# Patient Record
Sex: FEMALE | Race: WHITE | NOT HISPANIC OR LATINO | Employment: OTHER | URBAN - METROPOLITAN AREA
[De-identification: names, ages, dates, MRNs, and addresses within clinical notes are randomized per-mention and may not be internally consistent; named-entity substitution may affect disease eponyms.]

---

## 2017-03-03 ENCOUNTER — OFFICE VISIT - HEALTHEAST (OUTPATIENT)
Dept: CARDIOLOGY | Facility: CLINIC | Age: 62
End: 2017-03-03

## 2017-03-03 ENCOUNTER — AMBULATORY - HEALTHEAST (OUTPATIENT)
Dept: CARDIOLOGY | Facility: CLINIC | Age: 62
End: 2017-03-03

## 2017-03-03 DIAGNOSIS — I10 ESSENTIAL HYPERTENSION: ICD-10-CM

## 2017-03-03 DIAGNOSIS — I48.20 CHRONIC ATRIAL FIBRILLATION (H): ICD-10-CM

## 2017-03-03 RX ORDER — LOSARTAN POTASSIUM AND HYDROCHLOROTHIAZIDE 12.5; 5 MG/1; MG/1
1 TABLET ORAL DAILY
Status: SHIPPED | COMMUNITY
Start: 2017-03-03 | End: 2021-08-21

## 2017-03-03 ASSESSMENT — MIFFLIN-ST. JEOR: SCORE: 1759.91

## 2017-03-15 ENCOUNTER — HOSPITAL ENCOUNTER (OUTPATIENT)
Dept: CARDIOLOGY | Facility: HOSPITAL | Age: 62
Discharge: HOME | End: 2017-03-15
Attending: INTERNAL MEDICINE

## 2017-03-15 DIAGNOSIS — I48.20 CHRONIC ATRIAL FIBRILLATION (H): ICD-10-CM

## 2017-04-24 ENCOUNTER — COMMUNICATION - HEALTHEAST (OUTPATIENT)
Dept: CARDIOLOGY | Facility: CLINIC | Age: 62
End: 2017-04-24

## 2017-04-24 ENCOUNTER — OFFICE VISIT - HEALTHEAST (OUTPATIENT)
Dept: CARDIOLOGY | Facility: CLINIC | Age: 62
End: 2017-04-24

## 2017-04-24 DIAGNOSIS — I10 ESSENTIAL HYPERTENSION: ICD-10-CM

## 2017-04-24 DIAGNOSIS — I48.20 CHRONIC ATRIAL FIBRILLATION (H): ICD-10-CM

## 2017-04-24 ASSESSMENT — MIFFLIN-ST. JEOR: SCORE: 1728.16

## 2017-04-26 ENCOUNTER — COMMUNICATION - HEALTHEAST (OUTPATIENT)
Dept: CARDIOLOGY | Facility: CLINIC | Age: 62
End: 2017-04-26

## 2017-04-26 DIAGNOSIS — I48.20 CHRONIC ATRIAL FIBRILLATION (H): ICD-10-CM

## 2017-04-27 ENCOUNTER — COMMUNICATION - HEALTHEAST (OUTPATIENT)
Dept: CARDIOLOGY | Facility: CLINIC | Age: 62
End: 2017-04-27

## 2017-10-23 ENCOUNTER — COMMUNICATION - HEALTHEAST (OUTPATIENT)
Dept: CARDIOLOGY | Facility: CLINIC | Age: 62
End: 2017-10-23

## 2017-10-23 DIAGNOSIS — I48.20 CHRONIC ATRIAL FIBRILLATION (H): ICD-10-CM

## 2017-10-23 RX ORDER — METOPROLOL SUCCINATE 25 MG/1
TABLET, EXTENDED RELEASE ORAL
Qty: 90 TABLET | Refills: 2 | Status: ON HOLD | OUTPATIENT
Start: 2017-10-23 | End: 2023-05-04

## 2018-03-22 ENCOUNTER — RECORDS - HEALTHEAST (OUTPATIENT)
Dept: LAB | Facility: CLINIC | Age: 63
End: 2018-03-22

## 2018-03-22 LAB
ANION GAP SERPL CALCULATED.3IONS-SCNC: 10 MMOL/L (ref 5–18)
BUN SERPL-MCNC: 17 MG/DL (ref 8–22)
CALCIUM SERPL-MCNC: 9 MG/DL (ref 8.5–10.5)
CHLORIDE BLD-SCNC: 106 MMOL/L (ref 98–107)
CO2 SERPL-SCNC: 25 MMOL/L (ref 22–31)
CREAT SERPL-MCNC: 0.59 MG/DL (ref 0.6–1.1)
GFR SERPL CREATININE-BSD FRML MDRD: >60 ML/MIN/1.73M2
GLUCOSE BLD-MCNC: 119 MG/DL (ref 70–125)
POTASSIUM BLD-SCNC: 3.4 MMOL/L (ref 3.5–5)
SODIUM SERPL-SCNC: 141 MMOL/L (ref 136–145)

## 2018-08-28 ENCOUNTER — COMMUNICATION - HEALTHEAST (OUTPATIENT)
Dept: ADMINISTRATIVE | Facility: CLINIC | Age: 63
End: 2018-08-28

## 2018-10-19 ENCOUNTER — COMMUNICATION - HEALTHEAST (OUTPATIENT)
Dept: CARDIOLOGY | Facility: CLINIC | Age: 63
End: 2018-10-19

## 2019-11-21 ENCOUNTER — OFFICE VISIT - HEALTHEAST (OUTPATIENT)
Dept: CARDIOLOGY | Facility: CLINIC | Age: 64
End: 2019-11-21

## 2019-11-21 DIAGNOSIS — I48.19 PERSISTENT ATRIAL FIBRILLATION (H): ICD-10-CM

## 2019-11-21 DIAGNOSIS — I10 HYPERTENSION, UNSPECIFIED TYPE: ICD-10-CM

## 2019-11-21 RX ORDER — OLMESARTAN MEDOXOMIL AND HYDROCHLOROTHIAZIDE 20/12.5 20; 12.5 MG/1; MG/1
1 TABLET ORAL
Status: SHIPPED | COMMUNITY
Start: 2019-09-11 | End: 2021-08-21

## 2019-12-18 ENCOUNTER — HOSPITAL ENCOUNTER (OUTPATIENT)
Dept: CARDIOLOGY | Facility: HOSPITAL | Age: 64
Discharge: HOME | End: 2019-12-18
Attending: INTERNAL MEDICINE

## 2019-12-18 DIAGNOSIS — I48.19 PERSISTENT ATRIAL FIBRILLATION (H): ICD-10-CM

## 2021-05-24 ENCOUNTER — RECORDS - HEALTHEAST (OUTPATIENT)
Dept: ADMINISTRATIVE | Facility: CLINIC | Age: 66
End: 2021-05-24

## 2021-05-25 ENCOUNTER — RECORDS - HEALTHEAST (OUTPATIENT)
Dept: ADMINISTRATIVE | Facility: CLINIC | Age: 66
End: 2021-05-25

## 2021-05-26 ENCOUNTER — OFFICE VISIT - HEALTHEAST (OUTPATIENT)
Dept: CARDIOLOGY | Facility: CLINIC | Age: 66
End: 2021-05-26

## 2021-05-26 DIAGNOSIS — I10 ESSENTIAL HYPERTENSION: ICD-10-CM

## 2021-05-26 DIAGNOSIS — I48.21 PERMANENT ATRIAL FIBRILLATION (H): ICD-10-CM

## 2021-05-26 DIAGNOSIS — I07.1 TRICUSPID VALVE INSUFFICIENCY, UNSPECIFIED ETIOLOGY: ICD-10-CM

## 2021-05-26 ASSESSMENT — MIFFLIN-ST. JEOR: SCORE: 1868.78

## 2021-05-27 ENCOUNTER — RECORDS - HEALTHEAST (OUTPATIENT)
Dept: ADMINISTRATIVE | Facility: CLINIC | Age: 66
End: 2021-05-27

## 2021-05-28 ENCOUNTER — RECORDS - HEALTHEAST (OUTPATIENT)
Dept: ADMINISTRATIVE | Facility: CLINIC | Age: 66
End: 2021-05-28

## 2021-05-30 VITALS — BODY MASS INDEX: 38.34 KG/M2 | WEIGHT: 253 LBS | HEIGHT: 68 IN

## 2021-05-30 VITALS — BODY MASS INDEX: 39.4 KG/M2 | WEIGHT: 260 LBS | HEIGHT: 68 IN

## 2021-06-02 ENCOUNTER — RECORDS - HEALTHEAST (OUTPATIENT)
Dept: ADMINISTRATIVE | Facility: CLINIC | Age: 66
End: 2021-06-02

## 2021-06-03 ENCOUNTER — RECORDS - HEALTHEAST (OUTPATIENT)
Dept: ADMINISTRATIVE | Facility: CLINIC | Age: 66
End: 2021-06-03

## 2021-06-04 VITALS
RESPIRATION RATE: 18 BRPM | HEART RATE: 99 BPM | SYSTOLIC BLOOD PRESSURE: 124 MMHG | BODY MASS INDEX: 40.97 KG/M2 | OXYGEN SATURATION: 97 % | DIASTOLIC BLOOD PRESSURE: 72 MMHG | WEIGHT: 265.5 LBS

## 2021-06-22 ENCOUNTER — HOSPITAL ENCOUNTER (OUTPATIENT)
Dept: CARDIOLOGY | Facility: HOSPITAL | Age: 66
Discharge: HOME | End: 2021-06-22
Attending: INTERNAL MEDICINE
Payer: MEDICARE

## 2021-06-22 DIAGNOSIS — I07.1 TRICUSPID VALVE INSUFFICIENCY, UNSPECIFIED ETIOLOGY: ICD-10-CM

## 2021-06-22 DIAGNOSIS — I48.21 PERMANENT ATRIAL FIBRILLATION (H): ICD-10-CM

## 2021-06-22 DIAGNOSIS — I10 ESSENTIAL HYPERTENSION: ICD-10-CM

## 2021-06-22 LAB
AORTIC ROOT: 3.5 CM
AORTIC VALVE MEAN VELOCITY: 168 CM/S
ASCENDING AORTA: 3.7 CM
AV DIMENSIONLESS INDEX VTI: 0.3
AV MEAN GRADIENT: 13 MMHG
AV PEAK GRADIENT: 26.8 MMHG
AV VALVE AREA: 0.9 CM2
BSA FOR ECHO PROCEDURE: 2.47 M2
CV BLOOD PRESSURE: ABNORMAL MMHG
CV ECHO HEIGHT: 67.5 IN
CV ECHO WEIGHT: 284 LBS
DOP CALC AO PEAK VEL: 259 CM/S
DOP CALC AO VTI: 58.5 CM
DOP CALC LVOT AREA: 3.14 CM2
DOP CALC LVOT DIAMETER: 2 CM
DOP CALC LVOT STROKE VOLUME: 51.8 CM3
DOP CALC MV VTI: 28.2 CM
DOP CALCLVOT PEAK VEL VTI: 16.5 CM
EJECTION FRACTION: 74 % (ref 55–75)
FRACTIONAL SHORTENING: 27.1 % (ref 28–44)
INTERVENTRICULAR SEPTUM IN END DIASTOLE: 0.9 CM (ref 0.6–0.9)
IVS/PW RATIO: 0.8
LA AREA 1: 23.9 CM2
LA AREA 2: 23.4 CM2
LEFT ATRIUM LENGTH: 5.47 CM
LEFT ATRIUM SIZE: 5.3 CM
LEFT ATRIUM TO AORTIC ROOT RATIO: 1.51 NO UNITS
LEFT ATRIUM VOLUME INDEX: 35.2 ML/M2
LEFT ATRIUM VOLUME: 86.9 ML
LEFT VENTRICLE CARDIAC INDEX: 2 L/MIN/M2
LEFT VENTRICLE CARDIAC OUTPUT: 4.9 L/MIN
LEFT VENTRICLE DIASTOLIC VOLUME INDEX: 20.2 CM3/M2 (ref 29–61)
LEFT VENTRICLE DIASTOLIC VOLUME: 50 CM3 (ref 46–106)
LEFT VENTRICLE HEART RATE: 94 BPM
LEFT VENTRICLE MASS INDEX: 73.6 G/M2
LEFT VENTRICLE SYSTOLIC VOLUME INDEX: 5.3 CM3/M2 (ref 8–24)
LEFT VENTRICLE SYSTOLIC VOLUME: 13 CM3 (ref 14–42)
LEFT VENTRICULAR INTERNAL DIMENSION IN DIASTOLE: 4.8 CM (ref 3.8–5.2)
LEFT VENTRICULAR INTERNAL DIMENSION IN SYSTOLE: 3.5 CM (ref 2.2–3.5)
LEFT VENTRICULAR MASS: 181.9 G
LEFT VENTRICULAR OUTFLOW TRACT MEAN GRADIENT: 1 MMHG
LEFT VENTRICULAR OUTFLOW TRACT MEAN VELOCITY: 43.8 CM/S
LEFT VENTRICULAR POSTERIOR WALL IN END DIASTOLE: 1.2 CM (ref 0.6–0.9)
LV STROKE VOLUME INDEX: 21 ML/M2
MITRAL VALVE MEAN INFLOW VELOCITY: 48.3 CM/S
MITRAL VALVE PEAK VELOCITY: 124 CM/S
MV AREA VTI: 1.84 CM2
MV DECELERATION TIME: 197 MS
MV MEAN GRADIENT: 1 MMHG
MV PEAK E VELOCITY: 139 CM/S
MV PEAK GRADIENT: 6.2 MMHG
MV VALVE AREA BY CONTINUITY EQUATION: 1.8 CM2
NUC REST DIASTOLIC VOLUME INDEX: 4544 LBS
NUC REST SYSTOLIC VOLUME INDEX: 67.5 IN
PV ACCELERATION TIME: 53 MS
TRICUSPID REGURGITATION PEAK PRESSURE GRADIENT: 33.4 MMHG
TRICUSPID VALVE ANULAR PLANE SYSTOLIC EXCURSION: 2.1 CM
TRICUSPID VALVE PEAK REGURGITANT VELOCITY: 289 CM/S

## 2021-06-22 ASSESSMENT — MIFFLIN-ST. JEOR: SCORE: 1868.78

## 2021-06-25 NOTE — PROGRESS NOTES
Progress Notes by Adia Rocha MD at 4/24/2017  8:30 AM     Author: Adia Rocha MD Service: -- Author Type: Physician    Filed: 4/24/2017  8:57 AM Encounter Date: 4/24/2017 Status: Signed    : Adia Rocha MD (Physician)                  [email or web address]           Thank you Dr. Dougherty for asking the [facility] Heart Care team to participate in the care of your patient, Misty Quezada.     Impression and Plan     1. Atrial fibrillation. Misty has relatively recent diagnosis of atrial fibrillation. This was diagnosed within the last 6 months. Esther [id number] Score is 2 yielding an annual embolic risk of 2.2-2.9%. He has been initiated on apixaban (Eliquis ) for CVA prophylaxis. Her ventricular response would appear to be well controlled.  Today, I did readdress with Misty the possibility of pursuing direct current cardioversion.  She continues to be somewhat reticent to pursue and would rather defer.  Plan:    Continue metoprolol succinate.    Continue apixaban (Eliquis ) for CVA prophylaxis.     2.  Hypertension.  Blood pressure is very reasonable in the office today.    Plan on follow-up in 6 months.         History of Present Illness    Once again I would like to thank you again for asking me to participate in the care of your patient, Misty Quezada.  As you know, but to reiterate for my own records, Misty Quezada is a 61 y.o. female with relatively recent diagnosis of atrial fibrillation. Patient indicates that this was identified somewhat incidentally. She states that she is unaware of the rhythm disturbance. She specifically denies subjective palpitations. She reports no lightheadedness. She also denies any chest pain, shortness of breath, or subjective decline in exercise tolerance from baseline but does indicate that she is somewhat limited due to her weight and some mild orthopedic issues.    Further review of systems is otherwise negative/noncontributory  "(medical record and 13 point review of systems reviewed as well and pertinent positives noted).         Cardiac Diagnostics      Echocardiogram 9 February 2017:  1. Normal left ventricular size and systolic performance with ejection fraction of 50-55%.  2. Mild concentric increased left ventricular wall thickness.  3. Moderate tricuspid insufficiency.  4. Mild left atrial enlargement. Moderate right atrial enlargement.     24-hour Holter monitor 15 March 2017:  1. Persistent atrial fibrillation with mildly elevated ventricular response.         Physical Examination       /70 (Patient Site: Left Arm, Patient Position: Sitting, Cuff Size: Adult Large)  Pulse 76  Resp 16  Ht 5' 7.5\" (1.715 m)  Wt (!) 253 lb (114.8 kg)  BMI 39.04 kg/m2        Wt Readings from Last 3 Encounters:   04/24/17 (!) 253 lb (114.8 kg)   03/03/17 (!) 260 lb (117.9 kg)     The patient is alert and oriented times three. Sclerae are anicteric. Mucosal membranes are moist. Jugular venous pressure is normal. No significant adenopathy/thyromegally appreciated. Lungs are clear with good expansion. On cardiovascular exam, the patient has an irregular S1 and S2. Abdomen is soft and non-tender. Extremities reveal no clubbing, cyanosis, or edema.         Family History/Social History/Risk Factors   Patient does not smoke.  Family history of hypertension.         Medications  Allergies   Current Outpatient Prescriptions   Medication Sig Dispense Refill   ? apixaban (ELIQUIS) 5 mg Tab tablet Take 5 mg by mouth 2 (two) times a day.     ? CALCIUM/D3/MAG OX//TYRONE/ZN (CALTRATE + D3 PLUS MINERALS ORAL) Take 1 tablet by mouth daily.     ? cholecalciferol, vitamin D3, 2,000 unit Tab Take 2,000 Units by mouth daily.     ? cyanocobalamin 1000 MCG tablet Take 1,000 mcg by mouth daily.     ? losartan-hydrochlorothiazide (HYZAAR) 50-12.5 mg per tablet Take 1 tablet by mouth daily.     ? metoprolol succinate (TOPROL-XL) 25 MG Take 12.5 mg by mouth daily.   "   ? MULTIVIT WITH CALCIUM,IRON,MIN (WOMEN'S DAILY MULTIVITAMIN ORAL) Take 1 tablet by mouth daily.       No current facility-administered medications for this visit.       No Known Allergies       Lab Results   Lab Results   Component Value Date     09/09/2016    K 3.7 09/09/2016     09/09/2016    CO2 27 09/09/2016    BUN 14 09/09/2016    CREATININE 0.73 09/09/2016    CALCIUM 9.2 09/09/2016     Lab Results   Component Value Date    CHOL 163 09/09/2016    TRIG 81 09/09/2016    HDL 42 (L) 09/09/2016    LDLCALC 105 09/09/2016     Lab Results   Component Value Date    TSH 4.15 09/09/2016

## 2021-06-25 NOTE — PROGRESS NOTES
Progress Notes by Adia Rocha MD at 3/3/2017  9:10 AM     Author: Adia Rocha MD Service: -- Author Type: Physician    Filed: 3/3/2017  9:52 AM Encounter Date: 3/3/2017 Status: Signed    : Adia Rocha MD (Physician)        `        [email or web address]            Thank you Dr. Dougherty for asking the [facility] Heart Care team to participate in the care of your patient, Misty Quezada.     Impression and Plan     1.  Atrial fibrillation.  Misty has relatively recent diagnosis of atrial fibrillation.  This was diagnosed within the last 6 months. Rut [id number] Score is 2 yielding an annual embolic risk of 2.2-2.9%.  He has been initiated on apixaban (Eliquis ) for CVA prophylaxis.  Her ventricular response would appear to be well controlled on examination and also on ECG.  Patient is essentially asymptomatic with the rhythm disturbance.  I did discuss with her pursuing at least one attempt at direct-current cardioversion.  She had first blush was somewhat reticent to pursue this though did not entirely rule out this option.  She ultimately wanted to think about this some more.  Parenthetically I did discuss possible sleep apnea with the patient.  She states that she lives with her 2 sons and she has not been reported to have prominent snoring.  She also minimizes any subjective daytime somnolence.  Recommend:    24-hour Holter monitor to ensure the ventricular response is indeed well-controlled particularly throughout her activities of daily living.    Continue metoprolol succinate.    Continue apixaban (Eliquis )  for CVA prophylaxis.    Plan on follow-up in 6 weeks.         History of Present Illness    Once again I would like to thank you again for asking me to participate in the care of your patient, Misty Quezada.  As you know, but to reiterate for my own records, Misty Quezada is a 61 y.o. female with relatively recent diagnosis of atrial fibrillation.   "Patient indicates that this was identified somewhat incidentally.  She states that she is unaware of the rhythm disturbance.  She specifically denies subjective palpitations.  She reports no lightheadedness.  She also denies any chest pain, shortness of breath, or subjective decline in exercise tolerance from baseline but does indicate that she is somewhat limited due to her weight and some mild orthopedic issues.    Further review of systems is otherwise negative/noncontributory (medical record and 13 point review of systems reviewed as well and pertinent positives noted).         Cardiac Diagnostics     Echocardiogram 9 February 2017:  1. Normal left ventricular size and systolic performance with ejection fraction of 50-55%.  2. Mild concentric increased left ventricular wall thickness.  3. Moderate tricuspid insufficiency.  4. Mild left atrial enlargement.  Moderate right atrial enlargement.    12-lead ECG 24 August 2016 (personally reviewed): Atrial fibrillation with heart rate of 106 bpm.  Somewhat low voltage.  Nonspecific T-wave changes.         Physical Examination         Visit Vitals   ? BP 96/80 (Patient Site: Right Arm, Patient Position: Sitting, Cuff Size: Adult Large)   ? Pulse 76   ? Resp 16   ? Ht 5' 7.5\" (1.715 m)   ? Wt (!) 260 lb (117.9 kg)   ? BMI 40.12 kg/m2           Wt Readings from Last 3 Encounters:   03/03/17 (!) 260 lb (117.9 kg)     The patient is alert and oriented times three. Sclerae are anicteric. Mucosal membranes are moist. Jugular venous pressure is normal. No significant adenopathy/thyromegally appreciated. Lungs are clear with good expansion. On cardiovascular exam, the patient has an irregular S1 and S2.  1-2/6 systolic murmur.  Abdomen is soft and non-tender. Extremities reveal no clubbing, cyanosis, or edema.         Family History/Social History/Risk Factors     She does not smoke.  Father had a history of brain aneurysm.  Mother with history of hypertension.  Patient works " as a  at a [employer].  She lives with her 2 sons.  In her spare time she enjoys reading.         Medications  Allergies   Current Outpatient Prescriptions   Medication Sig Dispense Refill   ? apixaban (ELIQUIS) 5 mg Tab tablet Take 5 mg by mouth 2 (two) times a day.     ? aspirin 81 MG EC tablet Take 81 mg by mouth daily.     ? CALCIUM/D3/MAG OX//TYRONE/ZN (CALTRATE + D3 PLUS MINERALS ORAL) Take 1 tablet by mouth daily.     ? cholecalciferol, vitamin D3, 2,000 unit Tab Take 2,000 Units by mouth daily.     ? cyanocobalamin 1000 MCG tablet Take 1,000 mcg by mouth daily.     ? losartan-hydrochlorothiazide (HYZAAR) 50-12.5 mg per tablet Take 1 tablet by mouth daily.     ? metoprolol succinate (TOPROL-XL) 25 MG Take 12.5 mg by mouth daily.     ? MULTIVIT WITH CALCIUM,IRON,MIN (WOMEN'S DAILY MULTIVITAMIN ORAL) Take 1 tablet by mouth daily.       No current facility-administered medications for this visit.       No Known Allergies       Lab Results   Lab Results   Component Value Date     09/09/2016    K 3.7 09/09/2016     09/09/2016    CO2 27 09/09/2016    BUN 14 09/09/2016    CREATININE 0.73 09/09/2016    CALCIUM 9.2 09/09/2016     Lab Results   Component Value Date    CHOL 163 09/09/2016    TRIG 81 09/09/2016    HDL 42 (L) 09/09/2016    LDLCALC 105 09/09/2016     Lab Results   Component Value Date    TSH 4.15 09/09/2016           Medical History  Surgical History   Hypertension  Anxiety  Obesity   Status post tonsillectomy/adenoidectomy  Status post ventral hernia repair  Status post appendectomy  Status post Muna-en-Y bypass  Status post dilation/curettage

## 2021-06-28 NOTE — PROGRESS NOTES
Progress Notes by Adia Rocha MD at 11/21/2019  1:30 PM     Author: Adia Rocha MD Service: -- Author Type: Physician    Filed: 11/21/2019  1:46 PM Encounter Date: 11/21/2019 Status: Signed    : Adia Rocha MD (Physician)                                       Thank you Dr. Chavez for asking the [facility] Heart Care team to participate in the care of your patient, Misty Quezada.     Impression and Plan     1. Atrial fibrillation. Misty has relatively recent diagnosis of atrial fibrillation. This was diagnosed within the last 6 months. Miguel [id number] Score is 2 yielding an annual embolic risk of 2.2-2.9%. He has been initiated on apixaban (Eliquis ) for CVA prophylaxis. Her ventricular response would appear to be well fairly well controlled.  Misty historically has been reticent to pursue cardioversion.  Plan:    Continue metoprolol succinate.    Continue apixaban (Eliquis ) for CVA prophylaxis.      At this time, would like to obtain a 24-hour Holter monitor to ensure ventricular response is indeed well controlled particularly with activity.     2.  Hypertension.  Blood pressure is reasonable in the office today at 124/72 mmHg.     Plan on follow-up in one year           History of Present Illness    Once again I would like to thank you again for asking me to participate in the care of your patient, Misty Quezada.  As you know, but to reiterate for my own records, Misty Quezada is a 64 y.o. female with chronic/persistent atrial fibrillation. Patient indicates that this was identified somewhat incidentally. She states that she is unaware of the rhythm disturbance. She specifically denies subjective palpitations. She reports no lightheadedness. She also denies any chest pain, shortness of breath, or subjective decline in exercise tolerance from baseline.    Further review of systems is otherwise negative/noncontributory (medical record and 13 point review of systems  reviewed as well and pertinent positives noted).         Cardiac Diagnostics      Echocardiogram 9 February 2017:  1. Normal left ventricular size and systolic performance with ejection fraction of 50-55%.  2. Mild concentric increased left ventricular wall thickness.  3. Moderate tricuspid insufficiency.  4. Mild left atrial enlargement. Moderate right atrial enlargement.    24-hour Holter monitor 15 March 2017:  1. Persistent atrial fibrillation with mildly elevated ventricular response.           Physical Examination       /72 (Patient Site: Right Arm, Patient Position: Sitting, Cuff Size: Adult Large)   Pulse 99   Resp 18   Wt (!) 265 lb 8 oz (120.4 kg)   SpO2 97%   BMI 40.97 kg/m          Wt Readings from Last 3 Encounters:   11/21/19 (!) 265 lb 8 oz (120.4 kg)   04/24/17 (!) 253 lb (114.8 kg)   03/03/17 (!) 260 lb (117.9 kg)     The patient is alert and oriented times three. Sclerae are anicteric. Mucosal membranes are moist. Jugular venous pressure is normal. No significant adenopathy/thyromegally appreciated. Lungs are clear with good expansion. On cardiovascular exam, the patient has an irregular S1 and S2. Abdomen is soft and non-tender. Extremities reveal no clubbing, cyanosis, or edema.         Family History/Social History/Risk Factors   Patient does not smoke.  Family history of hypertension.         Medications  Allergies   Current Outpatient Medications   Medication Sig Dispense Refill   ? apixaban (ELIQUIS) 5 mg Tab tablet Take 5 mg by mouth 2 (two) times a day.     ? CALCIUM/D3/MAG OX//TYRONE/ZN (CALTRATE + D3 PLUS MINERALS ORAL) Take 1 tablet by mouth daily.     ? cholecalciferol, vitamin D3, 2,000 unit Tab Take 2,000 Units by mouth daily.     ? cyanocobalamin 1000 MCG tablet Take 1,000 mcg by mouth daily.     ? losartan-hydrochlorothiazide (HYZAAR) 50-12.5 mg per tablet Take 1 tablet by mouth daily.     ? metoprolol succinate (TOPROL-XL) 25 MG TAKE 1 TABLET BY MOUTH DAILY. 90 tablet 2    ? MULTIVIT WITH CALCIUM,IRON,MIN (WOMEN'S DAILY MULTIVITAMIN ORAL) Take 1 tablet by mouth daily.     ? olmesartan-hydrochlorothiazide (BENICAR HCT) 20-12.5 mg per tablet Take 1 tablet by mouth.       No current facility-administered medications for this visit.       No Known Allergies       Lab Results   Lab Results   Component Value Date     03/22/2018    K 3.4 (L) 03/22/2018     03/22/2018    CO2 25 03/22/2018    BUN 17 03/22/2018    CREATININE 0.59 (L) 03/22/2018    CALCIUM 9.0 03/22/2018     Lab Results   Component Value Date    CHOL 163 09/09/2016    TRIG 81 09/09/2016    HDL 42 (L) 09/09/2016    LDLCALC 105 09/09/2016     Lab Results   Component Value Date    TSH 4.15 09/09/2016

## 2021-07-04 NOTE — PROGRESS NOTES
Progress Notes by Adia Rocha MD at 5/26/2021  1:10 PM     Author: Adia Rocha MD Service: -- Author Type: Physician    Filed: 5/26/2021  1:44 PM Encounter Date: 5/26/2021 Status: Signed    : Adia Rocha MD (Physician)                                       Thank you Dr. Chavez for asking the [facility] Heart Care team to participate in the care of your patient, Misty Quezada.     Impression and Plan        1. Atrial fibrillation (permanent). Misty has relatively recent diagnosis of atrial fibrillation. This was diagnosed within the last 6 months. Esther [id number] Score is 2 yielding an annual embolic risk of 2.2-2.9%. He has been initiated on apixaban (Eliquis ) for CVA prophylaxis. Her ventricular response would appear to be well fairly well controlled in the clinic today and prior Holter monitor 18 December 2019 also confirmed controlled ventricular response.  Plan:    Continue metoprolol succinate.    Continue apixaban (Eliquis ) for CVA prophylaxis.       2.  Hypertension.  Blood pressure is reasonable in the office today at 124/72 mmHg.    3.  Tricuspid insufficiency.  This is felt moderate in degree on echocardiogram February 2017.    Plan to obtain repeat echocardiogram to rule out significant progression.     Plan on follow-up in one year.      25 minutes spent reviewing prior records (including documentation, laboratory studies, cardiac testing/imaging), interview with patient along with physical exam, planning, and subsequent documentation/crafting of note.           History of Present Illness    Once again I would like to thank you again for asking me to participate in the care of your patient, Misty Quezada.  As you know, but to reiterate for my own records, Misty Quezada is a 65 y.o. female with chronic/persistent atrial fibrillation. Patient indicates that this was identified somewhat incidentally. She states that she is unaware of the rhythm  "disturbance. She specifically denies subjective palpitations. She reports no lightheadedness. She also denies any chest pain, shortness of breath, or subjective decline in exercise tolerance from her usual baseline.  Denies fevers, chills, or other constitutional symptoms.    Further review of systems is otherwise negative/noncontributory (medical record and 13 point review of systems reviewed as well and pertinent positives noted).         Cardiac Diagnostics      Echocardiogram 9 February 2017:  1. Normal left ventricular size and systolic performance with ejection fraction of 50-55%.  2. Mild concentric increased left ventricular wall thickness.  3. Moderate tricuspid insufficiency.  4. Mild left atrial enlargement. Moderate right atrial enlargement.    24-hour Holter monitor 18 December 2019:  1. Persistent atrial fibrillation with controlled ventricular response.    24-hour Holter monitor 15 March 2017:  1. Persistent atrial fibrillation with mildly elevated ventricular response.           Physical Examination       /85 (Patient Site: Right Arm, Patient Position: Sitting, Cuff Size: Adult Large)   Pulse 88   Resp 16   Ht 5' 7.5\" (1.715 m)   Wt (!) 284 lb (128.8 kg) Comment: With shoes.  BMI 43.82 kg/m          Wt Readings from Last 3 Encounters:   05/26/21 (!) 284 lb (128.8 kg)   11/21/19 (!) 265 lb 8 oz (120.4 kg)   04/24/17 (!) 253 lb (114.8 kg)     The patient is alert and oriented times three. Sclerae are anicteric. Mucosal membranes are moist. Jugular venous pressure is normal. No significant adenopathy/thyromegally appreciated. Lungs are clear with good expansion. On cardiovascular exam, the patient has a an irregular regular S1 and S2. Abdomen is soft and non-tender. Extremities reveal no clubbing, cyanosis, or edema.         Family History/Social History/Risk Factors   Patient does not smoke.  Family history of hypertension in father.         Medications  Allergies   Current Outpatient " Medications   Medication Sig Dispense Refill   ? apixaban (ELIQUIS) 5 mg Tab tablet Take 5 mg by mouth 2 (two) times a day.     ? CALCIUM/D3/MAG OX//TYRONE/ZN (CALTRATE + D3 PLUS MINERALS ORAL) Take 1 tablet by mouth daily.     ? cholecalciferol, vitamin D3, 2,000 unit Tab Take 2,000 Units by mouth daily.     ? cyanocobalamin 1000 MCG tablet Take 1,000 mcg by mouth daily.     ? losartan-hydrochlorothiazide (HYZAAR) 50-12.5 mg per tablet Take 1 tablet by mouth daily.     ? metoprolol succinate (TOPROL-XL) 25 MG TAKE 1 TABLET BY MOUTH DAILY. 90 tablet 2   ? MULTIVIT WITH CALCIUM,IRON,MIN (WOMEN'S DAILY MULTIVITAMIN ORAL) Take 1 tablet by mouth daily.     ? olmesartan-hydrochlorothiazide (BENICAR HCT) 20-12.5 mg per tablet Take 1 tablet by mouth.       No current facility-administered medications for this visit.       No Known Allergies       Lab Results   Lab Results   Component Value Date     03/22/2018    K 3.4 (L) 03/22/2018     03/22/2018    CO2 25 03/22/2018    BUN 17 03/22/2018    CREATININE 0.59 (L) 03/22/2018    CALCIUM 9.0 03/22/2018     Lab Results   Component Value Date    CHOL 163 09/09/2016    TRIG 81 09/09/2016    HDL 42 (L) 09/09/2016    LDLCALC 105 09/09/2016     Lab Results   Component Value Date    TSH 4.15 09/09/2016

## 2021-07-06 VITALS
DIASTOLIC BLOOD PRESSURE: 85 MMHG | HEIGHT: 68 IN | SYSTOLIC BLOOD PRESSURE: 122 MMHG | WEIGHT: 284 LBS | RESPIRATION RATE: 16 BRPM | BODY MASS INDEX: 43.04 KG/M2 | HEART RATE: 88 BPM

## 2021-07-06 VITALS — HEIGHT: 68 IN | WEIGHT: 284 LBS | BODY MASS INDEX: 43.04 KG/M2

## 2021-07-13 ENCOUNTER — RECORDS - HEALTHEAST (OUTPATIENT)
Dept: ADMINISTRATIVE | Facility: CLINIC | Age: 66
End: 2021-07-13

## 2021-07-21 ENCOUNTER — RECORDS - HEALTHEAST (OUTPATIENT)
Dept: ADMINISTRATIVE | Facility: CLINIC | Age: 66
End: 2021-07-21

## 2021-08-21 ENCOUNTER — APPOINTMENT (OUTPATIENT)
Dept: RADIOLOGY | Facility: HOSPITAL | Age: 66
DRG: 871 | End: 2021-08-21
Payer: MEDICARE

## 2021-08-21 ENCOUNTER — APPOINTMENT (OUTPATIENT)
Dept: ULTRASOUND IMAGING | Facility: HOSPITAL | Age: 66
DRG: 871 | End: 2021-08-21
Payer: MEDICARE

## 2021-08-21 ENCOUNTER — HOSPITAL ENCOUNTER (INPATIENT)
Facility: HOSPITAL | Age: 66
LOS: 6 days | Discharge: NURSING, REHAB OR LONG-TERM CARE | DRG: 871 | End: 2021-08-27
Attending: EMERGENCY MEDICINE | Admitting: INTERNAL MEDICINE
Payer: MEDICARE

## 2021-08-21 DIAGNOSIS — R06.00 DYSPNEA: Primary | ICD-10-CM

## 2021-08-21 DIAGNOSIS — I48.91 ATRIAL FIBRILLATION WITH RAPID VENTRICULAR RESPONSE (H): ICD-10-CM

## 2021-08-21 DIAGNOSIS — R79.89 ELEVATED TROPONIN: ICD-10-CM

## 2021-08-21 DIAGNOSIS — L03.116 CELLULITIS OF LEFT LEG: ICD-10-CM

## 2021-08-21 DIAGNOSIS — A41.9 SEPSIS (H): ICD-10-CM

## 2021-08-21 DIAGNOSIS — J20.9 ACUTE BRONCHITIS, UNSPECIFIED ORGANISM: ICD-10-CM

## 2021-08-21 LAB
ANION GAP SERPL CALCULATED.3IONS-SCNC: 10 MMOL/L (ref 5–18)
APTT PPP: 29 SECONDS (ref 22–38)
BNP SERPL-MCNC: 281 PG/ML (ref 0–112)
BUN SERPL-MCNC: 26 MG/DL (ref 8–22)
CALCIUM SERPL-MCNC: 10.1 MG/DL (ref 8.5–10.5)
CHLORIDE BLD-SCNC: 106 MMOL/L (ref 98–107)
CO2 SERPL-SCNC: 22 MMOL/L (ref 22–31)
CREAT SERPL-MCNC: 0.88 MG/DL (ref 0.6–1.1)
ERYTHROCYTE [DISTWIDTH] IN BLOOD BY AUTOMATED COUNT: 14.8 % (ref 10–15)
GFR SERPL CREATININE-BSD FRML MDRD: 68 ML/MIN/1.73M2
GLUCOSE BLD-MCNC: 129 MG/DL (ref 70–125)
HCT VFR BLD AUTO: 44.2 % (ref 35–47)
HGB BLD-MCNC: 14.1 G/DL (ref 11.7–15.7)
INR PPP: 1.44 (ref 0.9–1.15)
LACTATE SERPL-SCNC: 1.6 MMOL/L (ref 0.7–2)
LACTATE SERPL-SCNC: 3.7 MMOL/L (ref 0.7–2)
MCH RBC QN AUTO: 30.4 PG (ref 26.5–33)
MCHC RBC AUTO-ENTMCNC: 31.9 G/DL (ref 31.5–36.5)
MCV RBC AUTO: 95 FL (ref 78–100)
PLATELET # BLD AUTO: 220 10E3/UL (ref 150–450)
POTASSIUM BLD-SCNC: 3.6 MMOL/L (ref 3.5–5)
RBC # BLD AUTO: 4.64 10E6/UL (ref 3.8–5.2)
SARS-COV-2 RNA RESP QL NAA+PROBE: NEGATIVE
SODIUM SERPL-SCNC: 138 MMOL/L (ref 136–145)
TROPONIN I SERPL-MCNC: 1.32 NG/ML (ref 0–0.29)
TROPONIN I SERPL-MCNC: 1.75 NG/ML (ref 0–0.29)
TROPONIN I SERPL-MCNC: 1.87 NG/ML (ref 0–0.29)
WBC # BLD AUTO: 17.2 10E3/UL (ref 4–11)

## 2021-08-21 PROCEDURE — 99291 CRITICAL CARE FIRST HOUR: CPT | Mod: 25

## 2021-08-21 PROCEDURE — 87149 DNA/RNA DIRECT PROBE: CPT | Performed by: PHYSICIAN ASSISTANT

## 2021-08-21 PROCEDURE — 250N000011 HC RX IP 250 OP 636: Performed by: PHYSICIAN ASSISTANT

## 2021-08-21 PROCEDURE — 84484 ASSAY OF TROPONIN QUANT: CPT | Performed by: HOSPITALIST

## 2021-08-21 PROCEDURE — 999N000127 HC STATISTIC PERIPHERAL IV START W US GUIDANCE

## 2021-08-21 PROCEDURE — 36415 COLL VENOUS BLD VENIPUNCTURE: CPT | Performed by: HOSPITALIST

## 2021-08-21 PROCEDURE — 96367 TX/PROPH/DG ADDL SEQ IV INF: CPT

## 2021-08-21 PROCEDURE — 258N000003 HC RX IP 258 OP 636: Performed by: EMERGENCY MEDICINE

## 2021-08-21 PROCEDURE — 96375 TX/PRO/DX INJ NEW DRUG ADDON: CPT

## 2021-08-21 PROCEDURE — C9803 HOPD COVID-19 SPEC COLLECT: HCPCS

## 2021-08-21 PROCEDURE — 258N000003 HC RX IP 258 OP 636: Performed by: PHYSICIAN ASSISTANT

## 2021-08-21 PROCEDURE — 96366 THER/PROPH/DIAG IV INF ADDON: CPT

## 2021-08-21 PROCEDURE — 93970 EXTREMITY STUDY: CPT

## 2021-08-21 PROCEDURE — 83605 ASSAY OF LACTIC ACID: CPT | Performed by: PHYSICIAN ASSISTANT

## 2021-08-21 PROCEDURE — 250N000013 HC RX MED GY IP 250 OP 250 PS 637: Performed by: HOSPITALIST

## 2021-08-21 PROCEDURE — 210N000001 HC R&B IMCU HEART CARE

## 2021-08-21 PROCEDURE — 87635 SARS-COV-2 COVID-19 AMP PRB: CPT | Performed by: PHYSICIAN ASSISTANT

## 2021-08-21 PROCEDURE — 36415 COLL VENOUS BLD VENIPUNCTURE: CPT | Performed by: PHYSICIAN ASSISTANT

## 2021-08-21 PROCEDURE — 85730 THROMBOPLASTIN TIME PARTIAL: CPT | Performed by: PHYSICIAN ASSISTANT

## 2021-08-21 PROCEDURE — 85610 PROTHROMBIN TIME: CPT | Performed by: PHYSICIAN ASSISTANT

## 2021-08-21 PROCEDURE — 250N000011 HC RX IP 250 OP 636: Performed by: HOSPITALIST

## 2021-08-21 PROCEDURE — 85027 COMPLETE CBC AUTOMATED: CPT | Performed by: PHYSICIAN ASSISTANT

## 2021-08-21 PROCEDURE — 83880 ASSAY OF NATRIURETIC PEPTIDE: CPT | Performed by: PHYSICIAN ASSISTANT

## 2021-08-21 PROCEDURE — 71046 X-RAY EXAM CHEST 2 VIEWS: CPT

## 2021-08-21 PROCEDURE — 250N000009 HC RX 250: Performed by: PHYSICIAN ASSISTANT

## 2021-08-21 PROCEDURE — 250N000011 HC RX IP 250 OP 636: Performed by: EMERGENCY MEDICINE

## 2021-08-21 PROCEDURE — 96365 THER/PROPH/DIAG IV INF INIT: CPT

## 2021-08-21 PROCEDURE — 250N000013 HC RX MED GY IP 250 OP 250 PS 637: Performed by: PHYSICIAN ASSISTANT

## 2021-08-21 PROCEDURE — 80048 BASIC METABOLIC PNL TOTAL CA: CPT | Performed by: PHYSICIAN ASSISTANT

## 2021-08-21 PROCEDURE — 250N000013 HC RX MED GY IP 250 OP 250 PS 637: Performed by: EMERGENCY MEDICINE

## 2021-08-21 PROCEDURE — 87186 SC STD MICRODIL/AGAR DIL: CPT | Performed by: PHYSICIAN ASSISTANT

## 2021-08-21 PROCEDURE — 99223 1ST HOSP IP/OBS HIGH 75: CPT | Performed by: HOSPITALIST

## 2021-08-21 PROCEDURE — 84484 ASSAY OF TROPONIN QUANT: CPT | Performed by: PHYSICIAN ASSISTANT

## 2021-08-21 PROCEDURE — 93005 ELECTROCARDIOGRAM TRACING: CPT | Performed by: PHYSICIAN ASSISTANT

## 2021-08-21 PROCEDURE — 99223 1ST HOSP IP/OBS HIGH 75: CPT | Performed by: INTERNAL MEDICINE

## 2021-08-21 RX ORDER — METOPROLOL TARTRATE 1 MG/ML
5 INJECTION, SOLUTION INTRAVENOUS ONCE
Status: DISCONTINUED | OUTPATIENT
Start: 2021-08-21 | End: 2021-08-21

## 2021-08-21 RX ORDER — ACETAMINOPHEN 650 MG/1
650 SUPPOSITORY RECTAL EVERY 6 HOURS PRN
Status: DISCONTINUED | OUTPATIENT
Start: 2021-08-21 | End: 2021-08-22

## 2021-08-21 RX ORDER — ACETAMINOPHEN 650 MG/1
650 SUPPOSITORY RECTAL EVERY 6 HOURS PRN
Status: DISCONTINUED | OUTPATIENT
Start: 2021-08-21 | End: 2021-08-21

## 2021-08-21 RX ORDER — ACETAMINOPHEN 325 MG/1
650 TABLET ORAL EVERY 6 HOURS PRN
Status: DISCONTINUED | OUTPATIENT
Start: 2021-08-21 | End: 2021-08-22

## 2021-08-21 RX ORDER — ACETAMINOPHEN 325 MG/1
650 TABLET ORAL EVERY 6 HOURS PRN
Status: DISCONTINUED | OUTPATIENT
Start: 2021-08-21 | End: 2021-08-21

## 2021-08-21 RX ORDER — METOPROLOL SUCCINATE 25 MG/1
25 TABLET, EXTENDED RELEASE ORAL ONCE
Status: COMPLETED | OUTPATIENT
Start: 2021-08-21 | End: 2021-08-21

## 2021-08-21 RX ORDER — CALCIUM CARBONATE 500 MG/1
1000 TABLET, CHEWABLE ORAL 4 TIMES DAILY PRN
Status: DISCONTINUED | OUTPATIENT
Start: 2021-08-21 | End: 2021-08-27 | Stop reason: HOSPADM

## 2021-08-21 RX ORDER — ACETAMINOPHEN 325 MG/1
650 TABLET ORAL ONCE
Status: COMPLETED | OUTPATIENT
Start: 2021-08-21 | End: 2021-08-21

## 2021-08-21 RX ORDER — METOPROLOL TARTRATE 1 MG/ML
5 INJECTION, SOLUTION INTRAVENOUS EVERY 5 MIN PRN
Status: COMPLETED | OUTPATIENT
Start: 2021-08-21 | End: 2021-08-21

## 2021-08-21 RX ORDER — SODIUM CHLORIDE 9 MG/ML
INJECTION, SOLUTION INTRAVENOUS ONCE
Status: COMPLETED | OUTPATIENT
Start: 2021-08-21 | End: 2021-08-21

## 2021-08-21 RX ORDER — OLMESARTAN MEDOXOMIL AND HYDROCHLOROTHIAZIDE 20/12.5 20; 12.5 MG/1; MG/1
1 TABLET ORAL DAILY
Status: ON HOLD | COMMUNITY
End: 2022-10-07

## 2021-08-21 RX ORDER — LIDOCAINE 40 MG/G
CREAM TOPICAL
Status: DISCONTINUED | OUTPATIENT
Start: 2021-08-21 | End: 2021-08-27 | Stop reason: HOSPADM

## 2021-08-21 RX ORDER — BISACODYL 10 MG
10 SUPPOSITORY, RECTAL RECTAL DAILY PRN
Status: DISCONTINUED | OUTPATIENT
Start: 2021-08-21 | End: 2021-08-27 | Stop reason: HOSPADM

## 2021-08-21 RX ORDER — FAMOTIDINE 20 MG/1
20 TABLET, FILM COATED ORAL 2 TIMES DAILY PRN
Status: DISCONTINUED | OUTPATIENT
Start: 2021-08-21 | End: 2021-08-27 | Stop reason: HOSPADM

## 2021-08-21 RX ORDER — ONDANSETRON 2 MG/ML
4 INJECTION INTRAMUSCULAR; INTRAVENOUS EVERY 6 HOURS PRN
Status: DISCONTINUED | OUTPATIENT
Start: 2021-08-21 | End: 2021-08-27 | Stop reason: HOSPADM

## 2021-08-21 RX ORDER — PIPERACILLIN SODIUM, TAZOBACTAM SODIUM 3; .375 G/15ML; G/15ML
3.38 INJECTION, POWDER, LYOPHILIZED, FOR SOLUTION INTRAVENOUS EVERY 8 HOURS
Status: DISCONTINUED | OUTPATIENT
Start: 2021-08-21 | End: 2021-08-24

## 2021-08-21 RX ORDER — ONDANSETRON 4 MG/1
4 TABLET, ORALLY DISINTEGRATING ORAL EVERY 6 HOURS PRN
Status: DISCONTINUED | OUTPATIENT
Start: 2021-08-21 | End: 2021-08-27 | Stop reason: HOSPADM

## 2021-08-21 RX ORDER — LANOLIN ALCOHOL/MO/W.PET/CERES
1000 CREAM (GRAM) TOPICAL DAILY
Status: DISCONTINUED | OUTPATIENT
Start: 2021-08-21 | End: 2021-08-27 | Stop reason: HOSPADM

## 2021-08-21 RX ORDER — PIPERACILLIN SODIUM, TAZOBACTAM SODIUM 3; .375 G/15ML; G/15ML
3.38 INJECTION, POWDER, LYOPHILIZED, FOR SOLUTION INTRAVENOUS ONCE
Status: COMPLETED | OUTPATIENT
Start: 2021-08-21 | End: 2021-08-21

## 2021-08-21 RX ORDER — CEFAZOLIN SODIUM 1 G/3ML
1 INJECTION, POWDER, FOR SOLUTION INTRAMUSCULAR; INTRAVENOUS ONCE
Status: DISCONTINUED | OUTPATIENT
Start: 2021-08-21 | End: 2021-08-21

## 2021-08-21 RX ORDER — POLYETHYLENE GLYCOL 3350 17 G/17G
17 POWDER, FOR SOLUTION ORAL DAILY PRN
Status: DISCONTINUED | OUTPATIENT
Start: 2021-08-21 | End: 2021-08-27 | Stop reason: HOSPADM

## 2021-08-21 RX ORDER — VITAMIN B COMPLEX
2000 TABLET ORAL DAILY
Status: DISCONTINUED | OUTPATIENT
Start: 2021-08-22 | End: 2021-08-27 | Stop reason: HOSPADM

## 2021-08-21 RX ORDER — METOPROLOL SUCCINATE 25 MG/1
25 TABLET, EXTENDED RELEASE ORAL DAILY
Status: DISCONTINUED | OUTPATIENT
Start: 2021-08-22 | End: 2021-08-27 | Stop reason: HOSPADM

## 2021-08-21 RX ORDER — METOPROLOL TARTRATE 1 MG/ML
5 INJECTION, SOLUTION INTRAVENOUS ONCE
Status: COMPLETED | OUTPATIENT
Start: 2021-08-21 | End: 2021-08-21

## 2021-08-21 RX ADMIN — APIXABAN 5 MG: 5 TABLET, FILM COATED ORAL at 10:58

## 2021-08-21 RX ADMIN — PIPERACILLIN SODIUM AND TAZOBACTAM SODIUM 3.38 G: 3; .375 INJECTION, POWDER, LYOPHILIZED, FOR SOLUTION INTRAVENOUS at 11:50

## 2021-08-21 RX ADMIN — METOPROLOL TARTRATE 5 MG: 5 INJECTION INTRAVENOUS at 09:48

## 2021-08-21 RX ADMIN — CYANOCOBALAMIN TAB 1000 MCG 1000 MCG: 1000 TAB at 18:46

## 2021-08-21 RX ADMIN — VANCOMYCIN HYDROCHLORIDE 2000 MG: 5 INJECTION, POWDER, LYOPHILIZED, FOR SOLUTION INTRAVENOUS at 12:44

## 2021-08-21 RX ADMIN — METOPROLOL TARTRATE 5 MG: 5 INJECTION INTRAVENOUS at 10:18

## 2021-08-21 RX ADMIN — ACETAMINOPHEN 650 MG: 325 TABLET ORAL at 19:53

## 2021-08-21 RX ADMIN — METOPROLOL TARTRATE 5 MG: 5 INJECTION INTRAVENOUS at 10:00

## 2021-08-21 RX ADMIN — SODIUM CHLORIDE: 9 INJECTION, SOLUTION INTRAVENOUS at 11:00

## 2021-08-21 RX ADMIN — PIPERACILLIN SODIUM AND TAZOBACTAM SODIUM 3.38 G: 3; .375 INJECTION, POWDER, LYOPHILIZED, FOR SOLUTION INTRAVENOUS at 18:42

## 2021-08-21 RX ADMIN — APIXABAN 5 MG: 5 TABLET, FILM COATED ORAL at 21:30

## 2021-08-21 RX ADMIN — METOPROLOL SUCCINATE 25 MG: 25 TABLET, EXTENDED RELEASE ORAL at 10:11

## 2021-08-21 RX ADMIN — ACETAMINOPHEN 650 MG: 325 TABLET ORAL at 11:04

## 2021-08-21 RX ADMIN — MICONAZOLE NITRATE: 20 POWDER TOPICAL at 22:24

## 2021-08-21 ASSESSMENT — MIFFLIN-ST. JEOR
SCORE: 1697.63
SCORE: 1845.87

## 2021-08-21 ASSESSMENT — ACTIVITIES OF DAILY LIVING (ADL): DEPENDENT_IADLS:: INDEPENDENT

## 2021-08-21 NOTE — CONSULTS
Care Management Initial Consult    General Information  Assessment completed with: Patient, pt  Type of CM/SW Visit: Initial Assessment    Primary Care Provider verified and updated as needed: Yes   Readmission within the last 30 days: no previous admission in last 30 days   Return Category: Progression of disease  Reason for Consult: discharge planning  Advance Care Planning: Advance Care Planning Reviewed: no concerns identified          Communication Assessment  Patient's communication style: spoken language (English or Bilingual)             Cognitive  Cognitive/Neuro/Behavioral: WDL                      Living Environment:   People in home: child(david), adult     Current living Arrangements:        Able to return to prior arrangements:         Family/Social Support:  Care provided by:    Provides care for:                  Description of Support System:           Current Resources:   Patient receiving home care services: No     Community Resources: DME  Equipment currently used at home: cane, straight  Supplies currently used at home: None    Employment/Financial:  Employment Status:          Financial Concerns: No concerns identified   Referral to Financial Counselor: Yes       Lifestyle & Psychosocial Needs:  Social Determinants of Health     Tobacco Use: Low Risk      Smoking Tobacco Use: Never Smoker     Smokeless Tobacco Use: Never Used   Alcohol Use:      Frequency of Alcohol Consumption:      Average Number of Drinks:      Frequency of Binge Drinking:    Financial Resource Strain:      Difficulty of Paying Living Expenses:    Food Insecurity:      Worried About Running Out of Food in the Last Year:      Ran Out of Food in the Last Year:    Transportation Needs:      Lack of Transportation (Medical):      Lack of Transportation (Non-Medical):    Physical Activity:      Days of Exercise per Week:      Minutes of Exercise per Session:    Stress:      Feeling of Stress :    Social Connections:      Frequency  of Communication with Friends and Family:      Frequency of Social Gatherings with Friends and Family:      Attends [employer] Services:      Active Member of Clubs or Organizations:      Attends Club or Organization Meetings:      Marital Status:    Intimate Partner Violence:      Fear of Current or Ex-Partner:      Emotionally Abused:      Physically Abused:      Sexually Abused:    Depression:      PHQ-2 Score:    Housing Stability:      Unable to Pay for Housing in the Last Year:      Number of Places Lived in the Last Year:      Unstable Housing in the Last Year:        Functional Status:  Prior to admission patient needed assistance:   Dependent ADLs:: Ambulation-cane  Dependent IADLs:: Independent  Assesssment of Functional Status: At functional baseline    Mental Health Status:  Mental Health Status: No Current Concerns       Chemical Dependency Status:                Values/Beliefs:  Spiritual, Cultural Beliefs, [employer]al Practices, Values that affect care:                 Additional Information:  Assessed, no svcs, lives w/sons and they will transport at discharge, declined HCD.      Wan Urrutia RN

## 2021-08-21 NOTE — ED TRIAGE NOTES
Pt called medics because she could not get off toilet due to pain in right leg. Pt denies injury. Pt has history of HTN and a-fib--on eliquis.  
Pt complains of right leg pain and also some pain in left. Pt denies injury or numbness/tingling  
none
no

## 2021-08-21 NOTE — ED PROVIDER NOTES
ED PROVIDER NOTE    EMERGENCY DEPARTMENT ENCOUNTER      NAME: Misty Quezada  AGE: 66 year old female  YOB: 1955  MRN: [id number]  EVALUATION DATE & TIME: No admission date for patient encounter.    PCP: Provider, Historical    ED PROVIDER: Milady Chance PA-C      Chief Complaint   Patient presents with     Leg Pain         FINAL IMPRESSION:  1. Atrial fibrillation with rapid ventricular response (H)    2. Cellulitis of left leg    3. Elevated troponin    4. Sepsis (H)          MEDICAL DECISION MAKING:    Pertinent Labs & Imaging studies reviewed. (See chart for details)    66-year-old female with a history of atrial fibrillation (On Eliquis), HTN, and tricuspid insufficiency presenting with leg pain R>L. Reports that her legs starting hurting more about 24 hours ago. No trauma. Denies any shortness of breath or chest pain. Is unsure if the legs feel more swollen. She is unable to ambulate. Lives at home with sons.    Here was tachycardic and just slightly hypertensive. On examination has atrial fibrillation. No respiratory distress. Significant lower extremity edema.  Signs of cellulitis of the LLE. Sensation intact and feet are warm. No signs of arterial compromise.    Initial concern is for heart failure with leg edema given her A. fib with RVR. DVT unlikely bilaterally and she is on Eliquis but we will obtain ultrasounds of the legs. Initiated work-up with EKG, lab work and chest x-ray.    EKG revealed afib with RVR. Labs with leukocytosis of 17.2, lactate 3.7 and troponin 1.75. Her lactate and elevated trop may be d/t sepsis or stress response from her rapid HR. Discussed trop elevation with cardiology. Agrees with treating sepsis and her afib and monitoring. No indication for heparin at this time given patient is really asymptomatic. CXR slightly enlarged heart but no signs of pulmonary edema.  US w/o signs of DVT.    Patient started on light IV fluids for sepsis given no signs of pulmonary  edema on CXR but avoiding boluses given LE edema.  She was given metoprolol IV x5 with improvement of her tachycardia.  Started on zosyn/vanco for cellulitis.    After looking for multiple hospital beds throughout the [facility] and being unsuccessful, patient ultimately admitted to [facility] and will board in ED until bed in hospital available and will be managed by hospitalist.     At the conclusion of the encounter I discussed the results of all of the tests and the disposition. The questions were answered. The patient or family acknowledged understanding and was agreeable with the care plan.         ED COURSE  6:33 AM I Met and evaluated patient. Discussed ED plan. Patient work-up delayed and initially commenced from triage and patient evaluated in triage due to emergency department full with boarding inpatient patients due to nursing staff shortages and no hospital bed availability within the [facility] limiting admission capability and transfer.   9:23 AM I staffed the patient with Dr. Worrell and discussed the patient's elevated troponin lab result  9:33 AM I rechecked the patient. She is currently being placed in ED room.  9:55 AM I rechecked the patient again  10:36 AM Pharmacy told me her last Eliquis dose was yeterday morning, will give a dose here.  10:44 AM Lactate elevated at 3.7. This could be sepsis or related to her afib RVR.  Will start light fluids, avoid bolus given concern for retained fluid.    11:39 AM We have tried multiple hospitals in [facility], who state they also do not have any cardiac telemetry beds, we are checking with [place] and if this is unsuccessful, patient will be admitted and board here in ED.   11:40 AM Spoke with Dr. Zapata, cardiology given + trop.  Recommends continued tx of RVR and sepsis. Agrees with no heparin at this point.    11:42 AM Spoke with Dr. Young, hospitalist for admission to cardiac tele  11:45 AM Ordered repeated lactate and troponin for 6 hours from initial per  discussion with hospitalist  1:11 PM HR down to 98. /66        MEDICATIONS GIVEN IN THE EMERGENCY:  Medications   piperacillin-tazobactam (ZOSYN) 3.375 g vial to attach to  mL bag (3.375 g Intravenous New Bag 8/21/21 1150)   vancomycin 2000 mg in 0.9% NaCl 500 ml intermittent infusion 2,000 mg (has no administration in time range)   metoprolol (LOPRESSOR) injection 5 mg (5 mg Intravenous Given 8/21/21 0948)   metoprolol succinate ER (TOPROL-XL) 24 hr tablet 25 mg (25 mg Oral Given 8/21/21 1011)   metoprolol (LOPRESSOR) injection 5 mg (5 mg Intravenous Given 8/21/21 1018)   apixaban ANTICOAGULANT (ELIQUIS) tablet 5 mg (5 mg Oral Given 8/21/21 1058)   sodium chloride 0.9% infusion ( Intravenous New Bag 8/21/21 1100)   acetaminophen (TYLENOL) tablet 650 mg (650 mg Oral Given 8/21/21 1104)       NEW PRESCRIPTIONS STARTED AT TODAY'S ER VISIT  New Prescriptions    No medications on file          =================================================================    HPI    Patient information was obtained from: Patient      Misty Quezada is a 66 year old female who presents via EMS for evaluation of leg pain.    Patient reports bilateral leg pain that started yesterday and has worsened over the last 5 hours, adding that the right leg is worse. She reports some chronic leg pain but nothing this bad. She notes pain being worse in her feet and denies any falls or trauma. She is unsure if they are more swollen than normal. Patient called medics because she could not get off the toilet at home and can no longer walk due to her pain. She normally is able to ambulate her legs and this has never happened before.     Patient reports a fever of 100.4 with an episode of vomiting and diarrhea in the last 24 hours. She sometimes has back pain but this is non-consistent and not her main concern.     Patient lives with her 2 sons and is taking blood pressure and blood thinner medications. She took tylenol while waiting in the  ED and last took metrolol on Thursday morning but not yesterday because she wasn't feeling well. [facility]     Patient denies chest pain, shortness of breath, abdominal pain, numbness, tingling, or any other symptoms at this time.         REVIEW OF SYSTEMS   Constitutional: Negative for chills.Positive for fever  Pulmonary: Negative for shortness of breath  Cardiac: Negative for chest pain  GI:Negative for abdominal pain, Positive for vomiting and diarrhea  Musculoskeletal: Positive for bilateral leg pain(worse in right leg and feet) and mild back pain  Skin: Negative for skin changes  Neuro: Negative for numbness, tingliness.      All other systems reviewed and are negative        PAST MEDICAL HISTORY:  Afib  HTN  Tricuspid insufficiency    PAST SURGICAL HISTORY:  No past surgical history on file.        CURRENT MEDICATIONS:    No current facility-administered medications for this encounter.    Current Outpatient Medications:      apixaban (ELIQUIS) 5 mg Tab tablet, [APIXABAN (ELIQUIS) 5 MG TAB TABLET] Take 5 mg by mouth 2 (two) times a day., Disp: , Rfl:      CALCIUM/D3/MAG OX//TYRONE/ZN (CALTRATE + D3 PLUS MINERALS ORAL), [CALCIUM/D3/MAG OX//TYRONE/ZN (CALTRATE + D3 PLUS MINERALS ORAL)] Take 1 tablet by mouth daily., Disp: , Rfl:      cholecalciferol, vitamin D3, 2,000 unit Tab, [CHOLECALCIFEROL, VITAMIN D3, 2,000 UNIT TAB] Take 2,000 Units by mouth daily., Disp: , Rfl:      cyanocobalamin 1000 MCG tablet, [CYANOCOBALAMIN 1000 MCG TABLET] Take 1,000 mcg by mouth daily., Disp: , Rfl:      losartan-hydrochlorothiazide (HYZAAR) 50-12.5 mg per tablet, [LOSARTAN-HYDROCHLOROTHIAZIDE (HYZAAR) 50-12.5 MG PER TABLET] Take 1 tablet by mouth daily., Disp: , Rfl:      metoprolol succinate (TOPROL-XL) 25 MG, [METOPROLOL SUCCINATE (TOPROL-XL) 25 MG] TAKE 1 TABLET BY MOUTH DAILY., Disp: 90 tablet, Rfl: 2     MULTIVIT WITH CALCIUM,IRON,MIN (WOMEN'S DAILY MULTIVITAMIN ORAL), [MULTIVIT WITH  "CALCIUM,IRON,MIN (WOMEN'S DAILY MULTIVITAMIN ORAL)] Take 1 tablet by mouth daily., Disp: , Rfl:      olmesartan-hydrochlorothiazide (BENICAR HCT) 20-12.5 mg per tablet, [OLMESARTAN-HYDROCHLOROTHIAZIDE (BENICAR HCT) 20-12.5 MG PER TABLET] Take 1 tablet by mouth., Disp: , Rfl:     ALLERGIES:  No Known Allergies    FAMILY HISTORY:  No family history on file.    SOCIAL HISTORY:   Smoking: non-smoker  Alcohol: Drinks alcohol rarely  Illicit drug: Denies illicit drug use  Other: Lives with her 2 sons    VITALS:  Vitals:    08/21/21 0207   BP: (!) 149/72   Pulse: 112   Resp: 20   Temp: 99.4  F (37.4  C)   TempSrc: Oral   SpO2: 96%   Weight: 113 kg (249 lb 1.9 oz)   Height: 1.702 m (5' 7\")       PHYSICAL EXAM    General Appearance:  Alert, cooperative, no distress, appears stated age  HENT: Normocephalic without obvious deformity, atraumatic. Mucous membranes moist   Eyes: Conjunctiva clear, Lids normal. No discharge.   Respiratory: Mild distress Lungs clear to ausculation bilaterally. No wheezes, rhonchi or stridor  Cardiovascular: Irregular irregular. Tachycardic.  Normal cap refill. + pitting LE edema  GI: Abdomen soft, obese, nontender, normal bowel sounds  : No CVA tenderness  Musculoskeletal: Edema of b/l lower extremities with some difficulty with movement given the swelling.  No tenderness  Integument: Warm, dry. Erythema and warmth of the LLE from knee to foot.    Neurologic: Alert and orientated x3. No focal deficits.  Psych: Normal mood and affect        LAB:  Labs Ordered and Resulted from Time of ED Arrival Up to the Time of Departure from the ED   B-TYPE NATRIURETIC PEPTIDE ( EAST ONLY) - Abnormal; Notable for the following components:       Result Value     (*)     All other components within normal limits   CBC WITH PLATELETS - Abnormal; Notable for the following components:    WBC Count 17.2 (*)     All other components within normal limits   INR - Abnormal; Notable for the following components: "    INR 1.44 (*)     All other components within normal limits   TROPONIN I - Abnormal; Notable for the following components:    Troponin I 1.75 (*)     All other components within normal limits   PARTIAL THROMBOPLASTIN TIME - Normal   BASIC METABOLIC PANEL   MAY SALINE LOCK IV   CARDIAC CONTINUOUS MONITORING       RADIOLOGY:  Chest XR,  PA & LAT   Final Result   IMPRESSION: Shallow inspiration. Heart size is mildly enlarged. Slight interstitial prominence most likely due to the shallow inspiration. No convincing evidence for pulmonary edema.      US Lower Extremity Venous Duplex Bilateral   Final Result   IMPRESSION:   1.  No deep venous thrombosis in the bilateral lower extremities.          EKG:    Performed at: 8/21/21 at 0747  Impression: Atrial fibrillation with RVR, No STEMI, Nonspecific ST ad T wave abnormality  Dr. Martin and I have independently reviewed and interpreted the EKG(s) documented above.    EKG:   Performed at: 8/21/21 at 1217  Impression: Atrial fibrillation with RVR, rightward axis, Low voltage QRS  Comparison: When compared to ECG at 0747, nonspecific T wave abnormality now evident in lateral leads  Dr. Worrell and I have independently reviewed and interpreted the EKG(s) documented above.    Critical Care  Performed by: Milady Chance PA-C  Authorized by: Milady Chance PA-C   Total critical care time: 60 minutes  Criticalcare time was exclusive of separately billable procedures and treating other patients.  Critical care was necessary to treat or prevent imminent or life-threatening deterioration of the following conditions: Sepsis, cardiac failure.  60 min of care was time spent personally by me on the following activities: development of treatment plan with patient or surrogate, discussions with consultants, examination of patient, evaluation of patient's response totreatment, obtaining history from patient or surrogate, ordering and performing treatments and interventions, ordering and  review of laboratory studies, ordering and review of radiographic studies and re-evaluation ofpatient's condition, this excludes any separately billable procedures.      I, Aneudy Morrow, am serving as a scribe to document services personally performed by Milady Chance PA-C based on my observation and the provider's statements to me. I, Milady Chance PA-C attest that Aneudy Morrow is acting in a scribe capacity, has observed my performance of the services and has documented them in accordance with my direction.    Milady Chance PA-C   Emergency Medicine       Milady Chance PA-C  08/21/21 7658

## 2021-08-21 NOTE — PHARMACY-ADMISSION MEDICATION HISTORY
Pharmacy Note - Admission Medication History    Pertinent Provider Information: Pt last had her Eliquis on 8/20 in am     ______________________________________________________________________    Prior To Admission (PTA) med list completed and updated in EMR.       PTA Med List   Medication Sig Last Dose     apixaban (ELIQUIS) 5 mg Tab tablet [APIXABAN (ELIQUIS) 5 MG TAB TABLET] Take 5 mg by mouth 2 (two) times a day. 8/20/2021 at am     CALCIUM/D3/MAG OX//TYRONE/ZN (CALTRATE + D3 PLUS MINERALS ORAL) [CALCIUM/D3/MAG OX//TYRONE/ZN (CALTRATE + D3 PLUS MINERALS ORAL)] Take 1 tablet by mouth daily. 8/20/2021 at am     cholecalciferol, vitamin D3, 2,000 unit Tab [CHOLECALCIFEROL, VITAMIN D3, 2,000 UNIT TAB] Take 2,000 Units by mouth daily. 8/20/2021 at am     cyanocobalamin 1000 MCG tablet [CYANOCOBALAMIN 1000 MCG TABLET] Take 1,000 mcg by mouth daily. 8/20/2021 at am     metoprolol succinate (TOPROL-XL) 25 MG [METOPROLOL SUCCINATE (TOPROL-XL) 25 MG] TAKE 1 TABLET BY MOUTH DAILY. 8/20/2021 at am     MULTIVIT WITH CALCIUM,IRON,MIN (WOMEN'S DAILY MULTIVITAMIN ORAL) [MULTIVIT WITH CALCIUM,IRON,MIN (WOMEN'S DAILY MULTIVITAMIN ORAL)] Take 1 tablet by mouth daily. 8/20/2021 at am     olmesartan-hydrochlorothiazide (BENICAR HCT) 20-12.5 MG tablet Take 1 tablet by mouth daily 8/20/2021 at am       Information source(s): Patient and CareEverywhere/SureScripts  Method of interview communication: in-person    Summary of Changes to PTA Med List  New: N/A  Discontinued: losartan-hctz  Changed: N/A    Patient was asked about OTC/herbal products specifically.  PTA med list reflects this.    In the past week, patient estimated taking medication this percent of the time:  greater than 90%.    Allergies were reviewed, assessed, and updated with the patient.      Patient does not use any multi-dose medications prior to admission.    The information provided in this note is only as accurate as the sources available at the time of the  update(s).    Thank you for the opportunity to participate in the care of this patient.    Khadra Edwards  8/21/2021 10:38 AM

## 2021-08-21 NOTE — ED PROVIDER NOTES
"I am seeing this patient along with LAURA Hernández    At this point I agree with the current assessment done in the Emergency Department.   I, Jonathon Worrell MD have reviewed the documentation, personally taken the patient's history, performed an exam and agree with the physical finds, diagnosis and management plan.     Misty Quezada is a 66 year old female, with a history of hypertension, irregular heart beat, and prediabetes, who presents to the Emergency Department for the evaluation of leg pain.     Patient reports bilateral leg pain that started yesterday and has worsened over the last 5 hours, adding that the right leg is worse. She reports some chronic leg pain but nothing this bad. She notes pain being worse in her feet and denies any falls or trauma. She is unsure if they are more swollen than normal. Patient called medics because she could not get off the toilet at home and can no longer walk due to her pain. She normally is able to ambulate her legs and this has never happened before.      Patient reports a fever of 100.4 with an episode of vomiting and diarrhea in the last 24 hours.      Patient lives with her 2 sons and is taking blood pressure and blood thinner medications. She took tylenol while waiting in the ED and belongs to [facility] [employer] [facility] for primary care. Patient denies skin changes, chest pain, shortness of breath, abdominal pain, numbness, tingling, or any other symptoms at this time.     ROS: Negative for chills, shortness of breath, chest pain, abdominal pain, skin changes, numbness and tingles. Positive for fever, vomiting, diarrhea, bilateral leg pain (worse in right), and mild back pain.    PHYSICAL EXAM  BP (!) 161/94   Pulse (!) 143   Temp (!) 101.2  F (38.4  C)   Resp (!) 31   Ht 1.702 m (5' 7\")   Wt 113 kg (249 lb 1.9 oz)   SpO2 97%   BMI 39.02 kg/m    Constitutional:  Awake, alert, in moderate distress, obese  HENT:  Normocephalic, Atraumatic. Bilateral external ears " normal. Oropharynx moist. Nose normal. Neck- Normal range of motion with no guarding, No midline cervical tenderness, Supple, No stridor.   Eyes:  PERRL, EOMI with no signs of entrapment, Conjunctiva normal, No discharge.   Respiratory:  Normal breath sounds, No respiratory distress, No wheezing.  Lungs clear   Cardiovascular:  Rapid irregular heart beat   GI:  Soft, No tenderness, No distension, No palpable masses  Musculoskeletal:  Erythema of stocking distribution, relative sparing of distal foot, mild pitting edema of bilateral lower extremities  Integument:  Warm, Dry, No erythema, No rash.   Neurologic:  Alert & oriented, Normal motor function, Normal sensory function, No focal deficits noted.   Psychiatric:  Affect normal, Judgment normal, Mood normal.     Social: Non-smoker, drinks alcohol rarely, denies illicit drug use, and lives with her 2 sons.    ED COURSE:  9:20 AM Received a call regarding critical lab information of an elevated troponin. Was staffed by Milady Chance PA-C.  9:22 AM Received full history of patient from Milady.   9:50 AM Met with the patient and obtained her history. Performed physical exam.  11:02 AM.  Heart rate now approximately 100.  Plan will be for hospitalization.  Patient noted to be markedly febrile.  Tylenol ordered.  11:46 AM.  Patient accepted for admission here in the hospital.  Patient's lactate did return moderately elevated.  Could relate to her infectious process versus poor circulation related to her rapid atrial fibrillation.  Will proceed with fluids judiciously given concerns of precipitating pulmonary edema.    Results for orders placed or performed during the hospital encounter of 08/21/21   Chest XR,  PA & LAT     Status: None    Narrative    EXAM: XR CHEST 2 VW  LOCATION: [facility]  DATE/TIME: 8/21/2021 8:53 AM    INDICATION: leg edema, concern for chf  COMPARISON: None.      Impression    IMPRESSION: Shallow inspiration. Heart size  is mildly enlarged. Slight interstitial prominence most likely due to the shallow inspiration. No convincing evidence for pulmonary edema.   US Lower Extremity Venous Duplex Bilateral     Status: None    Narrative    EXAM: US LOWER EXTREMITY VENOUS DUPLEX BILATERAL  LOCATION: [facility]  DATE/TIME: 8/21/2021 8:27 AM    INDICATION: Bilateral lower extremity swelling.  COMPARISON: None.  TECHNIQUE: Venous Duplex ultrasound of bilateral lower extremities with and without compression, augmentation and duplex. Color flow and spectral Doppler with waveform analysis performed.    FINDINGS: Exam includes the common femoral, femoral, popliteal veins as well as segmentally visualized deep calf veins and greater saphenous vein.     RIGHT: No deep vein thrombosis. No superficial thrombophlebitis. No popliteal cyst.    LEFT: No deep vein thrombosis. No superficial thrombophlebitis. No popliteal cyst.      Impression    IMPRESSION:  1.  No deep venous thrombosis in the bilateral lower extremities.   Basic metabolic panel     Status: Abnormal   Result Value Ref Range    Sodium 138 136 - 145 mmol/L    Potassium 3.6 3.5 - 5.0 mmol/L    Chloride 106 98 - 107 mmol/L    Carbon Dioxide (CO2) 22 22 - 31 mmol/L    Anion Gap 10 5 - 18 mmol/L    Urea Nitrogen 26 (H) 8 - 22 mg/dL    Creatinine 0.88 0.60 - 1.10 mg/dL    Calcium 10.1 8.5 - 10.5 mg/dL    Glucose 129 (H) 70 - 125 mg/dL    GFR Estimate 68 >60 mL/min/1.73m2   B-Type Natriuretic Peptide ([facility] Only)     Status: Abnormal   Result Value Ref Range     (H) 0 - 112 pg/mL   CBC with platelets     Status: Abnormal   Result Value Ref Range    WBC Count 17.2 (H) 4.0 - 11.0 10e3/uL    RBC Count 4.64 3.80 - 5.20 10e6/uL    Hemoglobin 14.1 11.7 - 15.7 g/dL    Hematocrit 44.2 35.0 - 47.0 %    MCV 95 78 - 100 fL    MCH 30.4 26.5 - 33.0 pg    MCHC 31.9 31.5 - 36.5 g/dL    RDW 14.8 10.0 - 15.0 %    Platelet Count 220 150 - 450 10e3/uL   INR     Status: Abnormal    Result Value Ref Range    INR 1.44 (H) 0.90 - 1.15   Partial thromboplastin time     Status: Normal   Result Value Ref Range    aPTT 29 22 - 38 Seconds   Troponin I     Status: Abnormal   Result Value Ref Range    Troponin I 1.75 (HH) 0.00 - 0.29 ng/mL   Asymptomatic COVID-19 Virus (Coronavirus) by PCR Nasopharyngeal     Status: Normal    Specimen: Nasopharyngeal; Swab   Result Value Ref Range    SARS CoV2 PCR Negative Negative    Narrative    Testing was performed using the humberto  SARS-CoV-2 & Influenza A/B Assay on the humberto  G[employer]  System.  This test should be ordered for the detection of SARS-COV-2 in individuals who meet SARS-CoV-2 clinical and/or epidemiological criteria. Test performance is unknown in asymptomatic patients.  This test is for in vitro diagnostic use under the FDA EUA for laboratories certified under CLIA to perform moderate and/or high complexity testing. This test has not been FDA cleared or approved.  A negative test does not rule out the presence of PCR inhibitors in the specimen or target RNA in concentration below the limit of detection for the assay. The possibility of a false negative should be considered if the patient's recent exposure or clinical presentation suggests COVID-19.  [facility] are certified under the Clinical Laboratory Improvement Amendments of 1988 (CLIA-88) as qualified to perform moderate and/or high complexity laboratory testing.   Lactic acid whole blood     Status: Abnormal   Result Value Ref Range    Lactic Acid 3.7 (H) 0.7 - 2.0 mmol/L     Independent review and interpretation of EKG: Atrial fibrillation with RVR.  Rate of 150.  Normal QRS.  Nonspecific ST segment flattening inferiorly.  No prior tracing.    Chest XR,  PA & LAT    Result Date: 8/21/2021  EXAM: XR CHEST 2 VW LOCATION: [facility] DATE/TIME: 8/21/2021 8:53 AM INDICATION: leg edema, concern for chf COMPARISON: None.     IMPRESSION: Shallow inspiration.  Heart size is mildly enlarged. Slight interstitial prominence most likely due to the shallow inspiration. No convincing evidence for pulmonary edema.    US Lower Extremity Venous Duplex Bilateral    Result Date: 8/21/2021  EXAM: US LOWER EXTREMITY VENOUS DUPLEX BILATERAL LOCATION: [facility] DATE/TIME: 8/21/2021 8:27 AM INDICATION: Bilateral lower extremity swelling. COMPARISON: None. TECHNIQUE: Venous Duplex ultrasound of bilateral lower extremities with and without compression, augmentation and duplex. Color flow and spectral Doppler with waveform analysis performed. FINDINGS: Exam includes the common femoral, femoral, popliteal veins as well as segmentally visualized deep calf veins and greater saphenous vein. RIGHT: No deep vein thrombosis. No superficial thrombophlebitis. No popliteal cyst. LEFT: No deep vein thrombosis. No superficial thrombophlebitis. No popliteal cyst.     IMPRESSION: 1.  No deep venous thrombosis in the bilateral lower extremities.    Patient is present primarily with leg weakness and discomfort in the left leg.  Examination consistent with potential cellulitis versus DVT within the leg.  However patient also noted to be in atrial fibrillation with RVR.  Patient febrile also.  Patient had been seen initially in triage area due to circumstances of overcrowding.  Troponin had returned positive.  However she is without chest pain or shortness of breath.  Likely presents enzyme leak after prolonged tachycardia and febrile episode.  We will proceed with antibiotics and beta-blockers for rate management.  Pertinent Labs & Imaging studies reviewed. (See Advanced Practice Provider's chart for details if not noted here)      Prior to disposition, all patient concerns were addressed and opportunity to ask additional questions was given.  Patient was comfortable with this treatment plan and expressed understanding of all instructions.      Atrial fibrillation with RVR  Left  lower extremity cellulitis  Elevated troponin    Patient represents critical care situation proximately 40 minutes spent direct involved patient care independent of any procedures     Jonathon Worrell MD  08/21/21 1147       Jonathon Worrell MD  08/21/21 1149       Jonathon Worrell MD  08/21/21 [id number]

## 2021-08-21 NOTE — H&P
[facility]    History and Physical - Hospitalist Service       Date of Admission:  8/21/2021    Assessment & Plan      iMsty Quezada is a 66 year old female admitted on 8/21/2021. She has history of HTN and preDM and presents for evaluation of BLE pain found to be septic.    #Severe sepsis  With signs of organ damage including elevated troponin and lactic acidosis  WBC 17, tachycardic, tachypneic, febrile 101.2F  COVID19 negative and is immunized  Source possible LLE cellulitis  Starting on Vanc and Zosyn  Blood cultures in process  Trend labs  Hold IVF resuscitation, lactate has already normalized and concern possible CHF    #LLE cellulitis  Vanc and Zosyn as above  Initiate lymphedema therapy    #Demand ischemia/type II MI  Initial troponin 1.75  EKG some flattened T waves without previous for comparison, no clear ischemia  Likely from sepsis and A fib RVR  No heparin drip for now  Trend trop  Consult cardiology  Echo  Likely get stress test when more stable    #Tachypnea  CXR with some slight interstitial prominence possibly due to shallow inspiration. BNP mildly elevated 281.  Unclear if any component of acute CHF. Not hypoxic.  Checking echo.    #A fib RVR  She had not taken her metoprolol yet today  Got IV metoprolol in ED with improvement in HR  Resume home metoprolol  Continue home Eliquis    #HTN  Hold home Benicar for now, monitor BP trend  Continue home metoprolol         Diet:   regular diet  DVT Prophylaxis: Moderate risk. Eliquis    Hi Catheter: Not present  Central Lines: None  Code Status:   Full code. Discussed and confirmed with patient.    Clinically Significant Risk Factors Present on Admission             # Coagulation Defect: home medication list includes an anticoagulant medication       Disposition Plan   Expected discharge: 08/23/2021 recommended to prior living arrangement once SIRS/Sepsis treated.     The patient's care was discussed with the  Patient.    Tonya Young MD  [facility]  Text page via [facility] Paging/Directory      ______________________________________________________________________    Chief Complaint   LE cramping pain    History of Present Illness   Misty Quezada is a 66 year old female who presents for leg cramping pain  She reports began 3 days ago, worsening  She did not notice erythema, unsure when it began  Hard to walk due to pain. Denies weakness, numbness  Has not had similar in past  Fever 100.4F 2 days ago  Rhinorrhea  Had one meal where food did not taste right. Then resolved next meal.  Appetite has been less  Denies significant cough.    Patient did not wish me to call and discuss her care with any family members, however notes if she were unable to speak for herself, her contacts are her sons Samuel and Slade.    Review of Systems    The 10 point Review of Systems is negative other than noted in the HPI or here.    Past Medical History    I have reviewed this patient's medical history and updated it with pertinent information if needed.   Past Medical History:   Diagnosis Date     Benign essential hypertension      Chronic atrial fibrillation (H)        Past Surgical History   I have reviewed this patient's surgical history and updated it with pertinent information if needed.  Past Surgical History:   Procedure Laterality Date     APPENDECTOMY       CHOLECYSTECTOMY       GASTRIC BYPASS         Social History   I have reviewed this patient's social history and updated it with pertinent information if needed.  Social History     Tobacco Use     Smoking status: Never Smoker     Smokeless tobacco: Never Used   Substance Use Topics     Alcohol use: Not Currently     Comment: Rarely Uses alcohol     Drug use: Never       Family History   I have reviewed this patient's family history and updated it with pertinent information if needed.  Family History   Problem Relation Age of Onset     No Known Problems  Mother      No Known Problems Father        Prior to Admission Medications   Prior to Admission Medications   Prescriptions Last Dose Informant Patient Reported? Taking?   CALCIUM/D3/MAG OX//TYRONE/ZN (CALTRATE + D3 PLUS MINERALS ORAL) 8/20/2021 at am  Yes Yes   Sig: [CALCIUM/D3/MAG OX//TYRONE/ZN (CALTRATE + D3 PLUS MINERALS ORAL)] Take 1 tablet by mouth daily.   MULTIVIT WITH CALCIUM,IRON,MIN (WOMEN'S DAILY MULTIVITAMIN ORAL) 8/20/2021 at am  Yes Yes   Sig: [MULTIVIT WITH CALCIUM,IRON,MIN (WOMEN'S DAILY MULTIVITAMIN ORAL)] Take 1 tablet by mouth daily.   apixaban (ELIQUIS) 5 mg Tab tablet 8/20/2021 at am  Yes Yes   Sig: [APIXABAN (ELIQUIS) 5 MG TAB TABLET] Take 5 mg by mouth 2 (two) times a day.   cholecalciferol, vitamin D3, 2,000 unit Tab 8/20/2021 at am  Yes Yes   Sig: [CHOLECALCIFEROL, VITAMIN D3, 2,000 UNIT TAB] Take 2,000 Units by mouth daily.   cyanocobalamin 1000 MCG tablet 8/20/2021 at am  Yes Yes   Sig: [CYANOCOBALAMIN 1000 MCG TABLET] Take 1,000 mcg by mouth daily.   metoprolol succinate (TOPROL-XL) 25 MG 8/20/2021 at am  No Yes   Sig: [METOPROLOL SUCCINATE (TOPROL-XL) 25 MG] TAKE 1 TABLET BY MOUTH DAILY.   olmesartan-hydrochlorothiazide (BENICAR HCT) 20-12.5 MG tablet 8/20/2021 at am  Yes Yes   Sig: Take 1 tablet by mouth daily      Facility-Administered Medications: None     Allergies   No Known Allergies    Physical Exam   Vital Signs: Temp: 98.6  F (37  C) Temp src: Oral BP: 129/71 Pulse: 98   Resp: (!) 37 SpO2: 94 % O2 Device: Nasal cannula Oxygen Delivery: 1 LPM  Weight: 249 lbs 1.92 oz    General: in no apparent distress, non-toxic and alert female lying in hospital bed oriented x3. Fatigued and a bit distracted.  HEENT: Head normocephalic atraumatic, oral mucosa moist. Sclerae anicteric  CV: Regular rhythm, normal rate, no murmurs  Resp: No wheezes, no rales or rhonchi, no focal consolidations  GI: Belly soft, nondistended, nontender, bowel sounds present  Skin: Erythema LLE, small scabbed area  mid shin  Extremities: 1+ pitting edema bilateral ankles  Psych: Normal affect, mood dysthymic  Neuro: CNII-XII grossly intact, moving all 4 extremities    Data   Data reviewed today: I reviewed all medications, new labs and imaging results over the last 24 hours.  EKG personally reviewed shows: A fib RVR rate 115 bpm    Recent Results (from the past 12 hour(s))   B-Type Natriuretic Peptide ([facility] Only)    Collection Time: 08/21/21  8:20 AM   Result Value Ref Range     (H) 0 - 112 pg/mL   CBC with platelets    Collection Time: 08/21/21  8:20 AM   Result Value Ref Range    WBC Count 17.2 (H) 4.0 - 11.0 10e3/uL    RBC Count 4.64 3.80 - 5.20 10e6/uL    Hemoglobin 14.1 11.7 - 15.7 g/dL    Hematocrit 44.2 35.0 - 47.0 %    MCV 95 78 - 100 fL    MCH 30.4 26.5 - 33.0 pg    MCHC 31.9 31.5 - 36.5 g/dL    RDW 14.8 10.0 - 15.0 %    Platelet Count 220 150 - 450 10e3/uL   INR    Collection Time: 08/21/21  8:20 AM   Result Value Ref Range    INR 1.44 (H) 0.90 - 1.15   Partial thromboplastin time    Collection Time: 08/21/21  8:20 AM   Result Value Ref Range    aPTT 29 22 - 38 Seconds   Troponin I    Collection Time: 08/21/21  8:20 AM   Result Value Ref Range    Troponin I 1.75 (HH) 0.00 - 0.29 ng/mL   Asymptomatic COVID-19 Virus (Coronavirus) by PCR Nasopharyngeal    Collection Time: 08/21/21 10:22 AM    Specimen: Nasopharyngeal; Swab   Result Value Ref Range    SARS CoV2 PCR Negative Negative   Basic metabolic panel    Collection Time: 08/21/21 10:28 AM   Result Value Ref Range    Sodium 138 136 - 145 mmol/L    Potassium 3.6 3.5 - 5.0 mmol/L    Chloride 106 98 - 107 mmol/L    Carbon Dioxide (CO2) 22 22 - 31 mmol/L    Anion Gap 10 5 - 18 mmol/L    Urea Nitrogen 26 (H) 8 - 22 mg/dL    Creatinine 0.88 0.60 - 1.10 mg/dL    Calcium 10.1 8.5 - 10.5 mg/dL    Glucose 129 (H) 70 - 125 mg/dL    GFR Estimate 68 >60 mL/min/1.73m2   Lactic acid whole blood    Collection Time: 08/21/21 10:28 AM   Result Value Ref Range    Lactic Acid  3.7 (H) 0.7 - 2.0 mmol/L   Lactic acid whole blood    Collection Time: 08/21/21  2:26 PM   Result Value Ref Range    Lactic Acid 1.6 0.7 - 2.0 mmol/L   Troponin I (second draw)    Collection Time: 08/21/21  2:26 PM   Result Value Ref Range    Troponin I 1.87 (HH) 0.00 - 0.29 ng/mL

## 2021-08-21 NOTE — PROGRESS NOTES
Patient arrived on a cart from ED with one RN.No c/o pain.  called with Trop level 1.87 texted Dr. Foster hospitalist with result. 's Afib. PICC team paged for IV to get started since it was infiltrated on arrival. Left leg red and swollen and warm. Had US of the Left leg before she arrived to the unit. Patient resting in bed and call light in reach. Will report off to next shift.

## 2021-08-21 NOTE — CONSULTS
[facility] team is asked by the emergency room PA Milady Chance to see Misty Quezada in consultation to evaluate atrial fibrillation with rapid ventricular response.      Assessment:     1. Chronic atrial fibrillation, with rapid ventricular response likely secondary to current sepsis syndrome.  This would appear to be an appropriate response to systemic stress.  2. Elevated troponin.  This is likely to represent a type II myocardial infarction not associated with an acute coronary syndrome, rather related to the added stress of atrial fibrillation with a rapid ventricular response.  This does not require further acute cardiac intervention at this time.  3. Obesity, chronic atrial fibrillation, daytime sleepiness pulmonary hypertension.  Could represent obstructive sleep apnea for which patient has never been evaluated.     Plan:     1. Suggested treating sepsis syndrome, holding home rate control medications as necessary for hypotension  2. Suggest continued use of Eliquis for stroke prevention, unless interventions are planned.  3. Consider eventual outpatient sleep apnea evaluation     Current History:     Misty Quezada is a 66-year-old woman with permanent atrial fibrillation, and essential hypertension.  She has been followed in our clinic by Dr. Alphonse Rocha, seen most recently in May.    She reports minimal activity chronically, and that she sleeps much of the daytime but feels rested in the mornings.  She denies any reports of snoring or apnea.  Over the last 3 days she has had increased pain in both legs, by her report actually more in the right rather than the left.  Today she has felt more flushed was admitted for further evaluation.  Feels better after antibiotic administration    Past Medical History:   History reviewed. No pertinent past medical history.  Sleep history: Sleepy through much of the day, but feels rested in the mornings.  No snoring or apnea  "reported  Past Surgical History:   History reviewed. No pertinent surgical history.    Family History:   History reviewed. No pertinent family history.  Family history reviewed and is not pertinent to the chief complaint or presenting problem    Social History:    reports that she has never smoked. She has never used smokeless tobacco.  Exercise history: Minimal ambulation recently with swollen legs.    Meds:     Current Outpatient Medications   Medication Sig Dispense Refill     apixaban (ELIQUIS) 5 mg Tab tablet [APIXABAN (ELIQUIS) 5 MG TAB TABLET] Take 5 mg by mouth 2 (two) times a day.       CALCIUM/D3/MAG OX//TYRONE/ZN (CALTRATE + D3 PLUS MINERALS ORAL) [CALCIUM/D3/MAG OX//TYRONE/ZN (CALTRATE + D3 PLUS MINERALS ORAL)] Take 1 tablet by mouth daily.       cholecalciferol, vitamin D3, 2,000 unit Tab [CHOLECALCIFEROL, VITAMIN D3, 2,000 UNIT TAB] Take 2,000 Units by mouth daily.       cyanocobalamin 1000 MCG tablet [CYANOCOBALAMIN 1000 MCG TABLET] Take 1,000 mcg by mouth daily.       metoprolol succinate (TOPROL-XL) 25 MG [METOPROLOL SUCCINATE (TOPROL-XL) 25 MG] TAKE 1 TABLET BY MOUTH DAILY. 90 tablet 2     MULTIVIT WITH CALCIUM,IRON,MIN (WOMEN'S DAILY MULTIVITAMIN ORAL) [MULTIVIT WITH CALCIUM,IRON,MIN (WOMEN'S DAILY MULTIVITAMIN ORAL)] Take 1 tablet by mouth daily.       olmesartan-hydrochlorothiazide (BENICAR HCT) 20-12.5 MG tablet Take 1 tablet by mouth daily         Allergies:   Patient has no known allergies.    Review of Systems:   A 12 point comprehensive review of systems was negative except as noted in the current history.    Objective:      Physical Exam  Wt Readings from Last 3 Encounters:   08/21/21 113 kg (249 lb 1.9 oz)   05/26/21 128.8 kg (284 lb)   11/21/19 120.4 kg (265 lb 8 oz)     Body mass index is 39.02 kg/m .  /60   Pulse 110   Temp 98.6  F (37  C) (Oral)   Resp (!) 32   Ht 1.702 m (5' 7\")   Wt 113 kg (249 lb 1.9 oz)   SpO2 95%   BMI 39.02 kg/m      General Appearance:  No " apparent distress.  HEENT: No scleral icterus; the mucous membranes were pink and moist.  Conjunctiva not injected  Neck:  No cervical bruits, jugular venous distention, or thyromegaly.  Chest:The spine was straight. The chest was symmetric.  Lungs:  Respirations unlabored; the lungs are clear to auscultation.  Cardiovascular:   Nonpalpable point of maximal impulse. Auscultation reveals rapid, irregularly irregular first and second heart sounds with 1/6 systolic murmur left lower sternal border.  Carotid, radial, and dorsalis pedal pulses are intact and symmetric.  Abdomen: Obese.  No organomegaly, masses, bruits, or tenderness. Bowels sounds are present  Extremities: Thick: 2+ edema to the knees bilaterally.  Left erythema without significant tenderness in the calf and foot.  Right foot without erythema, tender to the touch.  Skin:  No xanthelasma.  Neurologic: Mood and affect are appropriate. Speech is fluent.      EKG (personally reviewed):    Today's initial ECG shows atrial fibrillation at 155 bpm, subsequent exam shows atrial fibrillation at 115 bpm.  Low voltage QRS is noted.    Imaging   EXAM: XR CHEST 2 VW  LOCATION: [facility]  DATE/TIME: 8/21/2021 8:53 AM  INDICATION: leg edema, concern for chf  COMPARISON: None.                                                  IMPRESSION: Shallow inspiration. Heart size is mildly enlarged. Slight interstitial prominence most likely due to the shallow inspiration. No convincing evidence for pulmonary edema.      Cardiac diagnostics    Echocardiogram 6/2021:    Normal left ventricular size.The calculated left ventricular ejection fraction is 74%. This represents a normal ejection fraction. The left ventricular wall thickness is normal. Diastolic pattern consistent with atrial fibrillation observed.    Left atrial volume is mildly increased.    Normal right ventricular systolic function. The right ventricle is mildly dilated. TAPSE is normal, which  is consistent with normal right ventricular systolic function.    The tricuspid valve has a dilated annulus. Moderate to severe tricuspid valve regurgitation. The estimated systolic pulmonary artery pressure is 42 mmhg.    Estimated central venous pressure equal to 8 mmHg.    No previous study for comparison.        Lab Review     Lab Results   Component Value Date    HGB 14.1 08/21/2021     Lab Results   Component Value Date     08/21/2021     Lab Results   Component Value Date    POTASSIUM 3.6 08/21/2021     Lab Results   Component Value Date    BUN 26 08/21/2021     Lab Results   Component Value Date    CR 0.88 08/21/2021     Lab Results   Component Value Date    CHOL 163 09/09/2016    HDL 42 09/09/2016     09/09/2016    TRIG 81 09/09/2016           Willi Zapata MD MD, FACC  8/21/2021    Note created using Dragon voice recognition software.  Sound alike errors may have escaped editing.

## 2021-08-21 NOTE — PHARMACY-VANCOMYCIN DOSING SERVICE
Pharmacy Vancomycin Initial Note  Date of Service 2021  Patient's  1955  66 year old, female    Indication: Sepsis and Skin and Soft Tissue Infection    Current estimated CrCl = Estimated Creatinine Clearance: 80.5 mL/min (based on SCr of 0.88 mg/dL).    Creatinine for last 3 days  2021: 10:28 AM Creatinine 0.88 mg/dL    Recent Vancomycin Level(s) for last 3 days  No results found for requested labs within last 72 hours.      Vancomycin IV Administrations (past 72 hours)      No vancomycin orders with administrations in past 72 hours.                Nephrotoxins and other renal medications (From now, onward)    Start     Dose/Rate Route Frequency Ordered Stop    21 1200  vancomycin 2000 mg in 0.9% NaCl 500 ml intermittent infusion 2,000 mg      2,000 mg  over 2 Hours Intravenous ONCE 21 1135      21 1130  piperacillin-tazobactam (ZOSYN) 3.375 g vial to attach to  mL bag      3.375 g  over 30 Minutes Intravenous ONCE 21 1107            Contrast Orders - past 72 hours (72h ago, onward)    None        Plan:  1. Vancomycin  2000 mg IV x one dose in ED (~ 17.5 mg/kg).   2. Vancomycin monitoring method: AUC  3. Vancomycin therapeutic monitoring goal: 400-600 mg*h/L  4. Please re consult pharmacy if vancomycin therapy is to continue on admission.    Mariana Arnold [facility]

## 2021-08-22 ENCOUNTER — APPOINTMENT (OUTPATIENT)
Dept: OCCUPATIONAL THERAPY | Facility: HOSPITAL | Age: 66
DRG: 871 | End: 2021-08-22
Attending: HOSPITALIST
Payer: MEDICARE

## 2021-08-22 ENCOUNTER — APPOINTMENT (OUTPATIENT)
Dept: PHYSICAL THERAPY | Facility: HOSPITAL | Age: 66
DRG: 871 | End: 2021-08-22
Attending: HOSPITALIST
Payer: MEDICARE

## 2021-08-22 ENCOUNTER — APPOINTMENT (OUTPATIENT)
Dept: CARDIOLOGY | Facility: HOSPITAL | Age: 66
DRG: 871 | End: 2021-08-22
Attending: HOSPITALIST
Payer: MEDICARE

## 2021-08-22 LAB
ANION GAP SERPL CALCULATED.3IONS-SCNC: 9 MMOL/L (ref 5–18)
BASOPHILS # BLD AUTO: 0 10E3/UL (ref 0–0.2)
BASOPHILS NFR BLD AUTO: 0 %
BUN SERPL-MCNC: 28 MG/DL (ref 8–22)
CALCIUM SERPL-MCNC: 8.9 MG/DL (ref 8.5–10.5)
CHLORIDE BLD-SCNC: 107 MMOL/L (ref 98–107)
CO2 SERPL-SCNC: 22 MMOL/L (ref 22–31)
CREAT SERPL-MCNC: 0.74 MG/DL (ref 0.6–1.1)
ENTEROCOCCUS FAECALIS: NOT DETECTED
ENTEROCOCCUS FAECIUM: NOT DETECTED
EOSINOPHIL # BLD AUTO: 0 10E3/UL (ref 0–0.7)
EOSINOPHIL NFR BLD AUTO: 0 %
ERYTHROCYTE [DISTWIDTH] IN BLOOD BY AUTOMATED COUNT: 14.7 % (ref 10–15)
GFR SERPL CREATININE-BSD FRML MDRD: 84 ML/MIN/1.73M2
GLUCOSE BLD-MCNC: 112 MG/DL (ref 70–125)
HCT VFR BLD AUTO: 35.8 % (ref 35–47)
HGB BLD-MCNC: 11.5 G/DL (ref 11.7–15.7)
IMM GRANULOCYTES # BLD: 0.1 10E3/UL
IMM GRANULOCYTES NFR BLD: 1 %
LACTATE SERPL-SCNC: 0.9 MMOL/L (ref 0.7–2)
LISTERIA SPECIES (DETECTED/NOT DETECTED): NOT DETECTED
LVEF ECHO: NORMAL
LYMPHOCYTES # BLD AUTO: 0.9 10E3/UL (ref 0.8–5.3)
LYMPHOCYTES NFR BLD AUTO: 7 %
MAGNESIUM SERPL-MCNC: 1.8 MG/DL (ref 1.8–2.6)
MCH RBC QN AUTO: 30.3 PG (ref 26.5–33)
MCHC RBC AUTO-ENTMCNC: 32.1 G/DL (ref 31.5–36.5)
MCV RBC AUTO: 94 FL (ref 78–100)
MONOCYTES # BLD AUTO: 0.4 10E3/UL (ref 0–1.3)
MONOCYTES NFR BLD AUTO: 3 %
NEUTROPHILS # BLD AUTO: 11.9 10E3/UL (ref 1.6–8.3)
NEUTROPHILS NFR BLD AUTO: 89 %
NRBC # BLD AUTO: 0 10E3/UL
NRBC BLD AUTO-RTO: 0 /100
PLATELET # BLD AUTO: 186 10E3/UL (ref 150–450)
POTASSIUM BLD-SCNC: 3.2 MMOL/L (ref 3.5–5)
POTASSIUM BLD-SCNC: 3.7 MMOL/L (ref 3.5–5)
RBC # BLD AUTO: 3.8 10E6/UL (ref 3.8–5.2)
SODIUM SERPL-SCNC: 138 MMOL/L (ref 136–145)
STAPHYLOCOCCUS AUREUS: NOT DETECTED
STAPHYLOCOCCUS EPIDERMIDIS: NOT DETECTED
STAPHYLOCOCCUS LUGDUNENSIS: NOT DETECTED
STAPHYLOCOCCUS SPECIES: NOT DETECTED
STREPTOCOCCUS AGALACTIAE: NOT DETECTED
STREPTOCOCCUS ANGINOSUS GROUP: NOT DETECTED
STREPTOCOCCUS PNEUMONIAE: NOT DETECTED
STREPTOCOCCUS PYOGENES: NOT DETECTED
STREPTOCOCCUS SPECIES: DETECTED
WBC # BLD AUTO: 13.3 10E3/UL (ref 4–11)

## 2021-08-22 PROCEDURE — 87077 CULTURE AEROBIC IDENTIFY: CPT | Performed by: HOSPITALIST

## 2021-08-22 PROCEDURE — 99233 SBSQ HOSP IP/OBS HIGH 50: CPT | Performed by: HOSPITALIST

## 2021-08-22 PROCEDURE — 210N000001 HC R&B IMCU HEART CARE

## 2021-08-22 PROCEDURE — 97166 OT EVAL MOD COMPLEX 45 MIN: CPT | Mod: GO

## 2021-08-22 PROCEDURE — 97162 PT EVAL MOD COMPLEX 30 MIN: CPT | Mod: GP

## 2021-08-22 PROCEDURE — 85025 COMPLETE CBC W/AUTO DIFF WBC: CPT | Performed by: HOSPITALIST

## 2021-08-22 PROCEDURE — 99232 SBSQ HOSP IP/OBS MODERATE 35: CPT | Performed by: INTERNAL MEDICINE

## 2021-08-22 PROCEDURE — 84132 ASSAY OF SERUM POTASSIUM: CPT | Performed by: HOSPITALIST

## 2021-08-22 PROCEDURE — 999N000208 ECHOCARDIOGRAM COMPLETE

## 2021-08-22 PROCEDURE — 255N000002 HC RX 255 OP 636: Performed by: HOSPITALIST

## 2021-08-22 PROCEDURE — 250N000013 HC RX MED GY IP 250 OP 250 PS 637: Performed by: HOSPITALIST

## 2021-08-22 PROCEDURE — 250N000011 HC RX IP 250 OP 636: Performed by: HOSPITALIST

## 2021-08-22 PROCEDURE — 258N000003 HC RX IP 258 OP 636: Performed by: HOSPITALIST

## 2021-08-22 PROCEDURE — 97535 SELF CARE MNGMENT TRAINING: CPT | Mod: GO

## 2021-08-22 PROCEDURE — 97530 THERAPEUTIC ACTIVITIES: CPT | Mod: GP

## 2021-08-22 PROCEDURE — 87149 DNA/RNA DIRECT PROBE: CPT | Performed by: HOSPITALIST

## 2021-08-22 PROCEDURE — 80048 BASIC METABOLIC PNL TOTAL CA: CPT | Performed by: HOSPITALIST

## 2021-08-22 PROCEDURE — 83735 ASSAY OF MAGNESIUM: CPT | Performed by: INTERNAL MEDICINE

## 2021-08-22 PROCEDURE — 93306 TTE W/DOPPLER COMPLETE: CPT | Mod: 26 | Performed by: INTERNAL MEDICINE

## 2021-08-22 PROCEDURE — 83605 ASSAY OF LACTIC ACID: CPT | Performed by: HOSPITALIST

## 2021-08-22 PROCEDURE — 36415 COLL VENOUS BLD VENIPUNCTURE: CPT | Performed by: HOSPITALIST

## 2021-08-22 RX ORDER — TRAMADOL HYDROCHLORIDE 50 MG/1
50 TABLET ORAL EVERY 6 HOURS PRN
Status: DISCONTINUED | OUTPATIENT
Start: 2021-08-22 | End: 2021-08-27 | Stop reason: HOSPADM

## 2021-08-22 RX ORDER — NALOXONE HYDROCHLORIDE 0.4 MG/ML
0.2 INJECTION, SOLUTION INTRAMUSCULAR; INTRAVENOUS; SUBCUTANEOUS
Status: DISCONTINUED | OUTPATIENT
Start: 2021-08-22 | End: 2021-08-27 | Stop reason: HOSPADM

## 2021-08-22 RX ORDER — NALOXONE HYDROCHLORIDE 0.4 MG/ML
0.4 INJECTION, SOLUTION INTRAMUSCULAR; INTRAVENOUS; SUBCUTANEOUS
Status: DISCONTINUED | OUTPATIENT
Start: 2021-08-22 | End: 2021-08-27 | Stop reason: HOSPADM

## 2021-08-22 RX ORDER — ACETAMINOPHEN 325 MG/1
975 TABLET ORAL 3 TIMES DAILY
Status: DISCONTINUED | OUTPATIENT
Start: 2021-08-22 | End: 2021-08-27 | Stop reason: HOSPADM

## 2021-08-22 RX ORDER — POTASSIUM CHLORIDE 1500 MG/1
40 TABLET, EXTENDED RELEASE ORAL ONCE
Status: COMPLETED | OUTPATIENT
Start: 2021-08-22 | End: 2021-08-22

## 2021-08-22 RX ADMIN — Medication 1 MG: at 21:20

## 2021-08-22 RX ADMIN — VANCOMYCIN HYDROCHLORIDE 1500 MG: 5 INJECTION, POWDER, LYOPHILIZED, FOR SOLUTION INTRAVENOUS at 11:21

## 2021-08-22 RX ADMIN — PIPERACILLIN SODIUM AND TAZOBACTAM SODIUM 3.38 G: 3; .375 INJECTION, POWDER, LYOPHILIZED, FOR SOLUTION INTRAVENOUS at 11:24

## 2021-08-22 RX ADMIN — Medication 2000 UNITS: at 09:06

## 2021-08-22 RX ADMIN — APIXABAN 5 MG: 5 TABLET, FILM COATED ORAL at 09:07

## 2021-08-22 RX ADMIN — TRAMADOL HYDROCHLORIDE 50 MG: 50 TABLET, FILM COATED ORAL at 14:37

## 2021-08-22 RX ADMIN — CYANOCOBALAMIN TAB 1000 MCG 1000 MCG: 1000 TAB at 09:06

## 2021-08-22 RX ADMIN — PIPERACILLIN SODIUM AND TAZOBACTAM SODIUM 3.38 G: 3; .375 INJECTION, POWDER, LYOPHILIZED, FOR SOLUTION INTRAVENOUS at 17:35

## 2021-08-22 RX ADMIN — METOPROLOL SUCCINATE 25 MG: 25 TABLET, EXTENDED RELEASE ORAL at 09:07

## 2021-08-22 RX ADMIN — POTASSIUM CHLORIDE 40 MEQ: 1500 TABLET, EXTENDED RELEASE ORAL at 09:07

## 2021-08-22 RX ADMIN — APIXABAN 5 MG: 5 TABLET, FILM COATED ORAL at 20:55

## 2021-08-22 RX ADMIN — TRAMADOL HYDROCHLORIDE 50 MG: 50 TABLET, FILM COATED ORAL at 20:56

## 2021-08-22 RX ADMIN — ACETAMINOPHEN 650 MG: 325 TABLET ORAL at 02:36

## 2021-08-22 RX ADMIN — PIPERACILLIN SODIUM AND TAZOBACTAM SODIUM 3.38 G: 3; .375 INJECTION, POWDER, LYOPHILIZED, FOR SOLUTION INTRAVENOUS at 02:36

## 2021-08-22 RX ADMIN — ACETAMINOPHEN 975 MG: 325 TABLET ORAL at 13:19

## 2021-08-22 RX ADMIN — PERFLUTREN 3 ML: 6.52 INJECTION, SUSPENSION INTRAVENOUS at 09:55

## 2021-08-22 RX ADMIN — MICONAZOLE NITRATE: 20 POWDER TOPICAL at 09:10

## 2021-08-22 RX ADMIN — ACETAMINOPHEN 975 MG: 325 TABLET ORAL at 20:56

## 2021-08-22 RX ADMIN — MICONAZOLE NITRATE 1 APPLICATOR: 20 POWDER TOPICAL at 20:57

## 2021-08-22 ASSESSMENT — MIFFLIN-ST. JEOR: SCORE: 1847.23

## 2021-08-22 ASSESSMENT — ACTIVITIES OF DAILY LIVING (ADL): PREVIOUS_RESPONSIBILITIES: MEAL PREP;HOUSEKEEPING;LAUNDRY;SHOPPING;MEDICATION MANAGEMENT;FINANCES;DRIVING;WORK

## 2021-08-22 NOTE — PROGRESS NOTES
Occupational Therapy       08/22/21 1005   Quick Adds   Type of Visit Initial Occupational Therapy Evaluation   Living Environment   Living Environment Comments see PT comments   Self-Care   Usual Activity Tolerance good   Current Activity Tolerance fair   Regular Exercise No   Activity/Exercise/Self-Care Comment see PT comments   Instrumental Activities of Daily Living (IADL)   Previous Responsibilities meal prep;housekeeping;laundry;shopping;medication management;finances;driving;work  (part time)   Disability/Function   Hearing Difficulty or Deaf no   Wear Glasses or Blind yes   Concentrating, Remembering or Making Decisions Difficulty no   Difficulty Communicating no   Difficulty Eating/Swallowing no   Walking or Climbing Stairs Difficulty no   Dressing/Bathing Difficulty no   Toileting issues no   Doing Errands Independently Difficulty (such as shopping) no   Fall history within last six months no   General Information   Onset of Illness/Injury or Date of Surgery 08/21/21   Referring Physician Dr Young   Patient/Family Therapy Goal Statement (OT) go home   Additional Occupational Profile Info/Pertinent History of Current Problem mod complexity   Existing Precautions/Restrictions no known precautions/restrictions   Limitations/Impairments other (see comments)  (painful legs)   Left Upper Extremity (Weight-bearing Status) full weight-bearing (FWB)   Right Upper Extremity (Weight-bearing Status) full weight-bearing (FWB)   Left Lower Extremity (Weight-bearing Status) full weight-bearing (FWB)   Right Lower Extremity (Weight-bearing Status) full weight-bearing (FWB)   Cognitive Status Examination   Orientation Status orientation to person, place and time   Affect/Mental Status (Cognitive) WNL   Follows Commands follows multi-step commands;over 90% accuracy   Visual Perception   Visual Impairment/Limitations corrective lenses for reading   Sensory   Sensory Quick Adds No deficits were identified   Pain  Assessment   Patient Currently in Pain Yes, see Vital Sign flowsheet   Posture   Posture forward head position   Range of Motion Comprehensive   General Range of Motion no range of motion deficits identified   Strength Comprehensive (MMT)   General Manual Muscle Testing (MMT) Assessment no strength deficits identified   Muscle Tone Assessment   Muscle Tone Quick Adds No deficits were identified   Coordination   Upper Extremity Coordination No deficits were identified   Bed Mobility   Bed Mobility supine-sit   Supine-Sit [place] (Bed Mobility) maximum assist (25% patient effort);2 person assist   Assistive Device (Bed Mobility) bed rails   Comment (Bed Mobility) weakness, pain   Transfers   Transfer Comments mod assist   Balance   Balance Assessment standing balance: dynamic   Standing Balance: Dynamic fair balance   Activities of Daily Living   BADL Assessment lower body dressing   Lower Body Dressing Assessment   [place] Level (Lower Body Dressing) dependent (less than 25% patient effort)   Assistive Devices (Lower Body Dressing) other (see comments)  (none)   Position (Lower Body Dressing) unsupported sitting   Comment (Lower Body Dressing) painful LE's   Clinical Impression   Criteria for Skilled Therapeutic Interventions Met (OT) skilled treatment is necessary   OT Diagnosis weakness, pain, fatigue   OT Problem List-Impairments impacting ADL balance;strength;pain   Assessment of Occupational Performance 3-5 Performance Deficits   Identified Performance Deficits balance, strength, pain   Planned Therapy Interventions (OT) ADL retraining;strengthening   Clinical Decision Making Complexity (OT) moderate complexity   Therapy Frequency (OT) Daily   Predicted Duration of Therapy 7 days   Anticipated Equipment Needs Upon Discharge (OT) shower chair   OT Discharge Planning    OT Discharge Recommendation (DC Rec) Transitional Care Facility   OT Rationale for DC Rec needs assist w/her ADLs and mobility   Total  Evaluation Time (Minutes)   Total Evaluation Time (Minutes) 15

## 2021-08-22 NOTE — PLAN OF CARE
Problem: Adult Inpatient Plan of Care  Goal: Plan of Care Review  Outcome: No Change     Pt c/o bilateral leg pain, tylenol given x1 with some relief.   Pt turned and repositioned with pillows, stated he back hurts. Pt unsteady on her feet, heavy assist x2 with activity. BSC for now.   No fevers overnight, pt on IV antibiotics.

## 2021-08-22 NOTE — PROGRESS NOTES
08/22/21 0845   Quick Adds   Type of Visit Initial PT Evaluation       Present no   Living Environment   People in home child(david), adult  (2 sons )   Current Living Arrangements house  (split entry could live on upper level, laundry in lower leve)   Home Accessibility stairs to enter home;stairs within home   Number of Stairs, Main Entrance 1   Number of Stairs, Within Home, Primary 8   Stair Railings, Within Home, Primary other (see comments)  (1 railing )   Transportation Anticipated family or friend will provide   Self-Care   Usual Activity Tolerance good   Current Activity Tolerance fair   Equipment Currently Used at Home cane, straight  (ros only use SEcane when legs bothered her otherwise no AD )   Activity/Exercise/Self-Care Comment pt I all ADLs, drives    General Information   Onset of Illness/Injury or Date of Surgery 08/21/21   Referring Physician Dr. Tonya Young    Patient/Family Therapy Goals Statement (PT) return when better    Pertinent History of Current Problem (include personal factors and/or comorbidities that impact the POC) LLE cellulitis, pt report RLE >LLE    General Observations pt sitting at EOB with RN, reports pain in legs    Cognition   Orientation Status (Cognition) oriented x 4   Pain Assessment   Patient Currently in Pain Yes, see Vital Sign flowsheet  (BLE R>L)   Integumentary/Edema   Integumentary/Edema Comments LEs    Strength   Strength Comments generalized weakness due to pain and swelling, Pt able to moves legs with increased effort in bed    Bed Mobility   Bed Mobility Limitations decreased ability to use legs for bridging/pushing   Impairments Contributing to Impaired Bed Mobility pain;decreased strength   Assistive Device (Bed Mobility) bed rails  (HOB elevated )   Comment (Bed Mobility) sit>supine minAx1 + CGA to bring legs into bed    Transfers   Transfer Safety Concerns Noted decreased weight-shifting ability;decreased step length   Impairments  Contributing to Impaired Transfers pain;decreased strength   Transfer Safety Comments min-mod AX1-2, pt has difficulty initiating transfer due to pain in legs    Gait/Stairs (Locomotion)   [place] Level (Gait) minimum assist (75% patient effort)   Assistive Device (Gait) walker, front-wheeled   Distance in Feet (Required for LE Total Joints) 5-6 steps x2   Pattern (Gait) step-to   Deviations/Abnormal Patterns (Gait) antalgic   Comment (Gait/Stairs) pt reports pain BLEs, difficulty taking steps to transfer   Clinical Impression   Criteria for Skilled Therapeutic Intervention yes, treatment indicated   PT Diagnosis (PT) decreased mobilty    Influenced by the following impairments pain, weakness    Functional limitations due to impairments decreased independent mobility    Clinical Presentation Evolving/Changing   Clinical Presentation Rationale per medical diagnosis   Clinical Decision Making (Complexity) moderate complexity   Therapy Frequency (PT) Daily   Predicted Duration of Therapy Intervention (days/wks) 7   Planned Therapy Interventions (PT) bed mobility training;transfer training;gait training;progressive activity/exercise   Anticipated Equipment Needs at Discharge (PT) other (see comments)  (has SEcane, may need FWW for home )   Risk & Benefits of therapy have been explained evaluation/treatment results reviewed   Clinical Impression Comments pain/weakness limiting mobility    PT Discharge Planning    PT Discharge Recommendation (DC Rec)   (with decreased pain home with A, will continue to assess d/c)   PT Rationale for DC Rec min/mod for limited mobility    Total Evaluation Time   Total Evaluation Time (Minutes) 10

## 2021-08-22 NOTE — PLAN OF CARE
Patient is oriented but switches between alertness/lethargy.  Uses call light appropriately.  Patient often sad/tearful - offered emotional support.  Patient heavy assist x 1-2, transfer belt, and walker.  Patient only did transfers to/from bed, chair and commode.  Administered scheduled acetaminophen and prn tramadol for pain.  Pain continues in lower left extremity.  RN managed potassium protocol - K 3.2, gave oral replacement, recheck 3.7, recheck again with morning blood draw.      Problem: Adjustment to Illness (Sepsis/Septic Shock)  Goal: Optimal Coping  Outcome: Improving

## 2021-08-22 NOTE — PROGRESS NOTES
[facility]    Medicine Progress Note - Hospitalist Service       Date of Admission:  8/21/2021    Assessment & Plan           Misty Quezada is a 66 year old female admitted on 8/21/2021. She has history of obesity, HTN and preDM and presents for evaluation of BLE pain found to be septic with Strep bacteremia     #Severe sepsis  #Strep bacteremia  With signs of organ damage including elevated troponin and lactic acidosis  WBC 17, tachycardic, tachypneic, febrile 101.2F on admission  Source possibly from LLE cellulitis  Blood culture with GPCs in pairs and chains from 1 of 2 bottles, PCR showing Strep species, speciation and sensitivities pending. Repeat blood culture x1 today  Continue on Vanc and Zosyn  Likely will consult ID prior to discharge but for now I am comfortable managing     #LLE cellulitis  Vanc and Zosyn as above  Initiate lymphedema therapy  Scheduled tylenol. Tramadol for breakthrough pain     #Demand ischemia/type II MI  Initial troponin 1.75-->1.32  EKG some flattened T waves without previous for comparison, no clear ischemia  Likely from sepsis and A fib RVR  No heparin drip for now  Cardiology following, appreciate assistance  Echo without wall motion abnormality  Consider stress test as outpatient  Cardiology also recommends Sleep eval     #Tachypnea  Likely was from sepsis. Better today  CXR with some slight interstitial prominence possibly due to shallow inspiration. BNP mildly elevated 281.  Not thought to be acute CHF. Not hypoxic.  Echo ok LEV 55-60%     #A fib RVR  Continue home metoprolol  Continue home Eliquis     #HTN  Hold home Benicar for now, monitor BP trend  Continue home metoprolol    #HypoK  Protocol       Diet: Combination Diet Regular Diet Adult    DVT Prophylaxis: Moderate risk. Eliquis   Hi Catheter: Not present  Central Lines: None  Code Status: Full Code      Disposition Plan   Expected discharge: 08/24/2021 recommended to prior living  arrangement once SIRS/Sepsis treated.     The patient's care was discussed with the Patient.    Tonya Young MD  Hospitalist Service  [facility]  Text page via [facility] Paging/Directory      Clinically Significant Risk Factors Present on Admission               ____________        Physical Exam   Vital Signs: Temp: 98.6  F (37  C) Temp src: Oral BP: 125/58 Pulse: 102   Resp: 22 SpO2: 97 % O2 Device: None (Room air)    Weight: 282 lbs 1.6 oz  General: in no apparent distress, non-toxic and alert obese female lying in hospital bed oriented x3  HEENT: Head normocephalic atraumatic, oral mucosa moist. Sclerae anicteric  Skin: Cheeks flushed. LLE erythema improved proximally, unchanged distally  Extremities: Woody edema bilateral lower legs  Psych: Normal affect, mood dysthymic  Neuro: CNII-XII grossly intact, moving all 4 extremities      Data   Recent Results (from the past 24 hour(s))   Lactic acid whole blood    Collection Time: 08/21/21  2:26 PM   Result Value Ref Range    Lactic Acid 1.6 0.7 - 2.0 mmol/L   Troponin I (second draw)    Collection Time: 08/21/21  2:26 PM   Result Value Ref Range    Troponin I 1.87 (HH) 0.00 - 0.29 ng/mL   Troponin I    Collection Time: 08/21/21  7:44 PM   Result Value Ref Range    Troponin I 1.32 (HH) 0.00 - 0.29 ng/mL   Basic metabolic panel    Collection Time: 08/22/21  4:24 AM   Result Value Ref Range    Sodium 138 136 - 145 mmol/L    Potassium 3.2 (L) 3.5 - 5.0 mmol/L    Chloride 107 98 - 107 mmol/L    Carbon Dioxide (CO2) 22 22 - 31 mmol/L    Anion Gap 9 5 - 18 mmol/L    Urea Nitrogen 28 (H) 8 - 22 mg/dL    Creatinine 0.74 0.60 - 1.10 mg/dL    Calcium 8.9 8.5 - 10.5 mg/dL    Glucose 112 70 - 125 mg/dL    GFR Estimate 84 >60 mL/min/1.73m2   CBC with platelets and differential    Collection Time: 08/22/21  4:24 AM   Result Value Ref Range    WBC Count 13.3 (H) 4.0 - 11.0 10e3/uL    RBC Count 3.80 3.80 - 5.20 10e6/uL    Hemoglobin 11.5 (L) 11.7 - 15.7 g/dL     Hematocrit 35.8 35.0 - 47.0 %    MCV 94 78 - 100 fL    MCH 30.3 26.5 - 33.0 pg    MCHC 32.1 31.5 - 36.5 g/dL    RDW 14.7 10.0 - 15.0 %    Platelet Count 186 150 - 450 10e3/uL    % Neutrophils 89 %    % Lymphocytes 7 %    % Monocytes 3 %    % Eosinophils 0 %    % Basophils 0 %    % Immature Granulocytes 1 %    NRBCs per 100 WBC 0 <1 /100    Absolute Neutrophils 11.9 (H) 1.6 - 8.3 10e3/uL    Absolute Lymphocytes 0.9 0.8 - 5.3 10e3/uL    Absolute Monocytes 0.4 0.0 - 1.3 10e3/uL    Absolute Eosinophils 0.0 0.0 - 0.7 10e3/uL    Absolute Basophils 0.0 0.0 - 0.2 10e3/uL    Absolute Immature Granulocytes 0.1 (H) <=0.0 10e3/uL    Absolute NRBCs 0.0 10e3/uL   Magnesium    Collection Time: 08/22/21  4:24 AM   Result Value Ref Range    Magnesium 1.8 1.8 - 2.6 mg/dL   Echocardiogram Complete    Collection Time: 08/22/21 10:05 AM   Result Value Ref Range    LVEF  55-60%      ____________  Interval History   Data reviewed today: I reviewed all medications, new labs and imaging results over the last 24 hours. I personally reviewed no images or EKG's today.  Patient states she is feeling a little bit better today.  Continues to have significant pain in the left leg.  Denies chest pain or shortness of breath.  Appetite poor but is eating.  Voiding and stooling okay. Some back pain today, not constant.

## 2021-08-22 NOTE — PLAN OF CARE
Problem: Adult Inpatient Plan of Care  Goal: Plan of Care Review  Outcome: No Change     Problem: Infection Progression (Sepsis)  Goal: Absence of Infection Signs and Symptoms  Outcome: No Change  Intervention: Promote Recovery  Recent Flowsheet Documentation  Taken 8/21/2021 2100 by Rashida Carranza, RN  Activity Management:   activity adjusted per tolerance   bedrest with commode  Taken 8/21/2021 1600 by Rashida Carranza, RN  Activity Management:   activity adjusted per tolerance   bedrest with commode     Problem: Dysrhythmia  Goal: Normalized Cardiac Rhythm  Outcome: No Change    Patient has chronic afib.  Patient's heart rate has maintained mostly 90s-110s while patient's temp was afebrile.  Patient did spike a temp of 101.2 at 1930, and patient's heart rate fluctuated 120s-130s.  Patient was given tylenol and her temperature and heart rate eventually came down to temp 99.5 and HR 90-110s.    Patient has been alert and oriented x4.  Patient has been sleeping in between cares.  Patient calls on call light appropriately.  Patient has been assist of 1 up to the commode.    Patient's 2000 Troponin was 1.32.  This is down from previous draw.  Dr. Zapata instructed not to call md with 2000 troponin level.  Patient has had no chest pain, and only feels short of breath with activity.  Will continue to monitor.

## 2021-08-22 NOTE — PROVIDER NOTIFICATION
Text page to Dr. Tonya Young:  The lab called to state the blood cultures showed streptococcus. They will do the manual work up now.

## 2021-08-22 NOTE — PHARMACY-VANCOMYCIN DOSING SERVICE
"Pharmacy Vancomycin Initial Note  Date of Service 2021  Patient's  1955  66 year old, female    Indication: Skin and Soft Tissue Infection    Current estimated CrCl = Estimated Creatinine Clearance: 86.3 mL/min (based on SCr of 0.88 mg/dL).    Creatinine for last 3 days  2021: 10:28 AM Creatinine 0.88 mg/dL    Recent Vancomycin Level(s) for last 3 days  No results found for requested labs within last 72 hours.      Vancomycin IV Administrations (past 72 hours)                   vancomycin 2000 mg in 0.9% NaCl 500 ml intermittent infusion 2,000 mg (mg) 2,000 mg New Bag 21 1244                Nephrotoxins and other renal medications (From now, onward)    Start     Dose/Rate Route Frequency Ordered Stop    21 1200  vancomycin 1500 mg in 0.9% NaCl 250 ml intermittent infusion 1,500 mg      1,500 mg  over 90 Minutes Intravenous EVERY 24 HOURS 21 1941      21 1800  piperacillin-tazobactam (ZOSYN) 3.375 g vial to attach to  mL bag     Note to Pharmacy: For [facility], [facility] and [facility]: For Zosyn-naive patients, use the \"Zosyn initial dose + extended infusion\" order panel.    3.375 g  over 240 Minutes Intravenous EVERY 8 HOURS 21 1529            Contrast Orders - past 72 hours (72h ago, onward)    None        Population pharmacokinetic (pop-pk) data from Bayesian modeling software:  Regimen: 1500 mg IV every 24 hours.  Exposure target: AUC24 (range)400-600 mg/L.hr   AUC24,ss: 460 mg/L.hr  Probability of AUC24 > 400: 60 %  Ctrough,ss: 10.3 mg/L  Probability of Ctrough,ss > 20: 24 %  Probability of nephrotoxicity (Lodise LEILANI ): 6 %        Plan:  1. Start vancomycin 1500 mg IV q24h.   2. Vancomycin monitoring method: AUC  3. Vancomycin therapeutic monitoring goal: 400-600 mg*h/L  4. Pharmacy will check vancomycin levels as appropriate in 1-3 Days.    5. Serum creatinine levels will be ordered daily for the first week of therapy and at least twice weekly for subsequent weeks.  "     Janina Callejas, [facility]

## 2021-08-22 NOTE — CONSULTS
Cardiology Progress Note    Assessment/Plan:    1. Chronic atrial fibrillation with rapid ventricular response secondary to sepsis syndrome with cellulitis.  Continue Eliquis, resuming metoprolol today.  Likely discontinue telemetry in a.m.  2. Elevated troponin representing type II myocardial infarction not associated with acute coronary syndrome  3. Obesity, chronic atrial fibrillation, pulmonary hypertension, daytime sleepiness.  Suggest outpatient sleep apnea evaluation.    Active Problems:    Elevated troponin    Atrial fibrillation with rapid ventricular response (H)    Cellulitis of left leg     LOS: 1 day     Subjective:  Both legs still sore.  Denies shortness of breath.  Poor sleep last evening.      Objective:   Vital signs in last 24 hours:  Vitals:    08/21/21 2322 08/22/21 0229 08/22/21 0357 08/22/21 0712   BP: 90/59  111/63 111/75   BP Location: Left arm  Left arm Right arm   Pulse: 110  112 111   Resp: 24 22 24   Temp: 99  F (37.2  C)  98.7  F (37.1  C) 99  F (37.2  C)   TempSrc: Oral  Oral Oral   SpO2: 93%  93% 90%   Weight:  128 kg (282 lb 1.6 oz)     Height:         Weight:   Wt Readings from Last 3 Encounters:   08/22/21 128 kg (282 lb 1.6 oz)   05/26/21 128.8 kg (284 lb)   11/21/19 120.4 kg (265 lb 8 oz)           PHYSICAL EXAM      Respiratory:  Normal breath sounds, No respiratory distress, No wheezing, No chest tenderness.   Cardiovascular: Irregularly irregular.  No murmur or gallop audible.  GI:  Bowel sounds normal, Soft, No tenderness, No masses  Extremities: 2+ edema bilaterally to the knees.  Less erythema in left calf today.  Tender on both sides.       Cardiographics:   Telemetry atrial fibrillation 120 beats a minute last evening, slowing to 100 bpm today.    Imaging   EXAM: XR CHEST 2 VW  LOCATION: [facility]  DATE/TIME: 8/21/2021 8:53 AM  INDICATION: leg edema, concern for chf  COMPARISON: None.                                                  IMPRESSION:  Shallow inspiration. Heart size is mildly enlarged. Slight interstitial prominence most likely due to the shallow inspiration. No convincing evidence for pulmonary edema.        Cardiac diagnostics    Echocardiogram 6/2021:    Normal left ventricular size.The calculated left ventricular ejection fraction is 74%. This represents a normal ejection fraction. The left ventricular wall thickness is normal. Diastolic pattern consistent with atrial fibrillation observed.    Left atrial volume is mildly increased.    Normal right ventricular systolic function. The right ventricle is mildly dilated. TAPSE is normal, which is consistent with normal right ventricular systolic function.    The tricuspid valve has a dilated annulus. Moderate to severe tricuspid valve regurgitation. The estimated systolic pulmonary artery pressure is 42 mmhg.    Estimated central venous pressure equal to 8 mmHg.    No previous study for comparison.    Lab Results:   Lab Results   Component Value Date    WBC 13.3 (H) 08/22/2021    HGB 11.5 (L) 08/22/2021    HCT 35.8 08/22/2021     08/22/2021    CHOL 163 09/09/2016    TRIG 81 09/09/2016    HDL 42 (L) 09/09/2016     08/22/2021    BUN 28 (H) 08/22/2021    CO2 22 08/22/2021    INR 1.44 (H) 08/21/2021     Lab Results   Component Value Date    TROPONINI 1.32 (HH) 08/21/2021         Willi Zapata MD FAC  8/22/2021

## 2021-08-23 ENCOUNTER — APPOINTMENT (OUTPATIENT)
Dept: MRI IMAGING | Facility: HOSPITAL | Age: 66
DRG: 871 | End: 2021-08-23
Attending: HOSPITALIST
Payer: MEDICARE

## 2021-08-23 ENCOUNTER — APPOINTMENT (OUTPATIENT)
Dept: PHYSICAL THERAPY | Facility: HOSPITAL | Age: 66
DRG: 871 | End: 2021-08-23
Payer: MEDICARE

## 2021-08-23 ENCOUNTER — APPOINTMENT (OUTPATIENT)
Dept: OCCUPATIONAL THERAPY | Facility: HOSPITAL | Age: 66
DRG: 871 | End: 2021-08-23
Payer: MEDICARE

## 2021-08-23 LAB
ANION GAP SERPL CALCULATED.3IONS-SCNC: 12 MMOL/L (ref 5–18)
ATRIAL RATE - MUSE: 166 BPM
ATRIAL RATE - MUSE: 97 BPM
BASOPHILS # BLD AUTO: 0 10E3/UL (ref 0–0.2)
BASOPHILS NFR BLD AUTO: 0 %
BUN SERPL-MCNC: 24 MG/DL (ref 8–22)
CALCIUM SERPL-MCNC: 9.1 MG/DL (ref 8.5–10.5)
CHLORIDE BLD-SCNC: 106 MMOL/L (ref 98–107)
CO2 SERPL-SCNC: 22 MMOL/L (ref 22–31)
CREAT SERPL-MCNC: 0.7 MG/DL (ref 0.6–1.1)
DIASTOLIC BLOOD PRESSURE - MUSE: 56 MMHG
DIASTOLIC BLOOD PRESSURE - MUSE: NORMAL MMHG
ENTEROCOCCUS FAECALIS: NOT DETECTED
ENTEROCOCCUS FAECIUM: NOT DETECTED
EOSINOPHIL # BLD AUTO: 0.2 10E3/UL (ref 0–0.7)
EOSINOPHIL NFR BLD AUTO: 1 %
ERYTHROCYTE [DISTWIDTH] IN BLOOD BY AUTOMATED COUNT: 15 % (ref 10–15)
GFR SERPL CREATININE-BSD FRML MDRD: 90 ML/MIN/1.73M2
GLUCOSE BLD-MCNC: 112 MG/DL (ref 70–125)
HCT VFR BLD AUTO: 36.4 % (ref 35–47)
HGB BLD-MCNC: 11.5 G/DL (ref 11.7–15.7)
IMM GRANULOCYTES # BLD: 0.1 10E3/UL
IMM GRANULOCYTES NFR BLD: 1 %
INTERPRETATION ECG - MUSE: NORMAL
INTERPRETATION ECG - MUSE: NORMAL
LISTERIA SPECIES (DETECTED/NOT DETECTED): NOT DETECTED
LYMPHOCYTES # BLD AUTO: 1.2 10E3/UL (ref 0.8–5.3)
LYMPHOCYTES NFR BLD AUTO: 10 %
MCH RBC QN AUTO: 30.7 PG (ref 26.5–33)
MCHC RBC AUTO-ENTMCNC: 31.6 G/DL (ref 31.5–36.5)
MCV RBC AUTO: 97 FL (ref 78–100)
MONOCYTES # BLD AUTO: 0.7 10E3/UL (ref 0–1.3)
MONOCYTES NFR BLD AUTO: 6 %
NEUTROPHILS # BLD AUTO: 10 10E3/UL (ref 1.6–8.3)
NEUTROPHILS NFR BLD AUTO: 82 %
NRBC # BLD AUTO: 0 10E3/UL
NRBC BLD AUTO-RTO: 0 /100
P AXIS - MUSE: NORMAL DEGREES
P AXIS - MUSE: NORMAL DEGREES
PLATELET # BLD AUTO: 188 10E3/UL (ref 150–450)
POTASSIUM BLD-SCNC: 3.9 MMOL/L (ref 3.5–5)
PR INTERVAL - MUSE: NORMAL MS
PR INTERVAL - MUSE: NORMAL MS
QRS DURATION - MUSE: 94 MS
QRS DURATION - MUSE: 96 MS
QT - MUSE: 278 MS
QT - MUSE: 324 MS
QTC - MUSE: 439 MS
QTC - MUSE: 448 MS
R AXIS - MUSE: 15 DEGREES
R AXIS - MUSE: 92 DEGREES
RBC # BLD AUTO: 3.75 10E6/UL (ref 3.8–5.2)
SODIUM SERPL-SCNC: 140 MMOL/L (ref 136–145)
STAPHYLOCOCCUS AUREUS: NOT DETECTED
STAPHYLOCOCCUS EPIDERMIDIS: DETECTED
STAPHYLOCOCCUS LUGDUNENSIS: NOT DETECTED
STREPTOCOCCUS AGALACTIAE: NOT DETECTED
STREPTOCOCCUS ANGINOSUS GROUP: NOT DETECTED
STREPTOCOCCUS PNEUMONIAE: NOT DETECTED
STREPTOCOCCUS PYOGENES: NOT DETECTED
STREPTOCOCCUS SPECIES: NOT DETECTED
SYSTOLIC BLOOD PRESSURE - MUSE: 119 MMHG
SYSTOLIC BLOOD PRESSURE - MUSE: NORMAL MMHG
T AXIS - MUSE: 116 DEGREES
T AXIS - MUSE: 34 DEGREES
VENTRICULAR RATE- MUSE: 115 BPM
VENTRICULAR RATE- MUSE: 150 BPM
WBC # BLD AUTO: 12.2 10E3/UL (ref 4–11)

## 2021-08-23 PROCEDURE — 99232 SBSQ HOSP IP/OBS MODERATE 35: CPT | Performed by: INTERNAL MEDICINE

## 2021-08-23 PROCEDURE — 85004 AUTOMATED DIFF WBC COUNT: CPT | Performed by: HOSPITALIST

## 2021-08-23 PROCEDURE — 99233 SBSQ HOSP IP/OBS HIGH 50: CPT | Performed by: HOSPITALIST

## 2021-08-23 PROCEDURE — 250N000013 HC RX MED GY IP 250 OP 250 PS 637: Performed by: HOSPITALIST

## 2021-08-23 PROCEDURE — A9585 GADOBUTROL INJECTION: HCPCS | Performed by: HOSPITALIST

## 2021-08-23 PROCEDURE — 250N000011 HC RX IP 250 OP 636: Performed by: HOSPITALIST

## 2021-08-23 PROCEDURE — 97110 THERAPEUTIC EXERCISES: CPT | Mod: GO

## 2021-08-23 PROCEDURE — 97535 SELF CARE MNGMENT TRAINING: CPT | Mod: GO

## 2021-08-23 PROCEDURE — 258N000003 HC RX IP 258 OP 636: Performed by: HOSPITALIST

## 2021-08-23 PROCEDURE — 97110 THERAPEUTIC EXERCISES: CPT | Mod: GP

## 2021-08-23 PROCEDURE — 73720 MRI LWR EXTREMITY W/O&W/DYE: CPT | Mod: LT

## 2021-08-23 PROCEDURE — 210N000001 HC R&B IMCU HEART CARE

## 2021-08-23 PROCEDURE — 80048 BASIC METABOLIC PNL TOTAL CA: CPT | Performed by: HOSPITALIST

## 2021-08-23 PROCEDURE — 255N000002 HC RX 255 OP 636: Performed by: HOSPITALIST

## 2021-08-23 PROCEDURE — 36415 COLL VENOUS BLD VENIPUNCTURE: CPT | Performed by: HOSPITALIST

## 2021-08-23 RX ORDER — DIPHENHYDRAMINE HCL 25 MG
25 TABLET ORAL EVERY 6 HOURS PRN
Status: DISCONTINUED | OUTPATIENT
Start: 2021-08-23 | End: 2021-08-27 | Stop reason: HOSPADM

## 2021-08-23 RX ORDER — GADOBUTROL 604.72 MG/ML
10 INJECTION INTRAVENOUS ONCE
Status: COMPLETED | OUTPATIENT
Start: 2021-08-23 | End: 2021-08-23

## 2021-08-23 RX ADMIN — PIPERACILLIN SODIUM AND TAZOBACTAM SODIUM 3.38 G: 3; .375 INJECTION, POWDER, LYOPHILIZED, FOR SOLUTION INTRAVENOUS at 17:17

## 2021-08-23 RX ADMIN — TRAMADOL HYDROCHLORIDE 50 MG: 50 TABLET, FILM COATED ORAL at 10:02

## 2021-08-23 RX ADMIN — APIXABAN 5 MG: 5 TABLET, FILM COATED ORAL at 20:38

## 2021-08-23 RX ADMIN — VANCOMYCIN HYDROCHLORIDE 1500 MG: 5 INJECTION, POWDER, LYOPHILIZED, FOR SOLUTION INTRAVENOUS at 11:23

## 2021-08-23 RX ADMIN — MICONAZOLE NITRATE: 20 POWDER TOPICAL at 20:40

## 2021-08-23 RX ADMIN — CYANOCOBALAMIN TAB 1000 MCG 1000 MCG: 1000 TAB at 08:05

## 2021-08-23 RX ADMIN — GADOBUTROL 10 ML: 604.72 INJECTION INTRAVENOUS at 14:48

## 2021-08-23 RX ADMIN — METOPROLOL SUCCINATE 25 MG: 25 TABLET, EXTENDED RELEASE ORAL at 08:04

## 2021-08-23 RX ADMIN — APIXABAN 5 MG: 5 TABLET, FILM COATED ORAL at 08:04

## 2021-08-23 RX ADMIN — ACETAMINOPHEN 975 MG: 325 TABLET ORAL at 20:38

## 2021-08-23 RX ADMIN — PIPERACILLIN SODIUM AND TAZOBACTAM SODIUM 3.38 G: 3; .375 INJECTION, POWDER, LYOPHILIZED, FOR SOLUTION INTRAVENOUS at 09:59

## 2021-08-23 RX ADMIN — Medication 2000 UNITS: at 08:04

## 2021-08-23 RX ADMIN — MICONAZOLE NITRATE: 20 POWDER TOPICAL at 08:05

## 2021-08-23 RX ADMIN — ACETAMINOPHEN 975 MG: 325 TABLET ORAL at 13:10

## 2021-08-23 RX ADMIN — PIPERACILLIN SODIUM AND TAZOBACTAM SODIUM 3.38 G: 3; .375 INJECTION, POWDER, LYOPHILIZED, FOR SOLUTION INTRAVENOUS at 02:48

## 2021-08-23 RX ADMIN — TRAMADOL HYDROCHLORIDE 50 MG: 50 TABLET, FILM COATED ORAL at 15:22

## 2021-08-23 RX ADMIN — TRAMADOL HYDROCHLORIDE 50 MG: 50 TABLET, FILM COATED ORAL at 21:01

## 2021-08-23 RX ADMIN — TRAMADOL HYDROCHLORIDE 50 MG: 50 TABLET, FILM COATED ORAL at 03:10

## 2021-08-23 RX ADMIN — ACETAMINOPHEN 975 MG: 325 TABLET ORAL at 08:04

## 2021-08-23 ASSESSMENT — MIFFLIN-ST. JEOR: SCORE: 1855.39

## 2021-08-23 NOTE — PROGRESS NOTES
Care Management Follow Up    Length of Stay (days): 2    Expected Discharge Date: 08/24/2021     Concerns to be Addressed:     Clinical progression, cardiology following  Patient plan of care discussed at interdisciplinary rounds: Yes    Anticipated Discharge Disposition:  Transitional Care     Anticipated Discharge Services:  Nursing, PT and OT  Anticipated Discharge DME:  [facility] [place]    Patient/family educated on Medicare website which has current facility and service quality ratings:  Yes  Education Provided on the Discharge Plan:  yes  Patient/Family in Agreement with the Plan:  yes    Referrals Placed by CM/SW:  Pending TCU choices  Private pay costs discussed: Not applicable    Additional Information:  KATHARINA completed chart review. SWCM spoke to physical therapist who is recommending TCU at discharge. SW met with patient in her room to introduce self, CM role and review discharge plan and recommendations. Discussed TCU recommendations, patient is agreeable to TCU. Provided patient with TCU choice list, provided brief education on TCU. Patient states understanding and will review TCU choices.   Patient reports she is independent at baseline, drives.   Care management will follow patient for discharge planning.      Kym Angel LGSW

## 2021-08-23 NOTE — PLAN OF CARE
Problem: Adult Inpatient Plan of Care  Goal: Plan of Care Review  Outcome: No Change    Pt continues to have bilateral leg pain, tramadol given as needed. Pt did sleep post intervention.  Pt is very weeping and sad, pt states she wants to get better sooner than later.   Offer emotional support and listen to pt concerns.

## 2021-08-23 NOTE — PLAN OF CARE
Problem: Adjustment to Illness (Sepsis/Septic Shock)  Goal: Optimal Coping  Outcome: No Change       At times patient is weepy, and states she is frustrated.  Patient is easily reassured.  Patient is compliant with plan of care.    Patient's vitals have been stable.  Patient remains in chronic afib with heart rate 100-110s.  Patient has not had a fever this evening.  Patient's cellulitis is less red today, and less swollen.      Patient is up to commode or chair with assist of 1.     Patient did complain of pain in her calfs at HS.  Patient was given prn tramadol and scheduled tylenol.  Patient was then assisted with ADLS and repositioned.  Patient fell asleep after.  Will continue to monitor.

## 2021-08-23 NOTE — PROGRESS NOTES
[facility]    Medicine Progress Note - Hospitalist Service       Date of Admission:  8/21/2021    Assessment & Plan             Misty Quezada is a 66 year old female admitted on 8/21/2021. She has history of obesity, HTN and preDM and presents for evaluation of BLE pain found to be septic with Strep bacteremia     #Severe sepsis  #Strep bacteremia  Presented with signs of organ damage including elevated troponin and lactic acidosis  WBC 17, tachycardic, tachypneic, febrile 101.2F on admission  Source possibly from LLE cellulitis  Blood culture 8/21 with GPCs in pairs and chains from 1 of 2 bottles, PCR showing Strep species non GAS and non GBS, speciation and sensitivities pending. Repeat blood culture from 8/22 growing GPCs in clusters. Suspect the latter may be a contaminant. Will repeat another blood culture tomorrow AM.  Continue on Zosyn. Chart reviewed by ID consultant did not feel Vanc indicated.  Likely will consult ID prior to discharge but for now I am comfortable managing.  MRI today just showing severe cellulitis, no abscess or osteomyelitis.     #LLE cellulitis  Zosyn as above  Initiate lymphedema therapy  Scheduled tylenol. Tramadol for breakthrough pain     #Demand ischemia/type II MI  Initial troponin 1.75-->1.32  EKG some flattened T waves without previous for comparison, no clear ischemia  Likely from sepsis and A fib RVR  Cardiology saw and have signed off, appreciate assistance  Echo without wall motion abnormality  Consider stress test as outpatient  Cardiology also recommends Sleep eval     #A fib RVR  Continue home metoprolol, Eliquis     #HTN  Hold home Benicar for now, monitor BP trend  Continue home metoprolol    #HypoK  Protocol       Diet: Combination Diet Regular Diet Adult    DVT Prophylaxis: Moderate risk. Eliquis   Hi Catheter: Not present  Central Lines: None  Code Status: Full Code      Disposition Plan   Expected discharge: 08/24/2021 recommended to  transitional care unit once SIRS/Sepsis treated.     The patient's care was discussed with the Patient.    Tonya Young MD  Hospitalist Service  [facility]  Text page via [employer] Paging/Directory      Clinically Significant Risk Factors Present on Admission               ____________        Physical Exam   Vital Signs: Temp: 98  F (36.7  C) Temp src: Oral BP: 115/69 Pulse: 101   Resp: 22 SpO2: 94 % O2 Device: None (Room air)    Weight: 283 lbs 14.4 oz  General: in no apparent distress, non-toxic and alert obese female lying in hospital bed oriented x3  HEENT: Head normocephalic atraumatic, oral mucosa moist. Sclerae anicteric  Skin: Cheeks flushed. LLE erythema improved proximally, unchanged distally.  R calf more firm compared to L.  Extremities: Woody edema bilateral lower legs  Psych: Normal affect, mood dysthymic  Neuro: CNII-XII grossly intact, moving all 4 extremities      Data   Recent Results (from the past 24 hour(s))   Potassium    Collection Time: 08/22/21  2:18 PM   Result Value Ref Range    Potassium 3.7 3.5 - 5.0 mmol/L   Lactic Acid STAT    Collection Time: 08/22/21  2:18 PM   Result Value Ref Range    Lactic Acid 0.9 0.7 - 2.0 mmol/L   Blood Culture Arm, Right    Collection Time: 08/22/21  2:18 PM    Specimen: Arm, Right; Blood   Result Value Ref Range    Culture No growth after 12 hours    Basic metabolic panel    Collection Time: 08/23/21  6:04 AM   Result Value Ref Range    Sodium 140 136 - 145 mmol/L    Potassium 3.9 3.5 - 5.0 mmol/L    Chloride 106 98 - 107 mmol/L    Carbon Dioxide (CO2) 22 22 - 31 mmol/L    Anion Gap 12 5 - 18 mmol/L    Urea Nitrogen 24 (H) 8 - 22 mg/dL    Creatinine 0.70 0.60 - 1.10 mg/dL    Calcium 9.1 8.5 - 10.5 mg/dL    Glucose 112 70 - 125 mg/dL    GFR Estimate 90 >60 mL/min/1.73m2   CBC with platelets and differential    Collection Time: 08/23/21  6:04 AM   Result Value Ref Range    WBC Count 12.2 (H) 4.0 - 11.0 10e3/uL    RBC Count 3.75 (L) 3.80 -  5.20 10e6/uL    Hemoglobin 11.5 (L) 11.7 - 15.7 g/dL    Hematocrit 36.4 35.0 - 47.0 %    MCV 97 78 - 100 fL    MCH 30.7 26.5 - 33.0 pg    MCHC 31.6 31.5 - 36.5 g/dL    RDW 15.0 10.0 - 15.0 %    Platelet Count 188 150 - 450 10e3/uL    % Neutrophils 82 %    % Lymphocytes 10 %    % Monocytes 6 %    % Eosinophils 1 %    % Basophils 0 %    % Immature Granulocytes 1 %    NRBCs per 100 WBC 0 <1 /100    Absolute Neutrophils 10.0 (H) 1.6 - 8.3 10e3/uL    Absolute Lymphocytes 1.2 0.8 - 5.3 10e3/uL    Absolute Monocytes 0.7 0.0 - 1.3 10e3/uL    Absolute Eosinophils 0.2 0.0 - 0.7 10e3/uL    Absolute Basophils 0.0 0.0 - 0.2 10e3/uL    Absolute Immature Granulocytes 0.1 (H) <=0.0 10e3/uL    Absolute NRBCs 0.0 10e3/uL     ____________  Interval History   Data reviewed today: I reviewed all medications, new labs and imaging results over the last 24 hours. I personally reviewed no images or EKG's today.  patient states left leg still very painful especially when she bears weight. leg still red, feels firm to the touch and with scabbed area mid shin. some improvement in proximal erythema since admission but more distally no improvement. somewhat concerned for underlying osteomyelitis, will check MRI for completeness. speciation on blood cultures still pending. patient does appear more energetic today. complains of hoarse voice. no dysphagia or odynophagia. not been up walking much due to leg pain. planning TCU when medically ready, likely not for 2 days or more. tiesha June at bedside for visit.

## 2021-08-23 NOTE — PLAN OF CARE
Problem: Adult Inpatient Plan of Care  Goal: Plan of Care Review  Outcome: Improving  Pt up with assist x1. Pain increased in left leg when up moving around. PT/OT working with pt. Updated on plan of care, pt verbalized understanding. Call light within reach, calls approprietly. VSS, will continue to monitor.      Problem: Infection Progression (Sepsis)  Goal: Absence of Infection Signs and Symptoms  Outcome: Improving   IV antibiotics given per order. MR of left leg ordered, called MRI, no time scheduled yet. Left shin reddened, swelling noted, elevated on pillows. Lymphedema wraps held, waiting on MRI.     Problem: Dysrhythmia  Goal: Normalized Cardiac Rhythm  Outcome: Improving   Afib on tele, HR in the 90s- low 100s when moving around.

## 2021-08-23 NOTE — PROGRESS NOTES
Cardiology Progress Note    Assessment/Plan:  1.  Chronic atrial fibrillation with mildly rapid ventricular response, likely precipitated by stress of acute sepsis.  Heart rates improving with resumption of her daily dose of metoprolol.  We will continue with current regimen and anticipate heart rate will continue to decline.  Continue apixaban anticoagulation.  2.  Elevated troponin, consistent with demand ischemia from acute infection.  Echocardiogram demonstrates no evidence of a regional wall motion abnormality.  3.  Pulmonary hypertension, daytime sleepiness and obesity.  Agree with recommendation for outpatient evaluation for obstructive sleep apnea.    We will sign off at this time.  Call if further questions.      Subjective:  Reports general fatigue, weakness.  Tells me that there is recommendation that she go to TCU for additional rehab which I told her would be best.      acetaminophen  975 mg Oral TID     apixaban ANTICOAGULANT  5 mg Oral BID     cyanocobalamin  1,000 mcg Oral Daily     metoprolol succinate ER  25 mg Oral Daily     miconazole   Topical BID     piperacillin-tazobactam  3.375 g Intravenous Q8H     sodium chloride (PF)  3 mL Intracatheter Q8H     vancomycin  1,500 mg Intravenous Q24H     Vitamin D3  2,000 Units Oral Daily         Objective:   Vital signs in last 24 hours:  Temp:  [97.3  F (36.3  C)-99  F (37.2  C)] 98.2  F (36.8  C)  Pulse:  [] 98  Resp:  [22-24] 22  BP: (106-125)/(58-89) 108/72  SpO2:  [92 %-97 %] 92 %  Weight:   [unfilled]     Review of Systems:  Negative with exception of above    Physical Exam:  General appearance: alert, cooperative, no distress   Head: Normocephalic, without obvious abnormality, atraumatic  Neck: no JVD   Lungs: clear to auscultation bilaterally   Heart: Irregularly irregular rhythm.  S1, S2 normal.  No murmur or gallop  Extremities: No peripheral edema bilaterally.    Cardiographics (personally reviewed):   Telemetry demonstrates atrial  fibrillation with controlled ventricular response    Imaging (personally reviewed):   No new imaging.    Lab Results (personally reviewed):   Recent Labs   Lab 08/23/21  0604      CO2 22   BUN 24*       INR   Date Value Ref Range Status   08/21/2021 1.44 (H) 0.90 - 1.15 Final     Comment:     Effective 7/11/2021, the reference range for this assay has changed.       Troponin I   Date Value Ref Range Status   08/21/2021 1.32 (HH) 0.00 - 0.29 ng/mL Final   08/21/2021 1.87 (HH) 0.00 - 0.29 ng/mL Final   08/21/2021 1.75 (HH) 0.00 - 0.29 ng/mL Final       BNP   Date Value Ref Range Status   08/21/2021 281 (H) 0 - 112 pg/mL Final       Hemoglobin   Date Value Ref Range Status   08/23/2021 11.5 (L) 11.7 - 15.7 g/dL Final         Gissel Goodwin MD

## 2021-08-24 ENCOUNTER — APPOINTMENT (OUTPATIENT)
Dept: OCCUPATIONAL THERAPY | Facility: HOSPITAL | Age: 66
DRG: 871 | End: 2021-08-24
Attending: HOSPITALIST
Payer: MEDICARE

## 2021-08-24 ENCOUNTER — APPOINTMENT (OUTPATIENT)
Dept: PHYSICAL THERAPY | Facility: HOSPITAL | Age: 66
DRG: 871 | End: 2021-08-24
Payer: MEDICARE

## 2021-08-24 LAB
ERYTHROCYTE [DISTWIDTH] IN BLOOD BY AUTOMATED COUNT: 14.9 % (ref 10–15)
HCT VFR BLD AUTO: 35.3 % (ref 35–47)
HGB BLD-MCNC: 11.3 G/DL (ref 11.7–15.7)
MCH RBC QN AUTO: 31 PG (ref 26.5–33)
MCHC RBC AUTO-ENTMCNC: 32 G/DL (ref 31.5–36.5)
MCV RBC AUTO: 97 FL (ref 78–100)
PLATELET # BLD AUTO: 206 10E3/UL (ref 150–450)
POTASSIUM BLD-SCNC: 4.1 MMOL/L (ref 3.5–5)
RBC # BLD AUTO: 3.65 10E6/UL (ref 3.8–5.2)
WBC # BLD AUTO: 8.6 10E3/UL (ref 4–11)

## 2021-08-24 PROCEDURE — 97110 THERAPEUTIC EXERCISES: CPT | Mod: GP

## 2021-08-24 PROCEDURE — 36415 COLL VENOUS BLD VENIPUNCTURE: CPT | Performed by: HOSPITALIST

## 2021-08-24 PROCEDURE — 250N000013 HC RX MED GY IP 250 OP 250 PS 637: Performed by: HOSPITALIST

## 2021-08-24 PROCEDURE — 97530 THERAPEUTIC ACTIVITIES: CPT | Mod: GP

## 2021-08-24 PROCEDURE — 85027 COMPLETE CBC AUTOMATED: CPT | Performed by: HOSPITALIST

## 2021-08-24 PROCEDURE — 97535 SELF CARE MNGMENT TRAINING: CPT | Mod: GO

## 2021-08-24 PROCEDURE — 210N000001 HC R&B IMCU HEART CARE

## 2021-08-24 PROCEDURE — 99233 SBSQ HOSP IP/OBS HIGH 50: CPT | Performed by: HOSPITALIST

## 2021-08-24 PROCEDURE — 250N000013 HC RX MED GY IP 250 OP 250 PS 637: Performed by: INTERNAL MEDICINE

## 2021-08-24 PROCEDURE — 250N000011 HC RX IP 250 OP 636: Performed by: HOSPITALIST

## 2021-08-24 PROCEDURE — 99222 1ST HOSP IP/OBS MODERATE 55: CPT | Performed by: INTERNAL MEDICINE

## 2021-08-24 PROCEDURE — 250N000012 HC RX MED GY IP 250 OP 636 PS 637: Performed by: INTERNAL MEDICINE

## 2021-08-24 PROCEDURE — 250N000011 HC RX IP 250 OP 636: Performed by: INTERNAL MEDICINE

## 2021-08-24 PROCEDURE — 87040 BLOOD CULTURE FOR BACTERIA: CPT | Performed by: HOSPITALIST

## 2021-08-24 PROCEDURE — 84132 ASSAY OF SERUM POTASSIUM: CPT | Performed by: HOSPITALIST

## 2021-08-24 RX ORDER — PREDNISONE 20 MG/1
60 TABLET ORAL
Status: COMPLETED | OUTPATIENT
Start: 2021-08-24 | End: 2021-08-26

## 2021-08-24 RX ORDER — CEFTRIAXONE 2 G/1
2 INJECTION, POWDER, FOR SOLUTION INTRAMUSCULAR; INTRAVENOUS EVERY 24 HOURS
Status: COMPLETED | OUTPATIENT
Start: 2021-08-24 | End: 2021-08-26

## 2021-08-24 RX ADMIN — TRAMADOL HYDROCHLORIDE 50 MG: 50 TABLET, FILM COATED ORAL at 06:00

## 2021-08-24 RX ADMIN — METOPROLOL SUCCINATE 25 MG: 25 TABLET, EXTENDED RELEASE ORAL at 09:34

## 2021-08-24 RX ADMIN — APIXABAN 5 MG: 5 TABLET, FILM COATED ORAL at 21:28

## 2021-08-24 RX ADMIN — ACETAMINOPHEN 975 MG: 325 TABLET ORAL at 15:28

## 2021-08-24 RX ADMIN — ACETAMINOPHEN 975 MG: 325 TABLET ORAL at 09:34

## 2021-08-24 RX ADMIN — ACETAMINOPHEN 975 MG: 325 TABLET ORAL at 21:28

## 2021-08-24 RX ADMIN — PREDNISONE 60 MG: 20 TABLET ORAL at 15:28

## 2021-08-24 RX ADMIN — CYANOCOBALAMIN TAB 1000 MCG 1000 MCG: 1000 TAB at 09:39

## 2021-08-24 RX ADMIN — PIPERACILLIN SODIUM AND TAZOBACTAM SODIUM 3.38 G: 3; .375 INJECTION, POWDER, LYOPHILIZED, FOR SOLUTION INTRAVENOUS at 09:35

## 2021-08-24 RX ADMIN — Medication 2000 UNITS: at 09:34

## 2021-08-24 RX ADMIN — CEFTRIAXONE SODIUM 2 G: 2 INJECTION, POWDER, FOR SOLUTION INTRAMUSCULAR; INTRAVENOUS at 18:06

## 2021-08-24 RX ADMIN — MICONAZOLE NITRATE 1 APPLICATOR: 20 POWDER TOPICAL at 10:02

## 2021-08-24 RX ADMIN — DIPHENHYDRAMINE HCL 25 MG: 25 TABLET ORAL at 01:26

## 2021-08-24 RX ADMIN — MICONAZOLE NITRATE: 20 POWDER TOPICAL at 21:20

## 2021-08-24 RX ADMIN — APIXABAN 5 MG: 5 TABLET, FILM COATED ORAL at 09:39

## 2021-08-24 RX ADMIN — PIPERACILLIN SODIUM AND TAZOBACTAM SODIUM 3.38 G: 3; .375 INJECTION, POWDER, LYOPHILIZED, FOR SOLUTION INTRAVENOUS at 01:35

## 2021-08-24 ASSESSMENT — ACTIVITIES OF DAILY LIVING (ADL): PREVIOUS_RESPONSIBILITIES: MEAL PREP;HOUSEKEEPING;LAUNDRY

## 2021-08-24 ASSESSMENT — MIFFLIN-ST. JEOR: SCORE: 1862.65

## 2021-08-24 NOTE — PLAN OF CARE
Problem: Nutrition Impaired (Sepsis/Septic Shock)  Goal: Optimal Nutrition Intake  Outcome: No Change    Continues to have poor appetite. Consumed only 1/4 of soup for dinner.     Problem: Pain Acute  Goal: Acceptable Pain Control and Functional Ability  Outcome: No Change  Intervention: Develop Pain Management Plan  Recent Flowsheet Documentation  Taken 8/23/2021 2101 by Calin Crespo, RN  Pain Management Interventions: medication (see MAR)     Pt continues to complain of bilateral lower extremity pain (up to 8/10) and is at it's worst while ambulating. Pain is being managed effectively with scheduled analgesics and prn Tramadol.    Problem: Dysrhythmia  Goal: Normalized Cardiac Rhythm  Outcome: Improving  Intervention: Monitor and Manage Cardiac Rhythm Effect  Recent Flowsheet Documentation  Taken 8/23/2021 1546 by Calin Crespo, RN  VTE Prevention/Management: anticoagulant therapy maintained     A-fib remains .

## 2021-08-24 NOTE — SIGNIFICANT EVENT
Significant Event Note    Time of event: 7:54 PM August 23, 2021    Description of event:  Was notified regarding 1 blood culture from 8/22 growing staphalococcus epidermidis. Original blood culture from 8/21 grew Streptococcus species.  It is likely that the staph epidermidis is result of contamination.  Patient is currently on Zosyn.  ID was consulted earlier in the admission and did not feel vancomycin was indicated.  Patient has been clinically improving on current regimen.  Vital signs currently within normal limits.  Plan:  - Continue current antibiotic regimen   - Sensitivities still pending   -Blood culture from today is pending.    Dionicio Hall MD

## 2021-08-24 NOTE — PLAN OF CARE
Problem: Adult Inpatient Plan of Care  Goal: Plan of Care Review  Outcome: Improving     Pt c/o bilateral leg pain, mostly in the left side. Too early for prn at the time. Legs elevated on pillows and ice applied. Pt more steady on her feet now to bedside commode.     Pt c/o body itching, benadryl given with relief.

## 2021-08-24 NOTE — CONSULTS
Consultation - Infectious Disease  [facility]  Misty Quezada,  1955, MRN [id number]    Admitting Dx: Elevated troponin [R77.8]  Atrial fibrillation with rapid ventricular response (H) [I48.91]  Cellulitis of left leg [L03.116]  Sepsis (H) [A41.9]    PCP: Tiff Chavez, [phone]       ASSESSMENT   66-year-old woman with a history of hypertension, A. fib, obesity who is admitted with left leg pain and swelling.    1. Left leg cellulitis.  Erythema and swelling on admission.  Fever.  Leukocytosis.  Ultrasound negative for DVT.  MRI showing cellulitis without deeper infection  2. Gram-positive bacteremia.  1 of 2 blood cultures on admission with strep species, final culture pending.  Likely associated with cellulitis.  Repeat cultures on the next day with GPC's in clusters, likely a contaminant (staph epidermidis on verigene).    Active Problems:    Elevated troponin    Atrial fibrillation with rapid ventricular response (H)    Cellulitis of left leg       PLAN   -Discontinue Zosyn  -Start ceftriaxone 2 g IV daily  -3-day burst of prednisone 60 mg once daily  -Lymphedema wraps versus leg elevation      Thank you for this consult. Will follow.    Zion Mcdaniels MD  [facility]  Direct messaging: [employer] Paging  On-Call ID provider: [phone], option: [phone]      ===========================================      Chief Complaint   Cellulitis      HPI     We have been requested by Tonya Young MD to evaluate Misty Quezada for the above.    History obtained by patient    Misty Quezada is a 66 year old woman with a history of A. fib, hypertension, obesity who is admitted with left leg pain.  She developed new swelling over the past few days.  This is never happened before.  Started on broad-spectrum antibiotics.  Blood cultures on admission with strep species.  Febrile on admission, but has been afebrile since then.  Continues to have pain and swelling in her left  "leg.  Some low back pain today without radiation.          Review of Systems   Ten systems reviewed and negative except for what is noted in the HPI       Medical History  Past Medical History:   Diagnosis Date     Benign essential hypertension      Chronic atrial fibrillation (H)     Surgical History  She  has a past surgical history that includes gastric bypass; appendectomy; and Cholecystectomy.     Social History  Reviewed, and she  reports that she has never smoked. She has never used smokeless tobacco. She reports previous alcohol use. She reports that she does not use drugs.  Social History     Social History Narrative     Not on file     Family History  family history includes No Known Problems in her father and mother.  family history reviewed and is not pertinent to the presenting problem.            Allergies   No Known Allergies      Antibiotics   Zosyn 8/21-      Previous:  Vancomycin 8/21-8/23      Physical Exam     Temp:  [97.8  F (36.6  C)-98.8  F (37.1  C)] 98.7  F (37.1  C)  Pulse:  [] 90  Resp:  [18-20] 18  BP: (105-129)/() 116/72  SpO2:  [92 %-96 %] 95 %    /72 (BP Location: Right arm)   Pulse 90   Temp 98.7  F (37.1  C) (Oral)   Resp 18   Ht 1.702 m (5' 7\")   Wt 129.5 kg (285 lb 8 oz)   SpO2 95%   BMI 44.72 kg/m      GENERAL:  well-developed, well-nourished, lying in bed in no acute distress.   HENT:  Head is normocephalic, atraumatic. Oropharynx is moist without exudates or ulcers.  Edentulous  EYES:  Eyes have anicteric sclerae without conjunctival injection or stigmata of endocarditis.   NECK:  Supple.  LUNGS:  Clear to auscultation.  CARDIOVASCULAR:  Regular rate and rhythm with no murmurs, gallops or rubs.  ABDOMEN:  Normal bowel sounds, soft, nontender. No appreciable hepatosplenomegaly  Lymph: No inguinal lymphadenopathy  SKIN: Left leg edema and erythema.  Most tender area just proximal to left medial malleolus.  Good ankle range of motion  NEUROLOGIC:  Grossly " nonfocal.      Cultures     8/21 blood culture 1 of 2: Strep species on verigene  8/22 blood culture: Gram-positive cocci in clusters (MSSE on verigene)  8/24 blood culture: Pending    Laboratory results     Recent Labs   Lab 08/24/21  0428 08/23/21  0604 08/22/21  0424   WBC 8.6 12.2* 13.3*   HGB 11.3* 11.5* 11.5*    188 186       Recent Labs   Lab 08/23/21  0604 08/22/21  0424 08/21/21  1028    138 138   CO2 22 22 22   BUN 24* 28* 26*       No results for input(s): CRP in the last 168 hours.    Invalid input(s): SEDRATE        Imaging   Radiology results reviewed    Chest XR,  PA & LAT    Result Date: 8/21/2021  EXAM: XR CHEST 2 VW LOCATION: [facility] DATE/TIME: 8/21/2021 8:53 AM INDICATION: leg edema, concern for chf COMPARISON: None.     IMPRESSION: Shallow inspiration. Heart size is mildly enlarged. Slight interstitial prominence most likely due to the shallow inspiration. No convincing evidence for pulmonary edema.    MR Tibia Fibula Lower Leg Left wo & w Contr    Result Date: 8/23/2021  EXAM: MR TIBIA FIBULA LOWER LEG LEFT WO and W CONTR LOCATION: [facility] DATE/TIME: 8/23/2021 2:11 PM INDICATION: Pain and swelling. COMPARISON: None. TECHNIQUE: Routine. Additional postgadolinium T1 sequences were obtained. IV CONTRAST: 10 ml of Gadavist FINDINGS: BONES: -The left tibia and fibula are negative for fracture or marrow signal abnormality. No evidence for significant stress reaction. No evidence for osteomyelitis. No evidence for solid bone lesion. SOFT TISSUES:  -There is some intermediate signal abnormality within the medial and lateral gastrocnemius suggestive of muscle strain. There is no tearing. There is no significant fatty infiltration or atrophy. No evidence for organized fluid collection or focal soft tissue mass. MUSCLES: -Extensive but nonspecific edema or cellulitis seen circumferentially about the lower leg without evidence for discrete  fluid collection.     IMPRESSION: 1.  Extensive but nonspecific edema or cellulitis seen circumferentially about the lower leg without evidence for organized fluid collection or soft tissue mass. 2.  There is some mild intermediate signal abnormality within the medial and lateral gastrocnemius which could be due to low-grade muscle strain or reactive but there is no tearing or retraction and no evidence for organized fluid collection or soft tissue mass. 3.  The left tibia and fibula are negative for fracture or marrow signal abnormality. No evidence for stress reaction or focal bone lesion. 4.  No significant knee or ankle joint effusion.    Echocardiogram Complete    Result Date: 2021  [id number]^DOW[place] [address]  Name: BEN BENTLEY MRN: [id number] : 1955 Study Date: 2021 09:14 AM Age: 66 yrs Gender: Female Patient Location: [facility] Reason For Study: Atrial Fibrillation, Dyspnea Ordering Physician: ZELDA PICKARD Performed By: JESUS  BSA: 2.3 m2 Height: 67 in Weight: 282 lb HR: 117 ______________________________________________________________________________ Procedure Definity (NDC #49034-517) given intravenously. Lot # [id number], : . ______________________________________________________________________________ Interpretation Summary  Left ventricular function is normal.The ejection fraction is 55-60%. The right ventricle is moderately dilated. TAPSE is normal, which is consistent with normal right ventricular systolic function. There is mod-severe to severe (3-4+) tricuspid regurgitation. The right ventricular systolic pressure is approximated at 58mmHg plus the right atrial pressure. Moderate valvular aortic stenosis. ______________________________________________________________________________ I      WMSI = 1.00     % Normal = 100  X - Cannot   0 -                      (2) - Mildly 2 -          Segments   Size Interpret    Hyperkinetic 1 - Normal  Hypokinetic  Hypokinetic  1-2     small                                                    7 -          3-5    moderate 3 - Akinetic 4 -          5 -         6 - Akinetic Dyskinetic   6-14    large              Dyskinetic   Aneurysmal  w/scar       w/scar       15-16   diffuse  Left Ventricle The left ventricle is normal in size. Left ventricular function is normal.The ejection fraction is 55-60%. There is mild concentric left ventricular hypertrophy. Left ventricular diastolic function is normal. Flattened septum is consistent with RV pressure/volume overload.  Right Ventricle The right ventricle is moderately dilated. TAPSE is normal, which is consistent with normal right ventricular systolic function.  Atria The left atrium is mild to moderately dilated. The right atrium is moderate to severely dilated. There is no color Doppler evidence of an atrial shunt.  Mitral Valve Mitral valve leaflets appear normal. There is no evidence of mitral stenosis or clinically significant mitral regurgitation.  Tricuspid Valve Tricuspid valve leaflets appear normal. There is mod-severe to severe (3-4+) tricuspid regurgitation. The right ventricular systolic pressure is approximated at 58mmHg plus the right atrial pressure. Right ventricular systolic pressure is elevated, consistent with moderate pulmonary hypertension.  Aortic Valve Moderate aortic valve calcification is present. Moderate valvular aortic stenosis.  Pulmonic Valve The pulmonic valve is not well visualized.  Vessels The aorta root is normal. Normal size ascending aorta. IVC diameter >2.1 cm collapsing <50% with sniff suggests a high RA pressure estimated at 15 mmHg or greater.  Pericardium There is no pericardial effusion.  ______________________________________________________________________________ MMode/2D Measurements & Calculations IVSd: 1.4 cm LVIDd: 4.1 cm LVIDs: 2.8 cm LVPWd: 1.7 cm FS: 30.0 % LV mass(C)d: 244.6  grams LV mass(C)dI: 104.5 grams/m2 Ao root diam: 3.5 cm LA dimension: 4.6 cm  asc Aorta Diam: 3.7 cm LA/Ao: 1.3 LVOT diam: 2.1 cm LVOT area: 3.4 cm2 LA Volume Indexed (AL/bp): 51.8 ml/m2 RWT: 0.83  Time Measurements MM HR: 117.0 BPM  Doppler Measurements & Calculations MV E max markus: 104.8 cm/sec  MV max P.9 mmHg MV mean P.6 mmHg MV V2 VTI: 25.3 cm MVA(VTI): 1.9 cm2 MV dec time: 0.20 sec Ao V2 max: 262.2 cm/sec Ao max P.0 mmHg Ao V2 mean: 175.8 cm/sec Ao mean P.4 mmHg Ao V2 VTI: 48.9 cm LORRAINE(I,D): 0.99 cm2 LORRAINE(V,D): 1.2 cm2 LV V1 max PG: 3.4 mmHg LV V1 max: 92.2 cm/sec LV V1 VTI: 14.1 cm SV(LVOT): 48.6 ml SI(LVOT): 20.8 ml/m2 PA acc time: 0.08 sec TR max markus: 327.1 cm/sec TR max P.8 mmHg AV Markus Ratio (DI): 0.35 LORRAINE Index (cm2/m2): 0.42 E/E' avg: 10.6 Lateral E/e': 7.6 Medial E/e': 13.6  ______________________________________________________________________________ Report approved by: Adin Huerta 2021 11:11 AM       US Lower Extremity Venous Duplex Bilateral    Result Date: 2021  EXAM: US LOWER EXTREMITY VENOUS DUPLEX BILATERAL LOCATION: [facility] DATE/TIME: 2021 8:27 AM INDICATION: Bilateral lower extremity swelling. COMPARISON: None. TECHNIQUE: Venous Duplex ultrasound of bilateral lower extremities with and without compression, augmentation and duplex. Color flow and spectral Doppler with waveform analysis performed. FINDINGS: Exam includes the common femoral, femoral, popliteal veins as well as segmentally visualized deep calf veins and greater saphenous vein. RIGHT: No deep vein thrombosis. No superficial thrombophlebitis. No popliteal cyst. LEFT: No deep vein thrombosis. No superficial thrombophlebitis. No popliteal cyst.     IMPRESSION: 1.  No deep venous thrombosis in the bilateral lower extremities.      Data reviewed today: I reviewed all medications, new labs and imaging results over the last 24 hours. I personally reviewed the leg MRI  image(s) showing No abscess.  The patient's care was discussed with the Patient.

## 2021-08-24 NOTE — PLAN OF CARE
Problem: Adult Inpatient Plan of Care  Goal: Plan of Care Review  Outcome: Improving   Pt is alert and oirented x4 and able to make needs known.  She is able to get up to commode with just a standby assist.  Abx were changed per ID.  Lymph wraps off at this time and OT will be here in the am to replace.  Pt is resting in bed at this time, call light within reach, will continue to monitor.

## 2021-08-24 NOTE — PROGRESS NOTES
Care Management Follow Up    Length of Stay (days): 3    Expected Discharge Date:  8/24/2021       Concerns to be Addressed:     Clinical progression  Patient plan of care discussed at interdisciplinary rounds: Yes    Anticipated Discharge Disposition:  Transitional Care     Anticipated Discharge Services:  Nursing, PT and OT  Anticipated Discharge DME:  david    Patient/family educated on Medicare website which has current facility and service quality ratings:  yes  Education Provided on the Discharge Plan:  Yes  Patient/Family in Agreement with the Plan:  yes    Referrals Placed by CM/SW:  Post acute rehab  Private pay costs discussed: Not applicable    Additional Information:  SWCM completed chart review and met with patient in her room to review discharge planning and TCU choices. Patient states she and her son have reviewed ratings and would like referrals sent to [facility] and [facility]. Referrals sent.   Care management will follow for discharge planning.      ROGER Ambrocio

## 2021-08-24 NOTE — PROGRESS NOTES
[facility]    Medicine Progress Note - Hospitalist Service       Date of Admission:  8/21/2021    Assessment & Plan             Misty Quezada is a 66 year old female admitted on 8/21/2021. She has history of obesity, HTN and preDM and presents for evaluation of BLE pain found to be septic with Strep bacteremia     #Severe sepsis, resolved  #Strep bacteremia?  Presented with signs of organ damage including elevated troponin and lactic acidosis  WBC 17, tachycardic, tachypneic, febrile 101.2F on admission  Source possibly from LLE cellulitis  Blood culture 8/21 polymicrobial with Strep dysgalactiae, Strep mitis x2 strains, and Rothia mucilaginosa, given polymicrobial this may be skin contaminant although Strep would fit cellulitis picture.  Repeat blood culture from 8/22 growing Staph warneri do suspect this is a contaminant. Repeat blood culture 8/24 NGTD.  Labs significantly improved.  ID consulted today appreciate assistance     #LLE cellulitis  Has been on Zosyn, changing to high dose Rocephin today per ID  Leg is slow to improve. ID also adding high dose prednisone today.  MRI just showed severe cellulitis, no abscess or osteomyelitis.  Continue lymphedema therapy  Scheduled tylenol. Tramadol for breakthrough pain     #Demand ischemia/type II MI  Initial troponin 1.75-->1.32  EKG some flattened T waves without previous for comparison, no clear ischemia  Likely from sepsis and A fib RVR  Cardiology saw and have signed off, appreciate assistance  Echo without wall motion abnormality  Consider stress test as outpatient  Cardiology also recommends Sleep eval     #A fib RVR  Continue home metoprolol, Eliquis  Ok to discontinue tele at this point     #HTN  Hold home Benicar for now, monitor BP trend  Continue home metoprolol    #HypoK  Protocol       Diet: Combination Diet Regular Diet Adult    DVT Prophylaxis: Moderate risk. Eliquis   Hi Catheter: Not present  Central Lines: None  Code  Status: Full Code      Disposition Plan   Expected discharge: 08/25/2021 recommended to transitional care unit once cellulitis better.     The patient's care was discussed with the Patient.    Tonya Young MD  Hospitalist Service  [facility]  Text page via [email or web address] Paging/Directory      Clinically Significant Risk Factors Present on Admission               ____________        Physical Exam   Vital Signs: Temp: 98.7  F (37.1  C) Temp src: Oral BP: 116/72 Pulse: 90   Resp: 18 SpO2: 95 % O2 Device: None (Room air)    Weight: 285 lbs 8 oz  General: in no apparent distress, non-toxic and alert obese female lying in hospital bed oriented x3  HEENT: Head normocephalic atraumatic, oral mucosa moist. Sclerae anicteric  Skin: Cheeks flushed. LLE erythema extending up into proximal medial knee, quite densely erythematous.    Extremities: Legs in lymphedema wraps  Psych: Normal affect, mood dysthymic  Neuro: CNII-XII grossly intact, moving all 4 extremities      Data   Recent Results (from the past 24 hour(s))   Potassium    Collection Time: 08/24/21  4:28 AM   Result Value Ref Range    Potassium 4.1 3.5 - 5.0 mmol/L   CBC with platelets    Collection Time: 08/24/21  4:28 AM   Result Value Ref Range    WBC Count 8.6 4.0 - 11.0 10e3/uL    RBC Count 3.65 (L) 3.80 - 5.20 10e6/uL    Hemoglobin 11.3 (L) 11.7 - 15.7 g/dL    Hematocrit 35.3 35.0 - 47.0 %    MCV 97 78 - 100 fL    MCH 31.0 26.5 - 33.0 pg    MCHC 32.0 31.5 - 36.5 g/dL    RDW 14.9 10.0 - 15.0 %    Platelet Count 206 150 - 450 10e3/uL     ____________  Interval History   Data reviewed today: I reviewed all medications, new labs and imaging results over the last 24 hours. I personally reviewed no images or EKG's today.  patient states leg not feeling much better. did get lymphedema wraps placed today. still with dense erythema up into the knee. wonder if maybe a steroid would help get things moving in the right direction. will ask ID for assistance in  mgmt. not ready for discharge. still awaiting speciation and sensitivities from blood cultures.

## 2021-08-25 ENCOUNTER — APPOINTMENT (OUTPATIENT)
Dept: PHYSICAL THERAPY | Facility: HOSPITAL | Age: 66
DRG: 871 | End: 2021-08-25
Payer: MEDICARE

## 2021-08-25 ENCOUNTER — APPOINTMENT (OUTPATIENT)
Dept: OCCUPATIONAL THERAPY | Facility: HOSPITAL | Age: 66
DRG: 871 | End: 2021-08-25
Payer: MEDICARE

## 2021-08-25 LAB
ANION GAP SERPL CALCULATED.3IONS-SCNC: 14 MMOL/L (ref 5–18)
BUN SERPL-MCNC: 15 MG/DL (ref 8–22)
CALCIUM SERPL-MCNC: 9.4 MG/DL (ref 8.5–10.5)
CHLORIDE BLD-SCNC: 105 MMOL/L (ref 98–107)
CO2 SERPL-SCNC: 19 MMOL/L (ref 22–31)
CREAT SERPL-MCNC: 0.61 MG/DL (ref 0.6–1.1)
ERYTHROCYTE [DISTWIDTH] IN BLOOD BY AUTOMATED COUNT: 14.6 % (ref 10–15)
GFR SERPL CREATININE-BSD FRML MDRD: >90 ML/MIN/1.73M2
GLUCOSE BLD-MCNC: 146 MG/DL (ref 70–125)
HCT VFR BLD AUTO: 37.3 % (ref 35–47)
HGB BLD-MCNC: 11.8 G/DL (ref 11.7–15.7)
MCH RBC QN AUTO: 30.3 PG (ref 26.5–33)
MCHC RBC AUTO-ENTMCNC: 31.6 G/DL (ref 31.5–36.5)
MCV RBC AUTO: 96 FL (ref 78–100)
PLATELET # BLD AUTO: 264 10E3/UL (ref 150–450)
POTASSIUM BLD-SCNC: 4 MMOL/L (ref 3.5–5)
RBC # BLD AUTO: 3.89 10E6/UL (ref 3.8–5.2)
SODIUM SERPL-SCNC: 138 MMOL/L (ref 136–145)
WBC # BLD AUTO: 7.6 10E3/UL (ref 4–11)

## 2021-08-25 PROCEDURE — 250N000012 HC RX MED GY IP 250 OP 636 PS 637: Performed by: INTERNAL MEDICINE

## 2021-08-25 PROCEDURE — 85027 COMPLETE CBC AUTOMATED: CPT | Performed by: HOSPITALIST

## 2021-08-25 PROCEDURE — 99232 SBSQ HOSP IP/OBS MODERATE 35: CPT | Performed by: INTERNAL MEDICINE

## 2021-08-25 PROCEDURE — 250N000013 HC RX MED GY IP 250 OP 250 PS 637: Performed by: HOSPITALIST

## 2021-08-25 PROCEDURE — 97110 THERAPEUTIC EXERCISES: CPT | Mod: GO

## 2021-08-25 PROCEDURE — 250N000011 HC RX IP 250 OP 636: Performed by: INTERNAL MEDICINE

## 2021-08-25 PROCEDURE — 80048 BASIC METABOLIC PNL TOTAL CA: CPT | Performed by: HOSPITALIST

## 2021-08-25 PROCEDURE — 36415 COLL VENOUS BLD VENIPUNCTURE: CPT | Performed by: HOSPITALIST

## 2021-08-25 PROCEDURE — 120N000004 HC R&B MS OVERFLOW

## 2021-08-25 PROCEDURE — 250N000013 HC RX MED GY IP 250 OP 250 PS 637: Performed by: INTERNAL MEDICINE

## 2021-08-25 PROCEDURE — 97116 GAIT TRAINING THERAPY: CPT | Mod: GP

## 2021-08-25 PROCEDURE — 97535 SELF CARE MNGMENT TRAINING: CPT | Mod: GO

## 2021-08-25 PROCEDURE — 97110 THERAPEUTIC EXERCISES: CPT | Mod: GP

## 2021-08-25 RX ADMIN — APIXABAN 5 MG: 5 TABLET, FILM COATED ORAL at 09:49

## 2021-08-25 RX ADMIN — BENZOCAINE AND MENTHOL 1 LOZENGE: 15; 3.6 LOZENGE ORAL at 18:45

## 2021-08-25 RX ADMIN — TRAMADOL HYDROCHLORIDE 50 MG: 50 TABLET, FILM COATED ORAL at 13:18

## 2021-08-25 RX ADMIN — TRAMADOL HYDROCHLORIDE 50 MG: 50 TABLET, FILM COATED ORAL at 00:32

## 2021-08-25 RX ADMIN — METOPROLOL SUCCINATE 25 MG: 25 TABLET, EXTENDED RELEASE ORAL at 09:49

## 2021-08-25 RX ADMIN — PREDNISONE 60 MG: 20 TABLET ORAL at 09:48

## 2021-08-25 RX ADMIN — ACETAMINOPHEN 975 MG: 325 TABLET ORAL at 21:02

## 2021-08-25 RX ADMIN — MICONAZOLE NITRATE: 20 POWDER TOPICAL at 21:06

## 2021-08-25 RX ADMIN — CYANOCOBALAMIN TAB 1000 MCG 1000 MCG: 1000 TAB at 09:49

## 2021-08-25 RX ADMIN — Medication 2000 UNITS: at 09:49

## 2021-08-25 RX ADMIN — ACETAMINOPHEN 975 MG: 325 TABLET ORAL at 09:49

## 2021-08-25 RX ADMIN — CEFTRIAXONE SODIUM 2 G: 2 INJECTION, POWDER, FOR SOLUTION INTRAMUSCULAR; INTRAVENOUS at 17:34

## 2021-08-25 RX ADMIN — Medication 1 MG: at 21:05

## 2021-08-25 RX ADMIN — APIXABAN 5 MG: 5 TABLET, FILM COATED ORAL at 21:02

## 2021-08-25 RX ADMIN — ACETAMINOPHEN 975 MG: 325 TABLET ORAL at 14:28

## 2021-08-25 RX ADMIN — MICONAZOLE NITRATE: 20 POWDER TOPICAL at 09:49

## 2021-08-25 RX ADMIN — Medication 1000 MG: at 18:46

## 2021-08-25 RX ADMIN — TRAMADOL HYDROCHLORIDE 50 MG: 50 TABLET, FILM COATED ORAL at 21:05

## 2021-08-25 NOTE — PROGRESS NOTES
"Infectious Diseases Progress Note  [facility]    Date of visit: 8/25/2021      ASSESSMENT   66-year-old woman with a history of hypertension, A. fib, obesity who is admitted with left leg pain and swelling.    1. Left leg cellulitis.  Erythema and swelling on admission.  Fever.  Leukocytosis.  Ultrasound negative for DVT.  MRI showing cellulitis without deeper infection. improving  2. Gram-positive bacteremia.  1 of 2 blood cultures on admission with strep mitis (x2), group G strep, and Rothia (usually a contaminant). Repeat cultures on the next day with Staph warneri, likely a contaminant    Active Problems:    Elevated troponin    Atrial fibrillation with rapid ventricular response (H)    Cellulitis of left leg       PLAN   -continue ceftriaxone   -3-day burst of prednisone 60 mg once daily (day #2 today)  -Lymphedema wraps versus leg elevation      Zion Mcdaniels MD  [facility]  Direct messaging: [employer] Paging  On-Call ID provider: [phone], option: 9      ===========================================      SUBJECTIVE / INTERVAL HISTORY:     No events. Feels about the same, maybe a little better. Tolerating prednisone.       Review of Systems     No fevers, no rashes    Antibiotics   Ceftriaxone 8/24-      Previous:  Zosyn 8/21-8/24  Vancomycin 8/21-8/23      Physical Exam     Temp:  [97.8  F (36.6  C)-98.6  F (37  C)] 98  F (36.7  C)  Pulse:  [] 95  Resp:  [16-18] 16  BP: (112-127)/(58-75) 114/70  SpO2:  [92 %-96 %] 96 %    /70 (BP Location: Left arm)   Pulse 95   Temp 98  F (36.7  C) (Oral)   Resp 16   Ht 1.702 m (5' 7\")   Wt 129.5 kg (285 lb 8 oz)   SpO2 96%   BMI 44.72 kg/m      GENERAL:  well-developed, well-nourished, lying in bed in no acute distress.   HENT:  Head is normocephalic, atraumatic. Oropharynx is moist without exudates or ulcers.  Edentulous  EYES:  Eyes have anicteric sclerae without conjunctival injection   LUNGS:  Normal resp " pattern  SKIN: Left leg edema and erythema.  Less intensity today, less tenderness.   NEUROLOGIC:  Grossly nonfocal.      Cultures     8/21 blood culture 1 of 2: Strep mitis x 2, group G strep, rothia  8/22 blood culture: Staph warneri  8/24 blood culture: no growth to date       Pertinent Labs:     Recent Labs   Lab 08/25/21  0425 08/24/21  0428 08/23/21  0604   WBC 7.6 8.6 12.2*   HGB 11.8 11.3* 11.5*    206 188       Recent Labs   Lab 08/25/21  0425 08/23/21  0604 08/22/21  0424    140 138   CO2 19* 22 22   BUN 15 24* 28*       No results for input(s): CRP in the last 168 hours.    Invalid input(s): SEDRATE        Imaging:     MR Tibia Fibula Lower Leg Left wo & w Contr    Result Date: 8/23/2021  EXAM: MR TIBIA FIBULA LOWER LEG LEFT WO and W CONTR LOCATION: [facility] DATE/TIME: 8/23/2021 2:11 PM INDICATION: Pain and swelling. COMPARISON: None. TECHNIQUE: Routine. Additional postgadolinium T1 sequences were obtained. IV CONTRAST: 10 ml of Gadavist FINDINGS: BONES: -The left tibia and fibula are negative for fracture or marrow signal abnormality. No evidence for significant stress reaction. No evidence for osteomyelitis. No evidence for solid bone lesion. SOFT TISSUES:  -There is some intermediate signal abnormality within the medial and lateral gastrocnemius suggestive of muscle strain. There is no tearing. There is no significant fatty infiltration or atrophy. No evidence for organized fluid collection or focal soft tissue mass. MUSCLES: -Extensive but nonspecific edema or cellulitis seen circumferentially about the lower leg without evidence for discrete fluid collection.     IMPRESSION: 1.  Extensive but nonspecific edema or cellulitis seen circumferentially about the lower leg without evidence for organized fluid collection or soft tissue mass. 2.  There is some mild intermediate signal abnormality within the medial and lateral gastrocnemius which could be due to  low-grade muscle strain or reactive but there is no tearing or retraction and no evidence for organized fluid collection or soft tissue mass. 3.  The left tibia and fibula are negative for fracture or marrow signal abnormality. No evidence for stress reaction or focal bone lesion. 4.  No significant knee or ankle joint effusion.        Data reviewed today: I reviewed all medications, new labs and imaging results over the last 24 hours. I personally reviewed no images or EKG's today.  The patient's care was discussed with the Bedside Nurse and Patient.

## 2021-08-25 NOTE — PROGRESS NOTES
Hospitalist Progress Note    Assessment/Plan  Misty Quezada is a 66 year old female admitted on 8/21/2021. She has history of obesity, HTN and preDM and presents for evaluation of BLE pain found to be septic with Strep bacteremia     #Severe sepsis, resolved  #Gram-positive bacteremia  Presented with signs of organ damage including elevated troponin and lactic acidosis  WBC 17, tachycardic, tachypneic, febrile 101.2F on admission  Source is  LLE cellulitis  Blood culture 8/21 polymicrobial with Strep dysgalactiae, Strep mitis x2 strains, and Rothia mucilaginosa, given polymicrobial this may be skin contaminant although Strep would fit cellulitis picture.  Repeat blood culture from 8/22 growing Staph warneri do suspect this is a contaminant. Repeat blood culture 8/24 NGTD.  Labs significantly improved.  ID consulted, appreciate assistance     #LLE cellulitis  Has been on Zosyn, changing to high dose Rocephin 8/24 per ID  Leg is slow to improve.  Prednisone added 8/24  MRI just showed severe cellulitis, no abscess or osteomyelitis.  Continue lymphedema therapy  Scheduled tylenol. Tramadol for breakthrough pain     #Demand ischemia/type II MI  Initial troponin 1.75-->1.32  EKG some flattened T waves without previous for comparison, no clear ischemia  Likely from sepsis and A fib RVR  Cardiology saw and have signed off, appreciate assistance  Echo without wall motion abnormality  Consider stress test as outpatient  Cardiology also recommends Sleep eval     #A fib RVR  Continue home metoprolol, Eliquis  Ok to discontinue tele at this point     #HTN  Hold home Benicar for now, monitor BP trend  Continue home metoprolol     #HypoK  Protocol      Barriers to Discharge: IV antibiotics    Anticipated discharge date/Disposition: TCU in 2 days    Subjective  Patient reports persistent moderate to severe left lower extremity pain.  She states that erythema and swelling may be slightly better today.  She still has difficulties  ambulating.  Appetite is poor.  Reports having some loose stools.    Objective    Vital signs in last 24 hours  Temp:  [97.7  F (36.5  C)-98.4  F (36.9  C)] 97.7  F (36.5  C)  Pulse:  [] 95  Resp:  [16-18] 18  BP: (112-127)/(58-75) 121/75  SpO2:  [92 %-96 %] 93 % @LASTSAO2(12)@ O2 Device: None (Room air)    Weight:   Wt Readings from Last 3 Encounters:   08/24/21 129.5 kg (285 lb 8 oz)   05/26/21 128.8 kg (284 lb)   11/21/19 120.4 kg (265 lb 8 oz)      Weight change:     Intake/Output last 3 shifts  I/O last 3 completed shifts:  In: 600 [P.O.:600]  Out: 450 [Urine:450]  Body mass index is 44.72 kg/m .    Physical Exam    General Appearance:    Alert, cooperative, no distress, appears stated age   Lungs:     Clear bilaterally    Cardiovascular:    Regular rate ands rhythm.  Nornal S1, S2.  No murmur, rub or gallop.  No edema   Abdomen:     Soft, non-tender, bowel sounds active all four quadrants,     no masses, no organomegaly   Neurologic:   Awake, alert, oriented x 3.  Grossly nonfocal                                      Skin: Significant left leg edema and erythema    Pertinent Labs   Lab Results: personally reviewed.   Recent Labs   Lab 08/25/21  0425 08/23/21  0604 08/22/21  0424    140 138   CO2 19* 22 22   BUN 15 24* 28*     Recent Labs   Lab 08/25/21  0425 08/24/21  0428 08/23/21  0604   WBC 7.6 8.6 12.2*   HGB 11.8 11.3* 11.5*   HCT 37.3 35.3 36.4    206 188     Recent Labs   Lab 08/21/21  1944 08/21/21  1426 08/21/21  0820   TROPONINI 1.32* 1.87* 1.75*     Invalid input(s): POCGLUFGR    Medications  Current Facility-Administered Medications   Medication     acetaminophen (TYLENOL) tablet 975 mg     apixaban ANTICOAGULANT (ELIQUIS) tablet 5 mg     bisacodyl (DULCOLAX) Suppository 10 mg     calcium carbonate (TUMS) chewable tablet 1,000 mg     cefTRIAXone (ROCEPHIN) 2 g vial to attach to  ml bag for ADULTS or NS 50 ml bag for PEDS     cyanocobalamin (VITAMIN B-12) tablet 1,000 mcg      diphenhydrAMINE (BENADRYL) tablet 25 mg     famotidine (PEPCID) tablet 20 mg     lidocaine (LMX4) cream     lidocaine 1 % 0.1-1 mL     magnesium hydroxide (MILK OF MAGNESIA) suspension 30 mL     melatonin tablet 1 mg     metoprolol succinate ER (TOPROL-XL) 24 hr tablet 25 mg     miconazole (MICATIN) 2 % powder     naloxone (NARCAN) injection 0.2 mg    Or     naloxone (NARCAN) injection 0.4 mg    Or     naloxone (NARCAN) injection 0.2 mg    Or     naloxone (NARCAN) injection 0.4 mg     ondansetron (ZOFRAN-ODT) ODT tab 4 mg    Or     ondansetron (ZOFRAN) injection 4 mg     polyethylene glycol (MIRALAX) Packet 17 g     predniSONE (DELTASONE) tablet 60 mg     sodium chloride (PF) 0.9% PF flush 3 mL     sodium chloride (PF) 0.9% PF flush 3 mL     traMADol (ULTRAM) tablet 50 mg     Vitamin D3 (CHOLECALCIFEROL) tablet 2,000 Units       Pertinent Radiology   Radiology Results: Personally reviewed   Results for orders placed or performed during the hospital encounter of 08/21/21   Chest XR,  PA & LAT    Impression    IMPRESSION: Shallow inspiration. Heart size is mildly enlarged. Slight interstitial prominence most likely due to the shallow inspiration. No convincing evidence for pulmonary edema.   US Lower Extremity Venous Duplex Bilateral    Impression    IMPRESSION:  1.  No deep venous thrombosis in the bilateral lower extremities.   MR Tibia Fibula Lower Leg Left wo & w Contr    Impression    IMPRESSION:  1.  Extensive but nonspecific edema or cellulitis seen circumferentially about the lower leg without evidence for organized fluid collection or soft tissue mass.  2.  There is some mild intermediate signal abnormality within the medial and lateral gastrocnemius which could be due to low-grade muscle strain or reactive but there is no tearing or retraction and no evidence for organized fluid collection or soft   tissue mass.  3.  The left tibia and fibula are negative for fracture or marrow signal abnormality. No  evidence for stress reaction or focal bone lesion.  4.  No significant knee or ankle joint effusion.   Echocardiogram Complete   Result Value Ref Range    LVEF  55-60%          Advanced Care Planning:  Discharge Planning discussed with patient and nursing staff        Sushma Stiles MD  Internal Medicine Hospitalist  8/25/2021

## 2021-08-25 NOTE — PLAN OF CARE
Problem: Infection Progression (Sepsis)  Goal: Absence of Infection Signs and Symptoms  Outcome: Improving     Problem: Skin or Soft Tissue Infection  Goal: Infection Symptom Resolution  Outcome: Improving   RN outlined the redness on left leg to measure improvement.     Pt is med surg.     Pt states pain is increased with ambulation in LE.  Pt is 6/10 in bed and 9/10 with ambulation.  Scheduled tylenol given with some improvement in pain.

## 2021-08-25 NOTE — PROGRESS NOTES
Care Management Follow Up    Length of Stay (days): 4    Expected Discharge Date: 08/26/2021     Concerns to be Addressed:     Clinical progression, ID following  Patient plan of care discussed at interdisciplinary rounds: Yes    Anticipated Discharge Disposition:  Transitional Care- accepted at [facility] for 8/26      Anticipated Discharge Services:  Nursing, PT and OT  Anticipated Discharge DME:  Walker, wheelchair    Patient/family educated on Medicare website which has current facility and service quality ratings:  yes  Education Provided on the Discharge Plan:  yes  Patient/Family in Agreement with the Plan:  yes    Referrals Placed by CM/SW:  Post acute rehab  Private pay costs discussed: transportation costs    Additional Information:  KATHARINA completed chart review, discussed updates with MD. Patient likely ready for discharge on 8/26/2021. Patient has been clinically accepted at [facility] and [facility], Patient states she and her son have reviewed ratings and would like to accept bed at [facility]. SW updated Hattie in admissions who states bed is available tomorrow 8/26/2021. Patient states she will update her son and patient is requesting medical transport at discharge.      Kym Angel, ISHMAELSW

## 2021-08-25 NOTE — PLAN OF CARE
Pt given scheduled Tylenol with no relief in LLE pain. PRN Tramadol given and pt able to sleep  a couple hours. Otherwise pt up most of night. Frequent position changes to make pt as comfortable as possible.

## 2021-08-26 ENCOUNTER — APPOINTMENT (OUTPATIENT)
Dept: OCCUPATIONAL THERAPY | Facility: HOSPITAL | Age: 66
DRG: 871 | End: 2021-08-26
Payer: MEDICARE

## 2021-08-26 ENCOUNTER — APPOINTMENT (OUTPATIENT)
Dept: PHYSICAL THERAPY | Facility: HOSPITAL | Age: 66
DRG: 871 | End: 2021-08-26
Payer: MEDICARE

## 2021-08-26 ENCOUNTER — APPOINTMENT (OUTPATIENT)
Dept: ULTRASOUND IMAGING | Facility: HOSPITAL | Age: 66
DRG: 871 | End: 2021-08-26
Attending: INTERNAL MEDICINE
Payer: MEDICARE

## 2021-08-26 LAB
BACTERIA BLD CULT: ABNORMAL
BACTERIA BLD CULT: NO GROWTH
POTASSIUM BLD-SCNC: 3.7 MMOL/L (ref 3.5–5)

## 2021-08-26 PROCEDURE — 93970 EXTREMITY STUDY: CPT

## 2021-08-26 PROCEDURE — 250N000011 HC RX IP 250 OP 636: Performed by: INTERNAL MEDICINE

## 2021-08-26 PROCEDURE — 250N000012 HC RX MED GY IP 250 OP 636 PS 637: Performed by: INTERNAL MEDICINE

## 2021-08-26 PROCEDURE — 250N000013 HC RX MED GY IP 250 OP 250 PS 637: Performed by: INTERNAL MEDICINE

## 2021-08-26 PROCEDURE — 84132 ASSAY OF SERUM POTASSIUM: CPT | Performed by: FAMILY MEDICINE

## 2021-08-26 PROCEDURE — 99232 SBSQ HOSP IP/OBS MODERATE 35: CPT | Performed by: INTERNAL MEDICINE

## 2021-08-26 PROCEDURE — 36415 COLL VENOUS BLD VENIPUNCTURE: CPT | Performed by: FAMILY MEDICINE

## 2021-08-26 PROCEDURE — 250N000013 HC RX MED GY IP 250 OP 250 PS 637: Performed by: HOSPITALIST

## 2021-08-26 PROCEDURE — 120N000004 HC R&B MS OVERFLOW

## 2021-08-26 PROCEDURE — 97535 SELF CARE MNGMENT TRAINING: CPT | Mod: GO

## 2021-08-26 PROCEDURE — 99233 SBSQ HOSP IP/OBS HIGH 50: CPT | Performed by: INTERNAL MEDICINE

## 2021-08-26 PROCEDURE — 97116 GAIT TRAINING THERAPY: CPT | Mod: GP

## 2021-08-26 PROCEDURE — 97110 THERAPEUTIC EXERCISES: CPT | Mod: GP

## 2021-08-26 RX ORDER — AZITHROMYCIN 250 MG/1
500 TABLET, FILM COATED ORAL ONCE
Status: COMPLETED | OUTPATIENT
Start: 2021-08-26 | End: 2021-08-26

## 2021-08-26 RX ORDER — CEPHALEXIN 500 MG/1
1000 CAPSULE ORAL EVERY 8 HOURS SCHEDULED
Status: DISCONTINUED | OUTPATIENT
Start: 2021-08-27 | End: 2021-08-27 | Stop reason: HOSPADM

## 2021-08-26 RX ORDER — AZITHROMYCIN 250 MG/1
250 TABLET, FILM COATED ORAL DAILY
Status: DISCONTINUED | OUTPATIENT
Start: 2021-08-27 | End: 2021-08-27 | Stop reason: HOSPADM

## 2021-08-26 RX ADMIN — MICONAZOLE NITRATE: 20 POWDER TOPICAL at 09:49

## 2021-08-26 RX ADMIN — PREDNISONE 60 MG: 20 TABLET ORAL at 09:47

## 2021-08-26 RX ADMIN — AZITHROMYCIN MONOHYDRATE 500 MG: 250 TABLET ORAL at 09:47

## 2021-08-26 RX ADMIN — APIXABAN 5 MG: 5 TABLET, FILM COATED ORAL at 21:25

## 2021-08-26 RX ADMIN — CYANOCOBALAMIN TAB 1000 MCG 1000 MCG: 1000 TAB at 09:48

## 2021-08-26 RX ADMIN — MICONAZOLE NITRATE: 20 POWDER TOPICAL at 21:26

## 2021-08-26 RX ADMIN — TRAMADOL HYDROCHLORIDE 50 MG: 50 TABLET, FILM COATED ORAL at 17:33

## 2021-08-26 RX ADMIN — ACETAMINOPHEN 975 MG: 325 TABLET ORAL at 21:24

## 2021-08-26 RX ADMIN — Medication 2000 UNITS: at 09:46

## 2021-08-26 RX ADMIN — ACETAMINOPHEN 975 MG: 325 TABLET ORAL at 09:48

## 2021-08-26 RX ADMIN — ACETAMINOPHEN 975 MG: 325 TABLET ORAL at 14:47

## 2021-08-26 RX ADMIN — APIXABAN 5 MG: 5 TABLET, FILM COATED ORAL at 09:47

## 2021-08-26 RX ADMIN — METOPROLOL SUCCINATE 25 MG: 25 TABLET, EXTENDED RELEASE ORAL at 09:46

## 2021-08-26 RX ADMIN — Medication 1000 MG: at 09:46

## 2021-08-26 RX ADMIN — TRAMADOL HYDROCHLORIDE 50 MG: 50 TABLET, FILM COATED ORAL at 09:46

## 2021-08-26 RX ADMIN — CEFTRIAXONE SODIUM 2 G: 2 INJECTION, POWDER, FOR SOLUTION INTRAMUSCULAR; INTRAVENOUS at 17:19

## 2021-08-26 ASSESSMENT — MIFFLIN-ST. JEOR: SCORE: 1863.1

## 2021-08-26 NOTE — PLAN OF CARE
Pt's cellulitis has improved today.  Pt up in chair most of the day.  Pt states pain is still bad in left lower extremity where cellulitis is present.  Tramadol helps.

## 2021-08-26 NOTE — PROGRESS NOTES
Care Management Follow Up    Length of Stay (days): 5    Expected Discharge Date: 08/26/2021     Concerns to be Addressed:     Clinical progression, infectious disease following  Patient plan of care discussed at interdisciplinary rounds: Yes    Anticipated Discharge Disposition:  Transitional Care     Anticipated Discharge Services:  Nursing, PT and OT  Anticipated Discharge DME:  Wheelchair, walker    Patient/family educated on Medicare website which has current facility and service quality ratings:  yes  Education Provided on the Discharge Plan:  Yes  Patient/Family in Agreement with the Plan:  Yes, patient states she has been updating sons    Referrals Placed by CM/ZURI:  Post acute rehab  Private pay costs discussed: transportation costs    Additional Information:  KATHARINA completed chart review, updates received from MD. Anticipate patient will be ready for discharge on 8/27/21 per MD. Patient has been clinically accepted at [facility]. ZURI spoke to Hattie to provide update, will continue to update Hattie when patient medically rady to discharge, anticipate bed availability at [facility] when ready.  Patient will need wheelchair transport at discharge.      Kym Angel LGSW

## 2021-08-26 NOTE — PROGRESS NOTES
"Infectious Diseases Progress Note  [facility]    Date of visit: 8/25/2021      ASSESSMENT   66-year-old woman with a history of hypertension, A. fib, obesity who is admitted with left leg pain and swelling.    1. Left leg cellulitis.  Erythema and swelling on admission.  Fever.  Leukocytosis.  Ultrasound negative for DVT.  MRI showing cellulitis without deeper infection. improving  2. Gram-positive bacteremia.  1 of 2 blood cultures on admission with strep mitis (x2), group G strep, and Rothia (usually a contaminant). Repeat cultures on the next day with Staph warneri, likely a contaminant    Active Problems:    Elevated troponin    Atrial fibrillation with rapid ventricular response (H)    Cellulitis of left leg       PLAN   -continue ceftriaxone  -start cephalexin 1000mg po tid starting tomorrow   -3-day burst of prednisone 60 mg once daily (last day today)  -Lymphedema wraps versus leg elevation  -left leg venous u/s due to increase pain to r/out clot    Azithromycin started today by hospitalist for bronchitis      Zion Mcdaniels MD  [facility]  Direct messaging: [employer] Paging  On-Call ID provider: [phone], option: 9      ===========================================      SUBJECTIVE / INTERVAL HISTORY:     No events. Notes increased hoarseness and sore throat today. Leg is a little better. Tolerating antibiotics. More pain in calf today.    Review of Systems     No fevers, no rashes    Antibiotics   Ceftriaxone 8/24-      Previous:  Zosyn 8/21-8/24  Vancomycin 8/21-8/23      Physical Exam     Temp:  [97.5  F (36.4  C)-98.4  F (36.9  C)] 98  F (36.7  C)  Pulse:  [83-98] 98  Resp:  [16-18] 16  BP: (114-141)/(71-86) 141/83  SpO2:  [91 %-95 %] 94 %    BP (!) 141/83 (BP Location: Left arm)   Pulse 98   Temp 98  F (36.7  C) (Oral)   Resp 16   Ht 1.702 m (5' 7\")   Wt 129.5 kg (285 lb 9.6 oz)   SpO2 94%   BMI 44.73 kg/m      GENERAL:  well-developed, well-nourished, lying " in bed in no acute distress.   HENT:  Head is normocephalic, atraumatic. Oropharynx is moist without exudates or ulcers.  Edentulous  EYES:  Eyes have anicteric sclerae without conjunctival injection   LUNGS:  Normal resp pattern  SKIN: erythema improved. Calf tenderness on the left  NEUROLOGIC:  Grossly nonfocal.      Cultures     8/21 blood culture 1 of 2: Strep mitis x 2, group G strep, rothia  8/22 blood culture: Staph warneri  8/24 blood culture: no growth to date       Pertinent Labs:     Recent Labs   Lab 08/25/21  0425 08/24/21  0428 08/23/21  0604   WBC 7.6 8.6 12.2*   HGB 11.8 11.3* 11.5*    206 188       Recent Labs   Lab 08/25/21 0425 08/23/21  0604 08/22/21  0424    140 138   CO2 19* 22 22   BUN 15 24* 28*       No results for input(s): CRP in the last 168 hours.    Invalid input(s): SEDRATE        Imaging:     MR Tibia Fibula Lower Leg Left wo & w Contr    Result Date: 8/23/2021  EXAM: MR TIBIA FIBULA LOWER LEG LEFT WO and W CONTR LOCATION: [facility] DATE/TIME: 8/23/2021 2:11 PM INDICATION: Pain and swelling. COMPARISON: None. TECHNIQUE: Routine. Additional postgadolinium T1 sequences were obtained. IV CONTRAST: 10 ml of Gadavist FINDINGS: BONES: -The left tibia and fibula are negative for fracture or marrow signal abnormality. No evidence for significant stress reaction. No evidence for osteomyelitis. No evidence for solid bone lesion. SOFT TISSUES:  -There is some intermediate signal abnormality within the medial and lateral gastrocnemius suggestive of muscle strain. There is no tearing. There is no significant fatty infiltration or atrophy. No evidence for organized fluid collection or focal soft tissue mass. MUSCLES: -Extensive but nonspecific edema or cellulitis seen circumferentially about the lower leg without evidence for discrete fluid collection.     IMPRESSION: 1.  Extensive but nonspecific edema or cellulitis seen circumferentially about the lower leg  without evidence for organized fluid collection or soft tissue mass. 2.  There is some mild intermediate signal abnormality within the medial and lateral gastrocnemius which could be due to low-grade muscle strain or reactive but there is no tearing or retraction and no evidence for organized fluid collection or soft tissue mass. 3.  The left tibia and fibula are negative for fracture or marrow signal abnormality. No evidence for stress reaction or focal bone lesion. 4.  No significant knee or ankle joint effusion.        Data reviewed today: I reviewed all medications, new labs and imaging results over the last 24 hours. I personally reviewed no images or EKG's today.  The patient's care was discussed with the Bedside Nurse and Patient.

## 2021-08-26 NOTE — PLAN OF CARE
Problem: Adult Inpatient Plan of Care  Goal: Plan of Care Review  Outcome: Improving     Pt reports pain in bilateral legs, no prn given at the time as not due yet. Pt sleeping throughout the night.  Lymphedema wraps in place. Legs elevated on pillows.  Assist x1 to Bedside commode.

## 2021-08-26 NOTE — PLAN OF CARE
Problem: Pain Acute  Goal: Acceptable Pain Control and Functional Ability  Outcome: Improving  Intervention: Develop Pain Management Plan  Recent Flowsheet Documentation  Taken 8/25/2021 1730 by Clarisse Gayle RN  Pain Management Interventions:   repositioned   distraction   emotional support     Pt c/o 5/10 pain to BLE, repositioned legs and pt stated relief. Pt remains on IV ABX. Up AST 1-2 to BR and up to chair. VSS, no acute changes.

## 2021-08-26 NOTE — PROGRESS NOTES
Hospitalist Progress Note    Assessment/Plan  Misty Quezada is a 66 year old female admitted on 8/21/2021. She has history of obesity, HTN and preDM and presents for evaluation of BLE pain found to be septic with Strep bacteremia     #Severe sepsis, resolved  #Gram-positive bacteremia  Presented with signs of organ damage including elevated troponin and lactic acidosis  WBC 17, tachycardic, tachypneic, febrile 101.2F on admission  Source is  LLE cellulitis  Blood culture 8/21 polymicrobial with Strep dysgalactiae, Strep mitis x2 strains, and Rothia mucilaginosa, given polymicrobial this may be skin contaminant although Strep would fit cellulitis picture.  Repeat blood culture from 8/22 growing Staph warneri do suspect this is a contaminant. Repeat blood culture 8/24 NGTD.  Labs significantly improved.  ID consulted, appreciate assistance     #LLE cellulitis  Has been on Zosyn, changing to high dose Rocephin 8/24 per ID and can switch to oral Keflex 3 times daily starting tomorrow  Prednisone added 8/24 (completing today)  MRI just showed severe cellulitis, no abscess or osteomyelitis.  Continue lymphedema therapy  Scheduled tylenol. Tramadol for breakthrough pain    #Suspected acute bronchitis  And Z-Denis today     #Demand ischemia/type II MI  Initial troponin 1.75-->1.32  EKG some flattened T waves without previous for comparison, no clear ischemia  Likely from sepsis and A fib RVR  Cardiology saw and have signed off, appreciate assistance  Echo without wall motion abnormality  Consider stress test as outpatient  Cardiology also recommends Sleep eval     #Chronic atrial fibrillation  Continue home metoprolol, Eliquis     #HTN  Hold home Benicar for now, monitor BP trend  Continue home metoprolol     #HypoK  Protocol      Barriers to Discharge: IV antibiotics    Anticipated discharge date/Disposition: TCU tomorrow    Subjective  Patient complains of leg tightness from lymphedema wraps which were removed by myself.  Erythema and swelling has improved since yesterday. No fevers or chills overnight. Patient endorses new cough and scratchy throat since yesterday.      Objective    Vital signs in last 24 hours  Temp:  [97.5  F (36.4  C)-98.4  F (36.9  C)] 97.9  F (36.6  C)  Pulse:  [] 118  Resp:  [16-18] 18  BP: (108-141)/(71-91) 108/91  SpO2:  [91 %-95 %] 95 % @LASTSAO2(12)@ O2 Device: None (Room air)    Weight:   Wt Readings from Last 3 Encounters:   08/26/21 129.5 kg (285 lb 9.6 oz)   05/26/21 128.8 kg (284 lb)   11/21/19 120.4 kg (265 lb 8 oz)      Weight change:     Intake/Output last 3 shifts  I/O last 3 completed shifts:  In: -   Out: 300 [Urine:300]  Body mass index is 44.73 kg/m .    Physical Exam    General Appearance:    Alert, cooperative, no distress, appears stated age   Lungs:     Clear bilaterally    Cardiovascular:    Regular rate ands rhythm.  Nornal S1, S2.  No murmur, rub or gallop.  No edema   Abdomen:     Soft, non-tender, bowel sounds active all four quadrants,     no masses, no organomegaly   Neurologic:   Awake, alert, oriented x 3.  Grossly nonfocal                                      Skin: Left leg edema and erythema - improved since yesterday    Pertinent Labs   Lab Results: personally reviewed.   Recent Labs   Lab 08/25/21  0425 08/23/21  0604 08/22/21  0424    140 138   CO2 19* 22 22   BUN 15 24* 28*     Recent Labs   Lab 08/25/21  0425 08/24/21  0428 08/23/21  0604   WBC 7.6 8.6 12.2*   HGB 11.8 11.3* 11.5*   HCT 37.3 35.3 36.4    206 188     Recent Labs   Lab 08/21/21  1944 08/21/21  1426 08/21/21  0820   TROPONINI 1.32* 1.87* 1.75*     Invalid input(s): POCGLUFGR    Medications  Current Facility-Administered Medications   Medication     acetaminophen (TYLENOL) tablet 975 mg     apixaban ANTICOAGULANT (ELIQUIS) tablet 5 mg     [START ON 8/27/2021] azithromycin (ZITHROMAX) tablet 250 mg     benzocaine-menthol (CEPACOL) 15-3.6 MG lozenge 1 lozenge     bisacodyl (DULCOLAX)  Suppository 10 mg     calcium carbonate (TUMS) chewable tablet 1,000 mg     [START ON 8/27/2021] cephALEXin (KEFLEX) capsule 1,000 mg     cyanocobalamin (VITAMIN B-12) tablet 1,000 mcg     diphenhydrAMINE (BENADRYL) tablet 25 mg     famotidine (PEPCID) tablet 20 mg     lidocaine (LMX4) cream     lidocaine 1 % 0.1-1 mL     magnesium hydroxide (MILK OF MAGNESIA) suspension 30 mL     melatonin tablet 1 mg     metoprolol succinate ER (TOPROL-XL) 24 hr tablet 25 mg     miconazole (MICATIN) 2 % powder     naloxone (NARCAN) injection 0.2 mg    Or     naloxone (NARCAN) injection 0.4 mg    Or     naloxone (NARCAN) injection 0.2 mg    Or     naloxone (NARCAN) injection 0.4 mg     ondansetron (ZOFRAN-ODT) ODT tab 4 mg    Or     ondansetron (ZOFRAN) injection 4 mg     polyethylene glycol (MIRALAX) Packet 17 g     sodium chloride (PF) 0.9% PF flush 3 mL     sodium chloride (PF) 0.9% PF flush 3 mL     traMADol (ULTRAM) tablet 50 mg     vitamin C (ASCORBIC ACID) tablet 1,000 mg     Vitamin D3 (CHOLECALCIFEROL) tablet 2,000 Units       Pertinent Radiology   Radiology Results: Personally reviewed   Results for orders placed or performed during the hospital encounter of 08/21/21   Chest XR,  PA & LAT    Impression    IMPRESSION: Shallow inspiration. Heart size is mildly enlarged. Slight interstitial prominence most likely due to the shallow inspiration. No convincing evidence for pulmonary edema.   US Lower Extremity Venous Duplex Bilateral    Impression    IMPRESSION:  1.  No deep venous thrombosis in the bilateral lower extremities.   MR Tibia Fibula Lower Leg Left wo & w Contr    Impression    IMPRESSION:  1.  Extensive but nonspecific edema or cellulitis seen circumferentially about the lower leg without evidence for organized fluid collection or soft tissue mass.  2.  There is some mild intermediate signal abnormality within the medial and lateral gastrocnemius which could be due to low-grade muscle strain or reactive but there  is no tearing or retraction and no evidence for organized fluid collection or soft   tissue mass.  3.  The left tibia and fibula are negative for fracture or marrow signal abnormality. No evidence for stress reaction or focal bone lesion.  4.  No significant knee or ankle joint effusion.   Echocardiogram Complete   Result Value Ref Range    LVEF  55-60%          Advanced Care Planning:  Discharge Planning discussed with patient and Dr. Arslan Stiles MD  Internal Medicine Hospitalist  8/25/2021

## 2021-08-27 ENCOUNTER — APPOINTMENT (OUTPATIENT)
Dept: OCCUPATIONAL THERAPY | Facility: HOSPITAL | Age: 66
DRG: 871 | End: 2021-08-27
Payer: MEDICARE

## 2021-08-27 VITALS
SYSTOLIC BLOOD PRESSURE: 123 MMHG | BODY MASS INDEX: 44.95 KG/M2 | DIASTOLIC BLOOD PRESSURE: 77 MMHG | OXYGEN SATURATION: 95 % | HEART RATE: 87 BPM | WEIGHT: 286.4 LBS | HEIGHT: 67 IN | RESPIRATION RATE: 18 BRPM | TEMPERATURE: 97.6 F

## 2021-08-27 LAB — POTASSIUM BLD-SCNC: 3.9 MMOL/L (ref 3.5–5)

## 2021-08-27 PROCEDURE — 36415 COLL VENOUS BLD VENIPUNCTURE: CPT | Performed by: INTERNAL MEDICINE

## 2021-08-27 PROCEDURE — 250N000013 HC RX MED GY IP 250 OP 250 PS 637: Performed by: INTERNAL MEDICINE

## 2021-08-27 PROCEDURE — 97535 SELF CARE MNGMENT TRAINING: CPT | Mod: GO

## 2021-08-27 PROCEDURE — 99239 HOSP IP/OBS DSCHRG MGMT >30: CPT | Performed by: HOSPITALIST

## 2021-08-27 PROCEDURE — 99232 SBSQ HOSP IP/OBS MODERATE 35: CPT | Performed by: INTERNAL MEDICINE

## 2021-08-27 PROCEDURE — 250N000013 HC RX MED GY IP 250 OP 250 PS 637: Performed by: HOSPITALIST

## 2021-08-27 PROCEDURE — 84132 ASSAY OF SERUM POTASSIUM: CPT | Performed by: INTERNAL MEDICINE

## 2021-08-27 RX ORDER — ACETAMINOPHEN 325 MG/1
975 TABLET ORAL 3 TIMES DAILY
Qty: 30 TABLET | Refills: 0
Start: 2021-08-27 | End: 2022-09-24

## 2021-08-27 RX ORDER — CEPHALEXIN 500 MG/1
1000 CAPSULE ORAL EVERY 8 HOURS
Qty: 42 CAPSULE | Refills: 0
Start: 2021-08-27 | End: 2021-09-03

## 2021-08-27 RX ORDER — AZITHROMYCIN 250 MG/1
250 TABLET, FILM COATED ORAL DAILY
Qty: 3 TABLET | Refills: 0
Start: 2021-08-28 | End: 2021-09-27

## 2021-08-27 RX ADMIN — Medication 1000 MG: at 10:20

## 2021-08-27 RX ADMIN — ACETAMINOPHEN 975 MG: 325 TABLET ORAL at 10:14

## 2021-08-27 RX ADMIN — AZITHROMYCIN MONOHYDRATE 250 MG: 250 TABLET ORAL at 10:21

## 2021-08-27 RX ADMIN — Medication 2000 UNITS: at 10:14

## 2021-08-27 RX ADMIN — CYANOCOBALAMIN TAB 1000 MCG 1000 MCG: 1000 TAB at 10:14

## 2021-08-27 RX ADMIN — METOPROLOL SUCCINATE 25 MG: 25 TABLET, EXTENDED RELEASE ORAL at 10:13

## 2021-08-27 RX ADMIN — APIXABAN 5 MG: 5 TABLET, FILM COATED ORAL at 10:20

## 2021-08-27 RX ADMIN — MICONAZOLE NITRATE: 20 POWDER TOPICAL at 10:17

## 2021-08-27 RX ADMIN — CEPHALEXIN 1000 MG: 500 CAPSULE ORAL at 07:39

## 2021-08-27 ASSESSMENT — MIFFLIN-ST. JEOR: SCORE: 1866.73

## 2021-08-27 NOTE — DISCHARGE INSTRUCTIONS
Lymphedema after care information:  After TCU stay, can order tubigrip or tetragrip elasticized stockinette.  Can order through most medical supply websites or [employer].  Size F appears to be the appropriate size at this time, however you can measure the circumference of the widest part of your calf and compare to the size chart.  Mini Roberts OTR/L

## 2021-08-27 NOTE — PROGRESS NOTES
Patient discharged via HE w/c transport.  Patient belongings and discharge instructions sent with patient.  Patient PIV was removed prior to discharge.  Patient discharged to TCU.

## 2021-08-27 NOTE — PLAN OF CARE
Problem: Adult Inpatient Plan of Care  Goal: Optimal Comfort and Wellbeing  Outcome: Improving  Intervention: Provide Person-Centered Care  Recent Flowsheet Documentation  Taken 8/26/2021 2100 by Rupa Kern RN  Trust Relationship/Rapport:   care explained   choices provided   emotional support provided   empathic listening provided   questions answered   questions encouraged   reassurance provided   thoughts/feelings acknowledged  Taken 8/26/2021 1545 by Rupa Kern RN  Trust Relationship/Rapport:   care explained   choices provided   emotional support provided   empathic listening provided   questions answered   questions encouraged   reassurance provided   thoughts/feelings acknowledged     Problem: Infection Progression (Sepsis)  Goal: Absence of Infection Signs and Symptoms  Outcome: Improving  Intervention: Promote Recovery  Recent Flowsheet Documentation  Taken 8/26/2021 2100 by Rupa Kern RN  Activity Management:   activity adjusted per tolerance   activity encouraged   up ad adiel  Taken 8/26/2021 1545 by Rupa Kern RN  Activity Management:   activity adjusted per tolerance   activity encouraged   up ad adiel     Patient is compliant with cares. She explained her frustration with her bilateral lower extremities r/t pain. Left leg red, warm to touch, sore/tender when touched. Right leg is sore and tender on calf. Removed Lymph wraps for US Left and Right leg.    RLE: +3 edema, pain (calf, equal to left)  LLE: +3 edema, red, warm to touch, pain (tender/sore)    Protocol  K+: 3.7, am recheck 8/27.    NOT on Telemetry. Med/Surg.

## 2021-08-27 NOTE — PLAN OF CARE
Pt c/o of moderate LLE pain but no analgesics given. Up to void SBA and walker. Mobility much improved. Pt looking forward to discharge.

## 2021-08-27 NOTE — DISCHARGE SUMMARY
[facility]  Hospitalist Discharge Summary      Date of Admission:  8/21/2021  Date of Discharge:  8/27/2021  Discharging Provider: Michelle Traore MD      Discharge Diagnoses   Left leg cellulitis  Severe sepsis, resolved  Strep bacteremia  Acute bronchitis  Demand ischemia/type II MI  Chronic Afib  HTN    Follow-ups Needed After Discharge   Follow-up Appointments     Follow Up and recommended labs and tests      Follow up with Nursing home physician.  No follow up labs or test are   needed.         Patient will need to be referred for an outpatient sleep study to evaluate for sleep apnea    Unresulted Labs Ordered in the Past 30 Days of this Admission     Date and Time Order Name Status Description    8/24/2021 12:02 AM Blood Culture Arm, Right Preliminary       These results will be followed up by PCP.    Discharge Disposition   Discharged to nursing home  Condition at discharge: Stable      Hospital Course   Misty Quezada is a 66 year old female admitted on 8/21/2021. She has history of obesity, HTN and preDM who presented for evaluation of BLE pain found to have severe sepsis with Strep bacteremia. She had an elevated troponin and lactate on admission due to sepsis. She did not require pressors and was started on broad spectrum IV antibiotics. She was eventually switched to high dose ceftriaxone per ID recommendations based on blood culture results and she will complete 7 days of cephalexin 1000mg TID once discharged from the hospital. She is being discharged to TCU in stable condition.     #Severe sepsis, resolved  #Gram-positive bacteremia  Presented with signs of organ damage including elevated troponin and lactic acidosis  WBC 17, tachycardic, tachypneic, febrile 101.2F on admission  Source is  LLE cellulitis  Blood culture 8/21 polymicrobial with Strep dysgalactiae, Strep mitis x2 strains, and Rothia mucilaginosa, given polymicrobial this may be skin contaminant although Strep  would fit cellulitis picture.  Repeat blood culture from 8/22 growing Staph warneri do suspect this is a contaminant. Repeat blood culture 8/24 NGTD.  Labs significantly improved.  ID consulted, appreciate assistance     #LLE cellulitis  Had been on Zosyn, changed to high dose Rocephin 8/24 per ID and then to oral Keflex 1000mg TID on the day of discharge  Prednisone added 8/24 (completing today)  MRI just showed severe cellulitis, no abscess or osteomyelitis.  Continue lymphedema therapy  Scheduled tylenol. Tramadol for breakthrough pain     #Suspected acute bronchitis  Started on zithromax on 8/26 - will complete course     #Demand ischemia/type II MI  Initial troponin 1.75-->1.32  EKG had some flattened T waves without previous for comparison, no clear ischemia  Likely from sepsis and Afib with RVR  Cardiology saw and recommended monitoring and medical mgmt, as well as outpatient sleep study to eval for MIKEL  Echo without wall motion abnormality     #Chronic atrial fibrillation  Continue home metoprolol, Eliquis     #HTN  Continue home metoprolol and resume benicar    Consultations This Hospital Stay   PHARMACY TO DOSE VANCO  SOCIAL WORK IP CONSULT  LYMPHEDEMA THERAPY IP CONSULT  PHARMACY TO DOSE VANCO  PHYSICAL THERAPY ADULT IP CONSULT  OCCUPATIONAL THERAPY ADULT IP CONSULT  CARDIOLOGY IP CONSULT  INFECTIOUS DISEASES IP CONSULT    Code Status   Full Code    Time Spent on this Encounter   I, Michelle Traore MD, personally saw the patient today and spent greater than 30 minutes discharging this patient.       Michelle Traore MD  [facility]  [address] [place] [id number]  Phone: [phone]  Fax: [phone]  ______________________________________________________________________    Physical Exam   Vital Signs: Temp: 97.6  F (36.4  C) Temp src: Oral BP: 123/77 Pulse: 87   Resp: 18 SpO2: 95 % O2 Device: None (Room air)    Weight: 286 lbs 6.4  oz  Constitutional: obese female sitting up in NAD  HEENT: NC/AT, no scleral icterus, nl conjunctiva, moist oral mucosa  Respiratory: good inspiratory effort, lungs CTAB  Cardiovascular: RRR, no M/R/G, 2+ radial pulses, 1+ BLE edema  GI: soft, obese, nontender  Skin: LLE faint erythema on shin/calf, well below line of demarcation, nontender, warm, dry, intact  Neuro: CN 2-12 grossly intact  MSK: , able to move all 4 exts  Psych: nl mood/affect/judgment.         Primary Care Physician   Tiff Chavez    Discharge Orders      General info for SNF    Length of Stay Estimate: Short Term Care: Estimated # of Days <30  Condition at Discharge: Improving  Level of care:skilled   Rehabilitation Potential: Excellent  Admission H&P remains valid and up-to-date: Yes  Recent Chemotherapy: N/A  Use Nursing Home Standing Orders: Yes     Mantoux instructions    Give two-step Mantoux (PPD) Per Facility Policy Yes     Follow Up and recommended labs and tests    Follow up with Nursing home physician.  No follow up labs or test are needed.     Reason for your hospital stay    67yo female here with left leg cellulitis, sepsis, Strep bacteremia. Treated with IV antibiotics and has been transitioned to oral Keflex for 7 more days, as well as zithromax for presumed bronchitis.     Activity - Up with assistive device     Full Code     Fall precautions     Advance Diet as Tolerated    Follow this diet upon discharge: Orders Placed This Encounter      Combination Diet Regular Diet Adult       Significant Results and Procedures   Most Recent 6 Bacteria Isolates From Any Culture (See EPIC Reports for Culture Details):No lab results found.,   Results for orders placed or performed during the hospital encounter of 08/21/21   Chest XR,  PA & LAT    Narrative    EXAM: XR CHEST 2 VW  LOCATION: [facility]  DATE/TIME: 8/21/2021 8:53 AM    INDICATION: leg edema, concern for chf  COMPARISON: None.      Impression     IMPRESSION: Shallow inspiration. Heart size is mildly enlarged. Slight interstitial prominence most likely due to the shallow inspiration. No convincing evidence for pulmonary edema.   US Lower Extremity Venous Duplex Bilateral    Narrative    EXAM: US LOWER EXTREMITY VENOUS DUPLEX BILATERAL  LOCATION: [facility]  DATE/TIME: 8/21/2021 8:27 AM    INDICATION: Bilateral lower extremity swelling.  COMPARISON: None.  TECHNIQUE: Venous Duplex ultrasound of bilateral lower extremities with and without compression, augmentation and duplex. Color flow and spectral Doppler with waveform analysis performed.    FINDINGS: Exam includes the common femoral, femoral, popliteal veins as well as segmentally visualized deep calf veins and greater saphenous vein.     RIGHT: No deep vein thrombosis. No superficial thrombophlebitis. No popliteal cyst.    LEFT: No deep vein thrombosis. No superficial thrombophlebitis. No popliteal cyst.      Impression    IMPRESSION:  1.  No deep venous thrombosis in the bilateral lower extremities.   MR Tibia Fibula Lower Leg Left wo & w Contr    Narrative    EXAM: MR TIBIA FIBULA LOWER LEG LEFT WO and W CONTR  LOCATION: [facility]  DATE/TIME: 8/23/2021 2:11 PM    INDICATION: Pain and swelling.  COMPARISON: None.  TECHNIQUE: Routine. Additional postgadolinium T1 sequences were obtained.  IV CONTRAST: 10 ml of Gadavist    FINDINGS:     BONES:   -The left tibia and fibula are negative for fracture or marrow signal abnormality. No evidence for significant stress reaction. No evidence for osteomyelitis. No evidence for solid bone lesion.     SOFT TISSUES:    -There is some intermediate signal abnormality within the medial and lateral gastrocnemius suggestive of muscle strain. There is no tearing. There is no significant fatty infiltration or atrophy. No evidence for organized fluid collection or focal soft   tissue mass.    MUSCLES:  -Extensive but  nonspecific edema or cellulitis seen circumferentially about the lower leg without evidence for discrete fluid collection.      Impression    IMPRESSION:  1.  Extensive but nonspecific edema or cellulitis seen circumferentially about the lower leg without evidence for organized fluid collection or soft tissue mass.  2.  There is some mild intermediate signal abnormality within the medial and lateral gastrocnemius which could be due to low-grade muscle strain or reactive but there is no tearing or retraction and no evidence for organized fluid collection or soft   tissue mass.  3.  The left tibia and fibula are negative for fracture or marrow signal abnormality. No evidence for stress reaction or focal bone lesion.  4.  No significant knee or ankle joint effusion.   US Lower Extremity Venous Duplex Bilateral    Narrative    EXAM: US LOWER EXTREMITY VENOUS DUPLEX BILATERAL  LOCATION: [facility]  DATE/TIME: 2021 5:17 PM    INDICATION: Lower extremity pain and swelling  COMPARISON: 2021.  TECHNIQUE: Venous Duplex ultrasound of bilateral lower extremities with and without compression, augmentation and duplex. Color flow and spectral Doppler with waveform analysis performed.    FINDINGS: Exam includes the common femoral, femoral, popliteal veins as well as segmentally visualized deep calf veins and greater saphenous vein.     RIGHT: No deep vein thrombosis. No superficial thrombophlebitis. No popliteal cyst.    LEFT: No deep vein thrombosis. No superficial thrombophlebitis. No popliteal cyst.      Impression    IMPRESSION:  1.  No deep venous thrombosis in the bilateral lower extremities.   Echocardiogram Complete     Value    LVEF  55-60%    Narrative    [id number]  [id number]  [id number]  [id number] [address]     Name: BEN BENTLEY  MRN: [id number]  : 1955  Study Date: 2021 09:14 AM  Age: 66 yrs  Gender:  Female  Patient Location: [facility]  Reason For Study: Atrial Fibrillation, Dyspnea  Ordering Physician: ZELDA PICKARD  Performed By: JESUS     BSA: 2.3 m2  Height: 67 in  Weight: 282 lb  HR: 117  ______________________________________________________________________________  Procedure  Definity (NDC #55546-317) given intravenously. Lot # [id number], : .  ______________________________________________________________________________  Interpretation Summary     Left ventricular function is normal.The ejection fraction is 55-60%.  The right ventricle is moderately dilated.  TAPSE is normal, which is consistent with normal right ventricular systolic  function.  There is mod-severe to severe (3-4+) tricuspid regurgitation.  The right ventricular systolic pressure is approximated at 58mmHg plus the  right atrial pressure.  Moderate valvular aortic stenosis.  ______________________________________________________________________________  I      WMSI = 1.00     % Normal = 100     X - Cannot   0 -                      (2) - Mildly 2 -          Segments  Size  Interpret    Hyperkinetic 1 - Normal  Hypokinetic  Hypokinetic  1-2     small                                                     7 -          3-5      moderate  3 - Akinetic 4 -          5 -         6 - Akinetic Dyskinetic   6-14    large               Dyskinetic   Aneurysmal  w/scar       w/scar       15-16   diffuse     Left Ventricle  The left ventricle is normal in size. Left ventricular function is normal.The  ejection fraction is 55-60%. There is mild concentric left ventricular  hypertrophy. Left ventricular diastolic function is normal. Flattened septum  is consistent with RV pressure/volume overload.     Right Ventricle  The right ventricle is moderately dilated. TAPSE is normal, which is  consistent with normal right ventricular systolic function.     Atria  The left atrium is mild to moderately dilated. The right atrium is moderate to  severely dilated.  There is no color Doppler evidence of an atrial shunt.     Mitral Valve  Mitral valve leaflets appear normal. There is no evidence of mitral stenosis  or clinically significant mitral regurgitation.     Tricuspid Valve  Tricuspid valve leaflets appear normal. There is mod-severe to severe (3-4+)  tricuspid regurgitation. The right ventricular systolic pressure is  approximated at 58mmHg plus the right atrial pressure. Right ventricular  systolic pressure is elevated, consistent with moderate pulmonary  hypertension.     Aortic Valve  Moderate aortic valve calcification is present. Moderate valvular aortic  stenosis.     Pulmonic Valve  The pulmonic valve is not well visualized.     Vessels  The aorta root is normal. Normal size ascending aorta. IVC diameter >2.1 cm  collapsing <50% with sniff suggests a high RA pressure estimated at 15 mmHg or  greater.     Pericardium  There is no pericardial effusion.     ______________________________________________________________________________  MMode/2D Measurements & Calculations  IVSd: 1.4 cm  LVIDd: 4.1 cm  LVIDs: 2.8 cm  LVPWd: 1.7 cm  FS: 30.0 %  LV mass(C)d: 244.6 grams  LV mass(C)dI: 104.5 grams/m2  Ao root diam: 3.5 cm  LA dimension: 4.6 cm     asc Aorta Diam: 3.7 cm  LA/Ao: 1.3  LVOT diam: 2.1 cm  LVOT area: 3.4 cm2  LA Volume Indexed (AL/bp): 51.8 ml/m2  RWT: 0.83     Time Measurements  MM HR: 117.0 BPM     Doppler Measurements & Calculations  MV E max markus: 104.8 cm/sec     MV max P.9 mmHg  MV mean P.6 mmHg  MV V2 VTI: 25.3 cm  MVA(VTI): 1.9 cm2  MV dec time: 0.20 sec  Ao V2 max: 262.2 cm/sec  Ao max P.0 mmHg  Ao V2 mean: 175.8 cm/sec  Ao mean P.4 mmHg  Ao V2 VTI: 48.9 cm  LORRAINE(I,D): 0.99 cm2  LORRAINE(V,D): 1.2 cm2  LV V1 max PG: 3.4 mmHg  LV V1 max: 92.2 cm/sec  LV V1 VTI: 14.1 cm  SV(LVOT): 48.6 ml  SI(LVOT): 20.8 ml/m2  PA acc time: 0.08 sec  TR max markus: 327.1 cm/sec  TR max P.8 mmHg  AV Markus Ratio (DI): 0.35  LORRAINE Index (cm2/m2): 0.42  E/E' avg:  10.6  Lateral E/e': 7.6  [facility] E/e': 13.6     ______________________________________________________________________________  Report approved by: Adin Huerta 08/22/2021 11:11 AM               Discharge Medications   Discharge Medication List as of 8/27/2021  2:28 PM      START taking these medications    Details   acetaminophen (TYLENOL) 325 MG tablet Take 3 tablets (975 mg) by mouth 3 times daily, Disp-30 tablet, R-0, No Print Out      azithromycin (ZITHROMAX) 250 MG tablet Take 1 tablet (250 mg) by mouth daily, Disp-3 tablet, R-0, No Print Out      cephALEXin (KEFLEX) 500 MG capsule Take 2 capsules (1,000 mg) by mouth every 8 hours for 7 days, Disp-42 capsule, R-0, No Print Out      miconazole (MICATIN) 2 % external powder Apply topically 2 times daily for 7 daysDisp-43 g, R-0No Print Out      vitamin C 1000 MG TABS tablet Take 1 tablet (1,000 mg) by mouth daily, Disp-30 tablet, R-0, No Print Out         CONTINUE these medications which have NOT CHANGED    Details   apixaban (ELIQUIS) 5 mg Tab tablet [APIXABAN (ELIQUIS) 5 MG TAB TABLET] Take 5 mg by mouth 2 (two) times a day., Historical      CALCIUM/D3/MAG OX//TYRONE/ZN (CALTRATE + D3 PLUS MINERALS ORAL) [CALCIUM/D3/MAG OX//TYRONE/ZN (CALTRATE + D3 PLUS MINERALS ORAL)] Take 1 tablet by mouth daily., Historical      cholecalciferol, vitamin D3, 2,000 unit Tab [CHOLECALCIFEROL, VITAMIN D3, 2,000 UNIT TAB] Take 2,000 Units by mouth daily., Historical      cyanocobalamin 1000 MCG tablet [CYANOCOBALAMIN 1000 MCG TABLET] Take 1,000 mcg by mouth daily., Historical      metoprolol succinate (TOPROL-XL) 25 MG [METOPROLOL SUCCINATE (TOPROL-XL) 25 MG] TAKE 1 TABLET BY MOUTH DAILY., Disp-90 tablet, R-2, Normal      MULTIVIT WITH CALCIUM,IRON,MIN (WOMEN'S DAILY MULTIVITAMIN ORAL) [MULTIVIT WITH CALCIUM,IRON,MIN (WOMEN'S DAILY MULTIVITAMIN ORAL)] Take 1 tablet by mouth daily., Historical      olmesartan-hydrochlorothiazide (BENICAR HCT) 20-12.5 MG tablet Take 1  tablet by mouth daily, Historical           Allergies   No Known Allergies

## 2021-08-27 NOTE — PLAN OF CARE
Occupational Therapy Discharge Summary    Reason for therapy discharge:    Discharged to transitional care facility.    Progress towards therapy goal(s). See goals on Care Plan in [facility] electronic health record for goal details.  Goals partially met.  Barriers to achieving goals:   discharge from facility.    Therapy recommendation(s):    Continued therapy is recommended.  Rationale/Recommendations:  Further OT services to maximize functional performance with ADLs/mobility.

## 2021-08-27 NOTE — PROGRESS NOTES
"Infectious Diseases Progress Note  [facility]    Date of visit: 8/25/2021      ASSESSMENT   66-year-old woman with a history of hypertension, A. fib, obesity who is admitted with left leg pain and swelling.    1. Left leg cellulitis.  Erythema and swelling on admission.  Fever.  Leukocytosis.  Ultrasound negative for DVT.  MRI showing cellulitis without deeper infection. improving  2. Gram-positive bacteremia.  1 of 2 blood cultures on admission with strep mitis (x2), group G strep, and Rothia (usually a contaminant). Repeat cultures on the next day with Staph warneri, likely a contaminant    Active Problems:    Elevated troponin    Atrial fibrillation with rapid ventricular response (H)    Cellulitis of left leg       PLAN   -cephalexin 1000mg po tid x 7 days  -Lymphedema wraps versus leg elevation        Zion Mcdaniels MD  [facility]  Direct messaging: [employer] Paging  On-Call ID provider: [phone], option: 9      ===========================================      SUBJECTIVE / INTERVAL HISTORY:     No events. Legs still feel heavy. U/s without dvt. Tolerating antibiotics.    Review of Systems     No fevers, no rashes    Antibiotics   Cephalexin 8/27-      Previous:  Ceftriaxone 8/24-8/26  Zosyn 8/21-8/24  Vancomycin 8/21-8/23      Physical Exam     Temp:  [97.5  F (36.4  C)-97.9  F (36.6  C)] 97.6  F (36.4  C)  Pulse:  [] 87  Resp:  [18] 18  BP: (108-124)/(57-91) 123/77  SpO2:  [95 %-97 %] 95 %    /77   Pulse 87   Temp 97.6  F (36.4  C) (Oral)   Resp 18   Ht 1.702 m (5' 7\")   Wt 129.9 kg (286 lb 6.4 oz)   SpO2 95%   BMI 44.86 kg/m      GENERAL:  well-developed, well-nourished, sitting in chair in no acute distress.   HENT:  Head is normocephalic, atraumatic. Oropharynx is moist without exudates or ulcers.  Edentulous  EYES:  Eyes have anicteric sclerae without conjunctival injection   LUNGS:  Normal resp pattern  SKIN: legs wrapped   NEUROLOGIC:  Grossly " nonfocal.      Cultures     8/21 blood culture 1 of 2: Strep mitis x 2, group G strep, rothia  8/22 blood culture: Staph warneri  8/24 blood culture: no growth to date       Pertinent Labs:     Recent Labs   Lab 08/25/21  0425 08/24/21  0428 08/23/21  0604   WBC 7.6 8.6 12.2*   HGB 11.8 11.3* 11.5*    206 188       Recent Labs   Lab 08/25/21  0425 08/23/21  0604 08/22/21  0424    140 138   CO2 19* 22 22   BUN 15 24* 28*       No results for input(s): CRP in the last 168 hours.    Invalid input(s): SEDRATE        Imaging:     MR Tibia Fibula Lower Leg Left wo & w Contr    Result Date: 8/23/2021  EXAM: MR TIBIA FIBULA LOWER LEG LEFT WO and W CONTR LOCATION: [facility] DATE/TIME: 8/23/2021 2:11 PM INDICATION: Pain and swelling. COMPARISON: None. TECHNIQUE: Routine. Additional postgadolinium T1 sequences were obtained. IV CONTRAST: 10 ml of Gadavist FINDINGS: BONES: -The left tibia and fibula are negative for fracture or marrow signal abnormality. No evidence for significant stress reaction. No evidence for osteomyelitis. No evidence for solid bone lesion. SOFT TISSUES:  -There is some intermediate signal abnormality within the medial and lateral gastrocnemius suggestive of muscle strain. There is no tearing. There is no significant fatty infiltration or atrophy. No evidence for organized fluid collection or focal soft tissue mass. MUSCLES: -Extensive but nonspecific edema or cellulitis seen circumferentially about the lower leg without evidence for discrete fluid collection.     IMPRESSION: 1.  Extensive but nonspecific edema or cellulitis seen circumferentially about the lower leg without evidence for organized fluid collection or soft tissue mass. 2.  There is some mild intermediate signal abnormality within the medial and lateral gastrocnemius which could be due to low-grade muscle strain or reactive but there is no tearing or retraction and no evidence for organized fluid  collection or soft tissue mass. 3.  The left tibia and fibula are negative for fracture or marrow signal abnormality. No evidence for stress reaction or focal bone lesion. 4.  No significant knee or ankle joint effusion.        Data reviewed today: I reviewed all medications, new labs and imaging results over the last 24 hours. I personally reviewed no images or EKG's today.  The patient's care was discussed with the Patient and Primary team.

## 2021-08-27 NOTE — PLAN OF CARE
Physical Therapy Discharge Summary    Reason for therapy discharge:    Discharged to transitional care facility.    Progress towards therapy goal(s). See goals on Care Plan in [facility] electronic health record for goal details.  Goals partially met.  Barriers to achieving goals:   discharge from facility.    Therapy recommendation(s):    Continued therapy is recommended.  Rationale/Recommendations:  to improve functional mobility.

## 2021-08-27 NOTE — PROGRESS NOTES
Care Management Discharge Note    Discharge Date: 08/27/2021  Expected Time of Departure: 4:00 PM     Discharge Disposition: Transitional Care    Discharge Services: None    Discharge DME: None    Discharge Transportation: agency    Private pay costs discussed: transportation costs    PAS Confirmation Code: [id number]  Patient/family educated on Medicare website which has current facility and service quality ratings: yes    Education Provided on the Discharge Plan:  Yes   Persons Notified of Discharge Plans: Pt and left VM for tiesha June  Patient/Family in Agreement with the Plan:  Yes  Handoff Referral Completed: yes     Additional Information:  Pt accepted at [facility] by Hattie tejeda today. Pt is accepting or bed.  Pt desires  [facility] [facility] W/C transport.  Ride set for 4:00 PM. PAS completed.  SW left VM for tiesha June.  [facility] full.       LISA Cross

## 2021-08-27 NOTE — PLAN OF CARE
Occupational Therapy Discharge Summary    Reason for therapy discharge:    Discharged to transitional care facility.    Progress towards therapy goal(s). See goals on Care Plan in [facility] electronic health record for goal details.  Goals met    Therapy recommendation(s):    Continued therapy is recommended.  Rationale/Recommendations:  Continue LE wraps at TCU to further reduce edema for ADL independence.

## 2021-08-29 LAB — BACTERIA BLD CULT: NO GROWTH

## 2021-08-30 ENCOUNTER — LAB REQUISITION (OUTPATIENT)
Dept: LAB | Facility: CLINIC | Age: 66
End: 2021-08-30

## 2021-08-30 DIAGNOSIS — R76.12 NONSPECIFIC REACTION TO CELL MEDIATED IMMUNITY MEASUREMENT OF GAMMA INTERFERON ANTIGEN RESPONSE WITHOUT ACTIVE TUBERCULOSIS: ICD-10-CM

## 2021-08-30 DIAGNOSIS — E03.9 HYPOTHYROIDISM, UNSPECIFIED: ICD-10-CM

## 2021-08-30 DIAGNOSIS — I10 ESSENTIAL (PRIMARY) HYPERTENSION: ICD-10-CM

## 2021-08-31 ENCOUNTER — LAB REQUISITION (OUTPATIENT)
Dept: LAB | Facility: CLINIC | Age: 66
End: 2021-08-31

## 2021-08-31 DIAGNOSIS — R89.9 UNSPECIFIED ABNORMAL FINDING IN SPECIMENS FROM OTHER ORGANS, SYSTEMS AND TISSUES: ICD-10-CM

## 2021-08-31 LAB
ANION GAP SERPL CALCULATED.3IONS-SCNC: 10 MMOL/L (ref 5–18)
BASOPHILS # BLD AUTO: 0 10E3/UL (ref 0–0.2)
BASOPHILS NFR BLD AUTO: 0 %
BUN SERPL-MCNC: 11 MG/DL (ref 8–22)
CALCIUM SERPL-MCNC: 9.2 MG/DL (ref 8.5–10.5)
CHLORIDE BLD-SCNC: 105 MMOL/L (ref 98–107)
CO2 SERPL-SCNC: 25 MMOL/L (ref 22–31)
CREAT SERPL-MCNC: 0.6 MG/DL (ref 0.6–1.1)
EOSINOPHIL # BLD AUTO: 0.2 10E3/UL (ref 0–0.7)
EOSINOPHIL NFR BLD AUTO: 2 %
ERYTHROCYTE [DISTWIDTH] IN BLOOD BY AUTOMATED COUNT: 14.8 % (ref 10–15)
GFR SERPL CREATININE-BSD FRML MDRD: >90 ML/MIN/1.73M2
GLUCOSE BLD-MCNC: 84 MG/DL (ref 70–125)
HCT VFR BLD AUTO: 36.3 % (ref 35–47)
HGB BLD-MCNC: 11.5 G/DL (ref 11.7–15.7)
IMM GRANULOCYTES # BLD: 0.1 10E3/UL
IMM GRANULOCYTES NFR BLD: 1 %
LYMPHOCYTES # BLD AUTO: 1.3 10E3/UL (ref 0.8–5.3)
LYMPHOCYTES NFR BLD AUTO: 17 %
MCH RBC QN AUTO: 30.7 PG (ref 26.5–33)
MCHC RBC AUTO-ENTMCNC: 31.7 G/DL (ref 31.5–36.5)
MCV RBC AUTO: 97 FL (ref 78–100)
MONOCYTES # BLD AUTO: 0.7 10E3/UL (ref 0–1.3)
MONOCYTES NFR BLD AUTO: 9 %
NEUTROPHILS # BLD AUTO: 5.5 10E3/UL (ref 1.6–8.3)
NEUTROPHILS NFR BLD AUTO: 71 %
NRBC # BLD AUTO: 0 10E3/UL
NRBC BLD AUTO-RTO: 0 /100
PLATELET # BLD AUTO: 444 10E3/UL (ref 150–450)
POTASSIUM BLD-SCNC: 4.1 MMOL/L (ref 3.5–5)
RBC # BLD AUTO: 3.75 10E6/UL (ref 3.8–5.2)
SODIUM SERPL-SCNC: 140 MMOL/L (ref 136–145)
TSH SERPL DL<=0.005 MIU/L-ACNC: 4.92 UIU/ML (ref 0.3–5)
WBC # BLD AUTO: 7.8 10E3/UL (ref 4–11)

## 2021-08-31 PROCEDURE — 84443 ASSAY THYROID STIM HORMONE: CPT

## 2021-08-31 PROCEDURE — 86481 TB AG RESPONSE T-CELL SUSP: CPT

## 2021-08-31 PROCEDURE — P9604 ONE-WAY ALLOW PRORATED TRIP: HCPCS

## 2021-08-31 PROCEDURE — 36415 COLL VENOUS BLD VENIPUNCTURE: CPT

## 2021-08-31 PROCEDURE — 81003 URINALYSIS AUTO W/O SCOPE: CPT | Performed by: INTERNAL MEDICINE

## 2021-08-31 PROCEDURE — 82374 ASSAY BLOOD CARBON DIOXIDE: CPT

## 2021-08-31 PROCEDURE — 85025 COMPLETE CBC W/AUTO DIFF WBC: CPT

## 2021-09-01 ENCOUNTER — LAB REQUISITION (OUTPATIENT)
Dept: LAB | Facility: CLINIC | Age: 66
End: 2021-09-01

## 2021-09-01 DIAGNOSIS — D50.9 IRON DEFICIENCY ANEMIA, UNSPECIFIED: ICD-10-CM

## 2021-09-01 DIAGNOSIS — R73.09 OTHER ABNORMAL GLUCOSE: ICD-10-CM

## 2021-09-01 DIAGNOSIS — R68.89 OTHER GENERAL SYMPTOMS AND SIGNS: ICD-10-CM

## 2021-09-01 DIAGNOSIS — R79.89 OTHER SPECIFIED ABNORMAL FINDINGS OF BLOOD CHEMISTRY: ICD-10-CM

## 2021-09-01 DIAGNOSIS — R70.0 ELEVATED ERYTHROCYTE SEDIMENTATION RATE: ICD-10-CM

## 2021-09-01 DIAGNOSIS — R06.89 OTHER ABNORMALITIES OF BREATHING: ICD-10-CM

## 2021-09-01 DIAGNOSIS — R74.8 ABNORMAL LEVELS OF OTHER SERUM ENZYMES: ICD-10-CM

## 2021-09-01 LAB
ALBUMIN UR-MCNC: NEGATIVE MG/DL
APPEARANCE UR: CLEAR
BILIRUB UR QL STRIP: NEGATIVE
COLOR UR AUTO: COLORLESS
GAMMA INTERFERON BACKGROUND BLD IA-ACNC: 0.03 IU/ML
GLUCOSE UR STRIP-MCNC: NEGATIVE MG/DL
HGB UR QL STRIP: NEGATIVE
KETONES UR STRIP-MCNC: NEGATIVE MG/DL
LEUKOCYTE ESTERASE UR QL STRIP: NEGATIVE
M TB IFN-G BLD-IMP: NEGATIVE
M TB IFN-G CD4+ BCKGRND COR BLD-ACNC: 5.69 IU/ML
MITOGEN IGNF BCKGRD COR BLD-ACNC: 0 IU/ML
MITOGEN IGNF BCKGRD COR BLD-ACNC: 0.02 IU/ML
NITRATE UR QL: NEGATIVE
PH UR STRIP: 5 [PH] (ref 5–7)
QUANTIFERON MITOGEN: 5.72 IU/ML
QUANTIFERON NIL TUBE: 0.03 IU/ML
QUANTIFERON TB1 TUBE: 0.05 IU/ML
QUANTIFERON TB2 TUBE: 0.03
SP GR UR STRIP: 1.01 (ref 1–1.03)
UROBILINOGEN UR STRIP-MCNC: <2 MG/DL

## 2021-09-02 LAB
ANION GAP SERPL CALCULATED.3IONS-SCNC: 9 MMOL/L (ref 5–18)
BNP SERPL-MCNC: 199 PG/ML (ref 0–112)
BUN SERPL-MCNC: 12 MG/DL (ref 8–22)
CALCIUM SERPL-MCNC: 8.8 MG/DL (ref 8.5–10.5)
CHLORIDE BLD-SCNC: 105 MMOL/L (ref 98–107)
CK SERPL-CCNC: 41 U/L (ref 30–190)
CO2 SERPL-SCNC: 28 MMOL/L (ref 22–31)
CREAT SERPL-MCNC: 0.61 MG/DL (ref 0.6–1.1)
ERYTHROCYTE [SEDIMENTATION RATE] IN BLOOD BY WESTERGREN METHOD: 47 MM/HR (ref 0–20)
FERRITIN SERPL-MCNC: 123 NG/ML (ref 10–130)
GFR SERPL CREATININE-BSD FRML MDRD: >90 ML/MIN/1.73M2
GLUCOSE BLD-MCNC: 94 MG/DL (ref 70–125)
HBA1C MFR BLD: 6.2 %
IRON SATN MFR SERPL: 13 % (ref 20–50)
IRON SERPL-MCNC: 30 UG/DL (ref 42–175)
IRON SERPL-MCNC: 30 UG/DL (ref 42–175)
POTASSIUM BLD-SCNC: 4.1 MMOL/L (ref 3.5–5)
SODIUM SERPL-SCNC: 142 MMOL/L (ref 136–145)
TIBC SERPL-MCNC: 239 UG/DL (ref 313–563)
TRANSFERRIN SERPL-MCNC: 191 MG/DL (ref 212–360)

## 2021-09-02 PROCEDURE — 82728 ASSAY OF FERRITIN: CPT | Performed by: INTERNAL MEDICINE

## 2021-09-02 PROCEDURE — 36415 COLL VENOUS BLD VENIPUNCTURE: CPT | Performed by: INTERNAL MEDICINE

## 2021-09-02 PROCEDURE — 84466 ASSAY OF TRANSFERRIN: CPT | Performed by: INTERNAL MEDICINE

## 2021-09-02 PROCEDURE — 85652 RBC SED RATE AUTOMATED: CPT | Performed by: INTERNAL MEDICINE

## 2021-09-02 PROCEDURE — P9604 ONE-WAY ALLOW PRORATED TRIP: HCPCS | Performed by: INTERNAL MEDICINE

## 2021-09-02 PROCEDURE — 82374 ASSAY BLOOD CARBON DIOXIDE: CPT | Performed by: INTERNAL MEDICINE

## 2021-09-02 PROCEDURE — 82550 ASSAY OF CK (CPK): CPT | Performed by: INTERNAL MEDICINE

## 2021-09-02 PROCEDURE — 83880 ASSAY OF NATRIURETIC PEPTIDE: CPT | Performed by: INTERNAL MEDICINE

## 2021-09-02 PROCEDURE — 83036 HEMOGLOBIN GLYCOSYLATED A1C: CPT | Performed by: INTERNAL MEDICINE

## 2021-09-02 PROCEDURE — 83540 ASSAY OF IRON: CPT | Performed by: INTERNAL MEDICINE

## 2021-09-21 ENCOUNTER — HOSPITAL ENCOUNTER (EMERGENCY)
Facility: HOSPITAL | Age: 66
Discharge: HOME | End: 2021-09-21
Attending: EMERGENCY MEDICINE | Admitting: EMERGENCY MEDICINE
Payer: MEDICARE

## 2021-09-21 ENCOUNTER — APPOINTMENT (OUTPATIENT)
Dept: ULTRASOUND IMAGING | Facility: HOSPITAL | Age: 66
End: 2021-09-21
Payer: MEDICARE

## 2021-09-21 VITALS
HEIGHT: 67 IN | HEART RATE: 75 BPM | SYSTOLIC BLOOD PRESSURE: 122 MMHG | BODY MASS INDEX: 45.04 KG/M2 | OXYGEN SATURATION: 95 % | RESPIRATION RATE: 16 BRPM | DIASTOLIC BLOOD PRESSURE: 66 MMHG | TEMPERATURE: 98.4 F | WEIGHT: 287 LBS

## 2021-09-21 DIAGNOSIS — L03.116 CELLULITIS OF LEFT LEG: ICD-10-CM

## 2021-09-21 DIAGNOSIS — L03.116 LEFT LEG CELLULITIS: ICD-10-CM

## 2021-09-21 LAB
ANION GAP SERPL CALCULATED.3IONS-SCNC: 10 MMOL/L (ref 5–18)
BUN SERPL-MCNC: 11 MG/DL (ref 8–22)
C REACTIVE PROTEIN LHE: 1.8 MG/DL (ref 0–0.8)
CALCIUM SERPL-MCNC: 9.3 MG/DL (ref 8.5–10.5)
CHLORIDE BLD-SCNC: 106 MMOL/L (ref 98–107)
CO2 SERPL-SCNC: 27 MMOL/L (ref 22–31)
CREAT SERPL-MCNC: 0.73 MG/DL (ref 0.6–1.1)
ERYTHROCYTE [DISTWIDTH] IN BLOOD BY AUTOMATED COUNT: 14.6 % (ref 10–15)
GFR SERPL CREATININE-BSD FRML MDRD: 85 ML/MIN/1.73M2
GLUCOSE BLD-MCNC: 105 MG/DL (ref 70–125)
HCT VFR BLD AUTO: 38.2 % (ref 35–47)
HGB BLD-MCNC: 11.9 G/DL (ref 11.7–15.7)
MCH RBC QN AUTO: 30.1 PG (ref 26.5–33)
MCHC RBC AUTO-ENTMCNC: 31.2 G/DL (ref 31.5–36.5)
MCV RBC AUTO: 97 FL (ref 78–100)
PLATELET # BLD AUTO: 314 10E3/UL (ref 150–450)
POTASSIUM BLD-SCNC: 3.4 MMOL/L (ref 3.5–5)
RBC # BLD AUTO: 3.96 10E6/UL (ref 3.8–5.2)
SODIUM SERPL-SCNC: 143 MMOL/L (ref 136–145)
WBC # BLD AUTO: 8 10E3/UL (ref 4–11)

## 2021-09-21 PROCEDURE — 80048 BASIC METABOLIC PNL TOTAL CA: CPT | Performed by: EMERGENCY MEDICINE

## 2021-09-21 PROCEDURE — 86141 C-REACTIVE PROTEIN HS: CPT | Performed by: EMERGENCY MEDICINE

## 2021-09-21 PROCEDURE — 99285 EMERGENCY DEPT VISIT HI MDM: CPT | Mod: 25

## 2021-09-21 PROCEDURE — 36415 COLL VENOUS BLD VENIPUNCTURE: CPT | Performed by: EMERGENCY MEDICINE

## 2021-09-21 PROCEDURE — 96365 THER/PROPH/DIAG IV INF INIT: CPT

## 2021-09-21 PROCEDURE — 85027 COMPLETE CBC AUTOMATED: CPT | Performed by: EMERGENCY MEDICINE

## 2021-09-21 PROCEDURE — 250N000011 HC RX IP 250 OP 636: Performed by: EMERGENCY MEDICINE

## 2021-09-21 PROCEDURE — 93971 EXTREMITY STUDY: CPT | Mod: LT

## 2021-09-21 RX ORDER — CLINDAMYCIN HCL 300 MG
300 CAPSULE ORAL 4 TIMES DAILY
Qty: 40 CAPSULE | Refills: 0 | Status: SHIPPED | OUTPATIENT
Start: 2021-09-21 | End: 2021-10-01

## 2021-09-21 RX ORDER — CLINDAMYCIN PHOSPHATE 900 MG/50ML
900 INJECTION, SOLUTION INTRAVENOUS ONCE
Status: COMPLETED | OUTPATIENT
Start: 2021-09-21 | End: 2021-09-21

## 2021-09-21 RX ADMIN — CLINDAMYCIN PHOSPHATE 900 MG: 900 INJECTION, SOLUTION INTRAVENOUS at 16:15

## 2021-09-21 ASSESSMENT — ENCOUNTER SYMPTOMS
WOUND: 1
VOMITING: 0
NUMBNESS: 0
NAUSEA: 0
COLOR CHANGE: 1
CHILLS: 0
FEVER: 0
SHORTNESS OF BREATH: 0

## 2021-09-21 ASSESSMENT — MIFFLIN-ST. JEOR: SCORE: 1869.45

## 2021-09-21 NOTE — ED PROVIDER NOTES
EMERGENCY DEPARTMENT ENCOUNTER      NAME: Misty Quezada  AGE: 66 year old female  YOB: 1955  MRN: [id number]  EVALUATION DATE & TIME: 9/21/2021 12:51 PM    PCP: Tiff Chavez    ED PROVIDER: Milady Jacob PA-C      Chief Complaint   Patient presents with     left leg cellulitis         FINAL IMPRESSION:  1. Cellulitis of left leg          ED COURSE & MEDICAL DECISION MAKING:    Pertinent Labs & Imaging studies reviewed. (See chart for details)    66 year old female presents to the Emergency Department for evaluation of left leg redness.    Physical exam is remarkable for a generally well-appearing female who is in no acute distress.  She has chronic skin changes noted on the left anterior leg.  There is a healing wound on the left anterior lower leg as well with surrounding erythema that is circumferential, warmth, and tenderness to palpation just lateral to the wound.  She has good distal sensation, capillary refill in the foot is less than 2 seconds, strong dorsal pedal pulse identified.  Heart and lung sounds clear diffusely throughout.  Vital signs remarkable for mild tachycardia, otherwise stable and she is afebrile.    CBC is unremarkable with no leukocytosis or anemia.     Care signed out to Dr. Worrell pending lab work and US of the left leg. Disposition depending on workup.    ED Course   1:11 PM Performed my initial history and physical exam. Discussed workup in the emergency department, management of symptoms, and likely disposition. PPE: Provider wore surgical mask and gloves.   2:03 PM Care signed out to Dr. Worrell pending labs and US. Patient updated with change of plan.     At the conclusion of the encounter I discussed the results of all of the tests and the disposition. The questions were answered. The patient or family acknowledged understanding and was agreeable with the care plan.     Voice recognition software was used in the creation of this note. Any grammatical or  nonsensical errors are due to inherent errors with the software and are not the intention of the writer.     MEDICATIONS GIVEN IN THE EMERGENCY:  Medications - No data to display    NEW PRESCRIPTIONS STARTED AT TODAY'S ER VISIT  New Prescriptions    No medications on file            =================================================================    HPI    Patient information was obtained from: Patient     Use of Intrepreter: N/A         Misty Quezada is a 66 year old female with a pertinent medical history of hypertension, atrial fibrillation, prediabetes, and left leg cellulitis who presents to this ED by private vehicle for evaluation of left leg redness and swelling.    Per chart review, patient was admitted to [facility] 8/21/2021-8/27/2021 after presenting to the ED with bilateral lower extremity pain found to have LLE cellulitis and severe sepsis with Strep bacteremia. Patient had an elevated troponin and lactate on admission due to sepsis. No pressors required. Patient was started on broad spectrum IV antibiotics, eventually switched to high dose ceftriaxone per ID recommendations. Also started on Zithromax on 8/26 for suspected acute bronchitis. Discharged to TCU in stable condition to complete 7 days of cephalexin.      Patient reports that her left lower leg became more red and swollen today. She endorses associated pain to this area around a wound to the left lower leg. Patient is not currently on oral antibiotics, she has been applying Mupirocin to the wound on the left lower leg. She denies fever, chills, chest pain, shortness of breath, nausea, vomiting, numbness/tingling in the left foot, or additional symptoms at this time. No history of DVT or PE.         REVIEW OF SYSTEMS   Review of Systems   Constitutional: Negative for chills and fever.   Respiratory: Negative for shortness of breath.    Cardiovascular: Positive for leg swelling. Negative for chest pain.   Gastrointestinal: Negative  for nausea and vomiting.   Skin: Positive for color change (redness to left lower leg) and wound (left lower leg).   Neurological: Negative for numbness.     All other systems reviewed and are negative unless noted in HPI.      PAST MEDICAL HISTORY:  Past Medical History:   Diagnosis Date     Benign essential hypertension      Chronic atrial fibrillation (H)        PAST SURGICAL HISTORY:  Past Surgical History:   Procedure Laterality Date     APPENDECTOMY       CHOLECYSTECTOMY       GASTRIC BYPASS         CURRENT MEDICATIONS:    acetaminophen (TYLENOL) 325 MG tablet  apixaban (ELIQUIS) 5 mg Tab tablet  azithromycin (ZITHROMAX) 250 MG tablet  CALCIUM/D3/MAG OX//TYRONE/ZN (CALTRATE + D3 PLUS MINERALS ORAL)  cholecalciferol, vitamin D3, 2,000 unit Tab  cyanocobalamin 1000 MCG tablet  metoprolol succinate (TOPROL-XL) 25 MG  MULTIVIT WITH CALCIUM,IRON,MIN (WOMEN'S DAILY MULTIVITAMIN ORAL)  olmesartan-hydrochlorothiazide (BENICAR HCT) 20-12.5 MG tablet  vitamin C 1000 MG TABS tablet        ALLERGIES:  No Known Allergies    FAMILY HISTORY:  Family History   Problem Relation Age of Onset     No Known Problems Mother      No Known Problems Father        SOCIAL HISTORY:   Social History     Socioeconomic History     Marital status:      Spouse name: Not on file     Number of children: Not on file     Years of education: Not on file     Highest education level: Not on file   Occupational History     Not on file   Tobacco Use     Smoking status: Never Smoker     Smokeless tobacco: Never Used   Substance and Sexual Activity     Alcohol use: Not Currently     Comment: Rarely Uses alcohol     Drug use: Never     Sexual activity: Not on file   Other Topics Concern     Not on file   Social History Narrative     Not on file     Social Determinants of Health     Financial Resource Strain:      Difficulty of Paying Living Expenses:    Food Insecurity:      Worried About Running Out of Food in the Last Year:      Ran Out of Food  "in the Last Year:    Transportation Needs:      Lack of Transportation (Medical):      Lack of Transportation (Non-Medical):    Physical Activity:      Days of Exercise per Week:      Minutes of Exercise per Session:    Stress:      Feeling of Stress :    Social Connections:      Frequency of Communication with Friends and Family:      Frequency of Social Gatherings with Friends and Family:      Attends Sikh Services:      Active Member of Clubs or Organizations:      Attends Club or Organization Meetings:      Marital Status:    Intimate Partner Violence:      Fear of Current or Ex-Partner:      Emotionally Abused:      Physically Abused:      Sexually Abused:        VITALS:  Patient Vitals for the past 24 hrs:   BP Temp Temp src Pulse Resp SpO2 Height Weight   09/21/21 1330 122/66 -- -- 75 16 95 % -- --   09/21/21 1220 121/86 98.4  F (36.9  C) Oral 104 16 94 % 1.702 m (5' 7\") 130.2 kg (287 lb)       PHYSICAL EXAM    VITAL SIGNS: /66   Pulse 75   Temp 98.4  F (36.9  C) (Oral)   Resp 16   Ht 1.702 m (5' 7\")   Wt 130.2 kg (287 lb)   SpO2 95%   BMI 44.95 kg/m    General Appearance: Alert, cooperative, normal speech and facial symmetry, appears stated age, the patient does not appear in distress  Head:  Normocephalic, without obvious abnormality, atraumatic  Cardio:  Regular rate and rhythm, S1 and S2 normal, no murmur, rub    or gallop, 2+ pulses symmetric in all extremities  Pulm:  Clear to auscultation bilaterally, respirations unlabored with no accessory muscle use  Extremities:  Chronic skin changes noted on the left anterior leg.  There is a healing wound on the left anterior lower leg as well with surrounding erythema that is circumferential, warmth, and tenderness to palpation just lateral to the wound.  No posterior calf tenderness; She has good distal sensation, capillary refill in the foot is less than 2 seconds, strong dorsal pedal pulse identified.    Neuro: Patient is awake, alert, and " responsive to voice. No gross motor weaknesses or sensory loss; moves all extremities.     LAB:  All pertinent labs reviewed and interpreted.  Labs Ordered and Resulted from Time of ED Arrival Up to the Time of Departure from the ED   CBC WITH PLATELETS - Abnormal; Notable for the following components:       Result Value    MCHC 31.2 (*)     All other components within normal limits   BASIC METABOLIC PANEL   CRP INFLAMMATION   PERIPHERAL IV CATHETER       RADIOLOGY:  Reviewed all pertinent imaging. Please see official radiology report.  US Lower Extremity Venous Duplex Left    (Results Pending)         I, Radha Moses, am serving as a scribe to document services personally performed by Milady Jacob PA-C based on my observation and the provider's statements to me. I, Milady Jacob PA-C attest that Radha Moses is acting in a scribe capacity, has observed my performance of the services and has documented them in accordance with my direction.     Milady Jacob PA-C  Emergency Medicine  [facility]  [facility] [facility] [id number] [phone]  Dept: [phone]     Milady Jacob PA-C  09/21/21 2909

## 2021-09-21 NOTE — ED TRIAGE NOTES
Pt was in the hospital for a left leg cellulitis Aug 21st thru 27th. Pt presents now with left lower leg redness, swelling and warmth to left lower leg. Pt denies n/v, no fever or chills

## 2021-09-21 NOTE — ED PROVIDER NOTES
"ED SIGNOUT  Date/Time:9/21/2021 2:08 PM    Patient signed out to me, Dr. Worrell, by my colleague, Milady Jacob PA-C.  Please see their note for complete history and physical. Plan to follow up on US Lower Extremity Venous Duplex Left and disposition.     The creation of this record is based on the scribe s observations of the work being performed by Tfif Canales and the provider s statements to them. It was created on their behalf by Tiff Caanles a trained medical scribe. This document has been checked and approved by the attending provider.      HPI  Misty Quezada is a 66 year old female with a pertinent medical history of hypertension, atrial fibrillation, prediabetes, and left leg cellulitis who presents to this ED by private vehicle for evaluation of left leg redness and swelling.    Patient reports that her left lower leg became more red and swollen today. She endorses associated pain to this area around a wound to the left lower leg.     REMAINING ED WORKUP:    Vitals:  /66   Pulse 75   Temp 98.4  F (36.9  C) (Oral)   Resp 16   Ht 1.702 m (5' 7\")   Wt 130.2 kg (287 lb)   SpO2 95%   BMI 44.95 kg/m        Pertinent labs results reviewed   Results for orders placed or performed during the hospital encounter of 09/21/21   US Lower Extremity Venous Duplex Left    Impression    IMPRESSION:    1.  No deep venous thrombosis in the left lower extremity.    2.  Nonspecific subcutaneous edema.   CBC (+ platelets, no diff)   Result Value Ref Range    WBC Count 8.0 4.0 - 11.0 10e3/uL    RBC Count 3.96 3.80 - 5.20 10e6/uL    Hemoglobin 11.9 11.7 - 15.7 g/dL    Hematocrit 38.2 35.0 - 47.0 %    MCV 97 78 - 100 fL    MCH 30.1 26.5 - 33.0 pg    MCHC 31.2 (L) 31.5 - 36.5 g/dL    RDW 14.6 10.0 - 15.0 %    Platelet Count 314 150 - 450 10e3/uL   Basic metabolic panel   Result Value Ref Range    Sodium 143 136 - 145 mmol/L    Potassium 3.4 (L) 3.5 - 5.0 mmol/L    Chloride 106 98 - 107 mmol/L    Carbon Dioxide (CO2) 27 " 22 - 31 mmol/L    Anion Gap 10 5 - 18 mmol/L    Urea Nitrogen 11 8 - 22 mg/dL    Creatinine 0.73 0.60 - 1.10 mg/dL    Calcium 9.3 8.5 - 10.5 mg/dL    Glucose 105 70 - 125 mg/dL    GFR Estimate 85 >60 mL/min/1.73m2   CRP inflammation   Result Value Ref Range    CRP 1.8 (H) 0.0-<0.8 mg/dL       Pertinent imaging reviewed   Please see official radiology report.  US Lower Extremity Venous Duplex Left   Final Result   IMPRESSION:      1.  No deep venous thrombosis in the left lower extremity.      2.  Nonspecific subcutaneous edema.           Interventions  Medications   clindamycin (CLEOCIN) infusion 900 mg (has no administration in time range)        ED Course/MDM:  2:08 PM Signout accepted from Milady Jacob PA-C. Prior records were reviewed.  Diagnostics from this visit are reviewed.  3:50 PM.  I introduced myself to the patient.  Returns without evidence of DVT.  On inspection patient with marked erythema in the stocking distribution on the left.  Moderately tender.  Consistent with likely early recurrent cellulitis.  Records were reviewed.  Patient with positive blood cultures for various staph organisms during recent hospitalization.  Had been treated with cephalexin.  However was sensitive to clindamycin.  We will proceed with clindamycin dosage here and then continued outpatient management.  Recurrent cellulitis.             1. Cellulitis of left leg          Current Discharge Medication List      START taking these medications    Details   clindamycin (CLEOCIN) 300 MG capsule Take 1 capsule (300 mg) by mouth 4 times daily for 10 days  Qty: 40 capsule, Refills: 0              Jonathon Worrell MD  09/21/21 8085

## 2021-09-27 ENCOUNTER — OFFICE VISIT (OUTPATIENT)
Dept: CARDIOLOGY | Facility: CLINIC | Age: 66
End: 2021-09-27
Payer: MEDICARE

## 2021-09-27 VITALS
HEIGHT: 67 IN | HEART RATE: 47 BPM | RESPIRATION RATE: 16 BRPM | SYSTOLIC BLOOD PRESSURE: 96 MMHG | BODY MASS INDEX: 44.95 KG/M2 | DIASTOLIC BLOOD PRESSURE: 74 MMHG

## 2021-09-27 DIAGNOSIS — R60.0 BILATERAL LOWER EXTREMITY EDEMA: ICD-10-CM

## 2021-09-27 DIAGNOSIS — I27.20 PULMONARY HYPERTENSION (H): ICD-10-CM

## 2021-09-27 DIAGNOSIS — I48.21 PERMANENT ATRIAL FIBRILLATION (H): Primary | ICD-10-CM

## 2021-09-27 DIAGNOSIS — G47.33 OSA (OBSTRUCTIVE SLEEP APNEA): ICD-10-CM

## 2021-09-27 DIAGNOSIS — I35.0 NONRHEUMATIC AORTIC VALVE STENOSIS: ICD-10-CM

## 2021-09-27 DIAGNOSIS — I07.1 TRICUSPID VALVE INSUFFICIENCY, UNSPECIFIED ETIOLOGY: ICD-10-CM

## 2021-09-27 PROCEDURE — 99214 OFFICE O/P EST MOD 30 MIN: CPT | Performed by: INTERNAL MEDICINE

## 2021-09-27 RX ORDER — NYSTATIN 100000 [USP'U]/G
POWDER TOPICAL
COMMUNITY
Start: 2021-09-15 | End: 2022-09-24

## 2021-09-27 RX ORDER — GABAPENTIN 100 MG/1
200 CAPSULE ORAL 3 TIMES DAILY
COMMUNITY
Start: 2021-09-15 | End: 2022-09-24

## 2021-09-27 RX ORDER — TRAZODONE HYDROCHLORIDE 50 MG/1
50 TABLET, FILM COATED ORAL AT BEDTIME
COMMUNITY
End: 2022-09-24

## 2021-09-27 RX ORDER — FUROSEMIDE 20 MG
20 TABLET ORAL DAILY
COMMUNITY
Start: 2021-09-15 | End: 2022-09-24

## 2021-09-27 NOTE — PROGRESS NOTES
[facility]   [address], [place] [address]   [email or web address]   OFFICE: [phone]          Thank you Tiff Ron for asking the [facility] team to participate in the care of your patient, Misty Quezada.     Impression and Plan     1. Atrial fibrillation (permanent). Misty has relatively recent diagnosis of atrial fibrillation. This was diagnosed within the last 6 months. Misty's [id number] Score is 3-4. %. She has been initiated on apixaban (Eliquis ) for CVA prophylaxis.  When initially roomed, heart rate was registered at 47 bpm.  On personal exam, however, heart rate actually seemed a bit on the faster side.  When she was ill in the hospital, her ventricular response was somewhat on the faster side.  Plan:    Continue metoprolol succinate.    Continue apixaban (Eliquis ) for CVA prophylaxis.    24-hour Holter monitor to reassess ventricular response and confirm adequate control.     2.  Aortic stenosis.  This was felt moderate in degree by echocardiogram 22 August though mean gradient only 14 mmHg with peak velocity of 2.6 m/s (mildly elevated). Calculated valve area, however, measured at 0.42 cm .  Dimensionless index 0.35.  Stroke-volume index 20.8 mL/m . Echocardiogram a couple months prior on 22 June 2021 revealed only mild aortic stenosis.  More advanced aortic stenosis is not suspected on clinical exam.  This will require continued intermittent monitoring.    Hypertension.  Blood pressure is very reasonable on exam at 96/74 mmHg.     3.  Tricuspid insufficiency pulmonary hypertension.  Echocardiogram 22 August 2021 demonstrated moderate to severe tricuspid insufficiency.  Pulmonary artery pressure documented at 40-45 mmHg plus right to pressure.  This is felt likely related to possible underlying sleep apnea/hypoventilation syndrome/obesity.  Outpatient sleep evaluation had been advised upon dismissal from hospital 27  August 2021.  This, however, does not appear to have been set up.  Plan:    Referral to Sleep Medicine    4.  Lower extremity edema.  Suspect that this is multifactorial in nature though primarily related to impeded venous return vis-à-vis significant central obesity.  As aforementioned, patient with suspected sleep disordered breathing/sleep apnea leading to some degree of pulmonary hypertension which likely is a contributor as well.  With the exception of her lower extremity edema, she otherwise appears fairly volume neutral on exam.  Specifically, jugular venous pressure appears normal and lungs are fairly clear.  Consequently, do not feel further augmentation and diuretic regimen is necessarily required.  Parenthetically, left lower extremity Doppler 21 September 2021 revealed no evidence of DVT.    Referral to Sleep Medicine as per problem #3.    Follow-up and further recommendations pending Holter monitor results.    35 minutes spent reviewing prior records (including documentation, laboratory studies, cardiac testing/imaging), interview with patient along with physical exam, planning, and subsequent documentation/crafting of note.           History of Present Illness    Once again I would like to thank you again for asking me to participate in the care of your patient, Misty Quezada.  As you know, but to reiterate for my own records, Misty Quezada is a 66 year old female with permanent atrial fibrillation. Patient indicates that this was identified somewhat incidentally. She states that she is unaware of the rhythm disturbance. She specifically denies subjective palpitations. She reports no lightheadedness. She also denies any chest pain, shortness of breath, or subjective decline in exercise tolerance from her usual baseline.  Denies fevers, chills, or other constitutional symptoms.    Approximately one month ago, patient had been admitted to the with evidence of sepsis/bacteremia.  Patient had left  lower extremity cellulitis and suspected bronchitis.    On interview today, Misty is without specific cardiovascular complaint.  She states her breathing is fairly comfortable and at her baseline.  Denies chest pain.  Reports no subjective palpitations despite permanent atrial fibrillation.  No lightheadedness.  She continues to have some redness involving the left lower extremity and is currently on antibiotics for continued treatment of her cellulitis.  There is bilateral lower extremity edema, though she states that this is been fairly stable.  Currently denies any fevers, chills, or other constitutional symptoms.    Further review of systems is otherwise negative/noncontributory (medical record and 13 point review of systems reviewed as well and pertinent positives noted).         Cardiac Diagnostics      Echocardiogram 22 August 2021:  1. Normal left ventricular size and systolic performance with ejection fraction of 55-60%.  2. Abnormal septal motion consistent with right ventricular volume/pressure overload.  3. Moderate aortic stenosis.  4. Mean gradient documented at 14 mmHg with peak velocity of 2.6 m/s.  5. Moderate-severe tricuspid insufficiency.  6. Moderate right ventricular enlargement with normal right ventricular systolic performance.  7. Mild to moderate left atrial enlargement.  Moderate to severe right atrial enlargement.  8. Right ventricular systolic pressure relative to right to pressure is mildly increased.  Pulmonary artery pressure estimated at 40-45 mmHg plus right atrial pressure.    Echocardiogram 22 June 2021:  1. Normal left ventricular size with hyperdynamic left ventricular systolic performance.  Ejection fraction 70-75%.  2. Mild aortic stenosis.  3. Moderate to severe tricuspid insufficiency.  4. Mild right ventricular enlargement with normal right ventricular systolic performance.  5. Mild left atrial enlargement.  Moderate right atrial enlargement.  6. Right ventricular systolic  "pressure relative to right to pressure is at the upper limits of normal.  Pulmonary artery pressure estimated at 30-35 mmHg plus right to pressure.    Echocardiogram 9 February 2017:  1. Normal left ventricular size and systolic performance with ejection fraction of 50-55%.  2. Mild concentric increased left ventricular wall thickness.  3. Moderate tricuspid insufficiency.  4. Mild left atrial enlargement. Moderate right atrial enlargement.    24-hour Holter monitor 18 December 2019:  1. Persistent atrial fibrillation with controlled ventricular response.    24-hour Holter monitor 15 March 2017:  1. Persistent atrial fibrillation with mildly elevated ventricular response.    Twelve-lead ECG (personally viewed) 8/21/2021: Atrial fibrillation with heart rate of 115 bpm.  Low voltage.            Physical Examination       BP 96/74 (BP Location: Right arm, Patient Position: Sitting, Cuff Size: Adult Large)   Pulse (!) 47   Resp 16   Ht 1.702 m (5' 7\")   BMI 44.95 kg/m          Wt Readings from Last 3 Encounters:   09/21/21 130.2 kg (287 lb)   08/27/21 129.9 kg (286 lb 6.4 oz)   05/26/21 128.8 kg (284 lb)       The patient is alert and oriented times three. Sclerae are anicteric. Mucosal membranes are moist. Jugular venous pressure is normal. No significant adenopathy/thyromegally appreciated. Lungs are fairly clear with somewhat diminished. On cardiovascular exam, the patient has an irregular S1 and S2.  There is a 2-6 systolic murmur heard somewhat in a sash-like distribution which is mid peaking.  Abdomen is soft and non-tender. Extremities reveal no clubbing, cyanosis, with bilateral lower extremity edema.  There is some erythema involving the left lower extremity.  Dressing/compressive wraps in place.         Imaging     Left lower extremity Dopplers 21 September 2021:  1. No deep venous thrombosis in the left lower extremity.  2. Nonspecific subcutaneous edema.    Chest radiograph 21 August 2021:  1. Shallow " inspiration.   2. Heart size is mildly enlarged.   3. Slight interstitial prominence most likely due to the shallow inspiration.   4. No convincing evidence for pulmonary edema.          Medications  Allergies   Current Outpatient Medications   Medication Sig Dispense Refill     acetaminophen (TYLENOL) 325 MG tablet Take 3 tablets (975 mg) by mouth 3 times daily 30 tablet 0     apixaban (ELIQUIS) 5 mg Tab tablet [APIXABAN (ELIQUIS) 5 MG TAB TABLET] Take 5 mg by mouth 2 (two) times a day.       CALCIUM/D3/MAG OX//TYRONE/ZN (CALTRATE + D3 PLUS MINERALS ORAL) [CALCIUM/D3/MAG OX//TYRONE/ZN (CALTRATE + D3 PLUS MINERALS ORAL)] Take 1 tablet by mouth daily.       cholecalciferol, vitamin D3, 2,000 unit Tab [CHOLECALCIFEROL, VITAMIN D3, 2,000 UNIT TAB] Take 2,000 Units by mouth daily.       clindamycin (CLEOCIN) 300 MG capsule Take 1 capsule (300 mg) by mouth 4 times daily for 10 days 40 capsule 0     cyanocobalamin 1000 MCG tablet [CYANOCOBALAMIN 1000 MCG TABLET] Take 1,000 mcg by mouth daily.       furosemide (LASIX) 20 MG tablet Take 20 mg by mouth daily       gabapentin (NEURONTIN) 100 MG capsule Take 200 mg by mouth 3 times daily       metoprolol succinate (TOPROL-XL) 25 MG [METOPROLOL SUCCINATE (TOPROL-XL) 25 MG] TAKE 1 TABLET BY MOUTH DAILY. 90 tablet 2     MULTIVIT WITH CALCIUM,IRON,MIN (WOMEN'S DAILY MULTIVITAMIN ORAL) [MULTIVIT WITH CALCIUM,IRON,MIN (WOMEN'S DAILY MULTIVITAMIN ORAL)] Take 1 tablet by mouth daily.       nystatin (MYCOSTATIN) 260316 UNIT/GM external powder Apply under breasts once daily as needed until clear       olmesartan-hydrochlorothiazide (BENICAR HCT) 20-12.5 MG tablet Take 1 tablet by mouth daily       traZODone (DESYREL) 50 MG tablet Take 50 mg by mouth At Bedtime       vitamin C 1000 MG TABS tablet Take 1 tablet (1,000 mg) by mouth daily 30 tablet 0     No Known Allergies       Lab Results    Chemistry/lipid CBC Cardiac Enzymes/BNP/TSH/INR   Recent Labs   Lab Test 09/09/16  1412   CHOL  163   HDL 42*      TRIG 81     Recent Labs   Lab Test 09/09/16  1412 10/10/14  1207    118     Recent Labs   Lab Test 09/21/21  1346      POTASSIUM 3.4*   CHLORIDE 106   CO2 27      BUN 11   CR 0.73   GFRESTIMATED 85   TURNER 9.3     Recent Labs   Lab Test 09/21/21  1346 08/25/21  0425 08/23/21  0604   CR 0.73 0.61 0.70     No results for input(s): A1C in the last 15828 hours.       Recent Labs   Lab Test 09/21/21  1346   WBC 8.0   HGB 11.9   HCT 38.2   MCV 97        Recent Labs   Lab Test 09/21/21  1346 08/25/21  0425 08/24/21  0428   HGB 11.9 11.8 11.3*    Recent Labs   Lab Test 08/21/21  1944 08/21/21  1426 08/21/21  0820   TROPONINI 1.32* 1.87* 1.75*     Recent Labs   Lab Test 08/21/21  0820   *     No results for input(s): TSH in the last 69284 hours.  Recent Labs   Lab Test 08/21/21  0820   INR 1.44*        Medical History  Surgical History Family History Social History   Past Medical History:   Diagnosis Date     Benign essential hypertension      Chronic atrial fibrillation (H)      Past Surgical History:   Procedure Laterality Date     APPENDECTOMY       CHOLECYSTECTOMY       GASTRIC BYPASS       Family History   Problem Relation Age of Onset     No Known Problems Mother      No Known Problems Father         Social History     Socioeconomic History     Marital status:      Spouse name: Not on file     Number of children: Not on file     Years of education: Not on file     Highest education level: Not on file   Occupational History     Not on file   Tobacco Use     Smoking status: Never Smoker     Smokeless tobacco: Never Used   Substance and Sexual Activity     Alcohol use: Not Currently     Comment: Rarely Uses alcohol     Drug use: Never     Sexual activity: Not on file   Other Topics Concern     Not on file   Social History Narrative     Not on file     Social Determinants of Health     Financial Resource Strain:      Difficulty of Paying Living Expenses:     Food Insecurity:      Worried About Running Out of Food in the Last Year:      Ran Out of Food in the Last Year:    Transportation Needs:      Lack of Transportation (Medical):      Lack of Transportation (Non-Medical):    Physical Activity:      Days of Exercise per Week:      Minutes of Exercise per Session:    Stress:      Feeling of Stress :    Social Connections:      Frequency of Communication with Friends and Family:      Frequency of Social Gatherings with Friends and Family:      Attends Sabian[employer] Services:      Active Member of Clubs or Organizations:      Attends Club or Organization Meetings:      Marital Status:    Intimate Partner Violence:      Fear of Current or Ex-Partner:      Emotionally Abused:      Physically Abused:      Sexually Abused:

## 2021-09-27 NOTE — LETTER
9/27/2021    Tiff Chavez, NP  [address]  [facility] [address]    RE: Misty A Damien       Dear Colleague,    I had the pleasure of seeing Misty Quezada in the [facility].           [facility]   [address], [place] [address]   [email or web address]   OFFICE: [phone]          Thank you Tiff Ron for asking the [facility] team to participate in the care of your patient, Misty Quezada.     Impression and Plan     1. Atrial fibrillation (permanent). Misty has relatively recent diagnosis of atrial fibrillation. This was diagnosed within the last 6 months. Misty's [id number] Score is 3-4. %. She has been initiated on apixaban (Eliquis ) for CVA prophylaxis.  When initially roomed, heart rate was registered at 47 bpm.  On personal exam, however, heart rate actually seemed a bit on the faster side.  When she was ill in the hospital, her ventricular response was somewhat on the faster side.  Plan:    Continue metoprolol succinate.    Continue apixaban (Eliquis ) for CVA prophylaxis.    24-hour Holter monitor to reassess ventricular response and confirm adequate control.     2.  Aortic stenosis.  This was felt moderate in degree by echocardiogram 22 August though mean gradient only 14 mmHg with peak velocity of 2.6 m/s (mildly elevated). Calculated valve area, however, measured at 0.42 cm .  Dimensionless index 0.35.  Stroke-volume index 20.8 mL/m . Echocardiogram a couple months prior on 22 June 2021 revealed only mild aortic stenosis.  More advanced aortic stenosis is not suspected on clinical exam.  This will require continued intermittent monitoring.    Hypertension.  Blood pressure is very reasonable on exam at 96/74 mmHg.     3.  Tricuspid insufficiency pulmonary hypertension.  Echocardiogram 22 August 2021 demonstrated moderate to severe tricuspid insufficiency.   Pulmonary artery pressure documented at 40-45 mmHg plus right to pressure.  This is felt likely related to possible underlying sleep apnea/hypoventilation syndrome/obesity.  Outpatient sleep evaluation had been advised upon dismissal from hospital 27 August 2021.  This, however, does not appear to have been set up.  Plan:    Referral to Sleep Medicine    4.  Lower extremity edema.  Suspect that this is multifactorial in nature though primarily related to impeded venous return vis-à-vis significant central obesity.  As aforementioned, patient with suspected sleep disordered breathing/sleep apnea leading to some degree of pulmonary hypertension which likely is a contributor as well.  With the exception of her lower extremity edema, she otherwise appears fairly volume neutral on exam.  Specifically, jugular venous pressure appears normal and lungs are fairly clear.  Consequently, do not feel further augmentation and diuretic regimen is necessarily required.  Parenthetically, left lower extremity Doppler 21 September 2021 revealed no evidence of DVT.    Referral to Sleep Medicine as per problem #3.    Follow-up and further recommendations pending Holter monitor results.    35 minutes spent reviewing prior records (including documentation, laboratory studies, cardiac testing/imaging), interview with patient along with physical exam, planning, and subsequent documentation/crafting of note.           History of Present Illness    Once again I would like to thank you again for asking me to participate in the care of your patient, Misty Quezada.  As you know, but to reiterate for my own records, Misty Quezada is a 66 year old female with permanent atrial fibrillation. Patient indicates that this was identified somewhat incidentally. She states that she is unaware of the rhythm disturbance. She specifically denies subjective palpitations. She reports no lightheadedness. She also denies any chest pain, shortness of breath,  or subjective decline in exercise tolerance from her usual baseline.  Denies fevers, chills, or other constitutional symptoms.    Approximately one month ago, patient had been admitted to the with evidence of sepsis/bacteremia.  Patient had left lower extremity cellulitis and suspected bronchitis.    On interview today, Misty is without specific cardiovascular complaint.  She states her breathing is fairly comfortable and at her baseline.  Denies chest pain.  Reports no subjective palpitations despite permanent atrial fibrillation.  No lightheadedness.  She continues to have some redness involving the left lower extremity and is currently on antibiotics for continued treatment of her cellulitis.  There is bilateral lower extremity edema, though she states that this is been fairly stable.  Currently denies any fevers, chills, or other constitutional symptoms.    Further review of systems is otherwise negative/noncontributory (medical record and 13 point review of systems reviewed as well and pertinent positives noted).         Cardiac Diagnostics      Echocardiogram 22 August 2021:  1. Normal left ventricular size and systolic performance with ejection fraction of 55-60%.  2. Abnormal septal motion consistent with right ventricular volume/pressure overload.  3. Moderate aortic stenosis.  4. Mean gradient documented at 14 mmHg with peak velocity of 2.6 m/s.  5. Moderate-severe tricuspid insufficiency.  6. Moderate right ventricular enlargement with normal right ventricular systolic performance.  7. Mild to moderate left atrial enlargement.  Moderate to severe right atrial enlargement.  8. Right ventricular systolic pressure relative to right to pressure is mildly increased.  Pulmonary artery pressure estimated at 40-45 mmHg plus right atrial pressure.    Echocardiogram 22 June 2021:  1. Normal left ventricular size with hyperdynamic left ventricular systolic performance.  Ejection fraction 70-75%.  2. Mild aortic  "stenosis.  3. Moderate to severe tricuspid insufficiency.  4. Mild right ventricular enlargement with normal right ventricular systolic performance.  5. Mild left atrial enlargement.  Moderate right atrial enlargement.  6. Right ventricular systolic pressure relative to right to pressure is at the upper limits of normal.  Pulmonary artery pressure estimated at 30-35 mmHg plus right to pressure.    Echocardiogram 9 February 2017:  1. Normal left ventricular size and systolic performance with ejection fraction of 50-55%.  2. Mild concentric increased left ventricular wall thickness.  3. Moderate tricuspid insufficiency.  4. Mild left atrial enlargement. Moderate right atrial enlargement.    24-hour Holter monitor 18 December 2019:  1. Persistent atrial fibrillation with controlled ventricular response.    24-hour Holter monitor 15 March 2017:  1. Persistent atrial fibrillation with mildly elevated ventricular response.    Twelve-lead ECG (personally viewed) 8/21/2021: Atrial fibrillation with heart rate of 115 bpm.  Low voltage.            Physical Examination       BP 96/74 (BP Location: Right arm, Patient Position: Sitting, Cuff Size: Adult Large)   Pulse (!) 47   Resp 16   Ht 1.702 m (5' 7\")   BMI 44.95 kg/m          Wt Readings from Last 3 Encounters:   09/21/21 130.2 kg (287 lb)   08/27/21 129.9 kg (286 lb 6.4 oz)   05/26/21 128.8 kg (284 lb)       The patient is alert and oriented times three. Sclerae are anicteric. Mucosal membranes are moist. Jugular venous pressure is normal. No significant adenopathy/thyromegally appreciated. Lungs are fairly clear with somewhat diminished. On cardiovascular exam, the patient has an irregular S1 and S2.  There is a 2-6 systolic murmur heard somewhat in a sash-like distribution which is mid peaking.  Abdomen is soft and non-tender. Extremities reveal no clubbing, cyanosis, with bilateral lower extremity edema.  There is some erythema involving the left lower extremity.  " Dressing/compressive wraps in place.         Imaging     Left lower extremity Dopplers 21 September 2021:  1. No deep venous thrombosis in the left lower extremity.  2. Nonspecific subcutaneous edema.    Chest radiograph 21 August 2021:  1. Shallow inspiration.   2. Heart size is mildly enlarged.   3. Slight interstitial prominence most likely due to the shallow inspiration.   4. No convincing evidence for pulmonary edema.          Medications  Allergies   Current Outpatient Medications   Medication Sig Dispense Refill     acetaminophen (TYLENOL) 325 MG tablet Take 3 tablets (975 mg) by mouth 3 times daily 30 tablet 0     apixaban (ELIQUIS) 5 mg Tab tablet [APIXABAN (ELIQUIS) 5 MG TAB TABLET] Take 5 mg by mouth 2 (two) times a day.       CALCIUM/D3/MAG OX//TYRONE/ZN (CALTRATE + D3 PLUS MINERALS ORAL) [CALCIUM/D3/MAG OX//TYRONE/ZN (CALTRATE + D3 PLUS MINERALS ORAL)] Take 1 tablet by mouth daily.       cholecalciferol, vitamin D3, 2,000 unit Tab [CHOLECALCIFEROL, VITAMIN D3, 2,000 UNIT TAB] Take 2,000 Units by mouth daily.       clindamycin (CLEOCIN) 300 MG capsule Take 1 capsule (300 mg) by mouth 4 times daily for 10 days 40 capsule 0     cyanocobalamin 1000 MCG tablet [CYANOCOBALAMIN 1000 MCG TABLET] Take 1,000 mcg by mouth daily.       furosemide (LASIX) 20 MG tablet Take 20 mg by mouth daily       gabapentin (NEURONTIN) 100 MG capsule Take 200 mg by mouth 3 times daily       metoprolol succinate (TOPROL-XL) 25 MG [METOPROLOL SUCCINATE (TOPROL-XL) 25 MG] TAKE 1 TABLET BY MOUTH DAILY. 90 tablet 2     MULTIVIT WITH CALCIUM,IRON,MIN (WOMEN'S DAILY MULTIVITAMIN ORAL) [MULTIVIT WITH CALCIUM,IRON,MIN (WOMEN'S DAILY MULTIVITAMIN ORAL)] Take 1 tablet by mouth daily.       nystatin (MYCOSTATIN) 846743 UNIT/GM external powder Apply under breasts once daily as needed until clear       olmesartan-hydrochlorothiazide (BENICAR HCT) 20-12.5 MG tablet Take 1 tablet by mouth daily       traZODone (DESYREL) 50 MG tablet Take 50 mg  by mouth At Bedtime       vitamin C 1000 MG TABS tablet Take 1 tablet (1,000 mg) by mouth daily 30 tablet 0     No Known Allergies       Lab Results    Chemistry/lipid CBC Cardiac Enzymes/BNP/TSH/INR   Recent Labs   Lab Test 09/09/16  1412   CHOL 163   HDL 42*      TRIG 81     Recent Labs   Lab Test 09/09/16  1412 10/10/14  1207    118     Recent Labs   Lab Test 09/21/21  1346      POTASSIUM 3.4*   CHLORIDE 106   CO2 27      BUN 11   CR 0.73   GFRESTIMATED 85   TURNER 9.3     Recent Labs   Lab Test 09/21/21  1346 08/25/21  0425 08/23/21  0604   CR 0.73 0.61 0.70     No results for input(s): A1C in the last 85262 hours.       Recent Labs   Lab Test 09/21/21  1346   WBC 8.0   HGB 11.9   HCT 38.2   MCV 97        Recent Labs   Lab Test 09/21/21  1346 08/25/21  0425 08/24/21  0428   HGB 11.9 11.8 11.3*    Recent Labs   Lab Test 08/21/21  1944 08/21/21  1426 08/21/21  0820   TROPONINI 1.32* 1.87* 1.75*     Recent Labs   Lab Test 08/21/21  0820   *     No results for input(s): TSH in the last 63426 hours.  Recent Labs   Lab Test 08/21/21  0820   INR 1.44*        Medical History  Surgical History Family History Social History   Past Medical History:   Diagnosis Date     Benign essential hypertension      Chronic atrial fibrillation (H)      Past Surgical History:   Procedure Laterality Date     APPENDECTOMY       CHOLECYSTECTOMY       GASTRIC BYPASS       Family History   Problem Relation Age of Onset     No Known Problems Mother      No Known Problems Father         Social History     Socioeconomic History     Marital status:      Spouse name: Not on file     Number of children: Not on file     Years of education: Not on file     Highest education level: Not on file   Occupational History     Not on file   Tobacco Use     Smoking status: Never Smoker     Smokeless tobacco: Never Used   Substance and Sexual Activity     Alcohol use: Not Currently     Comment: Rarely Uses alcohol      Drug use: Never     Sexual activity: Not on file   Other Topics Concern     Not on file   Social History Narrative     Not on file     Social Determinants of Health     Financial Resource Strain:      Difficulty of Paying Living Expenses:    Food Insecurity:      Worried About Running Out of Food in the Last Year:      Ran Out of Food in the Last Year:    Transportation Needs:      Lack of Transportation (Medical):      Lack of Transportation (Non-Medical):    Physical Activity:      Days of Exercise per Week:      Minutes of Exercise per Session:    Stress:      Feeling of Stress :    Social Connections:      Frequency of Communication with Friends and Family:      Frequency of Social Gatherings with Friends and Family:      Attends Gnosticism Services:      Active Member of Clubs or Organizations:      Attends Club or Organization Meetings:      Marital Status:    Intimate Partner Violence:      Fear of Current or Ex-Partner:      Emotionally Abused:      Physically Abused:      Sexually Abused:                       Thank you for allowing me to participate in the care of your patient.      Sincerely,     Adia Rocha MD     [facility]  cc:   Tiff Chavez, NP  [address] [place] [address]

## 2021-10-04 ENCOUNTER — HOSPITAL ENCOUNTER (OUTPATIENT)
Dept: CARDIOLOGY | Facility: HOSPITAL | Age: 66
Discharge: HOME | End: 2021-10-04
Attending: INTERNAL MEDICINE | Admitting: INTERNAL MEDICINE
Payer: MEDICARE

## 2021-10-04 DIAGNOSIS — I48.21 PERMANENT ATRIAL FIBRILLATION (H): ICD-10-CM

## 2021-10-04 PROCEDURE — 93227 XTRNL ECG REC<48 HR R&I: CPT | Performed by: INTERNAL MEDICINE

## 2021-10-04 PROCEDURE — 93225 XTRNL ECG REC<48 HRS REC: CPT

## 2021-10-07 DIAGNOSIS — I48.21 PERMANENT ATRIAL FIBRILLATION (H): Primary | ICD-10-CM

## 2022-01-24 ENCOUNTER — OFFICE VISIT (OUTPATIENT)
Dept: FAMILY MEDICINE | Facility: CLINIC | Age: 67
End: 2022-01-24
Payer: MEDICARE

## 2022-01-24 VITALS
HEART RATE: 115 BPM | SYSTOLIC BLOOD PRESSURE: 160 MMHG | DIASTOLIC BLOOD PRESSURE: 100 MMHG | TEMPERATURE: 98.2 F | RESPIRATION RATE: 24 BRPM | OXYGEN SATURATION: 94 %

## 2022-01-24 DIAGNOSIS — H10.33 ACUTE CONJUNCTIVITIS OF BOTH EYES, UNSPECIFIED ACUTE CONJUNCTIVITIS TYPE: Primary | ICD-10-CM

## 2022-01-24 PROCEDURE — 99203 OFFICE O/P NEW LOW 30 MIN: CPT | Performed by: FAMILY MEDICINE

## 2022-01-24 RX ORDER — TOBRAMYCIN AND DEXAMETHASONE 3; 1 MG/ML; MG/ML
1-2 SUSPENSION/ DROPS OPHTHALMIC EVERY 4 HOURS
Qty: 10 ML | Refills: 1 | Status: SHIPPED | OUTPATIENT
Start: 2022-01-24 | End: 2022-09-24

## 2022-01-24 NOTE — PROGRESS NOTES
Pt is a 66 year old female brought by  who presents with concerns about possible conjunctivitis.  There has been 2days of left conjunctival injection and green discharge.  There is no pain, photophobia, blurriness or diplopia.  Additional symptoms include none.    Obj:  BP (!) 160/100 (BP Location: Right arm, Patient Position: Sitting, Cuff Size: Adult Large)   Pulse 115   Temp 98.2  F (36.8  C)   Resp 24   SpO2 94%   Gen:  WDWN, alert, NAD  Head:  Normocephalic, atraumatic  Eyes:  EOMI, PERRLA, left conjunctival injection with green discharge noted  Ears:  TM's clear bilaterally  Nose:  normal  Mouth: MMM without lesions  Pharynx:  Not inflamed, no exudate  Neck:  supple  Lungs:  clear  Heart:  Regular rate and rhythm without murmers.    (H10.33) Acute conjunctivitis of left eyes, unspecified acute conjunctivitis type  (primary encounter diagnosis)  Comment:   Plan: tobramycin-dexamethasone (TOBRADEX) 0.3-0.1 %         ophthalmic suspension        Use bilaterally to prevent spread.     .

## 2022-09-24 ENCOUNTER — APPOINTMENT (OUTPATIENT)
Dept: PHYSICAL THERAPY | Facility: HOSPITAL | Age: 67
DRG: 280 | End: 2022-09-24
Attending: FAMILY MEDICINE
Payer: MEDICARE

## 2022-09-24 ENCOUNTER — APPOINTMENT (OUTPATIENT)
Dept: CARDIOLOGY | Facility: HOSPITAL | Age: 67
DRG: 280 | End: 2022-09-24
Attending: FAMILY MEDICINE
Payer: MEDICARE

## 2022-09-24 ENCOUNTER — HOSPITAL ENCOUNTER (INPATIENT)
Facility: HOSPITAL | Age: 67
LOS: 13 days | Discharge: NURSING, REHAB OR LONG-TERM CARE | DRG: 280 | End: 2022-10-07
Attending: EMERGENCY MEDICINE | Admitting: FAMILY MEDICINE
Payer: MEDICARE

## 2022-09-24 ENCOUNTER — APPOINTMENT (OUTPATIENT)
Dept: RADIOLOGY | Facility: HOSPITAL | Age: 67
DRG: 280 | End: 2022-09-24
Attending: EMERGENCY MEDICINE
Payer: MEDICARE

## 2022-09-24 DIAGNOSIS — J81.0 ACUTE PULMONARY EDEMA (H): ICD-10-CM

## 2022-09-24 DIAGNOSIS — I89.0 LYMPHEDEMA: ICD-10-CM

## 2022-09-24 DIAGNOSIS — R60.0 BILATERAL LOWER EXTREMITY EDEMA: ICD-10-CM

## 2022-09-24 DIAGNOSIS — Z79.01 ANTICOAGULATED BY ANTICOAGULATION TREATMENT: ICD-10-CM

## 2022-09-24 DIAGNOSIS — I48.20 CHRONIC ATRIAL FIBRILLATION (H): ICD-10-CM

## 2022-09-24 DIAGNOSIS — E66.01 MORBID OBESITY (H): ICD-10-CM

## 2022-09-24 DIAGNOSIS — Z86.39 HISTORY OF DIABETES MELLITUS: ICD-10-CM

## 2022-09-24 LAB
ANION GAP SERPL CALCULATED.3IONS-SCNC: 12 MMOL/L (ref 7–15)
BASOPHILS # BLD AUTO: 0 10E3/UL (ref 0–0.2)
BASOPHILS NFR BLD AUTO: 0 %
BUN SERPL-MCNC: 19.2 MG/DL (ref 8–23)
CALCIUM SERPL-MCNC: 9 MG/DL (ref 8.8–10.2)
CHLORIDE SERPL-SCNC: 105 MMOL/L (ref 98–107)
CK SERPL-CCNC: 97 U/L (ref 26–192)
CREAT SERPL-MCNC: 0.68 MG/DL (ref 0.51–0.95)
DEPRECATED HCO3 PLAS-SCNC: 24 MMOL/L (ref 22–29)
EOSINOPHIL # BLD AUTO: 0.1 10E3/UL (ref 0–0.7)
EOSINOPHIL NFR BLD AUTO: 1 %
ERYTHROCYTE [DISTWIDTH] IN BLOOD BY AUTOMATED COUNT: 18.4 % (ref 10–15)
GFR SERPL CREATININE-BSD FRML MDRD: >90 ML/MIN/1.73M2
GLUCOSE BLDC GLUCOMTR-MCNC: 126 MG/DL (ref 70–99)
GLUCOSE SERPL-MCNC: 98 MG/DL (ref 70–99)
HCT VFR BLD AUTO: 34.9 % (ref 35–47)
HGB BLD-MCNC: 10.6 G/DL (ref 11.7–15.7)
IMM GRANULOCYTES # BLD: 0 10E3/UL
IMM GRANULOCYTES NFR BLD: 0 %
LVEF ECHO: NORMAL
LYMPHOCYTES # BLD AUTO: 1 10E3/UL (ref 0.8–5.3)
LYMPHOCYTES NFR BLD AUTO: 18 %
MAGNESIUM SERPL-MCNC: 1.7 MG/DL (ref 1.7–2.3)
MCH RBC QN AUTO: 27.5 PG (ref 26.5–33)
MCHC RBC AUTO-ENTMCNC: 30.4 G/DL (ref 31.5–36.5)
MCV RBC AUTO: 90 FL (ref 78–100)
MONOCYTES # BLD AUTO: 0.9 10E3/UL (ref 0–1.3)
MONOCYTES NFR BLD AUTO: 15 %
NEUTROPHILS # BLD AUTO: 3.9 10E3/UL (ref 1.6–8.3)
NEUTROPHILS NFR BLD AUTO: 66 %
NRBC # BLD AUTO: 0 10E3/UL
NRBC BLD AUTO-RTO: 0 /100
NT-PROBNP SERPL-MCNC: 1898 PG/ML (ref 0–125)
PLATELET # BLD AUTO: 295 10E3/UL (ref 150–450)
POTASSIUM SERPL-SCNC: 5.2 MMOL/L (ref 3.4–5.3)
PROCALCITONIN SERPL IA-MCNC: 0.06 NG/ML
RBC # BLD AUTO: 3.86 10E6/UL (ref 3.8–5.2)
SARS-COV-2 RNA RESP QL NAA+PROBE: NEGATIVE
SODIUM SERPL-SCNC: 141 MMOL/L (ref 136–145)
TROPONIN T SERPL HS-MCNC: 17 NG/L
TROPONIN T SERPL HS-MCNC: 17 NG/L
TROPONIN T SERPL HS-MCNC: 18 NG/L
WBC # BLD AUTO: 5.8 10E3/UL (ref 4–11)

## 2022-09-24 PROCEDURE — C9803 HOPD COVID-19 SPEC COLLECT: HCPCS

## 2022-09-24 PROCEDURE — 99223 1ST HOSP IP/OBS HIGH 75: CPT | Performed by: FAMILY MEDICINE

## 2022-09-24 PROCEDURE — 93005 ELECTROCARDIOGRAM TRACING: CPT | Performed by: EMERGENCY MEDICINE

## 2022-09-24 PROCEDURE — 83735 ASSAY OF MAGNESIUM: CPT | Performed by: FAMILY MEDICINE

## 2022-09-24 PROCEDURE — 210N000001 HC R&B IMCU HEART CARE

## 2022-09-24 PROCEDURE — 96374 THER/PROPH/DIAG INJ IV PUSH: CPT | Mod: 59

## 2022-09-24 PROCEDURE — 99285 EMERGENCY DEPT VISIT HI MDM: CPT | Mod: 25

## 2022-09-24 PROCEDURE — 97535 SELF CARE MNGMENT TRAINING: CPT | Mod: GP | Performed by: PHYSICAL THERAPIST

## 2022-09-24 PROCEDURE — 93306 TTE W/DOPPLER COMPLETE: CPT | Mod: 26 | Performed by: GENERAL ACUTE CARE HOSPITAL

## 2022-09-24 PROCEDURE — 71045 X-RAY EXAM CHEST 1 VIEW: CPT

## 2022-09-24 PROCEDURE — 85025 COMPLETE CBC W/AUTO DIFF WBC: CPT | Performed by: EMERGENCY MEDICINE

## 2022-09-24 PROCEDURE — U0003 INFECTIOUS AGENT DETECTION BY NUCLEIC ACID (DNA OR RNA); SEVERE ACUTE RESPIRATORY SYNDROME CORONAVIRUS 2 (SARS-COV-2) (CORONAVIRUS DISEASE [COVID-19]), AMPLIFIED PROBE TECHNIQUE, MAKING USE OF HIGH THROUGHPUT TECHNOLOGIES AS DESCRIBED BY CMS-2020-01-R: HCPCS | Performed by: EMERGENCY MEDICINE

## 2022-09-24 PROCEDURE — 36415 COLL VENOUS BLD VENIPUNCTURE: CPT | Performed by: EMERGENCY MEDICINE

## 2022-09-24 PROCEDURE — 84484 ASSAY OF TROPONIN QUANT: CPT | Performed by: FAMILY MEDICINE

## 2022-09-24 PROCEDURE — 84145 PROCALCITONIN (PCT): CPT | Performed by: EMERGENCY MEDICINE

## 2022-09-24 PROCEDURE — 36415 COLL VENOUS BLD VENIPUNCTURE: CPT | Performed by: FAMILY MEDICINE

## 2022-09-24 PROCEDURE — 80048 BASIC METABOLIC PNL TOTAL CA: CPT | Performed by: EMERGENCY MEDICINE

## 2022-09-24 PROCEDURE — 97162 PT EVAL MOD COMPLEX 30 MIN: CPT | Mod: GP | Performed by: PHYSICAL THERAPIST

## 2022-09-24 PROCEDURE — 250N000013 HC RX MED GY IP 250 OP 250 PS 637: Performed by: FAMILY MEDICINE

## 2022-09-24 PROCEDURE — 250N000013 HC RX MED GY IP 250 OP 250 PS 637: Performed by: INTERNAL MEDICINE

## 2022-09-24 PROCEDURE — 83880 ASSAY OF NATRIURETIC PEPTIDE: CPT | Performed by: EMERGENCY MEDICINE

## 2022-09-24 PROCEDURE — 250N000011 HC RX IP 250 OP 636: Performed by: EMERGENCY MEDICINE

## 2022-09-24 PROCEDURE — 82550 ASSAY OF CK (CPK): CPT | Performed by: EMERGENCY MEDICINE

## 2022-09-24 PROCEDURE — 255N000002 HC RX 255 OP 636: Performed by: FAMILY MEDICINE

## 2022-09-24 PROCEDURE — 84484 ASSAY OF TROPONIN QUANT: CPT | Performed by: EMERGENCY MEDICINE

## 2022-09-24 RX ORDER — PROCHLORPERAZINE MALEATE 5 MG
5 TABLET ORAL EVERY 6 HOURS PRN
Status: DISCONTINUED | OUTPATIENT
Start: 2022-09-24 | End: 2022-10-07 | Stop reason: HOSPADM

## 2022-09-24 RX ORDER — LANOLIN ALCOHOL/MO/W.PET/CERES
1000 CREAM (GRAM) TOPICAL DAILY
Status: DISCONTINUED | OUTPATIENT
Start: 2022-09-24 | End: 2022-10-07 | Stop reason: HOSPADM

## 2022-09-24 RX ORDER — ACETAMINOPHEN 325 MG/1
975 TABLET ORAL EVERY 4 HOURS PRN
COMMUNITY
End: 2024-04-23

## 2022-09-24 RX ORDER — POLYETHYLENE GLYCOL 3350 17 G/17G
17 POWDER, FOR SOLUTION ORAL DAILY
Status: DISCONTINUED | OUTPATIENT
Start: 2022-09-24 | End: 2022-10-03

## 2022-09-24 RX ORDER — LOSARTAN POTASSIUM 25 MG/1
25 TABLET ORAL DAILY
Status: DISCONTINUED | OUTPATIENT
Start: 2022-09-24 | End: 2022-10-07 | Stop reason: HOSPADM

## 2022-09-24 RX ORDER — BISACODYL 10 MG
10 SUPPOSITORY, RECTAL RECTAL DAILY PRN
Status: DISCONTINUED | OUTPATIENT
Start: 2022-09-24 | End: 2022-10-07 | Stop reason: HOSPADM

## 2022-09-24 RX ORDER — METOPROLOL SUCCINATE 25 MG/1
25 TABLET, EXTENDED RELEASE ORAL DAILY
Status: DISCONTINUED | OUTPATIENT
Start: 2022-09-24 | End: 2022-10-07 | Stop reason: HOSPADM

## 2022-09-24 RX ORDER — ONDANSETRON 2 MG/ML
4 INJECTION INTRAMUSCULAR; INTRAVENOUS EVERY 6 HOURS PRN
Status: DISCONTINUED | OUTPATIENT
Start: 2022-09-24 | End: 2022-10-07 | Stop reason: HOSPADM

## 2022-09-24 RX ORDER — ACETAMINOPHEN 325 MG/1
975 TABLET ORAL EVERY 4 HOURS PRN
Status: DISCONTINUED | OUTPATIENT
Start: 2022-09-24 | End: 2022-10-07 | Stop reason: HOSPADM

## 2022-09-24 RX ORDER — CITALOPRAM HYDROBROMIDE 10 MG/1
20 TABLET ORAL DAILY
Status: DISCONTINUED | OUTPATIENT
Start: 2022-09-24 | End: 2022-10-07 | Stop reason: HOSPADM

## 2022-09-24 RX ORDER — CITALOPRAM HYDROBROMIDE 10 MG/1
20 TABLET ORAL DAILY
COMMUNITY
End: 2024-04-23

## 2022-09-24 RX ORDER — VITAMIN B COMPLEX
2000 TABLET ORAL DAILY
Status: DISCONTINUED | OUTPATIENT
Start: 2022-09-24 | End: 2022-09-26

## 2022-09-24 RX ORDER — ONDANSETRON 4 MG/1
4 TABLET, ORALLY DISINTEGRATING ORAL EVERY 6 HOURS PRN
Status: DISCONTINUED | OUTPATIENT
Start: 2022-09-24 | End: 2022-10-07 | Stop reason: HOSPADM

## 2022-09-24 RX ORDER — LIDOCAINE 40 MG/G
CREAM TOPICAL
Status: DISCONTINUED | OUTPATIENT
Start: 2022-09-24 | End: 2022-10-07 | Stop reason: HOSPADM

## 2022-09-24 RX ORDER — CITALOPRAM HYDROBROMIDE 20 MG/1
20 TABLET ORAL DAILY
Status: DISCONTINUED | OUTPATIENT
Start: 2022-09-25 | End: 2022-09-24

## 2022-09-24 RX ORDER — FUROSEMIDE 10 MG/ML
40 INJECTION INTRAMUSCULAR; INTRAVENOUS EVERY 12 HOURS
Status: DISCONTINUED | OUTPATIENT
Start: 2022-09-24 | End: 2022-09-25 | Stop reason: DRUGHIGH

## 2022-09-24 RX ORDER — FUROSEMIDE 10 MG/ML
40 INJECTION INTRAMUSCULAR; INTRAVENOUS ONCE
Status: COMPLETED | OUTPATIENT
Start: 2022-09-24 | End: 2022-09-24

## 2022-09-24 RX ORDER — PROCHLORPERAZINE 25 MG
12.5 SUPPOSITORY, RECTAL RECTAL EVERY 12 HOURS PRN
Status: DISCONTINUED | OUTPATIENT
Start: 2022-09-24 | End: 2022-10-07 | Stop reason: HOSPADM

## 2022-09-24 RX ORDER — MULTIPLE VITAMINS W/ MINERALS TAB 9MG-400MCG
TAB ORAL DAILY
Status: DISCONTINUED | OUTPATIENT
Start: 2022-09-24 | End: 2022-10-07 | Stop reason: HOSPADM

## 2022-09-24 RX ORDER — MULTIVIT WITH MINERALS/LUTEIN
1000 TABLET ORAL DAILY
Status: DISCONTINUED | OUTPATIENT
Start: 2022-09-24 | End: 2022-09-26

## 2022-09-24 RX ADMIN — Medication 1 TABLET: at 14:25

## 2022-09-24 RX ADMIN — APIXABAN 5 MG: 5 TABLET, FILM COATED ORAL at 20:23

## 2022-09-24 RX ADMIN — CITALOPRAM HYDROBROMIDE 20 MG: 20 TABLET ORAL at 14:30

## 2022-09-24 RX ADMIN — Medication 1000 MCG: at 14:29

## 2022-09-24 RX ADMIN — Medication 2000 UNITS: at 14:25

## 2022-09-24 RX ADMIN — LOSARTAN POTASSIUM 25 MG: 25 TABLET, FILM COATED ORAL at 14:29

## 2022-09-24 RX ADMIN — METOPROLOL SUCCINATE 25 MG: 25 TABLET, EXTENDED RELEASE ORAL at 14:24

## 2022-09-24 RX ADMIN — PERFLUTREN 2 ML: 6.52 INJECTION, SUSPENSION INTRAVENOUS at 14:55

## 2022-09-24 RX ADMIN — ACETAMINOPHEN 975 MG: 325 TABLET, FILM COATED ORAL at 23:34

## 2022-09-24 RX ADMIN — FUROSEMIDE 40 MG: 10 INJECTION, SOLUTION INTRAMUSCULAR; INTRAVENOUS at 11:13

## 2022-09-24 RX ADMIN — Medication 1000 MG: at 14:28

## 2022-09-24 RX ADMIN — MICONAZOLE NITRATE: 2 POWDER TOPICAL at 21:00

## 2022-09-24 ASSESSMENT — ACTIVITIES OF DAILY LIVING (ADL)
ADLS_ACUITY_SCORE: 35
ADLS_ACUITY_SCORE: 35
DRESSING/BATHING_DIFFICULTY: NO
ADLS_ACUITY_SCORE: 22
ADLS_ACUITY_SCORE: 22
ADLS_ACUITY_SCORE: 35
CONCENTRATING,_REMEMBERING_OR_MAKING_DECISIONS_DIFFICULTY: NO
ADLS_ACUITY_SCORE: 22
WEAR_GLASSES_OR_BLIND: YES
VISION_MANAGEMENT: GLASSES
EQUIPMENT_CURRENTLY_USED_AT_HOME: CANE, STRAIGHT
DIFFICULTY_EATING/SWALLOWING: NO
WALKING_OR_CLIMBING_STAIRS_DIFFICULTY: NO
DOING_ERRANDS_INDEPENDENTLY_DIFFICULTY: YES
TOILETING_ISSUES: NO
ADLS_ACUITY_SCORE: 35
FALL_HISTORY_WITHIN_LAST_SIX_MONTHS: NO
CHANGE_IN_FUNCTIONAL_STATUS_SINCE_ONSET_OF_CURRENT_ILLNESS/INJURY: NO
ADLS_ACUITY_SCORE: 22

## 2022-09-24 ASSESSMENT — ENCOUNTER SYMPTOMS
DYSURIA: 0
SHORTNESS OF BREATH: 1
VOMITING: 0
FEVER: 0
DIARRHEA: 0
HEMATURIA: 0
CHILLS: 0

## 2022-09-24 NOTE — ED NOTES
"[facility] ED Handoff Report    ED Chief Complaint: leg swelling and decreased mobility    ED Diagnosis:  (J81.0) Acute pulmonary edema (H)  Comment: Given Lasix  Plan: Monitor I&O    (R60.0) Bilateral lower extremity edema  Comment: lymphodema  Plan:     (I89.0) Lymphedema  Comment: Patient does not wear compression x 6 months  Plan:     (E66.01) Morbid obesity (H)  Comment:   Plan:     (Z86.39) History of diabetes mellitus  Comment:   Plan:     (I48.20) Chronic atrial fibrillation (H)  Comment:   Plan:     (Z79.01) Anticoagulated by anticoagulation treatment  Comment:   Plan:        PMH:    Past Medical History:   Diagnosis Date     Benign essential hypertension      Chronic atrial fibrillation (H)         Code Status:  Full Code     Falls Risk: Yes Band: Applied    Current Living Situation/Residence: lives in a house     Elimination Status: Continent: Yes     Activity Level: SBA w/ walker, lately has required more assistence    Patients Preferred Language:  English     Needed: No    Vital Signs:  /58   Pulse 98   Temp 98.4  F (36.9  C) (Temporal)   Resp 28   Ht 1.702 m (5' 7\")   Wt (!) 154.2 kg (340 lb)   SpO2 94%   BMI 53.25 kg/m       Cardiac Rhythm: atrial fibrillation    Pain Score: 4/10    Is the Patient Confused:  No    Last Food or Drink: 09/24/22 at ordered tray at 1415    Focused Assessment:  Patient has bilateral swelling to legs.  Chronic problem, but worsening over the last couple weeks, to the point of patient not being able to ambulate with someone to assist her.  Patient lives in house with 2 sons.  No increasing redness.  Increasing SOB with exertion- tires easily.      Tests Performed: Done: Labs    Treatments Provided:  SEE MAR    Family Dynamics/Concerns: No    Family Updated On Visitor Policy: Yes    Plan of Care Communicated to Family: Yes    Who Was Updated about Plan of Care: Patient updating family via cell phone    Belongings Checklist Done and Signed by " Patient: Yes    Medications sent with patient: NA    Covid: asymptomatic , negative    Additional Information: Patient has stopped wearing compression stocking for the past 6 months or so.  Discussed Lasix order with pharmacy and they will retime the next dose to 1800 today as first dose was given at 1113 this am.      RN: Lisa Story RN   9/24/2022 2:34 PM

## 2022-09-24 NOTE — PHARMACY-ADMISSION MEDICATION HISTORY
Pharmacy Note - Admission Medication History    Pertinent Provider Information: none     ______________________________________________________________________    Prior To Admission (PTA) med list completed and updated in EMR.       PTA Med List   Medication Sig Last Dose     acetaminophen (TYLENOL) 325 MG tablet Take 975 mg by mouth every 4 hours as needed for mild pain 9/24/2022 at am     apixaban (ELIQUIS) 5 mg Tab tablet [APIXABAN (ELIQUIS) 5 MG TAB TABLET] Take 5 mg by mouth 2 (two) times a day. 9/22/2022     CALCIUM/D3/MAG OX//TYRONE/ZN (CALTRATE + D3 PLUS MINERALS ORAL) [CALCIUM/D3/MAG OX//TYRONE/ZN (CALTRATE + D3 PLUS MINERALS ORAL)] Take 1 tablet by mouth daily. 9/22/2022     cholecalciferol, vitamin D3, 2,000 unit Tab [CHOLECALCIFEROL, VITAMIN D3, 2,000 UNIT TAB] Take 2,000 Units by mouth daily. 9/22/2022     citalopram (CELEXA) 10 MG tablet Take 20 mg by mouth daily 9/22/2022     cyanocobalamin 1000 MCG tablet [CYANOCOBALAMIN 1000 MCG TABLET] Take 1,000 mcg by mouth daily. 9/22/2022     metoprolol succinate (TOPROL-XL) 25 MG [METOPROLOL SUCCINATE (TOPROL-XL) 25 MG] TAKE 1 TABLET BY MOUTH DAILY. 9/22/2022     MULTIVIT WITH CALCIUM,IRON,MIN (WOMEN'S DAILY MULTIVITAMIN ORAL) [MULTIVIT WITH CALCIUM,IRON,MIN (WOMEN'S DAILY MULTIVITAMIN ORAL)] Take 1 tablet by mouth daily. 9/22/2022     olmesartan-hydrochlorothiazide (BENICAR HCT) 20-12.5 MG tablet Take 1 tablet by mouth daily 9/22/2022     vitamin C 1000 MG TABS tablet Take 1 tablet (1,000 mg) by mouth daily 9/22/2022       Information source(s): Patient and CareEverywhere/SureScripts  Method of interview communication: in-person    Summary of Changes to PTA Med List  New: citalopram  Discontinued: furosemide, gabapentin, nystatin, Tobradex, trazodone  Changed: none    Patient was asked about OTC/herbal products specifically.  PTA med list reflects this.    In the past week, patient estimated taking medication this percent of the time:  50-90% due to  other.    Allergies were reviewed, assessed, and updated with the patient.      Patient does not use any multi-dose medications prior to admission.    The information provided in this note is only as accurate as the sources available at the time of the update(s).    Thank you for the opportunity to participate in the care of this patient.    Alejandra Zacarias  9/24/2022 10:08 AM

## 2022-09-24 NOTE — ED TRIAGE NOTES
" Worsening leg swelling and poor mobility. \"Getting worse last couple of months\". Pain in legs an 8. Denies CP. Slightly Tachy, other VSS. HX of AFIB. Expresses interest in OT/PT.     Triage Assessment     Row Name 09/24/22 0634       Triage Assessment (Adult)    Airway WDL WDL       Respiratory WDL    Respiratory WDL WDL       Skin Circulation/Temperature WDL    Skin Circulation/Temperature WDL X  LE edema       Cardiac WDL    Cardiac WDL X  ST       Peripheral/Neurovascular WDL    Peripheral Neurovascular WDL WDL       Cognitive/Neuro/Behavioral WDL    Cognitive/Neuro/Behavioral WDL WDL              "

## 2022-09-24 NOTE — ED PROVIDER NOTES
EMERGENCY DEPARTMENT ENCOUNTER      NAME: Misty Quezada  AGE: 67 year old female  YOB: 1955  MRN: [id number]  EVALUATION DATE & TIME: No admission date for patient encounter.    PCP: Tiff Chavez    ED PROVIDER: Pastora Augustine M.D.      CHIEF COMPLAINT     Chief Complaint   Patient presents with     leg swelling, decreased mobility         FINAL IMPRESSION:     1. Acute pulmonary edema (H)    2. Bilateral lower extremity edema    3. Lymphedema    4. Morbid obesity (H)    5. History of diabetes mellitus    6. Chronic atrial fibrillation (H)    7. Anticoagulated by anticoagulation treatment          MEDICAL DECISION MAKING:       Pertinent Labs & Imaging studies reviewed. (See chart for details)    67 year old female presents to the Emergency Department for evaluation of leg swelling and leakage.    ED Course as of 09/24/22 1514   Sat Sep 24, 2022   0710 Mrs. Quezada is a 67-year-old female presents here complaining of leg swelling.  Is been getting progressively worse over the last few weeks.  She last saw her primary care doctor over the summer who recommended compression stockings she says she is unable to use those.   0710 associated with her leg swelling and her weight gain is some shortness of breath.  She has a history of chronic A. fib he is anticoagulated denies any trauma.   0710 She denies any constitutional symptoms such as fevers chills headache no blood in her nose or throat.  Denies any chest pain but admits to feeling short of breath.  No current abdominal pain vomiting diarrhea no dysuria no hematuria she has some right-sided hip pain denies any trauma no fecal urinary incontinence.   0711 Patient states she is here today because she knew she could be seen immediately versus waiting for her primary care doctor.  She is in the past she had had sepsis and is concerned about this.   0711 Patient lives independently.  She uses a walker for mobility.   0711 Per EMS she was able to  ambulate with assistance but limited.   0711 She uses a cane.   0712 Examination she is nontoxic cooperative and pleasant on exam she is morbidly obese seated in a wheelchair seen at triage because of current emergency department boarding conditions.  She is reading outlook.  She is tachycardic irregularly regular.  Occasional crackles bilateral chronic lymphedema minimal erythema no gross cellulitis.   0712 Differential diagnosis for increasing lower distributive edema with shortness of breath chronic A. fib include but not limited to lymphedema congestive heart failure acute coronary syndrome pulmonary embolism DVT sepsis among others.   0937 Elevated BNP patient will extremity edema and crackles saturations decreased to 88%.  Concerning for CHF   0938 Troponin T 18 (6-99)  No current chest pain   Plan repeat in 2 hours   0943 Hematocrit(!): 34.9   0959 Spoke with hospitalist Dr. Matamoros who requests cardiac telemetry inpatient.   1003 Clinical impression and decision making 67-year-old female presents complaining of lower Arelis edema worse than usual associated with shortness of breath.  History of A. fib anticoagulated on Eliquis.  On examination she is nontoxic morbidly obese crackles on auscultation of chronic lymphedema minimal inner thigh erythema but no evidence of Samreen's gangrene and no evidence of cellulitis.  She is hypoxic when laying flat.  BNP significant elevated no previous for comparison checks x-ray shows volume overload.   1003 Entertained the possibility of PE at the less likely given that she is anticoagulated and she has reasons for her shortness of breath.   1004 No chest pain but shortness of breath initial troponin 18 awaiting 2-hour troponin.   1004 Given 40 mEq of Lasix IV.  Will be admitted to cardiac telemetry for further evaluation.   1201 Delta troponin is 17.  This is not an increase of greater or equal than 7 ng/L but it is greater than 14 which is the referring referral for  normal female.  Patient will be admitted and discussed with cardiology.   1202 Clinical impression and decision making  [age]-year-old female morbidly obese presents via EMS for shortness of breath and weight extremity edema.  Toxic on examination with bibasilar crackles.  Episodes of hypoxia when laying flat.  Known history of A. fib no ST segment elevation or depression   1203 Initial troponin 18 delta troponin 17 no increase but still higher than the range for female.  Noted to be in congestive heart failure given Lasix.  Supplemental oxygen as needed.  Admitted for further evaluation.  Admitted in stable condition.       Vital Signs: Tachycardic tachypneic  EKG: Atrial fibrillation poor anterior progression right axis deviation low voltage  Imaging: X-ray pulmonary edema  Home Meds: Reviewed  ED meds/abx: Lasix  Fluids: TKO    Labs  K 5.2  Cr 0.68  Wbc 5.8  Hgb 10.6  Platelets 295  bnp 1898  Troponin T 18      Review of Previous Records  Echo   Definity (NDC #88088-499) given intravenously. Lot # [id number], : .  ______________________________________________________________________________  Interpretation Summary     Left ventricular function is normal.The ejection fraction is 55-60%.  The right ventricle is moderately dilated.  TAPSE is normal, which is consistent with normal right ventricular systolic  function.  There is mod-severe to severe (3-4+) tricuspid regurgitation.  The right ventricular systolic pressure is approximated at 58mmHg plus the  right atrial pressure.  Moderate valvular aortic stenosis.  ______________________________________________________________________________        Consults  Hospitalist, Dr. Matamoros.    ED COURSE     7:05 AM I met with the patient in triage and obtained history, performed an initial exam, and discussed options and plan for diagnostics and treatment here in the ED.    8:30 AM REvaluated updated.    9:35 AM reevaluated and updated    9:41 AM I paged the  hospitalist, to accept patient for admission.    9:49 AM I spoke with the patient's care manager.    9:50 AM I spoke with hospitalist to accept patient to Dr. Matamoros, cardiac tele-inpatient.    12:06 PM re evaluated and upgraded.  In agreement with plan.  At the time of the dictation patient boarding in the emergency department declined transfer.  No chest pain currently.  No shortness of breath.  Admitting physician at bedside.  Satting 94% on room air.    At the conclusion of the encounter I discussed the results of all of the tests and the disposition. The questions were answered. The patient acknowledged understanding and was agreeable with the care plan.         Critical Care     Performed by: Dr Pastora Augustine  Authorized by: Dr Pastora Augustine  Total critical care time: 30 minutes  Critical care was necessary to treat or prevent imminent or life-threatening deterioration of the following conditions:  Acute pulmonary edema with congestive heart failure.  Hypoxic.  Critical care was time spent personally by me on the following activities: development of treatment plan with patient or surrogate, discussions with consultants, examination of patient, evaluation of patient's response to treatment, obtaining history from patient or surrogate, ordering and performing treatments and interventions, ordering and review of laboratory studies, ordering and review of radiographic studies, re-evaluation of patient's condition and monitoring for potential decompensation.  Critical care time was exclusive of separately billable procedures and treating other patients.    MEDICATIONS GIVEN IN THE EMERGENCY:     Medications   acetaminophen (TYLENOL) tablet 975 mg (has no administration in time range)   apixaban ANTICOAGULANT (ELIQUIS) tablet 5 mg (has no administration in time range)   calcium carbonate-vitamin D (OS-TURNER with D) per tablet (has no administration in time range)   Vitamin D3 (CHOLECALCIFEROL) tablet 2,000 Units  (2,000 Units Oral Given 9/24/22 1425)   cyanocobalamin (VITAMIN B-12) tablet 1,000 mcg (1,000 mcg Oral Given 9/24/22 1429)   metoprolol succinate ER (TOPROL XL) 24 hr tablet 25 mg (25 mg Oral Given 9/24/22 1424)   multivitamin w/minerals (THERA-VIT-M) tablet (1 tablet Oral Given 9/24/22 1425)   vitamin C (ASCORBIC ACID) tablet 1,000 mg (1,000 mg Oral Given 9/24/22 1428)   losartan (COZAAR) tablet 25 mg (25 mg Oral Given 9/24/22 1429)   lidocaine 1 % 0.1-1 mL (has no administration in time range)   lidocaine (LMX4) cream (has no administration in time range)   sodium chloride (PF) 0.9% PF flush 3 mL (3 mLs Intracatheter Not Given 9/24/22 1334)   sodium chloride (PF) 0.9% PF flush 3 mL (has no administration in time range)   melatonin tablet 1 mg (has no administration in time range)   Patient is already receiving anticoagulation with heparin, enoxaparin (LOVENOX), warfarin (COUMADIN)  or other anticoagulant medication (has no administration in time range)   polyethylene glycol (MIRALAX) Packet 17 g (17 g Oral Not Given 9/24/22 1448)   bisacodyl (DULCOLAX) suppository 10 mg (has no administration in time range)   ondansetron (ZOFRAN ODT) ODT tab 4 mg (has no administration in time range)     Or   ondansetron (ZOFRAN) injection 4 mg (has no administration in time range)   prochlorperazine (COMPAZINE) injection 5 mg (has no administration in time range)     Or   prochlorperazine (COMPAZINE) tablet 5 mg (has no administration in time range)     Or   prochlorperazine (COMPAZINE) suppository 12.5 mg (has no administration in time range)   furosemide (LASIX) injection 40 mg (has no administration in time range)   Continuing ACE inhibitor/ARB/ARNI from home medication list OR ACE inhibitor/ARB/ARNI order already placed during this visit (has no administration in time range)   Continuing beta blocker from home medication list OR beta blocker order already placed during this visit (has no administration in time range)    citalopram (celeXA) tablet 20 mg (20 mg Oral Given 9/24/22 1430)   furosemide (LASIX) injection 40 mg (40 mg Intravenous Given 9/24/22 1113)   perflutren lipid microsphere (DEFINITY) injection SUSP 2 mL (2 mLs Intravenous Given 9/24/22 5275)       NEW PRESCRIPTIONS STARTED AT TODAY'S ER VISIT     New Prescriptions    No medications on file          =================================================================    HPI     Patient information was obtained from: Patient    Use of : N/A     Misty Quezada is a 67 year old female who presents by ambulance for evaluation of leg swelling and fluid leakage.    Per EMS, the patient has swelling in legs and decreased mobility, and has only been able to shuffle in short amounts before sitting back down. The patient was slightly tachycardic with her heart rate in the 90s to low 100s.     The patient reports that she has been experiencing foot pain and swelling in her legs bilaterally fo the past couple of days. She states that she also has some fluid leaking from the area and she had seen her provider at [facility] in July and she was told to wear compression socks but that hasn't seemed to help. She denies any redness, fever, chills, chest pain, vomiting, diarrhea, hematuria, vaginal numbness and dysuria. Patient also expresses she has concerns for an infection and came in as she wanted to get seen sooner in the ED rather than in clinic.    Patient has a history of sepsis, left leg cellulitis, hypertension, prediabetes, and venous ulcers. No recent falls or history of alcohol and smoking. No other complaints at this time.          REVIEW OF SYSTEMS   Review of Systems   Constitutional: Negative for chills and fever.   Respiratory: Positive for shortness of breath.         Positive for tachycardia.   Cardiovascular: Positive for leg swelling (with fluid leakage). Negative for chest pain.   Gastrointestinal: Negative for diarrhea and vomiting.  "  Genitourinary: Negative for dysuria and hematuria.        Negative for vaginal numbness.   Musculoskeletal: Positive for gait problem.   Skin:        Negative for erythema.   All other systems reviewed and are negative.       PAST MEDICAL HISTORY:     Past Medical History:   Diagnosis Date     Benign essential hypertension      Chronic atrial fibrillation (H)        PAST SURGICAL HISTORY:     Past Surgical History:   Procedure Laterality Date     APPENDECTOMY       CHOLECYSTECTOMY       GASTRIC BYPASS           CURRENT MEDICATIONS:   acetaminophen (TYLENOL) 325 MG tablet  apixaban (ELIQUIS) 5 mg Tab tablet  CALCIUM/D3/MAG OX//TYRONE/ZN (CALTRATE + D3 PLUS MINERALS ORAL)  cholecalciferol, vitamin D3, 2,000 unit Tab  citalopram (CELEXA) 10 MG tablet  cyanocobalamin 1000 MCG tablet  metoprolol succinate (TOPROL-XL) 25 MG  MULTIVIT WITH CALCIUM,IRON,MIN (WOMEN'S DAILY MULTIVITAMIN ORAL)  olmesartan-hydrochlorothiazide (BENICAR HCT) 20-12.5 MG tablet  vitamin C 1000 MG TABS tablet         ALLERGIES:   No Known Allergies    FAMILY HISTORY:     Family History   Problem Relation Age of Onset     No Known Problems Mother      No Known Problems Father        SOCIAL HISTORY:     Social History     Socioeconomic History     Marital status:    Tobacco Use     Smoking status: Never Smoker     Smokeless tobacco: Never Used   Substance and Sexual Activity     Alcohol use: Not Currently     Comment: Rarely Uses alcohol     Drug use: Never       VITALS:   /58   Pulse 98   Temp 98.4  F (36.9  C) (Temporal)   Resp 28   Ht 1.702 m (5' 7\")   Wt (!) 154.2 kg (340 lb)   SpO2 94%   BMI 53.25 kg/m      PHYSICAL EXAM     Physical Exam  Vitals and nursing note reviewed.   Constitutional:       General: She is not in acute distress.     Appearance: Normal appearance. She is obese. She is not toxic-appearing or diaphoretic.   Abdominal:       Musculoskeletal:      Right lower leg: Edema present.      Left lower leg: Edema " present.   Skin:         Neurological:      Mental Status: She is alert.         Physical Exam   Constitutional: Nontoxic cooperative and pleasant sitting in wheelchair reading a book.    Head: Atraumatic.     Nose: Nose normal.     Mouth/Throat: Oropharynx is clear and moist.     Eyes: EOM are normal. Pupils are equal, round, and reactive to light.     Ears: External ears normal    Neck: Normal range of motion. Neck supple.     Cardiovascular: Irregularly irregular    Pulmonary/Chest: Basilar crackles    Abdominal: Protuberant nontender    Musculoskeletal: Normal range of motion.     Neurological: No focal deficit    Lymphatics: Bilateral chronic lower extremity edema pharyngitis    : External vaginal area no evidence of erythema    Skin: Skin is warm and dry.  Chronic venous stasis changes lower extremity    Psychiatric: Normal mood and affect. Behavior is normal.       LAB:     All pertinent labs reviewed and interpreted.  Labs Ordered and Resulted from Time of ED Arrival to Time of ED Departure   TROPONIN T, HIGH SENSITIVITY - Abnormal       Result Value    Troponin T, High Sensitivity 18 (*)    N TERMINAL PRO BNP OUTPATIENT - Abnormal    N Terminal Pro BNP Outpatient 1,898 (*)    PROCALCITONIN - Abnormal    Procalcitonin 0.06 (*)    CBC WITH PLATELETS AND DIFFERENTIAL - Abnormal    WBC Count 5.8      RBC Count 3.86      Hemoglobin 10.6 (*)     Hematocrit 34.9 (*)     MCV 90      MCH 27.5      MCHC 30.4 (*)     RDW 18.4 (*)     Platelet Count 295      % Neutrophils 66      % Lymphocytes 18      % Monocytes 15      % Eosinophils 1      % Basophils 0      % Immature Granulocytes 0      NRBCs per 100 WBC 0      Absolute Neutrophils 3.9      Absolute Lymphocytes 1.0      Absolute Monocytes 0.9      Absolute Eosinophils 0.1      Absolute Basophils 0.0      Absolute Immature Granulocytes 0.0      Absolute NRBCs 0.0     TROPONIN T, HIGH SENSITIVITY - Abnormal    Troponin T, High Sensitivity 17 (*)    BASIC METABOLIC  PANEL - Normal    Sodium 141      Potassium 5.2      Chloride 105      Carbon Dioxide (CO2) 24      Anion Gap 12      Urea Nitrogen 19.2      Creatinine 0.68      Calcium 9.0      Glucose 98      GFR Estimate >90     CK TOTAL - Normal    CK 97     COVID-19 VIRUS (CORONAVIRUS) BY PCR - Normal    SARS CoV2 PCR Negative     TROPONIN T, HIGH SENSITIVITY        RADIOLOGY:     Reviewed all pertinent imaging. Please see official radiology report.  XR Chest Port 1 View   Final Result   IMPRESSION: Cardiomegaly, pulmonary vascular congestion, distention of the azygos vein, mild interstitial edema with small left effusion are consistent with mild failure.      No signs of pneumonia.      Echocardiogram Complete    (Results Pending)        EKG:     EKG #1  Atrial fibrillation poor anterior progression for axis deviation low voltage    Time:080542    Ventricular rate 98 bmp  Axis RAD  NY interval ms  QRS duration 88 ms  QT//462 ms    Compared to previous EKG on August 2021 poor anterior progression is new right axis deviation was seen before low voltage was seen before.  I have independently reviewed and interpreted the EKG(s) documented above.      PROCEDURES:     Procedures      I,Izzy Troy, am serving as a scribe to document services personally performed by Dr. Augustine based on my observation and the provider's statements to me. I, Pastora Augustine MD attest that Izzy Troy is acting in a scribe capacity, has observed my performance of the services and has documented them in accordance with my direction.    Pastora Augustine M.D.  Emergency Medicine  [facility] EMERGENCY DEPARTMENT  [facility] [id number] [phone]  Dept: [phone]     Pastora Augustine MD  09/24/22 8742       Pastora Augustine MD  09/24/22 1218       Pastora Augustine MD  09/24/22 2003

## 2022-09-24 NOTE — H&P
"[facility]    History and Physical - Hospitalist Service       Date of Admission:  9/24/2022    Assessment & Plan      Misty Quezada is a 67 year old female admitted on 9/24/2022 with LE edema and shortness of breath.      1. Acute CHF  -- No h/o this per pt, does have mod aortic stenosis so could be AF with poor rate control, worsening valve disease vs new cardiac event  -- Serial troponin measurements  -- Echo  -- Lasix  -- Continue metoprolol and ARB  -- Strict I/O, low sodium diet    2. LE edema, acute on chronic  -- May be related to above  -- Lymphedema therapy  -- Diuretics    3. Chronic AF  -- Rate control  -- Continue eliquis    4. Obesity  -- Body mass index is 53.25 kg/m .   -- Referred to sleep medicine last year, unclear if followed up    5. Constipation  -- Start scheduled Miralax with prn Dolcolax supp       Diet:   Low sodium, cardiac  DVT Prophylaxis: DOAC  Hi Catheter: Not present  Central Lines: None  Cardiac Monitoring: None  Code Status:   Full    Clinically Significant Risk Factors Present on Admission               # Coagulation Defect: home medication list includes an anticoagulant medication   # Hypertension: home medication list includes antihypertensive(s)    # Severe Obesity: Estimated body mass index is 53.25 kg/m  as calculated from the following:    Height as of this encounter: 1.702 m (5' 7\").    Weight as of this encounter: 154.2 kg (340 lb).        Disposition Plan      Expected Discharge Date: 09/26/2022                The patient's care was discussed with the Patient.    Khadra Escalante MD  Hospitalist Service  [facility]  Securely message with the Vocera Web Console (learn more here)  Text page via [facility] Paging/Directory         ______________________________________________________________________    Chief Complaint   Lower extremity edema    History is obtained from the patient    History of Present Illness   Misty NARAYAN " Damien is a 67 year old female who presented to ED with worsening lower extremity edema. Reports it has been worsening for the past few weeks. She additionally endorses some dyspnea on exertion. No chest pain. Unsure if orthopnea as she never tries to lay flat.  She was concerned that she was developing cellulitis which she has had previously so she presented to the emergency department today.    Her only other concern is she occasionally has some right flank pain but no dysuria and no hematuria.  No known history of kidney stones.  She also endorses some intermittent constipation.    Review of Systems    The 10 point Review of Systems is negative other than noted in the HPI or here.     Past Medical History    I have reviewed this patient's medical history and updated it with pertinent information if needed.   Past Medical History:   Diagnosis Date     Benign essential hypertension      Chronic atrial fibrillation (H)        Past Surgical History   I have reviewed this patient's surgical history and updated it with pertinent information if needed.  Past Surgical History:   Procedure Laterality Date     APPENDECTOMY       CHOLECYSTECTOMY       GASTRIC BYPASS         Social History   I have reviewed this patient's social history and updated it with pertinent information if needed.  Social History     Tobacco Use     Smoking status: Never Smoker     Smokeless tobacco: Never Used   Substance Use Topics     Alcohol use: Not Currently     Comment: Rarely Uses alcohol     Drug use: Never       Family History   I have reviewed this patient's family history and updated it with pertinent information if needed.  Family History   Problem Relation Age of Onset     No Known Problems Mother      No Known Problems Father        Prior to Admission Medications   Prior to Admission Medications   Prescriptions Last Dose Informant Patient Reported? Taking?   CALCIUM/D3/MAG OX//TYRONE/ZN (CALTRATE + D3 PLUS MINERALS ORAL) 9/22/2022   Yes Yes   Sig: [CALCIUM/D3/MAG OX//TYRONE/ZN (CALTRATE + D3 PLUS MINERALS ORAL)] Take 1 tablet by mouth daily.   MULTIVIT WITH CALCIUM,IRON,MIN (WOMEN'S DAILY MULTIVITAMIN ORAL) 9/22/2022  Yes Yes   Sig: [MULTIVIT WITH CALCIUM,IRON,MIN (WOMEN'S DAILY MULTIVITAMIN ORAL)] Take 1 tablet by mouth daily.   acetaminophen (TYLENOL) 325 MG tablet 9/24/2022 at am  Yes Yes   Sig: Take 975 mg by mouth every 4 hours as needed for mild pain   apixaban (ELIQUIS) 5 mg Tab tablet 9/22/2022  Yes Yes   Sig: [APIXABAN (ELIQUIS) 5 MG TAB TABLET] Take 5 mg by mouth 2 (two) times a day.   cholecalciferol, vitamin D3, 2,000 unit Tab 9/22/2022  Yes Yes   Sig: [CHOLECALCIFEROL, VITAMIN D3, 2,000 UNIT TAB] Take 2,000 Units by mouth daily.   citalopram (CELEXA) 10 MG tablet 9/22/2022  Yes Yes   Sig: Take 20 mg by mouth daily   cyanocobalamin 1000 MCG tablet 9/22/2022  Yes Yes   Sig: [CYANOCOBALAMIN 1000 MCG TABLET] Take 1,000 mcg by mouth daily.   metoprolol succinate (TOPROL-XL) 25 MG 9/22/2022  No Yes   Sig: [METOPROLOL SUCCINATE (TOPROL-XL) 25 MG] TAKE 1 TABLET BY MOUTH DAILY.   olmesartan-hydrochlorothiazide (BENICAR HCT) 20-12.5 MG tablet 9/22/2022  Yes Yes   Sig: Take 1 tablet by mouth daily   vitamin C 1000 MG TABS tablet 9/22/2022  No Yes   Sig: Take 1 tablet (1,000 mg) by mouth daily      Facility-Administered Medications: None     Allergies   No Known Allergies    Physical Exam   Vital Signs: Temp: 98.4  F (36.9  C) Temp src: Temporal BP: 119/78 Pulse: 97   Resp: 24 SpO2: 91 % O2 Device: None (Room air)    Weight: 340 lbs 0 oz    General: Well developed obese middle-aged female, No apparent distress  Head: Normocephalic, atraumatic  Eyes: Pupils equal, round, Extra-ocular movements intact  Mouth: Mucus membranes moist  Cardiovascular: Irregularly irregular rate and rhythm, Normal S1, S2, difficult to auscultate murmur due to body habitus  Lungs: Clear to auscultation bilaterally, difficult to auscultate lung bases due to body  habitus  Abdomen: Soft, obese, non-tender, not distended, Bowel sounds present  Extremities: 4+ edema to mid thigh, no clubbing  Skin: Warm and well-perfused without lesions.  Neurologic: Alert and oriented. Face is symmetric, Moves all extremities equally      Data   Data reviewed today: I reviewed all medications, new labs and imaging results over the last 24 hours.    Recent Labs   Lab 09/24/22  0850   WBC 5.8   HGB 10.6*   MCV 90         POTASSIUM 5.2   CHLORIDE 105   CO2 24   BUN 19.2   CR 0.68   ANIONGAP 12   TURNER 9.0   GLC 98     Recent Results (from the past 24 hour(s))   XR Chest Port 1 View    Narrative    EXAM: XR CHEST PORT 1 VIEW  LOCATION: [facility] [place]  DATE/TIME: 9/24/2022 9:54 AM    INDICATION: SOB  COMPARISON: 08/21/2022      Impression    IMPRESSION: Cardiomegaly, pulmonary vascular congestion, distention of the azygos vein, mild interstitial edema with small left effusion are consistent with mild failure.    No signs of pneumonia.

## 2022-09-24 NOTE — PLAN OF CARE
Problem: Plan of Care - These are the overarching goals to be used throughout the patient stay.    Goal: Optimal Comfort and Wellbeing  Outcome: Ongoing, Progressing     Problem: Plan of Care - These are the overarching goals to be used throughout the patient stay.    Goal: Readiness for Transition of Care  Outcome: Ongoing, Progressing  Intervention: Mutually Develop Transition Plan  Recent Flowsheet Documentation  Taken 2022 1600 by Keeley Dorsey, RN  Equipment Currently Used at Home: cane, straight   Goal Outcome Evaluation:    Plan of Care Reviewed With: patient     Overall Patient Progress: improving    Pt is axox4, and a-fib. Pt arrived to unit on RA but O2 sats went as low as 85 when talking, applied 1L O2 NC and pt sats stayed above 90, decreased O2 to 0.5L, pt maintaining 02 above 90, notified MD Escalante. Pt had some visible redness under breast, eli keyes MD, powder med ordered. Mg and K protocol, recheck in the AM. B Continue to monitor.

## 2022-09-25 ENCOUNTER — APPOINTMENT (OUTPATIENT)
Dept: PHYSICAL THERAPY | Facility: HOSPITAL | Age: 67
DRG: 280 | End: 2022-09-25
Payer: MEDICARE

## 2022-09-25 ENCOUNTER — APPOINTMENT (OUTPATIENT)
Dept: PHYSICAL THERAPY | Facility: HOSPITAL | Age: 67
DRG: 280 | End: 2022-09-25
Attending: FAMILY MEDICINE
Payer: MEDICARE

## 2022-09-25 ENCOUNTER — APPOINTMENT (OUTPATIENT)
Dept: OCCUPATIONAL THERAPY | Facility: HOSPITAL | Age: 67
DRG: 280 | End: 2022-09-25
Attending: FAMILY MEDICINE
Payer: MEDICARE

## 2022-09-25 LAB
ANION GAP SERPL CALCULATED.3IONS-SCNC: 11 MMOL/L (ref 7–15)
BUN SERPL-MCNC: 19.3 MG/DL (ref 8–23)
CALCIUM SERPL-MCNC: 8.8 MG/DL (ref 8.8–10.2)
CHLORIDE SERPL-SCNC: 105 MMOL/L (ref 98–107)
CREAT SERPL-MCNC: 0.72 MG/DL (ref 0.51–0.95)
DEPRECATED HCO3 PLAS-SCNC: 26 MMOL/L (ref 22–29)
ERYTHROCYTE [DISTWIDTH] IN BLOOD BY AUTOMATED COUNT: 17.9 % (ref 10–15)
GFR SERPL CREATININE-BSD FRML MDRD: >90 ML/MIN/1.73M2
GLUCOSE SERPL-MCNC: 89 MG/DL (ref 70–99)
HCT VFR BLD AUTO: 32 % (ref 35–47)
HGB BLD-MCNC: 9.9 G/DL (ref 11.7–15.7)
MAGNESIUM SERPL-MCNC: 1.7 MG/DL (ref 1.7–2.3)
MCH RBC QN AUTO: 28 PG (ref 26.5–33)
MCHC RBC AUTO-ENTMCNC: 30.9 G/DL (ref 31.5–36.5)
MCV RBC AUTO: 90 FL (ref 78–100)
PLATELET # BLD AUTO: 262 10E3/UL (ref 150–450)
POTASSIUM SERPL-SCNC: 3.8 MMOL/L (ref 3.4–5.3)
RBC # BLD AUTO: 3.54 10E6/UL (ref 3.8–5.2)
SODIUM SERPL-SCNC: 142 MMOL/L (ref 136–145)
WBC # BLD AUTO: 4.8 10E3/UL (ref 4–11)

## 2022-09-25 PROCEDURE — 250N000013 HC RX MED GY IP 250 OP 250 PS 637: Performed by: FAMILY MEDICINE

## 2022-09-25 PROCEDURE — 258N000003 HC RX IP 258 OP 636: Performed by: INTERNAL MEDICINE

## 2022-09-25 PROCEDURE — 97530 THERAPEUTIC ACTIVITIES: CPT | Mod: GP | Performed by: PHYSICAL THERAPIST

## 2022-09-25 PROCEDURE — 97535 SELF CARE MNGMENT TRAINING: CPT | Mod: GO

## 2022-09-25 PROCEDURE — 36415 COLL VENOUS BLD VENIPUNCTURE: CPT | Performed by: FAMILY MEDICINE

## 2022-09-25 PROCEDURE — 85027 COMPLETE CBC AUTOMATED: CPT | Performed by: FAMILY MEDICINE

## 2022-09-25 PROCEDURE — 99233 SBSQ HOSP IP/OBS HIGH 50: CPT | Performed by: INTERNAL MEDICINE

## 2022-09-25 PROCEDURE — 97535 SELF CARE MNGMENT TRAINING: CPT | Mod: GP | Performed by: PHYSICAL THERAPIST

## 2022-09-25 PROCEDURE — 250N000013 HC RX MED GY IP 250 OP 250 PS 637: Performed by: INTERNAL MEDICINE

## 2022-09-25 PROCEDURE — 210N000001 HC R&B IMCU HEART CARE

## 2022-09-25 PROCEDURE — 83735 ASSAY OF MAGNESIUM: CPT | Performed by: FAMILY MEDICINE

## 2022-09-25 PROCEDURE — 99222 1ST HOSP IP/OBS MODERATE 55: CPT | Performed by: INTERNAL MEDICINE

## 2022-09-25 PROCEDURE — 250N000009 HC RX 250: Performed by: INTERNAL MEDICINE

## 2022-09-25 PROCEDURE — 80048 BASIC METABOLIC PNL TOTAL CA: CPT | Performed by: FAMILY MEDICINE

## 2022-09-25 PROCEDURE — 97165 OT EVAL LOW COMPLEX 30 MIN: CPT | Mod: GO

## 2022-09-25 RX ORDER — SPIRONOLACTONE 25 MG/1
25 TABLET ORAL DAILY
Status: DISCONTINUED | OUTPATIENT
Start: 2022-09-25 | End: 2022-10-07 | Stop reason: HOSPADM

## 2022-09-25 RX ORDER — SODIUM CHLORIDE 9 MG/ML
INJECTION, SOLUTION INTRAVENOUS CONTINUOUS
Status: DISCONTINUED | OUTPATIENT
Start: 2022-09-25 | End: 2022-09-26

## 2022-09-25 RX ADMIN — CITALOPRAM HYDROBROMIDE 20 MG: 20 TABLET ORAL at 09:00

## 2022-09-25 RX ADMIN — SODIUM CHLORIDE: 9 INJECTION, SOLUTION INTRAVENOUS at 09:01

## 2022-09-25 RX ADMIN — ACETAMINOPHEN 975 MG: 325 TABLET, FILM COATED ORAL at 18:34

## 2022-09-25 RX ADMIN — Medication 2000 UNITS: at 09:00

## 2022-09-25 RX ADMIN — METOPROLOL SUCCINATE 25 MG: 25 TABLET, EXTENDED RELEASE ORAL at 09:00

## 2022-09-25 RX ADMIN — APIXABAN 5 MG: 5 TABLET, FILM COATED ORAL at 20:18

## 2022-09-25 RX ADMIN — SPIRONOLACTONE 25 MG: 25 TABLET, FILM COATED ORAL at 09:00

## 2022-09-25 RX ADMIN — Medication 1000 MG: at 09:00

## 2022-09-25 RX ADMIN — MICONAZOLE NITRATE 1 G: 2 POWDER TOPICAL at 20:21

## 2022-09-25 RX ADMIN — LOSARTAN POTASSIUM 25 MG: 25 TABLET, FILM COATED ORAL at 09:00

## 2022-09-25 RX ADMIN — FUROSEMIDE 10 MG/HR: 10 INJECTION, SOLUTION INTRAVENOUS at 09:00

## 2022-09-25 RX ADMIN — Medication 1000 MCG: at 09:00

## 2022-09-25 RX ADMIN — ACETAMINOPHEN 975 MG: 325 TABLET, FILM COATED ORAL at 12:35

## 2022-09-25 RX ADMIN — Medication 1 TABLET: at 09:00

## 2022-09-25 RX ADMIN — APIXABAN 5 MG: 5 TABLET, FILM COATED ORAL at 09:00

## 2022-09-25 RX ADMIN — MICONAZOLE NITRATE: 2 POWDER TOPICAL at 12:29

## 2022-09-25 RX ADMIN — POLYETHYLENE GLYCOL 3350 17 G: 17 POWDER, FOR SOLUTION ORAL at 09:00

## 2022-09-25 ASSESSMENT — ACTIVITIES OF DAILY LIVING (ADL)
ADLS_ACUITY_SCORE: 34
PREVIOUS_RESPONSIBILITIES: MEAL PREP;HOUSEKEEPING;LAUNDRY;MEDICATION MANAGEMENT;FINANCES
DEPENDENT_IADLS:: TRANSPORTATION
ADLS_ACUITY_SCORE: 34
ADLS_ACUITY_SCORE: 34
ADLS_ACUITY_SCORE: 22

## 2022-09-25 NOTE — PROGRESS NOTES
"   09/25/22 1150   Quick Adds   Type of Visit Initial PT Evaluation   Living Environment   People in Home child(david), adult  (son)   Current Living Arrangements house   Home Accessibility stairs within home   Number of Stairs, Main Entrance 1   Stair Railings, Main Entrance railings safe and in good condition   Number of Stairs, Within Home, Primary six   Stair Railings, Within Home, Primary railings safe and in good condition   Transportation Anticipated family or friend will provide   Self-Care   Equipment Currently Used at Home cane, straight   Fall history within last six months no   General Information   Onset of Illness/Injury or Date of Surgery 09/24/22   Referring Physician Khadra Escalante MD   Patient/Family Therapy Goals Statement (PT) None stated.   Pertinent History of Current Problem (include personal factors and/or comorbidities that impact the POC) Per H&P: \"67 year old female admitted on 9/24/2022 with LE edema and shortness of breath.\"   Existing Precautions/Restrictions fall   Range of Motion (ROM)   Range of Motion ROM is WFL   Strength (Manual Muscle Testing)   Strength (Manual Muscle Testing) Deficits observed during functional mobility   Transfers   Transfers sit-stand transfer   Transfer Safety Concerns Noted decreased weight-shifting ability   Impairments Contributing to Impaired Transfers impaired balance;decreased strength   Sit-Stand Transfer   Sit-Stand [place] (Transfers) moderate assist (50% patient effort);maximum assist (25% patient effort);2 person assist   Assistive Device (Sit-Stand Transfers) walker, front-wheeled   Gait/Stairs (Locomotion)   [place] Level (Gait) moderate assist (50% patient effort);maximum assist (25% patient effort);2 person assist   Assistive Device (Gait) walker, front-wheeled   Distance in Feet (Required for LE Total Joints) 2 steps forward/back x 2 reps   Pattern (Gait) step-to   Deviations/Abnormal Patterns (Gait) radha decreased;gait speed " decreased   Clinical Impression   Criteria for Skilled Therapeutic Intervention Yes, treatment indicated   PT Diagnosis (PT) impaired functional mobility   Influenced by the following impairments decreased strength, impaired balance, decreased endurance   Functional limitations due to impairments transfers, ambulation   Clinical Presentation (PT Evaluation Complexity) Evolving/Changing   Clinical Presentation Rationale Pt presents as medically diagnosed.   Clinical Decision Making (Complexity) moderate complexity   Planned Therapy Interventions (PT) balance training;bed mobility training;gait training;home exercise program;neuromuscular re-education;patient/family education;strengthening;transfer training   Risk & Benefits of therapy have been explained evaluation/treatment results reviewed;participants voiced agreement with care plan;participants included;patient   PT Discharge Planning   PT Discharge Recommendation (DC Rec) Transitional Care Facility   PT Rationale for DC Rec Pt currently requires mod to max assist of 2 for transfers. Patient is independent with functional mobility at baseline, indicating strength, balance, and activity tolerance are below baseline. Recommend TCU at discharge to maximize safety and independence with functional mobility.   Plan of Care Review   Plan of Care Reviewed With patient   Total Evaluation Time   Total Evaluation Time (Minutes) 15   Physical Therapy Goals   PT Frequency Daily   PT Predicted Duration/Target Date for Goal Attainment 10/01/22   PT Goals Bed Mobility;Gait;Transfers   PT: Bed Mobility Minimal assist;Supine to/from sit   PT: Transfers Minimal assist;Sit to/from stand;Assistive device   PT: Gait Minimal assist;Assistive device;Rolling walker;10 feet     Janina Antoine, PT  9/25/2022

## 2022-09-25 NOTE — PLAN OF CARE
Goal Outcome Evaluation:    Heart Failure Care Map  GOALS TO BE MET BEFORE DISCHARGE:    1. Decrease congestion and/or edema with diuretic therapy to achieve near optimal volume status.     Dyspnea improved: No, further care required to meet this goal. Please explain Pt still experiencing dyspnea.   Edema improved: No, further care required to meet this goal. Please explain Pt still has +3 BLE        Net I/O and Weights since admission:   08/26 1500 - 09/25 1459  In: 843 [P.O.:840; I.V.:3]  Out: 2100 [Urine:2100]  Net: -1257     Vitals:    09/24/22 0631 09/24/22 1700 09/25/22 0432   Weight: (!) 154.2 kg (340 lb) (!) 159.8 kg (352 lb 4.7 oz) (!) 159.8 kg (352 lb 4.7 oz)       2.  O2 sats > 90% on room air, or at prior home O2 therapy level.      Able to wean O2 this shift to keep sats above 90%?: No, further care required to meet this goal. Please explain Pt requiring 1L O2 to maintain sats.   Does patient use Home O2? No          Current oxygenation status:   SpO2: 96 %     O2 Device: Nasal cannula, Oxygen Delivery: 1 LPM    3.  Tolerates ambulation and mobility near baseline.     Ambulation: No, further care required to meet this goal. Please explain Pt requiring heavy assist x 2 to stand.   Times patient ambulated with staff this shift: 0    Please review the Heart Failure Care Map for additional HF goal outcomes.    Vital signs stable. Patient desats when she dozes off, 1L O2 applied to maintain sats. Heavy assist x 2 to stand. Sat in chair most of day, refused to recline with legs up despite education. Lymph wraps in place. Tylenol x 1 for pain in legs.    On lasix gtt 10 mg/hr. Purewick in place,  ml.    Sybil Mcbride RN  9/25/2022

## 2022-09-25 NOTE — PROGRESS NOTES
Pt verbalized she is so depressed, and wants to die so she can go to [facility]. She does not have any plan to kill herself. Encouraged pt to verbalized her feelings, reassured her that we are here to help her. Ordered early breakfast for her.

## 2022-09-25 NOTE — PROGRESS NOTES
Occupational Therapy      09/25/22 1130   Quick Adds   Type of Visit Initial Occupational Therapy Evaluation   Living Environment   People in Home child(david), adult  (sons)   Current Living Arrangements house   Home Accessibility stairs within home;stairs to enter home   Number of Stairs, Main Entrance 1   Stair Railings, Main Entrance railings safe and in good condition   Number of Stairs, Within Home, Primary six  (split entry home)   Stair Railings, Within Home, Primary railings safe and in good condition   Transportation Anticipated family or friend will provide   Living Environment Comments Tub/shower w/ GB no Shower chair   Self-Care   Equipment Currently Used at Home cane, straight   Fall history within last six months no   Activity/Exercise/Self-Care Comment Ind. w/ dressing/bathing/toileting   Instrumental Activities of Daily Living (IADL)   Previous Responsibilities meal prep;housekeeping;laundry;medication management;finances   General Information   Onset of Illness/Injury or Date of Surgery 09/24/22   Referring Physician Amarilis Dent MD   Additional Occupational Profile Info/Pertinent History of Current Problem 67 year old female admitted on 9/24/2022 with LE edema and shortness of breath. Acute CHF   Existing Precautions/Restrictions fall;oxygen therapy device and L/min   Cognitive Status Examination   Orientation Status orientation to person, place and time   Range of Motion Comprehensive   General Range of Motion no range of motion deficits identified   Transfers   Transfers sit-stand transfer   Sit-Stand Transfer   Sit-Stand [place] (Transfers) moderate assist (50% patient effort);minimum assist (75% patient effort);2 person assist   Assistive Device (Sit-Stand Transfers) walker, front-wheeled   Activities of Daily Living   BADL Assessment/Intervention lower body dressing;upper body dressing   Upper Body Dressing Assessment/Training   Position (Upper Body Dressing) unsupported sitting    [place] Level (Upper Body Dressing) minimum assist (75% patient effort);supervision;verbal cues   Lower Body Dressing Assessment/Training   Position (Lower Body Dressing) unsupported sitting   [place] Level (Lower Body Dressing) moderate assist (50% patient effort)   Clinical Impression   Criteria for Skilled Therapeutic Interventions Met (OT) Yes, treatment indicated   OT Diagnosis Decreased ADL ind/safety   OT Problem List-Impairments impacting ADL problems related to;balance;activity tolerance impaired;mobility;strength   Assessment of Occupational Performance 3-5 Performance Deficits   Planned Therapy Interventions (OT) ADL retraining;bed mobility training;strengthening;transfer training   Clinical Decision Making Complexity (OT) moderate complexity   Risk & Benefits of therapy have been explained evaluation/treatment results reviewed;patient   OT Discharge Planning   OT Discharge Recommendation (DC Rec) Transitional Care Facility;home with assist;home with home care occupational therapy   OT Rationale for DC Rec OT recommending TCU as safest d/c option to ensure safety w/ trnf/mobility- Pt requires Assist x2 for trnf/mobility/self cares at this time   Total Evaluation Time (Minutes)   Total Evaluation Time (Minutes) 8   OT Goals   Therapy Frequency (OT) Daily   OT Predicted Duration/Target Date for Goal Attainment 10/04/22   OT Goals Lower Body Dressing;Toilet Transfer/Toileting;Transfers   OT: Lower Body Dressing Modified independent;using adaptive equipment   OT: Transfer Modified independent;with assistive device   OT: Toilet Transfer/Toileting Modified independent;using adaptive equipment

## 2022-09-25 NOTE — CONSULTS
Care Management Initial Consult    General Information  Assessment completed with: Patient, Patient Misty  Type of CM/SW Visit: Initial Assessment    Primary Care Provider verified and updated as needed: Yes   Readmission within the last 30 days: no previous admission in last 30 days         Advance Care Planning: Advance Care Planning Reviewed: no concerns identified          Communication Assessment  Patient's communication style: spoken language (English or Bilingual)    Hearing Difficulty or Deaf: no   Wear Glasses or Blind: yes    Cognitive  Cognitive/Neuro/Behavioral: WDL                      Living Environment:   People in home: child(david), adult  Sons Solange  Current living Arrangements: house      Able to return to prior arrangements: yes (If ok from PT standpoint)       Family/Social Support:  Care provided by: self, child(david)  Provides care for: no one, unable/limited ability to care for self  Marital Status:   Children          Description of Support System: Supportive         Current Resources:   Patient receiving home care services: No     Community Resources:    Equipment currently used at home: cane, straight  Supplies currently used at home:      Employment/Financial:  Employment Status:          Financial Concerns:             Lifestyle & Psychosocial Needs:  Social Determinants of Health     Tobacco Use: Low Risk      Smoking Tobacco Use: Never Smoker     Smokeless Tobacco Use: Never Used   Alcohol Use: Not on file   Financial Resource Strain: Not on file   Food Insecurity: Not on file   Transportation Needs: Not on file   Physical Activity: Not on file   Stress: Not on file   Social Connections: Not on file   Intimate Partner Violence: Not on file   Depression: Not on file   Housing Stability: Not on file       Functional Status:  Prior to admission patient needed assistance:   Dependent ADLs:: Ambulation-cane  Dependent IADLs:: Transportation       Mental Health Status:           Chemical Dependency Status:                Values/Beliefs:  Spiritual, Cultural Beliefs, Baptism Practices, Values that affect care:                 Additional Information:  Assessed. Pt from home with 2 sons Jorge Baker. Pt says Ind at baseline, but does use cane at times. Sons help with running errands, patient has license but hasn't driven recently. No svcs prior. Family can transport.     CM will continue to follow plan of care, review recommendations,and assist with any discharge need anticipated.     Paty Espinal RN

## 2022-09-25 NOTE — PLAN OF CARE
Problem: Dysrhythmia  Goal: Normalized Cardiac Rhythm  Outcome: Ongoing, Progressing     Problem: Cardiac Output Decreased (Heart Failure)  Goal: Optimal Cardiac Output  Outcome: Ongoing, Progressing     Problem: Fluid Imbalance (Heart Failure)  Goal: Fluid Balance  Outcome: Ongoing, Progressing     Problem: Functional Ability Impaired (Heart Failure)  Goal: Optimal Functional Ability  Outcome: Ongoing, Progressing     Problem: Oral Intake Inadequate (Heart Failure)  Goal: Optimal Nutrition Intake  Outcome: Ongoing, Progressing     Problem: Respiratory Compromise (Heart Failure)  Goal: Effective Oxygenation and Ventilation  Outcome: Ongoing, Progressing   Goal Outcome Evaluation:      Pt is alert and oriented  X 4, c/o pain in the legs, Tylenol given with relief. SOB with activity noted, lung sounds diminished, SpO2 90 % on RA., applied O2 at 1 LPM per nasal cannula. SpO2 went up to 92 to 95 %.At 0530 H, pt requested to be off O2, O2 saturation dropped to 87 to 88% on RA., placed O2 at 1 LPM again. Pt denies having diabetes, stated she is not on diabetic medication. She also does not remember being diagnosed with CHF. CHF education packet given to pt. Lasix held, SBP in the 90's. Purewick in place. Will continue to monitor.        Heart Failure Care Map  GOALS TO BE MET BEFORE DISCHARGE:    1. Decrease congestion and/or edema with diuretic therapy to achieve near optimal volume status.     Dyspnea improved: No, further care required to meet this goal. Please explain SOB with activity noted   Edema improved: No, further care required to meet this goal. Please explain severe edema in the lower extremities        Net I/O and Weights since admission:   08/26 0700 - 09/25 0659  In: 243 [P.O.:240; I.V.:3]  Out: 1050 [Urine:1050]  Net: -807     Vitals:    09/24/22 0631 09/24/22 1700 09/25/22 0432   Weight: (!) 154.2 kg (340 lb) (!) 159.8 kg (352 lb 4.7 oz) (!) 159.8 kg (352 lb 4.7 oz)       2.  O2 sats > 90% on room air,  or at prior home O2 therapy level.      Able to wean O2 this shift to keep sats above 90%?: No, further care required to meet this goal. Please explain SpO2 dropped to 87 to 88% on RA.   Does patient use Home O2? No          Current oxygenation status:   SpO2: 94 %     O2 Device: Nasal cannula, Oxygen Delivery: 1 LPM    3.  Tolerates ambulation and mobility near baseline.     Ambulation: No, further care required to meet this goal. Please explain NA   Times patient ambulated with staff this shift: 0    Please review the Heart Failure Care Map for additional HF goal outcomes.    Katelynn Webb RN  9/25/2022

## 2022-09-25 NOTE — CONSULTS
Cardiology Consult Note    Thank you, Dr. Escalante, for asking the [facility] team to see Misty Quezada in consultation at [facility] to evaluate acute on chronic diastolic heart failure exacerbation, mild troponin elevation.      Assessment:   1.  Mild troponin elevation, likely due to congestive heart failure.  Trend not consistent with acute myocardial injury and echocardiogram yesterday demonstrated normal LV systolic function without wall motion abnormality.  2.  Acute on chronic HFpEF.  Patient's echocardiogram yesterday demonstrated normal LV systolic function with mild concentric left ventricular hypertrophy.  Aortic valve moderately stenotic.  Severe tricuspid regurgitation with at least mild-moderate pulmonary hypertension.  We will continue diuretic therapy but switch to furosemide infusion given extensive lower extremity edema.  We will also add spironolactone which may provide additional benefit.  3.  Essential hypertension, well controlled.  4.  Severe lower extremity edema, likely due to a combination of venous insufficiency due to severe morbid obesity, lymphedema as well as pulmonary hypertension.  Again we will switch to furosemide infusion for the next 24 hours to see if we can mobilize fluid quicker.  5.  Super morbid obesity     Plan:   1.  Change from intermittent IV Lasix to furosemide infusion and add spironolactone 25 mg daily  2.  No need to pursue ischemic work-up at this time  3.  Consider inpatient nocturnal oximetry run to assess for possible MIKEL.    Primary cardiologist: Dr. Alphonse Rocha     Current History:   Ms. Misty Quezada is a 67 year old female with history of super morbid obesity, chronic atrial fibrillation on Eliquis anticoagulation, essential hypertension who presented to the ED with progressive worsening of her lower extremity edema.  States her lower extremities became more swollen approximately 2 to 3 months ago.  Was advised by  primary physician to begin wearing compression stockings; however, the patient found it quite difficult to get the stockings on and thus never wore them.  She also did not follow back with her primary physician to assess for alternative treatments.  Over the last 2 months, her legs became increasingly more swollen and she reports having gained approximately 70 pounds during this time.  Also noted some increased shortness of breath but denied any associated chest discomfort, increased orthopnea, PND.    Past Medical History:     Past Medical History:   Diagnosis Date     Benign essential hypertension      Chronic atrial fibrillation (H)        Past Surgical History:     Past Surgical History:   Procedure Laterality Date     APPENDECTOMY       CHOLECYSTECTOMY       GASTRIC BYPASS         Family History:     Family History   Problem Relation Age of Onset     No Known Problems Mother      No Known Problems Father        Social History:    reports that she has never smoked. She has never used smokeless tobacco. She reports previous alcohol use. She reports that she does not use drugs.    Meds:     Current Facility-Administered Medications:      acetaminophen (TYLENOL) tablet 975 mg, 975 mg, Oral, Q4H PRN, Khadra Escalante MD, 975 mg at 09/24/22 2334     apixaban ANTICOAGULANT (ELIQUIS) tablet 5 mg, 5 mg, Oral, BID, Khadra Escalante MD, 5 mg at 09/24/22 2023     bisacodyl (DULCOLAX) suppository 10 mg, 10 mg, Rectal, Daily PRN, Khadra Escalante MD     calcium carbonate-vitamin D (OS-TURNER with D) per tablet, , Oral, Daily, Khadra Escalante MD     citalopram (celeXA) tablet 20 mg, 20 mg, Oral, Daily, Khadra Escalante MD, 20 mg at 09/24/22 4700     Continuing ACE inhibitor/ARB/ARNI from home medication list OR ACE inhibitor/ARB/ARNI order already placed during this visit, , Does not apply, DOES NOT GO TO Boris PHILLIPS Abigail L, MD     Continuing beta blocker from home medication list OR beta blocker order already  placed during this visit, , Does not apply, DOES NOT GO TO MARBoris Abigail L, MD     cyanocobalamin (VITAMIN B-12) tablet 1,000 mcg, 1,000 mcg, Oral, Daily, Khadra Escalante MD, 1,000 mcg at 09/24/22 1429     furosemide (LASIX) injection 40 mg, 40 mg, Intravenous, Q12H, Herberth Kay MD     lidocaine (LMX4) cream, , Topical, Q1H PRN, Khadra Escalante MD     lidocaine 1 % 0.1-1 mL, 0.1-1 mL, Other, Q1H PRN, Khadra Escalante MD     losartan (COZAAR) tablet 25 mg, 25 mg, Oral, Daily, Khadra Escalante MD, 25 mg at 09/24/22 1429     melatonin tablet 1 mg, 1 mg, Oral, At Bedtime PRN, Khadra Escalante MD     metoprolol succinate ER (TOPROL XL) 24 hr tablet 25 mg, 25 mg, Oral, Daily, Khadra Escalante MD, 25 mg at 09/24/22 1424     miconazole (MICATIN) 2 % powder, , Topical, BID, Herberth Kay MD, Given at 09/24/22 2100     multivitamin w/minerals (THERA-VIT-M) tablet, , Oral, Daily, Khadra Escalante MD, 1 tablet at 09/24/22 1425     ondansetron (ZOFRAN ODT) ODT tab 4 mg, 4 mg, Oral, Q6H PRN **OR** ondansetron (ZOFRAN) injection 4 mg, 4 mg, Intravenous, Q6H PRN, Khadra Escalante MD     Patient is already receiving anticoagulation with heparin, enoxaparin (LOVENOX), warfarin (COUMADIN)  or other anticoagulant medication, , Does not apply, Continuous PRN, Khadra Escalante MD     polyethylene glycol (MIRALAX) Packet 17 g, 17 g, Oral, Daily, Khadra Escalante MD     prochlorperazine (COMPAZINE) injection 5 mg, 5 mg, Intravenous, Q6H PRN **OR** prochlorperazine (COMPAZINE) tablet 5 mg, 5 mg, Oral, Q6H PRN **OR** prochlorperazine (COMPAZINE) suppository 12.5 mg, 12.5 mg, Rectal, Q12H PRN, Khadra Escalante MD     sodium chloride (PF) 0.9% PF flush 3 mL, 3 mL, Intracatheter, Q8H, Khadra Escalante MD, 3 mL at 09/25/22 0437     sodium chloride (PF) 0.9% PF flush 3 mL, 3 mL, Intracatheter, q1 min prn, Khadra Escalante MD     vitamin C (ASCORBIC ACID) tablet 1,000 mg, 1,000 mg, Oral, Daily, Boris  "Khadra REEVES MD, 1,000 mg at 09/24/22 1428     Vitamin D3 (CHOLECALCIFEROL) tablet 2,000 Units, 2,000 Units, Oral, Daily, Khadra Escalante MD, 2,000 Units at 09/24/22 1425    apixaban ANTICOAGULANT  5 mg Oral BID     calcium carbonate-vitamin D   Oral Daily     citalopram  20 mg Oral Daily     cyanocobalamin  1,000 mcg Oral Daily     furosemide  40 mg Intravenous Q12H     losartan  25 mg Oral Daily     metoprolol succinate ER  25 mg Oral Daily     miconazole   Topical BID     multivitamin w/minerals   Oral Daily     polyethylene glycol  17 g Oral Daily     sodium chloride (PF)  3 mL Intracatheter Q8H     ascorbic acid  1,000 mg Oral Daily     Vitamin D3  2,000 Units Oral Daily       Allergies:   Patient has no known allergies.    Review of Systems:   A 12 point comprehensive review of systems was negative except as noted in the current history.    Objective:      Physical Exam  Body mass index is 55.18 kg/m .  /55 (BP Location: Right arm)   Pulse 92   Temp 98.5  F (36.9  C) (Oral)   Resp 20   Ht 1.702 m (5' 7\")   Wt (!) 159.8 kg (352 lb 4.7 oz)   SpO2 99%   BMI 55.18 kg/m      General Appearance:    Well developed, morbidly obese female in no acute distress sitting in the chair.   HEENT:   Normocephalic, atraumatic.  Sclera nonicteric.  Mucous membranes moist.   Neck:  No appreciable jugular venous distention at 90 degrees although this is difficult to note given her large girth.   Chest:  Symmetric with normal AP diameter   Lungs:    Diffuse end expiratory wheezes.  Occasional rales.   Cardiovascular:    Irregularly irregular rhythm.  S1 normal, S2 reduced.  1/6 mid-to-late peaking crescendo decrescendo systolic murmur heard at the left upper sternal border.   Abdomen:   Morbidly obese, soft.   Extremities:  Lower extremity wraps in place.  3+ pitting edema to the mid thigh.   Skin:  No obvious rash or lesions although given lower extremities are wrapped   Neurologic:  Alert and oriented x3.  No gross " focal deficits.             Cardiographics (personally reviewed)  ECG demonstrates atrial fibrillation, low voltage, nonspecific T wave abnormality.  No change when compared to prior ECG.    Imaging (personally reviewed)  Procedure  Complete Echo Adult. Definity (NDC #62869-011) given intravenously.  Technically difficult study.Extremely difficult acoustic windows despite the  use of contrast for endcardial border definition. Compared to the prior study  dated 8/22/2021, there have been no changes.  ______________________________________________________________________________  Interpretation Summary     1. Left ventricular size is normal. Mild concentric increase in wall  thickness. Systolic function is normal. The estimated left ventricular  ejection fraction is 55-60%.  2. Right ventricle is not well visualized. Size is probably at least  moderately enlarged. Systolic function appears normal.  3. Severe right atrial enlargement.  4. Calcified trileaflet aortic valve with moderate aortic stenosis. Peak  forward velocity measures 2.8 m/s, mean gradient 17 mm Hg, calculated aortic  valve area 1.1 cm2, and dimensionless index 0.44. Trace aortic regurgitation.  5. Severe functional tricuspid regurgitation.  6. At least mild pulmonary hypertension is present with an estimated pulmonary  artery systolic pressure of 39 mm Hg plus the right atrial pressure.  7. Compared to the prior study dated 8/22/2021, there has been no significant  change.  ______________________________________________________________________________      Lab Review (personally reviewed all results)  Recent Labs   Lab Test 09/09/16  1412   CHOL 163   HDL 42*      TRIG 81     Recent Labs   Lab Test 09/09/16  1412 10/10/14  1207    118     Recent Labs   Lab Test 09/25/22  0438      POTASSIUM 3.8   CHLORIDE 105   CO2 26   GLC 89   BUN 19.3   CR 0.72   GFRESTIMATED >90   TURNER 8.8     Recent Labs   Lab Test 09/25/22  0438 09/24/22  0889  09/21/21  1346   CR 0.72 0.68 0.73     Recent Labs   Lab Test 09/02/21  0457   A1C 6.2*          Recent Labs   Lab Test 09/25/22  0438   WBC 4.8   HGB 9.9*   HCT 32.0*   MCV 90        Recent Labs   Lab Test 09/25/22  0438 09/24/22  0850 09/21/21  1346   HGB 9.9* 10.6* 11.9    Recent Labs   Lab Test 08/21/21  1944 08/21/21  1426 08/21/21  0820   TROPONINI 1.32* 1.87* 1.75*     Recent Labs   Lab Test 09/24/22  0850 09/02/21  0457 08/21/21  0820   BNP  --  199* 281*   NTBNP 1,898*  --   --      Recent Labs   Lab Test 08/31/21  0720   TSH 4.92     Recent Labs   Lab Test 08/21/21  0820   INR 1.44*

## 2022-09-26 ENCOUNTER — TELEPHONE (OUTPATIENT)
Dept: CARDIOLOGY | Facility: CLINIC | Age: 67
End: 2022-09-26

## 2022-09-26 ENCOUNTER — APPOINTMENT (OUTPATIENT)
Dept: PHYSICAL THERAPY | Facility: HOSPITAL | Age: 67
DRG: 280 | End: 2022-09-26
Payer: MEDICARE

## 2022-09-26 ENCOUNTER — APPOINTMENT (OUTPATIENT)
Dept: OCCUPATIONAL THERAPY | Facility: HOSPITAL | Age: 67
DRG: 280 | End: 2022-09-26
Payer: MEDICARE

## 2022-09-26 DIAGNOSIS — I50.9 ACUTE DECOMPENSATED HEART FAILURE (H): Primary | ICD-10-CM

## 2022-09-26 LAB
HOLD SPECIMEN: NORMAL
MAGNESIUM SERPL-MCNC: 1.6 MG/DL (ref 1.7–2.3)
POTASSIUM SERPL-SCNC: 3.8 MMOL/L (ref 3.4–5.3)

## 2022-09-26 PROCEDURE — 250N000009 HC RX 250: Performed by: INTERNAL MEDICINE

## 2022-09-26 PROCEDURE — 84132 ASSAY OF SERUM POTASSIUM: CPT | Performed by: FAMILY MEDICINE

## 2022-09-26 PROCEDURE — 97165 OT EVAL LOW COMPLEX 30 MIN: CPT | Mod: GO

## 2022-09-26 PROCEDURE — 97110 THERAPEUTIC EXERCISES: CPT | Mod: GO

## 2022-09-26 PROCEDURE — 97535 SELF CARE MNGMENT TRAINING: CPT | Mod: GO

## 2022-09-26 PROCEDURE — 250N000013 HC RX MED GY IP 250 OP 250 PS 637: Performed by: INTERNAL MEDICINE

## 2022-09-26 PROCEDURE — 97535 SELF CARE MNGMENT TRAINING: CPT | Mod: GP | Performed by: PHYSICAL THERAPIST

## 2022-09-26 PROCEDURE — 99232 SBSQ HOSP IP/OBS MODERATE 35: CPT | Performed by: INTERNAL MEDICINE

## 2022-09-26 PROCEDURE — 99233 SBSQ HOSP IP/OBS HIGH 50: CPT | Performed by: INTERNAL MEDICINE

## 2022-09-26 PROCEDURE — 210N000001 HC R&B IMCU HEART CARE

## 2022-09-26 PROCEDURE — 83735 ASSAY OF MAGNESIUM: CPT | Performed by: FAMILY MEDICINE

## 2022-09-26 PROCEDURE — 250N000013 HC RX MED GY IP 250 OP 250 PS 637: Performed by: FAMILY MEDICINE

## 2022-09-26 PROCEDURE — 36415 COLL VENOUS BLD VENIPUNCTURE: CPT | Performed by: FAMILY MEDICINE

## 2022-09-26 RX ADMIN — APIXABAN 5 MG: 5 TABLET, FILM COATED ORAL at 20:40

## 2022-09-26 RX ADMIN — ACETAMINOPHEN 975 MG: 325 TABLET, FILM COATED ORAL at 08:56

## 2022-09-26 RX ADMIN — LOSARTAN POTASSIUM 25 MG: 25 TABLET, FILM COATED ORAL at 08:27

## 2022-09-26 RX ADMIN — Medication 1000 MCG: at 08:27

## 2022-09-26 RX ADMIN — MICONAZOLE NITRATE: 2 POWDER TOPICAL at 08:28

## 2022-09-26 RX ADMIN — Medication 1 TABLET: at 08:27

## 2022-09-26 RX ADMIN — APIXABAN 5 MG: 5 TABLET, FILM COATED ORAL at 08:27

## 2022-09-26 RX ADMIN — MICONAZOLE NITRATE 1 G: 2 POWDER TOPICAL at 20:41

## 2022-09-26 RX ADMIN — FUROSEMIDE 10 MG/HR: 10 INJECTION, SOLUTION INTRAVENOUS at 12:06

## 2022-09-26 RX ADMIN — SPIRONOLACTONE 25 MG: 25 TABLET, FILM COATED ORAL at 08:27

## 2022-09-26 RX ADMIN — Medication 1000 MG: at 08:26

## 2022-09-26 RX ADMIN — CITALOPRAM HYDROBROMIDE 20 MG: 20 TABLET ORAL at 08:27

## 2022-09-26 RX ADMIN — POLYETHYLENE GLYCOL 3350 17 G: 17 POWDER, FOR SOLUTION ORAL at 08:27

## 2022-09-26 RX ADMIN — Medication 2000 UNITS: at 08:27

## 2022-09-26 RX ADMIN — Medication 1 TABLET: at 08:26

## 2022-09-26 RX ADMIN — METOPROLOL SUCCINATE 25 MG: 25 TABLET, EXTENDED RELEASE ORAL at 08:27

## 2022-09-26 ASSESSMENT — ACTIVITIES OF DAILY LIVING (ADL)
ADLS_ACUITY_SCORE: 34

## 2022-09-26 NOTE — PROGRESS NOTES
Care Management Follow Up    Length of Stay (days): 2    Expected Discharge Date: 09/28/2022     Concerns to be Addressed: discharge planning       Patient plan of care discussed at interdisciplinary rounds: Yes    Anticipated Discharge Disposition: transitional care      Anticipated Discharge Services: transitional care   Anticipated Discharge DME: per treatment team     Patient/family educated on Medicare website which has current facility and service quality ratings: yes   Education Provided on the Discharge Plan: yes   Patient/Family in Agreement with the Plan: yes     Referrals Placed by CM/SW: no referrals have been placed at this time   Private pay costs discussed: Not applicable    Additional Information:  2:49 PM  SW met and spoke with Pt about therapy recommendations for TCU. Pt is agreeable to TCU and reports she has been to transitional care in the past. Pt reports she does not recall the name of the facility. SW provided Pt with bariatric TCU list.     SW reviewed Pt's chart and found that she went to [facility] several years ago. Facility is no longer open. CM to follow up with Pt tomorrow regarding TCU preferences.    LISA Horan

## 2022-09-26 NOTE — PLAN OF CARE
Assumed care 2300 to 0730. A&O x 4. Assist x 2-3 with a gait belt and walker. Tele is A.fib. Denies pain. Purewick in place. Pt slept for the majority of the shift. Call light within reach, able to make needs known. Bed alarm on for safety.    Problem: Sleep Disordered Breathing (Heart Failure)  Goal: Effective Breathing Pattern During Sleep  Outcome: Ongoing, Progressing     Problem: Respiratory Compromise (Heart Failure)  Goal: Effective Oxygenation and Ventilation  Outcome: Ongoing, Progressing  Intervention: Promote Airway Secretion Clearance  Recent Flowsheet Documentation  Taken 9/26/2022 0400 by LOVE TELLES  Cough And Deep Breathing: done with encouragement  Taken 9/26/2022 0035 by LOVE TELLES  Cough And Deep Breathing: done with encouragement

## 2022-09-26 NOTE — PROGRESS NOTES
[facility]    Medicine Progress Note - Hospitalist Service    Date of Admission:  9/24/2022    Assessment & Plan        Misty Quezada is a 67 year old female admitted on 9/24/2022 with LE edema and shortness of breath.    Acute CHF, troponin elevation  - Echo 9/24: Normal LV size and function with mild concentric wall thickness, EF 55 to 60%, moderately enlarged right ventricle, severe right atrial enlargement, mild pulmonary hypertension  - Lasix infusion-net of -2790, continue I's and O's  - Continue metoprolol and ARB  - Strict I/O, low sodium diet  - Cardiology consulted, appreciate recomendations    LE edema, acute on chronic  - venous insufficieny with new HF  - Lymphedema therapy, admits to noncompliance with TEDs at home, encouraged use and putting feet up  - Diuretics as above    Generalized weakness  -Due to heart failure and deconditioning  -PT and OT consulted  -Plan for TCU at discharge    MIKEL with nocturnal hypoxia  - echo shows pulmonary hypertension and enlarged right heart   - she is a never smoker and does not have asthma or other known lung disease  - BMI 55  - was observed by nursing to have hypoxia while sleeping  - has to sleep in sitting position at home  - overnight oximetry by RT 9/25-9/26: Had a total of 33 desaturations (less than 89% SPO2 for at least 10 seconds) with the lowest to 76%, spent approximately 8.6% of the night with desaturations below 90%    Anemia  -Has a history of iron deficiency anemia, will check iron levels  -Continue to monitor    Hypomagnesemia  -Will monitor and replace with RN replacement protocol    Obesity  - Body mass index is 55    Constipation  - Start scheduled Miralax with prn Dolcolax supp    Chronic AF  -Continue metoprolol for rate control  - Continue eliquis    Disposition: Will need TCU at discharge for generalized weakness however will require bariatric placement which will complicate process     Diet: 2 Gram Sodium Diet  "Other - please comment    DVT Prophylaxis: DOAC  Hi Catheter: Not present  Central Lines: None  Cardiac Monitoring: ACTIVE order. Indication: Acute decompensated heart failure (48 hours)  Code Status: Full Code      Disposition Plan      Expected Discharge Date: 09/28/2022        Discharge Comments: Lasix gtt.  Need TCU.  Bariatric is barrier to placement.        The patient's care was discussed with the Patient.    Amarilis Dent MD  Hospitalist Service  [facility]  Securely message with the Vocera Web Console (learn more here)  Text page via [employer] Paging/Directory         Clinically Significant Risk Factors Present on Admission               # Severe Obesity: Estimated body mass index is 55.97 kg/m  as calculated from the following:    Height as of this encounter: 1.702 m (5' 7\").    Weight as of this encounter: 162.1 kg (357 lb 5.9 oz).        ______________________________________________________________________    Interval History   She is doing well today.  She is not having any complaints.  She continues to have 3+ swelling to the thigh.  She did have good output yesterday with a net output of 2790.  Today so far she has had an output of 1895.    Data reviewed today: I reviewed all medications, new labs and imaging results over the last 24 hours. I personally reviewed no images or EKG's today.    Physical Exam   Vital Signs: Temp: 97.5  F (36.4  C) Temp src: Oral BP: 102/58 Pulse: 86   Resp: 20 SpO2: 97 % O2 Device: Nasal cannula Oxygen Delivery: 1 LPM  Weight: 357 lbs 5.85 oz  General Appearance: Awake, alert, in no acute distress  Respiratory: CTAB, no wheeze  Cardiovascular: Irregular, 3+ pitting edema to the thigh  GI: soft, nontender, non distended, normal bowel sounds  Skin: no jaundice, no rash    Data   Recent Labs   Lab 09/26/22  0443 09/25/22  0438 09/24/22  1654 09/24/22  0850   WBC  --  4.8  --  5.8   HGB  --  9.9*  --  10.6*   MCV  --  90  --  90   PLT  --  262  -- "  295   NA  --  142  --  141   POTASSIUM 3.8 3.8  --  5.2   CHLORIDE  --  105  --  105   CO2  --  26  --  24   BUN  --  19.3  --  19.2   CR  --  0.72  --  0.68   ANIONGAP  --  11  --  12   TURNER  --  8.8  --  9.0   GLC  --  89 126* 98     Medications     Continuing ACE inhibitor/ARB/ARNI from home medication list OR ACE inhibitor/ARB order already placed during this visit       - MEDICATION INSTRUCTIONS -       furosemide 10 mg/hr (09/26/22 1206)     - MEDICATION INSTRUCTIONS -         apixaban ANTICOAGULANT  5 mg Oral BID     calcium carbonate-vitamin D   Oral Daily     citalopram  20 mg Oral Daily     cyanocobalamin  1,000 mcg Oral Daily     losartan  25 mg Oral Daily     metoprolol succinate ER  25 mg Oral Daily     miconazole   Topical BID     multivitamin w/minerals   Oral Daily     polyethylene glycol  17 g Oral Daily     sodium chloride (PF)  3 mL Intracatheter Q8H     spironolactone  25 mg Oral Daily

## 2022-09-26 NOTE — CONSULTS
"NUTRITION EDUCATION      REASON FOR ASSESSMENT:  Provider order for 2 gm sodium nutrition education    Admitted w/ acute CHF (no h/o), acute on chronic LE edema, chronic a-fib, obesity, constipation    NUTRITION HISTORY:  - pt typically enjoys pasta, meats, fruits, salty and sweet foods  - uses salt shaker \"typically\"  - has not had 2 gm sodium nutrition education in past  - reports no use of alcohol or smoking  - very receptive of education and expressed motivation to implement changes over time    CURRENT DIET:  2 gm sodium, no caffeine, 1800 mL fluid restriction    NUTRITION DIAGNOSIS:  Decreased sodium need related to chronic illness as evidenced by new diagnosis of heart failure with preserved ejection fraction 55-60%.    INTERVENTIONS:    Nutrition Prescription:  2000 mg sodium limit daily    Implementation:      *  Nutrition Education (Content):   A)  Provided handout on 2000 mg sodium restriction, low sodium shopping guide, low sodium recipe book   B)  Discussed ways to incorporate information from guidelines into sustainable practice long term      *  Nutrition Education (Application):   A)  Discussed current eating habits and recommended alternative food choices      *  Anticipate good compliance      *  Diet Education - refer to Education Flowsheet    Goals:      *  Patient will verbalize understanding of diet      *  All of the above goals met during the education session    Follow Up/Monitoring:      *  Provided RD contact information for future questions      *  Recommended Out-Patient Nutrition Referral, if further diet instructions are needed          "

## 2022-09-26 NOTE — PLAN OF CARE
Heart Failure Care Map  GOALS TO BE MET BEFORE DISCHARGE:    1. Decrease congestion and/or edema with diuretic therapy to achieve near optimal volume status.     Dyspnea improved: No, pt very short of breath with activity on 4L.   Edema improved: Yes, satisfactory for discharge.        Net I/O and Weights since admission:   08/27 1500 - 09/26 1459  In: 1452.85 [P.O.:1320; I.V.:132.85]  Out: 6000 [Urine:6000]  Net: -4547.15     Vitals:    09/24/22 0631 09/24/22 1700 09/25/22 0432 09/26/22 0409   Weight: (!) 154.2 kg (340 lb) (!) 159.8 kg (352 lb 4.7 oz) (!) 159.8 kg (352 lb 4.7 oz) (!) 162.1 kg (357 lb 5.9 oz)       2.  O2 sats > 90% on room air, or at prior home O2 therapy level.      Able to wean O2 this shift to keep sats above 90%?: No, further care required to meet this goal. Please explain Pt continues on 2L per NC.  Pt desats to 80's on RA.   Does patient use Home O2? No          Current oxygenation status:   SpO2: 92 %     O2 Device: Nasal cannula, 2L per NC    3.  Tolerates ambulation and mobility near baseline.     Ambulation: No, further care required to meet this goal. Please explain     Times patient ambulated with staff this shift: 0    Please review the Heart Failure Care Map for additional HF goal outcomes.    Karen Peterson RN  9/26/2022

## 2022-09-26 NOTE — PROGRESS NOTES
Completed CHF education, including overview of what CHF is, daily weights, low salt diet, monitoring SOB and edema, and medication adherence. Instructed pt on when to call MD.RN went over CHF booklet.  Pt does have a working scale at home.  RN discussed importance of close follow up once discharged. Pt stated understanding to all education.  Per pt, RD has already done education on diet. Pt expressed an interest in trying Mrs. Chung for a salt substitute.

## 2022-09-26 NOTE — PLAN OF CARE
Problem: Dysrhythmia  Goal: Normalized Cardiac Rhythm  Outcome: Ongoing, Progressing     Problem: Functional Ability Impaired (Heart Failure)  Goal: Optimal Functional Ability  Intervention: Optimize Functional Ability  Recent Flowsheet Documentation  Taken 9/25/2022 2058 by Katelynn Webb RN  Activity Management: activity adjusted per tolerance  Taken 9/25/2022 1615 by Katelynn Webb RN  Activity Management: activity adjusted per tolerance     Problem: Fluid Imbalance (Heart Failure)  Goal: Fluid Balance  Outcome: Ongoing, Progressing   Goal Outcome Evaluation:      Pt c/o leg pain and neck pain, Tylenol given with some relief. For neck pain, ice pack applied and rolled blanket placed to support her neck which pt verbalized it was helpful. Lung sounds clear. O2 was replaced when she was sleeping, SpO2 dropped to 85% on RA, placed O2 at 1 LPM per nasal cannula, it came up to 94%. HR in the 80's, afib. On Lasix drip at 1 ml/hr, NS as carrier at 9 ml/hr. SBP in the 90's to 110's. Pulse oximetry overnight study ordered by MD, RT was notified. Will continue to monitor.     Heart Failure Care Map  GOALS TO BE MET BEFORE DISCHARGE:    1. Decrease congestion and/or edema with diuretic therapy to achieve near optimal volume status.     Dyspnea improved: No, still has SOB with activity   Edema improved: Still has plus 3 edema in BLE        Net I/O and Weights since admission:   08/26 2300 - 09/25 2259  In: 1452.85 [P.O.:1320; I.V.:132.85]  Out: 3000 [Urine:3000]  Net: -1547.15     Vitals:    09/24/22 0631 09/24/22 1700 09/25/22 0432   Weight: (!) 154.2 kg (340 lb) (!) 159.8 kg (352 lb 4.7 oz) (!) 159.8 kg (352 lb 4.7 oz)       2.  O2 sats > 90% on room air, or at prior home O2 therapy level.      Able to wean O2 this shift to keep sats above 90%?: No, further care required to meet this goal. Please explain SpO2 while awake was 92 to 94% but dropped to 85% on RA when sleeping.   Does patient use Home O2?  No          Current oxygenation status:   SpO2: 94 %     O2 Device: Nasal cannula, Oxygen Delivery: 1 LPM    3.  Tolerates ambulation and mobility near baseline.     Ambulation: No, further care required to meet this goal. Please explain Pt felt tired tonight   Times patient ambulated with staff this shift: 0    Please review the Heart Failure Care Map for additional HF goal outcomes.    Katelynn Webb RN  9/25/2022

## 2022-09-26 NOTE — PROGRESS NOTES
HEART CARE NOTE          Assessment/Recommendations     1. HFpEF, Valvular heart disease c/b ADHF  Assessment / Plan    Hypervolemic on physical exam, but diuresing well on current regimen     GDMT as detailed below; mainstay of treatment for HFpEF includes diuretics and adequate BP control (class I) and SGLT2-I (class 2a); additional medical therapy (ARNI, MRA, ARB) demonstrated less robust evidence for indication but may be considered per guideline recommendations (2b); no indication for BBlockers     Current Pharmacotherapy AHA Guideline-Directed Medical Therapy   Losartan 25 mg daily ARNI/ARB   Spironolactone 25 mg  MRA   SGLT2 inhibitor not started SGLT2-I    Furosemide gtt Loop diuretic        2. Atrial fibrillation  Assessment / Plan    Rate controlled - continue metoprolol, and apixaban     3. Elevated troponin  Assessment / Plan    Mild elevated in the setting of afib and ADHF -- likely type 2 MI int he setting of demand ischemia; echo without WMAs and patient chest pain free and hemodynamically stable - no further cardiac testing indicated at this time    4. Moderate aortic stenosis  Assessment / Plan    LORRAINE 1.1, pk/mn 2.78/17.4 - will discuss with valve clinic regarding potential interventions    History of Present Illness/Subjective    Ms. Misty Quezada is a 67 year old female with a PMHx significant for atrial fibrillation who presented in ADHF in the setting of valvular heart disease.    Today, Mrs. Quezada denies any acute cardiac events or complaints    ECG: Personally reviewed. nonspecific ST and T waves changes, atrial fibrillation, with RVR    ECHO (personnaly Reviewed):  Interpretation Summary     1. Left ventricular size is normal. Mild concentric increase in wall  thickness. Systolic function is normal. The estimated left ventricular  ejection fraction is 55-60%.  2. Right ventricle is not well visualized. Size is probably at least  moderately enlarged. Systolic function appears  "normal.  3. Severe right atrial enlargement.  4. Calcified trileaflet aortic valve with moderate aortic stenosis. Peak  forward velocity measures 2.8 m/s, mean gradient 17 mm Hg, calculated aortic  valve area 1.1 cm2, and dimensionless index 0.44. Trace aortic regurgitation.  5. Severe functional tricuspid regurgitation.  6. At least mild pulmonary hypertension is present with an estimated pulmonary  artery systolic pressure of 39 mm Hg plus the right atrial pressure.  7. Compared to the prior study dated 8/22/2021, there has been no significant  change.        Physical Examination Review of Systems   /64 (BP Location: Right arm)   Pulse 82   Temp 97.8  F (36.6  C) (Oral)   Resp 20   Ht 1.702 m (5' 7\")   Wt (!) 162.1 kg (357 lb 5.9 oz)   SpO2 98%   BMI 55.97 kg/m    Body mass index is 55.97 kg/m .  Wt Readings from Last 3 Encounters:   09/26/22 (!) 162.1 kg (357 lb 5.9 oz)   09/21/21 130.2 kg (287 lb)   08/27/21 129.9 kg (286 lb 6.4 oz)     General Appearance:   no distress, normal body habitus   ENT/Mouth: membranes moist, no oral lesions or bleeding gums.      EYES:  no scleral icterus, normal conjunctivae   Neck: no carotid bruits or thyromegaly   Chest/Lungs:   lungs are clear to auscultation, no rales or wheezing, equal chest wall expansion    Cardiovascular:   Irregular. Normal first and second heart sounds with + JOHN; no murmurs, rubs, or gallops; the carotid, radial and posterior tibial pulses are intact, + JVD and LE edema bilaterally    Abdomen:  no organomegaly, masses, bruits, or tenderness; bowel sounds are present   Extremities: no cyanosis or clubbing   Skin: no xanthelasma, warm.    Neurologic: calm     Psychiatric:  calm     A complete 10 systems ROS was reviewed  And is negative except what is listed in the HPI.          Medical History  Surgical History Family History Social History   Past Medical History:   Diagnosis Date     Benign essential hypertension      Chronic atrial " fibrillation (H)     Past Surgical History:   Procedure Laterality Date     APPENDECTOMY       CHOLECYSTECTOMY       GASTRIC BYPASS      no family history of premature coronary artery disease Social History     Socioeconomic History     Marital status:      Spouse name: Not on file     Number of children: Not on file     Years of education: Not on file     Highest education level: Not on file   Occupational History     Not on file   Tobacco Use     Smoking status: Never Smoker     Smokeless tobacco: Never Used   Substance and Sexual Activity     Alcohol use: Not Currently     Comment: Rarely Uses alcohol     Drug use: Never     Sexual activity: Not on file   Other Topics Concern     Not on file   Social History Narrative     Not on file     Social Determinants of Health     Financial Resource Strain: Not on file   Food Insecurity: Not on file   Transportation Needs: Not on file   Physical Activity: Not on file   Stress: Not on file   Social Connections: Not on file   Intimate Partner Violence: Not on file   Housing Stability: Not on file           Lab Results    Chemistry/lipid CBC Cardiac Enzymes/BNP/TSH/INR   Lab Results   Component Value Date    CHOL 163 09/09/2016    HDL 42 (L) 09/09/2016    TRIG 81 09/09/2016    BUN 19.3 09/25/2022     09/25/2022    CO2 26 09/25/2022    Lab Results   Component Value Date    WBC 4.8 09/25/2022    HGB 9.9 (L) 09/25/2022    HCT 32.0 (L) 09/25/2022    MCV 90 09/25/2022     09/25/2022    Lab Results   Component Value Date    TROPONINI 1.32 (HH) 08/21/2021     (H) 09/02/2021    TSH 4.92 08/31/2021    INR 1.44 (H) 08/21/2021     Lab Results   Component Value Date    TROPONINI 1.32 (HH) 08/21/2021          Weight:    Wt Readings from Last 3 Encounters:   09/26/22 (!) 162.1 kg (357 lb 5.9 oz)   09/21/21 130.2 kg (287 lb)   08/27/21 129.9 kg (286 lb 6.4 oz)       Allergies  No Known Allergies      Surgical History  Past Surgical History:   Procedure Laterality  Date     APPENDECTOMY       CHOLECYSTECTOMY       GASTRIC BYPASS         Social History  Tobacco:   History   Smoking Status     Never Smoker   Smokeless Tobacco     Never Used    Alcohol:   Social History    Substance and Sexual Activity      Alcohol use: Not Currently        Comment: Rarely Uses alcohol   Illicit Drugs:   History   Drug Use Unknown       Family History  Family History   Problem Relation Age of Onset     No Known Problems Mother      No Known Problems Father           Tara Siegel MD on 9/26/2022      cc: Tiff Chavez

## 2022-09-26 NOTE — PROGRESS NOTES
[facility]    Medicine Progress Note - Hospitalist Service    Date of Admission:  9/24/2022    Assessment & Plan        Misty Quezada is a 67 year old female admitted on 9/24/2022 with LE edema and shortness of breath.    1. Acute CHF, troponin elevation  - No h/o this per pt, does have mod aortic stenosis so could be AF with poor rate control, worsening valve disease vs new cardiac event  - Echo completed  - Lasix infusion  - Continue metoprolol and ARB  - Strict I/O, low sodium diet  - Cardiology consulted, appreciate recomendations    2. LE edema, acute on chronic  -- venous insufficieny with new HF  -- Lymphedema therapy, admits to noncompliance with TEDs at home, encouraged use and putting feet up  -- Diuretics as above    3. Likely MIKEL  -- echo shows pulmonary hypertension and enlarged right heart   -- she is a never smoker and does not have asthma or other known lung disease  -- BMI 55  -- was observed by nursing to have hypoxia while sleeping  -- has to sleep in sitting position at home  -- overnight oximetry by RT     4. Obesity  -- Body mass index is 55    5. Constipation  -- Start scheduled Miralax with prn Dolcolax supp    6. Chronic AF  -- Rate control  -- Continue eliquis         Diet: 2 Gram Sodium Diet Other - please comment    DVT Prophylaxis: DOAC  Hi Catheter: Not present  Central Lines: None  Cardiac Monitoring: ACTIVE order. Indication: Acute decompensated heart failure (48 hours)  Code Status: Full Code      Disposition Plan     Expected Discharge Date: 09/26/2022                The patient's care was discussed with the Bedside Nurse and Patient.    Amarilis Dent MD  Hospitalist Service  [facility]  Securely message with the Vocera Web Console (learn more here)  Text page via [employer] Paging/Directory         Clinically Significant Risk Factors Present on Admission               # Severe Obesity: Estimated body mass index is 55.18 kg/m   "as calculated from the following:    Height as of this encounter: 1.702 m (5' 7\").    Weight as of this encounter: 159.8 kg (352 lb 4.7 oz).        ______________________________________________________________________    Interval History   Mrs. Quezada states her legs are still very swollen. They do get edema but it is the worst it has been at this time. She is seeing vascular for her venous insufficiency. She was recommended to see sleep in the past but didn't follow up. She wants to get the vascular work up done first and I told her she can easily do both at the same time.    Data reviewed today: I reviewed all medications, new labs and imaging results over the last 24 hours. I personally reviewed no images or EKG's today.    Physical Exam   Vital Signs: Temp: 98  F (36.7  C) Temp src: Oral BP: 112/58 Pulse: 79   Resp: 20 SpO2: 91 % O2 Device: None (Room air) Oxygen Delivery: 1 LPM  Weight: 352 lbs 4.72 oz  General Appearance: Awake, alert, in no acute distress  Respiratory: CTAB, wheeze noted   Cardiovascular: irregular, murmur noted, 3+ edema into bilateral thighs  GI: soft, nontender, non distended, normal bowel sounds  Skin: no jaundice, no rash    Data   Recent Labs   Lab 09/25/22  0438 09/24/22  1654 09/24/22  0850   WBC 4.8  --  5.8   HGB 9.9*  --  10.6*   MCV 90  --  90     --  295     --  141   POTASSIUM 3.8  --  5.2   CHLORIDE 105  --  105   CO2 26  --  24   BUN 19.3  --  19.2   CR 0.72  --  0.68   ANIONGAP 11  --  12   TURNER 8.8  --  9.0   GLC 89 126* 98     No results found for this or any previous visit (from the past 24 hour(s)).  Medications     Continuing ACE inhibitor/ARB/ARNI from home medication list OR ACE inhibitor/ARB order already placed during this visit       - MEDICATION INSTRUCTIONS -       furosemide 10 mg/hr (09/25/22 0900)     - MEDICATION INSTRUCTIONS -       sodium chloride 9 mL/hr at 09/25/22 0901       apixaban ANTICOAGULANT  5 mg Oral BID     calcium " carbonate-vitamin D   Oral Daily     citalopram  20 mg Oral Daily     cyanocobalamin  1,000 mcg Oral Daily     losartan  25 mg Oral Daily     metoprolol succinate ER  25 mg Oral Daily     miconazole   Topical BID     multivitamin w/minerals   Oral Daily     polyethylene glycol  17 g Oral Daily     sodium chloride (PF)  3 mL Intracatheter Q8H     spironolactone  25 mg Oral Daily     ascorbic acid  1,000 mg Oral Daily     Vitamin D3  2,000 Units Oral Daily

## 2022-09-27 ENCOUNTER — APPOINTMENT (OUTPATIENT)
Dept: OCCUPATIONAL THERAPY | Facility: HOSPITAL | Age: 67
DRG: 280 | End: 2022-09-27
Payer: MEDICARE

## 2022-09-27 ENCOUNTER — APPOINTMENT (OUTPATIENT)
Dept: PHYSICAL THERAPY | Facility: HOSPITAL | Age: 67
DRG: 280 | End: 2022-09-27
Payer: MEDICARE

## 2022-09-27 LAB
ANION GAP SERPL CALCULATED.3IONS-SCNC: 11 MMOL/L (ref 7–15)
ATRIAL RATE - MUSE: 96 BPM
BUN SERPL-MCNC: 25.3 MG/DL (ref 8–23)
CALCIUM SERPL-MCNC: 8.8 MG/DL (ref 8.8–10.2)
CHLORIDE SERPL-SCNC: 101 MMOL/L (ref 98–107)
CREAT SERPL-MCNC: 0.8 MG/DL (ref 0.51–0.95)
DEPRECATED HCO3 PLAS-SCNC: 30 MMOL/L (ref 22–29)
DIASTOLIC BLOOD PRESSURE - MUSE: NORMAL MMHG
ERYTHROCYTE [DISTWIDTH] IN BLOOD BY AUTOMATED COUNT: 17.8 % (ref 10–15)
FERRITIN SERPL-MCNC: 34 NG/ML (ref 11–328)
GFR SERPL CREATININE-BSD FRML MDRD: 80 ML/MIN/1.73M2
GLUCOSE SERPL-MCNC: 97 MG/DL (ref 70–99)
HCT VFR BLD AUTO: 33.6 % (ref 35–47)
HGB BLD-MCNC: 10 G/DL (ref 11.7–15.7)
INTERPRETATION ECG - MUSE: NORMAL
IRON BINDING CAPACITY (ROCHE): 349 UG/DL (ref 240–430)
IRON SATN MFR SERPL: 7 % (ref 15–46)
IRON SERPL-MCNC: 24 UG/DL (ref 37–145)
MAGNESIUM SERPL-MCNC: 1.5 MG/DL (ref 1.7–2.3)
MAGNESIUM SERPL-MCNC: 1.6 MG/DL (ref 1.7–2.3)
MCH RBC QN AUTO: 27.2 PG (ref 26.5–33)
MCHC RBC AUTO-ENTMCNC: 29.8 G/DL (ref 31.5–36.5)
MCV RBC AUTO: 91 FL (ref 78–100)
P AXIS - MUSE: NORMAL DEGREES
PLATELET # BLD AUTO: 282 10E3/UL (ref 150–450)
POTASSIUM SERPL-SCNC: 3.7 MMOL/L (ref 3.4–5.3)
PR INTERVAL - MUSE: NORMAL MS
QRS DURATION - MUSE: 88 MS
QT - MUSE: 362 MS
QTC - MUSE: 462 MS
R AXIS - MUSE: 136 DEGREES
RBC # BLD AUTO: 3.68 10E6/UL (ref 3.8–5.2)
SODIUM SERPL-SCNC: 142 MMOL/L (ref 136–145)
SYSTOLIC BLOOD PRESSURE - MUSE: NORMAL MMHG
T AXIS - MUSE: 240 DEGREES
TRANSFERRIN SERPL-MCNC: 313 MG/DL (ref 200–360)
VENTRICULAR RATE- MUSE: 98 BPM
WBC # BLD AUTO: 5 10E3/UL (ref 4–11)

## 2022-09-27 PROCEDURE — 82728 ASSAY OF FERRITIN: CPT | Performed by: INTERNAL MEDICINE

## 2022-09-27 PROCEDURE — 99232 SBSQ HOSP IP/OBS MODERATE 35: CPT | Performed by: INTERNAL MEDICINE

## 2022-09-27 PROCEDURE — 80048 BASIC METABOLIC PNL TOTAL CA: CPT | Performed by: INTERNAL MEDICINE

## 2022-09-27 PROCEDURE — 120N000004 HC R&B MS OVERFLOW

## 2022-09-27 PROCEDURE — 85027 COMPLETE CBC AUTOMATED: CPT | Performed by: INTERNAL MEDICINE

## 2022-09-27 PROCEDURE — 250N000009 HC RX 250: Performed by: INTERNAL MEDICINE

## 2022-09-27 PROCEDURE — 97530 THERAPEUTIC ACTIVITIES: CPT | Mod: GP

## 2022-09-27 PROCEDURE — 84466 ASSAY OF TRANSFERRIN: CPT | Performed by: INTERNAL MEDICINE

## 2022-09-27 PROCEDURE — 97535 SELF CARE MNGMENT TRAINING: CPT | Mod: GO

## 2022-09-27 PROCEDURE — 250N000013 HC RX MED GY IP 250 OP 250 PS 637: Performed by: FAMILY MEDICINE

## 2022-09-27 PROCEDURE — 83550 IRON BINDING TEST: CPT | Performed by: INTERNAL MEDICINE

## 2022-09-27 PROCEDURE — 250N000011 HC RX IP 250 OP 636: Performed by: INTERNAL MEDICINE

## 2022-09-27 PROCEDURE — 250N000013 HC RX MED GY IP 250 OP 250 PS 637: Performed by: INTERNAL MEDICINE

## 2022-09-27 PROCEDURE — 83735 ASSAY OF MAGNESIUM: CPT | Performed by: INTERNAL MEDICINE

## 2022-09-27 PROCEDURE — 36415 COLL VENOUS BLD VENIPUNCTURE: CPT | Performed by: INTERNAL MEDICINE

## 2022-09-27 PROCEDURE — 97535 SELF CARE MNGMENT TRAINING: CPT | Mod: GP | Performed by: PHYSICAL THERAPIST

## 2022-09-27 PROCEDURE — 99233 SBSQ HOSP IP/OBS HIGH 50: CPT | Performed by: INTERNAL MEDICINE

## 2022-09-27 RX ORDER — FERROUS SULFATE 325(65) MG
325 TABLET ORAL EVERY OTHER DAY
Status: DISCONTINUED | OUTPATIENT
Start: 2022-09-27 | End: 2022-10-07 | Stop reason: HOSPADM

## 2022-09-27 RX ORDER — MAGNESIUM SULFATE 4 G/50ML
4 INJECTION INTRAVENOUS ONCE
Status: COMPLETED | OUTPATIENT
Start: 2022-09-27 | End: 2022-09-27

## 2022-09-27 RX ADMIN — APIXABAN 5 MG: 5 TABLET, FILM COATED ORAL at 08:27

## 2022-09-27 RX ADMIN — Medication 1 TABLET: at 08:26

## 2022-09-27 RX ADMIN — FUROSEMIDE 10 MG/HR: 10 INJECTION, SOLUTION INTRAVENOUS at 14:55

## 2022-09-27 RX ADMIN — Medication 1 MG: at 21:10

## 2022-09-27 RX ADMIN — METOPROLOL SUCCINATE 25 MG: 25 TABLET, EXTENDED RELEASE ORAL at 08:25

## 2022-09-27 RX ADMIN — MICONAZOLE NITRATE: 2 POWDER TOPICAL at 08:27

## 2022-09-27 RX ADMIN — LOSARTAN POTASSIUM 25 MG: 25 TABLET, FILM COATED ORAL at 08:27

## 2022-09-27 RX ADMIN — MICONAZOLE NITRATE: 2 POWDER TOPICAL at 20:11

## 2022-09-27 RX ADMIN — CITALOPRAM HYDROBROMIDE 20 MG: 20 TABLET ORAL at 08:27

## 2022-09-27 RX ADMIN — Medication 1 TABLET: at 08:25

## 2022-09-27 RX ADMIN — POLYETHYLENE GLYCOL 3350 17 G: 17 POWDER, FOR SOLUTION ORAL at 08:19

## 2022-09-27 RX ADMIN — APIXABAN 5 MG: 5 TABLET, FILM COATED ORAL at 20:11

## 2022-09-27 RX ADMIN — MAGNESIUM SULFATE HEPTAHYDRATE 4 G: 80 INJECTION, SOLUTION INTRAVENOUS at 08:20

## 2022-09-27 RX ADMIN — SPIRONOLACTONE 25 MG: 25 TABLET, FILM COATED ORAL at 08:27

## 2022-09-27 RX ADMIN — ACETAMINOPHEN 975 MG: 325 TABLET, FILM COATED ORAL at 05:12

## 2022-09-27 RX ADMIN — FERROUS SULFATE TAB 325 MG (65 MG ELEMENTAL FE) 325 MG: 325 (65 FE) TAB at 08:25

## 2022-09-27 RX ADMIN — Medication 1000 MCG: at 08:27

## 2022-09-27 ASSESSMENT — ACTIVITIES OF DAILY LIVING (ADL)
ADLS_ACUITY_SCORE: 34

## 2022-09-27 NOTE — PROGRESS NOTES
Care Management Follow Up    Length of Stay (days): 3    Expected Discharge Date: 09/29/2022     Concerns to be Addressed: discharge planning       Patient plan of care discussed at interdisciplinary rounds: Yes     Anticipated Discharge Disposition: transitional care      Anticipated Discharge Services: transitional care   Anticipated Discharge DME: per treatment team      Patient/family educated on Medicare website which has current facility and service quality ratings: yes   Education Provided on the Discharge Plan: yes   Patient/Family in Agreement with the Plan: yes      Referrals Placed by CM/SW: no referrals have been placed at this time   Private pay costs discussed: Not applicable     Additional Information:  10:28 AM  [facility] and spoke to Makenzie to ask about facility's weight limit. Makenzie reported that the limit is 500 lbs for case-to-case basis. ZURI called a spoke to Adebayo Stone who stated that facilities would likely be more concern about the x2A. [facility] will hold onto referral. ZURI spoke to Stephanie liaslava Michel who reported most facilities have a limit of 350 lbs but Pt would also need to be a x1A. Pt weight is 345 lbs today but yesterday Pt was 357 lbs. Marilu stated she will hold onto referral.     2:49 PM  ZURI met with Pt to discuss transitional care preferences. ZURI notified Pt that [facility] is no longer open. Pt stated she then prefers whatever facility can be closest to Pt's home. ZURI informed Pt that CM will follow up with updates.     ZURI sent referrals to the three [facility] ([facility], [facility], and [facility]), [facility], and [facility]. CM to follow up.     LISA Horan

## 2022-09-27 NOTE — PROGRESS NOTES
Heart Failure Care Map  GOALS TO BE MET BEFORE DISCHARGE:    1. Decrease congestion and/or edema with diuretic therapy to achieve near optimal volume status.     Dyspnea improved: No, further care required to meet this goal. Please explain patient short of breath with activity per prior nurse.   Edema improved: No, further care required to meet this goal. Please explain +3 edema lower extremities.        Net I/O and Weights since admission:   08/28 0700 - 09/27 0659  In: 2787.85 [P.O.:2520; I.V.:267.85]  Out: 9800 [Urine:9800]  Net: -7012.15     Vitals:    09/24/22 0631 09/24/22 1700 09/25/22 0432 09/26/22 0409   Weight: (!) 154.2 kg (340 lb) (!) 159.8 kg (352 lb 4.7 oz) (!) 159.8 kg (352 lb 4.7 oz) (!) 162.1 kg (357 lb 5.9 oz)       2.  O2 sats > 90% on room air, or at prior home O2 therapy level.      Able to wean O2 this shift to keep sats above 90%?: No, further care required to meet this goal. Please explain on 1 liter 02 per nasal cannula during sleep   Does patient use Home O2? No          Current oxygenation status:   SpO2: 93 %     O2 Device: Nasal cannula, Oxygen Delivery: 1 LPM    3.  Tolerates ambulation and mobility near baseline.     Ambulation: No, further care required to meet this goal. Please explain unknown how patient ambulates during the night.   Times patient ambulated with staff this shift: 0    Please review the Heart Failure Care Map for additional HF goal outcomes.  Patient is on a Lasix gtt 10 mg's/hr. purwik in place with 1000 ml's out so far. Will cont to monitor.  Mandi Rosenthal, CHRISTOPHER  9/27/2022

## 2022-09-27 NOTE — PLAN OF CARE
Problem: Adjustment to Illness (Heart Failure)  Goal: Optimal Coping  Outcome: Ongoing, Progressing     Problem: Cardiac Output Decreased (Heart Failure)  Goal: Optimal Cardiac Output  Outcome: Ongoing, Progressing     Problem: Dysrhythmia (Heart Failure)  Goal: Stable Heart Rate and Rhythm  Outcome: Ongoing, Progressing     Problem: Fluid Imbalance (Heart Failure)  Goal: Fluid Balance  Outcome: Ongoing, Progressing   Goal Outcome Evaluation:     Pt denies pain , still has SOB with activity. Lung sounds clear, on O2 at 1 LPM per nasal cannula.  to 1000 ml. She does it 5-10 x every hour when she is awake. Overnight Oximetry was done last night per RT's report. HR in the 80's, afib. No Bm since admission, had Miralax this AM, does not want to try Dulcolax suppository when offered tonight.  On IV Lasix drip at 1 ml/hr. She is voiding well. Purewick in place.      Heart Failure Care Map  GOALS TO BE MET BEFORE DISCHARGE:    1. Decrease congestion and/or edema with diuretic therapy to achieve near optimal volume status.     Dyspnea improved: No, further care required to meet this goal. Please explain has SOB with activity   Edema improved: No, plus 3 edema in BLE        Net I/O and Weights since admission:   08/28 0700 - 09/27 0659  In: 2787.85 [P.O.:2520; I.V.:267.85]  Out: 8800 [Urine:8800]  Net: -6012.15     Vitals:    09/24/22 0631 09/24/22 1700 09/25/22 0432 09/26/22 0409   Weight: (!) 154.2 kg (340 lb) (!) 159.8 kg (352 lb 4.7 oz) (!) 159.8 kg (352 lb 4.7 oz) (!) 162.1 kg (357 lb 5.9 oz)       2.  O2 sats > 90% on room air, or at prior home O2 therapy level.      Able to wean O2 this shift to keep sats above 90%?: No, further care required to meet this goal. Please explain unable to wean off,    Does patient use Home O2? No          Current oxygenation status:   SpO2: 93 %     O2 Device: Nasal cannula, Oxygen Delivery: 1 LPM    3.  Tolerates ambulation and mobility near baseline.     Ambulation: No, further  care required to meet this goal. Please explain NA   Times patient ambulated with staff this shift: 0    Please review the Heart Failure Care Map for additional HF goal outcomes.    Katelynn Webb RN  9/26/2022

## 2022-09-27 NOTE — PLAN OF CARE
Goal Outcome Evaluation:        Heart Failure Care Map  GOALS TO BE MET BEFORE DISCHARGE:    1. Decrease congestion and/or edema with diuretic therapy to achieve near optimal volume status.     Dyspnea improved: Yes, satisfactory for discharge.   Edema improved: No, further care required to meet this goal. Please explain     weight, continues to have a 3 edema in legs, getting lymph wraps and lasix gtt        Net I/O and Weights since admission:   08/28 1500 - 09/27 1459  In: 3074.45 [P.O.:2776; I.V.:298.45]  Out: 38343 [Urine:09794]  Net: -8125.55     Vitals:    09/24/22 0631 09/24/22 1700 09/25/22 0432 09/26/22 0409   Weight: (!) 154.2 kg (340 lb) (!) 159.8 kg (352 lb 4.7 oz) (!) 159.8 kg (352 lb 4.7 oz) (!) 162.1 kg (357 lb 5.9 oz)    09/27/22 0500   Weight: (!) 156.9 kg (345 lb 14.4 oz)       2.  O2 sats > 90% on room air, or at prior home O2 therapy level.      Able to wean O2 this shift to keep sats above 90%?: No, further care required to meet this goal. Please explain     Does patient use Home O2? No          Current oxygenation status:   SpO2: 98 %     O2 Device: Nasal cannula, Oxygen Delivery: 2 LPM    3.  Tolerates ambulation and mobility near baseline.     Ambulation: Yes, satisfactory for discharge.   Times patient ambulated with staff this shift: 2    Please review the Heart Failure Care Map for additional HF goal outcomes.    Mira Altman RN  9/27/2022

## 2022-09-27 NOTE — PROGRESS NOTES
HEART CARE NOTE          Assessment/Recommendations     1. HFpEF, Valvular heart disease c/b ADHF  Assessment / Plan    Hypervolemic on physical exam, but diuresing well on current regimen - no changes to plan of care at this time; continue current regimen     GDMT as detailed below; mainstay of treatment for HFpEF includes diuretics and adequate BP control (class I) and SGLT2-I (class 2a); additional medical therapy (ARNI, MRA, ARB) demonstrated less robust evidence for indication but may be considered per guideline recommendations (2b); no indication for BBlockers      Current Pharmacotherapy AHA Guideline-Directed Medical Therapy   Losartan 25 mg daily ARNI/ARB   Spironolactone 25 mg  MRA   SGLT2 inhibitor not started SGLT2-I    Furosemide gtt Loop diuretic        2. Atrial fibrillation  Assessment / Plan    Rate controlled - continue metoprolol, and apixaban      3. Elevated troponin  Assessment / Plan    Mild elevated in the setting of afib and ADHF -- likely type 2 MI int he setting of demand ischemia; echo without WMAs and patient chest pain free and hemodynamically stable - no further cardiac testing indicated at this time     4. Moderate aortic stenosis  Assessment / Plan    LORRAINE 1.1, pk/mn 2.78/17.4 - will discuss with valve clinic regarding potential interventions    History of Present Illness/Subjective      Ms. Misty Quezada is a 67 year old female with a PMHx significant for atrial fibrillation who presented in ADHF in the setting of valvular heart disease.     Today, Mrs. Quezada denies any acute cardiac events or complaints; Management plan as detailed above - changes at this time.      ECG: Personally reviewed. nonspecific ST and T waves changes, atrial fibrillation, with RVR     ECHO (personnaly Reviewed):  Interpretation Summary     1. Left ventricular size is normal. Mild concentric increase in wall  thickness. Systolic function is normal. The estimated left ventricular  ejection fraction is  "55-60%.  2. Right ventricle is not well visualized. Size is probably at least  moderately enlarged. Systolic function appears normal.  3. Severe right atrial enlargement.  4. Calcified trileaflet aortic valve with moderate aortic stenosis. Peak  forward velocity measures 2.8 m/s, mean gradient 17 mm Hg, calculated aortic  valve area 1.1 cm2, and dimensionless index 0.44. Trace aortic regurgitation.  5. Severe functional tricuspid regurgitation.  6. At least mild pulmonary hypertension is present with an estimated pulmonary  artery systolic pressure of 39 mm Hg plus the right atrial pressure.  7. Compared to the prior study dated 8/22/2021, there has been no significant  change.          Physical Examination Review of Systems   /64 (BP Location: Left arm, Patient Position: Semi-Khan's, Cuff Size: Adult Large)   Pulse 96   Temp 97.9  F (36.6  C) (Oral)   Resp 19   Ht 1.702 m (5' 7\")   Wt (!) 162.1 kg (357 lb 5.9 oz)   SpO2 98%   BMI 55.97 kg/m    Body mass index is 55.97 kg/m .  Wt Readings from Last 3 Encounters:   09/26/22 (!) 162.1 kg (357 lb 5.9 oz)   09/21/21 130.2 kg (287 lb)   08/27/21 129.9 kg (286 lb 6.4 oz)     General Appearance:   no distress, normal body habitus   ENT/Mouth: membranes moist, no oral lesions or bleeding gums.      EYES:  no scleral icterus, normal conjunctivae   Neck: no carotid bruits or thyromegaly   Chest/Lungs:   lungs are clear to auscultation, no rales or wheezing,equal chest wall expansion    Cardiovascular:   Irregular. Normal first and second heart sounds with no murmurs, rubs, or gallops; the carotid, radial and posterior tibial pulses are intact, + JVD and LE edema bilaterally    Abdomen:  no organomegaly, masses, bruits, or tenderness; bowel sounds are present   Extremities: no cyanosis or clubbing   Skin: no xanthelasma, warm.    Neurologic: alert and oriented x3, calm     Psychiatric: alert and oriented x3, calm     A complete 10 systems ROS was reviewed  And " is negative except what is listed in the HPI.          Medical History  Surgical History Family History Social History   Past Medical History:   Diagnosis Date     Benign essential hypertension      Chronic atrial fibrillation (H)     Past Surgical History:   Procedure Laterality Date     APPENDECTOMY       CHOLECYSTECTOMY       GASTRIC BYPASS      no family history of premature coronary artery disease Social History     Socioeconomic History     Marital status:      Spouse name: Not on file     Number of children: Not on file     Years of education: Not on file     Highest education level: Not on file   Occupational History     Not on file   Tobacco Use     Smoking status: Never Smoker     Smokeless tobacco: Never Used   Substance and Sexual Activity     Alcohol use: Not Currently     Comment: Rarely Uses alcohol     Drug use: Never     Sexual activity: Not on file   Other Topics Concern     Not on file   Social History Narrative     Not on file     Social Determinants of Health     Financial Resource Strain: Not on file   Food Insecurity: Not on file   Transportation Needs: Not on file   Physical Activity: Not on file   Stress: Not on file   Social Connections: Not on file   Intimate Partner Violence: Not on file   Housing Stability: Not on file           Lab Results    Chemistry/lipid CBC Cardiac Enzymes/BNP/TSH/INR   Lab Results   Component Value Date    CHOL 163 09/09/2016    HDL 42 (L) 09/09/2016    TRIG 81 09/09/2016    BUN 25.3 (H) 09/27/2022     09/27/2022    CO2 30 (H) 09/27/2022    Lab Results   Component Value Date    WBC 5.0 09/27/2022    HGB 10.0 (L) 09/27/2022    HCT 33.6 (L) 09/27/2022    MCV 91 09/27/2022     09/27/2022    Lab Results   Component Value Date    TROPONINI 1.32 (HH) 08/21/2021     (H) 09/02/2021    TSH 4.92 08/31/2021    INR 1.44 (H) 08/21/2021     Lab Results   Component Value Date    TROPONINI 1.32 (HH) 08/21/2021          Weight:    Wt Readings from Last 3  Encounters:   09/26/22 (!) 162.1 kg (357 lb 5.9 oz)   09/21/21 130.2 kg (287 lb)   08/27/21 129.9 kg (286 lb 6.4 oz)       Allergies  No Known Allergies      Surgical History  Past Surgical History:   Procedure Laterality Date     APPENDECTOMY       CHOLECYSTECTOMY       GASTRIC BYPASS         Social History  Tobacco:   History   Smoking Status     Never Smoker   Smokeless Tobacco     Never Used    Alcohol:   Social History    Substance and Sexual Activity      Alcohol use: Not Currently        Comment: Rarely Uses alcohol   Illicit Drugs:   History   Drug Use Unknown       Family History  Family History   Problem Relation Age of Onset     No Known Problems Mother      No Known Problems Father           Tara Siegel MD on 9/27/2022      cc: Tiff Chavez

## 2022-09-27 NOTE — PROGRESS NOTES
[facility] was introduced to the patient today including our role in the home management of their heart failure.  Heart Failure Education Materials were shared today with the patient.  Introduction to the expectations including daily weight tracking using the Daily Weight Log  Home B/P monitoring as appropriate( to bring in home monitoring cuff to the clinic appointment for verification)  Low Sodium diet of 2000mg or less daily, review of label reading, aim for fresh and avoid processed items. [employer] Meal Plan Application was provided to the patient today.  She lives at home with her two sons and does most of the food shopping and preparation.  Daily Fluid restriction of 2000ml  considerations  Exercise importance as able explained  Monitor and report s/s of heart failure. How to report these changes.  Follow up appointments and testing expectations.    Myra WEBB RN BSN, CHFN, PCCN-K    [facility]   [phone] Nurse Line  [facility]   [facility]   [address] [place] [address]

## 2022-09-27 NOTE — PLAN OF CARE
Goal Outcome Evaluation:      Heart Failure Care Map  GOALS TO BE MET BEFORE DISCHARGE:    1. Decrease congestion and/or edema with diuretic therapy to achieve near optimal volume status.     Dyspnea improved: Yes, satisfactory for discharge.   Edema improved: Yes, satisfactory for discharge.        Net I/O and Weights since admission:   08/28 2300 - 09/27 2259  In: 3194.45 [P.O.:2896; I.V.:298.45]  Out: 91279 [Urine:93678]  Net: -8605.55     Vitals:    09/24/22 0631 09/24/22 1700 09/25/22 0432 09/26/22 0409   Weight: (!) 154.2 kg (340 lb) (!) 159.8 kg (352 lb 4.7 oz) (!) 159.8 kg (352 lb 4.7 oz) (!) 162.1 kg (357 lb 5.9 oz)    09/27/22 0500   Weight: (!) 156.9 kg (345 lb 14.4 oz)       2.  O2 sats > 90% on room air, or at prior home O2 therapy level.      Able to wean O2 this shift to keep sats above 90%?: Yes, satisfactory for discharge.   Does patient use Home O2? No          Current oxygenation status:   SpO2: 92 %     O2 Device: None (Room air), Oxygen Delivery: 2 LPM    3.  Tolerates ambulation and mobility near baseline.     Ambulation: Yes, satisfactory for discharge.   Times patient ambulated with staff this shift: 1    Up to chair and taking steps to bedside commode     Please review the Heart Failure Care Map for additional HF goal outcomes.    Mira Altman RN  9/27/2022

## 2022-09-28 ENCOUNTER — APPOINTMENT (OUTPATIENT)
Dept: OCCUPATIONAL THERAPY | Facility: HOSPITAL | Age: 67
DRG: 280 | End: 2022-09-28
Payer: MEDICARE

## 2022-09-28 LAB
ANION GAP SERPL CALCULATED.3IONS-SCNC: 10 MMOL/L (ref 7–15)
BUN SERPL-MCNC: 24.9 MG/DL (ref 8–23)
CALCIUM SERPL-MCNC: 8.8 MG/DL (ref 8.8–10.2)
CHLORIDE SERPL-SCNC: 100 MMOL/L (ref 98–107)
CREAT SERPL-MCNC: 0.75 MG/DL (ref 0.51–0.95)
DEPRECATED HCO3 PLAS-SCNC: 32 MMOL/L (ref 22–29)
ERYTHROCYTE [DISTWIDTH] IN BLOOD BY AUTOMATED COUNT: 17.7 % (ref 10–15)
GFR SERPL CREATININE-BSD FRML MDRD: 86 ML/MIN/1.73M2
GLUCOSE SERPL-MCNC: 107 MG/DL (ref 70–99)
HCT VFR BLD AUTO: 30.8 % (ref 35–47)
HGB BLD-MCNC: 9.5 G/DL (ref 11.7–15.7)
MAGNESIUM SERPL-MCNC: 1.8 MG/DL (ref 1.7–2.3)
MCH RBC QN AUTO: 27.8 PG (ref 26.5–33)
MCHC RBC AUTO-ENTMCNC: 30.8 G/DL (ref 31.5–36.5)
MCV RBC AUTO: 90 FL (ref 78–100)
PLATELET # BLD AUTO: 260 10E3/UL (ref 150–450)
POTASSIUM SERPL-SCNC: 3.6 MMOL/L (ref 3.4–5.3)
RBC # BLD AUTO: 3.42 10E6/UL (ref 3.8–5.2)
SODIUM SERPL-SCNC: 142 MMOL/L (ref 136–145)
WBC # BLD AUTO: 4.8 10E3/UL (ref 4–11)

## 2022-09-28 PROCEDURE — 83735 ASSAY OF MAGNESIUM: CPT | Performed by: INTERNAL MEDICINE

## 2022-09-28 PROCEDURE — 85027 COMPLETE CBC AUTOMATED: CPT | Performed by: INTERNAL MEDICINE

## 2022-09-28 PROCEDURE — 250N000013 HC RX MED GY IP 250 OP 250 PS 637: Performed by: INTERNAL MEDICINE

## 2022-09-28 PROCEDURE — 99232 SBSQ HOSP IP/OBS MODERATE 35: CPT | Performed by: INTERNAL MEDICINE

## 2022-09-28 PROCEDURE — 99233 SBSQ HOSP IP/OBS HIGH 50: CPT | Performed by: HOSPITALIST

## 2022-09-28 PROCEDURE — 36415 COLL VENOUS BLD VENIPUNCTURE: CPT | Performed by: INTERNAL MEDICINE

## 2022-09-28 PROCEDURE — 97530 THERAPEUTIC ACTIVITIES: CPT | Mod: GO

## 2022-09-28 PROCEDURE — 250N000013 HC RX MED GY IP 250 OP 250 PS 637: Performed by: FAMILY MEDICINE

## 2022-09-28 PROCEDURE — 80048 BASIC METABOLIC PNL TOTAL CA: CPT | Performed by: INTERNAL MEDICINE

## 2022-09-28 PROCEDURE — 120N000004 HC R&B MS OVERFLOW

## 2022-09-28 RX ORDER — AMOXICILLIN 250 MG
1 CAPSULE ORAL 2 TIMES DAILY PRN
Status: DISCONTINUED | OUTPATIENT
Start: 2022-09-28 | End: 2022-10-07 | Stop reason: HOSPADM

## 2022-09-28 RX ADMIN — ACETAMINOPHEN 975 MG: 325 TABLET, FILM COATED ORAL at 01:23

## 2022-09-28 RX ADMIN — METOPROLOL SUCCINATE 25 MG: 25 TABLET, EXTENDED RELEASE ORAL at 08:25

## 2022-09-28 RX ADMIN — Medication 1 TABLET: at 08:25

## 2022-09-28 RX ADMIN — POLYETHYLENE GLYCOL 3350 17 G: 17 POWDER, FOR SOLUTION ORAL at 08:25

## 2022-09-28 RX ADMIN — MICONAZOLE NITRATE: 2 POWDER TOPICAL at 08:34

## 2022-09-28 RX ADMIN — APIXABAN 5 MG: 5 TABLET, FILM COATED ORAL at 21:27

## 2022-09-28 RX ADMIN — CITALOPRAM HYDROBROMIDE 20 MG: 20 TABLET ORAL at 08:25

## 2022-09-28 RX ADMIN — SPIRONOLACTONE 25 MG: 25 TABLET, FILM COATED ORAL at 08:25

## 2022-09-28 RX ADMIN — LOSARTAN POTASSIUM 25 MG: 25 TABLET, FILM COATED ORAL at 08:25

## 2022-09-28 RX ADMIN — Medication 1000 MCG: at 08:25

## 2022-09-28 RX ADMIN — MICONAZOLE NITRATE: 2 POWDER TOPICAL at 21:15

## 2022-09-28 RX ADMIN — Medication 1 TABLET: at 08:33

## 2022-09-28 RX ADMIN — Medication 1 MG: at 21:27

## 2022-09-28 RX ADMIN — APIXABAN 5 MG: 5 TABLET, FILM COATED ORAL at 08:25

## 2022-09-28 ASSESSMENT — ACTIVITIES OF DAILY LIVING (ADL)
ADLS_ACUITY_SCORE: 34

## 2022-09-28 NOTE — PROGRESS NOTES
Care Management Follow Up    Length of Stay (days): 4    Expected Discharge Date: 09/30/2022     Concerns to be Addressed: discharge planning       Patient plan of care discussed at interdisciplinary rounds: Yes     Anticipated Discharge Disposition: transitional care      Anticipated Discharge Services: transitional care   Anticipated Discharge DME: per treatment team      Patient/family educated on Medicare website which has current facility and service quality ratings: yes   Education Provided on the Discharge Plan: yes   Patient/Family in Agreement with the Plan: yes      Referrals Placed by CM/SW: no referrals have been placed at this time   Private pay costs discussed: Not applicable    Additional Information:  8:55 AM  ZURI received a phone call from Makenzie at [facility] who stated facility can accept Pt and would have a bed available today. ZURI reported that Pt may not be ready until tomorrow but would check-in with the Hospitalist. ZURI asked if facility would be able to offer a bed tomorrow if Pt is not ready today. Makenzie said yes and to call her once SW knows more about Pt's status.     ZURI met and spoke with Pt to inform her of accepting facility and that a bed is available for her when she is medically ready. SW provided Pt information with the address and phone number for any questions. Pt denies further questions regarding placement at this time.     9:44 AM  SW spoke to Hospitalist who said it will be several days before Pt will be ready for discharge. ZURI called Makenzie and informed her of Pt's conditional. Makenzie said she will hold onto referral and can review again when Pt is closer to discharge. CM to continue to follow progression of Pt's care for discharge planning.    LISA Horan

## 2022-09-28 NOTE — PROGRESS NOTES
[facility]    Medicine Progress Note - Hospitalist Service    Date of Admission:  9/24/2022    Assessment & Plan        Misty Quezada is a 67 year old female admitted on 9/24/2022 with LE edema and shortness of breath.    Acute HFpEF  Troponin elevation- likely type 2 MI due to demand ischemia  Moderate aortic stenosis  - Echo 9/24: Normal LV size and function with mild concentric wall thickness, EF 55 to 60%, moderately enlarged right ventricle, severe right atrial enlargement, mild pulmonary hypertension  - Lasix infusion, continue I's and O's, having good output  - Continue metoprolol and ARB  - Strict I/O, low sodium diet  - Cardiology consulted, appreciate recomendations    LE edema, acute on chronic  - venous insufficieny with new HF  - Lymphedema therapy, admits to noncompliance with TEDs at home, encouraged use and putting feet up  - Diuretics as above    Generalized weakness  -Due to heart failure and deconditioning  -PT and OT consulted  -Plan for TCU at discharge    MIKEL with nocturnal hypoxia  - echo shows pulmonary hypertension and enlarged right heart   - she is a never smoker and does not have asthma or other known lung disease  - BMI 55  - was observed by nursing to have hypoxia while sleeping  - has to sleep in sitting position at home  - overnight oximetry by RT 9/25-9/26: Had a total of 33 desaturations (less than 89% SPO2 for at least 10 seconds) with the lowest to 76%, spent approximately 8.6% of the night with desaturations below 90%    Anemia  -Iron studies 9/27: Iron 24, TIBC 349, Sat 7, transferrin 313, ferritin 34  -Started on ferrous sulfate every other day   -Continue to monitor    Hypomagnesemia  -Will monitor and replace with RN replacement protocol    Obesity  - Body mass index is 55    Constipation  - Start scheduled Miralax with prn Dolcolax supp    Chronic AF  -Continue metoprolol for rate control  - Continue eliquis    Disposition: Will need TCU at  "discharge for generalized weakness will require bariatric placement     Diet: 2 Gram Sodium Diet Other - please comment    DVT Prophylaxis: DOAC  Hi Catheter: Not present  Central Lines: None  Cardiac Monitoring: None  Code Status: Full Code      Disposition Plan      Expected Discharge Date: 09/29/2022        Discharge Comments: Lasix gtt.  Need TCU.  Bariatric is barrier to placement.        The patient's care was discussed with the Patient.    Amarilis Dent MD  Hospitalist Service  [facility]  Securely message with the Vocera Web Console (learn more here)  Text page via [employer] Paging/Directory         Clinically Significant Risk Factors Present on Admission               # Severe Obesity: Estimated body mass index is 54.18 kg/m  as calculated from the following:    Height as of this encounter: 1.702 m (5' 7\").    Weight as of this encounter: 156.9 kg (345 lb 14.4 oz).        ______________________________________________________________________    Interval History   Mrs. Quezada is doing fine today. She is not having complaints. Her urine output continues to be good. She has some decrease in her swelling but still significant. I discussed the results of her sleep oximetry test.    Data reviewed today: I reviewed all medications, new labs and imaging results over the last 24 hours. I personally reviewed no images or EKG's today.    Physical Exam   Vital Signs: Temp: 98  F (36.7  C) Temp src: Oral BP: 91/53 Pulse: 87   Resp: 20 SpO2: 92 % O2 Device: None (Room air) Oxygen Delivery: 2 LPM  Weight: 345 lbs 14.43 oz  General Appearance: Awake, alert, in no acute distress  Respiratory: CTAB, no wheeze  Cardiovascular: murmur notes, 3+ pitting edema  GI: soft, nontender, non distended, normal bowel sounds  Skin: no jaundice, no rash      Data   Recent Labs   Lab 09/27/22  0438 09/26/22  0443 09/25/22  0438 09/24/22  1654 09/24/22  0850   WBC 5.0  --  4.8  --  5.8   HGB 10.0*  --  9.9*  " --  10.6*   MCV 91  --  90  --  90     --  262  --  295     --  142  --  141   POTASSIUM 3.7 3.8 3.8  --  5.2   CHLORIDE 101  --  105  --  105   CO2 30*  --  26  --  24   BUN 25.3*  --  19.3  --  19.2   CR 0.80  --  0.72  --  0.68   ANIONGAP 11  --  11  --  12   TURENR 8.8  --  8.8  --  9.0   GLC 97  --  89 126* 98     Medications     Continuing ACE inhibitor/ARB/ARNI from home medication list OR ACE inhibitor/ARB order already placed during this visit       - MEDICATION INSTRUCTIONS -       furosemide 10 mg/hr (09/27/22 2000)     - MEDICATION INSTRUCTIONS -         apixaban ANTICOAGULANT  5 mg Oral BID     calcium carbonate-vitamin D   Oral Daily     citalopram  20 mg Oral Daily     cyanocobalamin  1,000 mcg Oral Daily     ferrous sulfate  325 mg Oral Every Other Day     losartan  25 mg Oral Daily     metoprolol succinate ER  25 mg Oral Daily     miconazole   Topical BID     multivitamin w/minerals   Oral Daily     polyethylene glycol  17 g Oral Daily     sodium chloride (PF)  3 mL Intracatheter Q8H     spironolactone  25 mg Oral Daily

## 2022-09-28 NOTE — PLAN OF CARE
Heart Failure Care Map  GOALS TO BE MET BEFORE DISCHARGE:    1. Decrease congestion and/or edema with diuretic therapy to achieve near optimal volume status.     Dyspnea improved: Denied.   Edema improved: +3 LE edema, lymph edema wraps on.        Net I/O and Weights since admission:   08/29 0700 - 09/28 0659  In: 3434.45 [P.O.:3136; I.V.:298.45]  Out: 77598 [Urine:28634]  Net: -9565.55     Vitals:    09/24/22 0631 09/24/22 1700 09/25/22 0432 09/26/22 0409   Weight: (!) 154.2 kg (340 lb) (!) 159.8 kg (352 lb 4.7 oz) (!) 159.8 kg (352 lb 4.7 oz) (!) 162.1 kg (357 lb 5.9 oz)    09/27/22 0500 09/28/22 0500   Weight: (!) 156.9 kg (345 lb 14.4 oz) (!) 153 kg (337 lb 4.9 oz)       2.  O2 sats > 90% on room air, or at prior home O2 therapy level.      Able to wean O2 this shift to keep sats above 90%?: On room air.   Does patient use Home O2? No          Current oxygenation status:   SpO2: 91 %     O2 Device: None (Room air),    3.  Tolerates ambulation and mobility near baseline.     Ambulation: Not applicable this shift.   Times patient ambulated with staff this shift: 0        Lasix drip infusing. Purewick in place. Got tylenol tonight for headache. TCU at discharge.    Please review the Heart Failure Care Map for additional HF goal outcomes.    Bety Wetzel, RN  9/28/2022

## 2022-09-28 NOTE — PROGRESS NOTES
HEART CARE NOTE          Assessment/Recommendations     1. HFpEF, Valvular heart disease c/b ADHF  Assessment / Plan    Hypervolemic on physical exam, but diuresing well on current regimen - no changes to plan of care at this time    GDMT as detailed below; mainstay of treatment for HFpEF includes diuretics and adequate BP control (class I) and SGLT2-I (class 2a); additional medical therapy (ARNI, MRA, ARB) demonstrated less robust evidence for indication but may be considered per guideline recommendations (2b); no indication for BBlockers      Current Pharmacotherapy AHA Guideline-Directed Medical Therapy   Losartan 25 mg daily ARNI/ARB   Spironolactone 25 mg  MRA   SGLT2 inhibitor not started SGLT2-I    Furosemide gtt Loop diuretic        2. Atrial fibrillation  Assessment / Plan    Rate controlled - continue metoprolol, and apixaban      3. Elevated troponin  Assessment / Plan    Mild elevated in the setting of afib and ADHF -- likely type 2 MI int he setting of demand ischemia; echo without WMAs and patient chest pain free and hemodynamically stable - no further cardiac testing indicated at this time     4. Moderate aortic stenosis  Assessment / Plan    LORRAINE 1.1, pk/mn 2.78/17.4 - will discuss with valve clinic regarding potential interventions    History of Present Illness/Subjective      Ms. Misty Quezada is a 67 year old female with a PMHx significant for atrial fibrillation who presented in ADHF in the setting of valvular heart disease.     Today, Mrs. Quezada denies any acute cardiac events or complaints; Management plan as detailed above; no changes to plan of care at this time     ECG: Personally reviewed. nonspecific ST and T waves changes, atrial fibrillation, with RVR     ECHO (personnaly Reviewed):  Interpretation Summary     1. Left ventricular size is normal. Mild concentric increase in wall  thickness. Systolic function is normal. The estimated left ventricular  ejection fraction is 55-60%.  2.  "Right ventricle is not well visualized. Size is probably at least  moderately enlarged. Systolic function appears normal.  3. Severe right atrial enlargement.  4. Calcified trileaflet aortic valve with moderate aortic stenosis. Peak  forward velocity measures 2.8 m/s, mean gradient 17 mm Hg, calculated aortic  valve area 1.1 cm2, and dimensionless index 0.44. Trace aortic regurgitation.  5. Severe functional tricuspid regurgitation.  6. At least mild pulmonary hypertension is present with an estimated pulmonary  artery systolic pressure of 39 mm Hg plus the right atrial pressure.  7. Compared to the prior study dated 8/22/2021, there has been no significant  change.          Physical Examination Review of Systems   /55 (BP Location: Right arm)   Pulse 84   Temp 97.6  F (36.4  C) (Oral)   Resp 18   Ht 1.702 m (5' 7\")   Wt (!) 153 kg (337 lb 4.9 oz)   SpO2 91%   BMI 52.83 kg/m    Body mass index is 52.83 kg/m .  Wt Readings from Last 3 Encounters:   09/28/22 (!) 153 kg (337 lb 4.9 oz)   09/21/21 130.2 kg (287 lb)   08/27/21 129.9 kg (286 lb 6.4 oz)     General Appearance:   no distress, normal body habitus   ENT/Mouth: membranes moist, no oral lesions or bleeding gums.      EYES:  no scleral icterus, normal conjunctivae   Neck: no carotid bruits or thyromegaly   Chest/Lungs:   lungs are clear to auscultation, no rales or wheezing, equal chest wall expansion    Cardiovascular:   Regular. Normal first and second heart sounds with no murmurs, rubs, or gallops; the carotid, radial and posterior tibial pulses are intact, + JVD and LE edema bilaterally    Abdomen:  no organomegaly, masses, bruits, or tenderness; bowel sounds are present   Extremities: no cyanosis or clubbing   Skin: no xanthelasma, warm.    Neurologic: alert and oriented x3, calm     Psychiatric: alert and oriented x3, calm     A complete 10 systems ROS was reviewed  And is negative except what is listed in the HPI.          Medical History  " Surgical History Family History Social History   Past Medical History:   Diagnosis Date     Benign essential hypertension      Chronic atrial fibrillation (H)     Past Surgical History:   Procedure Laterality Date     APPENDECTOMY       CHOLECYSTECTOMY       GASTRIC BYPASS      no family history of premature coronary artery disease Social History     Socioeconomic History     Marital status:      Spouse name: Not on file     Number of children: Not on file     Years of education: Not on file     Highest education level: Not on file   Occupational History     Not on file   Tobacco Use     Smoking status: Never Smoker     Smokeless tobacco: Never Used   Substance and Sexual Activity     Alcohol use: Not Currently     Comment: Rarely Uses alcohol     Drug use: Never     Sexual activity: Not on file   Other Topics Concern     Not on file   Social History Narrative     Not on file     Social Determinants of Health     Financial Resource Strain: Not on file   Food Insecurity: Not on file   Transportation Needs: Not on file   Physical Activity: Not on file   Stress: Not on file   Social Connections: Not on file   Intimate Partner Violence: Not on file   Housing Stability: Not on file           Lab Results    Chemistry/lipid CBC Cardiac Enzymes/BNP/TSH/INR   Lab Results   Component Value Date    CHOL 163 09/09/2016    HDL 42 (L) 09/09/2016    TRIG 81 09/09/2016    BUN 24.9 (H) 09/28/2022     09/28/2022    CO2 32 (H) 09/28/2022    Lab Results   Component Value Date    WBC 4.8 09/28/2022    HGB 9.5 (L) 09/28/2022    HCT 30.8 (L) 09/28/2022    MCV 90 09/28/2022     09/28/2022    Lab Results   Component Value Date    TROPONINI 1.32 (HH) 08/21/2021     (H) 09/02/2021    TSH 4.92 08/31/2021    INR 1.44 (H) 08/21/2021     Lab Results   Component Value Date    TROPONINI 1.32 (HH) 08/21/2021          Weight:    Wt Readings from Last 3 Encounters:   09/28/22 (!) 153 kg (337 lb 4.9 oz)   09/21/21 130.2 kg  (287 lb)   08/27/21 129.9 kg (286 lb 6.4 oz)       Allergies  No Known Allergies      Surgical History  Past Surgical History:   Procedure Laterality Date     APPENDECTOMY       CHOLECYSTECTOMY       GASTRIC BYPASS         Social History  Tobacco:   History   Smoking Status     Never Smoker   Smokeless Tobacco     Never Used    Alcohol:   Social History    Substance and Sexual Activity      Alcohol use: Not Currently        Comment: Rarely Uses alcohol   Illicit Drugs:   History   Drug Use Unknown       Family History  Family History   Problem Relation Age of Onset     No Known Problems Mother      No Known Problems Father           Tara Siegel MD on 9/28/2022      cc: Tiff Chavez

## 2022-09-28 NOTE — PLAN OF CARE
Goal Outcome Evaluation:      Heart Failure Care Map  GOALS TO BE MET BEFORE DISCHARGE:    1. Decrease congestion and/or edema with diuretic therapy to achieve near optimal volume status.     Dyspnea improved: Yes, satisfactory for discharge.   Edema improved: 4+ edema to lower extremities. Difficult to assess d/t lymphedema wraps in place.         Net I/O and Weights since admission:   08/29 1500 - 09/28 1459  In: 3891.45 [P.O.:3496; I.V.:395.45]  Out: 86672 [Urine:26738]  Net: -03588.55     Vitals:    09/24/22 0631 09/24/22 1700 09/25/22 0432 09/26/22 0409   Weight: (!) 154.2 kg (340 lb) (!) 159.8 kg (352 lb 4.7 oz) (!) 159.8 kg (352 lb 4.7 oz) (!) 162.1 kg (357 lb 5.9 oz)    09/27/22 0500 09/28/22 0500   Weight: (!) 156.9 kg (345 lb 14.4 oz) (!) 153 kg (337 lb 4.9 oz)       2.  O2 sats > 90% on room air, or at prior home O2 therapy level.      Able to wean O2 this shift to keep sats above 90%?: Yes, satisfactory for discharge.   Does patient use Home O2? No          Current oxygenation status:   SpO2: 91 %     O2 Device: None (Room air),      3.  Tolerates ambulation and mobility near baseline.     Ambulation: Pt unable to ambulate at this time. Pt able to stand and sit in the recliner.    Times patient ambulated with staff this shift: 0    Please review the Heart Failure Care Map for additional HF goal outcomes.    Kylie Mccormick, RN  9/28/2022           Pt up in chair for all of shift. VSS. Lasix gtt at 1ml/hr. Using purewick. No oxygen needs during the day.

## 2022-09-28 NOTE — PROGRESS NOTES
"[facility] Hospitalist Progress Note  [facility]  Admission date: 9/24/2022    Summary:    67F with moderate aortic stenosis, MIKEL, Afib who presents with LE edema and shortness of breath and found to have acute on chronic HFpEF.       Assessment/Plan    #Acute HFpEF in the setting of Moderate aortic stenosis  #Troponin elevation- likely type 2 MI due to demand ischemia  #mild pulm HTN and moderately enlarged RV  -lasix infusion.    -timing of TAVR evaluation per cards  -losartan, aldactone  -still massively volume overloaded despite 15lb weight loss.  Continue diuresis.  Consider dobutamine for RV dysfunction if hemodynamics limiting diuresis.  -appreciate cards care    #MIKEL with nocturnal hypoxia - not formally diagnosed.  - overnight oximetry by RT 9/25-9/26: Had a total of 33 desaturations (less than 89% SPO2 for at least 10 seconds) with the lowest to 76%, spent approximately 8.6% of the night with desaturations below 90%  -  Needs O2 for home and urgent sleep evaluation as outpatient for nocturnal NIPPV     #Chronic AF - -toprol-xl, eliquis.      #lymphedema - PT/OT    #Generalized weakness     Anemia  -Iron studies 9/27: Iron 24, TIBC 349, Sat 7, transferrin 313, ferritin 34  -Started on ferrous sulfate every other day   -Continue to monitor     #Hypomagnesemia - replacement  #moribd Obesity - Body mass index is 55  #Constipation -  Start scheduled Miralax with prn Dolcolax supp    Checklist:  Code Status: Full Code    Diet: 2 Gram Sodium Diet Other - please comment    Hi Catheter: Not present  Central Lines: None  DVT px:  DOAC        Auto-populated based on system request - if relevant they will be addressed above:  Clinically Significant Risk Factors Present on Admission               # Severe Obesity: Estimated body mass index is 52.83 kg/m  as calculated from the following:    Height as of this encounter: 1.702 m (5' 7\").    Weight as of this encounter: 153 kg (337 lb 4.9 oz).    "     Auto-populated from discharge tab:    Expected Discharge Date: 09/29/2022        Discharge Comments: Lasix gtt.  Need TCU.  Bariatric is barrier to placement.         Overnight Events/Subjective/Notable results:  -3L, renal function stable  4 point ROS otherwise negative    Objective    Vital signs in last 24 hours  Temp:  [97.6  F (36.4  C)-98.4  F (36.9  C)] 97.7  F (36.5  C)  Pulse:  [84-95] 95  Resp:  [18-20] 20  BP: ()/(53-64) 102/64  SpO2:  [91 %-98 %] 91 % O2 Device: None (Room air)    Weight:   337 lbs 4.86 oz    Intake/Output last 3 shifts  I/O last 3 completed shifts:  In: 713 [P.O.:616; I.V.:97]  Out: 2800 [Urine:2800]  Body mass index is 52.83 kg/m .    Physical Exam  General:  Alert, cooperative, no distress    Neurologic:  oriented, facial symmetry preserved, fluent speech.   Psych: calm, mood and affect appropriate to situation  HEENT:  Anicteric, MMM  CV: RRR no JOHN, ++LE edema  Lungs: CTAB.  Easyrespirations  Abd: soft, obese, NT, normoactive BS  Skin: no rashes or jaundice noted on exposed skin.    Central Lines and Tubes: None (no talavera, CVC, feeding tubes)      I have reviewed all labs, medications, imaging studies in the last 24 hours.  Pertinent findings&changes discussed above.    Data     Stella Church MD  Internal Medicine Hospitalist

## 2022-09-29 ENCOUNTER — APPOINTMENT (OUTPATIENT)
Dept: PHYSICAL THERAPY | Facility: HOSPITAL | Age: 67
DRG: 280 | End: 2022-09-29
Payer: MEDICARE

## 2022-09-29 ENCOUNTER — APPOINTMENT (OUTPATIENT)
Dept: OCCUPATIONAL THERAPY | Facility: HOSPITAL | Age: 67
DRG: 280 | End: 2022-09-29
Payer: MEDICARE

## 2022-09-29 LAB
ANION GAP SERPL CALCULATED.3IONS-SCNC: 9 MMOL/L (ref 7–15)
BUN SERPL-MCNC: 21.6 MG/DL (ref 8–23)
CALCIUM SERPL-MCNC: 8.9 MG/DL (ref 8.8–10.2)
CHLORIDE SERPL-SCNC: 97 MMOL/L (ref 98–107)
CREAT SERPL-MCNC: 0.68 MG/DL (ref 0.51–0.95)
DEPRECATED HCO3 PLAS-SCNC: 35 MMOL/L (ref 22–29)
GFR SERPL CREATININE-BSD FRML MDRD: >90 ML/MIN/1.73M2
GLUCOSE SERPL-MCNC: 106 MG/DL (ref 70–99)
MAGNESIUM SERPL-MCNC: 1.6 MG/DL (ref 1.7–2.3)
POTASSIUM SERPL-SCNC: 3.4 MMOL/L (ref 3.4–5.3)
POTASSIUM SERPL-SCNC: 3.5 MMOL/L (ref 3.4–5.3)
SODIUM SERPL-SCNC: 141 MMOL/L (ref 136–145)

## 2022-09-29 PROCEDURE — 97110 THERAPEUTIC EXERCISES: CPT | Mod: GP

## 2022-09-29 PROCEDURE — 36415 COLL VENOUS BLD VENIPUNCTURE: CPT | Performed by: HOSPITALIST

## 2022-09-29 PROCEDURE — 250N000013 HC RX MED GY IP 250 OP 250 PS 637: Performed by: INTERNAL MEDICINE

## 2022-09-29 PROCEDURE — 80048 BASIC METABOLIC PNL TOTAL CA: CPT | Performed by: HOSPITALIST

## 2022-09-29 PROCEDURE — 250N000011 HC RX IP 250 OP 636: Performed by: HOSPITALIST

## 2022-09-29 PROCEDURE — 120N000004 HC R&B MS OVERFLOW

## 2022-09-29 PROCEDURE — 99232 SBSQ HOSP IP/OBS MODERATE 35: CPT | Performed by: INTERNAL MEDICINE

## 2022-09-29 PROCEDURE — 84132 ASSAY OF SERUM POTASSIUM: CPT | Performed by: INTERNAL MEDICINE

## 2022-09-29 PROCEDURE — G0008 ADMIN INFLUENZA VIRUS VAC: HCPCS | Performed by: HOSPITALIST

## 2022-09-29 PROCEDURE — 97535 SELF CARE MNGMENT TRAINING: CPT | Mod: GP | Performed by: PHYSICAL THERAPIST

## 2022-09-29 PROCEDURE — 97530 THERAPEUTIC ACTIVITIES: CPT | Mod: GP

## 2022-09-29 PROCEDURE — 250N000009 HC RX 250: Performed by: INTERNAL MEDICINE

## 2022-09-29 PROCEDURE — 83735 ASSAY OF MAGNESIUM: CPT | Performed by: HOSPITALIST

## 2022-09-29 PROCEDURE — 90662 IIV NO PRSV INCREASED AG IM: CPT | Performed by: HOSPITALIST

## 2022-09-29 PROCEDURE — 250N000013 HC RX MED GY IP 250 OP 250 PS 637: Performed by: FAMILY MEDICINE

## 2022-09-29 PROCEDURE — 99233 SBSQ HOSP IP/OBS HIGH 50: CPT | Performed by: HOSPITALIST

## 2022-09-29 PROCEDURE — 97535 SELF CARE MNGMENT TRAINING: CPT | Mod: GO

## 2022-09-29 RX ADMIN — Medication 1 MG: at 21:44

## 2022-09-29 RX ADMIN — Medication 1 TABLET: at 08:10

## 2022-09-29 RX ADMIN — APIXABAN 5 MG: 5 TABLET, FILM COATED ORAL at 20:13

## 2022-09-29 RX ADMIN — METOPROLOL SUCCINATE 25 MG: 25 TABLET, EXTENDED RELEASE ORAL at 08:11

## 2022-09-29 RX ADMIN — CITALOPRAM HYDROBROMIDE 20 MG: 20 TABLET ORAL at 08:11

## 2022-09-29 RX ADMIN — ACETAMINOPHEN 975 MG: 325 TABLET, FILM COATED ORAL at 16:45

## 2022-09-29 RX ADMIN — Medication 1000 MCG: at 08:10

## 2022-09-29 RX ADMIN — INFLUENZA A VIRUS A/VICTORIA/2570/2019 IVR-215 (H1N1) ANTIGEN (FORMALDEHYDE INACTIVATED), INFLUENZA A VIRUS A/DARWIN/9/2021 SAN-010 (H3N2) ANTIGEN (FORMALDEHYDE INACTIVATED), INFLUENZA B VIRUS B/PHUKET/3073/2013 ANTIGEN (FORMALDEHYDE INACTIVATED), AND INFLUENZA B VIRUS B/MICHIGAN/01/2021 ANTIGEN (FORMALDEHYDE INACTIVATED) 0.7 ML: 60; 60; 60; 60 INJECTION, SUSPENSION INTRAMUSCULAR at 10:02

## 2022-09-29 RX ADMIN — MICONAZOLE NITRATE: 2 POWDER TOPICAL at 08:11

## 2022-09-29 RX ADMIN — MICONAZOLE NITRATE: 2 POWDER TOPICAL at 20:13

## 2022-09-29 RX ADMIN — FERROUS SULFATE TAB 325 MG (65 MG ELEMENTAL FE) 325 MG: 325 (65 FE) TAB at 08:10

## 2022-09-29 RX ADMIN — SPIRONOLACTONE 25 MG: 25 TABLET, FILM COATED ORAL at 08:11

## 2022-09-29 RX ADMIN — LOSARTAN POTASSIUM 25 MG: 25 TABLET, FILM COATED ORAL at 08:10

## 2022-09-29 RX ADMIN — FUROSEMIDE 10 MG/HR: 10 INJECTION, SOLUTION INTRAVENOUS at 05:03

## 2022-09-29 RX ADMIN — APIXABAN 5 MG: 5 TABLET, FILM COATED ORAL at 08:10

## 2022-09-29 RX ADMIN — POLYETHYLENE GLYCOL 3350 17 G: 17 POWDER, FOR SOLUTION ORAL at 08:10

## 2022-09-29 RX ADMIN — ACETAMINOPHEN 975 MG: 325 TABLET, FILM COATED ORAL at 06:44

## 2022-09-29 ASSESSMENT — ACTIVITIES OF DAILY LIVING (ADL)
ADLS_ACUITY_SCORE: 34
ADLS_ACUITY_SCORE: 33
ADLS_ACUITY_SCORE: 34
ADLS_ACUITY_SCORE: 34
ADLS_ACUITY_SCORE: 33
ADLS_ACUITY_SCORE: 34
ADLS_ACUITY_SCORE: 33
ADLS_ACUITY_SCORE: 34
ADLS_ACUITY_SCORE: 35
ADLS_ACUITY_SCORE: 34

## 2022-09-29 NOTE — PROGRESS NOTES
Heart Failure Care Map  GOALS TO BE MET BEFORE DISCHARGE:    1. Decrease congestion and/or edema with diuretic therapy to achieve near optimal volume status.     Dyspnea improved: Yes, satisfactory for discharge.   Edema improved: No, further care required to meet this goal. Please explain Bilateral LE edema, lymph wraps in place. Currently on Lasix drip.        Net I/O and Weights since admission:   08/30 0700 - 09/29 0659  In: 4272.25 [P.O.:3736; I.V.:536.25]  Out: 46721 [Urine:66819]  Net: -63439.75     Vitals:    09/24/22 0631 09/24/22 1700 09/25/22 0432 09/26/22 0409   Weight: (!) 154.2 kg (340 lb) (!) 159.8 kg (352 lb 4.7 oz) (!) 159.8 kg (352 lb 4.7 oz) (!) 162.1 kg (357 lb 5.9 oz)    09/27/22 0500 09/28/22 0500 09/29/22 0327   Weight: (!) 156.9 kg (345 lb 14.4 oz) (!) 153 kg (337 lb 4.9 oz) (!) 151.4 kg (333 lb 12.4 oz)       2.  O2 sats > 90% on room air, or at prior home O2 therapy level.      Able to wean O2 this shift to keep sats above 90%?: No, further care required to meet this goal. Please explain required 2LPM O2 overnight, desating into mid 80s   Does patient use Home O2? No          Current oxygenation status:   SpO2: 91 %     O2 Device: None (Room air), Oxygen Delivery: 2 LPM    3.  Tolerates ambulation and mobility near baseline.     Ambulation: No, further care required to meet this goal. Please explain rest/sleep overnight   Times patient ambulated with staff this shift: 0    Denied pain. Continues to be on Lasix drip at 10mg/hour. Diuresing well. Some weight loss overnight. Placed 2LPM O2 NC on due to desaturations into mid 80s. Denied SOB.     Please review the Heart Failure Care Map for additional HF goal outcomes.    Yon Niño RN  9/29/2022

## 2022-09-29 NOTE — PROGRESS NOTES
[facility] Hospitalist Progress Note  [facility]  Admission date: 9/24/2022    Summary:    67F with moderate aortic stenosis, MIKEL, Afib who presents with LE edema and shortness of breath and found to have acute on chronic HFpEF.       Assessment/Plan    #Acute HFpEF in the setting of Moderate aortic stenosis  #Troponin elevation- likely type 2 MI due to demand ischemia  #mild pulm HTN and moderately enlarged RV  -lasix infusion.    -timing of TAVR evaluation per cards  -losartan, aldactone  -still massively volume overloaded despite 15lb weight loss.  Continue diuresis.  Consider dobutamine for RV dysfunction if hemodynamics limiting diuresis, currently responding very well.  -appreciate cards care    #MIKEL with nocturnal hypoxia - not formally diagnosed.  - overnight oximetry by RT 9/25-9/26: Had a total of 33 desaturations (less than 89% SPO2 for at least 10 seconds) with the lowest to 76%, spent approximately 8.6% of the night with desaturations below 90%  - Needs O2 for home night and urgent sleep evaluation as outpatient for nocturnal NIPPV     #Chronic AF - -toprol-xl, eliquis.      #lymphedema - PT/OT    #Generalized weakness     #Anemia  -Iron studies 9/27: Iron 24, TIBC 349, Sat 7, transferrin 313, ferritin 34  -Started on ferrous sulfate every other day   -Continue to monitor  -outpatient GI referral for c-scope if not recent.     #Hypomagnesemia - replacement  #moribd Obesity - Body mass index is 55  #Constipation -  Start scheduled Miralax with prn Dolcolax supp    Checklist:  Code Status: Full Code    Diet: 2 Gram Sodium Diet Other - please comment    Hi Catheter: Not present  Central Lines: None  DVT px:  DOAC        Auto-populated based on system request - if relevant they will be addressed above:  Clinically Significant Risk Factors Present on Admission                      Auto-populated from discharge tab:    Expected Discharge Date: 09/30/2022        Discharge Comments: Lasix gtt.     [facility] to accept if bed is available when patient is ready to discharge         Overnight Events/Subjective/Notable results:  Hypoxia off O2 this AM  -3.7L yesterday, renal function stable  No CP or SOB  4 point ROS otherwise negative    Objective    Vital signs in last 24 hours  Temp:  [97.9  F (36.6  C)-98.5  F (36.9  C)] 97.9  F (36.6  C)  Pulse:  [80-90] 81  Resp:  [20] 20  BP: ()/(52-67) 94/60  SpO2:  [87 %-93 %] 88 % O2 Device: None (Room air)    Weight:   333 lbs 12.42 oz    Intake/Output last 3 shifts  I/O last 3 completed shifts:  In: 802.2 [P.O.:660; I.V.:142.2]  Out: 5650 [Urine:5650]  Body mass index is 52.28 kg/m .    Physical Exam  General:  Alert, cooperative, no distress    Neurologic:  oriented, facial symmetry preserved, fluent speech.   Psych: calm, mood and affect appropriate to situation  HEENT:  Anicteric, MMM  CV: RRR no JOHN, ++LE edema  Lungs: CTAB.  Easyrespirations  Abd: soft, obese, NT, normoactive BS  Skin: no rashes or jaundice noted on exposed skin.    Central Lines and Tubes: None (no talavera, CVC, feeding tubes)      I have reviewed all labs, medications, imaging studies in the last 24 hours.  Pertinent findings&changes discussed above.    Data     Stella Church MD  Internal Medicine Hospitalist

## 2022-09-29 NOTE — PROGRESS NOTES
HEART CARE NOTE          Assessment/Recommendations     1. HFpEF, Valvular heart disease c/b ADHF  Assessment / Plan    Hypervolemic on physical exam, but diuresing well on current regimen - no changes to plan of care at this time    GDMT as detailed below; mainstay of treatment for HFpEF includes diuretics and adequate BP control (class I) and SGLT2-I (class 2a); additional medical therapy (ARNI, MRA, ARB) demonstrated less robust evidence for indication but may be considered per guideline recommendations (2b); no indication for BBlockers      Current Pharmacotherapy AHA Guideline-Directed Medical Therapy   Losartan 25 mg daily ARNI/ARB   Spironolactone 25 mg  MRA   SGLT2 inhibitor not started SGLT2-I    Furosemide gtt Loop diuretic        2. Atrial fibrillation  Assessment / Plan    Rate controlled - continue metoprolol, and apixaban      3. Elevated troponin  Assessment / Plan    Mild elevated in the setting of afib and ADHF -- likely type 2 MI int he setting of demand ischemia; echo without WMAs and patient chest pain free and hemodynamically stable - no further cardiac testing indicated at this time     4. Moderate aortic stenosis  Assessment / Plan    LORRAINE 1.1, pk/mn 2.78/17.4 - will discuss with valve clinic regarding potential interventions    History of Present Illness/Subjective      Ms. Misty Quezada is a 67 year old female with a PMHx significant for atrial fibrillation who presented in ADHF in the setting of valvular heart disease.     Today, Mrs. Quezada denies any acute cardiac events or complaints; Management plan as detailed above; no changes to plan of care at this time.     ECG: Personally reviewed. nonspecific ST and T waves changes, atrial fibrillation, with RVR     ECHO (personnaly Reviewed):  Interpretation Summary     1. Left ventricular size is normal. Mild concentric increase in wall  thickness. Systolic function is normal. The estimated left ventricular  ejection fraction is  "55-60%.  2. Right ventricle is not well visualized. Size is probably at least  moderately enlarged. Systolic function appears normal.  3. Severe right atrial enlargement.  4. Calcified trileaflet aortic valve with moderate aortic stenosis. Peak  forward velocity measures 2.8 m/s, mean gradient 17 mm Hg, calculated aortic  valve area 1.1 cm2, and dimensionless index 0.44. Trace aortic regurgitation.  5. Severe functional tricuspid regurgitation.  6. At least mild pulmonary hypertension is present with an estimated pulmonary  artery systolic pressure of 39 mm Hg plus the right atrial pressure.  7. Compared to the prior study dated 8/22/2021, there has been no significant  change.          Physical Examination Review of Systems   /67 (BP Location: Right arm)   Pulse 82   Temp 97.9  F (36.6  C) (Oral)   Resp 20   Ht 1.702 m (5' 7\")   Wt (!) 151.4 kg (333 lb 12.4 oz)   SpO2 91%   BMI 52.28 kg/m    Body mass index is 52.28 kg/m .  Wt Readings from Last 3 Encounters:   09/29/22 (!) 151.4 kg (333 lb 12.4 oz)   09/21/21 130.2 kg (287 lb)   08/27/21 129.9 kg (286 lb 6.4 oz)     General Appearance:   no distress, normal body habitus   ENT/Mouth: membranes moist, no oral lesions or bleeding gums.      EYES:  no scleral icterus, normal conjunctivae   Neck: no carotid bruits or thyromegaly   Chest/Lungs:   lungs are clear to auscultation, no rales or wheezing, equal chest wall expansion    Cardiovascular:   Regular. Normal first and second heart sounds with no murmurs, rubs, or gallops; the carotid, radial and posterior tibial pulses are intact, + JVD and LE edema bilaterally    Abdomen:  no organomegaly, masses, bruits, or tenderness; bowel sounds are present   Extremities: no cyanosis or clubbing   Skin: no xanthelasma, warm.    Neurologic: alert and oriented x3, calm     Psychiatric: alert and oriented x3, calm     A complete 10 systems ROS was reviewed  And is negative except what is listed in the HPI.    "       Medical History  Surgical History Family History Social History   Past Medical History:   Diagnosis Date     Benign essential hypertension      Chronic atrial fibrillation (H)     Past Surgical History:   Procedure Laterality Date     APPENDECTOMY       CHOLECYSTECTOMY       GASTRIC BYPASS      no family history of premature coronary artery disease Social History     Socioeconomic History     Marital status:      Spouse name: Not on file     Number of children: Not on file     Years of education: Not on file     Highest education level: Not on file   Occupational History     Not on file   Tobacco Use     Smoking status: Never Smoker     Smokeless tobacco: Never Used   Substance and Sexual Activity     Alcohol use: Not Currently     Comment: Rarely Uses alcohol     Drug use: Never     Sexual activity: Not on file   Other Topics Concern     Not on file   Social History Narrative     Not on file     Social Determinants of Health     Financial Resource Strain: Not on file   Food Insecurity: Not on file   Transportation Needs: Not on file   Physical Activity: Not on file   Stress: Not on file   Social Connections: Not on file   Intimate Partner Violence: Not on file   Housing Stability: Not on file           Lab Results    Chemistry/lipid CBC Cardiac Enzymes/BNP/TSH/INR   Lab Results   Component Value Date    CHOL 163 09/09/2016    HDL 42 (L) 09/09/2016    TRIG 81 09/09/2016    BUN 24.9 (H) 09/28/2022     09/28/2022    CO2 32 (H) 09/28/2022    Lab Results   Component Value Date    WBC 4.8 09/28/2022    HGB 9.5 (L) 09/28/2022    HCT 30.8 (L) 09/28/2022    MCV 90 09/28/2022     09/28/2022    Lab Results   Component Value Date    TROPONINI 1.32 (HH) 08/21/2021     (H) 09/02/2021    TSH 4.92 08/31/2021    INR 1.44 (H) 08/21/2021     Lab Results   Component Value Date    TROPONINI 1.32 (HH) 08/21/2021          Weight:    Wt Readings from Last 3 Encounters:   09/29/22 (!) 151.4 kg (333 lb 12.4  oz)   09/21/21 130.2 kg (287 lb)   08/27/21 129.9 kg (286 lb 6.4 oz)       Allergies  No Known Allergies      Surgical History  Past Surgical History:   Procedure Laterality Date     APPENDECTOMY       CHOLECYSTECTOMY       GASTRIC BYPASS         Social History  Tobacco:   History   Smoking Status     Never Smoker   Smokeless Tobacco     Never Used    Alcohol:   Social History    Substance and Sexual Activity      Alcohol use: Not Currently        Comment: Rarely Uses alcohol   Illicit Drugs:   History   Drug Use Unknown       Family History  Family History   Problem Relation Age of Onset     No Known Problems Mother      No Known Problems Father           Tara Siegel MD on 9/29/2022      cc: Tiff Chavez

## 2022-09-29 NOTE — PROGRESS NOTES
Heart Failure Care Map  GOALS TO BE MET BEFORE DISCHARGE:    1. Decrease congestion and/or edema with diuretic therapy to achieve near optimal volume status.     Dyspnea improved: Yes, satisfactory for discharge. Able to walk short distances with A of 1 and walker today   Edema improved: No, further care required to meet this goal. Please explain Currently has lymph wraps on for compression therapy. Continues on IV diuretics and still has a lot of edema in lower belly and bilateral legs        Net I/O and Weights since admission:   08/29 2300 - 09/28 2259  In: 4264.45 [P.O.:3736; I.V.:528.45]  Out: 45529 [Urine:17949]  Net: -09193.55     Vitals:    09/24/22 0631 09/24/22 1700 09/25/22 0432 09/26/22 0409   Weight: (!) 154.2 kg (340 lb) (!) 159.8 kg (352 lb 4.7 oz) (!) 159.8 kg (352 lb 4.7 oz) (!) 162.1 kg (357 lb 5.9 oz)    09/27/22 0500 09/28/22 0500   Weight: (!) 156.9 kg (345 lb 14.4 oz) (!) 153 kg (337 lb 4.9 oz)       2.  O2 sats > 90% on room air, or at prior home O2 therapy level.      Able to wean O2 this shift to keep sats above 90%?: Yes, satisfactory for discharge.   Does patient use Home O2? No          Current oxygenation status:   SpO2: 92 %     O2 Device: None (Room air),      3.  Tolerates ambulation and mobility near baseline.     Ambulation: No, further care required to meet this goal. Please explain Still having difficulty with ambulation due to extensive edema and discomfort   Times patient ambulated with staff this shift: 1    Please review the Heart Failure Care Map for additional HF goal outcomes.    Meenakshi Flannery RN  9/28/2022

## 2022-09-29 NOTE — PLAN OF CARE
Goal Outcome Evaluation:    Pt denies pain,  nausea, and shortness of breath, is calm and cooperative with care team. Continues on lasix drip, voiding well.

## 2022-09-30 ENCOUNTER — APPOINTMENT (OUTPATIENT)
Dept: PHYSICAL THERAPY | Facility: HOSPITAL | Age: 67
DRG: 280 | End: 2022-09-30
Payer: MEDICARE

## 2022-09-30 ENCOUNTER — APPOINTMENT (OUTPATIENT)
Dept: OCCUPATIONAL THERAPY | Facility: HOSPITAL | Age: 67
DRG: 280 | End: 2022-09-30
Payer: MEDICARE

## 2022-09-30 LAB
ALBUMIN SERPL BCG-MCNC: 3.4 G/DL (ref 3.5–5.2)
ALP SERPL-CCNC: 92 U/L (ref 35–104)
ALT SERPL W P-5'-P-CCNC: 12 U/L (ref 10–35)
ANION GAP SERPL CALCULATED.3IONS-SCNC: 9 MMOL/L (ref 7–15)
AST SERPL W P-5'-P-CCNC: 27 U/L (ref 10–35)
BILIRUB DIRECT SERPL-MCNC: 0.26 MG/DL (ref 0–0.3)
BILIRUB SERPL-MCNC: 0.8 MG/DL
BUN SERPL-MCNC: 23 MG/DL (ref 8–23)
CALCIUM SERPL-MCNC: 9 MG/DL (ref 8.8–10.2)
CHLORIDE SERPL-SCNC: 96 MMOL/L (ref 98–107)
CREAT SERPL-MCNC: 0.71 MG/DL (ref 0.51–0.95)
DEPRECATED HCO3 PLAS-SCNC: 37 MMOL/L (ref 22–29)
GFR SERPL CREATININE-BSD FRML MDRD: >90 ML/MIN/1.73M2
GLUCOSE SERPL-MCNC: 104 MG/DL (ref 70–99)
HOLD SPECIMEN: NORMAL
MAGNESIUM SERPL-MCNC: 1.6 MG/DL (ref 1.7–2.3)
POTASSIUM SERPL-SCNC: 3.4 MMOL/L (ref 3.4–5.3)
POTASSIUM SERPL-SCNC: 3.6 MMOL/L (ref 3.4–5.3)
PROT SERPL-MCNC: 6.3 G/DL (ref 6.4–8.3)
SODIUM SERPL-SCNC: 142 MMOL/L (ref 136–145)

## 2022-09-30 PROCEDURE — 84450 TRANSFERASE (AST) (SGOT): CPT | Performed by: HOSPITALIST

## 2022-09-30 PROCEDURE — 97535 SELF CARE MNGMENT TRAINING: CPT | Mod: GO

## 2022-09-30 PROCEDURE — 83735 ASSAY OF MAGNESIUM: CPT | Performed by: HOSPITALIST

## 2022-09-30 PROCEDURE — 84155 ASSAY OF PROTEIN SERUM: CPT | Performed by: HOSPITALIST

## 2022-09-30 PROCEDURE — 84132 ASSAY OF SERUM POTASSIUM: CPT | Performed by: HOSPITALIST

## 2022-09-30 PROCEDURE — 250N000013 HC RX MED GY IP 250 OP 250 PS 637: Performed by: INTERNAL MEDICINE

## 2022-09-30 PROCEDURE — 97110 THERAPEUTIC EXERCISES: CPT | Mod: GO

## 2022-09-30 PROCEDURE — 250N000013 HC RX MED GY IP 250 OP 250 PS 637: Performed by: HOSPITALIST

## 2022-09-30 PROCEDURE — 82248 BILIRUBIN DIRECT: CPT | Performed by: HOSPITALIST

## 2022-09-30 PROCEDURE — 36415 COLL VENOUS BLD VENIPUNCTURE: CPT | Performed by: HOSPITALIST

## 2022-09-30 PROCEDURE — 250N000009 HC RX 250: Performed by: INTERNAL MEDICINE

## 2022-09-30 PROCEDURE — 82310 ASSAY OF CALCIUM: CPT | Performed by: HOSPITALIST

## 2022-09-30 PROCEDURE — 97535 SELF CARE MNGMENT TRAINING: CPT | Mod: GP | Performed by: PHYSICAL THERAPIST

## 2022-09-30 PROCEDURE — 120N000004 HC R&B MS OVERFLOW

## 2022-09-30 PROCEDURE — 250N000013 HC RX MED GY IP 250 OP 250 PS 637: Performed by: FAMILY MEDICINE

## 2022-09-30 PROCEDURE — 99233 SBSQ HOSP IP/OBS HIGH 50: CPT | Performed by: HOSPITALIST

## 2022-09-30 PROCEDURE — 99232 SBSQ HOSP IP/OBS MODERATE 35: CPT | Performed by: INTERNAL MEDICINE

## 2022-09-30 PROCEDURE — 97110 THERAPEUTIC EXERCISES: CPT | Mod: GP

## 2022-09-30 PROCEDURE — 97530 THERAPEUTIC ACTIVITIES: CPT | Mod: GP

## 2022-09-30 RX ORDER — POTASSIUM CHLORIDE 1500 MG/1
40 TABLET, EXTENDED RELEASE ORAL ONCE
Status: COMPLETED | OUTPATIENT
Start: 2022-09-30 | End: 2022-09-30

## 2022-09-30 RX ADMIN — ACETAMINOPHEN 975 MG: 325 TABLET, FILM COATED ORAL at 00:51

## 2022-09-30 RX ADMIN — SPIRONOLACTONE 25 MG: 25 TABLET, FILM COATED ORAL at 08:34

## 2022-09-30 RX ADMIN — MICONAZOLE NITRATE: 2 POWDER TOPICAL at 08:35

## 2022-09-30 RX ADMIN — POLYETHYLENE GLYCOL 3350 17 G: 17 POWDER, FOR SOLUTION ORAL at 08:31

## 2022-09-30 RX ADMIN — Medication 1 TABLET: at 08:34

## 2022-09-30 RX ADMIN — MICONAZOLE NITRATE 1 G: 2 POWDER TOPICAL at 20:26

## 2022-09-30 RX ADMIN — METOPROLOL SUCCINATE 25 MG: 25 TABLET, EXTENDED RELEASE ORAL at 08:34

## 2022-09-30 RX ADMIN — POTASSIUM CHLORIDE 40 MEQ: 1500 TABLET, EXTENDED RELEASE ORAL at 08:33

## 2022-09-30 RX ADMIN — ACETAMINOPHEN 975 MG: 325 TABLET, FILM COATED ORAL at 13:28

## 2022-09-30 RX ADMIN — APIXABAN 5 MG: 5 TABLET, FILM COATED ORAL at 08:31

## 2022-09-30 RX ADMIN — Medication 1000 MCG: at 08:31

## 2022-09-30 RX ADMIN — FUROSEMIDE 10 MG/HR: 10 INJECTION, SOLUTION INTRAVENOUS at 11:13

## 2022-09-30 RX ADMIN — LOSARTAN POTASSIUM 25 MG: 25 TABLET, FILM COATED ORAL at 08:31

## 2022-09-30 RX ADMIN — CITALOPRAM HYDROBROMIDE 20 MG: 20 TABLET ORAL at 08:33

## 2022-09-30 RX ADMIN — APIXABAN 5 MG: 5 TABLET, FILM COATED ORAL at 20:26

## 2022-09-30 ASSESSMENT — ACTIVITIES OF DAILY LIVING (ADL)
ADLS_ACUITY_SCORE: 32
ADLS_ACUITY_SCORE: 31
ADLS_ACUITY_SCORE: 32
ADLS_ACUITY_SCORE: 32
ADLS_ACUITY_SCORE: 31
ADLS_ACUITY_SCORE: 32
ADLS_ACUITY_SCORE: 33
ADLS_ACUITY_SCORE: 32
ADLS_ACUITY_SCORE: 31
ADLS_ACUITY_SCORE: 32
ADLS_ACUITY_SCORE: 32
ADLS_ACUITY_SCORE: 31

## 2022-09-30 NOTE — PROGRESS NOTES
HEART CARE NOTE          Assessment/Recommendations     1. HFpEF, Valvular heart disease c/b ADHF  Assessment / Plan    Hypervolemic on physical exam, but diuresing well on current regimen - no changes to regimen of plan of care at this time    GDMT as detailed below; mainstay of treatment for HFpEF includes diuretics and adequate BP control (class I) and SGLT2-I (class 2a); additional medical therapy (ARNI, MRA, ARB) demonstrated less robust evidence for indication but may be considered per guideline recommendations (2b); no indication for BBlockers      Current Pharmacotherapy AHA Guideline-Directed Medical Therapy   Losartan 25 mg daily ARNI/ARB   Spironolactone 25 mg  MRA   SGLT2 inhibitor not started SGLT2-I    Furosemide gtt Loop diuretic        2. Atrial fibrillation  Assessment / Plan    Rate controlled - continue metoprolol, and apixaban      3. Elevated troponin  Assessment / Plan    Mild elevated in the setting of afib and ADHF -- likely type 2 MI int he setting of demand ischemia; echo without WMAs and patient chest pain free and hemodynamically stable - no further cardiac testing indicated at this time     4. Moderate aortic stenosis  Assessment / Plan    LORRAINE 1.1, pk/mn 2.78/17.4 - will discuss with valve clinic regarding potential interventions      History of Present Illness/Subjective      Ms. Misty Quezada is a 67 year old female with a PMHx significant for atrial fibrillation who presented in ADHF in the setting of valvular heart disease.     Today, Mrs. Quezada denies any acute cardiac events or complaints; Management plan as detailed above - no changes to plan of care today     ECG: Personally reviewed. nonspecific ST and T waves changes, atrial fibrillation, with RVR     ECHO (personnaly Reviewed):  Interpretation Summary     1. Left ventricular size is normal. Mild concentric increase in wall  thickness. Systolic function is normal. The estimated left ventricular  ejection fraction is  "55-60%.  2. Right ventricle is not well visualized. Size is probably at least  moderately enlarged. Systolic function appears normal.  3. Severe right atrial enlargement.  4. Calcified trileaflet aortic valve with moderate aortic stenosis. Peak  forward velocity measures 2.8 m/s, mean gradient 17 mm Hg, calculated aortic  valve area 1.1 cm2, and dimensionless index 0.44. Trace aortic regurgitation.  5. Severe functional tricuspid regurgitation.  6. At least mild pulmonary hypertension is present with an estimated pulmonary  artery systolic pressure of 39 mm Hg plus the right atrial pressure.  7. Compared to the prior study dated 8/22/2021, there has been no significant  change.          Physical Examination Review of Systems   /73 (BP Location: Right arm)   Pulse 95   Temp 97.5  F (36.4  C) (Oral)   Resp 18   Ht 1.702 m (5' 7\")   Wt (!) 151.4 kg (333 lb 12.4 oz)   SpO2 91%   BMI 52.28 kg/m    Body mass index is 52.28 kg/m .  Wt Readings from Last 3 Encounters:   09/29/22 (!) 151.4 kg (333 lb 12.4 oz)   09/21/21 130.2 kg (287 lb)   08/27/21 129.9 kg (286 lb 6.4 oz)     General Appearance:   no distress, normal body habitus   ENT/Mouth: membranes moist, no oral lesions or bleeding gums.      EYES:  no scleral icterus, normal conjunctivae   Neck: no carotid bruits or thyromegaly   Chest/Lungs:   lungs are clear to auscultation, no rales or wheezing, equal chest wall expansion    Cardiovascular:   Regular. Normal first and second heart sounds with no murmurs, rubs, or gallops; the carotid, radial and posterior tibial pulses are intact, + JVD and LE edema bilaterally    Abdomen:  no organomegaly, masses, bruits, or tenderness; bowel sounds are present   Extremities: no cyanosis or clubbing   Skin: no xanthelasma, warm.    Neurologic: alert and oriented x3, calm     Psychiatric: alert and oriented x3, calm     A complete 10 systems ROS was reviewed  And is negative except what is listed in the HPI.    "       Medical History  Surgical History Family History Social History   Past Medical History:   Diagnosis Date     Benign essential hypertension      Chronic atrial fibrillation (H)     Past Surgical History:   Procedure Laterality Date     APPENDECTOMY       CHOLECYSTECTOMY       GASTRIC BYPASS      no family history of premature coronary artery disease Social History     Socioeconomic History     Marital status:      Spouse name: Not on file     Number of children: Not on file     Years of education: Not on file     Highest education level: Not on file   Occupational History     Not on file   Tobacco Use     Smoking status: Never Smoker     Smokeless tobacco: Never Used   Substance and Sexual Activity     Alcohol use: Not Currently     Comment: Rarely Uses alcohol     Drug use: Never     Sexual activity: Not on file   Other Topics Concern     Not on file   Social History Narrative     Not on file     Social Determinants of Health     Financial Resource Strain: Not on file   Food Insecurity: Not on file   Transportation Needs: Not on file   Physical Activity: Not on file   Stress: Not on file   Social Connections: Not on file   Intimate Partner Violence: Not on file   Housing Stability: Not on file           Lab Results    Chemistry/lipid CBC Cardiac Enzymes/BNP/TSH/INR   Lab Results   Component Value Date    CHOL 163 09/09/2016    HDL 42 (L) 09/09/2016    TRIG 81 09/09/2016    BUN 21.6 09/29/2022     09/29/2022    CO2 35 (H) 09/29/2022    Lab Results   Component Value Date    WBC 4.8 09/28/2022    HGB 9.5 (L) 09/28/2022    HCT 30.8 (L) 09/28/2022    MCV 90 09/28/2022     09/28/2022    Lab Results   Component Value Date    TROPONINI 1.32 (HH) 08/21/2021     (H) 09/02/2021    TSH 4.92 08/31/2021    INR 1.44 (H) 08/21/2021     Lab Results   Component Value Date    TROPONINI 1.32 (HH) 08/21/2021          Weight:    Wt Readings from Last 3 Encounters:   09/29/22 (!) 151.4 kg (333 lb 12.4 oz)    09/21/21 130.2 kg (287 lb)   08/27/21 129.9 kg (286 lb 6.4 oz)       Allergies  No Known Allergies      Surgical History  Past Surgical History:   Procedure Laterality Date     APPENDECTOMY       CHOLECYSTECTOMY       GASTRIC BYPASS         Social History  Tobacco:   History   Smoking Status     Never Smoker   Smokeless Tobacco     Never Used    Alcohol:   Social History    Substance and Sexual Activity      Alcohol use: Not Currently        Comment: Rarely Uses alcohol   Illicit Drugs:   History   Drug Use Unknown       Family History  Family History   Problem Relation Age of Onset     No Known Problems Mother      No Known Problems Father           Tara Siegel MD on 9/30/2022      cc: Tiff Chavez

## 2022-09-30 NOTE — PLAN OF CARE
Goal Outcome Evaluation:    Plan of Care Reviewed With: patient     Overall Patient Progress: improving     Heart Failure Care Map  GOALS TO BE MET BEFORE DISCHARGE:    1. Decrease congestion and/or edema with diuretic therapy to achieve near optimal volume status.     Dyspnea improved: Yes, satisfactory for discharge.   Edema improved: No, further care required to meet this goal. Please explain still has +2 to +3 LE edema        Net I/O and Weights since admission:   08/31 1500 - 09/30 1459  In: 5311.53 [P.O.:4752; I.V.:559.53]  Out: 73691 [Urine:53245]  Net: -16458.47     Vitals:    09/24/22 0631 09/24/22 1700 09/25/22 0432 09/26/22 0409   Weight: (!) 154.2 kg (340 lb) (!) 159.8 kg (352 lb 4.7 oz) (!) 159.8 kg (352 lb 4.7 oz) (!) 162.1 kg (357 lb 5.9 oz)    09/27/22 0500 09/28/22 0500 09/29/22 0327   Weight: (!) 156.9 kg (345 lb 14.4 oz) (!) 153 kg (337 lb 4.9 oz) (!) 151.4 kg (333 lb 12.4 oz)       2.  O2 sats > 90% on room air, or at prior home O2 therapy level.      Able to wean O2 this shift to keep sats above 90%?: Yes, satisfactory for discharge.   Does patient use Home O2? No          Current oxygenation status:   SpO2: 90 %     O2 Device: None (Room air), Oxygen Delivery: 1 LPM    3.  Tolerates ambulation and mobility near baseline.     Ambulation: No, further care required to meet this goal. Please explain needs encouragement   Times patient ambulated with staff this shift: 0    Please review the Heart Failure Care Map for additional HF goal outcomes.    Shakila Montelongo RN  9/30/2022

## 2022-09-30 NOTE — PROGRESS NOTES
Care Management Follow Up    Length of Stay (days): 6    Expected Discharge Date: 10/02/2022     Concerns to be Addressed:  Discharge Planning     Patient plan of care discussed at interdisciplinary rounds: Yes    Anticipated Discharge Disposition: TCU     Anticipated Discharge Services:  Therapy services  Anticipated Discharge DME:  TBD    Patient/family educated on Medicare website which has current facility and service quality ratings:  Yes  Education Provided on the Discharge Plan:  Yes  Patient/Family in Agreement with the Plan: Yes     Referrals Placed by CM/SW:  TCU referrals sent  Private pay costs discussed: Not applicable    Additional Information:  Patient is accepted at [facility] with tentative admission Monday 10/3.       Екатерина Raman

## 2022-09-30 NOTE — PROGRESS NOTES
[facility] Hospitalist Progress Note  [facility]  Admission date: 9/24/2022    Summary:    67F with moderate aortic stenosis, MIKEL, Afib who presents with LE edema and shortness of breath and found to have acute on chronic HFpEF.       Assessment/Plan    #Acute HFpEF in the setting of Moderate aortic stenosis  #Troponin elevation- likely type 2 MI due to demand ischemia  #mild pulm HTN and moderately enlarged RV  -lasix infusion.    -timing of TAVR evaluation per cards  -losartan, aldactone  -still massively volume overloaded despite 15lb weight loss.  Continue diuresis.  Consider dobutamine for RV dysfunction if hemodynamics limiting diuresis, currently responding very well.  Consider diamox if worsening contraction alklaosis.  -appreciate cards care    #MIKEL with nocturnal hypoxia - not formally diagnosed.  - overnight oximetry by RT 9/25-9/26: Had a total of 33 desaturations (less than 89% SPO2 for at least 10 seconds) with the lowest to 76%, spent approximately 8.6% of the night with desaturations below 90%  - Needs O2 for home night and urgent sleep evaluation as outpatient for nocturnal NIPPV     #Chronic AF - -toprol-xl, eliquis.      #lymphedema - PT/OT    #Generalized weakness     #Anemia  -Iron studies 9/27: Iron 24, TIBC 349, Sat 7, transferrin 313, ferritin 34  -Started on ferrous sulfate every other day   -Continue to monitor  -outpatient GI referral for c-scope if not recent.     #Hypomagnesemia - replacement  #moribd Obesity - Body mass index is 55  #Constipation -  Start scheduled Miralax with prn Dolcolax supp    Checklist:  Code Status: Full Code    Diet: 2 Gram Sodium Diet Other - please comment    Hi Catheter: Not present  Central Lines: None  DVT px:  DOAC        Auto-populated based on system request - if relevant they will be addressed above:  Clinically Significant Risk Factors Present on Admission                      Auto-populated from discharge tab:    Expected Discharge  Date: 10/01/2022        Discharge Comments: [facility] if bed is available when patient is ready to discharge         Overnight Events/Subjective/Notable results:  Hypoxia off O2 this AM  -2.7L yesterday, renal function stable  No CP or SOB  Bicarb up to 37  4 point ROS otherwise negative    Objective    Vital signs in last 24 hours  Temp:  [97.5  F (36.4  C)-98.3  F (36.8  C)] 98.3  F (36.8  C)  Pulse:  [80-99] 96  Resp:  [18-20] 20  BP: ()/(50-73) 106/57  SpO2:  [90 %-93 %] 90 % O2 Device: None (Room air)    Weight:   333 lbs 12.42 oz    Intake/Output last 3 shifts  I/O last 3 completed shifts:  In: 977.88 [P.O.:956; I.V.:21.88]  Out: 2450 [Urine:2450]  Body mass index is 52.28 kg/m .    Physical Exam  General:  Alert, cooperative, no distress    Neurologic:  oriented, facial symmetry preserved, fluent speech.   Psych: calm, mood and affect appropriate to situation  HEENT:  Anicteric, MMM  CV: RRR no JOHN, ++LE edema  Lungs: CTAB.  Easyrespirations  Abd: soft, obese, NT, normoactive BS  Skin: no rashes or jaundice noted on exposed skin.    Central Lines and Tubes: None (no talavera, CVC, feeding tubes)      I have reviewed all labs, medications, imaging studies in the last 24 hours.  Pertinent findings&changes discussed above.    Data     Stella Church MD  Internal Medicine Hospitalist

## 2022-09-30 NOTE — PROGRESS NOTES
CLINICAL NUTRITION SERVICES    Reviewed nutrition risk factors due to LOS. Pt is tolerating diet, eating well per nursing documentation and pt report. No nutrition issues identified at this time. Previously seen 9/26 with reported good appetite. RD will follow via weekly LOS nutrition screens at this time, unless consulted.

## 2022-09-30 NOTE — PLAN OF CARE
"  Problem: Cardiac Output Decreased (Heart Failure)  Goal: Optimal Cardiac Output  Outcome: Ongoing, Progressing     Problem: Fluid Imbalance (Heart Failure)  Goal: Fluid Balance  Outcome: Ongoing, Progressing     Patient is Med Surg.  Her leg edema is at a +2, ankles are +4, and feet are +3.  She has had her leg wraps on on shift.  Patient is voiding well with her peirwick.  She reported 6/10 pain at her feet reporting the feeling was \"tight\", tylenol was given at 1645 which gave some relief.  Patient is on room air, is a controlled A-Fib, and has fine crackles at the base of her lungs.  Patient is an 1800 mL fluid restriction,  and has a Lasix drip running at 10 mg/hr.  She has BMP, Mag, and K ordered for tomorrow morning.    Murray Verdugo RN  "

## 2022-09-30 NOTE — PROGRESS NOTES
Heart Failure Care Map  GOALS TO BE MET BEFORE DISCHARGE:    1. Decrease congestion and/or edema with diuretic therapy to achieve near optimal volume status.     Dyspnea improved: Patient gets SOB with exertion walking short distances from bed to the chair and marching in place.    Edema improved: No, further care required to meet this goal. Please explain continue to diureis. Lasix drip continuious. Weight 319 lbs. Patient incontinent and voided ample amount of urine to saturate the bed and the chair when up. Periwick placed yet difficult to get it in the proper place .        Net I/O and Weights since admission:   08/31 1500 - 09/30 1459  In: 5911.53 [P.O.:5352; I.V.:559.53]  Out: 04138 [Urine:76173]  Net: -94715.47     Vitals:    09/24/22 0631 09/24/22 1700 09/25/22 0432 09/26/22 0409   Weight: (!) 154.2 kg (340 lb) (!) 159.8 kg (352 lb 4.7 oz) (!) 159.8 kg (352 lb 4.7 oz) (!) 162.1 kg (357 lb 5.9 oz)    09/27/22 0500 09/28/22 0500 09/29/22 0327 09/30/22 1046   Weight: (!) 156.9 kg (345 lb 14.4 oz) (!) 153 kg (337 lb 4.9 oz) (!) 151.4 kg (333 lb 12.4 oz) 144.7 kg (319 lb)       2.  O2 sats > 90% on room air, or at prior home O2 therapy level.      Able to wean O2 this shift to keep sats above 90%?: Yes, satisfactory for discharge.   Does patient use Home O2? Yes-  no         Current oxygenation status:   SpO2: 90 %     O2 Device: None (Room air),      3.  Tolerates ambulation and mobility near baseline.     Ambulation: No, further care required to meet this goal. Please explain PAtient needs much assist to gain strength to walk with walker and gait belt for safety.    Times patient ambulated with staff this shift: 1    Please review the Heart Failure Care Map for additional HF goal outcomes.    Cori Craven RN  9/30/2022

## 2022-10-01 ENCOUNTER — APPOINTMENT (OUTPATIENT)
Dept: PHYSICAL THERAPY | Facility: HOSPITAL | Age: 67
DRG: 280 | End: 2022-10-01
Payer: MEDICARE

## 2022-10-01 LAB
ANION GAP SERPL CALCULATED.3IONS-SCNC: 9 MMOL/L (ref 7–15)
BUN SERPL-MCNC: 23.4 MG/DL (ref 8–23)
CALCIUM SERPL-MCNC: 9.1 MG/DL (ref 8.8–10.2)
CHLORIDE SERPL-SCNC: 94 MMOL/L (ref 98–107)
CREAT SERPL-MCNC: 0.75 MG/DL (ref 0.51–0.95)
DEPRECATED HCO3 PLAS-SCNC: 37 MMOL/L (ref 22–29)
GFR SERPL CREATININE-BSD FRML MDRD: 86 ML/MIN/1.73M2
GLUCOSE SERPL-MCNC: 100 MG/DL (ref 70–99)
HOLD SPECIMEN: NORMAL
HOLD SPECIMEN: NORMAL
MAGNESIUM SERPL-MCNC: 1.6 MG/DL (ref 1.7–2.3)
POTASSIUM SERPL-SCNC: 3.4 MMOL/L (ref 3.4–5.3)
POTASSIUM SERPL-SCNC: 3.7 MMOL/L (ref 3.4–5.3)
SODIUM SERPL-SCNC: 140 MMOL/L (ref 136–145)

## 2022-10-01 PROCEDURE — 250N000013 HC RX MED GY IP 250 OP 250 PS 637: Performed by: INTERNAL MEDICINE

## 2022-10-01 PROCEDURE — 84132 ASSAY OF SERUM POTASSIUM: CPT | Performed by: HOSPITALIST

## 2022-10-01 PROCEDURE — 250N000009 HC RX 250: Performed by: INTERNAL MEDICINE

## 2022-10-01 PROCEDURE — 250N000013 HC RX MED GY IP 250 OP 250 PS 637: Performed by: HOSPITALIST

## 2022-10-01 PROCEDURE — 99232 SBSQ HOSP IP/OBS MODERATE 35: CPT | Performed by: INTERNAL MEDICINE

## 2022-10-01 PROCEDURE — 99233 SBSQ HOSP IP/OBS HIGH 50: CPT | Performed by: HOSPITALIST

## 2022-10-01 PROCEDURE — 80048 BASIC METABOLIC PNL TOTAL CA: CPT | Performed by: HOSPITALIST

## 2022-10-01 PROCEDURE — 250N000013 HC RX MED GY IP 250 OP 250 PS 637: Performed by: FAMILY MEDICINE

## 2022-10-01 PROCEDURE — 120N000004 HC R&B MS OVERFLOW

## 2022-10-01 PROCEDURE — 97530 THERAPEUTIC ACTIVITIES: CPT | Mod: GP

## 2022-10-01 PROCEDURE — 36415 COLL VENOUS BLD VENIPUNCTURE: CPT | Performed by: HOSPITALIST

## 2022-10-01 PROCEDURE — 83735 ASSAY OF MAGNESIUM: CPT | Performed by: HOSPITALIST

## 2022-10-01 PROCEDURE — 97116 GAIT TRAINING THERAPY: CPT | Mod: GP

## 2022-10-01 RX ORDER — POTASSIUM CHLORIDE 1500 MG/1
40 TABLET, EXTENDED RELEASE ORAL ONCE
Status: COMPLETED | OUTPATIENT
Start: 2022-10-01 | End: 2022-10-01

## 2022-10-01 RX ADMIN — Medication 1 TABLET: at 08:16

## 2022-10-01 RX ADMIN — APIXABAN 5 MG: 5 TABLET, FILM COATED ORAL at 08:19

## 2022-10-01 RX ADMIN — FERROUS SULFATE TAB 325 MG (65 MG ELEMENTAL FE) 325 MG: 325 (65 FE) TAB at 08:19

## 2022-10-01 RX ADMIN — CITALOPRAM HYDROBROMIDE 20 MG: 20 TABLET ORAL at 08:18

## 2022-10-01 RX ADMIN — METOPROLOL SUCCINATE 25 MG: 25 TABLET, EXTENDED RELEASE ORAL at 08:19

## 2022-10-01 RX ADMIN — FUROSEMIDE 10 MG/HR: 10 INJECTION, SOLUTION INTRAVENOUS at 08:22

## 2022-10-01 RX ADMIN — POTASSIUM CHLORIDE 40 MEQ: 1500 TABLET, EXTENDED RELEASE ORAL at 08:19

## 2022-10-01 RX ADMIN — MICONAZOLE NITRATE: 2 POWDER TOPICAL at 08:23

## 2022-10-01 RX ADMIN — APIXABAN 5 MG: 5 TABLET, FILM COATED ORAL at 20:18

## 2022-10-01 RX ADMIN — Medication 1 MG: at 01:51

## 2022-10-01 RX ADMIN — SPIRONOLACTONE 25 MG: 25 TABLET, FILM COATED ORAL at 08:19

## 2022-10-01 RX ADMIN — MICONAZOLE NITRATE: 2 POWDER TOPICAL at 01:51

## 2022-10-01 RX ADMIN — Medication 1 TABLET: at 08:18

## 2022-10-01 RX ADMIN — Medication 1000 MCG: at 08:18

## 2022-10-01 RX ADMIN — ACETAMINOPHEN 975 MG: 325 TABLET, FILM COATED ORAL at 15:49

## 2022-10-01 RX ADMIN — LOSARTAN POTASSIUM 25 MG: 25 TABLET, FILM COATED ORAL at 08:19

## 2022-10-01 RX ADMIN — MICONAZOLE NITRATE 2 APPLICATOR: 2 POWDER TOPICAL at 20:18

## 2022-10-01 RX ADMIN — MICONAZOLE NITRATE: 2 POWDER TOPICAL at 08:20

## 2022-10-01 ASSESSMENT — ACTIVITIES OF DAILY LIVING (ADL)
ADLS_ACUITY_SCORE: 30
ADLS_ACUITY_SCORE: 36
ADLS_ACUITY_SCORE: 36
ADLS_ACUITY_SCORE: 30
ADLS_ACUITY_SCORE: 32
ADLS_ACUITY_SCORE: 36
ADLS_ACUITY_SCORE: 36
ADLS_ACUITY_SCORE: 30
ADLS_ACUITY_SCORE: 32
ADLS_ACUITY_SCORE: 32
ADLS_ACUITY_SCORE: 36
ADLS_ACUITY_SCORE: 30

## 2022-10-01 NOTE — PROGRESS NOTES
[facility] Hospitalist Progress Note  [facility]  Admission date: 9/24/2022    Summary:    67F with moderate aortic stenosis, MIKEL, Afib who presents with LE edema and shortness of breath and found to have acute on chronic HFpEF.       Assessment/Plan    #Acute HFpEF in the setting of Moderate aortic stenosis  #Troponin elevation- likely type 2 MI due to demand ischemia  #mild pulm HTN and moderately enlarged RV  -lasix infusion.    -timing of TAVR evaluation per cards  -losartan, aldactone  -still volume overloaded despite significant diuresis.  Continue diuresis.  Consider dobutamine for RV dysfunction if hemodynamics limiting diuresis, currently responding very well.  Consider diamox if worsening contraction alklaosis.  -appreciate cards care    #MIKEL with nocturnal hypoxia - not formally diagnosed.  - overnight oximetry by RT 9/25-9/26: Had a total of 33 desaturations (less than 89% SPO2 for at least 10 seconds) with the lowest to 76%, spent approximately 8.6% of the night with desaturations below 90%  - Needs O2 for home night and urgent sleep evaluation as outpatient for nocturnal NIPPV     #Chronic AF - -toprol-xl, eliquis.      #lymphedema - PT/OT    #Generalized weakness     #Anemia  -Iron studies 9/27: Iron 24, TIBC 349, Sat 7, transferrin 313, ferritin 34  -Started on ferrous sulfate every other day   -Continue to monitor  -outpatient GI referral for c-scope if not recent.     #Hypomagnesemia - replacement  #moribd Obesity - Body mass index is 55  #Constipation -  Start scheduled Miralax with prn Dolcolax supp    Checklist:  Code Status: Full Code    Diet: 2 Gram Sodium Diet Other - please comment    Hi Catheter: Not present  Central Lines: None  DVT px:  DOAC        Auto-populated based on system request - if relevant they will be addressed above:  Clinically Significant Risk Factors Present on Admission                      Auto-populated from discharge tab:     Expected Discharge Date:  10/03/2022        Discharge Comments[facility] if bed is available when patient is ready to discharge         Overnight Events/Subjective/Notable results:  Hypoxia off O2 this AM  -1.1L yesterday, renal function stable  No CP or SOB  Bicarb up to 37  4 point ROS otherwise negative    Objective    Vital signs in last 24 hours  Temp:  [97.9  F (36.6  C)-98.3  F (36.8  C)] 98.3  F (36.8  C)  Pulse:  [86-98] 90  Resp:  [18-20] 20  BP: ()/(51-62) 100/62  SpO2:  [90 %-92 %] 91 % O2 Device: None (Room air)    Weight:   316 lbs 3.2 oz    Intake/Output last 3 shifts  I/O last 3 completed shifts:  In: 720 [P.O.:720]  Out: 2050 [Urine:2050]  Body mass index is 49.52 kg/m .    Physical Exam  General:  Alert, cooperative, no distress    Neurologic:  oriented, facial symmetry preserved, fluent speech.   Psych: calm, mood and affect appropriate to situation  HEENT:  Anicteric, MMM  CV: RRR no JOHN, ++LE edema  Lungs: CTAB.  Easyrespirations  Abd: soft, obese, NT, normoactive BS  Skin: no rashes or jaundice noted on exposed skin.    Central Lines and Tubes: None (no talavera, CVC, feeding tubes)      I have reviewed all labs, medications, imaging studies in the last 24 hours.  Pertinent findings&changes discussed above.    Data     Stella Church MD  Internal Medicine Hospitalist

## 2022-10-01 NOTE — PROGRESS NOTES
Heart Failure Care Map  GOALS TO BE MET BEFORE DISCHARGE:    1. Decrease congestion and/or edema with diuretic therapy to achieve near optimal volume status.     Dyspnea improved: No, further care required to meet this goal. Please explain Still short of breath with minimal activity   Edema improved: No, further care required to meet this goal. Please explain Remains on lasix drip and still very edematous, especially lower extremities, lymph wraps in place        Net I/O and Weights since admission:   08/31 2300 - 09/30 2259  In: 5911.53 [P.O.:5352; I.V.:559.53]  Out: 53085 [Urine:39610]  Net: -04881.47     Vitals:    09/24/22 0631 09/24/22 1700 09/25/22 0432 09/26/22 0409   Weight: (!) 154.2 kg (340 lb) (!) 159.8 kg (352 lb 4.7 oz) (!) 159.8 kg (352 lb 4.7 oz) (!) 162.1 kg (357 lb 5.9 oz)    09/27/22 0500 09/28/22 0500 09/29/22 0327 09/30/22 1046   Weight: (!) 156.9 kg (345 lb 14.4 oz) (!) 153 kg (337 lb 4.9 oz) (!) 151.4 kg (333 lb 12.4 oz) 144.7 kg (319 lb)       2.  O2 sats > 90% on room air, or at prior home O2 therapy level.      Able to wean O2 this shift to keep sats above 90%?: Yes, satisfactory for discharge.   Does patient use Home O2? No          Current oxygenation status:   SpO2: 90 %     O2 Device: None (Room air),  No    3.  Tolerates ambulation and mobility near baseline.     Ambulation: No, further care required to meet this goal. Please explain Only ambulating very short distances from Bed to chair   Times patient ambulated with staff this shift: 1    Please review the Heart Failure Care Map for additional HF goal outcomes.    Meenakshi Flannery RN  9/30/2022

## 2022-10-01 NOTE — PROGRESS NOTES
Heart Failure Care Map  GOALS TO BE MET BEFORE DISCHARGE:    1. Decrease congestion and/or edema with diuretic therapy to achieve near optimal volume status.     Dyspnea improved: Yes, satisfactory for discharge.   Edema improved: No, further care required to meet this goal. Please explain Lympedema wraps on. Lasix drip as ordered. Weight loss since on drip. SHe said her ledgs feel better . Potassium supplement given and next potassium level at noon.today.        Net I/O and Weights since admission:   09/01 1500 - 10/01 1459  In: 6361.53 [P.O.:5802; I.V.:559.53]  Out: 49601 [Urine:18089]  Net: -47079.47     Vitals:    09/24/22 0631 09/24/22 1700 09/25/22 0432 09/26/22 0409   Weight: (!) 154.2 kg (340 lb) (!) 159.8 kg (352 lb 4.7 oz) (!) 159.8 kg (352 lb 4.7 oz) (!) 162.1 kg (357 lb 5.9 oz)    09/27/22 0500 09/28/22 0500 09/29/22 0327 09/30/22 1046   Weight: (!) 156.9 kg (345 lb 14.4 oz) (!) 153 kg (337 lb 4.9 oz) (!) 151.4 kg (333 lb 12.4 oz) 144.7 kg (319 lb)    10/01/22 0642   Weight: 143.4 kg (316 lb 3.2 oz)       2.  O2 sats > 90% on room air, or at prior home O2 therapy level.      Able to wean O2 this shift to keep sats above 90%?: Yes, satisfactory for discharge.   Does patient use Home O2? No          Current oxygenation status:   SpO2: 91 %     O2 Device: None (Room air),      3.  Tolerates ambulation and mobility near baseline.     Ambulation: Yes, satisfactory for discharge.   Times patient ambulated with staff this shift: 1    Please review the Heart Failure Care Map for additional HF goal outcomes.    Cori Craven RN  10/1/2022

## 2022-10-01 NOTE — PLAN OF CARE
Heart Failure Care Map  GOALS TO BE MET BEFORE DISCHARGE:    1. Decrease congestion and/or edema with diuretic therapy to achieve near optimal volume status.     Dyspnea improved: Yes, satisfactory for discharge.   Edema improved: No, further care required to meet this goal. Please explain pt still with BLE edema with lymph wraps on.        Net I/O and Weights since admission:   09/01 0700 - 10/01 0659  In: 5911.53 [P.O.:5352; I.V.:559.53]  Out: 05418 [Urine:00630]  Net: -46124.47     Vitals:    09/24/22 0631 09/24/22 1700 09/25/22 0432 09/26/22 0409   Weight: (!) 154.2 kg (340 lb) (!) 159.8 kg (352 lb 4.7 oz) (!) 159.8 kg (352 lb 4.7 oz) (!) 162.1 kg (357 lb 5.9 oz)    09/27/22 0500 09/28/22 0500 09/29/22 0327 09/30/22 1046   Weight: (!) 156.9 kg (345 lb 14.4 oz) (!) 153 kg (337 lb 4.9 oz) (!) 151.4 kg (333 lb 12.4 oz) 144.7 kg (319 lb)       2.  O2 sats > 90% on room air, or at prior home O2 therapy level.      Able to wean O2 this shift to keep sats above 90%?: Yes, satisfactory for discharge.   Does patient use Home O2? No          Current oxygenation status:   SpO2: 92 %     O2 Device: None (Room air),      3.  Tolerates ambulation and mobility near baseline.     Ambulation: No, further care required to meet this goal. Please explain pt is only transfering from bed to chair   Times patient ambulated with staff this shift: 0    Pt complained mostly of back pain due to bed.  Pt repositioned and given a back rub with good results.  Pt continues on Lasix drip at 10mg/hr. Pure wick on, but at times leaks and is incontinent.    Please review the Heart Failure Care Map for additional HF goal outcomes.    Syeda Cordero RN  10/1/2022

## 2022-10-01 NOTE — PROGRESS NOTES
Heart Failure Care Map  GOALS TO BE MET BEFORE DISCHARGE:    1. Decrease congestion and/or edema with diuretic therapy to achieve near optimal volume status.     Dyspnea improved: Yes, satisfactory for discharge.   Edema improved: No, further care required to meet this goal. Please explain fluid overload improving and continue to diurese. Lasix drip as ordered. daily weight , strict I and O . Fluid restriction 1800 cc.         Net I/O and Weights since admission:   09/01 2300 - 10/01 2259  In: 6661.53 [P.O.:6102; I.V.:559.53]  Out: 38941 [Urine:61592]  Net: -23414.47     Vitals:    09/24/22 0631 09/24/22 1700 09/25/22 0432 09/26/22 0409   Weight: (!) 154.2 kg (340 lb) (!) 159.8 kg (352 lb 4.7 oz) (!) 159.8 kg (352 lb 4.7 oz) (!) 162.1 kg (357 lb 5.9 oz)    09/27/22 0500 09/28/22 0500 09/29/22 0327 09/30/22 1046   Weight: (!) 156.9 kg (345 lb 14.4 oz) (!) 153 kg (337 lb 4.9 oz) (!) 151.4 kg (333 lb 12.4 oz) 144.7 kg (319 lb)    10/01/22 0642   Weight: 143.4 kg (316 lb 3.2 oz)       2.  O2 sats > 90% on room air, or at prior home O2 therapy level.      Able to wean O2 this shift to keep sats above 90%?: Yes, satisfactory for discharge.   Does patient use Home O2? No          Current oxygenation status:   SpO2: 94 %     O2 Device: None (Room air),      3.  Tolerates ambulation and mobility near baseline.     Ambulation: Yes, satisfactory for discharge.   Times patient ambulated with staff this shift: 1    Please review the Heart Failure Care Map for additional HF goal outcomes.    Cori Craven RN  10/1/2022

## 2022-10-01 NOTE — PROGRESS NOTES
HEART CARE NOTE          Assessment/Recommendations     1. HFpEF, Valvular heart disease c/b ADHF  Assessment / Plan    Hypervolemic on physical exam, but diuresing well on current regimen - no changes to regimen at this time    GDMT as detailed below; mainstay of treatment for HFpEF includes diuretics and adequate BP control (class I) and SGLT2-I (class 2a); additional medical therapy (ARNI, MRA, ARB) demonstrated less robust evidence for indication but may be considered per guideline recommendations (2b); no indication for BBlockers      Current Pharmacotherapy AHA Guideline-Directed Medical Therapy   Losartan 25 mg daily ARNI/ARB   Spironolactone 25 mg  MRA   SGLT2 inhibitor not started SGLT2-I    Furosemide gtt Loop diuretic        2. Atrial fibrillation  Assessment / Plan    Rate controlled - continue metoprolol, and apixaban      3. Elevated troponin  Assessment / Plan    Mild elevated in the setting of afib and ADHF -- likely type 2 MI int he setting of demand ischemia; echo without WMAs and patient chest pain free and hemodynamically stable - no further cardiac testing indicated at this time     4. Moderate aortic stenosis  Assessment / Plan    LORRAINE 1.1, pk/mn 2.78/17.4 - will discuss with valve clinic regarding potential interventions      History of Present Illness/Subjective      Ms. Misty Quezada is a 67 year old female with a PMHx significant for atrial fibrillation who presented in ADHF in the setting of valvular heart disease.     Today, Mrs. Quezada denies any acute cardiac events or complaints; Management plan as detailed above - no changes to plan of care today     ECG: Personally reviewed. nonspecific ST and T waves changes, atrial fibrillation, with RVR     ECHO (personnaly Reviewed):  Interpretation Summary     1. Left ventricular size is normal. Mild concentric increase in wall  thickness. Systolic function is normal. The estimated left ventricular  ejection fraction is 55-60%.  2. Right  "ventricle is not well visualized. Size is probably at least  moderately enlarged. Systolic function appears normal.  3. Severe right atrial enlargement.  4. Calcified trileaflet aortic valve with moderate aortic stenosis. Peak  forward velocity measures 2.8 m/s, mean gradient 17 mm Hg, calculated aortic  valve area 1.1 cm2, and dimensionless index 0.44. Trace aortic regurgitation.  5. Severe functional tricuspid regurgitation.  6. At least mild pulmonary hypertension is present with an estimated pulmonary  artery systolic pressure of 39 mm Hg plus the right atrial pressure.  7. Compared to the prior study dated 8/22/2021, there has been no significant  change.          Physical Examination Review of Systems   /62 (BP Location: Right arm)   Pulse 90   Temp 98.3  F (36.8  C) (Oral)   Resp 20   Ht 1.702 m (5' 7\")   Wt 143.4 kg (316 lb 3.2 oz)   SpO2 91%   BMI 49.52 kg/m    Body mass index is 49.52 kg/m .  Wt Readings from Last 3 Encounters:   10/01/22 143.4 kg (316 lb 3.2 oz)   09/21/21 130.2 kg (287 lb)   08/27/21 129.9 kg (286 lb 6.4 oz)     General Appearance:   no distress, normal body habitus   ENT/Mouth: membranes moist, no oral lesions or bleeding gums.      EYES:  no scleral icterus, normal conjunctivae   Neck: no carotid bruits or thyromegaly   Chest/Lungs:   lungs are clear to auscultation, no rales or wheezing, equal chest wall expansion    Cardiovascular:   Irregular. Normal first and second heart sounds with no murmurs, rubs, or gallops; the carotid, radial and posterior tibial pulses are intact, + JVD and LE edema bilaterally    Abdomen:  no organomegaly, masses, bruits, or tenderness; bowel sounds are present   Extremities: no cyanosis or clubbing   Skin: no xanthelasma, warm.    Neurologic: alert and oriented x3, calm     Psychiatric: alert and oriented x3, calm     A complete 10 systems ROS was reviewed  And is negative except what is listed in the HPI.          Medical History  Surgical " History Family History Social History   Past Medical History:   Diagnosis Date     Benign essential hypertension      Chronic atrial fibrillation (H)     Past Surgical History:   Procedure Laterality Date     APPENDECTOMY       CHOLECYSTECTOMY       GASTRIC BYPASS      no family history of premature coronary artery disease Social History     Socioeconomic History     Marital status:      Spouse name: Not on file     Number of children: Not on file     Years of education: Not on file     Highest education level: Not on file   Occupational History     Not on file   Tobacco Use     Smoking status: Never Smoker     Smokeless tobacco: Never Used   Substance and Sexual Activity     Alcohol use: Not Currently     Comment: Rarely Uses alcohol     Drug use: Never     Sexual activity: Not on file   Other Topics Concern     Not on file   Social History Narrative     Not on file     Social Determinants of Health     Financial Resource Strain: Not on file   Food Insecurity: Not on file   Transportation Needs: Not on file   Physical Activity: Not on file   Stress: Not on file   Social Connections: Not on file   Intimate Partner Violence: Not on file   Housing Stability: Not on file           Lab Results    Chemistry/lipid CBC Cardiac Enzymes/BNP/TSH/INR   Lab Results   Component Value Date    CHOL 163 09/09/2016    HDL 42 (L) 09/09/2016    TRIG 81 09/09/2016    BUN 23.4 (H) 10/01/2022     10/01/2022    CO2 37 (H) 10/01/2022    Lab Results   Component Value Date    WBC 4.8 09/28/2022    HGB 9.5 (L) 09/28/2022    HCT 30.8 (L) 09/28/2022    MCV 90 09/28/2022     09/28/2022    Lab Results   Component Value Date    TROPONINI 1.32 (HH) 08/21/2021     (H) 09/02/2021    TSH 4.92 08/31/2021    INR 1.44 (H) 08/21/2021     Lab Results   Component Value Date    TROPONINI 1.32 (HH) 08/21/2021          Weight:    Wt Readings from Last 3 Encounters:   10/01/22 143.4 kg (316 lb 3.2 oz)   09/21/21 130.2 kg (287 lb)    08/27/21 129.9 kg (286 lb 6.4 oz)       Allergies  No Known Allergies      Surgical History  Past Surgical History:   Procedure Laterality Date     APPENDECTOMY       CHOLECYSTECTOMY       GASTRIC BYPASS         Social History  Tobacco:   History   Smoking Status     Never Smoker   Smokeless Tobacco     Never Used    Alcohol:   Social History    Substance and Sexual Activity      Alcohol use: Not Currently        Comment: Rarely Uses alcohol   Illicit Drugs:   History   Drug Use Unknown       Family History  Family History   Problem Relation Age of Onset     No Known Problems Mother      No Known Problems Father           Tara Siegel MD on 10/1/2022      cc: Tiff Chavez

## 2022-10-02 ENCOUNTER — APPOINTMENT (OUTPATIENT)
Dept: PHYSICAL THERAPY | Facility: HOSPITAL | Age: 67
DRG: 280 | End: 2022-10-02
Payer: MEDICARE

## 2022-10-02 PROBLEM — E83.42 HYPOMAGNESEMIA: Status: ACTIVE | Noted: 2022-10-02

## 2022-10-02 PROBLEM — I50.33 ACUTE ON CHRONIC DIASTOLIC CONGESTIVE HEART FAILURE (H): Status: ACTIVE | Noted: 2022-10-02

## 2022-10-02 PROBLEM — R60.0 BILATERAL LOWER EXTREMITY EDEMA: Status: RESOLVED | Noted: 2022-09-24 | Resolved: 2022-10-02

## 2022-10-02 LAB
ANION GAP SERPL CALCULATED.3IONS-SCNC: 9 MMOL/L (ref 7–15)
BUN SERPL-MCNC: 23 MG/DL (ref 8–23)
CALCIUM SERPL-MCNC: 9.1 MG/DL (ref 8.8–10.2)
CHLORIDE SERPL-SCNC: 93 MMOL/L (ref 98–107)
CREAT SERPL-MCNC: 0.76 MG/DL (ref 0.51–0.95)
DEPRECATED HCO3 PLAS-SCNC: 39 MMOL/L (ref 22–29)
GFR SERPL CREATININE-BSD FRML MDRD: 85 ML/MIN/1.73M2
GLUCOSE SERPL-MCNC: 96 MG/DL (ref 70–99)
MAGNESIUM SERPL-MCNC: 1.7 MG/DL (ref 1.7–2.3)
POTASSIUM SERPL-SCNC: 3.5 MMOL/L (ref 3.4–5.3)
SODIUM SERPL-SCNC: 141 MMOL/L (ref 136–145)

## 2022-10-02 PROCEDURE — 250N000013 HC RX MED GY IP 250 OP 250 PS 637: Performed by: INTERNAL MEDICINE

## 2022-10-02 PROCEDURE — 99232 SBSQ HOSP IP/OBS MODERATE 35: CPT | Performed by: INTERNAL MEDICINE

## 2022-10-02 PROCEDURE — 80048 BASIC METABOLIC PNL TOTAL CA: CPT | Performed by: HOSPITALIST

## 2022-10-02 PROCEDURE — 250N000009 HC RX 250: Performed by: INTERNAL MEDICINE

## 2022-10-02 PROCEDURE — 97530 THERAPEUTIC ACTIVITIES: CPT | Mod: GP

## 2022-10-02 PROCEDURE — 250N000011 HC RX IP 250 OP 636: Performed by: INTERNAL MEDICINE

## 2022-10-02 PROCEDURE — 36415 COLL VENOUS BLD VENIPUNCTURE: CPT | Performed by: HOSPITALIST

## 2022-10-02 PROCEDURE — 120N000004 HC R&B MS OVERFLOW

## 2022-10-02 PROCEDURE — 83735 ASSAY OF MAGNESIUM: CPT | Performed by: HOSPITALIST

## 2022-10-02 PROCEDURE — 250N000013 HC RX MED GY IP 250 OP 250 PS 637: Performed by: FAMILY MEDICINE

## 2022-10-02 PROCEDURE — 97116 GAIT TRAINING THERAPY: CPT | Mod: GP

## 2022-10-02 PROCEDURE — 97535 SELF CARE MNGMENT TRAINING: CPT | Mod: GP

## 2022-10-02 RX ORDER — MAGNESIUM SULFATE HEPTAHYDRATE 40 MG/ML
2 INJECTION, SOLUTION INTRAVENOUS ONCE
Status: COMPLETED | OUTPATIENT
Start: 2022-10-02 | End: 2022-10-02

## 2022-10-02 RX ORDER — POTASSIUM CHLORIDE 1500 MG/1
40 TABLET, EXTENDED RELEASE ORAL ONCE
Status: COMPLETED | OUTPATIENT
Start: 2022-10-02 | End: 2022-10-02

## 2022-10-02 RX ADMIN — POLYETHYLENE GLYCOL 3350 17 G: 17 POWDER, FOR SOLUTION ORAL at 07:59

## 2022-10-02 RX ADMIN — APIXABAN 5 MG: 5 TABLET, FILM COATED ORAL at 20:19

## 2022-10-02 RX ADMIN — POTASSIUM CHLORIDE 40 MEQ: 1500 TABLET, EXTENDED RELEASE ORAL at 12:01

## 2022-10-02 RX ADMIN — ACETAMINOPHEN 975 MG: 325 TABLET, FILM COATED ORAL at 23:44

## 2022-10-02 RX ADMIN — Medication 1 TABLET: at 07:59

## 2022-10-02 RX ADMIN — MICONAZOLE NITRATE: 2 POWDER TOPICAL at 20:19

## 2022-10-02 RX ADMIN — MICONAZOLE NITRATE: 2 POWDER TOPICAL at 08:01

## 2022-10-02 RX ADMIN — METOPROLOL SUCCINATE 25 MG: 25 TABLET, EXTENDED RELEASE ORAL at 08:00

## 2022-10-02 RX ADMIN — LOSARTAN POTASSIUM 25 MG: 25 TABLET, FILM COATED ORAL at 07:59

## 2022-10-02 RX ADMIN — MAGNESIUM SULFATE HEPTAHYDRATE 2 G: 40 INJECTION, SOLUTION INTRAVENOUS at 12:01

## 2022-10-02 RX ADMIN — CITALOPRAM HYDROBROMIDE 20 MG: 20 TABLET ORAL at 08:00

## 2022-10-02 RX ADMIN — Medication 1000 MCG: at 07:59

## 2022-10-02 RX ADMIN — Medication 1 MG: at 23:48

## 2022-10-02 RX ADMIN — SPIRONOLACTONE 25 MG: 25 TABLET, FILM COATED ORAL at 08:00

## 2022-10-02 RX ADMIN — APIXABAN 5 MG: 5 TABLET, FILM COATED ORAL at 07:59

## 2022-10-02 RX ADMIN — ACETAMINOPHEN 975 MG: 325 TABLET, FILM COATED ORAL at 08:10

## 2022-10-02 RX ADMIN — FUROSEMIDE 10 MG/HR: 10 INJECTION, SOLUTION INTRAVENOUS at 06:50

## 2022-10-02 ASSESSMENT — ACTIVITIES OF DAILY LIVING (ADL)
ADLS_ACUITY_SCORE: 32
ADLS_ACUITY_SCORE: 30
ADLS_ACUITY_SCORE: 32
ADLS_ACUITY_SCORE: 30
ADLS_ACUITY_SCORE: 30
ADLS_ACUITY_SCORE: 32
ADLS_ACUITY_SCORE: 30
ADLS_ACUITY_SCORE: 32

## 2022-10-02 NOTE — PROGRESS NOTES
Pt transferred from the chair to bed with assist of one with a walker and safety belt.  Pt's abdominal, groin, and breast folds cleaned,  Miconazole powder applied. Lymphedema wraps on lower extremities intact.

## 2022-10-02 NOTE — PROGRESS NOTES
[facility] Hospitalist Progress Note  [facility]  Admission date: 9/24/2022    Summary:    67F with moderate aortic stenosis, MIKEL, Afib who presents with LE edema and shortness of breath and found to have acute on chronic HFpEF.       Assessment/Plan    #Acute HFpEF in the setting of Moderate aortic stenosis  #Troponin elevation- likely type 2 MI due to demand ischemia  #mild pulm HTN and moderately enlarged RV  -lasix infusion.    -timing of TAVR evaluation per cards  -losartan, aldactone  -still volume overloaded despite significant diuresis.  Continue diuresis.  Consider dobutamine for RV dysfunction if hemodynamics limiting diuresis, currently responding very well.  Consider diamox if worsening contraction alklaosis.  -appreciate cards care    #MIKEL with nocturnal hypoxia - not formally diagnosed.  - overnight oximetry by RT 9/25-9/26: Had a total of 33 desaturations (less than 89% SPO2 for at least 10 seconds) with the lowest to 76%, spent approximately 8.6% of the night with desaturations below 90%  - Needs O2 for home night and urgent sleep evaluation as outpatient for nocturnal NIPPV     #Chronic AF - -toprol-xl, eliquis.      #lymphedema - PT/OT    #Generalized weakness     #Anemia  -Iron studies 9/27: Iron 24, TIBC 349, Sat 7, transferrin 313, ferritin 34  -Started on ferrous sulfate every other day   -Continue to monitor  -outpatient GI referral for c-scope if not recent.     #Hypomagnesemia - replacement  #moribd Obesity - Body mass index is 55  #Constipation -  Start scheduled Miralax with prn Dolcolax supp    Checklist:  Code Status: Full Code    Diet: 2 Gram Sodium Diet   Hi Catheter: Not present  Central Lines: None  DVT px:  DOAC          Overnight Events/Subjective/Notable results:  Weight was 357, now down to 310 (?baseline weight 260 lbs).  Breathing better.     Objective    Vital signs in last 24 hours  Temp:  [97.6  F (36.4  C)-98  F (36.7  C)] 97.8  F (36.6  C)  Pulse:  [86-98]  93  Resp:  [18-20] 18  BP: ()/(48-67) 110/55  SpO2:  [90 %-94 %] 92 % O2 Device: None (Room air)    Weight:   310 lbs 4.8 oz    Intake/Output last 3 shifts  I/O last 3 completed shifts:  In: 1153 [P.O.:1070; I.V.:83]  Out: 2850 [Urine:2850]  Body mass index is 48.6 kg/m .    Physical Exam  General:  Alert, cooperative, no distress    Neurologic:  oriented, facial symmetry preserved, fluent speech.   Psych: calm, mood and affect appropriate to situation  HEENT:  Anicteric, MMM  CV: RRR, 1/6 systolic murmur, 3+ bilateral ankle edema  Lungs: CTAB.  Easyrespirations  Abd: soft, obese, NT, normoactive BS  Skin: no rashes or jaundice noted on exposed skin.

## 2022-10-02 NOTE — PROGRESS NOTES
Care Management Follow Up    Length of Stay (days): 8    Expected Discharge Date: 10/03/2022     Concerns to be Addressed:  Discharge Planning  Patient plan of care discussed at interdisciplinary rounds: Yes    Anticipated Discharge Disposition: TCU     Anticipated Discharge Services:  Therapy services  Anticipated Discharge DME:  NA    Patient/family educated on Medicare website which has current facility and service quality ratings:  Yes  Education Provided on the Discharge Plan:  Yes  Patient/Family in Agreement with the Plan: Yes     Referrals Placed by CM/SW:    Private pay costs discussed: Not applicable    Additional Information:  Pt from home with 2 sons Samuel, and Slade. Pt says Ind at baseline, but does use cane at times. Sons help with running errands, patient has license but hasn't driven recently. No svcs prior. Patient accepted at [facility] with a tentative admission 10/3/22. Will need PAS.       Екатерина Raman

## 2022-10-02 NOTE — PROGRESS NOTES
Heart Failure Care Map  GOALS TO BE MET BEFORE DISCHARGE:    1. Decrease congestion and/or edema with diuretic therapy to achieve near optimal volume status.     Dyspnea improved: Yes, satisfactory for discharge.   Edema improved: No, further care required to meet this goal. Please explain PAtient showered today and than the lympedema OT came to wrap her legs which are still edematous. Patient lost modderate amount of weiight on the lasix drip and tolerating well. Periwick working well with good output. Legs  sore  and tylenol given with some relief. Patient walks with a walker and gait belt with one mely. [address] rd and folds on body red and powder placed as ordered. Call light in reach and bed or chair alarm on.         Net I/O and Weights since admission:   09/02 1500 - 10/02 1459  In: 7914.53 [P.O.:7192; I.V.:722.53]  Out: 69396 [Urine:04614]  Net: -70075.47     Vitals:    09/24/22 0631 09/24/22 1700 09/25/22 0432 09/26/22 0409   Weight: (!) 154.2 kg (340 lb) (!) 159.8 kg (352 lb 4.7 oz) (!) 159.8 kg (352 lb 4.7 oz) (!) 162.1 kg (357 lb 5.9 oz)    09/27/22 0500 09/28/22 0500 09/29/22 0327 09/30/22 1046   Weight: (!) 156.9 kg (345 lb 14.4 oz) (!) 153 kg (337 lb 4.9 oz) (!) 151.4 kg (333 lb 12.4 oz) 144.7 kg (319 lb)    10/01/22 0642 10/02/22 0651   Weight: 143.4 kg (316 lb 3.2 oz) 140.8 kg (310 lb 4.8 oz)       2.  O2 sats > 90% on room air, or at prior home O2 therapy level.      Able to wean O2 this shift to keep sats above 90%?: Yes, satisfactory for discharge.   Does patient use Home O2? No          Current oxygenation status:   SpO2: 92 %     O2 Device: None (Room air),      3.  Tolerates ambulation and mobility near baseline.     Ambulation: Yes, satisfactory for discharge.   Times patient ambulated with staff this shift: 1    Please review the Heart Failure Care Map for additional HF goal outcomes.    Cori Craven RN  10/2/2022

## 2022-10-02 NOTE — PROGRESS NOTES
HEART CARE NOTE          Assessment/Recommendations     1. HFpEF, Valvular heart disease c/b ADHF  Assessment / Plan    Hypervolemic on physical exam, but diuresing well on current regimen - no changes to regimen at this time    GDMT as detailed below; mainstay of treatment for HFpEF includes diuretics and adequate BP control (class I) and SGLT2-I (class 2a); additional medical therapy (ARNI, MRA, ARB) demonstrated less robust evidence for indication but may be considered per guideline recommendations (2b); no indication for BBlockers      Current Pharmacotherapy AHA Guideline-Directed Medical Therapy   Losartan 25 mg daily ARNI/ARB   Spironolactone 25 mg  MRA   SGLT2 inhibitor not started SGLT2-I    Furosemide gtt Loop diuretic        2. Atrial fibrillation  Assessment / Plan    Rate controlled - continue metoprolol, and apixaban      3. Elevated troponin  Assessment / Plan    Mild elevated in the setting of afib and ADHF -- likely type 2 MI int he setting of demand ischemia; echo without WMAs and patient chest pain free and hemodynamically stable - no further cardiac testing indicated at this time     4. Moderate aortic stenosis  Assessment / Plan    LORRAINE 1.1, pk/mn 2.78/17.4 - will discuss with valve clinic regarding potential interventions      History of Present Illness/Subjective      Ms. Misty Quezada is a 67 year old female with a PMHx significant for atrial fibrillation who presented in ADHF in the setting of valvular heart disease.     Today, Mrs. Quezada denies any acute cardiac events or complaints; Management plan as detailed above - no changes to plan of care today     ECG: Personally reviewed. nonspecific ST and T waves changes, atrial fibrillation, with RVR     ECHO (personnaly Reviewed):  Interpretation Summary     1. Left ventricular size is normal. Mild concentric increase in wall  thickness. Systolic function is normal. The estimated left ventricular  ejection fraction is 55-60%.  2. Right  "ventricle is not well visualized. Size is probably at least  moderately enlarged. Systolic function appears normal.  3. Severe right atrial enlargement.  4. Calcified trileaflet aortic valve with moderate aortic stenosis. Peak  forward velocity measures 2.8 m/s, mean gradient 17 mm Hg, calculated aortic  valve area 1.1 cm2, and dimensionless index 0.44. Trace aortic regurgitation.  5. Severe functional tricuspid regurgitation.  6. At least mild pulmonary hypertension is present with an estimated pulmonary  artery systolic pressure of 39 mm Hg plus the right atrial pressure.  7. Compared to the prior study dated 8/22/2021, there has been no significant  change.          Physical Examination Review of Systems   /67 (BP Location: Right arm)   Pulse 86   Temp 98  F (36.7  C) (Oral)   Resp 18   Ht 1.702 m (5' 7\")   Wt 143.4 kg (316 lb 3.2 oz)   SpO2 91%   BMI 49.52 kg/m    Body mass index is 49.52 kg/m .  Wt Readings from Last 3 Encounters:   10/01/22 143.4 kg (316 lb 3.2 oz)   09/21/21 130.2 kg (287 lb)   08/27/21 129.9 kg (286 lb 6.4 oz)     General Appearance:   no distress, normal body habitus   ENT/Mouth: membranes moist, no oral lesions or bleeding gums.      EYES:  no scleral icterus, normal conjunctivae   Neck: no carotid bruits or thyromegaly   Chest/Lungs:   lungs are clear to auscultation, no rales or wheezing, equal chest wall expansion    Cardiovascular:   Regular. Normal first and second heart sounds with no murmurs, rubs, or gallops; the carotid, radial and posterior tibial pulses are intact, + JVD and LE edema bilaterally    Abdomen:  no organomegaly, masses, bruits, or tenderness; bowel sounds are present   Extremities: no cyanosis or clubbing   Skin: no xanthelasma, warm.    Neurologic: alert and oriented x3, calm     Psychiatric: alert and oriented x3, calm     A complete 10 systems ROS was reviewed  And is negative except what is listed in the HPI.          Medical History  Surgical " History Family History Social History   Past Medical History:   Diagnosis Date     Benign essential hypertension      Chronic atrial fibrillation (H)     Past Surgical History:   Procedure Laterality Date     APPENDECTOMY       CHOLECYSTECTOMY       GASTRIC BYPASS      no family history of premature coronary artery disease Social History     Socioeconomic History     Marital status:      Spouse name: Not on file     Number of children: Not on file     Years of education: Not on file     Highest education level: Not on file   Occupational History     Not on file   Tobacco Use     Smoking status: Never Smoker     Smokeless tobacco: Never Used   Substance and Sexual Activity     Alcohol use: Not Currently     Comment: Rarely Uses alcohol     Drug use: Never     Sexual activity: Not on file   Other Topics Concern     Not on file   Social History Narrative     Not on file     Social Determinants of Health     Financial Resource Strain: Not on file   Food Insecurity: Not on file   Transportation Needs: Not on file   Physical Activity: Not on file   Stress: Not on file   Social Connections: Not on file   Intimate Partner Violence: Not on file   Housing Stability: Not on file           Lab Results    Chemistry/lipid CBC Cardiac Enzymes/BNP/TSH/INR   Lab Results   Component Value Date    CHOL 163 09/09/2016    HDL 42 (L) 09/09/2016    TRIG 81 09/09/2016    BUN 23.0 10/02/2022     10/02/2022    CO2 39 (H) 10/02/2022    Lab Results   Component Value Date    WBC 4.8 09/28/2022    HGB 9.5 (L) 09/28/2022    HCT 30.8 (L) 09/28/2022    MCV 90 09/28/2022     09/28/2022    Lab Results   Component Value Date    TROPONINI 1.32 (HH) 08/21/2021     (H) 09/02/2021    TSH 4.92 08/31/2021    INR 1.44 (H) 08/21/2021     Lab Results   Component Value Date    TROPONINI 1.32 (HH) 08/21/2021          Weight:    Wt Readings from Last 3 Encounters:   10/01/22 143.4 kg (316 lb 3.2 oz)   09/21/21 130.2 kg (287 lb)    08/27/21 129.9 kg (286 lb 6.4 oz)       Allergies  No Known Allergies      Surgical History  Past Surgical History:   Procedure Laterality Date     APPENDECTOMY       CHOLECYSTECTOMY       GASTRIC BYPASS         Social History  Tobacco:   History   Smoking Status     Never Smoker   Smokeless Tobacco     Never Used    Alcohol:   Social History    Substance and Sexual Activity      Alcohol use: Not Currently        Comment: Rarely Uses alcohol   Illicit Drugs:   History   Drug Use Unknown       Family History  Family History   Problem Relation Age of Onset     No Known Problems Mother      No Known Problems Father           Tara Siegel MD on 10/2/2022      cc: Tiff Chavez

## 2022-10-02 NOTE — PROGRESS NOTES
Heart Failure Care Map  GOALS TO BE MET BEFORE DISCHARGE:    1. Decrease congestion and/or edema with diuretic therapy to achieve near optimal volume status.     Dyspnea improved: Yes, satisfactory for discharge.present on RA.    Edema improved: No, further care required to meet this goal. Please explain pt is on continues to be on IV drip, at 10 mg per hour. 1800 cc of Fluid restrictiotion. daily weight.         Net I/O and Weights since admission:   09/02 0700 - 10/02 0659  In: 7264.53 [P.O.:6542; I.V.:722.53]  Out: 52319 [Urine:82482]  Net: -29918.47     Vitals:    09/24/22 0631 09/24/22 1700 09/25/22 0432 09/26/22 0409   Weight: (!) 154.2 kg (340 lb) (!) 159.8 kg (352 lb 4.7 oz) (!) 159.8 kg (352 lb 4.7 oz) (!) 162.1 kg (357 lb 5.9 oz)    09/27/22 0500 09/28/22 0500 09/29/22 0327 09/30/22 1046   Weight: (!) 156.9 kg (345 lb 14.4 oz) (!) 153 kg (337 lb 4.9 oz) (!) 151.4 kg (333 lb 12.4 oz) 144.7 kg (319 lb)    10/01/22 0642   Weight: 143.4 kg (316 lb 3.2 oz)       2.  O2 sats > 90% on room air, or at prior home O2 therapy level.      Able to wean O2 this shift to keep sats above 90%?: Yes, satisfactory for discharge.   Does patient use Home O2? No          Current oxygenation status:   SpO2: 91 %     O2 Device: None (Room air),      3.  Tolerates ambulation and mobility near baseline.     Ambulation: Yes, satisfactory for discharge.   Times patient ambulated with staff this shift: 1 in the room from the chair to the bed.     Please review the Heart Failure Care Map for additional HF goal outcomes.    Caio Galvan RN  10/2/2022

## 2022-10-03 ENCOUNTER — APPOINTMENT (OUTPATIENT)
Dept: PHYSICAL THERAPY | Facility: HOSPITAL | Age: 67
DRG: 280 | End: 2022-10-03
Payer: MEDICARE

## 2022-10-03 ENCOUNTER — APPOINTMENT (OUTPATIENT)
Dept: OCCUPATIONAL THERAPY | Facility: HOSPITAL | Age: 67
DRG: 280 | End: 2022-10-03
Payer: MEDICARE

## 2022-10-03 LAB
ANION GAP SERPL CALCULATED.3IONS-SCNC: 10 MMOL/L (ref 7–15)
BUN SERPL-MCNC: 25.4 MG/DL (ref 8–23)
CALCIUM SERPL-MCNC: 8.9 MG/DL (ref 8.8–10.2)
CHLORIDE SERPL-SCNC: 94 MMOL/L (ref 98–107)
CREAT SERPL-MCNC: 0.83 MG/DL (ref 0.51–0.95)
DEPRECATED HCO3 PLAS-SCNC: 37 MMOL/L (ref 22–29)
GFR SERPL CREATININE-BSD FRML MDRD: 76 ML/MIN/1.73M2
GLUCOSE SERPL-MCNC: 95 MG/DL (ref 70–99)
HOLD SPECIMEN: NORMAL
POTASSIUM SERPL-SCNC: 3.7 MMOL/L (ref 3.4–5.3)
SARS-COV-2 RNA RESP QL NAA+PROBE: NEGATIVE
SODIUM SERPL-SCNC: 141 MMOL/L (ref 136–145)

## 2022-10-03 PROCEDURE — 82310 ASSAY OF CALCIUM: CPT | Performed by: HOSPITALIST

## 2022-10-03 PROCEDURE — 120N000004 HC R&B MS OVERFLOW

## 2022-10-03 PROCEDURE — 250N000013 HC RX MED GY IP 250 OP 250 PS 637: Performed by: FAMILY MEDICINE

## 2022-10-03 PROCEDURE — 97535 SELF CARE MNGMENT TRAINING: CPT | Mod: GO

## 2022-10-03 PROCEDURE — 99232 SBSQ HOSP IP/OBS MODERATE 35: CPT | Performed by: INTERNAL MEDICINE

## 2022-10-03 PROCEDURE — 250N000013 HC RX MED GY IP 250 OP 250 PS 637: Performed by: INTERNAL MEDICINE

## 2022-10-03 PROCEDURE — 250N000009 HC RX 250: Performed by: INTERNAL MEDICINE

## 2022-10-03 PROCEDURE — 97530 THERAPEUTIC ACTIVITIES: CPT | Mod: GP

## 2022-10-03 PROCEDURE — 250N000011 HC RX IP 250 OP 636: Performed by: INTERNAL MEDICINE

## 2022-10-03 PROCEDURE — P9047 ALBUMIN (HUMAN), 25%, 50ML: HCPCS | Performed by: INTERNAL MEDICINE

## 2022-10-03 PROCEDURE — 36415 COLL VENOUS BLD VENIPUNCTURE: CPT | Performed by: HOSPITALIST

## 2022-10-03 PROCEDURE — 97116 GAIT TRAINING THERAPY: CPT | Mod: GP

## 2022-10-03 PROCEDURE — U0005 INFEC AGEN DETEC AMPLI PROBE: HCPCS | Performed by: INTERNAL MEDICINE

## 2022-10-03 PROCEDURE — 99233 SBSQ HOSP IP/OBS HIGH 50: CPT | Performed by: INTERNAL MEDICINE

## 2022-10-03 RX ORDER — ALBUMIN (HUMAN) 12.5 G/50ML
50 SOLUTION INTRAVENOUS ONCE
Status: COMPLETED | OUTPATIENT
Start: 2022-10-03 | End: 2022-10-03

## 2022-10-03 RX ORDER — POLYETHYLENE GLYCOL 3350 17 G/17G
17 POWDER, FOR SOLUTION ORAL DAILY PRN
Status: DISCONTINUED | OUTPATIENT
Start: 2022-10-03 | End: 2022-10-07 | Stop reason: HOSPADM

## 2022-10-03 RX ORDER — TETRAHYDROZOLINE HCL 0.05 %
1 DROPS OPHTHALMIC (EYE) 3 TIMES DAILY
Status: DISCONTINUED | OUTPATIENT
Start: 2022-10-03 | End: 2022-10-07 | Stop reason: HOSPADM

## 2022-10-03 RX ADMIN — METOPROLOL SUCCINATE 25 MG: 25 TABLET, EXTENDED RELEASE ORAL at 09:21

## 2022-10-03 RX ADMIN — MICONAZOLE NITRATE: 2 POWDER TOPICAL at 20:45

## 2022-10-03 RX ADMIN — TETRAHYDROZOLINE HCL 1 DROP: 0.05 LIQUID OPHTHALMIC at 14:19

## 2022-10-03 RX ADMIN — CITALOPRAM HYDROBROMIDE 20 MG: 20 TABLET ORAL at 09:20

## 2022-10-03 RX ADMIN — LOSARTAN POTASSIUM 25 MG: 25 TABLET, FILM COATED ORAL at 09:20

## 2022-10-03 RX ADMIN — APIXABAN 5 MG: 5 TABLET, FILM COATED ORAL at 20:45

## 2022-10-03 RX ADMIN — Medication 1 TABLET: at 09:20

## 2022-10-03 RX ADMIN — ALBUMIN HUMAN 50 G: 0.25 SOLUTION INTRAVENOUS at 12:03

## 2022-10-03 RX ADMIN — Medication 1 TABLET: at 09:21

## 2022-10-03 RX ADMIN — APIXABAN 5 MG: 5 TABLET, FILM COATED ORAL at 09:21

## 2022-10-03 RX ADMIN — POLYETHYLENE GLYCOL 3350 17 G: 17 POWDER, FOR SOLUTION ORAL at 09:22

## 2022-10-03 RX ADMIN — MICONAZOLE NITRATE: 2 POWDER TOPICAL at 09:22

## 2022-10-03 RX ADMIN — TETRAHYDROZOLINE HCL 1 DROP: 0.05 LIQUID OPHTHALMIC at 20:45

## 2022-10-03 RX ADMIN — ACETAMINOPHEN 975 MG: 325 TABLET, FILM COATED ORAL at 22:04

## 2022-10-03 RX ADMIN — FERROUS SULFATE TAB 325 MG (65 MG ELEMENTAL FE) 325 MG: 325 (65 FE) TAB at 09:20

## 2022-10-03 RX ADMIN — Medication 1000 MCG: at 09:20

## 2022-10-03 RX ADMIN — Medication 1 MG: at 22:04

## 2022-10-03 RX ADMIN — SPIRONOLACTONE 25 MG: 25 TABLET, FILM COATED ORAL at 09:21

## 2022-10-03 ASSESSMENT — ACTIVITIES OF DAILY LIVING (ADL)
ADLS_ACUITY_SCORE: 30

## 2022-10-03 NOTE — PROGRESS NOTES
[facility]    PROGRESS NOTE - Hospitalist Service    ASSESSMENT AND PLAN     Principal Problem:    Acute on chronic diastolic CHF  Active Problems:    Chronic atrial fibrillation (H)    Lymphedema    Acute pulmonary edema (H)    Morbid obesity (H)    Hypomagnesemia    Misty Quezada is a 67 year old female with moderate aortic stenosis, MIKEL, Afib who presents with LE edema and shortness of breath and found to have acute on chronic HFpEF.     Acute HFpEF in the setting of Moderate aortic stenosis   Troponin elevation- likely type 2 MI due to demand ischemia   Mild pulm HTN and moderately enlarged RV   Lasix infusion.  Appreciate cardio recs    Timing of TAVR evaluation per cards-patient to follow-up in valve clinic   Losartan, aldactone   Still volume overloaded despite significant diuresis.     Consider diamox if worsening contraction alklaosis.   Cardiology adding albumin boluses as urine output is decreasing    Acute hypoxic respiratory insufficiency secondary to above   Wean oxygen as able     MIKEL with nocturnal hypoxia - not formally diagnosed.   Overnight oximetry by RT 9/25-9/26: Had a total of 33 desaturations (less than 89% SPO2 for at least 10 seconds) with the lowest to 76%, spent approximately 8.6% of the night with desaturations below 90%.  Repeat pending to meet for insurance criteria 10/3   Needs O2 for home night and urgent sleep evaluation as outpatient for nocturnal NIPPV     Chronic AF -    Toprol-xl, eliquis.       Lymphedema - PT/OT     Anemia   Iron studies 9/27: Iron 24, TIBC 349, Sat 7, transferrin 313, ferritin 34   Started on ferrous sulfate every other day    Continue to monitor   Outpatient GI referral for c-scope if not recent.     Hypomagnesemia - replacement    Morbid Obesity - Body mass index is 55    Constipation -  PRN Miralax with prn Dolcolax supp    Malnutrition    Barriers to discharge: IV diuresis, cardiology recommendations    Anticipated length of  stay: Several days      Present on Admission:    Lymphedema    Acute pulmonary edema (H)    (Resolved) Bilateral lower extremity edema    Chronic atrial fibrillation (H)    Morbid obesity (H)      Subjective:  Patient resting comfortably in a chair.  Requiring oxygen.    PHYSICAL EXAM  Temp:  [98  F (36.7  C)-98.5  F (36.9  C)] 98  F (36.7  C)  Pulse:  [75-87] 85  Resp:  [17-18] 18  BP: ()/(52-61) 96/52  SpO2:  [90 %-94 %] 94 %  Wt Readings from Last 1 Encounters:   10/03/22 138.4 kg (305 lb 3.2 oz)       Intake/Output Summary (Last 24 hours) at 10/3/2022 1500  Last data filed at 10/3/2022 1421  Gross per 24 hour   Intake 1166 ml   Output 2100 ml   Net -934 ml      Body mass index is 47.8 kg/m .    GENRL: Alert and answering questions appropriately. Not in acute distress.  Sitting up in a chair  HEENT: no lymphadenopathy or thyromegaly  CHEST: Diminished throughout.    HEART: Rate controlled with a regular rhythm.  Murmur noted.  ABDMN: Soft. Obese, Non-tender, non-distended. No organomegaly. No guarding or rigidity. Bowel sounds present   EXTRM: + bilateral pedal edema  NEURO: Cranial nerves II-XII grossly intact. No focal neurological deficit. No involuntary movements. Normal mentation  PSYCH: flat affect and mood.   INTGM: No skin rash, no cyanosis or clubbing    PERTINENT LABS/IMAGING:  Results for orders placed or performed during the hospital encounter of 09/24/22   XR Chest Port 1 View    Impression    IMPRESSION: Cardiomegaly, pulmonary vascular congestion, distention of the azygos vein, mild interstitial edema with small left effusion are consistent with mild failure.    No signs of pneumonia.   Echocardiogram Complete   Result Value Ref Range    LVEF  55-60%      Most Recent 3 CBC's:Recent Labs   Lab Test 09/28/22  0442 09/27/22  0438 09/25/22  0438   WBC 4.8 5.0 4.8   HGB 9.5* 10.0* 9.9*   MCV 90 91 90    282 262       Recent Labs   Lab Test 09/09/16  1412   CHOL 163   HDL 42*       TRIG 81     Recent Labs   Lab Test 09/09/16  1412 10/10/14  1207    118     Recent Labs   Lab Test 10/03/22  0444      POTASSIUM 3.7   CHLORIDE 94*   CO2 37*   GLC 95   BUN 25.4*   CR 0.83   GFRESTIMATED 76   TURNER 8.9     Recent Labs   Lab Test 09/02/21  0457   A1C 6.2*     Recent Labs   Lab Test 09/28/22  0442 09/27/22  0438 09/25/22  0438   HGB 9.5* 10.0* 9.9*     Recent Labs   Lab Test 08/21/21  1944 08/21/21  1426 08/21/21  0820   TROPONINI 1.32* 1.87* 1.75*     Recent Labs   Lab Test 09/24/22  0850 09/02/21  0457 08/21/21  0820   BNP  --  199* 281*   NTBNP 1,898*  --   --      Recent Labs   Lab Test 08/31/21  0720   TSH 4.92     Recent Labs   Lab Test 08/21/21  0820   INR 1.44*       Rashida Beltran,   Hospitalist Service  [facility]  Text page via [facility] Paging/Directory

## 2022-10-03 NOTE — PROGRESS NOTES
Care Management Follow Up    Length of Stay (days): 9    Expected Discharge Date: 10/04/2022     Concerns to be Addressed:     Discharge planning  Patient plan of care discussed at interdisciplinary rounds: Yes    Anticipated Discharge Disposition: transitional care     Anticipated Discharge Services:  Nursing PT and OT  Anticipated Discharge DME:      Patient/family educated on Medicare website which has current facility and service quality ratings:    Education Provided on the Discharge Plan:  yes  Patient/Family in Agreement with the Plan:  yes    Referrals Placed by CM/SW:    Private pay costs discussed: Not applicable    Additional Information:  Chart reviewed.   SW spoke to Makenzie in admissions at [facility]- provided update. Pt on lasix drip - not medically ready to discharge.  CM will continue to keep facility updated on medical status for discharge.      Kym Angel, ISHMAELSW

## 2022-10-03 NOTE — PROGRESS NOTES
HEART CARE NOTE          Assessment/Recommendations     1. HFpEF, Valvular heart disease c/b ADHF  Assessment / Plan    Hypervolemic on physical exam, but diuresing well on current regimen although UOP trending down - will bolus albumin and continue to monitor    GDMT as detailed below; mainstay of treatment for HFpEF includes diuretics and adequate BP control (class I) and SGLT2-I (class 2a); additional medical therapy (ARNI, MRA, ARB) demonstrated less robust evidence for indication but may be considered per guideline recommendations (2b); no indication for BBlockers      Current Pharmacotherapy AHA Guideline-Directed Medical Therapy   Losartan 25 mg daily ARNI/ARB   Spironolactone 25 mg  MRA   SGLT2 inhibitor not started SGLT2-I    Furosemide gtt Loop diuretic        2. Atrial fibrillation  Assessment / Plan    Rate controlled - continue metoprolol, and apixaban      3. Elevated troponin  Assessment / Plan    Mild elevated in the setting of afib and ADHF -- likely type 2 MI int he setting of demand ischemia; echo without WMAs and patient chest pain free and hemodynamically stable - no further cardiac testing indicated at this time     4. Moderate aortic stenosis  Assessment / Plan    LORRAINE 1.1, pk/mn 2.78/17.4 - will discuss with valve clinic regarding potential interventions    History of Present Illness/Subjective    Ms. Misty Quezada is a 67 year old female with a PMHx significant for atrial fibrillation who presented in ADHF in the setting of valvular heart disease.     Today, Mrs. Quezada denies any acute cardiac events or complaints; Management plan as detailed above - no changes to plan of care today     ECG: Personally reviewed. nonspecific ST and T waves changes, atrial fibrillation, with RVR     ECHO (personnaly Reviewed):  Interpretation Summary     1. Left ventricular size is normal. Mild concentric increase in wall  thickness. Systolic function is normal. The estimated left ventricular  ejection  "fraction is 55-60%.  2. Right ventricle is not well visualized. Size is probably at least  moderately enlarged. Systolic function appears normal.  3. Severe right atrial enlargement.  4. Calcified trileaflet aortic valve with moderate aortic stenosis. Peak  forward velocity measures 2.8 m/s, mean gradient 17 mm Hg, calculated aortic  valve area 1.1 cm2, and dimensionless index 0.44. Trace aortic regurgitation.  5. Severe functional tricuspid regurgitation.  6. At least mild pulmonary hypertension is present with an estimated pulmonary  artery systolic pressure of 39 mm Hg plus the right atrial pressure.  7. Compared to the prior study dated 8/22/2021, there has been no significant  change.          Physical Examination Review of Systems   /61 (BP Location: Right arm)   Pulse 87   Temp 98.5  F (36.9  C) (Oral)   Resp 17   Ht 1.702 m (5' 7\")   Wt 140.8 kg (310 lb 4.8 oz)   SpO2 90%   BMI 48.60 kg/m    Body mass index is 48.6 kg/m .  Wt Readings from Last 3 Encounters:   10/02/22 140.8 kg (310 lb 4.8 oz)   09/21/21 130.2 kg (287 lb)   08/27/21 129.9 kg (286 lb 6.4 oz)     General Appearance:   no distress, normal body habitus   ENT/Mouth: membranes moist, no oral lesions or bleeding gums.      EYES:  no scleral icterus, normal conjunctivae   Neck: no carotid bruits or thyromegaly   Chest/Lungs:   lungs are clear to auscultation, no rales or wheezing, equal chest wall expansion    Cardiovascular:   Regular. Normal first and second heart sounds with + JOHN; no rubs, or gallops; the carotid, radial and posterior tibial pulses are intact, + JVD and LE edema bilaterally    Abdomen:  no organomegaly, masses, bruits, or tenderness; bowel sounds are present   Extremities: no cyanosis or clubbing   Skin: no xanthelasma, warm.    Neurologic: alert and oriented x3, calm     Psychiatric: alert and oriented x3, calm     A complete 10 systems ROS was reviewed  And is negative except what is listed in the HPI.    "       Medical History  Surgical History Family History Social History   Past Medical History:   Diagnosis Date     Benign essential hypertension      Chronic atrial fibrillation (H)     Past Surgical History:   Procedure Laterality Date     APPENDECTOMY       CHOLECYSTECTOMY       GASTRIC BYPASS      no family history of premature coronary artery disease Social History     Socioeconomic History     Marital status:      Spouse name: Not on file     Number of children: Not on file     Years of education: Not on file     Highest education level: Not on file   Occupational History     Not on file   Tobacco Use     Smoking status: Never Smoker     Smokeless tobacco: Never Used   Substance and Sexual Activity     Alcohol use: Not Currently     Comment: Rarely Uses alcohol     Drug use: Never     Sexual activity: Not on file   Other Topics Concern     Not on file   Social History Narrative     Not on file     Social Determinants of Health     Financial Resource Strain: Not on file   Food Insecurity: Not on file   Transportation Needs: Not on file   Physical Activity: Not on file   Stress: Not on file   Social Connections: Not on file   Intimate Partner Violence: Not on file   Housing Stability: Not on file           Lab Results    Chemistry/lipid CBC Cardiac Enzymes/BNP/TSH/INR   Lab Results   Component Value Date    CHOL 163 09/09/2016    HDL 42 (L) 09/09/2016    TRIG 81 09/09/2016    BUN 23.0 10/02/2022     10/02/2022    CO2 39 (H) 10/02/2022    Lab Results   Component Value Date    WBC 4.8 09/28/2022    HGB 9.5 (L) 09/28/2022    HCT 30.8 (L) 09/28/2022    MCV 90 09/28/2022     09/28/2022    Lab Results   Component Value Date    TROPONINI 1.32 (HH) 08/21/2021     (H) 09/02/2021    TSH 4.92 08/31/2021    INR 1.44 (H) 08/21/2021     Lab Results   Component Value Date    TROPONINI 1.32 (HH) 08/21/2021          Weight:    Wt Readings from Last 3 Encounters:   10/02/22 140.8 kg (310 lb 4.8 oz)    09/21/21 130.2 kg (287 lb)   08/27/21 129.9 kg (286 lb 6.4 oz)       Allergies  No Known Allergies      Surgical History  Past Surgical History:   Procedure Laterality Date     APPENDECTOMY       CHOLECYSTECTOMY       GASTRIC BYPASS         Social History  Tobacco:   History   Smoking Status     Never Smoker   Smokeless Tobacco     Never Used    Alcohol:   Social History    Substance and Sexual Activity      Alcohol use: Not Currently        Comment: Rarely Uses alcohol   Illicit Drugs:   History   Drug Use Unknown       Family History  Family History   Problem Relation Age of Onset     No Known Problems Mother      No Known Problems Father           Tara Siegel MD on 10/3/2022      cc: Tiff Chavez

## 2022-10-03 NOTE — PROGRESS NOTES
Heart Failure Care Map  GOALS TO BE MET BEFORE DISCHARGE:    1. Decrease congestion and/or edema with diuretic therapy to achieve near optimal volume status.     Dyspnea improved: Yes, satisfactory for discharge.   Edema improved: No, further care required to meet this goal. Please explain Still has lymph wraps on        Net I/O and Weights since admission:   09/02 2300 - 10/02 2259  In: 8460.53 [P.O.:7658; I.V.:802.53]  Out: 18324 [Urine:68236]  Net: -77373.47     Vitals:    09/24/22 0631 09/24/22 1700 09/25/22 0432 09/26/22 0409   Weight: (!) 154.2 kg (340 lb) (!) 159.8 kg (352 lb 4.7 oz) (!) 159.8 kg (352 lb 4.7 oz) (!) 162.1 kg (357 lb 5.9 oz)    09/27/22 0500 09/28/22 0500 09/29/22 0327 09/30/22 1046   Weight: (!) 156.9 kg (345 lb 14.4 oz) (!) 153 kg (337 lb 4.9 oz) (!) 151.4 kg (333 lb 12.4 oz) 144.7 kg (319 lb)    10/01/22 0642 10/02/22 0651   Weight: 143.4 kg (316 lb 3.2 oz) 140.8 kg (310 lb 4.8 oz)       2.  O2 sats > 90% on room air, or at prior home O2 therapy level.      Able to wean O2 this shift to keep sats above 90%?: Yes, satisfactory for discharge.   Does patient use Home O2? No          Current oxygenation status:   SpO2: 93 %     O2 Device: None (Room air),      3.  Tolerates ambulation and mobility near baseline.     Ambulation: No, further care required to meet this goal. Please explain Still not at base line mobility    Times patient ambulated with staff this shift: 3, in room, up to commode.     Remains on lasix drip at this time. No complaints of pain.     Please review the Heart Failure Care Map for additional HF goal outcomes.    Meenakshi Flannery RN  10/2/2022

## 2022-10-03 NOTE — PROGRESS NOTES
Pt refused to stand out of bed for a standing weight. Promised that she is  going to be weighed when she gets up for breakfast. Day  NA  has been notified.

## 2022-10-03 NOTE — PROGRESS NOTES
Heart Failure Care Map  GOALS TO BE MET BEFORE DISCHARGE:    1. Decrease congestion and/or edema with diuretic therapy to achieve near optimal volume status.     Dyspnea improved: Yes, satisfactory for discharge.   Edema improved:  No, further care required to meet this goal. Please explain Still has lymph wraps on.strick intake and out put. On  lasix drip.            Net I/O and Weights since admission:   09/03 0700 - 10/03 0659  In: 8460.53 [P.O.:7658; I.V.:802.53]  Out: 80847 [Urine:17681]  Net: -42864.47     Vitals:    09/24/22 0631 09/24/22 1700 09/25/22 0432 09/26/22 0409   Weight: (!) 154.2 kg (340 lb) (!) 159.8 kg (352 lb 4.7 oz) (!) 159.8 kg (352 lb 4.7 oz) (!) 162.1 kg (357 lb 5.9 oz)    09/27/22 0500 09/28/22 0500 09/29/22 0327 09/30/22 1046   Weight: (!) 156.9 kg (345 lb 14.4 oz) (!) 153 kg (337 lb 4.9 oz) (!) 151.4 kg (333 lb 12.4 oz) 144.7 kg (319 lb)    10/01/22 0642 10/02/22 0651   Weight: 143.4 kg (316 lb 3.2 oz) 140.8 kg (310 lb 4.8 oz)       2.  O2 sats > 90% on room air, or at prior home O2 therapy level.      Able to wean O2 this shift to keep sats above 90%?: Yes, satisfactory for discharge.   Does patient use Home O2? No          Current oxygenation status:   SpO2: 90 %     O2 Device: None (Room air), Oxygen Delivery: 1 LPM    3.  Tolerates ambulation and mobility near baseline.     Ambulation: Yes, satisfactory for discharge.   Times patient ambulated with staff this shift: 1. Up to the commode, and to the chair.     Please review the Heart Failure Care Map for additional HF goal outcomes  To .Improve and maintain  functional abilities.   intake and out put monitoring.  Encourage ambulation.   Continue lasix drip as scheduled.         Caio Galvan RN  10/3/2022

## 2022-10-04 ENCOUNTER — APPOINTMENT (OUTPATIENT)
Dept: OCCUPATIONAL THERAPY | Facility: HOSPITAL | Age: 67
DRG: 280 | End: 2022-10-04
Payer: MEDICARE

## 2022-10-04 ENCOUNTER — APPOINTMENT (OUTPATIENT)
Dept: PHYSICAL THERAPY | Facility: HOSPITAL | Age: 67
DRG: 280 | End: 2022-10-04
Payer: MEDICARE

## 2022-10-04 LAB
ANION GAP SERPL CALCULATED.3IONS-SCNC: 10 MMOL/L (ref 7–15)
BUN SERPL-MCNC: 27.6 MG/DL (ref 8–23)
CALCIUM SERPL-MCNC: 9 MG/DL (ref 8.8–10.2)
CHLORIDE SERPL-SCNC: 93 MMOL/L (ref 98–107)
CREAT SERPL-MCNC: 0.86 MG/DL (ref 0.51–0.95)
DEPRECATED HCO3 PLAS-SCNC: 39 MMOL/L (ref 22–29)
GFR SERPL CREATININE-BSD FRML MDRD: 73 ML/MIN/1.73M2
GLUCOSE SERPL-MCNC: 98 MG/DL (ref 70–99)
HOLD SPECIMEN: NORMAL
MAGNESIUM SERPL-MCNC: 2.1 MG/DL (ref 1.7–2.3)
POTASSIUM SERPL-SCNC: 3.5 MMOL/L (ref 3.4–5.3)
SODIUM SERPL-SCNC: 142 MMOL/L (ref 136–145)

## 2022-10-04 PROCEDURE — 250N000009 HC RX 250: Performed by: INTERNAL MEDICINE

## 2022-10-04 PROCEDURE — 97535 SELF CARE MNGMENT TRAINING: CPT | Mod: GO

## 2022-10-04 PROCEDURE — 99232 SBSQ HOSP IP/OBS MODERATE 35: CPT | Performed by: INTERNAL MEDICINE

## 2022-10-04 PROCEDURE — 82435 ASSAY OF BLOOD CHLORIDE: CPT | Performed by: HOSPITALIST

## 2022-10-04 PROCEDURE — 97535 SELF CARE MNGMENT TRAINING: CPT | Mod: GP

## 2022-10-04 PROCEDURE — 83735 ASSAY OF MAGNESIUM: CPT | Performed by: INTERNAL MEDICINE

## 2022-10-04 PROCEDURE — 250N000013 HC RX MED GY IP 250 OP 250 PS 637: Performed by: INTERNAL MEDICINE

## 2022-10-04 PROCEDURE — 250N000013 HC RX MED GY IP 250 OP 250 PS 637: Performed by: FAMILY MEDICINE

## 2022-10-04 PROCEDURE — 94762 N-INVAS EAR/PLS OXIMTRY CONT: CPT

## 2022-10-04 PROCEDURE — 97110 THERAPEUTIC EXERCISES: CPT | Mod: GO

## 2022-10-04 PROCEDURE — 36415 COLL VENOUS BLD VENIPUNCTURE: CPT | Performed by: HOSPITALIST

## 2022-10-04 PROCEDURE — 120N000004 HC R&B MS OVERFLOW

## 2022-10-04 PROCEDURE — 82374 ASSAY BLOOD CARBON DIOXIDE: CPT | Performed by: HOSPITALIST

## 2022-10-04 RX ORDER — POTASSIUM CHLORIDE 1500 MG/1
20 TABLET, EXTENDED RELEASE ORAL ONCE
Status: COMPLETED | OUTPATIENT
Start: 2022-10-04 | End: 2022-10-04

## 2022-10-04 RX ADMIN — CITALOPRAM HYDROBROMIDE 20 MG: 20 TABLET ORAL at 09:05

## 2022-10-04 RX ADMIN — SPIRONOLACTONE 25 MG: 25 TABLET, FILM COATED ORAL at 09:05

## 2022-10-04 RX ADMIN — APIXABAN 5 MG: 5 TABLET, FILM COATED ORAL at 19:58

## 2022-10-04 RX ADMIN — MICONAZOLE NITRATE: 2 POWDER TOPICAL at 22:28

## 2022-10-04 RX ADMIN — APIXABAN 5 MG: 5 TABLET, FILM COATED ORAL at 09:04

## 2022-10-04 RX ADMIN — POTASSIUM CHLORIDE 20 MEQ: 1500 TABLET, EXTENDED RELEASE ORAL at 13:44

## 2022-10-04 RX ADMIN — Medication 1000 MCG: at 09:05

## 2022-10-04 RX ADMIN — METOPROLOL SUCCINATE 25 MG: 25 TABLET, EXTENDED RELEASE ORAL at 13:44

## 2022-10-04 RX ADMIN — Medication 1 TABLET: at 09:05

## 2022-10-04 RX ADMIN — Medication 1 TABLET: at 09:04

## 2022-10-04 RX ADMIN — FUROSEMIDE 10 MG/HR: 10 INJECTION, SOLUTION INTRAVENOUS at 09:06

## 2022-10-04 RX ADMIN — TETRAHYDROZOLINE HCL 1 DROP: 0.05 LIQUID OPHTHALMIC at 09:06

## 2022-10-04 RX ADMIN — TETRAHYDROZOLINE HCL 1 DROP: 0.05 LIQUID OPHTHALMIC at 13:47

## 2022-10-04 RX ADMIN — TETRAHYDROZOLINE HCL 1 DROP: 0.05 LIQUID OPHTHALMIC at 21:21

## 2022-10-04 RX ADMIN — ACETAMINOPHEN 975 MG: 325 TABLET, FILM COATED ORAL at 04:43

## 2022-10-04 RX ADMIN — MICONAZOLE NITRATE: 2 POWDER TOPICAL at 09:09

## 2022-10-04 ASSESSMENT — ACTIVITIES OF DAILY LIVING (ADL)
ADLS_ACUITY_SCORE: 30

## 2022-10-04 NOTE — PLAN OF CARE
Goal Outcome Evaluation:    Plan of Care Reviewed With: patient        Vitals:    10/04/22 0009 10/04/22 0013 10/04/22 0431 10/04/22 0440   BP: (!) 88/55 110/65 98/60    BP Location: Right arm Left arm Left arm    Patient Position: Semi-Khan's Semi-Khan's Semi-Khan's    Cuff Size: Adult Regular Adult Regular Adult Regular    Pulse: 80  84    Resp: 18  18    Temp: 98.4  F (36.9  C)  98.4  F (36.9  C)    TempSrc: Oral  Oral    SpO2: 94%  94%    Weight:    134.9 kg (297 lb 4.8 oz)   Height:        Had a headache prn tylenol. On 1 liter oxygen was doing an overnight sleep study. IV lasix gtt. Purewick In place. Med surg. Lungs diminished. Lymphedema. +3 edema. Karen Landeros RN

## 2022-10-04 NOTE — PROGRESS NOTES
HEART CARE NOTE          Assessment/Recommendations     1. HFpEF, Valvular heart disease c/b ADHF  Assessment / Plan    Hypervolemic on physical exam, but diuresing well on current regimen - no changes to regimen at this time    GDMT as detailed below; mainstay of treatment for HFpEF includes diuretics and adequate BP control (class I) and SGLT2-I (class 2a); additional medical therapy (ARNI, MRA, ARB) demonstrated less robust evidence for indication but may be considered per guideline recommendations (2b); no indication for BBlockers      Current Pharmacotherapy AHA Guideline-Directed Medical Therapy   Losartan 25 mg daily ARNI/ARB   Spironolactone 25 mg  MRA   SGLT2 inhibitor not started SGLT2-I    Furosemide gtt Loop diuretic        2. Atrial fibrillation  Assessment / Plan    Rate controlled - continue metoprolol, and apixaban      3. Elevated troponin  Assessment / Plan    Mild elevated in the setting of afib and ADHF -- likely type 2 MI int he setting of demand ischemia; echo without WMAs and patient chest pain free and hemodynamically stable - no further cardiac testing indicated at this time     4. Moderate aortic stenosis  Assessment / Plan    LORRAINE 1.1, pk/mn 2.78/17.4 - will discuss with valve clinic regarding potential interventions      History of Present Illness/Subjective      Ms. Misty Quezada is a 67 year old female with a PMHx significant for atrial fibrillation who presented in ADHF in the setting of valvular heart disease.     Today, Mrs. Quezada denies any acute cardiac events or complaints; Management plan as detailed above - no changes to plan of care today     ECG: Personally reviewed. nonspecific ST and T waves changes, atrial fibrillation, with RVR     ECHO (personnaly Reviewed):  Interpretation Summary     1. Left ventricular size is normal. Mild concentric increase in wall  thickness. Systolic function is normal. The estimated left ventricular  ejection fraction is 55-60%.  2. Right  "ventricle is not well visualized. Size is probably at least  moderately enlarged. Systolic function appears normal.  3. Severe right atrial enlargement.  4. Calcified trileaflet aortic valve with moderate aortic stenosis. Peak  forward velocity measures 2.8 m/s, mean gradient 17 mm Hg, calculated aortic  valve area 1.1 cm2, and dimensionless index 0.44. Trace aortic regurgitation.  5. Severe functional tricuspid regurgitation.  6. At least mild pulmonary hypertension is present with an estimated pulmonary  artery systolic pressure of 39 mm Hg plus the right atrial pressure.  7. Compared to the prior study dated 8/22/2021, there has been no significant  change.          Physical Examination Review of Systems   BP 98/60 (BP Location: Left arm, Patient Position: Semi-Khan's, Cuff Size: Adult Regular)   Pulse 84   Temp 98.4  F (36.9  C) (Oral)   Resp 18   Ht 1.702 m (5' 7\")   Wt 134.9 kg (297 lb 4.8 oz)   SpO2 94%   BMI 46.56 kg/m    Body mass index is 46.56 kg/m .  Wt Readings from Last 3 Encounters:   10/04/22 134.9 kg (297 lb 4.8 oz)   09/21/21 130.2 kg (287 lb)   08/27/21 129.9 kg (286 lb 6.4 oz)     General Appearance:   no distress, normal body habitus   ENT/Mouth: membranes moist, no oral lesions or bleeding gums.      EYES:  no scleral icterus, normal conjunctivae   Neck: no carotid bruits or thyromegaly   Chest/Lungs:   lungs are clear to auscultation, no rales or wheezing, equal chest wall expansion    Cardiovascular:   Regular. Normal first and second heart sounds with no murmurs, rubs, or gallops; the carotid, radial and posterior tibial pulses are intact, + JVD and LE edema bilaterally    Abdomen:  no organomegaly, masses, bruits, or tenderness; bowel sounds are present   Extremities: no cyanosis or clubbing   Skin: no xanthelasma, warm.    Neurologic: alert and oriented x3, calm     Psychiatric: alert and oriented x3, calm     A complete 10 systems ROS was reviewed  And is negative except what is " listed in the HPI.          Medical History  Surgical History Family History Social History   Past Medical History:   Diagnosis Date     Benign essential hypertension      Chronic atrial fibrillation (H)     Past Surgical History:   Procedure Laterality Date     APPENDECTOMY       CHOLECYSTECTOMY       GASTRIC BYPASS      no family history of premature coronary artery disease Social History     Socioeconomic History     Marital status:      Spouse name: Not on file     Number of children: Not on file     Years of education: Not on file     Highest education level: Not on file   Occupational History     Not on file   Tobacco Use     Smoking status: Never Smoker     Smokeless tobacco: Never Used   Substance and Sexual Activity     Alcohol use: Not Currently     Comment: Rarely Uses alcohol     Drug use: Never     Sexual activity: Not on file   Other Topics Concern     Not on file   Social History Narrative     Not on file     Social Determinants of Health     Financial Resource Strain: Not on file   Food Insecurity: Not on file   Transportation Needs: Not on file   Physical Activity: Not on file   Stress: Not on file   Social Connections: Not on file   Intimate Partner Violence: Not on file   Housing Stability: Not on file           Lab Results    Chemistry/lipid CBC Cardiac Enzymes/BNP/TSH/INR   Lab Results   Component Value Date    CHOL 163 09/09/2016    HDL 42 (L) 09/09/2016    TRIG 81 09/09/2016    BUN 25.4 (H) 10/03/2022     10/03/2022    CO2 37 (H) 10/03/2022    Lab Results   Component Value Date    WBC 4.8 09/28/2022    HGB 9.5 (L) 09/28/2022    HCT 30.8 (L) 09/28/2022    MCV 90 09/28/2022     09/28/2022    Lab Results   Component Value Date    TROPONINI 1.32 (HH) 08/21/2021     (H) 09/02/2021    TSH 4.92 08/31/2021    INR 1.44 (H) 08/21/2021     Lab Results   Component Value Date    TROPONINI 1.32 (HH) 08/21/2021          Weight:    Wt Readings from Last 3 Encounters:   10/04/22  134.9 kg (297 lb 4.8 oz)   09/21/21 130.2 kg (287 lb)   08/27/21 129.9 kg (286 lb 6.4 oz)       Allergies  No Known Allergies      Surgical History  Past Surgical History:   Procedure Laterality Date     APPENDECTOMY       CHOLECYSTECTOMY       GASTRIC BYPASS         Social History  Tobacco:   History   Smoking Status     Never Smoker   Smokeless Tobacco     Never Used    Alcohol:   Social History    Substance and Sexual Activity      Alcohol use: Not Currently        Comment: Rarely Uses alcohol   Illicit Drugs:   History   Drug Use Unknown       Family History  Family History   Problem Relation Age of Onset     No Known Problems Mother      No Known Problems Father           Tara Siegel MD on 10/4/2022      cc: Tiff Chavez

## 2022-10-04 NOTE — PROGRESS NOTES
Pt set up on overnight oximentry.  Sats were 88% on oximetry unit.  Placed finger probe from both in room unit and portable unit and sats were 92%.  Set up at 3818

## 2022-10-04 NOTE — PROGRESS NOTES
[facility]    PROGRESS NOTE - Hospitalist Service    ASSESSMENT AND PLAN     Principal Problem:    Acute on chronic diastolic CHF  Active Problems:    Chronic atrial fibrillation (H)    Lymphedema    Acute pulmonary edema (H)    Morbid obesity (H)    Hypomagnesemia    Misty Quezada is a 67 year old female with moderate aortic stenosis, MIKEL, Afib who presents with LE edema and shortness of breath and found to have acute on chronic HFpEF.     Acute HFpEF in the setting of Moderate aortic stenosis              Troponin elevation- likely type 2 MI due to demand ischemia              Mild pulm HTN and moderately enlarged RV              Lasix infusion.  Appreciate cardio recs               Timing of TAVR evaluation per cards-patient to follow-up in valve clinic              Losartan, aldactone              Still volume overloaded despite significant diuresis.                Consider diamox if worsening contraction alklaosis.               Acute hypoxic respiratory insufficiency secondary to above              Wean oxygen as able     MIKEL with nocturnal hypoxia - not formally diagnosed.              Overnight oximetry by RT 9/25-9/26: Had a total of 33 desaturations (less than 89% SPO2 for at least 10 seconds) with the lowest to 76%, spent approximately 8.6% of the night with desaturations below 90%.  Repeat pending to meet for insurance criteria 10/3              Needs O2 for home night and urgent sleep evaluation as outpatient for nocturnal NIPPV     Chronic AF -               Toprol-xl, eliquis.       Lymphedema - PT/OT     Anemia              Iron studies 9/27: Iron 24, TIBC 349, Sat 7, transferrin 313, ferritin 34              Started on ferrous sulfate every other day               Continue to monitor              Outpatient GI referral for c-scope if not recent.     Hypomagnesemia - replacement protocol      Morbid Obesity - Body mass index is 55     Constipation -  PRN Miralax with prn  Dolcolax supp     Malnutrition     Barriers to discharge: IV diuresis, cardiology recommendations     Anticipated length of stay: Several days       Present on Admission:    Lymphedema    Acute pulmonary edema (H)    (Resolved) Bilateral lower extremity edema    Chronic atrial fibrillation (H)    Morbid obesity (H)      Subjective:  Patient resting comfortably in a chair.  Feeling better today.  Breathing improving.  No other complaints.    PHYSICAL EXAM  Temp:  [97.5  F (36.4  C)-98.4  F (36.9  C)] 97.5  F (36.4  C)  Pulse:  [80-87] 84  Resp:  [16-18] 18  BP: ()/(55-65) 94/63  SpO2:  [93 %-96 %] 96 %  Wt Readings from Last 1 Encounters:   10/04/22 134.9 kg (297 lb 4.8 oz)       Intake/Output Summary (Last 24 hours) at 10/4/2022 1316  Last data filed at 10/4/2022 0500  Gross per 24 hour   Intake 1041.69 ml   Output 1700 ml   Net -658.31 ml      Body mass index is 46.56 kg/m .    GENRL: Alert and answering questions appropriately. Not in acute distress.  Sitting up in a chair  HEENT: no lymphadenopathy or thyromegaly  CHEST: Diminished throughout.    HEART: Rate controlled with a regular rhythm.  Murmur noted.   ABDMN: Soft. Obese, Non-tender, non-distended. No organomegaly. No guarding or rigidity. Bowel sounds present   EXTRM: + bilateral pedal edema  NEURO: Cranial nerves II-XII grossly intact. No focal neurological deficit. No involuntary movements. Normal mentation  PSYCH: flat affect and mood.   INTGM: No skin rash, no cyanosis or clubbing      PERTINENT LABS/IMAGING:  Results for orders placed or performed during the hospital encounter of 09/24/22   XR Chest Port 1 View    Impression    IMPRESSION: Cardiomegaly, pulmonary vascular congestion, distention of the azygos vein, mild interstitial edema with small left effusion are consistent with mild failure.    No signs of pneumonia.   Echocardiogram Complete   Result Value Ref Range    LVEF  55-60%      Most Recent 3 CBC's:Recent Labs   Lab Test  09/28/22  0442 09/27/22  0438 09/25/22  0438   WBC 4.8 5.0 4.8   HGB 9.5* 10.0* 9.9*   MCV 90 91 90    282 262       Recent Labs   Lab Test 09/09/16  1412   CHOL 163   HDL 42*      TRIG 81     Recent Labs   Lab Test 09/09/16  1412 10/10/14  1207    118     Recent Labs   Lab Test 10/04/22  0522      POTASSIUM 3.5   CHLORIDE 93*   CO2 39*   GLC 98   BUN 27.6*   CR 0.86   GFRESTIMATED 73   TURNER 9.0     Recent Labs   Lab Test 09/02/21  0457   A1C 6.2*     Recent Labs   Lab Test 09/28/22  0442 09/27/22  0438 09/25/22  0438   HGB 9.5* 10.0* 9.9*     Recent Labs   Lab Test 08/21/21  1944 08/21/21  1426 08/21/21  0820   TROPONINI 1.32* 1.87* 1.75*     Recent Labs   Lab Test 09/24/22  0850 09/02/21  0457 08/21/21  0820   BNP  --  199* 281*   NTBNP 1,898*  --   --      Recent Labs   Lab Test 08/31/21  0720   TSH 4.92     Recent Labs   Lab Test 08/21/21  0820   INR 1.44*       Rashida Beltran, DO  Hospitalist Service  [facility]  Text page via [facility] Paging/Directory

## 2022-10-04 NOTE — PLAN OF CARE
Pt is alert and oriented. O2 has been in 87-89 without oxygen, so pt has remained on 1 L. Pt has a-fib, but is not on tele. OT worked with pt and had her standing stably. NIRU KAT     Problem: Plan of Care - These are the overarching goals to be used throughout the patient stay.    Goal: Plan of Care Review/Shift Note  Description: The Plan of Care Review/Shift note should be completed every shift.  The Outcome Evaluation is a brief statement about your assessment that the patient is improving, declining, or no change.  This information will be displayed automatically on your shift note.  Outcome: Ongoing, Progressing     Problem: Plan of Care - These are the overarching goals to be used throughout the patient stay.    Goal: Absence of Hospital-Acquired Illness or Injury  Intervention: Identify and Manage Fall Risk  Recent Flowsheet Documentation  Taken 10/4/2022 0830 by NIRU KAT  Safety Promotion/Fall Prevention:   activity supervised   clutter free environment maintained   nonskid shoes/slippers when out of bed   bed alarm on     Problem: Plan of Care - These are the overarching goals to be used throughout the patient stay.    Goal: Optimal Comfort and Wellbeing  Outcome: Ongoing, Progressing     Problem: Plan of Care - These are the overarching goals to be used throughout the patient stay.    Goal: Readiness for Transition of Care  Outcome: Ongoing, Progressing

## 2022-10-04 NOTE — PROGRESS NOTES
Heart Failure Care Map  GOALS TO BE MET BEFORE DISCHARGE:    1. Decrease congestion and/or edema with diuretic therapy to achieve near optimal volume status.     Dyspnea improved: Yes, satisfactory for discharge.   Edema improved:         Net I/O and Weights since admission:   09/04 0700 - 10/04 0659  In: 9400.53 [P.O.:8598; I.V.:802.53]  Out: 51149 [Urine:88899]  Net: -40353.47     Vitals:    09/24/22 0631 09/24/22 1700 09/25/22 0432 09/26/22 0409   Weight: (!) 154.2 kg (340 lb) (!) 159.8 kg (352 lb 4.7 oz) (!) 159.8 kg (352 lb 4.7 oz) (!) 162.1 kg (357 lb 5.9 oz)    09/27/22 0500 09/28/22 0500 09/29/22 0327 09/30/22 1046   Weight: (!) 156.9 kg (345 lb 14.4 oz) (!) 153 kg (337 lb 4.9 oz) (!) 151.4 kg (333 lb 12.4 oz) 144.7 kg (319 lb)    10/01/22 0642 10/02/22 0651 10/03/22 0851   Weight: 143.4 kg (316 lb 3.2 oz) 140.8 kg (310 lb 4.8 oz) 138.4 kg (305 lb 3.2 oz)       2.  O2 sats > 90% on room air, or at prior home O2 therapy level.      Able to wean O2 this shift to keep sats above 90%?: Yes, satisfactory for discharge.   Does patient use Home O2? No          Current oxygenation status:   SpO2: 93 %     O2 Device: Nasal cannula, Oxygen Delivery: 1 LPM    3.  Tolerates ambulation and mobility near baseline.     Ambulation:    Times patient ambulated with staff this shift: she had devang up to commode and up chair.     Please review the Heart Failure Care Map for additional HF goal outcomes.    RICHY RON RN  10/3/2022

## 2022-10-05 ENCOUNTER — APPOINTMENT (OUTPATIENT)
Dept: OCCUPATIONAL THERAPY | Facility: HOSPITAL | Age: 67
DRG: 280 | End: 2022-10-05
Payer: MEDICARE

## 2022-10-05 ENCOUNTER — APPOINTMENT (OUTPATIENT)
Dept: PHYSICAL THERAPY | Facility: HOSPITAL | Age: 67
DRG: 280 | End: 2022-10-05
Payer: MEDICARE

## 2022-10-05 LAB
ANION GAP SERPL CALCULATED.3IONS-SCNC: 9 MMOL/L (ref 7–15)
BUN SERPL-MCNC: 26.1 MG/DL (ref 8–23)
CALCIUM SERPL-MCNC: 9.2 MG/DL (ref 8.8–10.2)
CHLORIDE SERPL-SCNC: 91 MMOL/L (ref 98–107)
CREAT SERPL-MCNC: 0.79 MG/DL (ref 0.51–0.95)
DEPRECATED HCO3 PLAS-SCNC: 39 MMOL/L (ref 22–29)
GFR SERPL CREATININE-BSD FRML MDRD: 81 ML/MIN/1.73M2
GLUCOSE SERPL-MCNC: 86 MG/DL (ref 70–99)
HOLD SPECIMEN: NORMAL
MAGNESIUM SERPL-MCNC: 2 MG/DL (ref 1.7–2.3)
POTASSIUM SERPL-SCNC: 3.5 MMOL/L (ref 3.4–5.3)
SODIUM SERPL-SCNC: 139 MMOL/L (ref 136–145)

## 2022-10-05 PROCEDURE — 97116 GAIT TRAINING THERAPY: CPT | Mod: GP

## 2022-10-05 PROCEDURE — 97110 THERAPEUTIC EXERCISES: CPT | Mod: GP

## 2022-10-05 PROCEDURE — 83735 ASSAY OF MAGNESIUM: CPT | Performed by: INTERNAL MEDICINE

## 2022-10-05 PROCEDURE — 99232 SBSQ HOSP IP/OBS MODERATE 35: CPT | Performed by: INTERNAL MEDICINE

## 2022-10-05 PROCEDURE — 97535 SELF CARE MNGMENT TRAINING: CPT | Mod: GO

## 2022-10-05 PROCEDURE — 250N000013 HC RX MED GY IP 250 OP 250 PS 637: Performed by: FAMILY MEDICINE

## 2022-10-05 PROCEDURE — 250N000013 HC RX MED GY IP 250 OP 250 PS 637: Performed by: INTERNAL MEDICINE

## 2022-10-05 PROCEDURE — 36415 COLL VENOUS BLD VENIPUNCTURE: CPT | Performed by: INTERNAL MEDICINE

## 2022-10-05 PROCEDURE — 120N000004 HC R&B MS OVERFLOW

## 2022-10-05 PROCEDURE — 80048 BASIC METABOLIC PNL TOTAL CA: CPT | Performed by: INTERNAL MEDICINE

## 2022-10-05 RX ORDER — POTASSIUM CHLORIDE 1500 MG/1
20 TABLET, EXTENDED RELEASE ORAL ONCE
Status: COMPLETED | OUTPATIENT
Start: 2022-10-05 | End: 2022-10-05

## 2022-10-05 RX ORDER — ACETAZOLAMIDE 125 MG/1
125 TABLET ORAL
Status: DISCONTINUED | OUTPATIENT
Start: 2022-10-05 | End: 2022-10-07 | Stop reason: HOSPADM

## 2022-10-05 RX ADMIN — SPIRONOLACTONE 25 MG: 25 TABLET, FILM COATED ORAL at 09:01

## 2022-10-05 RX ADMIN — TETRAHYDROZOLINE HCL 1 DROP: 0.05 LIQUID OPHTHALMIC at 20:24

## 2022-10-05 RX ADMIN — MICONAZOLE NITRATE: 2 POWDER TOPICAL at 20:24

## 2022-10-05 RX ADMIN — Medication 1000 MCG: at 09:00

## 2022-10-05 RX ADMIN — Medication 1 TABLET: at 09:00

## 2022-10-05 RX ADMIN — FERROUS SULFATE TAB 325 MG (65 MG ELEMENTAL FE) 325 MG: 325 (65 FE) TAB at 09:02

## 2022-10-05 RX ADMIN — ACETAZOLAMIDE 125 MG: 125 TABLET ORAL at 09:00

## 2022-10-05 RX ADMIN — ACETAZOLAMIDE 125 MG: 125 TABLET ORAL at 12:22

## 2022-10-05 RX ADMIN — APIXABAN 5 MG: 5 TABLET, FILM COATED ORAL at 09:00

## 2022-10-05 RX ADMIN — ACETAMINOPHEN 975 MG: 325 TABLET, FILM COATED ORAL at 20:23

## 2022-10-05 RX ADMIN — LOSARTAN POTASSIUM 25 MG: 25 TABLET, FILM COATED ORAL at 09:01

## 2022-10-05 RX ADMIN — CITALOPRAM HYDROBROMIDE 20 MG: 20 TABLET ORAL at 09:02

## 2022-10-05 RX ADMIN — POLYETHYLENE GLYCOL 3350 17 G: 17 POWDER, FOR SOLUTION ORAL at 09:00

## 2022-10-05 RX ADMIN — APIXABAN 5 MG: 5 TABLET, FILM COATED ORAL at 20:23

## 2022-10-05 RX ADMIN — ACETAZOLAMIDE 125 MG: 125 TABLET ORAL at 16:35

## 2022-10-05 RX ADMIN — METOPROLOL SUCCINATE 25 MG: 25 TABLET, EXTENDED RELEASE ORAL at 09:02

## 2022-10-05 RX ADMIN — KETOTIFEN FUMARATE 1 DROP: 0.35 SOLUTION/ DROPS OPHTHALMIC at 15:29

## 2022-10-05 RX ADMIN — Medication 1 TABLET: at 09:02

## 2022-10-05 RX ADMIN — POTASSIUM CHLORIDE 20 MEQ: 1500 TABLET, EXTENDED RELEASE ORAL at 09:01

## 2022-10-05 ASSESSMENT — ACTIVITIES OF DAILY LIVING (ADL)
ADLS_ACUITY_SCORE: 30

## 2022-10-05 NOTE — PLAN OF CARE
Heart Failure Care Map  GOALS TO BE MET BEFORE DISCHARGE:    1. Decrease congestion and/or edema with diuretic therapy to achieve near optimal volume status.     Dyspnea improved: Yes, satisfactory for discharge.   Edema improved: No, further care required to meet this goal. Please explain Has 3+ edema on LE. Lymphedema wraps in place.         Net I/O and Weights since admission:   09/05 0700 - 10/05 0659  In: 49603.62 [P.O.:9878; I.V.:1032.62]  Out: 41414 [Urine:98977]  Net: -99857.38     Vitals:    09/24/22 0631 09/24/22 1700 09/25/22 0432 09/26/22 0409   Weight: (!) 154.2 kg (340 lb) (!) 159.8 kg (352 lb 4.7 oz) (!) 159.8 kg (352 lb 4.7 oz) (!) 162.1 kg (357 lb 5.9 oz)    09/27/22 0500 09/28/22 0500 09/29/22 0327 09/30/22 1046   Weight: (!) 156.9 kg (345 lb 14.4 oz) (!) 153 kg (337 lb 4.9 oz) (!) 151.4 kg (333 lb 12.4 oz) 144.7 kg (319 lb)    10/01/22 0642 10/02/22 0651 10/03/22 0851 10/04/22 0440   Weight: 143.4 kg (316 lb 3.2 oz) 140.8 kg (310 lb 4.8 oz) 138.4 kg (305 lb 3.2 oz) 134.9 kg (297 lb 4.8 oz)    10/05/22 0356   Weight: 133.4 kg (294 lb 1.6 oz)       2.  O2 sats > 90% on room air, or at prior home O2 therapy level.      Able to wean O2 this shift to keep sats above 90%?: No, further care required to meet this goal. Please explain Needs 1L O2    Does patient use Home O2? No          Current oxygenation status:   SpO2: 93 %     O2 Device: Nasal cannula,      3.  Tolerates ambulation and mobility near baseline.     Ambulation: Yes, satisfactory for discharge.   Times patient ambulated with staff this shift: up to bedside commode once     Please review the Heart Failure Care Map for additional HF goal outcomes.    Gee Oliveros RN  10/5/2022     Goal Outcome Evaluation: Patient is aox4. Denies chest pain. Getting 1L O2 with SpO2 between 93-94%. SOB improving per patient. Lung sounds diminished, crackles audible posteriorly. IS use encouraged while awake. Lasix drip running at 10 mg/hr. Output 700  montrell.

## 2022-10-05 NOTE — PLAN OF CARE
Pt is vitally stable, still on 1 L O2. Alert and Oriented. Pt has purewick but has been having accidents due to placement. NIRU KAT    Problem: Plan of Care - These are the overarching goals to be used throughout the patient stay.    Goal: Plan of Care Review/Shift Note  Description: The Plan of Care Review/Shift note should be completed every shift.  The Outcome Evaluation is a brief statement about your assessment that the patient is improving, declining, or no change.  This information will be displayed automatically on your shift note.  Outcome: Ongoing, Progressing     Problem: Plan of Care - These are the overarching goals to be used throughout the patient stay.    Goal: Absence of Hospital-Acquired Illness or Injury  Intervention: Identify and Manage Fall Risk  Recent Flowsheet Documentation  Taken 10/5/2022 1515 by NIRU KAT  Safety Promotion/Fall Prevention:   activity supervised   bed alarm on  Taken 10/5/2022 0830 by NIRU KAT  Safety Promotion/Fall Prevention:   activity supervised   bed alarm on     Problem: Plan of Care - These are the overarching goals to be used throughout the patient stay.    Goal: Absence of Hospital-Acquired Illness or Injury  Intervention: Prevent Skin Injury  Recent Flowsheet Documentation  Taken 10/5/2022 1515 by NIRU KAT  Body Position: position changed independently  Taken 10/5/2022 0830 by NIRU KAT  Body Position: position changed independently     Problem: Plan of Care - These are the overarching goals to be used throughout the patient stay.    Goal: Readiness for Transition of Care  Outcome: Ongoing, Progressing     Problem: Plan of Care - These are the overarching goals to be used throughout the patient stay.    Goal: Optimal Comfort and Wellbeing  Outcome: Ongoing, Progressing     Problem: Fluid Imbalance (Heart Failure)  Goal: Fluid Balance  Outcome: Ongoing, Progressing

## 2022-10-05 NOTE — PROGRESS NOTES
[facility]    PROGRESS NOTE - Hospitalist Service    ASSESSMENT AND PLAN     Principal Problem:    Acute on chronic diastolic CHF  Active Problems:    Chronic atrial fibrillation (H)    Lymphedema    Acute pulmonary edema (H)    Morbid obesity (H)    Hypomagnesemia    Misty Quezada is a 67 year old female with moderate aortic stenosis, MIKEL, Afib who presents with LE edema and shortness of breath and found to have acute on chronic HFpEF.     Acute HFpEF in the setting of Moderate aortic stenosis              Troponin elevation- likely type 2 MI due to demand ischemia              Mild pulm HTN and moderately enlarged RV              Lasix infusion.  Cardiology planning to transition to oral tomorrow 10/6              Timing of TAVR evaluation per cards-patient to follow-up in valve clinic              Losartan, aldactone   Diamox initiation today                Acute hypoxic respiratory insufficiency secondary to above              Wean oxygen as able     MIKEL with nocturnal hypoxia - not formally diagnosed.              Overnight oximetry by RT 9/25-9/26: Had a total of 33 desaturations (less than 89% SPO2 for at least 10 seconds) with the lowest to 76%, spent approximately 8.6% of the night with desaturations below 90%.  Repeat pending to meet for insurance criteria 10/3              Needs O2 for home night and urgent sleep evaluation as outpatient for nocturnal NIPPV     Chronic AF -               Toprol-xl, eliquis.       Lymphedema - PT/OT     Anemia              Iron studies 9/27: Iron 24, TIBC 349, Sat 7, transferrin 313, ferritin 34              Started on ferrous sulfate every other day               Continue to monitor              Outpatient GI referral for c-scope if not recent.     Hypomagnesemia - replacement protocol      Morbid Obesity - Body mass index is 55     Constipation -  PRN Miralax with prn Dolcolax supp     Malnutrition     Barriers to discharge: IV diuresis,  cardiology recommendations     Anticipated length of stay: 2-3 days      Present on Admission:    Lymphedema    Acute pulmonary edema (H)    (Resolved) Bilateral lower extremity edema    Chronic atrial fibrillation (H)    Morbid obesity (H)      Subjective:  Patient resting comfortably in a chair.  Notes her breathing is improving.  Still complaining of leg discomfort with swelling.    PHYSICAL EXAM  Temp:  [97.7  F (36.5  C)-98.9  F (37.2  C)] 98.6  F (37  C)  Pulse:  [] 110  Resp:  [18-20] 18  BP: ()/(62-73) 130/62  SpO2:  [93 %-97 %] 93 %  Wt Readings from Last 1 Encounters:   10/05/22 133.4 kg (294 lb 1.6 oz)       Intake/Output Summary (Last 24 hours) at 10/5/2022 1209  Last data filed at 10/5/2022 0940  Gross per 24 hour   Intake 1668.4 ml   Output 3300 ml   Net -1631.6 ml      Body mass index is 46.06 kg/m .    GENRL: Alert and answering questions appropriately. Not in acute distress.  Sitting up in a chair  HEENT: no lymphadenopathy or thyromegaly  CHEST: Diminished throughout.    HEART: Rate controlled with a regular rhythm.  Murmur noted.   ABDMN: Soft. Obese, Non-tender, non-distended. No organomegaly. No guarding or rigidity. Bowel sounds present   EXTRM: + bilateral pedal edema  NEURO: Cranial nerves II-XII grossly intact. No focal neurological deficit. No involuntary movements. Normal mentation  PSYCH: flat affect and mood.   INTGM: No skin rash, no cyanosis or clubbing    PERTINENT LABS/IMAGING:  Results for orders placed or performed during the hospital encounter of 09/24/22   XR Chest Port 1 View    Impression    IMPRESSION: Cardiomegaly, pulmonary vascular congestion, distention of the azygos vein, mild interstitial edema with small left effusion are consistent with mild failure.    No signs of pneumonia.   Echocardiogram Complete   Result Value Ref Range    LVEF  55-60%      Most Recent 3 CBC's:Recent Labs   Lab Test 09/28/22  0442 09/27/22  0438 09/25/22  0438   WBC 4.8 5.0 4.8   HGB  9.5* 10.0* 9.9*   MCV 90 91 90    282 262     Most Recent 3 BMP's:Recent Labs   Lab Test 10/05/22  0430 10/04/22  0522 10/03/22  0444    142 141   POTASSIUM 3.5 3.5 3.7   CHLORIDE 91* 93* 94*   CO2 39* 39* 37*   BUN 26.1* 27.6* 25.4*   CR 0.79 0.86 0.83   ANIONGAP 9 10 10   TURNER 9.2 9.0 8.9   GLC 86 98 95       Recent Labs   Lab Test 09/09/16  1412   CHOL 163   HDL 42*      TRIG 81     Recent Labs   Lab Test 09/09/16  1412 10/10/14  1207    118     Recent Labs   Lab Test 10/05/22  0430      POTASSIUM 3.5   CHLORIDE 91*   CO2 39*   GLC 86   BUN 26.1*   CR 0.79   GFRESTIMATED 81   TURNER 9.2     Recent Labs   Lab Test 09/02/21  0457   A1C 6.2*     Recent Labs   Lab Test 09/28/22  0442 09/27/22  0438 09/25/22  0438   HGB 9.5* 10.0* 9.9*     Recent Labs   Lab Test 08/21/21  1944 08/21/21  1426 08/21/21  0820   TROPONINI 1.32* 1.87* 1.75*     Recent Labs   Lab Test 09/24/22  0850 09/02/21  0457 08/21/21  0820   BNP  --  199* 281*   NTBNP 1,898*  --   --      Recent Labs   Lab Test 08/31/21  0720   TSH 4.92     Recent Labs   Lab Test 08/21/21  0820   INR 1.44*       Rashida Beltran,   Hospitalist Service  [facility]  Text page via [facility] Paging/Directory

## 2022-10-05 NOTE — PROGRESS NOTES
HEART CARE NOTE          Assessment/Recommendations       1. HFpEF, Valvular heart disease c/b ADHF  Assessment / Plan    Hypervolemic on physical exam, but diuresing well on current regimen - will plan to transition to oral regimen tomorrow; no changes today    GDMT as detailed below; mainstay of treatment for HFpEF includes diuretics and adequate BP control (class I) and SGLT2-I (class 2a); additional medical therapy (ARNI, MRA, ARB) demonstrated less robust evidence for indication but may be considered per guideline recommendations (2b); no indication for BBlockers      Current Pharmacotherapy AHA Guideline-Directed Medical Therapy   Losartan 25 mg daily ARNI/ARB   Spironolactone 25 mg  MRA   SGLT2 inhibitor not started SGLT2-I    Furosemide gtt Loop diuretic        2. Atrial fibrillation  Assessment / Plan    Rate controlled - continue metoprolol, and apixaban      3. Elevated troponin  Assessment / Plan    Mild elevated in the setting of afib and ADHF -- likely type 2 MI in the setting of demand ischemia; echo without WMAs and patient chest pain free and hemodynamically stable - no further cardiac testing indicated at this time     4. Moderate aortic stenosis  Assessment / Plan    LORRAINE 1.1, pk/mn 2.78/17.4 - will discuss with valve clinic regarding potential interventions    History of Present Illness/Subjective      Ms. Misty Quezada is a 67 year old female with a PMHx significant for atrial fibrillation who presented in ADHF in the setting of valvular heart disease.     Today, Mrs. Quezada denies any acute cardiac events or complaints; Management plan as detailed above - no changes to plan of care today     ECG: Personally reviewed. nonspecific ST and T waves changes, atrial fibrillation, with RVR     ECHO (personnaly Reviewed):  Interpretation Summary     1. Left ventricular size is normal. Mild concentric increase in wall  thickness. Systolic function is normal. The estimated left ventricular  ejection  "fraction is 55-60%.  2. Right ventricle is not well visualized. Size is probably at least  moderately enlarged. Systolic function appears normal.  3. Severe right atrial enlargement.  4. Calcified trileaflet aortic valve with moderate aortic stenosis. Peak  forward velocity measures 2.8 m/s, mean gradient 17 mm Hg, calculated aortic  valve area 1.1 cm2, and dimensionless index 0.44. Trace aortic regurgitation.  5. Severe functional tricuspid regurgitation.  6. At least mild pulmonary hypertension is present with an estimated pulmonary  artery systolic pressure of 39 mm Hg plus the right atrial pressure.  7. Compared to the prior study dated 8/22/2021, there has been no significant  change.          Physical Examination Review of Systems   /63 (BP Location: Left arm)   Pulse 89   Temp 98.5  F (36.9  C) (Oral)   Resp 20   Ht 1.702 m (5' 7\")   Wt 133.4 kg (294 lb 1.6 oz)   SpO2 93%   BMI 46.06 kg/m    Body mass index is 46.06 kg/m .  Wt Readings from Last 3 Encounters:   10/05/22 133.4 kg (294 lb 1.6 oz)   09/21/21 130.2 kg (287 lb)   08/27/21 129.9 kg (286 lb 6.4 oz)     General Appearance:   no distress, normal body habitus   ENT/Mouth: membranes moist, no oral lesions or bleeding gums.      EYES:  no scleral icterus, normal conjunctivae   Neck: no carotid bruits or thyromegaly   Chest/Lungs:   lungs are clear to auscultation, no rales or wheezing, equal chest wall expansion    Cardiovascular:   Regular. Normal first and second heart sounds with +JOHN; no rubs, or gallops; the carotid, radial and posterior tibial pulses are intact, + JVD and LE edema bilaterally    Abdomen:  no organomegaly, masses, bruits, or tenderness; bowel sounds are present   Extremities: no cyanosis or clubbing   Skin: no xanthelasma, warm.    Neurologic: alert and calm     Psychiatric: alert and calm     A complete 10 systems ROS was reviewed  And is negative except what is listed in the HPI.          Medical History  Surgical " History Family History Social History   Past Medical History:   Diagnosis Date     Benign essential hypertension      Chronic atrial fibrillation (H)     Past Surgical History:   Procedure Laterality Date     APPENDECTOMY       CHOLECYSTECTOMY       GASTRIC BYPASS      no family history of premature coronary artery disease Social History     Socioeconomic History     Marital status:      Spouse name: Not on file     Number of children: Not on file     Years of education: Not on file     Highest education level: Not on file   Occupational History     Not on file   Tobacco Use     Smoking status: Never Smoker     Smokeless tobacco: Never Used   Substance and Sexual Activity     Alcohol use: Not Currently     Comment: Rarely Uses alcohol     Drug use: Never     Sexual activity: Not on file   Other Topics Concern     Not on file   Social History Narrative     Not on file     Social Determinants of Health     Financial Resource Strain: Not on file   Food Insecurity: Not on file   Transportation Needs: Not on file   Physical Activity: Not on file   Stress: Not on file   Social Connections: Not on file   Intimate Partner Violence: Not on file   Housing Stability: Not on file           Lab Results    Chemistry/lipid CBC Cardiac Enzymes/BNP/TSH/INR   Lab Results   Component Value Date    CHOL 163 09/09/2016    HDL 42 (L) 09/09/2016    TRIG 81 09/09/2016    BUN 26.1 (H) 10/05/2022     10/05/2022    CO2 39 (H) 10/05/2022    Lab Results   Component Value Date    WBC 4.8 09/28/2022    HGB 9.5 (L) 09/28/2022    HCT 30.8 (L) 09/28/2022    MCV 90 09/28/2022     09/28/2022    Lab Results   Component Value Date    TROPONINI 1.32 (HH) 08/21/2021     (H) 09/02/2021    TSH 4.92 08/31/2021    INR 1.44 (H) 08/21/2021     Lab Results   Component Value Date    TROPONINI 1.32 (HH) 08/21/2021          Weight:    Wt Readings from Last 3 Encounters:   10/05/22 133.4 kg (294 lb 1.6 oz)   09/21/21 130.2 kg (287 lb)    08/27/21 129.9 kg (286 lb 6.4 oz)       Allergies  No Known Allergies      Surgical History  Past Surgical History:   Procedure Laterality Date     APPENDECTOMY       CHOLECYSTECTOMY       GASTRIC BYPASS         Social History  Tobacco:   History   Smoking Status     Never Smoker   Smokeless Tobacco     Never Used    Alcohol:   Social History    Substance and Sexual Activity      Alcohol use: Not Currently        Comment: Rarely Uses alcohol   Illicit Drugs:   History   Drug Use Unknown       Family History  Family History   Problem Relation Age of Onset     No Known Problems Mother      No Known Problems Father           Tara Siegel MD on 10/5/2022      cc: iTff Chavez

## 2022-10-05 NOTE — PROGRESS NOTES
Heart Failure Care Map  GOALS TO BE MET BEFORE DISCHARGE:    1. Decrease congestion and/or edema with diuretic therapy to achieve near optimal volume status.     Dyspnea improved: Yes, satisfactory for discharge.   Edema improved: No, further care required to meet this goal. Please explain Still on IV lasix and using lymphwraps        Net I/O and Weights since admission:   09/05 0700 - 10/05 0659  In: 53301.62 [P.O.:9878; I.V.:1032.62]  Out: 48826 [Urine:49991]  Net: -60313.38     Vitals:    09/24/22 0631 09/24/22 1700 09/25/22 0432 09/26/22 0409   Weight: (!) 154.2 kg (340 lb) (!) 159.8 kg (352 lb 4.7 oz) (!) 159.8 kg (352 lb 4.7 oz) (!) 162.1 kg (357 lb 5.9 oz)    09/27/22 0500 09/28/22 0500 09/29/22 0327 09/30/22 1046   Weight: (!) 156.9 kg (345 lb 14.4 oz) (!) 153 kg (337 lb 4.9 oz) (!) 151.4 kg (333 lb 12.4 oz) 144.7 kg (319 lb)    10/01/22 0642 10/02/22 0651 10/03/22 0851 10/04/22 0440   Weight: 143.4 kg (316 lb 3.2 oz) 140.8 kg (310 lb 4.8 oz) 138.4 kg (305 lb 3.2 oz) 134.9 kg (297 lb 4.8 oz)       2.  O2 sats > 90% on room air, or at prior home O2 therapy level.      Able to wean O2 this shift to keep sats above 90%?: No, further care required to meet this goal. Please explain Still requiring oxygen 1L   Does patient use Home O2? No          Current oxygenation status:   SpO2: 95 %     O2 Device: Nasal cannula, Oxygen Delivery: 1 LPM    3.  Tolerates ambulation and mobility near baseline.     Ambulation: Yes, satisfactory for discharge.   Times patient ambulated with staff this shift: 2 to the commode.  Encouraged to use IS and can get it to 1500. Lung sounds with crackles in bases.    Please review the Heart Failure Care Map for additional HF goal outcomes.    Meenakshi Flannery RN  10/4/2022

## 2022-10-06 ENCOUNTER — APPOINTMENT (OUTPATIENT)
Dept: PHYSICAL THERAPY | Facility: HOSPITAL | Age: 67
DRG: 280 | End: 2022-10-06
Payer: MEDICARE

## 2022-10-06 ENCOUNTER — APPOINTMENT (OUTPATIENT)
Dept: OCCUPATIONAL THERAPY | Facility: HOSPITAL | Age: 67
DRG: 280 | End: 2022-10-06
Payer: MEDICARE

## 2022-10-06 LAB
ANION GAP SERPL CALCULATED.3IONS-SCNC: 9 MMOL/L (ref 7–15)
BUN SERPL-MCNC: 28.1 MG/DL (ref 8–23)
CALCIUM SERPL-MCNC: 9 MG/DL (ref 8.8–10.2)
CHLORIDE SERPL-SCNC: 90 MMOL/L (ref 98–107)
CREAT SERPL-MCNC: 0.85 MG/DL (ref 0.51–0.95)
DEPRECATED HCO3 PLAS-SCNC: 36 MMOL/L (ref 22–29)
GFR SERPL CREATININE-BSD FRML MDRD: 74 ML/MIN/1.73M2
GLUCOSE SERPL-MCNC: 88 MG/DL (ref 70–99)
MAGNESIUM SERPL-MCNC: 2.1 MG/DL (ref 1.7–2.3)
POTASSIUM SERPL-SCNC: 3.3 MMOL/L (ref 3.4–5.3)
POTASSIUM SERPL-SCNC: 3.3 MMOL/L (ref 3.4–5.3)
POTASSIUM SERPL-SCNC: 3.6 MMOL/L (ref 3.4–5.3)
SODIUM SERPL-SCNC: 135 MMOL/L (ref 136–145)

## 2022-10-06 PROCEDURE — 250N000013 HC RX MED GY IP 250 OP 250 PS 637: Performed by: INTERNAL MEDICINE

## 2022-10-06 PROCEDURE — 97535 SELF CARE MNGMENT TRAINING: CPT | Mod: GP

## 2022-10-06 PROCEDURE — 120N000004 HC R&B MS OVERFLOW

## 2022-10-06 PROCEDURE — 36415 COLL VENOUS BLD VENIPUNCTURE: CPT | Performed by: INTERNAL MEDICINE

## 2022-10-06 PROCEDURE — 83735 ASSAY OF MAGNESIUM: CPT | Performed by: INTERNAL MEDICINE

## 2022-10-06 PROCEDURE — 80048 BASIC METABOLIC PNL TOTAL CA: CPT | Performed by: INTERNAL MEDICINE

## 2022-10-06 PROCEDURE — 97110 THERAPEUTIC EXERCISES: CPT | Mod: GP

## 2022-10-06 PROCEDURE — 97116 GAIT TRAINING THERAPY: CPT | Mod: GP

## 2022-10-06 PROCEDURE — 99232 SBSQ HOSP IP/OBS MODERATE 35: CPT | Performed by: INTERNAL MEDICINE

## 2022-10-06 PROCEDURE — 97535 SELF CARE MNGMENT TRAINING: CPT | Mod: GO

## 2022-10-06 PROCEDURE — 250N000013 HC RX MED GY IP 250 OP 250 PS 637: Performed by: FAMILY MEDICINE

## 2022-10-06 PROCEDURE — 84132 ASSAY OF SERUM POTASSIUM: CPT | Performed by: INTERNAL MEDICINE

## 2022-10-06 RX ORDER — POTASSIUM CHLORIDE 1500 MG/1
40 TABLET, EXTENDED RELEASE ORAL ONCE
Status: COMPLETED | OUTPATIENT
Start: 2022-10-06 | End: 2022-10-06

## 2022-10-06 RX ORDER — BUMETANIDE 2 MG/1
2 TABLET ORAL
Status: DISCONTINUED | OUTPATIENT
Start: 2022-10-06 | End: 2022-10-07 | Stop reason: HOSPADM

## 2022-10-06 RX ORDER — POTASSIUM CHLORIDE 1500 MG/1
20 TABLET, EXTENDED RELEASE ORAL ONCE
Status: COMPLETED | OUTPATIENT
Start: 2022-10-06 | End: 2022-10-06

## 2022-10-06 RX ADMIN — Medication 1 TABLET: at 08:42

## 2022-10-06 RX ADMIN — APIXABAN 5 MG: 5 TABLET, FILM COATED ORAL at 08:43

## 2022-10-06 RX ADMIN — POTASSIUM CHLORIDE 40 MEQ: 1500 TABLET, EXTENDED RELEASE ORAL at 08:42

## 2022-10-06 RX ADMIN — ACETAZOLAMIDE 125 MG: 125 TABLET ORAL at 09:13

## 2022-10-06 RX ADMIN — BUMETANIDE 2 MG: 2 TABLET ORAL at 08:43

## 2022-10-06 RX ADMIN — CITALOPRAM HYDROBROMIDE 20 MG: 20 TABLET ORAL at 08:43

## 2022-10-06 RX ADMIN — TETRAHYDROZOLINE HCL 1 DROP: 0.05 LIQUID OPHTHALMIC at 09:15

## 2022-10-06 RX ADMIN — POLYETHYLENE GLYCOL 3350 17 G: 17 POWDER, FOR SOLUTION ORAL at 09:06

## 2022-10-06 RX ADMIN — ACETAZOLAMIDE 125 MG: 125 TABLET ORAL at 12:44

## 2022-10-06 RX ADMIN — ACETAMINOPHEN 975 MG: 325 TABLET, FILM COATED ORAL at 05:17

## 2022-10-06 RX ADMIN — Medication 1 TABLET: at 08:43

## 2022-10-06 RX ADMIN — MICONAZOLE NITRATE 1 APPLICATOR: 2 POWDER TOPICAL at 08:45

## 2022-10-06 RX ADMIN — ACETAZOLAMIDE 125 MG: 125 TABLET ORAL at 19:46

## 2022-10-06 RX ADMIN — Medication 1 MG: at 23:14

## 2022-10-06 RX ADMIN — TETRAHYDROZOLINE HCL 1 DROP: 0.05 LIQUID OPHTHALMIC at 14:05

## 2022-10-06 RX ADMIN — ACETAMINOPHEN 975 MG: 325 TABLET, FILM COATED ORAL at 23:14

## 2022-10-06 RX ADMIN — APIXABAN 5 MG: 5 TABLET, FILM COATED ORAL at 21:56

## 2022-10-06 RX ADMIN — POTASSIUM CHLORIDE 20 MEQ: 1500 TABLET, EXTENDED RELEASE ORAL at 14:05

## 2022-10-06 RX ADMIN — SPIRONOLACTONE 25 MG: 25 TABLET, FILM COATED ORAL at 08:43

## 2022-10-06 RX ADMIN — KETOTIFEN FUMARATE 1 DROP: 0.35 SOLUTION/ DROPS OPHTHALMIC at 09:13

## 2022-10-06 RX ADMIN — BUMETANIDE 2 MG: 2 TABLET ORAL at 19:46

## 2022-10-06 RX ADMIN — TETRAHYDROZOLINE HCL 1 DROP: 0.05 LIQUID OPHTHALMIC at 21:56

## 2022-10-06 RX ADMIN — Medication 1000 MCG: at 08:45

## 2022-10-06 RX ADMIN — MICONAZOLE NITRATE: 2 POWDER TOPICAL at 21:56

## 2022-10-06 ASSESSMENT — ACTIVITIES OF DAILY LIVING (ADL)
ADLS_ACUITY_SCORE: 30
ADLS_ACUITY_SCORE: 31
ADLS_ACUITY_SCORE: 30
ADLS_ACUITY_SCORE: 30
ADLS_ACUITY_SCORE: 31
ADLS_ACUITY_SCORE: 31
ADLS_ACUITY_SCORE: 30

## 2022-10-06 NOTE — PLAN OF CARE
Goal Outcome Evaluation:    Plan of Care Reviewed With: patient     Overall Patient Progress: improving         Vitals:    10/05/22 0356 10/05/22 0717 10/05/22 0902 10/05/22 1520   BP: 103/63 97/65 130/62 128/62   BP Location: Left arm Left arm  Left arm   Pulse: 89 89 110 80   Resp: 20 18  18   Temp: 98.5  F (36.9  C) 98.6  F (37  C)  98  F (36.7  C)   TempSrc: Oral Oral  Oral   SpO2: 93% 93%  91%   Weight: 133.4 kg (294 lb 1.6 oz)      Height:        1 liter oxygen. Up in the chair. Assist of 1. Lasix gtt. Fine crackles diminished lungs. +3 edema. Hopefully change to po lasix tomorrow. TCU at discharge. makenzie Landeros RN

## 2022-10-06 NOTE — PROGRESS NOTES
HEART CARE NOTE          Assessment/Recommendations     1. HFpEF, Valvular heart disease c/b ADHF  Assessment / Plan    Nearing euvolemia will transition to oral diuretic regimen and continue to monitor     GDMT as detailed below; mainstay of treatment for HFpEF includes diuretics and adequate BP control (class I) and SGLT2-I (class 2a); additional medical therapy (ARNI, MRA, ARB) demonstrated less robust evidence for indication but may be considered per guideline recommendations (2b); no indication for BBlockers      Current Pharmacotherapy AHA Guideline-Directed Medical Therapy   Losartan 25 mg daily ARNI/ARB   Spironolactone 25 mg  MRA   SGLT2 inhibitor not started SGLT2-I    Bumex 2 mg BID Loop diuretic        2. Atrial fibrillation  Assessment / Plan    Rate controlled - continue metoprolol, and apixaban      3. Elevated troponin  Assessment / Plan    Mild elevated in the setting of afib and ADHF -- likely type 2 MI in the setting of demand ischemia; echo without WMAs and patient chest pain free and hemodynamically stable - no further cardiac testing indicated at this time     4. Moderate aortic stenosis  Assessment / Plan    LORRAINE 1.1, pk/mn 2.78/17.4 - will discuss with valve clinic regarding potential interventions        History of Present Illness/Subjective      Ms. Misty Quezada is a 67 year old female with a PMHx significant for atrial fibrillation who presented in ADHF in the setting of valvular heart disease.     Today, Mrs. Quezada denies any acute cardiac events or complaints; Management plan as detailed above - no changes to plan of care today     ECG: Personally reviewed. nonspecific ST and T waves changes, atrial fibrillation, with RVR     ECHO (personnaly Reviewed):  Interpretation Summary     1. Left ventricular size is normal. Mild concentric increase in wall  thickness. Systolic function is normal. The estimated left ventricular  ejection fraction is 55-60%.  2. Right ventricle is not well  "visualized. Size is probably at least  moderately enlarged. Systolic function appears normal.  3. Severe right atrial enlargement.  4. Calcified trileaflet aortic valve with moderate aortic stenosis. Peak  forward velocity measures 2.8 m/s, mean gradient 17 mm Hg, calculated aortic  valve area 1.1 cm2, and dimensionless index 0.44. Trace aortic regurgitation.  5. Severe functional tricuspid regurgitation.  6. At least mild pulmonary hypertension is present with an estimated pulmonary  artery systolic pressure of 39 mm Hg plus the right atrial pressure.  7. Compared to the prior study dated 8/22/2021, there has been no significant  change.          Physical Examination Review of Systems   BP 98/59 (BP Location: Left arm, Patient Position: Semi-Khan's, Cuff Size: Adult Regular)   Pulse 79   Temp 98.4  F (36.9  C) (Oral)   Resp 18   Ht 1.702 m (5' 7\")   Wt 124.8 kg (275 lb 1.6 oz)   SpO2 93%   BMI 43.09 kg/m    Body mass index is 43.09 kg/m .  Wt Readings from Last 3 Encounters:   10/06/22 124.8 kg (275 lb 1.6 oz)   09/21/21 130.2 kg (287 lb)   08/27/21 129.9 kg (286 lb 6.4 oz)     General Appearance:   no distress, normal body habitus   ENT/Mouth: membranes moist, no oral lesions or bleeding gums.      EYES:  no scleral icterus, normal conjunctivae   Neck: no carotid bruits or thyromegaly   Chest/Lungs:   lungs are clear to auscultation, no rales or wheezing, equal chest wall expansion    Cardiovascular:   Regular. Normal first and second heart sounds with no murmurs, rubs, or gallops; the carotid, radial and posterior tibial pulses are intact, no JVD and mild LE edema bilaterally    Abdomen:  no organomegaly, masses, bruits, or tenderness; bowel sounds are present   Extremities: no cyanosis or clubbing   Skin: no xanthelasma, warm.    Neurologic: alert and oriented x3, calm     Psychiatric: alert and oriented x3, calm     A complete 10 systems ROS was reviewed  And is negative except what is listed in the " HPI.          Medical History  Surgical History Family History Social History   Past Medical History:   Diagnosis Date     Benign essential hypertension      Chronic atrial fibrillation (H)     Past Surgical History:   Procedure Laterality Date     APPENDECTOMY       CHOLECYSTECTOMY       GASTRIC BYPASS      no family history of premature coronary artery disease Social History     Socioeconomic History     Marital status:      Spouse name: Not on file     Number of children: Not on file     Years of education: Not on file     Highest education level: Not on file   Occupational History     Not on file   Tobacco Use     Smoking status: Never Smoker     Smokeless tobacco: Never Used   Substance and Sexual Activity     Alcohol use: Not Currently     Comment: Rarely Uses alcohol     Drug use: Never     Sexual activity: Not on file   Other Topics Concern     Not on file   Social History Narrative     Not on file     Social Determinants of Health     Financial Resource Strain: Not on file   Food Insecurity: Not on file   Transportation Needs: Not on file   Physical Activity: Not on file   Stress: Not on file   Social Connections: Not on file   Intimate Partner Violence: Not on file   Housing Stability: Not on file           Lab Results    Chemistry/lipid CBC Cardiac Enzymes/BNP/TSH/INR   Lab Results   Component Value Date    CHOL 163 09/09/2016    HDL 42 (L) 09/09/2016    TRIG 81 09/09/2016    BUN 26.1 (H) 10/05/2022     10/05/2022    CO2 39 (H) 10/05/2022    Lab Results   Component Value Date    WBC 4.8 09/28/2022    HGB 9.5 (L) 09/28/2022    HCT 30.8 (L) 09/28/2022    MCV 90 09/28/2022     09/28/2022    Lab Results   Component Value Date    TROPONINI 1.32 (HH) 08/21/2021     (H) 09/02/2021    TSH 4.92 08/31/2021    INR 1.44 (H) 08/21/2021     Lab Results   Component Value Date    TROPONINI 1.32 (HH) 08/21/2021          Weight:    Wt Readings from Last 3 Encounters:   10/06/22 124.8 kg (275 lb  1.6 oz)   09/21/21 130.2 kg (287 lb)   08/27/21 129.9 kg (286 lb 6.4 oz)       Allergies  No Known Allergies      Surgical History  Past Surgical History:   Procedure Laterality Date     APPENDECTOMY       CHOLECYSTECTOMY       GASTRIC BYPASS         Social History  Tobacco:   History   Smoking Status     Never Smoker   Smokeless Tobacco     Never Used    Alcohol:   Social History    Substance and Sexual Activity      Alcohol use: Not Currently        Comment: Rarely Uses alcohol   Illicit Drugs:   History   Drug Use Unknown       Family History  Family History   Problem Relation Age of Onset     No Known Problems Mother      No Known Problems Father           Tara Siegel MD on 10/6/2022      cc: Tiff Chavez

## 2022-10-06 NOTE — PLAN OF CARE
Problem: Plan of Care - These are the overarching goals to be used throughout the patient stay.    Goal: Plan of Care Review/Shift Note  Description: The Plan of Care Review/Shift note should be completed every shift.  The Outcome Evaluation is a brief statement about your assessment that the patient is improving, declining, or no change.  This information will be displayed automatically on your shift note.  Outcome: Ongoing, Progressing  Flowsheets (Taken 10/6/2022 1348)  Plan of Care Reviewed With: patient  Goal: Absence of Hospital-Acquired Illness or Injury  Intervention: Identify and Manage Fall Risk  Recent Flowsheet Documentation  Taken 10/6/2022 1249 by Paty Gillis RN  Safety Promotion/Fall Prevention:   clutter free environment maintained   fall prevention program maintained   nonskid shoes/slippers when out of bed   patient and family education   room door open   room near nurse's station   safety round/check completed   room organization consistent   treat underlying cause  Taken 10/6/2022 0900 by Paty Gillis RN  Safety Promotion/Fall Prevention:   clutter free environment maintained   fall prevention program maintained   nonskid shoes/slippers when out of bed   patient and family education   room door open   room near nurse's station   safety round/check completed   room organization consistent   treat underlying cause  Intervention: Prevent Skin Injury  Recent Flowsheet Documentation  Taken 10/6/2022 1249 by Paty Gillis RN  Body Position: position changed independently  Taken 10/6/2022 0900 by Paty Gillis RN  Body Position: position changed independently  Intervention: Prevent and Manage VTE (Venous Thromboembolism) Risk  Recent Flowsheet Documentation  Taken 10/6/2022 1249 by Paty Gillis RN  VTE Prevention/Management: (Pt has lympedema wraps in place) --  Activity Management:   activity adjusted per tolerance   activity encouraged   ambulated in wen   ambulated in room   up in chair  Taken  "10/6/2022 0900 by Paty Gillis RN  VTE Prevention/Management: (Pt has lympedema wraps in place) --  Activity Management:   activity adjusted per tolerance   activity encouraged   ambulated in wen   ambulated in room   up in chair     Problem: Dysrhythmia  Goal: Normalized Cardiac Rhythm  Intervention: Monitor and Manage Cardiac Rhythm Effect  Recent Flowsheet Documentation  Taken 10/6/2022 1249 by Paty Gillis RN  VTE Prevention/Management: (Pt has lympedema wraps in place) --  Taken 10/6/2022 0900 by Paty Gillis RN  VTE Prevention/Management: (Pt has lympedema wraps in place) --     Problem: Cardiac Output Decreased (Heart Failure)  Goal: Optimal Cardiac Output  Outcome: Ongoing, Progressing     Problem: Functional Ability Impaired (Heart Failure)  Goal: Optimal Functional Ability  Intervention: Optimize Functional Ability  Recent Flowsheet Documentation  Taken 10/6/2022 1249 by Paty Gillis RN  Activity Management:   activity adjusted per tolerance   activity encouraged   ambulated in wen   ambulated in room   up in chair  Taken 10/6/2022 0900 by Paty Gillis RN  Activity Management:   activity adjusted per tolerance   activity encouraged   ambulated in wen   ambulated in room   up in chair     Problem: Respiratory Compromise (Heart Failure)  Goal: Effective Oxygenation and Ventilation  Intervention: Promote Airway Secretion Clearance  Recent Flowsheet Documentation  Taken 10/6/2022 1249 by Paty Gillis RN  Cough And Deep Breathing: done independently per patient  Taken 10/6/2022 0900 by Paty Gillis RN  Cough And Deep Breathing: done independently per patient   Goal Outcome Evaluation:    Plan of Care Reviewed With: patient      Pt did not feel up to CHF education today, \"I've had so many people in & out and just feel a little out of it.\"  Packet left with pt. Writer grouped cares for pt to rest this afternoon.  Pt has tolerated increasing activity with daily cares. BP's & medications have been closely " monitored with MD and reviewed with pt. Assist 1 with gait belt & walker is being used. Pt uses call light appropriately & has her things close to her.  Will continue POC.

## 2022-10-06 NOTE — PROGRESS NOTES
Care Management Follow Up    Length of Stay (days): 12    Expected Discharge Date: 10/07/2022     Concerns to be Addressed: all concerns addressed in this encounter     Patient plan of care discussed at interdisciplinary rounds: Yes    Anticipated Discharge Disposition: Transitional Care; [facility]     Anticipated Discharge Services: None  Anticipated Discharge DME: None    Patient/family educated on Medicare website which has current facility and service quality ratings: yes  Education Provided on the Discharge Plan:  yes  Patient/Family in Agreement with the Plan: yes    Referrals Placed by CM/SW:  yes  Private pay costs discussed: private room/amenity fees, transportation costs and Not applicable    Additional Information:  SW spoke with [facility] admissions to confirm that pt has bed for tomorrow as pt was switched to oral dieretics. Admissions is reviewing medications and will confirm with SW after talking with Transitional Care Unit pharmacy. Family stated that they will transport; SW to confirm and set ride if needed (message left for family). PAS to be completed.  11:34 AM    PAS done: [id number]  12:19 PM    Bed secured at [facility] for 10/7. SW spoke with son who plans to transport at 1100. SW confirmed with admissions who is agreeable to discharge plan. SW following.  2:59 PM    COURTNEY Haque

## 2022-10-06 NOTE — PLAN OF CARE
Goal Outcome Evaluation:    Plan of Care Reviewed With: patient     Overall Patient Progress: improving         Vitals:    10/06/22 0023 10/06/22 0427 10/06/22 0517 10/06/22 0542   BP: 98/59      BP Location: Left arm      Patient Position: Semi-Khan's      Cuff Size: Adult Regular      Pulse: 78 79  81   Resp: 18      Temp: 98.4  F (36.9  C)      TempSrc: Oral      SpO2: 96% 93%  94%   Weight:   124.8 kg (275 lb 1.6 oz)    Height:       Had some abdominal pain. Prn tylenol. 1 liter of oxygen. Lungs diminished fine crackles. Lasix gtt discontinued. Po bumex.  Purewick. Karen Landeros RN

## 2022-10-06 NOTE — PROGRESS NOTES
[facility]    PROGRESS NOTE - Hospitalist Service    ASSESSMENT AND PLAN     Principal Problem:    Acute on chronic diastolic CHF  Active Problems:    Chronic atrial fibrillation (H)    Lymphedema    Acute pulmonary edema (H)    Morbid obesity (H)    Hypomagnesemia    Misty Quezada is a 67 year old female with moderate aortic stenosis, MIKEL, Afib who presents with LE edema and shortness of breath and found to have acute on chronic HFpEF.     Acute HFpEF in the setting of Moderate aortic stenosis- improving               Troponin elevation- likely type 2 MI due to demand ischemia              Mild pulm HTN and moderately enlarged RV              Lasix infusion- dicontinued 10/6- now on oral Bumex             Timing of TAVR evaluation per cards-patient to follow-up in valve clinic              Losartan, aldactone              Diamox initiated 10/5- minimal improvement noted in labs today    Down from 352 pounds to 275 pounds today.  -23 L of fluid               Acute hypoxic respiratory insufficiency secondary to above              Wean oxygen as able     MIKEL with nocturnal hypoxia - not formally diagnosed.              Overnight oximetry by RT 9/25-9/26: Had a total of 33 desaturations (less than 89% SPO2 for at least 10 seconds) with the lowest to 76%, spent approximately 8.6% of the night with desaturations below 90%.  Repeat pending to meet for insurance criteria 10/3              Needs O2 for home night and urgent sleep evaluation as outpatient for nocturnal NIPPV     Chronic AF -               Toprol-xl, eliquis.       Lymphedema - PT/OT     Anemia              Iron studies 9/27: Iron 24, TIBC 349, Sat 7, transferrin 313, ferritin 34              Started on ferrous sulfate every other day               Continue to monitor              Outpatient GI referral for c-scope if not recent.     Hypomagnesemia - replacement protocol      Morbid Obesity - Body mass index is 55     Constipation  -  PRN Miralax with prn Dolcolax supp     Malnutrition     Barriers to discharge: Hypoxia, cardiology recommendations for discharge      Anticipated length of stay: 1-2 days    Needs nocturnal O2 prescription at discharge and sleep study referral.    Present on Admission:    Lymphedema    Acute pulmonary edema (H)    (Resolved) Bilateral lower extremity edema    Chronic atrial fibrillation (H)    Morbid obesity (H)      Subjective:  Patient feeling much better.  Still requiring 1 L nasal cannula.  Encouraged ambulation and incentive spirometry.  Patient still complaining of some left eye discomfort-continue eyedrops.  No other complaints.    PHYSICAL EXAM  Temp:  [97.6  F (36.4  C)-98.4  F (36.9  C)] 97.6  F (36.4  C)  Pulse:  [78-95] 90  Resp:  [18] 18  BP: ()/(54-66) 88/54  SpO2:  [88 %-96 %] 93 %  Wt Readings from Last 1 Encounters:   10/06/22 124.8 kg (275 lb 1.6 oz)       Intake/Output Summary (Last 24 hours) at 10/6/2022 1333  Last data filed at 10/6/2022 1000  Gross per 24 hour   Intake 1011.75 ml   Output 1500 ml   Net -488.25 ml      Body mass index is 43.09 kg/m .    GENRL: Alert and answering questions appropriately. Not in acute distress.  Sitting up in a chair  HEENT: no lymphadenopathy or thyromegaly  CHEST: Diminished throughout.    HEART: Rate controlled with a regular rhythm.  Murmur noted.   ABDMN: Soft. Obese, Non-tender, non-distended. No organomegaly. No guarding or rigidity. Bowel sounds present   EXTRM: + bilateral pedal edema  NEURO: Cranial nerves II-XII grossly intact. No focal neurological deficit. No involuntary movements. Normal mentation  PSYCH: flat affect and mood.   INTGM: No skin rash, no cyanosis or clubbing    PERTINENT LABS/IMAGING:  Results for orders placed or performed during the hospital encounter of 09/24/22   XR Chest Port 1 View    Impression    IMPRESSION: Cardiomegaly, pulmonary vascular congestion, distention of the azygos vein, mild interstitial edema with small  left effusion are consistent with mild failure.    No signs of pneumonia.   Echocardiogram Complete   Result Value Ref Range    LVEF  55-60%      Most Recent 3 CBC's:Recent Labs   Lab Test 09/28/22  0442 09/27/22  0438 09/25/22  0438   WBC 4.8 5.0 4.8   HGB 9.5* 10.0* 9.9*   MCV 90 91 90    282 262       Recent Labs   Lab Test 09/09/16  1412   CHOL 163   HDL 42*      TRIG 81     Recent Labs   Lab Test 09/09/16  1412 10/10/14  1207    118     Recent Labs   Lab Test 10/06/22  1226 10/06/22  0440   NA  --  135*   POTASSIUM 3.6 3.3*  3.3*   CHLORIDE  --  90*   CO2  --  36*   GLC  --  88   BUN  --  28.1*   CR  --  0.85   GFRESTIMATED  --  74   TURNER  --  9.0     Recent Labs   Lab Test 09/02/21  0457   A1C 6.2*     Recent Labs   Lab Test 09/28/22  0442 09/27/22  0438 09/25/22  0438   HGB 9.5* 10.0* 9.9*     Recent Labs   Lab Test 08/21/21  1944 08/21/21  1426 08/21/21  0820   TROPONINI 1.32* 1.87* 1.75*     Recent Labs   Lab Test 09/24/22  0850 09/02/21  0457 08/21/21  0820   BNP  --  199* 281*   NTBNP 1,898*  --   --      Recent Labs   Lab Test 08/31/21  0720   TSH 4.92     Recent Labs   Lab Test 08/21/21  0820   INR 1.44*       Rashida Beltran DO  Hospitalist Service  [facility]  Text page via [facility] Paging/Directory

## 2022-10-07 VITALS
OXYGEN SATURATION: 92 % | HEIGHT: 67 IN | BODY MASS INDEX: 42.66 KG/M2 | TEMPERATURE: 97.8 F | HEART RATE: 84 BPM | DIASTOLIC BLOOD PRESSURE: 62 MMHG | RESPIRATION RATE: 18 BRPM | SYSTOLIC BLOOD PRESSURE: 92 MMHG | WEIGHT: 271.8 LBS

## 2022-10-07 LAB
ANION GAP SERPL CALCULATED.3IONS-SCNC: 9 MMOL/L (ref 7–15)
BUN SERPL-MCNC: 31.8 MG/DL (ref 8–23)
CALCIUM SERPL-MCNC: 9.1 MG/DL (ref 8.8–10.2)
CHLORIDE SERPL-SCNC: 94 MMOL/L (ref 98–107)
CREAT SERPL-MCNC: 0.9 MG/DL (ref 0.51–0.95)
DEPRECATED HCO3 PLAS-SCNC: 36 MMOL/L (ref 22–29)
GFR SERPL CREATININE-BSD FRML MDRD: 69 ML/MIN/1.73M2
GLUCOSE SERPL-MCNC: 100 MG/DL (ref 70–99)
MAGNESIUM SERPL-MCNC: 2.2 MG/DL (ref 1.7–2.3)
POTASSIUM SERPL-SCNC: 3.5 MMOL/L (ref 3.4–5.3)
SODIUM SERPL-SCNC: 139 MMOL/L (ref 136–145)

## 2022-10-07 PROCEDURE — 99239 HOSP IP/OBS DSCHRG MGMT >30: CPT | Mod: GC | Performed by: INTERNAL MEDICINE

## 2022-10-07 PROCEDURE — 250N000013 HC RX MED GY IP 250 OP 250 PS 637: Performed by: INTERNAL MEDICINE

## 2022-10-07 PROCEDURE — 36415 COLL VENOUS BLD VENIPUNCTURE: CPT | Performed by: INTERNAL MEDICINE

## 2022-10-07 PROCEDURE — 250N000013 HC RX MED GY IP 250 OP 250 PS 637: Performed by: FAMILY MEDICINE

## 2022-10-07 PROCEDURE — 99232 SBSQ HOSP IP/OBS MODERATE 35: CPT | Performed by: INTERNAL MEDICINE

## 2022-10-07 PROCEDURE — 80048 BASIC METABOLIC PNL TOTAL CA: CPT | Performed by: INTERNAL MEDICINE

## 2022-10-07 PROCEDURE — 83735 ASSAY OF MAGNESIUM: CPT | Performed by: INTERNAL MEDICINE

## 2022-10-07 RX ORDER — POTASSIUM CHLORIDE 1500 MG/1
20 TABLET, EXTENDED RELEASE ORAL ONCE
Status: COMPLETED | OUTPATIENT
Start: 2022-10-07 | End: 2022-10-07

## 2022-10-07 RX ORDER — BUMETANIDE 2 MG/1
2 TABLET ORAL
DISCHARGE
Start: 2022-10-07 | End: 2022-11-03

## 2022-10-07 RX ORDER — LOSARTAN POTASSIUM 25 MG/1
25 TABLET ORAL DAILY
Status: ON HOLD | DISCHARGE
Start: 2022-10-08 | End: 2023-05-04

## 2022-10-07 RX ORDER — SPIRONOLACTONE 25 MG/1
25 TABLET ORAL DAILY
DISCHARGE
Start: 2022-10-08 | End: 2022-11-03

## 2022-10-07 RX ORDER — TETRAHYDROZOLINE HCL 0.05 %
1 DROPS OPHTHALMIC (EYE) 3 TIMES DAILY
DISCHARGE
Start: 2022-10-07 | End: 2023-01-25

## 2022-10-07 RX ORDER — POLYETHYLENE GLYCOL 3350 17 G/17G
17 POWDER, FOR SOLUTION ORAL DAILY PRN
DISCHARGE
Start: 2022-10-07 | End: 2024-04-23

## 2022-10-07 RX ORDER — FERROUS SULFATE 325(65) MG
325 TABLET ORAL EVERY OTHER DAY
DISCHARGE
Start: 2022-10-09

## 2022-10-07 RX ADMIN — CITALOPRAM HYDROBROMIDE 20 MG: 20 TABLET ORAL at 08:17

## 2022-10-07 RX ADMIN — Medication 1 TABLET: at 08:18

## 2022-10-07 RX ADMIN — APIXABAN 5 MG: 5 TABLET, FILM COATED ORAL at 08:18

## 2022-10-07 RX ADMIN — MICONAZOLE NITRATE: 2 POWDER TOPICAL at 08:23

## 2022-10-07 RX ADMIN — Medication 1000 MCG: at 08:18

## 2022-10-07 RX ADMIN — ACETAZOLAMIDE 125 MG: 125 TABLET ORAL at 08:18

## 2022-10-07 RX ADMIN — BUMETANIDE 2 MG: 2 TABLET ORAL at 08:18

## 2022-10-07 RX ADMIN — SPIRONOLACTONE 25 MG: 25 TABLET, FILM COATED ORAL at 08:17

## 2022-10-07 RX ADMIN — FERROUS SULFATE TAB 325 MG (65 MG ELEMENTAL FE) 325 MG: 325 (65 FE) TAB at 08:18

## 2022-10-07 RX ADMIN — POTASSIUM CHLORIDE 20 MEQ: 1500 TABLET, EXTENDED RELEASE ORAL at 08:17

## 2022-10-07 RX ADMIN — Medication 1 TABLET: at 08:17

## 2022-10-07 RX ADMIN — TETRAHYDROZOLINE HCL 1 DROP: 0.05 LIQUID OPHTHALMIC at 08:20

## 2022-10-07 ASSESSMENT — ACTIVITIES OF DAILY LIVING (ADL)
ADLS_ACUITY_SCORE: 30

## 2022-10-07 NOTE — PLAN OF CARE
Physical Therapy Discharge Summary    Reason for therapy discharge:    Discharged to transitional care facility.    Progress towards therapy goal(s). See goals on Care Plan in [facility] electronic health record for goal details.  Goals not met.  Barriers to achieving goals:   Pt was working on the goals. .    Therapy recommendation(s):    Continued therapy is recommended.  Rationale/Recommendations:  to improve mobility and strength. .

## 2022-10-07 NOTE — PROGRESS NOTES
HEART CARE NOTE          Assessment/Recommendations     1. HFpEF, Valvular heart disease c/b ADHF  Assessment / Plan    Nearing euvolemia; tolerating to oral diuretic regimen; no changes at this time - continue to monitor    GDMT as detailed below; mainstay of treatment for HFpEF includes diuretics and adequate BP control (class I) and SGLT2-I (class 2a); additional medical therapy (ARNI, MRA, ARB) demonstrated less robust evidence for indication but may be considered per guideline recommendations (2b); no indication for BBlockers      Current Pharmacotherapy AHA Guideline-Directed Medical Therapy   Losartan 25 mg daily ARNI/ARB   Spironolactone 25 mg  MRA   SGLT2 inhibitor not started SGLT2-I    Bumex 2 mg BID Loop diuretic        2. Atrial fibrillation  Assessment / Plan    Rate controlled - continue metoprolol, and apixaban      3. Elevated troponin  Assessment / Plan    Mild elevated in the setting of afib and ADHF -- likely type 2 MI in the setting of demand ischemia; echo without WMAs and patient chest pain free and hemodynamically stable - no further cardiac testing indicated at this time     4. Moderate aortic stenosis  Assessment / Plan    LORRAINE 1.1, pk/mn 2.78/17.4 - will discuss with valve clinic regarding potential interventions    Cardiology team will sign-off for now. Please do not hesitate to consult us again if new questions or concerns arise. Follow-up appointment will be arranged by CORE/HF clinic.       History of Present Illness/Subjective      Ms. Misty Quezada is a 67 year old female with a PMHx significant for atrial fibrillation who presented in ADHF in the setting of valvular heart disease.     Today, Mrs. Quezada denies any acute cardiac events or complaints; Management plan as detailed above - no changes to plan of care today     ECG: Personally reviewed. nonspecific ST and T waves changes, atrial fibrillation, with RVR     ECHO (personnaly Reviewed):  Interpretation Summary     1. Left  "ventricular size is normal. Mild concentric increase in wall  thickness. Systolic function is normal. The estimated left ventricular  ejection fraction is 55-60%.  2. Right ventricle is not well visualized. Size is probably at least  moderately enlarged. Systolic function appears normal.  3. Severe right atrial enlargement.  4. Calcified trileaflet aortic valve with moderate aortic stenosis. Peak  forward velocity measures 2.8 m/s, mean gradient 17 mm Hg, calculated aortic  valve area 1.1 cm2, and dimensionless index 0.44. Trace aortic regurgitation.  5. Severe functional tricuspid regurgitation.  6. At least mild pulmonary hypertension is present with an estimated pulmonary  artery systolic pressure of 39 mm Hg plus the right atrial pressure.  7. Compared to the prior study dated 8/22/2021, there has been no significant  change.          Physical Examination Review of Systems   /76 (BP Location: Left arm)   Pulse 88   Temp 98.3  F (36.8  C) (Oral)   Resp 18   Ht 1.702 m (5' 7\")   Wt 123.3 kg (271 lb 12.8 oz)   SpO2 93%   BMI 42.57 kg/m    Body mass index is 42.57 kg/m .  Wt Readings from Last 3 Encounters:   10/07/22 123.3 kg (271 lb 12.8 oz)   09/21/21 130.2 kg (287 lb)   08/27/21 129.9 kg (286 lb 6.4 oz)     General Appearance:   no distress, normal body habitus   ENT/Mouth: membranes moist, no oral lesions or bleeding gums.      EYES:  no scleral icterus, normal conjunctivae   Neck: no carotid bruits or thyromegaly   Chest/Lungs:   lungs are clear to auscultation, no rales or wheezing, equal chest wall expansion    Cardiovascular:   Regular. Normal first and second heart sounds with no murmurs, rubs, or gallops; the carotid, radial and posterior tibial pulses are intact, no JVD and trace LE edema bilaterally    Abdomen:  no organomegaly, masses, bruits, or tenderness; bowel sounds are present   Extremities: no cyanosis or clubbing   Skin: no xanthelasma, warm.    Neurologic: alert and oriented x3, " calm     Psychiatric: alert and oriented x3, calm     A complete 10 systems ROS was reviewed  And is negative except what is listed in the HPI.          Medical History  Surgical History Family History Social History   Past Medical History:   Diagnosis Date     Benign essential hypertension      Chronic atrial fibrillation (H)     Past Surgical History:   Procedure Laterality Date     APPENDECTOMY       CHOLECYSTECTOMY       GASTRIC BYPASS      no family history of premature coronary artery disease Social History     Socioeconomic History     Marital status:      Spouse name: Not on file     Number of children: Not on file     Years of education: Not on file     Highest education level: Not on file   Occupational History     Not on file   Tobacco Use     Smoking status: Never Smoker     Smokeless tobacco: Never Used   Substance and Sexual Activity     Alcohol use: Not Currently     Comment: Rarely Uses alcohol     Drug use: Never     Sexual activity: Not on file   Other Topics Concern     Not on file   Social History Narrative     Not on file     Social Determinants of Health     Financial Resource Strain: Not on file   Food Insecurity: Not on file   Transportation Needs: Not on file   Physical Activity: Not on file   Stress: Not on file   Social Connections: Not on file   Intimate Partner Violence: Not on file   Housing Stability: Not on file           Lab Results    Chemistry/lipid CBC Cardiac Enzymes/BNP/TSH/INR   Lab Results   Component Value Date    CHOL 163 09/09/2016    HDL 42 (L) 09/09/2016    TRIG 81 09/09/2016    BUN 31.8 (H) 10/07/2022     10/07/2022    CO2 36 (H) 10/07/2022    Lab Results   Component Value Date    WBC 4.8 09/28/2022    HGB 9.5 (L) 09/28/2022    HCT 30.8 (L) 09/28/2022    MCV 90 09/28/2022     09/28/2022    Lab Results   Component Value Date    TROPONINI 1.32 (HH) 08/21/2021     (H) 09/02/2021    TSH 4.92 08/31/2021    INR 1.44 (H) 08/21/2021     Lab Results    Component Value Date    TROPONINI 1.32 () 08/21/2021          Weight:    Wt Readings from Last 3 Encounters:   10/07/22 123.3 kg (271 lb 12.8 oz)   09/21/21 130.2 kg (287 lb)   08/27/21 129.9 kg (286 lb 6.4 oz)       Allergies  No Known Allergies      Surgical History  Past Surgical History:   Procedure Laterality Date     APPENDECTOMY       CHOLECYSTECTOMY       GASTRIC BYPASS         Social History  Tobacco:   History   Smoking Status     Never Smoker   Smokeless Tobacco     Never Used    Alcohol:   Social History    Substance and Sexual Activity      Alcohol use: Not Currently        Comment: Rarely Uses alcohol   Illicit Drugs:   History   Drug Use Unknown       Family History  Family History   Problem Relation Age of Onset     No Known Problems Mother      No Known Problems Father           Tara Siegel MD on 10/7/2022      cc: Tiff Chavez

## 2022-10-07 NOTE — PLAN OF CARE
Heart Failure Care Map  GOALS TO BE MET BEFORE DISCHARGE:    1. Decrease congestion and/or edema with diuretic therapy to achieve near optimal volume status.     Dyspnea improved: Yes, satisfactory for discharge.   Edema improved: No, further care required to meet this goal. Please explain pt has lymphedema         Net I/O and Weights since admission:   09/07 0700 - 10/07 0659  In: 28221.37 [P.O.:12803; I.V.:1064.37]  Out: 15916 [Urine:71843]  Net: -81766.63     Vitals:    09/24/22 0631 09/24/22 1700 09/25/22 0432 09/26/22 0409   Weight: (!) 154.2 kg (340 lb) (!) 159.8 kg (352 lb 4.7 oz) (!) 159.8 kg (352 lb 4.7 oz) (!) 162.1 kg (357 lb 5.9 oz)    09/27/22 0500 09/28/22 0500 09/29/22 0327 09/30/22 1046   Weight: (!) 156.9 kg (345 lb 14.4 oz) (!) 153 kg (337 lb 4.9 oz) (!) 151.4 kg (333 lb 12.4 oz) 144.7 kg (319 lb)    10/01/22 0642 10/02/22 0651 10/03/22 0851 10/04/22 0440   Weight: 143.4 kg (316 lb 3.2 oz) 140.8 kg (310 lb 4.8 oz) 138.4 kg (305 lb 3.2 oz) 134.9 kg (297 lb 4.8 oz)    10/05/22 0356 10/06/22 0517 10/07/22 0327   Weight: 133.4 kg (294 lb 1.6 oz) 124.8 kg (275 lb 1.6 oz) 123.3 kg (271 lb 12.8 oz)       2.  O2 sats > 90% on room air, or at prior home O2 therapy level.      Able to wean O2 this shift to keep sats above 90%?: Yes, satisfactory for discharge.   Does patient use Home O2? No          Current oxygenation status:   SpO2: 93 %     O2 Device: Nasal cannula, Oxygen Delivery: 2 LPM    3.  Tolerates ambulation and mobility near baseline.     Ambulation: Yes, satisfactory for discharge.   Times patient ambulated with staff this shift: 0    Please review the Heart Failure Care Map for additional HF goal outcomes.    Syeda Cordero RN  10/7/2022

## 2022-10-07 NOTE — PLAN OF CARE
Lymphedema Discharge Summary    Reason for therapy discharge:    Discharged to transitional care facility.    Progress towards therapy goal(s). See goals on Care Plan in [facility] electronic health record for goal details.  Goals not met.  Barriers to achieving goals:   discharge from facility.    Therapy recommendation(s):    Continued therapy is recommended.  Rationale/Recommendations:  Continued lymphedema therapy at [facility].      Clarisse Gayle, PT, DPT  10/7/2022

## 2022-10-07 NOTE — PLAN OF CARE
Problem: Plan of Care - These are the overarching goals to be used throughout the patient stay.    Goal: Absence of Hospital-Acquired Illness or Injury  Outcome: Ongoing, Progressing  Intervention: Identify and Manage Fall Risk  Recent Flowsheet Documentation  Taken 10/6/2022 1645 by Sybil Cote RN  Safety Promotion/Fall Prevention:   clutter free environment maintained   fall prevention program maintained   nonskid shoes/slippers when out of bed   patient and family education   safety round/check completed   room organization consistent   assistive device/personal items within reach  Intervention: Prevent Skin Injury  Recent Flowsheet Documentation  Taken 10/6/2022 2314 by Sybil Cote RN  Body Position: supine  Intervention: Prevent and Manage VTE (Venous Thromboembolism) Risk  Recent Flowsheet Documentation  Taken 10/6/2022 2314 by Sybil Cote RN  Activity Management:   standing at bedside   bedrest  Taken 10/6/2022 1645 by Sybil Cote RN  VTE Prevention/Management: (Pt has lympedema wraps in place) --  Activity Management:   activity adjusted per tolerance   activity encouraged   up in chair  Goal: Optimal Comfort and Wellbeing  Outcome: Ongoing, Progressing  Intervention: Monitor Pain and Promote Comfort  Recent Flowsheet Documentation  Taken 10/6/2022 2314 by Sybil Cote RN  Pain Management Interventions: medication (see MAR)  Taken 10/6/2022 1700 by Sybil Cote RN  Pain Management Interventions: medication offered but refused  Goal: Readiness for Transition of Care  Outcome: Ongoing, Progressing     Problem: Adjustment to Illness (Heart Failure)  Goal: Optimal Coping  Outcome: Ongoing, Progressing     Problem: Functional Ability Impaired (Heart Failure)  Goal: Optimal Functional Ability  Outcome: Ongoing, Progressing  Intervention: Optimize Functional Ability  Recent Flowsheet Documentation  Taken 10/6/2022 2314 by Sybil Cote RN  Activity Management:   standing at bedside    bedrest  Taken 10/6/2022 1645 by Sybil Cote, RN  Activity Management:   activity adjusted per tolerance   activity encouraged   up in chair     Problem: Oral Intake Inadequate (Heart Failure)  Goal: Optimal Nutrition Intake  Outcome: Ongoing, Progressing     Problem: Respiratory Compromise (Heart Failure)  Goal: Effective Oxygenation and Ventilation  Outcome: Ongoing, Progressing  Intervention: Promote Airway Secretion Clearance  Recent Flowsheet Documentation  Taken 10/6/2022 1645 by Sybil Cote, RN  Cough And Deep Breathing: done independently per patient   Goal Outcome Evaluation:      Pt a/o x4, cooperative. Flat affect. Reported soreness in lower extremities--declined prn med until hs--then tylenol given as requested w/  melatonin.  Pt up in chair most of shift, assisted to bed at end of shift per pt request. Was on the phone several times tonight and watching tv. Ate well for supper. Said she understands she may discharge tomorrow. Reminded pt to use call light for needs. Continue to monitor pt.

## 2022-10-07 NOTE — PLAN OF CARE
Occupational Therapy Discharge Summary    Reason for therapy discharge:    Discharged to transitional care facility.    Progress towards therapy goal(s). See goals on Care Plan in [facility] electronic health record for goal details.  Goals partially met.  Barriers to achieving goals:   discharge from facility.    Therapy recommendation(s):    Continued therapy is recommended.  Rationale/Recommendations:  continue OT services at [facility] to promote ability to perform ADLs, IADLs, and functional mobility.

## 2022-10-07 NOTE — PLAN OF CARE
Problem: Plan of Care - These are the overarching goals to be used throughout the patient stay.    Goal: Readiness for Transition of Care  Outcome: Adequate for Care Transition   Denies chest pain nor shortness of  breath Plan to discharge to [facility] all questions/concerns addressed at this tie All belongings returned

## 2022-10-07 NOTE — PROGRESS NOTES
Care Management Discharge Note    Discharge Date: 10/07/2022       Discharge Disposition: Transitional Care; [facility] via son to transport at 1100    Discharge Services: None    Discharge DME: None    Discharge Transportation: family or friend will provide    Private pay costs discussed: Not applicable    PAS Confirmation Code:  ([id number])  Patient/family educated on Medicare website which has current facility and service quality ratings: yes    Education Provided on the Discharge Plan:    Persons Notified of Discharge Plans: yes  Patient/Family in Agreement with the Plan: yes    Handoff Referral Completed: Yes    Additional Information:  [facility] confirmed with hospitalist that pt is medically cleared to discharge. Pt is discharging to [facility] and son is transporting at 1100. [facility] spoke with admissions at [facility] and will fax orders once placed. Message left for son per request to confirm that pt is in fact discharging today, 10/7.     Pt informed of current plan; no questions or concerns. All parties aware and agreeable to discharge plan.    PAS DONE.    COURTNEY Haque

## 2022-10-08 ENCOUNTER — LAB REQUISITION (OUTPATIENT)
Dept: LAB | Facility: CLINIC | Age: 67
End: 2022-10-08
Payer: MEDICARE

## 2022-10-08 DIAGNOSIS — I50.33 ACUTE ON CHRONIC DIASTOLIC (CONGESTIVE) HEART FAILURE (H): ICD-10-CM

## 2022-10-10 ENCOUNTER — TRANSITIONAL CARE UNIT VISIT (OUTPATIENT)
Dept: GERIATRICS | Facility: CLINIC | Age: 67
End: 2022-10-10
Payer: MEDICARE

## 2022-10-10 ENCOUNTER — LAB REQUISITION (OUTPATIENT)
Dept: LAB | Facility: CLINIC | Age: 67
End: 2022-10-10
Payer: MEDICARE

## 2022-10-10 VITALS
HEIGHT: 67 IN | OXYGEN SATURATION: 95 % | BODY MASS INDEX: 42.53 KG/M2 | HEART RATE: 79 BPM | DIASTOLIC BLOOD PRESSURE: 60 MMHG | TEMPERATURE: 98 F | RESPIRATION RATE: 19 BRPM | WEIGHT: 271 LBS | SYSTOLIC BLOOD PRESSURE: 110 MMHG

## 2022-10-10 DIAGNOSIS — I89.0 LYMPHEDEMA: ICD-10-CM

## 2022-10-10 DIAGNOSIS — R53.81 PHYSICAL DECONDITIONING: ICD-10-CM

## 2022-10-10 DIAGNOSIS — I50.33 ACUTE ON CHRONIC DIASTOLIC CONGESTIVE HEART FAILURE (H): Primary | ICD-10-CM

## 2022-10-10 DIAGNOSIS — E66.01 MORBID OBESITY (H): ICD-10-CM

## 2022-10-10 DIAGNOSIS — I48.20 CHRONIC ATRIAL FIBRILLATION (H): ICD-10-CM

## 2022-10-10 PROBLEM — J81.0 ACUTE PULMONARY EDEMA (H): Status: RESOLVED | Noted: 2022-09-24 | Resolved: 2022-10-10

## 2022-10-10 LAB
ANION GAP SERPL CALCULATED.3IONS-SCNC: 14 MMOL/L (ref 7–15)
BUN SERPL-MCNC: 32.9 MG/DL (ref 8–23)
CALCIUM SERPL-MCNC: 9.3 MG/DL (ref 8.8–10.2)
CHLORIDE SERPL-SCNC: 99 MMOL/L (ref 98–107)
CREAT SERPL-MCNC: 0.83 MG/DL (ref 0.51–0.95)
DEPRECATED HCO3 PLAS-SCNC: 29 MMOL/L (ref 22–29)
GFR SERPL CREATININE-BSD FRML MDRD: 76 ML/MIN/1.73M2
GLUCOSE SERPL-MCNC: 135 MG/DL (ref 70–99)
POTASSIUM SERPL-SCNC: 3.3 MMOL/L (ref 3.4–5.3)
SODIUM SERPL-SCNC: 142 MMOL/L (ref 136–145)

## 2022-10-10 PROCEDURE — 99309 SBSQ NF CARE MODERATE MDM 30: CPT | Performed by: NURSE PRACTITIONER

## 2022-10-10 PROCEDURE — 36415 COLL VENOUS BLD VENIPUNCTURE: CPT | Performed by: FAMILY MEDICINE

## 2022-10-10 PROCEDURE — P9604 ONE-WAY ALLOW PRORATED TRIP: HCPCS | Performed by: FAMILY MEDICINE

## 2022-10-10 PROCEDURE — 82310 ASSAY OF CALCIUM: CPT | Performed by: FAMILY MEDICINE

## 2022-10-10 NOTE — LETTER
10/10/2022        RE: Misty Quezada  [address] [address]        [facility] GERIATRICS    PRIMARY CARE PROVIDER AND CLINIC:  Tiff Chavez NP, [address]  / [facility] [address]  Chief Complaint   Patient presents with     Hospital F/U      [facility] Medical Record Number:  [id number]  Place of Service where encounter took place:  [facility] [[id number]]    Misty Quezada  is a 67 year old  (1955), admitted to the above facility from  [facility]. Hospital stay 9/24/22 through 10/7/22.. She was admitted for acute on chronic CHF. Provider notes that this is a [facility] patient. That care team will assume her care after today.     HPI:    Patient says she feels fine. No SOB, cough. Her legs are actually looking smaller than usual. She was not using any compression at home. OT will follow her here for lymphedema therapy. She is not currently wearing oxygen, says she hasn't for days.     CODE STATUS/ADVANCE DIRECTIVES DISCUSSION:  Full Code    ALLERGIES: No Known Allergies   PAST MEDICAL HISTORY:   Past Medical History:   Diagnosis Date     Benign essential hypertension      Chronic atrial fibrillation (H)       PAST SURGICAL HISTORY:   has a past surgical history that includes gastric bypass; appendectomy; and Cholecystectomy.  FAMILY HISTORY: family history includes No Known Problems in her father and mother.  SOCIAL HISTORY:   reports that she has never smoked. She has never used smokeless tobacco. She reports that she does not currently use alcohol. She reports that she does not use drugs.  Patient's living condition: lives alone    Post Discharge Medication Reconciliation Status:   Post Medication Reconciliation Status: Discharge medications reconciled, continue medications without change      Current Outpatient Medications   Medication Sig     acetaminophen (TYLENOL) 325 MG tablet Take 975 mg by mouth every 4 hours as  needed for mild pain     apixaban (ELIQUIS) 5 mg Tab tablet [APIXABAN (ELIQUIS) 5 MG TAB TABLET] Take 5 mg by mouth 2 (two) times a day.     bumetanide (BUMEX) 2 MG tablet Take 1 tablet (2 mg) by mouth 2 times daily     CALCIUM/D3/MAG OX//TYRONE/ZN (CALTRATE + D3 PLUS MINERALS ORAL) [CALCIUM/D3/MAG OX//TYRONE/ZN (CALTRATE + D3 PLUS MINERALS ORAL)] Take 1 tablet by mouth daily.     cholecalciferol, vitamin D3, 2,000 unit Tab [CHOLECALCIFEROL, VITAMIN D3, 2,000 UNIT TAB] Take 2,000 Units by mouth daily.     citalopram (CELEXA) 10 MG tablet Take 20 mg by mouth daily     cyanocobalamin 1000 MCG tablet [CYANOCOBALAMIN 1000 MCG TABLET] Take 1,000 mcg by mouth daily.     ferrous sulfate (FEROSUL) 325 (65 Fe) MG tablet Take 1 tablet (325 mg) by mouth every other day     ketotifen (ZADITOR) 0.025 % ophthalmic solution Place 1 drop Into the left eye every 12 hours as needed for itching     losartan (COZAAR) 25 MG tablet Take 1 tablet (25 mg) by mouth daily     melatonin 1 MG TABS tablet Take 1 tablet (1 mg) by mouth nightly as needed for sleep     metoprolol succinate (TOPROL-XL) 25 MG [METOPROLOL SUCCINATE (TOPROL-XL) 25 MG] TAKE 1 TABLET BY MOUTH DAILY.     miconazole (MICATIN) 2 % external powder Apply topically 2 times daily     MULTIVIT WITH CALCIUM,IRON,MIN (WOMEN'S DAILY MULTIVITAMIN ORAL) [MULTIVIT WITH CALCIUM,IRON,MIN (WOMEN'S DAILY MULTIVITAMIN ORAL)] Take 1 tablet by mouth daily.     polyethylene glycol (MIRALAX) 17 g packet Take 17 g by mouth daily as needed for constipation     spironolactone (ALDACTONE) 25 MG tablet Take 1 tablet (25 mg) by mouth daily     tetrahydrozoline (VISINE) 0.05 % ophthalmic solution Place 1 drop Into the left eye 3 times daily     vitamin C 1000 MG TABS tablet Take 1 tablet (1,000 mg) by mouth daily     No current facility-administered medications for this visit.       ROS:  10 point ROS of systems including Constitutional, Eyes, Respiratory, Cardiovascular, Gastroenterology,  "Genitourinary, Integumentary, Musculoskeletal, Psychiatric were all negative except for pertinent positives noted in my HPI.    Vitals:  /60   Pulse 79   Temp 98  F (36.7  C)   Resp 19   Ht 1.702 m (5' 7\")   Wt 122.9 kg (271 lb)   SpO2 95%   BMI 42.44 kg/m    Exam:  GENERAL APPEARANCE:  Alert, in no distress  ENT:  Mouth and posterior oropharynx normal, moist mucous membranes, normal hearing acuity  EYES:  EOM normal, conjunctiva and lids normal  RESP:  respiratory effort and palpation of chest normal, lungs clear to auscultation , no respiratory distress  CV:  noteable BLE edema, compression wraps on, difficult to quantify  SKIN:  Inspection of skin and subcutaneous tissue baseline  PSYCH:  oriented X 3, affect and mood normal    Lab/Diagnostic data:  Recent labs in [facility] reviewed by me today.     ASSESSMENT/PLAN:  (I50.33) Acute on chronic diastolic CHF  (primary encounter diagnosis)  Comment: Chronic: CHF due to effects of HTN/Heart Disease. Currently: compensated.  Plan: Continue current medications: bumex and aldactone. [facility] 10/12/22. Monitor weights q day. Call provider if greater than 3 pound gain from previous weight. Monitor for shortness of breath, wheezing, increasing lower extremity edema and change in activity tolerance.     (I89.0) Lymphedema  Comment: Chronic. Would expect improvement with therapy. Hopefully they will find a compression garment that she can manage at home  Plan: Continue current POC with no changes at this time and adjustments as needed.    (I48.20) Chronic atrial fibrillation (H)  Comment: HR controlled. On DOAC for anticoagulation with no s/sx side effects  Plan: Continue current POC with no changes at this time and adjustments as needed.    (R53.81) Physical deconditioning  Comment: Due to acute illness  Plan: PT/OT eval and treat, discharge planning per their recommendations.    (E66.01) Morbid obesity (H)  Comment: BMI>40 is consistent with morbid obesity. This is " clinically significant due to increased nursing cares, use of resources and specialty equipment.       Electronically signed by:  ERICK Morelos CNP                       Sincerely,        ERICK Morelos CNP

## 2022-10-10 NOTE — PROGRESS NOTES
[facility] GERIATRICS    PRIMARY CARE PROVIDER AND CLINIC:  Tiff Chavez, ASHLIE, [address] / [facility] [address]  Chief Complaint   Patient presents with     Hospital F/U      [place] [facility] Record Number:  [id number]  Place of Service where encounter took place:  [facility] ([facility]) [[id number]]    Misty Quezada  is a 67 year old  (1955), admitted to the above facility from  [facility]. Hospital stay 9/24/22 through 10/7/22.. She was admitted for acute on chronic CHF. Provider notes that this is a [facility] patient. That care team will assume her care after today.     HPI:    Patient says she feels fine. No SOB, cough. Her legs are actually looking smaller than usual. She was not using any compression at home. OT will follow her here for lymphedema therapy. She is not currently wearing oxygen, says she hasn't for days.     CODE STATUS/ADVANCE DIRECTIVES DISCUSSION:  Full Code    ALLERGIES: No Known Allergies   PAST MEDICAL HISTORY:   Past Medical History:   Diagnosis Date     Benign essential hypertension      Chronic atrial fibrillation (H)       PAST SURGICAL HISTORY:   has a past surgical history that includes gastric bypass; appendectomy; and Cholecystectomy.  FAMILY HISTORY: family history includes No Known Problems in her father and mother.  SOCIAL HISTORY:   reports that she has never smoked. She has never used smokeless tobacco. She reports that she does not currently use alcohol. She reports that she does not use drugs.  Patient's living condition: lives alone    Post Discharge Medication Reconciliation Status:   Post Medication Reconciliation Status: Discharge medications reconciled, continue medications without change      Current Outpatient Medications   Medication Sig     acetaminophen (TYLENOL) 325 MG tablet Take 975 mg by mouth every 4 hours as needed for mild pain     apixaban (ELIQUIS) 5 mg Tab tablet [APIXABAN (ELIQUIS) 5 MG TAB  TABLET] Take 5 mg by mouth 2 (two) times a day.     bumetanide (BUMEX) 2 MG tablet Take 1 tablet (2 mg) by mouth 2 times daily     CALCIUM/D3/MAG OX//TYRONE/ZN (CALTRATE + D3 PLUS MINERALS ORAL) [CALCIUM/D3/MAG OX//TYRONE/ZN (CALTRATE + D3 PLUS MINERALS ORAL)] Take 1 tablet by mouth daily.     cholecalciferol, vitamin D3, 2,000 unit Tab [CHOLECALCIFEROL, VITAMIN D3, 2,000 UNIT TAB] Take 2,000 Units by mouth daily.     citalopram (CELEXA) 10 MG tablet Take 20 mg by mouth daily     cyanocobalamin 1000 MCG tablet [CYANOCOBALAMIN 1000 MCG TABLET] Take 1,000 mcg by mouth daily.     ferrous sulfate (FEROSUL) 325 (65 Fe) MG tablet Take 1 tablet (325 mg) by mouth every other day     ketotifen (ZADITOR) 0.025 % ophthalmic solution Place 1 drop Into the left eye every 12 hours as needed for itching     losartan (COZAAR) 25 MG tablet Take 1 tablet (25 mg) by mouth daily     melatonin 1 MG TABS tablet Take 1 tablet (1 mg) by mouth nightly as needed for sleep     metoprolol succinate (TOPROL-XL) 25 MG [METOPROLOL SUCCINATE (TOPROL-XL) 25 MG] TAKE 1 TABLET BY MOUTH DAILY.     miconazole (MICATIN) 2 % external powder Apply topically 2 times daily     MULTIVIT WITH CALCIUM,IRON,MIN (WOMEN'S DAILY MULTIVITAMIN ORAL) [MULTIVIT WITH CALCIUM,IRON,MIN (WOMEN'S DAILY MULTIVITAMIN ORAL)] Take 1 tablet by mouth daily.     polyethylene glycol (MIRALAX) 17 g packet Take 17 g by mouth daily as needed for constipation     spironolactone (ALDACTONE) 25 MG tablet Take 1 tablet (25 mg) by mouth daily     tetrahydrozoline (VISINE) 0.05 % ophthalmic solution Place 1 drop Into the left eye 3 times daily     vitamin C 1000 MG TABS tablet Take 1 tablet (1,000 mg) by mouth daily     No current facility-administered medications for this visit.       ROS:  10 point ROS of systems including Constitutional, Eyes, Respiratory, Cardiovascular, Gastroenterology, Genitourinary, Integumentary, Musculoskeletal, Psychiatric were all negative except for  "pertinent positives noted in my HPI.    Vitals:  /60   Pulse 79   Temp 98  F (36.7  C)   Resp 19   Ht 1.702 m (5' 7\")   Wt 122.9 kg (271 lb)   SpO2 95%   BMI 42.44 kg/m    Exam:  GENERAL APPEARANCE:  Alert, in no distress  ENT:  Mouth and posterior oropharynx normal, moist mucous membranes, normal hearing acuity  EYES:  EOM normal, conjunctiva and lids normal  RESP:  respiratory effort and palpation of chest normal, lungs clear to auscultation , no respiratory distress  CV:  noteable BLE edema, compression wraps on, difficult to quantify  SKIN:  Inspection of skin and subcutaneous tissue baseline  PSYCH:  oriented X 3, affect and mood normal    Lab/Diagnostic data:  Recent labs in [facility] reviewed by me today.     ASSESSMENT/PLAN:  (I50.33) Acute on chronic diastolic CHF  (primary encounter diagnosis)  Comment: Chronic: CHF due to effects of HTN/Heart Disease. Currently: compensated.  Plan: Continue current medications: bumex and aldactone. BMP 10/12/22. Monitor weights q day. Call provider if greater than 3 pound gain from previous weight. Monitor for shortness of breath, wheezing, increasing lower extremity edema and change in activity tolerance.     (I89.0) Lymphedema  Comment: Chronic. Would expect improvement with therapy. Hopefully they will find a compression garment that she can manage at home  Plan: Continue current POC with no changes at this time and adjustments as needed.    (I48.20) Chronic atrial fibrillation (H)  Comment: HR controlled. On DOAC for anticoagulation with no s/sx side effects  Plan: Continue current POC with no changes at this time and adjustments as needed.    (R53.81) Physical deconditioning  Comment: Due to acute illness  Plan: PT/OT eval and treat, discharge planning per their recommendations.    (E66.01) Morbid obesity (H)  Comment: BMI>40 is consistent with morbid obesity. This is clinically significant due to increased nursing cares, use of resources and specialty " equipment.       Electronically signed by:  ERICK Morelos CNP

## 2022-10-12 LAB
ANION GAP SERPL CALCULATED.3IONS-SCNC: 15 MMOL/L (ref 7–15)
BUN SERPL-MCNC: 37.4 MG/DL (ref 8–23)
CALCIUM SERPL-MCNC: 9.5 MG/DL (ref 8.8–10.2)
CHLORIDE SERPL-SCNC: 95 MMOL/L (ref 98–107)
CREAT SERPL-MCNC: 0.85 MG/DL (ref 0.51–0.95)
DEPRECATED HCO3 PLAS-SCNC: 28 MMOL/L (ref 22–29)
GFR SERPL CREATININE-BSD FRML MDRD: 74 ML/MIN/1.73M2
GLUCOSE SERPL-MCNC: 114 MG/DL (ref 70–99)
POTASSIUM SERPL-SCNC: 3.4 MMOL/L (ref 3.4–5.3)
SODIUM SERPL-SCNC: 138 MMOL/L (ref 136–145)

## 2022-10-12 PROCEDURE — 36415 COLL VENOUS BLD VENIPUNCTURE: CPT | Performed by: NURSE PRACTITIONER

## 2022-10-12 PROCEDURE — P9604 ONE-WAY ALLOW PRORATED TRIP: HCPCS | Performed by: NURSE PRACTITIONER

## 2022-10-12 PROCEDURE — 80048 BASIC METABOLIC PNL TOTAL CA: CPT | Performed by: NURSE PRACTITIONER

## 2022-10-17 ENCOUNTER — TELEPHONE (OUTPATIENT)
Dept: CARDIOLOGY | Facility: CLINIC | Age: 67
End: 2022-10-17

## 2022-10-17 ENCOUNTER — OFFICE VISIT (OUTPATIENT)
Dept: CARDIOLOGY | Facility: CLINIC | Age: 67
End: 2022-10-17
Attending: INTERNAL MEDICINE
Payer: MEDICARE

## 2022-10-17 VITALS
BODY MASS INDEX: 40.25 KG/M2 | WEIGHT: 257 LBS | DIASTOLIC BLOOD PRESSURE: 62 MMHG | HEART RATE: 84 BPM | SYSTOLIC BLOOD PRESSURE: 100 MMHG | OXYGEN SATURATION: 92 % | RESPIRATION RATE: 20 BRPM

## 2022-10-17 DIAGNOSIS — I50.32 CHRONIC HEART FAILURE WITH PRESERVED EJECTION FRACTION (H): Primary | ICD-10-CM

## 2022-10-17 DIAGNOSIS — I89.0 LYMPHEDEMA: ICD-10-CM

## 2022-10-17 PROBLEM — L03.116 LEFT LEG CELLULITIS: Status: RESOLVED | Noted: 2021-08-21 | Resolved: 2022-10-17

## 2022-10-17 PROBLEM — I50.30 (HFPEF) HEART FAILURE WITH PRESERVED EJECTION FRACTION (H): Status: ACTIVE | Noted: 2022-10-17

## 2022-10-17 PROCEDURE — 99214 OFFICE O/P EST MOD 30 MIN: CPT | Performed by: NURSE PRACTITIONER

## 2022-10-17 RX ORDER — POTASSIUM CHLORIDE 750 MG/1
10 TABLET, EXTENDED RELEASE ORAL DAILY
COMMUNITY
End: 2023-01-25

## 2022-10-17 NOTE — TELEPHONE ENCOUNTER
"Pt was called to update on recs to see Valve clinic. Pt is not wanting to see them at this time. Says.. \"in a way I just want to get over what I'm going through now.\" Encouraged to give nurse line a call back if she changes her mind.     FRANCESCA WEBB RN  BSN    "

## 2022-10-17 NOTE — PATIENT INSTRUCTIONS
Misty Quezada,    It was a pleasure to see you today at the [facility].     My recommendations after this visit include:  - No changes to your medications.    - Limit salt in your diet.   - Weigh yourself daily.   - If you have any questions or concerns, please call [phone] to talk with my nurse.    Cony Perkins, CNP

## 2022-10-17 NOTE — TELEPHONE ENCOUNTER
----- Message from Cony Vanegas NP sent at 10/17/2022  2:30 PM CDT -----  I talked with patient about possible valve clinic but wanted to talk with Dr. Siegel first since she made note of it in her hospital note.  Please let patient know plan for valve clinic and place referral.    Thanks   ----- Message -----  From: Tara Siegel MD  Sent: 10/17/2022   2:23 PM CDT  To: Cony Vnaegas NP    Thanks Cony. Yes, I would like to have her seen in valve clinic.    ~ Catie    ----- Message -----  From: Cony Vanegas NP  Sent: 10/17/2022   1:22 PM CDT  To: Tara Siegel MD    I saw Misty for heart failure hospital follow-up today.  She was hospitalized September 24 to October 7 with acute heart failure with preserved ejection fraction.  She is currently at TCU.  She has no symptoms of acute heart failure and weights are not decreasing.    Your last hospital note indicated that you were going to talk to valve clinic for her moderate aortic stenosis.  What is the recommendation?  Do you want her seen in valve clinic?    ThanksCony

## 2022-10-17 NOTE — LETTER
10/17/2022    Tiff Chavez, NP  [address]  [facility] [address]    RE: Misty Quezada       Dear Colleague,     I had the pleasure of seeing Misty Quezada in the [facility].  HEART CARE PROGRESS NOTE      [facility]  [phone]      Assessment/Recommendations   Assessment:    1.  Heart failure with preserved ejection fraction, ejection fraction 55-60%: She has no symptoms of acute fluid retention.  Lung sounds are clear and she has minimal shortness of breath.  She is no longer on oxygen.  Her weight has decreased since hospital discharge.  She continues to have swelling related to lymphedema.  She states swelling has improved.  She is wearing lymphedema wraps.    2.  Lymphedema: She has lymphedema wraps which are changed every other day.    3.  Permanent atrial fibrillation: Heart rate controlled.  She continues to take Eliquis.    4.  Valve disease: Echocardiogram showed moderate aortic stenosis and severe tricuspid regurgitation.  Dr. Siegel planned to discuss with valve clinic and I will follow-up with her.    Plan:  1.  Continue current medications  2.  Labs from last week reviewed  3.  Low-sodium diet and daily weights    Misty is due to see Dr. Rocha and will be scheduled in November or December.  She was encouraged to see her primary care provider after leaving TCU.       History of Present Illness/Subjective    Ms. Misty Quezada is a 67 year old female seen at [facility] today for hospital follow-up.  She has a history of atrial fibrillation, lymphedema, aortic stenosis, and obstructive sleep apnea.  She was hospitalized September 24 to October 7, 2022 with acute heart failure.  Troponin was elevated but felt to be related to demand ischemia.  Echocardiogram on September 24, 2022 showed ejection fraction of 55 to 60%, moderate aortic stenosis, and severe tricuspid regurgitation.  There was discussion of referral to  valve clinic.  She was discharged to [facility].     She denies any symptoms of acute heart failure.  She has occasional dyspnea on exertion with walking long distances.  She denies orthopnea.  She denies lightheadedness and chest pain.  She has lymphedema wraps which are changed every other day.  She states that her swelling has decreased significantly.    Her initial weight at TCU was 279 pounds.  Her recorded weight from 3 days ago was 264 pounds and she thinks her weight this morning was 257 pounds.  She is on a low-sodium diet.      ECHO:   Echo result w/o MOPS: Interpretation Summary 1. Left ventricular size is normal. Mild concentric increase in wallthickness. Systolic function is normal. The estimated left ventricularejection fraction is 55-60%.2. Right ventricle is not well visualized. Size is probably at leastmoderately enlarged. Systolic function appears normal.3. Severe right atrial enlargement.4. Calcified trileaflet aortic valve with moderate aortic stenosis. Peakforward velocity measures 2.8 m/s, mean gradient 17 mm Hg, calculated aorticvalve area 1.1 cm2, and dimensionless index 0.44. Trace aortic regurgitation.5. Severe functional tricuspid regurgitation.6. At least mild pulmonary hypertension is present with an estimated pulmonaryartery systolic pressure of 39 mm Hg plus the right atrial pressure.7. Compared to the prior study dated 8/22/2021, there has been no significantchange.          Physical Examination Review of Systems   Vitals: /62 (BP Location: Right arm, Patient Position: Sitting, Cuff Size: Adult Large)   Pulse 84   Resp 20   Wt 116.6 kg (257 lb)   SpO2 92%   BMI 40.25 kg/m    BMI= Body mass index is 40.25 kg/m .  Wt Readings from Last 3 Encounters:   10/17/22 116.6 kg (257 lb)   10/10/22 122.9 kg (271 lb)   10/07/22 123.3 kg (271 lb 12.8 oz)       General Appearance:     Alert, cooperative and in no acute distress.   ENT/Mouth: membranes moist, no oral lesions or bleeding  gums.      EYES:  no scleral icterus, normal conjunctivae   Chest/Lungs:   lungs are clear to auscultation, no rales or wheezing, respirations unlabored   Cardiovascular:   Irregular. Normal first and second heart sounds, lymphedema wraps   Abdomen:   Obese, soft, nontender, nondistended, bowel sounds present   Extremities: no cyanosis or clubbing   Skin: warm, dry.    Neurologic: mood and affect are appropriate, alert and oriented x3         Please refer above for cardiac ROS details.      Medical History  Surgical History Family History Social History   Past Medical History:   Diagnosis Date     Benign essential hypertension      Chronic atrial fibrillation (H)      Past Surgical History:   Procedure Laterality Date     APPENDECTOMY       CHOLECYSTECTOMY       GASTRIC BYPASS       Family History   Problem Relation Age of Onset     No Known Problems Mother      No Known Problems Father     Social History     Socioeconomic History     Marital status:      Spouse name: Not on file     Number of children: Not on file     Years of education: Not on file     Highest education level: Not on file   Occupational History     Not on file   Tobacco Use     Smoking status: Never     Smokeless tobacco: Never   Substance and Sexual Activity     Alcohol use: Not Currently     Comment: Rarely Uses alcohol     Drug use: Never     Sexual activity: Not on file   Other Topics Concern     Not on file   Social History Narrative     Not on file     Social Determinants of Health     Financial Resource Strain: Not on file   Food Insecurity: Not on file   Transportation Needs: Not on file   Physical Activity: Not on file   Stress: Not on file   Social Connections: Not on file   Intimate Partner Violence: Not on file   Housing Stability: Not on file          Medications  Allergies   Current Outpatient Medications   Medication Sig Dispense Refill     acetaminophen (TYLENOL) 325 MG tablet Take 975 mg by mouth every 4 hours as needed for  mild pain       apixaban (ELIQUIS) 5 mg Tab tablet [APIXABAN (ELIQUIS) 5 MG TAB TABLET] Take 5 mg by mouth 2 (two) times a day.       bumetanide (BUMEX) 2 MG tablet Take 1 tablet (2 mg) by mouth 2 times daily       CALCIUM/D3/MAG OX//TYRONE/ZN (CALTRATE + D3 PLUS MINERALS ORAL) [CALCIUM/D3/MAG OX//TYRONE/ZN (CALTRATE + D3 PLUS MINERALS ORAL)] Take 1 tablet by mouth daily.       cholecalciferol, vitamin D3, 2,000 unit Tab [CHOLECALCIFEROL, VITAMIN D3, 2,000 UNIT TAB] Take 2,000 Units by mouth daily.       citalopram (CELEXA) 10 MG tablet Take 20 mg by mouth daily       cyanocobalamin 1000 MCG tablet [CYANOCOBALAMIN 1000 MCG TABLET] Take 1,000 mcg by mouth daily.       ferrous sulfate (FEROSUL) 325 (65 Fe) MG tablet Take 1 tablet (325 mg) by mouth every other day       ketotifen (ZADITOR) 0.025 % ophthalmic solution Place 1 drop Into the left eye every 12 hours as needed for itching       losartan (COZAAR) 25 MG tablet Take 1 tablet (25 mg) by mouth daily       melatonin 1 MG TABS tablet Take 1 tablet (1 mg) by mouth nightly as needed for sleep       metoprolol succinate (TOPROL-XL) 25 MG [METOPROLOL SUCCINATE (TOPROL-XL) 25 MG] TAKE 1 TABLET BY MOUTH DAILY. 90 tablet 2     miconazole (MICATIN) 2 % external powder Apply topically 2 times daily       MULTIVIT WITH CALCIUM,IRON,MIN (WOMEN'S DAILY MULTIVITAMIN ORAL) [MULTIVIT WITH CALCIUM,IRON,MIN (WOMEN'S DAILY MULTIVITAMIN ORAL)] Take 1 tablet by mouth daily.       polyethylene glycol (MIRALAX) 17 g packet Take 17 g by mouth daily as needed for constipation       potassium chloride ER (KLOR-CON M) 10 MEQ CR tablet Take 10 mEq by mouth daily       spironolactone (ALDACTONE) 25 MG tablet Take 1 tablet (25 mg) by mouth daily       tetrahydrozoline (VISINE) 0.05 % ophthalmic solution Place 1 drop Into the left eye 3 times daily       vitamin C 1000 MG TABS tablet Take 1 tablet (1,000 mg) by mouth daily 30 tablet 0    No Known Allergies      Lab Results    Chemistry/lipid  CBC Cardiac Enzymes/BNP/TSH/INR   Recent Labs   Lab Test 09/09/16  1412   CHOL 163   HDL 42*      TRIG 81     Recent Labs   Lab Test 09/09/16  1412 10/10/14  1207    118     Recent Labs   Lab Test 10/12/22  1037      POTASSIUM 3.4   CHLORIDE 95*   CO2 28   *   BUN 37.4*   CR 0.85   GFRESTIMATED 74   TURNER 9.5     Recent Labs   Lab Test 10/12/22  1037 10/10/22  1025 10/07/22  0421   CR 0.85 0.83 0.90     Recent Labs   Lab Test 09/02/21  0457   A1C 6.2*    Recent Labs   Lab Test 09/28/22  0442   WBC 4.8   HGB 9.5*   HCT 30.8*   MCV 90        Recent Labs   Lab Test 09/28/22  0442 09/27/22  0438 09/25/22  0438   HGB 9.5* 10.0* 9.9*    Recent Labs   Lab Test 08/21/21  1944 08/21/21  1426 08/21/21  0820   TROPONINI 1.32* 1.87* 1.75*     Recent Labs   Lab Test 09/24/22  0850 09/02/21  0457 08/21/21  0820   BNP  --  199* 281*   NTBNP 1,898*  --   --      Recent Labs   Lab Test 08/31/21  0720   TSH 4.92     Recent Labs   Lab Test 08/21/21  0820   INR 1.44*                  Thank you for allowing me to participate in the care of your patient.      Sincerely,     Cony Vanegas NP     [facility]  cc:   Tara Siegel MD  [address] [facility] [place] [address]

## 2022-10-21 ENCOUNTER — LAB REQUISITION (OUTPATIENT)
Dept: LAB | Facility: CLINIC | Age: 67
End: 2022-10-21
Payer: MEDICARE

## 2022-10-21 DIAGNOSIS — I50.32 CHRONIC DIASTOLIC (CONGESTIVE) HEART FAILURE (H): ICD-10-CM

## 2022-10-24 LAB
ANION GAP SERPL CALCULATED.3IONS-SCNC: 13 MMOL/L (ref 7–15)
BUN SERPL-MCNC: 37.4 MG/DL (ref 8–23)
CALCIUM SERPL-MCNC: 10.1 MG/DL (ref 8.8–10.2)
CHLORIDE SERPL-SCNC: 95 MMOL/L (ref 98–107)
CREAT SERPL-MCNC: 0.89 MG/DL (ref 0.51–0.95)
DEPRECATED HCO3 PLAS-SCNC: 27 MMOL/L (ref 22–29)
ERYTHROCYTE [DISTWIDTH] IN BLOOD BY AUTOMATED COUNT: 18.6 % (ref 10–15)
GFR SERPL CREATININE-BSD FRML MDRD: 70 ML/MIN/1.73M2
GLUCOSE SERPL-MCNC: 88 MG/DL (ref 70–99)
HCT VFR BLD AUTO: 39 % (ref 35–47)
HGB BLD-MCNC: 11.8 G/DL (ref 11.7–15.7)
MCH RBC QN AUTO: 28.4 PG (ref 26.5–33)
MCHC RBC AUTO-ENTMCNC: 30.3 G/DL (ref 31.5–36.5)
MCV RBC AUTO: 94 FL (ref 78–100)
PLATELET # BLD AUTO: 269 10E3/UL (ref 150–450)
POTASSIUM SERPL-SCNC: 3.9 MMOL/L (ref 3.4–5.3)
RBC # BLD AUTO: 4.15 10E6/UL (ref 3.8–5.2)
SODIUM SERPL-SCNC: 135 MMOL/L (ref 136–145)
WBC # BLD AUTO: 7 10E3/UL (ref 4–11)

## 2022-10-24 PROCEDURE — 80048 BASIC METABOLIC PNL TOTAL CA: CPT | Performed by: INTERNAL MEDICINE

## 2022-10-24 PROCEDURE — 36415 COLL VENOUS BLD VENIPUNCTURE: CPT | Performed by: INTERNAL MEDICINE

## 2022-10-24 PROCEDURE — 85027 COMPLETE CBC AUTOMATED: CPT | Performed by: INTERNAL MEDICINE

## 2022-10-24 PROCEDURE — P9604 ONE-WAY ALLOW PRORATED TRIP: HCPCS | Performed by: INTERNAL MEDICINE

## 2022-10-31 ENCOUNTER — TELEPHONE (OUTPATIENT)
Dept: CARDIOLOGY | Facility: CLINIC | Age: 67
End: 2022-10-31

## 2022-10-31 DIAGNOSIS — I50.32 CHRONIC HEART FAILURE WITH PRESERVED EJECTION FRACTION (H): Primary | ICD-10-CM

## 2022-10-31 NOTE — TELEPHONE ENCOUNTER
Attempted to reach Pt, directed to , unable to LM for callback due to full inbox.    Taz DUTTAN      ----- Message -----  From: Lizabeth Espinal RN  Sent: 10/31/2022   2:23 PM CDT  To: [facility]    Per notes pt was not sure if they wanted to be seen in valve clinic. Can you please call pt and if they would like to move forward to be seen in valve clinic. If so, please place referral order and let us know. We can then call pt to be scheduled.    Thanks,  Lizabeth

## 2022-10-31 NOTE — TELEPHONE ENCOUNTER
[facility] Call Center    Phone Message    May a detailed message be left on voicemail: yes     Reason for Call: Other: Pt calling in req to be schedule with Valve clinic central scheduling unable to schedule with valve clinic and there is no orders please call pt back to assist.     Action Taken: Message routed to:  Other: Cardiology    Travel Screening: Not Applicable

## 2022-11-01 NOTE — TELEPHONE ENCOUNTER
Referral is placed for evaluation of tricuspid valve options. Please contact patient to schedule  Myra WEBB RN BSN, CHFN, PCCN-K

## 2022-11-01 NOTE — DISCHARGE SUMMARY
[facility]  Hospitalist Discharge Summary      Date of Admission:  9/24/2022  Date of Discharge:  10/7/2022 10:42 AM  Discharging Provider: Frandy Bella MD  Discharge Service: Hospitalist Service    Discharge Diagnoses       Principal Problem:    Acute on chronic diastolic CHF  Active Problems:    Chronic atrial fibrillation (H)    Lymphedema    Acute pulmonary edema (H)    Morbid obesity (H)    Hypomagnesemia     Misty Quezada is a 67 year old female with moderate aortic stenosis, MIKEL, Afib who presents with LE edema and shortness of breath and found to have acute on chronic HFpEF.       10/7/22 :       Clinically improved  discharged to TCU - on bumex, spironolactone, losartan ( discontinued olmesartan - hydrochlorothiazide ), outpatient cardio f/u    A/p :         Acute HFpEF in the setting of Moderate aortic stenosis- improving               Troponin elevation- likely type 2 MI due to demand ischemia              Mild pulm HTN and moderately enlarged RV              Lasix infusion- dicontinued 10/6- now on oral Bumex             Timing of TAVR evaluation per cards-patient to follow-up in valve clinic              Losartan, aldactone              Diamox initiated 10/5- minimal improvement noted in labs today               Down from 352 pounds to 275 pounds today.  -23 L of fluid    10/7 : clinically improved, discharged on bumex, spironolactone, losartan ( discontinued olmesartan - hydrochlorothiazide ), outpatient cardio f/u                   Acute hypoxic respiratory insufficiency secondary to above              Wean oxygen as able, resolved.       MIKEL with nocturnal hypoxia - not formally diagnosed.              Overnight oximetry by RT 9/25-9/26: Had a total of 33 desaturations (less than 89% SPO2 for at least 10 seconds) with the lowest to 76%, spent approximately 8.6% of the night with desaturations below 90%.  Repeat pending to meet for insurance criteria  10/3              Needs O2 for home night and urgent sleep evaluation as outpatient for nocturnal NIPPV       Chronic AF -               Toprol-xl, eliquis.         Lymphedema - PT/OT       Anemia              Iron studies 9/27: Iron 24, TIBC 349, Sat 7, transferrin 313, ferritin 34              Started on ferrous sulfate every other day               Continue to monitor              Outpatient GI referral for c-scope if not recent.       Hypomagnesemia - replacement protocol        Morbid Obesity - Body mass index is 55       Constipation -  PRN Miralax with prn Dolcolax supp           Follow-ups Needed After Discharge   Follow-up Appointments     Follow Up and recommended labs and tests      Follow up with retirement physician.  The following labs/tests are   recommended: BMP.    Follow up with  cardiology      As advised in  2 weeks    Outpatient sleep study         {Additional follow-up instructions/to-do's for PCP    : BMP    Unresulted Labs Ordered in the Past 30 Days of this Admission     No orders found from 8/25/2022 to 9/25/2022.      These results will be followed up by PCP    Discharge Disposition   Discharged to nursing home  Condition at discharge: Stable        Consultations This Hospital Stay   CORE CLINIC EVALUATION IP CONSULT  OCCUPATIONAL THERAPY ADULT IP CONSULT  NUTRITION SERVICES ADULT IP CONSULT  CARE MANAGEMENT / SOCIAL WORK IP CONSULT  PHYSICAL THERAPY ADULT IP CONSULT  LYMPHEDEMA THERAPY IP CONSULT  CARDIOLOGY IP CONSULT  PHYSICAL THERAPY ADULT IP CONSULT  OCCUPATIONAL THERAPY ADULT IP CONSULT    Code Status   Full Code    Time Spent on this Encounter   I, Frandy Bella MD, personally saw the patient today and spent greater than 30 minutes discharging this patient.       Frandy Bella MD  [facility]  [address] [place] [id number]  Phone: [phone]  Fax:  [phone]  ______________________________________________________________________    Physical Exam   Vital Signs:                    Weight: 271 lbs 12.8 oz       GENERAL: The patient is not in any acute distressed. Awake and alert.  HEENT: Nonicteric sclerae, PERRLA, EOMI. Oropharynx clear. Moist mucous membranes. Conjunctivae appear well perfused.  HEART: Regular rate and rhythm without murmurs.  LUNGS: Clear to auscultation bilaterally. No wheezing or crackles.  ABDOMEN: Soft, positive bowel sounds, nontender.  SKIN: No rash, no excessive bruising, petechiae, or purpura.  EXTREMITIES : no rashes, no swelling in legs.  NEUROLOGIC: conscious and oriented, follows commands, no obvious focal deficits.  ROS: All other systems negative          Primary Care Physician   Tiff Chavez    Discharge Orders      Basic metabolic panel     General info for SNF    Length of Stay Estimate: Short Term Care: Estimated # of Days <30  Condition at Discharge: Stable  Level of care:skilled   Rehabilitation Potential: Fair  Admission H&P remains valid and up-to-date: Yes  Recent Chemotherapy: N/A  Use Nursing Home Standing Orders: Yes     Mantoux instructions    Give two-step Mantoux (PPD) Per Facility Policy Yes     Follow Up and recommended labs and tests    Follow up with group home physician.  The following labs/tests are recommended: BMP.    Follow up with  cardiology      As advised in  2 weeks    Outpatient sleep study     Reason for your hospital stay    SOB     Intake and output    Every shift     Daily weights    Call Provider for weight gain of more than 2 pounds per day or 5 pounds per week.     Activity - Up with nursing assistance     Full Code     Physical Therapy Adult Consult    Evaluate and treat as clinically indicated.    Reason:  deconditioning     Occupational Therapy Adult Consult    Evaluate and treat as clinically indicated.    Reason:  deconditioning     Oxygen (SNF/TCU) Discharge     Diet    Follow  this diet upon discharge: Orders Placed This Encounter      Room Service      2 Gram Sodium Diet Other - please comment       Significant Results and Procedures   Most Recent 3 CBC's:Recent Labs   Lab Test 10/24/22  1124 09/28/22  0442 09/27/22  0438   WBC 7.0 4.8 5.0   HGB 11.8 9.5* 10.0*   MCV 94 90 91    260 282     Most Recent 3 BMP's:Recent Labs   Lab Test 10/24/22  1124 10/12/22  1037 10/10/22  1025   * 138 142   POTASSIUM 3.9 3.4 3.3*   CHLORIDE 95* 95* 99   CO2 27 28 29   BUN 37.4* 37.4* 32.9*   CR 0.89 0.85 0.83   ANIONGAP 13 15 14   TURNER 10.1 9.5 9.3   GLC 88 114* 135*       Discharge Medications   Discharge Medication List as of 10/7/2022 10:36 AM      START taking these medications    Details   bumetanide (BUMEX) 2 MG tablet Take 1 tablet (2 mg) by mouth 2 times daily, Transitional      ferrous sulfate (FEROSUL) 325 (65 Fe) MG tablet Take 1 tablet (325 mg) by mouth every other day, Transitional      ketotifen (ZADITOR) 0.025 % ophthalmic solution Place 1 drop Into the left eye every 12 hours as needed for itching, Transitional      losartan (COZAAR) 25 MG tablet Take 1 tablet (25 mg) by mouth daily, Transitional      melatonin 1 MG TABS tablet Take 1 tablet (1 mg) by mouth nightly as needed for sleep, Transitional      miconazole (MICATIN) 2 % external powder Apply topically 2 times dailyTransitional      polyethylene glycol (MIRALAX) 17 g packet Take 17 g by mouth daily as needed for constipation, Transitional      spironolactone (ALDACTONE) 25 MG tablet Take 1 tablet (25 mg) by mouth daily, Transitional      tetrahydrozoline (VISINE) 0.05 % ophthalmic solution Place 1 drop Into the left eye 3 times daily, Transitional         CONTINUE these medications which have NOT CHANGED    Details   acetaminophen (TYLENOL) 325 MG tablet Take 975 mg by mouth every 4 hours as needed for mild pain, Historical      apixaban (ELIQUIS) 5 mg Tab tablet [APIXABAN (ELIQUIS) 5 MG TAB TABLET] Take 5 mg by mouth 2  (two) times a day., Historical      CALCIUM/D3/MAG OX//TYRONE/ZN (CALTRATE + D3 PLUS MINERALS ORAL) [CALCIUM/D3/MAG OX//TYRONE/ZN (CALTRATE + D3 PLUS MINERALS ORAL)] Take 1 tablet by mouth daily., Historical      cholecalciferol, vitamin D3, 2,000 unit Tab [CHOLECALCIFEROL, VITAMIN D3, 2,000 UNIT TAB] Take 2,000 Units by mouth daily., Historical      citalopram (CELEXA) 10 MG tablet Take 20 mg by mouth daily, Historical      cyanocobalamin 1000 MCG tablet [CYANOCOBALAMIN 1000 MCG TABLET] Take 1,000 mcg by mouth daily., Historical      metoprolol succinate (TOPROL-XL) 25 MG [METOPROLOL SUCCINATE (TOPROL-XL) 25 MG] TAKE 1 TABLET BY MOUTH DAILY., Disp-90 tablet, R-2, Normal      MULTIVIT WITH CALCIUM,IRON,MIN (WOMEN'S DAILY MULTIVITAMIN ORAL) [MULTIVIT WITH CALCIUM,IRON,MIN (WOMEN'S DAILY MULTIVITAMIN ORAL)] Take 1 tablet by mouth daily., Historical      vitamin C 1000 MG TABS tablet Take 1 tablet (1,000 mg) by mouth daily, Disp-30 tablet, R-0, No Print Out         STOP taking these medications       olmesartan-hydrochlorothiazide (BENICAR HCT) 20-12.5 MG tablet Comments:   Reason for Stopping:             Allergies   No Known Allergies

## 2022-11-03 ENCOUNTER — OFFICE VISIT (OUTPATIENT)
Dept: CARDIOLOGY | Facility: CLINIC | Age: 67
End: 2022-11-03
Attending: NURSE PRACTITIONER
Payer: MEDICARE

## 2022-11-03 VITALS
BODY MASS INDEX: 40.81 KG/M2 | WEIGHT: 260 LBS | DIASTOLIC BLOOD PRESSURE: 76 MMHG | RESPIRATION RATE: 20 BRPM | SYSTOLIC BLOOD PRESSURE: 110 MMHG | HEART RATE: 84 BPM | HEIGHT: 67 IN

## 2022-11-03 DIAGNOSIS — I89.0 LYMPHEDEMA: ICD-10-CM

## 2022-11-03 DIAGNOSIS — I50.32 CHRONIC HEART FAILURE WITH PRESERVED EJECTION FRACTION (H): Primary | ICD-10-CM

## 2022-11-03 PROCEDURE — 99214 OFFICE O/P EST MOD 30 MIN: CPT | Performed by: NURSE PRACTITIONER

## 2022-11-03 RX ORDER — POTASSIUM CHLORIDE 750 MG/1
10 TABLET, EXTENDED RELEASE ORAL DAILY
Qty: 30 TABLET | Refills: 11 | Status: SHIPPED | OUTPATIENT
Start: 2022-11-03 | End: 2023-12-22

## 2022-11-03 RX ORDER — SENNOSIDES A AND B 8.6 MG/1
17.2 TABLET, FILM COATED ORAL 2 TIMES DAILY PRN
Status: ON HOLD | COMMUNITY
Start: 2022-10-31 | End: 2023-05-03

## 2022-11-03 RX ORDER — BUMETANIDE 2 MG/1
2 TABLET ORAL
Qty: 60 TABLET | Refills: 11 | Status: SHIPPED | OUTPATIENT
Start: 2022-11-03 | End: 2022-11-07

## 2022-11-03 RX ORDER — SPIRONOLACTONE 25 MG/1
25 TABLET ORAL DAILY
Qty: 30 TABLET | Refills: 11 | Status: SHIPPED | OUTPATIENT
Start: 2022-11-03 | End: 2023-01-03

## 2022-11-03 NOTE — LETTER
11/3/2022    [facility]  [address]  [facility] [address]    RE: Misty Quezada       Dear Colleague,     I had the pleasure of seeing Misty Quezada in the [facility].  HEART CARE PROGRESS NOTE      [facility]  [phone]      Assessment/Recommendations   Assessment:    1.  Heart failure with preserved ejection fraction, ejection fraction 55-60%: She has no symptoms of acute heart failure.    2.  Lymphedema: She has lymphedema Velcro wraps.    3.  Permanent atrial fibrillation: Heart rate controlled.  She continues to take Eliquis.    4.  Valve disease: Echocardiogram showed moderate aortic stenosis and severe tricuspid regurgitation.  She will be seen in valve clinic on November 16.    5.  Hypokalemia: Potassium was low at TCU and she was started on potassium 10 mEq daily.  Most recent labs last week showed potassium of 3.9.    Plan:  1.  Continue current medications.  She requested refills of Bumex, potassium, and spironolactone which were sent.  2.  Continue to wear Velcro wraps daily  3.  Low-sodium diet and daily weights    Misty is due to see Dr. Rocha but does not want to schedule at this time.       History of Present Illness/Subjective    Ms. Misty Quezada is a 67 year old female seen at [facility] today for follow-up.  She has a history of atrial fibrillation, lymphedema, aortic stenosis, and obstructive sleep apnea.  She was hospitalized September 24 to October 7, 2022 with acute heart failure.  Troponin was elevated but felt to be related to demand ischemia.  Echocardiogram on September 24, 2022 showed ejection fraction of 55 to 60%, moderate aortic stenosis, and severe tricuspid regurgitation.      She was discharged from TCU on October 31.  She denies any symptoms of acute fluid retention.  She has lymphedema and swelling is at baseline.  She wears compression wraps daily.  She denies  "lightheadedness and chest pain.      She is weighing herself daily at home and states that her weight has been 233 pounds.  Clinic weight is 260 pounds so there is a large discrepancy between scales.  She is on a low-sodium diet.      ECHO:   Echo result w/o MOPS: Interpretation Summary 1. Left ventricular size is normal. Mild concentric increase in wallthickness. Systolic function is normal. The estimated left ventricularejection fraction is 55-60%.2. Right ventricle is not well visualized. Size is probably at leastmoderately enlarged. Systolic function appears normal.3. Severe right atrial enlargement.4. Calcified trileaflet aortic valve with moderate aortic stenosis. Peakforward velocity measures 2.8 m/s, mean gradient 17 mm Hg, calculated aorticvalve area 1.1 cm2, and dimensionless index 0.44. Trace aortic regurgitation.5. Severe functional tricuspid regurgitation.6. At least mild pulmonary hypertension is present with an estimated pulmonaryartery systolic pressure of 39 mm Hg plus the right atrial pressure.7. Compared to the prior study dated 8/22/2021, there has been no significantchange.          Physical Examination Review of Systems   Vitals: /76 (BP Location: Other (Comment), Patient Position: Sitting, Cuff Size: Adult Small)   Pulse 84   Resp 20   Ht 1.702 m (5' 7\")   Wt 117.9 kg (260 lb)   BMI 40.72 kg/m    BMI= Body mass index is 40.72 kg/m .  Wt Readings from Last 3 Encounters:   11/03/22 117.9 kg (260 lb)   10/17/22 116.6 kg (257 lb)   10/10/22 122.9 kg (271 lb)       General Appearance:     Alert, cooperative and in no acute distress.   ENT/Mouth: membranes moist, no oral lesions or bleeding gums.      EYES:  no scleral icterus, normal conjunctivae   Chest/Lungs:   lungs are clear to auscultation, no rales or wheezing, respirations unlabored   Cardiovascular:   Irregular. Normal first and second heart sounds, lymphedema wraps   Abdomen:   Obese, soft, nontender, nondistended, bowel sounds " present   Extremities: no cyanosis or clubbing   Skin: warm, dry.    Neurologic: mood and affect are appropriate, alert and oriented x3         Please refer above for cardiac ROS details.      Medical History  Surgical History Family History Social History   Past Medical History:   Diagnosis Date     Benign essential hypertension      Chronic atrial fibrillation (H)      Past Surgical History:   Procedure Laterality Date     APPENDECTOMY       CHOLECYSTECTOMY       GASTRIC BYPASS       Family History   Problem Relation Age of Onset     No Known Problems Mother      No Known Problems Father     Social History     Socioeconomic History     Marital status:      Spouse name: Not on file     Number of children: Not on file     Years of education: Not on file     Highest education level: Not on file   Occupational History     Not on file   Tobacco Use     Smoking status: Never     Smokeless tobacco: Never   Substance and Sexual Activity     Alcohol use: Not Currently     Comment: Rarely Uses alcohol     Drug use: Never     Sexual activity: Not on file   Other Topics Concern     Not on file   Social History Narrative     Not on file     Social Determinants of Health     Financial Resource Strain: Not on file   Food Insecurity: Not on file   Transportation Needs: Not on file   Physical Activity: Not on file   Stress: Not on file   Social Connections: Not on file   Intimate Partner Violence: Not on file   Housing Stability: Not on file          Medications  Allergies   Current Outpatient Medications   Medication Sig Dispense Refill     acetaminophen (TYLENOL) 325 MG tablet Take 975 mg by mouth every 4 hours as needed for mild pain       apixaban (ELIQUIS) 5 mg Tab tablet [APIXABAN (ELIQUIS) 5 MG TAB TABLET] Take 5 mg by mouth 2 (two) times a day.       bumetanide (BUMEX) 2 MG tablet Take 1 tablet (2 mg) by mouth 2 times daily 60 tablet 11     CALCIUM/D3/MAG OX//TYRONE/ZN (CALTRATE + D3 PLUS MINERALS ORAL)  [CALCIUM/D3/MAG OX//TYRONE/ZN (CALTRATE + D3 PLUS MINERALS ORAL)] Take 1 tablet by mouth daily.       cholecalciferol, vitamin D3, 2,000 unit Tab [CHOLECALCIFEROL, VITAMIN D3, 2,000 UNIT TAB] Take 2,000 Units by mouth daily.       citalopram (CELEXA) 10 MG tablet Take 20 mg by mouth daily       cyanocobalamin 1000 MCG tablet [CYANOCOBALAMIN 1000 MCG TABLET] Take 1,000 mcg by mouth daily.       ferrous sulfate (FEROSUL) 325 (65 Fe) MG tablet Take 1 tablet (325 mg) by mouth every other day       ketotifen (ZADITOR) 0.025 % ophthalmic solution Place 1 drop Into the left eye every 12 hours as needed for itching       losartan (COZAAR) 25 MG tablet Take 1 tablet (25 mg) by mouth daily       melatonin 3 MG tablet Take 3 mg by mouth       metoprolol succinate (TOPROL-XL) 25 MG [METOPROLOL SUCCINATE (TOPROL-XL) 25 MG] TAKE 1 TABLET BY MOUTH DAILY. 90 tablet 2     miconazole (MICATIN) 2 % external powder Apply topically 2 times daily       MULTIVIT WITH CALCIUM,IRON,MIN (WOMEN'S DAILY MULTIVITAMIN ORAL) [MULTIVIT WITH CALCIUM,IRON,MIN (WOMEN'S DAILY MULTIVITAMIN ORAL)] Take 1 tablet by mouth daily.       polyethylene glycol (MIRALAX) 17 g packet Take 17 g by mouth daily as needed for constipation       potassium chloride ER (K-TAB/KLOR-CON) 10 MEQ CR tablet Take 1 tablet (10 mEq) by mouth daily 30 tablet 11     potassium chloride ER (KLOR-CON M) 10 MEQ CR tablet Take 10 mEq by mouth daily       senna (SENOKOT) 8.6 MG tablet Take 17.2 mg by mouth       spironolactone (ALDACTONE) 25 MG tablet Take 1 tablet (25 mg) by mouth daily 30 tablet 11     tetrahydrozoline (VISINE) 0.05 % ophthalmic solution Place 1 drop Into the left eye 3 times daily      No Known Allergies      Lab Results    Chemistry/lipid CBC Cardiac Enzymes/BNP/TSH/INR   Recent Labs   Lab Test 09/09/16  1412   CHOL 163   HDL 42*      TRIG 81     Recent Labs   Lab Test 09/09/16  1412 10/10/14  1207    118     Recent Labs   Lab Test 10/12/22  1037       POTASSIUM 3.4   CHLORIDE 95*   CO2 28   *   BUN 37.4*   CR 0.85   GFRESTIMATED 74   TURNER 9.5     Recent Labs   Lab Test 10/12/22  1037 10/10/22  1025 10/07/22  0421   CR 0.85 0.83 0.90     Recent Labs   Lab Test 09/02/21  0457   A1C 6.2*    Recent Labs   Lab Test 09/28/22  0442   WBC 4.8   HGB 9.5*   HCT 30.8*   MCV 90        Recent Labs   Lab Test 09/28/22  0442 09/27/22  0438 09/25/22  0438   HGB 9.5* 10.0* 9.9*    Recent Labs   Lab Test 08/21/21  1944 08/21/21  1426 08/21/21  0820   TROPONINI 1.32* 1.87* 1.75*     Recent Labs   Lab Test 09/24/22  0850 09/02/21  0457 08/21/21  0820   BNP  --  199* 281*   NTBNP 1,898*  --   --      Recent Labs   Lab Test 08/31/21  0720   TSH 4.92     Recent Labs   Lab Test 08/21/21  0820   INR 1.44*                  Thank you for allowing me to participate in the care of your patient.      Sincerely,     Cony Vanegas NP     [facility]  cc:   Cony Vanegas NP  [address], [address]  [place] [address]

## 2022-11-03 NOTE — PATIENT INSTRUCTIONS
Misty Quezada,    It was a pleasure to see you today at the [facility].     My recommendations after this visit include:  - No changes to your medications. Refills were sent to the pharmacy.    - Continue to monitor for signs of retaining fluid (increasing weights, shortness of breath, swelling) and call with any concerns.   - If you have any questions or concerns, please call [phone] to talk with my nurse.    Cony Perkins, CNP

## 2022-11-10 DIAGNOSIS — I35.0 NONRHEUMATIC AORTIC VALVE STENOSIS: Primary | ICD-10-CM

## 2022-11-16 ENCOUNTER — OFFICE VISIT (OUTPATIENT)
Dept: CARDIOLOGY | Facility: CLINIC | Age: 67
End: 2022-11-16
Payer: MEDICARE

## 2022-11-16 ENCOUNTER — LAB (OUTPATIENT)
Dept: CARDIOLOGY | Facility: CLINIC | Age: 67
End: 2022-11-16
Payer: MEDICARE

## 2022-11-16 ENCOUNTER — ALLIED HEALTH/NURSE VISIT (OUTPATIENT)
Dept: CARDIOLOGY | Facility: CLINIC | Age: 67
End: 2022-11-16
Payer: MEDICARE

## 2022-11-16 VITALS
RESPIRATION RATE: 16 BRPM | DIASTOLIC BLOOD PRESSURE: 70 MMHG | HEIGHT: 67 IN | WEIGHT: 230 LBS | HEART RATE: 99 BPM | SYSTOLIC BLOOD PRESSURE: 100 MMHG | BODY MASS INDEX: 36.1 KG/M2

## 2022-11-16 DIAGNOSIS — I35.0 NONRHEUMATIC AORTIC VALVE STENOSIS: ICD-10-CM

## 2022-11-16 DIAGNOSIS — I36.1 NONRHEUMATIC TRICUSPID VALVE REGURGITATION: Primary | ICD-10-CM

## 2022-11-16 LAB
ALBUMIN SERPL BCG-MCNC: 4.1 G/DL (ref 3.5–5.2)
ALP SERPL-CCNC: 127 U/L (ref 35–104)
ALT SERPL W P-5'-P-CCNC: 35 U/L (ref 10–35)
ANION GAP SERPL CALCULATED.3IONS-SCNC: 12 MMOL/L (ref 7–15)
AST SERPL W P-5'-P-CCNC: 39 U/L (ref 10–35)
ATRIAL RATE - MUSE: 104 BPM
BILIRUB SERPL-MCNC: 0.6 MG/DL
BUN SERPL-MCNC: 20.3 MG/DL (ref 8–23)
CALCIUM SERPL-MCNC: 9.5 MG/DL (ref 8.8–10.2)
CHLORIDE SERPL-SCNC: 100 MMOL/L (ref 98–107)
CREAT SERPL-MCNC: 0.88 MG/DL (ref 0.51–0.95)
DEPRECATED HCO3 PLAS-SCNC: 27 MMOL/L (ref 22–29)
DIASTOLIC BLOOD PRESSURE - MUSE: NORMAL MMHG
ERYTHROCYTE [DISTWIDTH] IN BLOOD BY AUTOMATED COUNT: 17.7 % (ref 10–15)
GFR SERPL CREATININE-BSD FRML MDRD: 71 ML/MIN/1.73M2
GLUCOSE SERPL-MCNC: 107 MG/DL (ref 70–99)
HCT VFR BLD AUTO: 40.5 % (ref 35–47)
HGB BLD-MCNC: 12.3 G/DL (ref 11.7–15.7)
INTERPRETATION ECG - MUSE: NORMAL
MCH RBC QN AUTO: 28.7 PG (ref 26.5–33)
MCHC RBC AUTO-ENTMCNC: 30.4 G/DL (ref 31.5–36.5)
MCV RBC AUTO: 95 FL (ref 78–100)
P AXIS - MUSE: NORMAL DEGREES
PLATELET # BLD AUTO: 286 10E3/UL (ref 150–450)
POTASSIUM SERPL-SCNC: 4.4 MMOL/L (ref 3.4–5.3)
PR INTERVAL - MUSE: NORMAL MS
PROT SERPL-MCNC: 7 G/DL (ref 6.4–8.3)
QRS DURATION - MUSE: 94 MS
QT - MUSE: 342 MS
QTC - MUSE: 438 MS
R AXIS - MUSE: 112 DEGREES
RBC # BLD AUTO: 4.28 10E6/UL (ref 3.8–5.2)
SODIUM SERPL-SCNC: 139 MMOL/L (ref 136–145)
SYSTOLIC BLOOD PRESSURE - MUSE: NORMAL MMHG
T AXIS - MUSE: -41 DEGREES
VENTRICULAR RATE- MUSE: 99 BPM
WBC # BLD AUTO: 5.1 10E3/UL (ref 4–11)

## 2022-11-16 PROCEDURE — 99205 OFFICE O/P NEW HI 60 MIN: CPT | Performed by: INTERNAL MEDICINE

## 2022-11-16 PROCEDURE — 93000 ELECTROCARDIOGRAM COMPLETE: CPT | Performed by: INTERNAL MEDICINE

## 2022-11-16 PROCEDURE — 99207 PR NO CHARGE LOS: CPT

## 2022-11-16 PROCEDURE — 36415 COLL VENOUS BLD VENIPUNCTURE: CPT

## 2022-11-16 PROCEDURE — 85027 COMPLETE CBC AUTOMATED: CPT

## 2022-11-16 PROCEDURE — 80053 COMPREHEN METABOLIC PANEL: CPT

## 2022-11-16 NOTE — PROGRESS NOTES
HEART CARE ENCOUNTER CONSULTATON NOTE      [facility]  [phone]      Assessment/Recommendations   Assessment/Plan:    Severe symptomatic functional tricuspid valve regurgitation: Her severe tricuspid valve presentation is likely playing a significant role in her progressive symptoms.  To evaluate the valve further and assess her candidacy for potential transcatheter rpwx-gv-xina repair, she will be scheduled for a transesophageal echocardiogram.  If her anatomy is found to be feasible, she could be considered for the Clasp TR trial, which is evaluating the Janette device for transcatheter kmax-mg-bjru tricuspid valve repair.    Moderate aortic valve stenosis: Her aortic valve will be evaluated at the time of her MURTAZA as well, but based on the findings on her recent transthoracic study, this can likely be followed for now with serial imaging and potential transcatheter valve replacement in the next year or two, when she progresses to severe aortic valve stenosis.    The option of surgical tricuspid valve repair was also brought up, which Ms Quzeada is not interested in, and based on her comorbidities, she would likely be relatively high risk for this as well.    The plan for now therefore is to obtain a transesophageal cardiogram, and in the interim she has been asked to continue her current medical regimen, keep her appointments with the heart failure clinic and knows to call if there is a change in condition or worsening symptoms.    Thank you for the opportunity to participate in the care of Ms. Quezada.  Please do not hesitate to call with any questions or concerns regarding her cardiovascular status.       History of Present Illness/Subjective    HPI: Misty Quezada is a 67 year old female with a history of permanent fibrillation (on Eliquis), HFpEF, lymphedema, obstructive sleep apnea, BMI of 36 who was hospitalized recently with congestive heart failure.    During that  "hospitalization she had an echocardiogram that showed preserved left ventricular systolic function with severe tricuspid valve regurgitation and moderate aortic valve stenosis.  This echocardiogram from September 24, 2022 was reviewed and showed a mean gradient across the aortic valve of 17 mmHg with a peak velocity of 2.8 m/s and a valve area of 1.1 cm  with a dimensionless index of 0.4.  The right ventricle was noted to be moderately enlarged with normal systolic function and severe functional tricuspid valve regurgitation was described.  Pulmonary pressures were mild to moderately elevated with a systolic pressure of 39 mmHg plus the right atrial pressures.    Ms Quezada acknowledges increasing lower extremity edema, as well as fatigue and shortness of breath.  She has not had any chest pain, and also denies any orthopnea or PND.           Physical Examination  Review of Systems   Vitals: /70 (BP Location: Left arm, Patient Position: Sitting, Cuff Size: Adult Regular)   Pulse 99   Resp 16   Ht 1.702 m (5' 7\")   Wt 104.3 kg (230 lb)   BMI 36.02 kg/m    BMI= Body mass index is 36.02 kg/m .  Wt Readings from Last 3 Encounters:   11/16/22 104.3 kg (230 lb)   11/03/22 117.9 kg (260 lb)   10/17/22 116.6 kg (257 lb)       General Appearance:   no distress, normal body habitus, upright.   ENT/Mouth: membranes moist, no nasal discharge or bleeding gums.  Normal head shape, no evidence of injury or laceration.     EYES:  no scleral icterus, normal conjunctivae   Neck: no evidence of thyromegaly.  Supple   Chest/Lungs:   No audible wheezing equal chest wall expansion. Non labored breathing.  No cough.   Cardiovascular:   No evidence of elevated jugular venous pressure.  No evidence of pitting edema bilaterally    Abdomen:  no evidence of abdominal distention. No observe juandice.     Extremities: no cyanosis or clubbing noted.    Skin: no xanthelasma, normal skin color. No evidence of facial lacerations.    "   Neurologic: Normal arm motion bilateral, no tremors.  No evidence of focal defect.       Psychiatric: alert and oriented x3, calm        Please refer above for cardiac ROS details.        Medical History  Surgical History Family History Social History   Past Medical History:   Diagnosis Date     Benign essential hypertension      Chronic atrial fibrillation (H)      Past Surgical History:   Procedure Laterality Date     APPENDECTOMY       CHOLECYSTECTOMY       GASTRIC BYPASS       Family History   Problem Relation Age of Onset     No Known Problems Mother      No Known Problems Father         Social History     Socioeconomic History     Marital status:      Spouse name: Not on file     Number of children: Not on file     Years of education: Not on file     Highest education level: Not on file   Occupational History     Not on file   Tobacco Use     Smoking status: Never     Smokeless tobacco: Never   Substance and Sexual Activity     Alcohol use: Not Currently     Comment: Rarely Uses alcohol     Drug use: Never     Sexual activity: Not on file   Other Topics Concern     Not on file   Social History Narrative     Not on file     Social Determinants of Health     Financial Resource Strain: Not on file   Food Insecurity: Not on file   Transportation Needs: Not on file   Physical Activity: Not on file   Stress: Not on file   Social Connections: Not on file   Intimate Partner Violence: Not on file   Housing Stability: Not on file           Medications  Allergies   Current Outpatient Medications   Medication Sig Dispense Refill     acetaminophen (TYLENOL) 325 MG tablet Take 975 mg by mouth every 4 hours as needed for mild pain       apixaban (ELIQUIS) 5 mg Tab tablet [APIXABAN (ELIQUIS) 5 MG TAB TABLET] Take 5 mg by mouth 2 (two) times a day.       bumetanide (BUMEX) 2 MG tablet TAKE 1 TABLET(2 MG) BY MOUTH TWICE DAILY 180 tablet 3     CALCIUM/D3/MAG OX//TYRONE/ZN (CALTRATE + D3 PLUS MINERALS ORAL)  [CALCIUM/D3/MAG OX//TYRONE/ZN (CALTRATE + D3 PLUS MINERALS ORAL)] Take 1 tablet by mouth daily.       cholecalciferol, vitamin D3, 2,000 unit Tab [CHOLECALCIFEROL, VITAMIN D3, 2,000 UNIT TAB] Take 2,000 Units by mouth daily.       citalopram (CELEXA) 10 MG tablet Take 20 mg by mouth daily       cyanocobalamin 1000 MCG tablet [CYANOCOBALAMIN 1000 MCG TABLET] Take 1,000 mcg by mouth daily.       ferrous sulfate (FEROSUL) 325 (65 Fe) MG tablet Take 1 tablet (325 mg) by mouth every other day       ketotifen (ZADITOR) 0.025 % ophthalmic solution Place 1 drop Into the left eye every 12 hours as needed for itching       losartan (COZAAR) 25 MG tablet Take 1 tablet (25 mg) by mouth daily       melatonin 3 MG tablet Take 3 mg by mouth       metoprolol succinate (TOPROL-XL) 25 MG [METOPROLOL SUCCINATE (TOPROL-XL) 25 MG] TAKE 1 TABLET BY MOUTH DAILY. 90 tablet 2     miconazole (MICATIN) 2 % external powder Apply topically 2 times daily       MULTIVIT WITH CALCIUM,IRON,MIN (WOMEN'S DAILY MULTIVITAMIN ORAL) [MULTIVIT WITH CALCIUM,IRON,MIN (WOMEN'S DAILY MULTIVITAMIN ORAL)] Take 1 tablet by mouth daily.       polyethylene glycol (MIRALAX) 17 g packet Take 17 g by mouth daily as needed for constipation       potassium chloride ER (KLOR-CON M) 10 MEQ CR tablet Take 10 mEq by mouth daily       senna (SENOKOT) 8.6 MG tablet Take 17.2 mg by mouth       spironolactone (ALDACTONE) 25 MG tablet Take 1 tablet (25 mg) by mouth daily 30 tablet 11     potassium chloride ER (K-TAB/KLOR-CON) 10 MEQ CR tablet Take 1 tablet (10 mEq) by mouth daily 30 tablet 11     tetrahydrozoline (VISINE) 0.05 % ophthalmic solution Place 1 drop Into the left eye 3 times daily (Patient not taking: Reported on 11/16/2022)       No Known Allergies       Lab Results    Chemistry/lipid CBC Cardiac Enzymes/BNP/TSH/INR   Recent Labs   Lab Test 09/09/16  1412   CHOL 163   HDL 42*      TRIG 81     Recent Labs   Lab Test 09/09/16  1412 10/10/14  1207     118     Recent Labs   Lab Test 10/24/22  1124   *   POTASSIUM 3.9   CHLORIDE 95*   CO2 27   GLC 88   BUN 37.4*   CR 0.89   GFRESTIMATED 70   TURNER 10.1     Recent Labs   Lab Test 10/24/22  1124 10/12/22  1037 10/10/22  1025   CR 0.89 0.85 0.83     Recent Labs   Lab Test 09/02/21  0457   A1C 6.2*          Recent Labs   Lab Test 10/24/22  1124   WBC 7.0   HGB 11.8   HCT 39.0   MCV 94        Recent Labs   Lab Test 10/24/22  1124 09/28/22  0442 09/27/22  0438   HGB 11.8 9.5* 10.0*    Recent Labs   Lab Test 08/21/21  1944 08/21/21  1426 08/21/21  0820   TROPONINI 1.32* 1.87* 1.75*     Recent Labs   Lab Test 09/24/22  0850 09/02/21  0457 08/21/21  0820   BNP  --  199* 281*   NTBNP 1,898*  --   --      Recent Labs   Lab Test 08/31/21  0720   TSH 4.92     Recent Labs   Lab Test 08/21/21  0820   INR 1.44*        Reyna Vásquez MD

## 2022-11-16 NOTE — LETTER
11/16/2022    [facility]  [address]  [facility] [address]    RE: Misty Quezada       Dear Colleague,     I had the pleasure of seeing Misty Quezada in the [facility].    HEART CARE ENCOUNTER CONSULTATON NOTE      [facility]  [phone]      Assessment/Recommendations   Assessment/Plan:    Severe symptomatic functional tricuspid valve regurgitation: Her severe tricuspid valve presentation is likely playing a significant role in her progressive symptoms.  To evaluate the valve further and assess her candidacy for potential transcatheter [id number]-dt-axbs repair, she will be scheduled for a transesophageal echocardiogram.  If her anatomy is found to be feasible, she could be considered for the Clasp TR trial, which is evaluating the Janette device for transcatheter uxph-wu-qbjr tricuspid valve repair.    Moderate aortic valve stenosis: Her aortic valve will be evaluated at the time of her MURTAZA as well, but based on the findings on her recent transthoracic study, this can likely be followed for now with serial imaging and potential transcatheter valve replacement in the next year or two, when she progresses to severe aortic valve stenosis.    The option of surgical tricuspid valve repair was also brought up, which Ms Quezada is not interested in, and based on her comorbidities, she would likely be relatively high risk for this as well.    The plan for now therefore is to obtain a transesophageal cardiogram, and in the interim she has been asked to continue her current medical regimen, keep her appointments with the heart failure clinic and knows to call if there is a change in condition or worsening symptoms.    Thank you for the opportunity to participate in the care of Ms. Quezada.  Please do not hesitate to call with any questions or concerns regarding her cardiovascular status.       History of Present Illness/Subjective    HPI: Misty Quezada  "is a 67 year old female with a history of permanent fibrillation (on Eliquis), HFpEF, lymphedema, obstructive sleep apnea, BMI of 36 who was hospitalized recently with congestive heart failure.    During that hospitalization she had an echocardiogram that showed preserved left ventricular systolic function with severe tricuspid valve regurgitation and moderate aortic valve stenosis.  This echocardiogram from September 24, 2022 was reviewed and showed a mean gradient across the aortic valve of 17 mmHg with a peak velocity of 2.8 m/s and a valve area of 1.1 cm  with a dimensionless index of 0.4.  The right ventricle was noted to be moderately enlarged with normal systolic function and severe functional tricuspid valve regurgitation was described.  Pulmonary pressures were mild to moderately elevated with a systolic pressure of 39 mmHg plus the right atrial pressures.    Ms Quezada acknowledges increasing lower extremity edema, as well as fatigue and shortness of breath.  She has not had any chest pain, and also denies any orthopnea or PND.           Physical Examination  Review of Systems   Vitals: /70 (BP Location: Left arm, Patient Position: Sitting, Cuff Size: Adult Regular)   Pulse 99   Resp 16   Ht 1.702 m (5' 7\")   Wt 104.3 kg (230 lb)   BMI 36.02 kg/m    BMI= Body mass index is 36.02 kg/m .  Wt Readings from Last 3 Encounters:   11/16/22 104.3 kg (230 lb)   11/03/22 117.9 kg (260 lb)   10/17/22 116.6 kg (257 lb)       General Appearance:   no distress, normal body habitus, upright.   ENT/Mouth: membranes moist, no nasal discharge or bleeding gums.  Normal head shape, no evidence of injury or laceration.     EYES:  no scleral icterus, normal conjunctivae   Neck: no evidence of thyromegaly.  Supple   Chest/Lungs:   No audible wheezing equal chest wall expansion. Non labored breathing.  No cough.   Cardiovascular:   No evidence of elevated jugular venous pressure.  No evidence of pitting edema " bilaterally    Abdomen:  no evidence of abdominal distention. No observe juandice.     Extremities: no cyanosis or clubbing noted.    Skin: no xanthelasma, normal skin color. No evidence of facial lacerations.      Neurologic: Normal arm motion bilateral, no tremors.  No evidence of focal defect.       Psychiatric: alert and oriented x3, calm        Please refer above for cardiac ROS details.        Medical History  Surgical History Family History Social History   Past Medical History:   Diagnosis Date     Benign essential hypertension      Chronic atrial fibrillation (H)      Past Surgical History:   Procedure Laterality Date     APPENDECTOMY       CHOLECYSTECTOMY       GASTRIC BYPASS       Family History   Problem Relation Age of Onset     No Known Problems Mother      No Known Problems Father         Social History     Socioeconomic History     Marital status:      Spouse name: Not on file     Number of children: Not on file     Years of education: Not on file     Highest education level: Not on file   Occupational History     Not on file   Tobacco Use     Smoking status: Never     Smokeless tobacco: Never   Substance and Sexual Activity     Alcohol use: Not Currently     Comment: Rarely Uses alcohol     Drug use: Never     Sexual activity: Not on file   Other Topics Concern     Not on file   Social History Narrative     Not on file     Social Determinants of Health     Financial Resource Strain: Not on file   Food Insecurity: Not on file   Transportation Needs: Not on file   Physical Activity: Not on file   Stress: Not on file   Social Connections: Not on file   Intimate Partner Violence: Not on file   Housing Stability: Not on file           Medications  Allergies   Current Outpatient Medications   Medication Sig Dispense Refill     acetaminophen (TYLENOL) 325 MG tablet Take 975 mg by mouth every 4 hours as needed for mild pain       apixaban (ELIQUIS) 5 mg Tab tablet [APIXABAN (ELIQUIS) 5 MG TAB TABLET]  Take 5 mg by mouth 2 (two) times a day.       bumetanide (BUMEX) 2 MG tablet TAKE 1 TABLET(2 MG) BY MOUTH TWICE DAILY 180 tablet 3     CALCIUM/D3/MAG OX//TYRONE/ZN (CALTRATE + D3 PLUS MINERALS ORAL) [CALCIUM/D3/MAG OX//TYRONE/ZN (CALTRATE + D3 PLUS MINERALS ORAL)] Take 1 tablet by mouth daily.       cholecalciferol, vitamin D3, 2,000 unit Tab [CHOLECALCIFEROL, VITAMIN D3, 2,000 UNIT TAB] Take 2,000 Units by mouth daily.       citalopram (CELEXA) 10 MG tablet Take 20 mg by mouth daily       cyanocobalamin 1000 MCG tablet [CYANOCOBALAMIN 1000 MCG TABLET] Take 1,000 mcg by mouth daily.       ferrous sulfate (FEROSUL) 325 (65 Fe) MG tablet Take 1 tablet (325 mg) by mouth every other day       ketotifen (ZADITOR) 0.025 % ophthalmic solution Place 1 drop Into the left eye every 12 hours as needed for itching       losartan (COZAAR) 25 MG tablet Take 1 tablet (25 mg) by mouth daily       melatonin 3 MG tablet Take 3 mg by mouth       metoprolol succinate (TOPROL-XL) 25 MG [METOPROLOL SUCCINATE (TOPROL-XL) 25 MG] TAKE 1 TABLET BY MOUTH DAILY. 90 tablet 2     miconazole (MICATIN) 2 % external powder Apply topically 2 times daily       MULTIVIT WITH CALCIUM,IRON,MIN (WOMEN'S DAILY MULTIVITAMIN ORAL) [MULTIVIT WITH CALCIUM,IRON,MIN (WOMEN'S DAILY MULTIVITAMIN ORAL)] Take 1 tablet by mouth daily.       polyethylene glycol (MIRALAX) 17 g packet Take 17 g by mouth daily as needed for constipation       potassium chloride ER (KLOR-CON M) 10 MEQ CR tablet Take 10 mEq by mouth daily       senna (SENOKOT) 8.6 MG tablet Take 17.2 mg by mouth       spironolactone (ALDACTONE) 25 MG tablet Take 1 tablet (25 mg) by mouth daily 30 tablet 11     potassium chloride ER (K-TAB/KLOR-CON) 10 MEQ CR tablet Take 1 tablet (10 mEq) by mouth daily 30 tablet 11     tetrahydrozoline (VISINE) 0.05 % ophthalmic solution Place 1 drop Into the left eye 3 times daily (Patient not taking: Reported on 11/16/2022)       No Known Allergies       Lab Results     Chemistry/lipid CBC Cardiac Enzymes/BNP/TSH/INR   Recent Labs   Lab Test 09/09/16  1412   CHOL 163   HDL 42*      TRIG 81     Recent Labs   Lab Test 09/09/16  1412 10/10/14  1207    118     Recent Labs   Lab Test 10/24/22  1124   *   POTASSIUM 3.9   CHLORIDE 95*   CO2 27   GLC 88   BUN 37.4*   CR 0.89   GFRESTIMATED 70   TURNER 10.1     Recent Labs   Lab Test 10/24/22  1124 10/12/22  1037 10/10/22  1025   CR 0.89 0.85 0.83     Recent Labs   Lab Test 09/02/21  0457   A1C 6.2*          Recent Labs   Lab Test 10/24/22  1124   WBC 7.0   HGB 11.8   HCT 39.0   MCV 94        Recent Labs   Lab Test 10/24/22  1124 09/28/22  0442 09/27/22  0438   HGB 11.8 9.5* 10.0*    Recent Labs   Lab Test 08/21/21  1944 08/21/21  1426 08/21/21  0820   TROPONINI 1.32* 1.87* 1.75*     Recent Labs   Lab Test 09/24/22  0850 09/02/21  0457 08/21/21  0820   BNP  --  199* 281*   NTBNP 1,898*  --   --      Recent Labs   Lab Test 08/31/21  0720   TSH 4.92     Recent Labs   Lab Test 08/21/21  0820   INR 1.44*        Reyna Vásquez MD    Thank you for allowing me to participate in the care of your patient.    Sincerely,   Reyna Vásquez MD   [facility]

## 2022-11-16 NOTE — PROGRESS NOTES
Valve Clinic [facility] - 11/16/22  (See consult note from Dr. Vásquez, pt being seen for consult for TR clasp)    Referring provider: Dr. Siegel    Labs completed at visit today: Yes; pending at time of visit    Labs reviewed prior to clinic:Yes- Albumin 3.4, K 3.9, Na 135, Crt 0.89, GFR 70, WBC 7.0, Hgb 11.8, hematocrit 39, plts 269.       KCCQ12 (date completed 11/16/22), scanned into chart      PMH: HFpEF, chronic Afib (on Eliquis), obesity, lymphedema, hypomagnesemia, HTN, severe tricuspid regurgitation, moderate aortic stenosis, pulmonary hypertension, LE edema, hypokalemia, MIKEL (no formal diagnosis).    Recent admission from 9/24/22-10/7/22 admit for acute on chronic HF     Symptoms: Pt reports she has been having leg swelling, fatigue and bloating. Unsure how long this has been going on.     Social: Pt has 2 sons      ECHO date 9/24/22  TTE  EF 55-60 %  MG 17.4, LORRAINE 1.1, severe tricuspid regurgitation and moderate AS      Plan: Pt would like to move forward with workup to identify if she is a candidate for potential TR clasp. Also discussed that pt has moderate aortic stenosis and will eventually require re-evaluation of her aortic valve. Current plan is to have patient set up for a MURTAZA and evaluate if she would be a candidate for the clasp trial before moving forward with further discussion.     Reviewed pre-procedure work up and procedural related education information with patient. All questions answered, no further questions at this time. Patient has my direct contact information as well as the valve clinic line and was encouraged to call with questions or concerns.       Sunil Montana RN BSN- Valve Clinic Coordinator   [facility]  [facility]  Phone: [phone]  Fax: [phone]

## 2022-11-28 ENCOUNTER — TELEPHONE (OUTPATIENT)
Dept: CARDIOLOGY | Facility: CLINIC | Age: 67
End: 2022-11-28

## 2022-11-30 ENCOUNTER — TELEPHONE (OUTPATIENT)
Dept: CARDIOLOGY | Facility: CLINIC | Age: 67
End: 2022-11-30

## 2022-12-05 ENCOUNTER — TELEPHONE (OUTPATIENT)
Dept: CARDIOLOGY | Facility: CLINIC | Age: 67
End: 2022-12-05

## 2022-12-05 NOTE — TELEPHONE ENCOUNTER
CLASP II TR Initial Phone Visit    The primary objective of this study is to evaluate the safety and effectiveness of the PILAR System with OMT compared to OMT alone in participants with symptomatic severe tricuspid regurgitation who may not be ideal candidates for tricuspid valve surgery and ay be eligible for transcatheter tricuspid valve repair.     Misty Quezada was contacted today, 12/05/22 to discuss participation in the CLASP II TR study.     The consent discussion included:     Study description and purpose     Device description     Randomization  (2:1 ratio Pilar Device:OMT)    Study visits    Risks of participation    Benefits (if any)    Alternatives    Voluntary participation    Confidentiality     Compensation/costs of participation    Injury and legal rights      Plan: Education Visit set for 12/14/22.      Sierra R. Behr-Holewinski

## 2022-12-12 ENCOUNTER — TELEPHONE (OUTPATIENT)
Dept: CARDIOLOGY | Facility: CLINIC | Age: 67
End: 2022-12-12

## 2022-12-12 NOTE — TELEPHONE ENCOUNTER
Patient called and said that she would like to reschedule our [facility] TR education visit to 12/21 at 1pm.     Sierra R. Behr-Holewinski

## 2023-01-03 DIAGNOSIS — I48.21 PERMANENT ATRIAL FIBRILLATION (H): ICD-10-CM

## 2023-01-03 DIAGNOSIS — I35.0 NONRHEUMATIC AORTIC VALVE STENOSIS: Primary | ICD-10-CM

## 2023-01-03 DIAGNOSIS — I50.32 CHRONIC HEART FAILURE WITH PRESERVED EJECTION FRACTION (H): ICD-10-CM

## 2023-01-03 RX ORDER — SPIRONOLACTONE 25 MG/1
25 TABLET ORAL DAILY
Qty: 90 TABLET | Refills: 0 | Status: SHIPPED | OUTPATIENT
Start: 2023-01-03 | End: 2023-08-16

## 2023-01-04 ENCOUNTER — TELEPHONE (OUTPATIENT)
Dept: CARDIOLOGY | Facility: CLINIC | Age: 68
End: 2023-01-04

## 2023-01-04 NOTE — TELEPHONE ENCOUNTER
Patient cancelled [facility] tr education visit for today. Left message to try and re-schedule.     Sierra R. Behr-Holewinski

## 2023-01-18 ENCOUNTER — OFFICE VISIT (OUTPATIENT)
Dept: CARDIOLOGY | Facility: CLINIC | Age: 68
End: 2023-01-18
Payer: MEDICARE

## 2023-01-18 VITALS
BODY MASS INDEX: 37.51 KG/M2 | HEIGHT: 67 IN | WEIGHT: 239 LBS | SYSTOLIC BLOOD PRESSURE: 100 MMHG | HEART RATE: 92 BPM | RESPIRATION RATE: 16 BRPM | DIASTOLIC BLOOD PRESSURE: 78 MMHG

## 2023-01-18 DIAGNOSIS — Z00.6 EXAM FOR CLINICAL RESEARCH: ICD-10-CM

## 2023-01-18 DIAGNOSIS — I48.21 PERMANENT ATRIAL FIBRILLATION (H): ICD-10-CM

## 2023-01-18 DIAGNOSIS — I35.0 NONRHEUMATIC AORTIC VALVE STENOSIS: Primary | ICD-10-CM

## 2023-01-18 PROCEDURE — 99207 PR NO CHARGE-RESEARCH SERVICE: CPT

## 2023-01-18 NOTE — LETTER
1/18/2023    [facility]  [address]  [facility] [address]    RE: Misty Quezada       Dear Colleague,     I had the pleasure of seeing Misty Quezada in the [facility].  CLASP II TR Education visit    The primary objective of this study is to evaluate the safety and effectiveness of the JANETTE System with OMT compared to OMT alone in participants with symptomatic severe tricuspid regurgitation who may not be ideal candidates for tricuspid valve surgery and ay be eligible for transcatheter tricuspid valve repair.     Misty NARAYAN Sotojet was contacted today, 01/18/23 to discuss participation in the CLASP II TR study.     The consent discussion included:     Study description and purpose     Device description     Randomization  (2:1 ratio Janette Device:OMT)    Study visits    Risks of participation    Benefits (if any)    Alternatives    Voluntary participation    Confidentiality     Compensation/costs of participation    Injury and legal rights    The subject is interested in learning more about the CLASP II TR study, they were provided with a copy of the consent form to take home and review.      Plan: Patient is signing consent today and we will work on getting MURTAZA and TTE set up. Baseline visit to take place at a later time.        Sierra R. Behr-Holewinski        CLASP II TR Study Consent Visit    Purpose: [employer] JANETTE TrAnScatheter Valve RePair System Pivotal Clinical Trial (CLASP II TR): A prospective, multicenter, randomized, controlled pivotal trial to evaluate the safety and effectiveness of transcatheter tricuspid valve repair with the [employer] JANETTE Transcatheter Valve Repair System and optimal medical therapy (OMT) compared to OMT alone in patients with tricuspid regurgitation.     Misty Espitiajonas a 67 year old female , was seen in clinic today to discuss participation in the CLASP II TR study.     The consent discussion began on 11/28/2022.  The consent  form was reviewed with the patient.     The consent discussion included:    Study description and purpose     Conflict of interest    Device description     Randomization  (2:1 investigational device:OMT)    Study visits    Study medications    Risks of participation    Benefits (if any)    Alternatives    Voluntary participation    Confidentiality     Compensation/costs of participation    Injury and legal rights    The subject was provided time to review the consent form and consider participation. her questions were answered to her satisfaction. The patient has voluntarily agreed to participate in the above noted study.     The consent form version [id number]  was signed 01/18/23.     The subject was provided with a copy of the consent form.    Plan: Get screening, TTE, and MURTAZA to be set-up.        Thank you for allowing me to participate in the care of your patient.    Sincerely,     Sierra R. Behr-Holewinski     [facility]

## 2023-01-18 NOTE — PROGRESS NOTES
CLASP II TR Education visit    The primary objective of this study is to evaluate the safety and effectiveness of the JANETTE System with OMT compared to OMT alone in participants with symptomatic severe tricuspid regurgitation who may not be ideal candidates for tricuspid valve surgery and ay be eligible for transcatheter tricuspid valve repair.     Misty Quezada was contacted today, 01/18/23 to discuss participation in the CLASP II TR study.     The consent discussion included:     Study description and purpose     Device description     Randomization  (2:1 ratio Janette Device:OMT)    Study visits    Risks of participation    Benefits (if any)    Alternatives    Voluntary participation    Confidentiality     Compensation/costs of participation    Injury and legal rights    The subject is interested in learning more about the CLASP II TR study, they were provided with a copy of the consent form to take home and review.      Plan: Patient is signing consent today and we will work on getting MURTAZA and TTE set up. Baseline visit to take place at a later time.        Sierra R. Behr-Holewinski

## 2023-01-18 NOTE — PROGRESS NOTES
CLASP II TR Study Consent Visit    Purpose: Freeman PILAR TrAnScatheter Valve RePair System Pivotal Clinical Trial (CLASP II TR): A prospective, multicenter, randomized, controlled pivotal trial to evaluate the safety and effectiveness of transcatheter tricuspid valve repair with the Freeman PILAR Transcatheter Valve Repair System and optimal medical therapy (OMT) compared to OMT alone in patients with tricuspid regurgitation.     Misty Quezada a 67 year old female , was seen in clinic today to discuss participation in the CLASP II TR study.     The consent discussion began on 11/28/2022.  The consent form was reviewed with the patient.     The consent discussion included:    Study description and purpose     Conflict of interest    Device description     Randomization  (2:1 investigational device:OMT)    Study visits    Study medications    Risks of participation    Benefits (if any)    Alternatives    Voluntary participation    Confidentiality     Compensation/costs of participation    Injury and legal rights    The subject was provided time to review the consent form and consider participation. her questions were answered to her satisfaction. The patient has voluntarily agreed to participate in the above noted study.     The consent form version [id number]  was signed 01/18/23.     The subject was provided with a copy of the consent form.    Plan: Get screening, TTE, and MURTAZA to be set-up.      Sierra R. Behr-Holewinski

## 2023-01-23 DIAGNOSIS — I50.32 CHRONIC HEART FAILURE WITH PRESERVED EJECTION FRACTION (H): ICD-10-CM

## 2023-01-23 DIAGNOSIS — I35.0 NONRHEUMATIC AORTIC VALVE STENOSIS: Primary | ICD-10-CM

## 2023-01-23 DIAGNOSIS — I48.21 PERMANENT ATRIAL FIBRILLATION (H): ICD-10-CM

## 2023-01-23 DIAGNOSIS — I07.1 TRICUSPID VALVE INSUFFICIENCY, UNSPECIFIED ETIOLOGY: ICD-10-CM

## 2023-01-23 DIAGNOSIS — Z00.6 EXAM FOR CLINICAL RESEARCH: ICD-10-CM

## 2023-01-23 PROCEDURE — 99207 PR NO CHARGE-RESEARCH SERVICE: CPT | Performed by: INTERNAL MEDICINE

## 2023-01-24 DIAGNOSIS — Z00.6 EXAMINATION OF PARTICIPANT OR CONTROL IN CLINICAL RESEARCH: Primary | ICD-10-CM

## 2023-01-24 PROCEDURE — 99207 PR NO CHARGE-RESEARCH SERVICE: CPT

## 2023-01-25 ENCOUNTER — OFFICE VISIT (OUTPATIENT)
Dept: CARDIOLOGY | Facility: CLINIC | Age: 68
End: 2023-01-25
Payer: MEDICARE

## 2023-01-25 VITALS
TEMPERATURE: 97.4 F | DIASTOLIC BLOOD PRESSURE: 70 MMHG | HEART RATE: 86 BPM | HEIGHT: 67 IN | BODY MASS INDEX: 36.57 KG/M2 | SYSTOLIC BLOOD PRESSURE: 111 MMHG | WEIGHT: 233 LBS | RESPIRATION RATE: 16 BRPM

## 2023-01-25 DIAGNOSIS — Z00.6 EXAMINATION OF PARTICIPANT OR CONTROL IN CLINICAL RESEARCH: ICD-10-CM

## 2023-01-25 LAB
ALBUMIN SERPL BCG-MCNC: 4.6 G/DL (ref 3.5–5.2)
ALP SERPL-CCNC: 113 U/L (ref 35–104)
ALT SERPL W P-5'-P-CCNC: 27 U/L (ref 10–35)
ANION GAP SERPL CALCULATED.3IONS-SCNC: 13 MMOL/L (ref 7–15)
APTT PPP: 28 SECONDS (ref 22–38)
AST SERPL W P-5'-P-CCNC: 33 U/L (ref 10–35)
ATRIAL RATE - MUSE: 120 BPM
BILIRUB SERPL-MCNC: 0.8 MG/DL
BUN SERPL-MCNC: 23.5 MG/DL (ref 8–23)
CALCIUM SERPL-MCNC: 10 MG/DL (ref 8.8–10.2)
CHLORIDE SERPL-SCNC: 99 MMOL/L (ref 98–107)
CK SERPL-CCNC: 29 U/L (ref 26–192)
CREAT SERPL-MCNC: 0.78 MG/DL (ref 0.51–0.95)
DEPRECATED HCO3 PLAS-SCNC: 26 MMOL/L (ref 22–29)
DIASTOLIC BLOOD PRESSURE - MUSE: NORMAL MMHG
ERYTHROCYTE [DISTWIDTH] IN BLOOD BY AUTOMATED COUNT: 14.1 % (ref 10–15)
GFR SERPL CREATININE-BSD FRML MDRD: 82 ML/MIN/1.73M2
GGT SERPL-CCNC: 52 U/L (ref 5–36)
GLUCOSE SERPL-MCNC: 98 MG/DL (ref 70–99)
HCT VFR BLD AUTO: 42.5 % (ref 35–47)
HGB BLD-MCNC: 13.6 G/DL (ref 11.7–15.7)
INR PPP: 1.13 (ref 0.85–1.15)
INTERPRETATION ECG - MUSE: NORMAL
MCH RBC QN AUTO: 31.2 PG (ref 26.5–33)
MCHC RBC AUTO-ENTMCNC: 32 G/DL (ref 31.5–36.5)
MCV RBC AUTO: 98 FL (ref 78–100)
P AXIS - MUSE: NORMAL DEGREES
PLATELET # BLD AUTO: 275 10E3/UL (ref 150–450)
POTASSIUM SERPL-SCNC: 4.2 MMOL/L (ref 3.4–5.3)
PR INTERVAL - MUSE: NORMAL MS
PROT SERPL-MCNC: 7.3 G/DL (ref 6.4–8.3)
QRS DURATION - MUSE: 96 MS
QT - MUSE: 366 MS
QTC - MUSE: 457 MS
R AXIS - MUSE: 70 DEGREES
RBC # BLD AUTO: 4.36 10E6/UL (ref 3.8–5.2)
SODIUM SERPL-SCNC: 138 MMOL/L (ref 136–145)
SYSTOLIC BLOOD PRESSURE - MUSE: NORMAL MMHG
T AXIS - MUSE: -1 DEGREES
URATE SERPL-MCNC: 6.5 MG/DL (ref 2.4–5.7)
VENTRICULAR RATE- MUSE: 94 BPM
WBC # BLD AUTO: 5.1 10E3/UL (ref 4–11)

## 2023-01-25 PROCEDURE — 82550 ASSAY OF CK (CPK): CPT

## 2023-01-25 PROCEDURE — 99000 SPECIMEN HANDLING OFFICE-LAB: CPT

## 2023-01-25 PROCEDURE — 36415 COLL VENOUS BLD VENIPUNCTURE: CPT

## 2023-01-25 PROCEDURE — 84484 ASSAY OF TROPONIN QUANT: CPT

## 2023-01-25 PROCEDURE — 80053 COMPREHEN METABOLIC PANEL: CPT

## 2023-01-25 PROCEDURE — 85027 COMPLETE CBC AUTOMATED: CPT

## 2023-01-25 PROCEDURE — 82553 CREATINE MB FRACTION: CPT | Mod: 90

## 2023-01-25 PROCEDURE — 93000 ELECTROCARDIOGRAM COMPLETE: CPT | Performed by: INTERNAL MEDICINE

## 2023-01-25 PROCEDURE — 84550 ASSAY OF BLOOD/URIC ACID: CPT

## 2023-01-25 PROCEDURE — 85610 PROTHROMBIN TIME: CPT

## 2023-01-25 PROCEDURE — 85730 THROMBOPLASTIN TIME PARTIAL: CPT

## 2023-01-25 PROCEDURE — 82977 ASSAY OF GGT: CPT

## 2023-01-25 NOTE — PROGRESS NOTES
CLASP II TR Inclusion/ Exclusion Criteria     Freeman PILAR TrAnScatheter Valve RePair System Pivotal Clinical Trial (CLASP II TR): A prospective, multicenter, randomized, controlled pivotal trial to evaluate the safety and effectiveness of transcatheter tricuspid valve repair with the Freeman PILAR Transcatheter Valve Repair System and optimal medical therapy (OMT) compared to OMT alone in patients with tricuspid regurgitation.     Patient must meet all inclusion criteria and no exclusion for participation    *Note should be co-signed by PI*    1. Eighteen (18) years of age or older Yes   2.   Despite medical therapy (OMT) per the local Heart Team, patient has signs of TR, symptoms from TR, or prior heart failure hospitalization from TR. Patient must be on OMT per the local heart team at the time of TR assessment for trial eligibility (TTE). OMT includes stable oral diuretic medications unless patient has a documented history of intolerance.     Oral diuretic doses must be stable (no decrease of more than   or increase of 2x) in the 7 days prior to baseline/screening TTE. Efforts should be made to keep oral diuretic doses stable after the TTE.     Signs of TR include, but are not limited to, peripheral edema or ascites. Symptoms of TR include, but are not limited to, patient classification as NYHA class II, III, or Cyn     Yes   3.   Functional or degenerative TR graded as severe or greater9 on a TTE assessed by the echo core lab. (TTE 60 days from submission) Yes   4.   New York Heart Association (NYHA) Class II-Cyn or heart failure hospitalization in the prior 12 months  Yes   5.   Patient is at an intermediate or greater estimated risk of mortality with tricuspid valve surgery as determined by the cardiac surgeon with concurrence by the local Heart Team.  Yes   6.   Patient is able and willing to give informed consent, follow protocol procedures, and comply with follow-up visit requirements.  Yes        1. Tricuspid valve anatomy not evaluable by TTE or MURTAZA  No   2. Tricuspid valve anatomy precludes proper device deployment and function, including but not limited to:     Evidence of moderate to severe calcification in the grasping area     b. Excessive chordae structure in the grasping area     c. Presence of perforation in the grasping area     d. Severe leaflet tethering or immobile leaflet          (Assessed by core lab) No   3.    In the opinion of the investigator, access to the right atrium with a 22 FR guide is deemed not feasible (e.g. DVT, occluded femoral veins)  No   4.    LVEF ? 25% (Assessed by core lab per screening/baseline TTE)  No   5.    Significant  intra-cardiac mass, thrombus, or vegetation  (Chronic scarring of th LV apex or chronic DEJAH thrombi can be considered for inclusion. Assess by core lab) No   6.    Echocardiographic evidence of severe right ventricular dysfunction (Assessed by core lab per screening/baseline TTE) No   7.    Primary non-degenerative tricuspid disease (e.g. carcinoid, rheumatic, endocarditis, iatrogenic, tricuspid stenosis)  No   8.    Previous tricuspid valve repair or replacement that would interfere with placement of PILAR  No   9.    Presence of trans-tricuspid pacemaker or defibrillator leads which meet one of the following:     Would prevent proper TR reduction due to interaction of the lead with the leaflets     Were implanted in the RV   (Implanted in the RV within the last 90 days prior to the point of enrollment. Patients with RV lead extraction are required to wait 30 days prior to being assess for the study. Assessed by echo core lab. ) No   10.  Severe aortic, mitral and/or pulmonic valve stenosis and/or regurgitation.   (Patients with these concomitant conditions may opt to have the associated valve treated and be assess for the trial. Patients who will receive or have recently received valve treatent for these conditions must wait 60 days  post-transcatheter or 90 days post-surgical treatment prior to being assess for the study.) No   11.   Known history of untreated severe symptomatic carotid stenosis (>50% by ultrasound) or asymptomatic carotid stenosis (>70% by ultrasound)   No   12.   Suspicion of pericardial adhesions that may preclude leaflet approximation (e.g. post pericarditis, constrictive pericarditis, calcifications visible on imaging)  No   13.   Hemodynamically significant pericardial effusion  No   14.   Presence of infiltrative cardiomyopathy or valvulopathy (including carcinoid, amyloidosis, sarcoidosis, hemochromatosis) significant congenital heart disease, including but not limited to atrial septal defect, RV dysplasia, and arrhythmogenic RV  No   15.   Clinically significant, untreated coronary artery disease requiring revascularization, unstable angina, evidence of acute coronary syndrome, recent myocardial infarction (per WHO definition)   (MI within 30 days prior to the point of enrollment) No   16.   Active gastrointestinal (GI) bleeding (Within 90 days prior to the point of enrollment) No   17.   Any of the following cardiovascular procedures:            A. Percutaneous coronary, intracardiac, or endovascular intervention within the last 30 days prior to the point of enrollment         B. Carotid surgery within 30 days prior to the point of enrollment         C. Direct current cardioversion within the last 30 days prior to the point of enrollment         D. Leadless RV pacemaker implant within the last 30 days prior to the point of enrollment         E. Cardiac surgery within 90 days prior to the point of enrollment     (Intracardiac intervention includes, but is not limited to: left atrial appendage occlusion, ASD closure, AF ablation) No   18.   Need for emergent or urgent surgery for any reason, any planned cardiac surgery within the next 12 months(365 days), or any planned percutaneous cardiac procedure within the next 90  days (From the point of enrollment) No   19.   Hypotension (systolic pressure < 90 mmHg) or requirement for inotropic support or hemodynamic support device. (Within 30 days prior to the point of enrollment. Qualifying TTE must be performed in absence of inotropic suport or hemodynamic support device)  No   20.   Resting systolic blood pressure < 90 or > 160 mmHg after repeated measurements   (Repeated = 3 consecutive measurements) No   21.   Any of the following:         - PASP >60mmHg by echo doppler (unless RHC demonstrates PASP ? 70 mmHg        -PASP > 70 mmHg by RHC         - PVR >5 wood units by RHC (unless PVR ?5 Wood units and systolic B >85 mmHg after vasodilator challenge)  (PASP by echo doppler will be assessed by echo core lab. RHC assessed by site within 90 days of submission for screening) No   22.   Patient with refractory heart failure requiring, advanced intervention (i.e. patient has or will need left ventricular assist device, transplantation) (ACC/AHA Stage D heart failure)  No   23.   Deep vein thrombosis (DVT) or pulmonary embolism (PE) in the past 180 days (Within 180 days prior to signing initial informed consent) No   24.   In the opinion of the investigator, access to and through the femoral vein/IVC with guide sheath is deemed not feasible (e.g., occlude femoral veins, occluded or thrombosed IVC filter)  No   25.   Recent stroke (Within 90 days prior to procedure date) No   26.   Modified Vidal Scale ? 4 disability (For patients with history of stroke) No   27.   Kidney dysfunction with estimated glomerular filtration rate (eGFR) ? 25 mL/min/1.73m2 or patient is on chronic dialysis (At the point of enrollment) No   28.   Patients with hepatic insufficiency or cirrhosis with Child-Kim score Class C (At the point of enrollment) Yes   29.   COPD requiring continuous home oxygen  No   30.   Chronic anemia with transfusion dependency or (Hgb < 9 g/dL) not corrected by transfusion (At the point  of enrollment) No   31.   Thrombocytopenia (Platelet count < 75,000/mm3) or thrombocytosis (Platelet count > 750,000/mm3) (At the point of enrollment) No   32.   Known bleeding or clotting disorders or patient refuses blood transfusion  No   33.   Active endocarditis within the last 90 days or infection, requiring antibiotic therapy (oral or intravenous) within the last 14 days. (Within 90 days or 14 days prior to the point of enrollment respectively. Patient should also be more than 14 days from last dose of antibiotics with no evidence of active infection prior to an implant procedure, as applicable) No   34.   Pregnant, breastfeeding, or planning pregnancy within the next 12 months (365 days). ( As assessed during the screening process. Note: Female patients of child bearing potential need to have a negative pregnancy test performed within 14 days prior to study procedure (if applicable) and be adherent to an accepted method of contraception ) No   35.   Patients in whom a MURTAZA is contraindicated or cannot be completed No   36.   Untreatable hypersensitivity or contraindication to any of the following:     All oral anticoagulant and antiplatelet therapy     Heparin and Bivalirudin      Nitinol Alloys (Nickel and Titanium)     Contrast media   (Patient must be able to tolerate at least one antiplatelet medication OR one anticoagulant medication) No   37.   Patient is currently participating in another investigational biologic, drug, or device clinical study  No   38.   Concurrent medical condition with a life expectancy of less than 12 months (365 days) in the judgment of the Investigator.  No   39.  Any condition, in the opinion of the investigator, making it unlikely the patient will be able to complete all protocol procedures and follow-up visits.   No   40.   Patient has other medical, social, or psychological conditions that preclude appropriate consent and follow-up, or patient is under guardianship.  No    41.   Any patient considered to be vulnerable (A vulnerable patient is an individual who is unable to fully understand all aspects of the investigation that are relevant to the decision to participate, or who could be manipulated or unduly influenced as a result of a compromised position, expectation of benefits or fear of retaliatory response (ISO 43278) ) No       Patient was approved by [facility] on 3/30/23 for the registry cohort. Implant will be 5/3/23 with Dr. Howard Plummer and Dr. Kana Calderón.     Sierra R. Behr-Holewinski

## 2023-01-25 NOTE — PROGRESS NOTES
"CLASP II TR Screening/Baseline    Purpose: Freeman PILAR TrAnScatheter Valve RePair System Pivotal Clinical Trial (CLASP II TR): A prospective, multicenter, randomized, controlled pivotal trial to evaluate the safety and effectiveness of transcatheter tricuspid valve repair with the Freeman PILAR Transcatheter Valve Repair System and optimal medical therapy (OMT) compared to OMT alone in patients with tricuspid regurgitation.       Misty Quezada was seen in clinic today for the CLASP II TR Screening/Baseline visit.    Demography  Age 67 year old    Gender female    Ethnicity White   Race White      NYHA: II    /70 (BP Location: Left arm, Patient Position: Sitting, Cuff Size: Adult Large)   Pulse 86   Temp 97.4  F (36.3  C) (Oral)   Resp 16   Ht 1.702 m (5' 7\")   Wt 105.7 kg (233 lb)   BMI 36.49 kg/m      Current Outpatient Medications   Medication     acetaminophen (TYLENOL) 325 MG tablet     apixaban (ELIQUIS) 5 mg Tab tablet     bumetanide (BUMEX) 2 MG tablet     CALCIUM/D3/MAG OX//TYRONE/ZN (CALTRATE + D3 PLUS MINERALS ORAL)     cholecalciferol, vitamin D3, 2,000 unit Tab     citalopram (CELEXA) 10 MG tablet     cyanocobalamin 1000 MCG tablet     ferrous sulfate (FEROSUL) 325 (65 Fe) MG tablet     losartan (COZAAR) 25 MG tablet     melatonin 3 MG tablet     metoprolol succinate (TOPROL-XL) 25 MG     MULTIVIT WITH CALCIUM,IRON,MIN (WOMEN'S DAILY MULTIVITAMIN ORAL)     polyethylene glycol (MIRALAX) 17 g packet     potassium chloride ER (K-TAB/KLOR-CON) 10 MEQ CR tablet     senna (SENOKOT) 8.6 MG tablet     spironolactone (ALDACTONE) 25 MG tablet     No current facility-administered medications for this visit.         Edema Grading Questionaire  Lower Limb Edema Grading: [] Absent   [x] Grade 1+ (2 mm or less: slight pitting, no visible distortion, disappears rapidly)  []Grade 2+ (2-4 mm indent: somewhat deeper pit, no readably detectable distortion, disappears in 10-25 seconds)  [] Grade 3+ (4-6 " mm: pit is noticeably deep. May last more than a minute. Dependent extremity looks swollen and m[place]o)  []Grade 4+ (6-8 mm: pt is very deep. Lasts for 2-5 minutes. Dependent extremity is grossly distorted)   Ankle Circumference Measurements: Right Ankle Circumference: 27 cm  Left Ankle Circumference: 28 cm      1. Over the last week, how often did you have swelling in your ankles?  [] Every day   [] 6 days    [] 5 days   [x]4 days    [] 3 days    []2 days   []1 day   [] Not at all    2. What level of swelling did you experience? [] Extreme swelling    [] Quite a bit of swelling   [] Mild swelling    [x] Very little swelling   [] None    3. How much were you bothered by swelling in your ankles?  [] Extremely    [] Quite a bit    [] Moderately   [] A little    [x] Not at all    4. During what times of day did you have swelling in your ankles? [] Morning only    [] afternoon only    [x] evening only  [] Morning & evening    [] morning & afternoon  [] afternoon & evening   [] morning, afternoon & evening  [] Not at all    5. Did swelling in your ankles limit your normal activity? [] Extremely   [] Quite a bit  [] Moderately  [] A little  [x] Not at all    6. Did you have any difficulty answering these questions?  [] Yes  [x] No       MASCORRO ADL   Bathing [x]Independent - 1 point  [] Dependent - 0 points   Dressing [x]Independent - 1 point  [] Dependent - 0 points    Toileting [x]Independent - 1 point  [] Dependent - 0 points   Transferring [x]Independent - 1 point  [] Dependent - 0 points   Continence [x]Independent - 1 point  [] Dependent - 0 points   Feeding [x]Independent - 1 point  [] Dependent - 0 points   Total Score: 6/6        Cardiovascular Medical History Part 1  Tricuspid Valve Disease Etiology (primary, secondary, mixed, pacer related, indeterminate)  Secondary   Number of Hospitalization for heart failure in the last 12 months 0   Number of days hospitalized for heart failure in the last 12 months 0   Date of  last hospitalization for heart failure within the last 12 months 0   Aneurysm  No   Angina No   Atrial Septal Defect No   Ventricular Septal Defect No   Cardiac Valve Disease (severe stenosis or regurgitation -- what valves)  No   Cardiomyopathy -- specify  No   Carotid artery disease (greater than 50%) No   Cardiogenic Shock (if yes, date) No   Coronary Artery disease (greater than 50%) No   Deep vein thrombosis (DVT)(If yes, date) No   Dyslipidemia or hyperlipidemia No   Endocarditis (If yes date and date resolved) No   Hypotension (chronic, systolic BP <90 mmHg or diastolic < 60 mmHg) No   Hypertension (treated) No   Pulmonary Hypertension within past year (PASP) Yes        - If yes, PASP within past year        - Measured by what?   56mmHg -- [facility]     Cardiovascular Medical History Part 2  Myocardial infarction (If yes, date -- including STEMI, NSTEMI, or UNK) No   Peripheral Artery disease No   Pulmonary Embolism (if yes date) No   Rheumatic Heart Disease No   Stroke (if yes, etiology and date) No   Transient Ischemic Attack (if yes, date) No   Atrial fibrillation (If yes, specify) Yes: permanent   Atrial Flutter  No   Bradycardia (less than 60bpm) No   Ventricular tachycardia or fibrillation No   Conduction Defects:     1st degree AV Block No   2nd degree AV Block No   3rd degree AV block (complete heart block) No   LBBB No   RBBB No   Bifascicular block No   Long QT syndrome No   Any other significant arrhythmia/conduction defects No   Other relevant cardiovascular conditions No       Cardiovascular Interventions and Surgical History Part 1  Carotid artery stenting (if yes, date) No   Pacemaker/ICD (If yes, type and date) No   Leadless pacemaker or ICD  No   Cardiac Resynchronization therapy (CRT) (If yes, date) No   PCI/Stent (if yes location and date) No   Coronary Artery Bypass graft (CABG) Surgery (If yes locations and date) No       Cardiovascular Interventions and Surgical History Part 2  Heart  Transplant  No   Is the patient currently listed for a heart transplant?  No   Mitral Valve Surgery/Intervention (If yes, specify and date) No   Aortic Valve Surgery/Intervention (If yes, specify and date) No   Tricuspid Valve Surgery/Intervention (If yes, specify and date) No   Pulmonic Valve Surgery/Intervention (If yes, specify and date) No   Catheter ablation, direct current cardioversion  or other interventional EP procedure No   ASD Closure No   VSD Closure No   IVC Filter No   TEVAR or EVAR No   MAZE or similar procedure No   Other relevant cardiovascular procedures or surgeries? (if yes, specify) No     Non-Cardiovascular Medical History  Ascites  No   Hospitalization and/or ED visit requiring paracentesis in the last 12 months? No   Chronic Obstructive Pulmonary Disease (COPD) (If Yes, Severity)  No   Sleep Apnea Yes   Other chronic lung disease (if yes, specify) No   Home oxygen use (if yes, specify) No   Pneumonia (if yes, recent or remote) No   Diabetes (if yes, type) No   Hyperthyroidism  No   Hypothyroidism No   Renal Insuffiencey or failure (if yes, stage, dialysis?) No   Gastrointestinal or esophogeal bleeding (if yes, date) No   Cirrhosis (If yes, child-Kim score or MELD Score if available) No   Other liver diease (if yes, specify) No   Cancer/Malignancy (If yes, type, chest radiation and cancer status) No   Coagulopathy (bleeding or clotting disorder, if yes specify) No   Chronic anemia (<= 9 g/dL) {No   Thrombocytopenia: moderate or greater (Plt <=100,000/uL) No   Dementia (If yes, severity) No   Autoimmune disorder No   Immunocompromised No   History of Falls?  No   COVID-19 No   Other non cardiovascular conditions (if yes, specify) No   Tobacco use (former, current or never) never   Alcohol consumption (>1 drink per day in past 12 months) No   Illicit or recreatational drug use (no, recent or remote) no       Plan: plan for MURTAZA and TTE. Angio needs to be scheduled.      Ernestine MAY  Behr-Holewinski

## 2023-01-25 NOTE — LETTER
"1/25/2023    [facility]  [address]  [facility] [address]    RE: Misty SYLVAIN Quezada       Dear Colleague,     I had the pleasure of seeing Misty Quezada in the [facility].  CLASP II TR Screening/Baseline    Purpose: Freeman PILAR TrAnScatheter Valve RePair System Pivotal Clinical Trial (CLASP II TR): A prospective, multicenter, randomized, controlled pivotal trial to evaluate the safety and effectiveness of transcatheter tricuspid valve repair with the Freeman PILAR Transcatheter Valve Repair System and optimal medical therapy (OMT) compared to OMT alone in patients with tricuspid regurgitation.       Misty Quezada was seen in clinic today for the CLASP II TR Screening/Baseline visit.    Demography  Age 67 year old    Gender female    Ethnicity {desc; ethnicity:[id number]}   Race White      NYHA: II    /70 (BP Location: Left arm, Patient Position: Sitting, Cuff Size: Adult Large)   Pulse 86   Temp 97.4  F (36.3  C) (Oral)   Resp 16   Ht 1.702 m (5' 7\")   Wt 105.7 kg (233 lb)   BMI 36.49 kg/m      Current Outpatient Medications   Medication     acetaminophen (TYLENOL) 325 MG tablet     apixaban (ELIQUIS) 5 mg Tab tablet     bumetanide (BUMEX) 2 MG tablet     CALCIUM/D3/MAG OX//TYRONE/ZN (CALTRATE + D3 PLUS MINERALS ORAL)     cholecalciferol, vitamin D3, 2,000 unit Tab     citalopram (CELEXA) 10 MG tablet     cyanocobalamin 1000 MCG tablet     ferrous sulfate (FEROSUL) 325 (65 Fe) MG tablet     losartan (COZAAR) 25 MG tablet     melatonin 3 MG tablet     metoprolol succinate (TOPROL-XL) 25 MG     MULTIVIT WITH CALCIUM,IRON,MIN (WOMEN'S DAILY MULTIVITAMIN ORAL)     polyethylene glycol (MIRALAX) 17 g packet     potassium chloride ER (K-TAB/KLOR-CON) 10 MEQ CR tablet     senna (SENOKOT) 8.6 MG tablet     spironolactone (ALDACTONE) 25 MG tablet     No current facility-administered medications for this visit.         Edema Grading Questionaire  Lower Limb Edema " Grading: [] Absent   [x] Grade 1+ (2 mm or less: slight pitting, no visible distortion, disappears rapidly)  []Grade 2+ (2-4 mm indent: somewhat deeper pit, no readably detectable distortion, disappears in 10-25 seconds)  [] Grade 3+ (4-6 mm: pit is noticeably deep. May last more than a minute. Dependent extremity looks swollen and macedo)  []Grade 4+ (6-8 mm: pt is very deep. Lasts for 2-5 minutes. Dependent extremity is grossly distorted)   Ankle Circumference Measurements: Right Ankle Circumference: 27 cm  Left Ankle Circumference: 28 cm      1. Over the last week, how often did you have swelling in your ankles?  [] Every day   [] 6 days    [] 5 days   [x]4 days    [] 3 days    []2 days   []1 day   [] Not at all    2. What level of swelling did you experience? [] Extreme swelling    [] Quite a bit of swelling   [] Mild swelling    [x] Very little swelling   [] None    3. How much were you bothered by swelling in your ankles?  [] Extremely    [] Quite a bit    [] Moderately   [] A little    [x] Not at all    4. During what times of day did you have swelling in your ankles? [] Morning only    [] afternoon only    [x] evening only  [] Morning & evening    [] morning & afternoon  [] afternoon & evening   [] morning, afternoon & evening  [] Not at all    5. Did swelling in your ankles limit your normal activity? [] Extremely   [] Quite a bit  [] Moderately  [] A little  [x] Not at all    6. Did you have any difficulty answering these questions?  [] Yes  [x] No       MASCORRO ADL   Bathing [x]Independent - 1 point  [] Dependent - 0 points   Dressing [x]Independent - 1 point  [] Dependent - 0 points    Toileting [x]Independent - 1 point  [] Dependent - 0 points   Transferring [x]Independent - 1 point  [] Dependent - 0 points   Continence [x]Independent - 1 point  [] Dependent - 0 points   Feeding [x]Independent - 1 point  [] Dependent - 0 points   Total Score: 6/6        Cardiovascular Medical History Part 1  Tricuspid  Valve Disease Etiology (primary, secondary, mixed, pacer related, indeterminate)  Secondary   Number of Hospitalization for heart failure in the last 12 months 0   Number of days hospitalized for heart failure in the last 12 months 0   Date of last hospitalization for heart failure within the last 12 months 0   Aneurysm  No   Angina No   Atrial Septal Defect No   Ventricular Septal Defect No   Cardiac Valve Disease (severe stenosis or regurgitation -- what valves)  No   Cardiomyopathy -- specify  No   Carotid artery disease (greater than 50%) {YES/NO:60}   Cardiogenic Shock (if yes, date) No   Coronary Artery disease (greater than 50%) {YES/NO:60}   Deep vein thrombosis (DVT)(If yes, date) No   Dyslipidemia or hyperlipidemia {YES/NO:60}   Endocarditis (If yes date and date resolved) No   Hypotension (chronic, systolic BP <90 mmHg or diastolic < 60 mmHg) {YES/NO:60}   Hypertension (treated) {YES/NO:60}   Pulmonary Hypertension within past year (PASP) Ye        - If yes, PASP within past year        - Measured by what?   ***     Cardiovascular Medical History Part 2  Myocardial infarction (If yes, date -- including STEMI, NSTEMI, or UNK) No   Peripheral Artery disease No   Pulmonary Embolism (if yes date) No   Rheumatic Heart Disease No   Stroke (if yes, etiology and date) No   Transient Ischemic Attack (if yes, date) No   Atrial fibrillation (If yes, specify) Yes: permanent   Atrial Flutter  No   Bradycardia (less than 60bpm) No   Ventricular tachycardia or fibrillation No   Conduction Defects:     1st degree AV Block No   2nd degree AV Block No   3rd degree AV block (complete heart block) No   LBBB No   RBBB No   Bifascicular block No   Long QT syndrome No   Any other significant arrhythmia/conduction defects No   Other relevant cardiovascular conditions No       Cardiovascular Interventions and Surgical History Part 1  Carotid artery stenting (if yes, date) No   Pacemaker/ICD (If yes, type and date) No   Leadless  pacemaker or ICD  No   Cardiac Resynchronization therapy (CRT) (If yes, date) No   PCI/Stent (if yes location and date) No   Coronary Artery Bypass graft (CABG) Surgery (If yes locations and date) No       Cardiovascular Interventions and Surgical History Part 2  Heart Transplant  No   Is the patient currently listed for a heart transplant?  No   Mitral Valve Surgery/Intervention (If yes, specify and date) No   Aortic Valve Surgery/Intervention (If yes, specify and date) No   Tricuspid Valve Surgery/Intervention (If yes, specify and date) No   Pulmonic Valve Surgery/Intervention (If yes, specify and date) No   Catheter ablation, direct current cardioversion  or other interventional EP procedure No   ASD Closure No   VSD Closure No   IVC Filter No   TEVAR or EVAR No   MAZE or similar procedure No   Other relevant cardiovascular procedures or surgeries? (if yes, specify) No     Non-Cardiovascular Medical History  Ascites  No   Hospitalization and/or ED visit requiring paracentesis in the last 12 months? No   Chronic Obstructive Pulmonary Disease (COPD) (If Yes, Severity)  No   Sleep Apnea Yes   Other chronic lung disease (if yes, specify) No   Home oxygen use (if yes, specify) No   Pneumonia (if yes, recent or remote) No   Diabetes (if yes, type) No   Hyperthyroidism  No   Hypothyroidism No   Renal Insuffiencey or failure (if yes, stage, dialysis?) No   Gastrointestinal or esophogeal bleeding (if yes, date) No   Cirrhosis (If yes, child-Kim score or MELD Score if available) No   Other liver diease (if yes, specify) No   Cancer/Malignancy (If yes, type, chest radiation and cancer status) No   Coagulopathy (bleeding or clotting disorder, if yes specify) No   Chronic anemia (<= 9 g/dL) {No   Thrombocytopenia: moderate or greater (Plt <=100,000/uL) No   Dementia (If yes, severity) No   Autoimmune disorder No   Immunocompromised No   History of Falls?  No   COVID-19 No   Other non cardiovascular conditions (if yes,  specify) No   Tobacco use (former, current or never) never   Alcohol consumption (>1 drink per day in past 12 months) No   Illicit or recreatational drug use (no, recent or remote) no       Plan:     Sierra R. Behr-Holewinski      CLASP II TR Inclusion/ Exclusion Criteria     Freeman PILAR TrAnScatheter Valve RePair System Pivotal Clinical Trial (CLASP II TR): A prospective, multicenter, randomized, controlled pivotal trial to evaluate the safety and effectiveness of transcatheter tricuspid valve repair with the Freeman PILAR Transcatheter Valve Repair System and optimal medical therapy (OMT) compared to OMT alone in patients with tricuspid regurgitation.     Patient must meet all inclusion criteria and no exclusion for participation    *Note should be co-signed by PI*    2. Eighteen (18) years of age or older Yes   2.   Despite medical therapy (OMT) per the local Heart Team, patient has signs of TR, symptoms from TR, or prior heart failure hospitalization from TR. Patient must be on OMT per the local heart team at the time of TR assessment for trial eligibility (TTE). OMT includes stable oral diuretic medications unless patient has a documented history of intolerance.     Oral diuretic doses must be stable (no decrease of more than   or increase of 2x) in the 7 days prior to baseline/screening TTE. Efforts should be made to keep oral diuretic doses stable after the TTE.     Signs of TR include, but are not limited to, peripheral edema or ascites. Symptoms of TR include, but are not limited to, patient classification as NYHA class II, III, or Cyn     Yes   3.   Functional or degenerative TR graded as severe or greater9 on a TTE assessed by the echo core lab. (TTE 60 days from submission) Yes   4.   New York Heart Association (NYHA) Class II-Cyn or heart failure hospitalization in the prior 12 months  Yes   5.   Patient is at an intermediate or greater estimated risk of mortality with tricuspid valve surgery as  determined by the cardiac surgeon with concurrence by the local Heart Team.  Yes   6.   Patient is able and willing to give informed consent, follow protocol procedures, and comply with follow-up visit requirements.  Yes       1. Tricuspid valve anatomy not evaluable by TTE or MURTAZA  No   2. Tricuspid valve anatomy precludes proper device deployment and function, including but not limited to:     Evidence of moderate to severe calcification in the grasping area     b. Excessive chordae structure in the grasping area     c. Presence of perforation in the grasping area     d. Severe leaflet tethering or immobile leaflet          (Assessed by core lab) No   3.    In the opinion of the investigator, access to the right atrium with a 22 FR guide is deemed not feasible (e.g. DVT, occluded femoral veins)  No   4.    LVEF ? 25% (Assessed by core lab per screening/baseline TTE)  No   5.    Significant  intra-cardiac mass, thrombus, or vegetation  (Chronic scarring of th LV apex or chronic DEJAH thrombi can be considered for inclusion. Assess by core lab) No   6.    Echocardiographic evidence of severe right ventricular dysfunction (Assessed by core lab per screening/baseline TTE) No   7.    Primary non-degenerative tricuspid disease (e.g. carcinoid, rheumatic, endocarditis, iatrogenic, tricuspid stenosis)  No   8.    Previous tricuspid valve repair or replacement that would interfere with placement of PILAR  No   9.    Presence of trans-tricuspid pacemaker or defibrillator leads which meet one of the following:     Would prevent proper TR reduction due to interaction of the lead with the leaflets     Were implanted in the RV   (Implanted in the RV within the last 90 days prior to the point of enrollment. Patients with RV lead extraction are required to wait 30 days prior to being assess for the study. Assessed by echo core lab. ) No   10.  Severe aortic, mitral and/or pulmonic valve stenosis and/or regurgitation.   (Patients  with these concomitant conditions may opt to have the associated valve treated and be assess for the trial. Patients who will receive or have recently received valve treatent for these conditions must wait 60 days post-transcatheter or 90 days post-surgical treatment prior to being assess for the study.) No   11.   Known history of untreated severe symptomatic carotid stenosis (>50% by ultrasound) or asymptomatic carotid stenosis (>70% by ultrasound)   No   12.   Suspicion of pericardial adhesions that may preclude leaflet approximation (e.g. post pericarditis, constrictive pericarditis, calcifications visible on imaging)  No   13.   Hemodynamically significant pericardial effusion  No   14.   Presence of infiltrative cardiomyopathy or valvulopathy (including carcinoid, amyloidosis, sarcoidosis, hemochromatosis) significant congenital heart disease, including but not limited to atrial septal defect, RV dysplasia, and arrhythmogenic RV  No   15.   Clinically significant, untreated coronary artery disease requiring revascularization, unstable angina, evidence of acute coronary syndrome, recent myocardial infarction (per WHO definition)   (MI within 30 days prior to the point of enrollment) No   16.   Active gastrointestinal (GI) bleeding (Within 90 days prior to the point of enrollment) No   17.   Any of the following cardiovascular procedures:            A. Percutaneous coronary, intracardiac, or endovascular intervention within the last 30 days prior to the point of enrollment         B. Carotid surgery within 30 days prior to the point of enrollment         C. Direct current cardioversion within the last 30 days prior to the point of enrollment         D. Leadless RV pacemaker implant within the last 30 days prior to the point of enrollment         E. Cardiac surgery within 90 days prior to the point of enrollment     (Intracardiac intervention includes, but is not limited to: left atrial appendage occlusion, ASD  closure, AF ablation) No   18.   Need for emergent or urgent surgery for any reason, any planned cardiac surgery within the next 12 months(365 days), or any planned percutaneous cardiac procedure within the next 90 days (From the point of enrollment) No   19.   Hypotension (systolic pressure < 90 mmHg) or requirement for inotropic support or hemodynamic support device. (Within 30 days prior to the point of enrollment. Qualifying TTE must be performed in absence of inotropic suport or hemodynamic support device)  No   20.   Resting systolic blood pressure < 90 or > 160 mmHg after repeated measurements   (Repeated = 3 consecutive measurements) No   21.   Any of the following:         - PASP >60mmHg by echo doppler (unless RHC demonstrates PASP ? 70 mmHg        -PASP > 70 mmHg by RHC         - PVR >5 wood units by RHC (unless PVR ?5 Wood units and systolic B >85 mmHg after vasodilator challenge)  (PASP by echo doppler will be assessed by echo core lab. RHC assessed by site within 90 days of submission for screening) No   22.   Patient with refractory heart failure requiring, advanced intervention (i.e. patient has or will need left ventricular assist device, transplantation) (ACC/AHA Stage D heart failure)  No   23.   Deep vein thrombosis (DVT) or pulmonary embolism (PE) in the past 180 days (Within 180 days prior to signing initial informed consent) No   24.   In the opinion of the investigator, access to and through the femoral vein/IVC with guide sheath is deemed not feasible (e.g., occlude femoral veins, occluded or thrombosed IVC filter)  No   25.   Recent stroke (Within 90 days prior to procedure date) No   26.   Modified Hendry Scale ? 4 disability (For patients with history of stroke) No   27.   Kidney dysfunction with estimated glomerular filtration rate (eGFR) ? 25 mL/min/1.73m2 or patient is on chronic dialysis (At the point of enrollment) No   28.   Patients with hepatic insufficiency or cirrhosis with  Child-Kim score Class C (At the point of enrollment) Yes   29.   COPD requiring continuous home oxygen  No   30.   Chronic anemia with transfusion dependency or (Hgb < 9 g/dL) not corrected by transfusion (At the point of enrollment) No   31.   Thrombocytopenia (Platelet count < 75,000/mm3) or thrombocytosis (Platelet count > 750,000/mm3) (At the point of enrollment) No   32.   Known bleeding or clotting disorders or patient refuses blood transfusion  No   33.   Active endocarditis within the last 90 days or infection, requiring antibiotic therapy (oral or intravenous) within the last 14 days. (Within 90 days or 14 days prior to the point of enrollment respectively. Patient should also be more than 14 days from last dose of antibiotics with no evidence of active infection prior to an implant procedure, as applicable) No   34.   Pregnant, breastfeeding, or planning pregnancy within the next 12 months (365 days). ( As assessed during the screening process. Note: Female patients of child bearing potential need to have a negative pregnancy test performed within 14 days prior to study procedure (if applicable) and be adherent to an accepted method of contraception ) No   35.   Patients in whom a MURTAZA is contraindicated or cannot be completed No   36.   Untreatable hypersensitivity or contraindication to any of the following:     All oral anticoagulant and antiplatelet therapy     Heparin and Bivalirudin      Nitinol Alloys (Nickel and Titanium)     Contrast media   (Patient must be able to tolerate at least one antiplatelet medication OR one anticoagulant medication) No   37.   Patient is currently participating in another investigational biologic, drug, or device clinical study  No   38.   Concurrent medical condition with a life expectancy of less than 12 months (365 days) in the judgment of the Investigator.  No   39.  Any condition, in the opinion of the investigator, making it unlikely the patient will be able to  complete all protocol procedures and follow-up visits.   No   40.   Patient has other medical, social, or psychological conditions that preclude appropriate consent and follow-up, or patient is under guardianship.  No   41.   Any patient considered to be vulnerable (A vulnerable patient is an individual who is unable to fully understand all aspects of the investigation that are relevant to the decision to participate, or who could be manipulated or unduly influenced as a result of a compromised position, expectation of benefits or fear of retaliatory response (ISO 98989) ) No       Sierra R. Behr-Holewinski      CLASP II TR Lab And EKG  Baseline/Screening Review    Purpose: Freeman PILAR TrAnScatheter Valve RePair System Pivotal Clinical Trial (CLASP II TR): A prospective, multicenter, randomized, controlled pivotal trial to evaluate the safety and effectiveness of transcatheter tricuspid valve repair with the Freeman PILAR Transcatheter Valve Repair System and optimal medical therapy (OMT) compared to OMT alone in patients with tricuspid regurgitation.     Dr. Vásquez -- Please specify if the lab work and EKG is clinically significant or not. If clinical significant, please indicate what next actions should be!     If CS, please indicate next actions!   []  CS    []  NCS -- copy and paste these to appropriate areas    Component      Latest Ref Rng & Units 1/25/2023 CS/NCS   Urea Nitrogen      8.0 - 23.0 mg/dL 23.5 (H) []  CS    []  NCS   Creatinine      0.51 - 0.95 mg/dL 0.78    AST      10 - 35 U/L 33    ALT      10 - 35 U/L 27    Protein Total      6.4 - 8.3 g/dL 7.3    Albumin      3.5 - 5.2 g/dL 4.6    Bilirubin Total      <=1.2 mg/dL 0.8    GFR Estimate      >60 mL/min/1.73m2 82    WBC      4.0 - 11.0 10e3/uL 5.1    RBC Count      3.80 - 5.20 10e6/uL 4.36    Hemoglobin      11.7 - 15.7 g/dL 13.6    Hematocrit      35.0 - 47.0 % 42.5    Platelet Count      150 - 450 10e3/uL 275    Troponin I High Sensitivity       <54 ng/L 7    CK Total      26 - 192 U/L 29    Creatine Kinase MB Test      0.0 - 4.3 ng/mL 1.3    Uric Acid      2.4 - 5.7 mg/dL 6.5 (H) []  CS    []  NCS   GGT      5 - 36 U/L 52 (H) []  CS    []  NCS   PTT      22 - 38 Seconds 28    INR      0.85 - 1.15 1.13        []  CS    []  NCS -- For EKG on 1/25/2023    ECG results from 01/25/23   ECG 12-LEAD WITH MUSE ([facility])     Value    Systolic Blood Pressure     Diastolic Blood Pressure     Ventricular Rate 94    Atrial Rate 120    NM Interval     QRS Duration 96        QTc 457    P Axis     R AXIS 70    T Axis -1    Interpretation ECG      Atrial fibrillation  Low voltage QRS  Abnormal QRS-T angle, consider primary T wave abnormality  Abnormal ECG  When compared with ECG of 16-NOV-2022 13:45,  Nonspecific T wave abnormality no longer evident in Anterior leads  Confirmed by LISA PHILLIPS, LES LOC: ([id number]) on 1/25/2023 3:26:53 PM         Sierra R. Behr-Holewinski      Thank you for allowing me to participate in the care of your patient.      Sincerely,     Sierra R. Behr-Holewinski     [facility]  cc:   No referring provider defined for this encounter.

## 2023-01-26 LAB — TROPONIN I SERPL HS-MCNC: 7 NG/L

## 2023-01-27 LAB — CK MB SERPL-MCNC: 1.3 NG/ML

## 2023-02-06 ENCOUNTER — OFFICE VISIT (OUTPATIENT)
Dept: CARDIOLOGY | Facility: CLINIC | Age: 68
End: 2023-02-06
Payer: MEDICARE

## 2023-02-06 VITALS
BODY MASS INDEX: 37.35 KG/M2 | HEIGHT: 67 IN | HEART RATE: 67 BPM | RESPIRATION RATE: 18 BRPM | DIASTOLIC BLOOD PRESSURE: 68 MMHG | SYSTOLIC BLOOD PRESSURE: 108 MMHG | WEIGHT: 238 LBS

## 2023-02-06 DIAGNOSIS — I50.32 CHRONIC HEART FAILURE WITH PRESERVED EJECTION FRACTION (H): ICD-10-CM

## 2023-02-06 DIAGNOSIS — I48.20 CHRONIC ATRIAL FIBRILLATION (H): Primary | ICD-10-CM

## 2023-02-06 DIAGNOSIS — I36.1 NONRHEUMATIC TRICUSPID VALVE REGURGITATION: ICD-10-CM

## 2023-02-06 PROBLEM — I35.0 AORTIC VALVE STENOSIS: Status: ACTIVE | Noted: 2022-11-10

## 2023-02-06 PROCEDURE — 99214 OFFICE O/P EST MOD 30 MIN: CPT | Performed by: NURSE PRACTITIONER

## 2023-02-06 NOTE — PATIENT INSTRUCTIONS
Misty Quezada,    It was a pleasure to see you today at the [facility].     My recommendations after this visit include:    MURTAZA tomorrow 2/7/2023    Schedule sleep study.  Strongly encourage you to have sleep apnea treated if indicated    Liv Dockery, CNP  [facility], Electrophysiology  [phone]  EP nurses [phone]

## 2023-02-06 NOTE — PROGRESS NOTES
HEART CARE ELECTROPHYSIOLOGY NOTE      [facility]  [phone]      Assessment/Recommendations   Assessment/Plan:  1.  Permanent atrial fibrillation: Ventricular rates controlled.  Asymptomatic.  Continue rate control with metoprolol succinate 25 mg daily.  She has a [id number] score of 3 for age 65-74, female gender, antiheart failure.  Continue Eliquis 5 mg twice daily for stroke prophylaxis.  She reports no missed doses or bleeding issues.      2.  Chronic heart failure with preserved ejection fraction: NYHA class II.  She appears well compensated with no sign of acute fluid retention on exam or significant heart failure symptoms.  Continue Bumex with potassium.    3.  Severe tricuspid regurgitation: Increasing lower extremity edema, fatigue, and shortness of breath likely related to valvular disease.  She is participating in the CLASP II TR study (Freeman PILAR TrAnScatheter Valve RePair System Pivotal Clinical Trial).  A prospective, multicenter, randomized, controlled pivotal trial to evaluate the safety and effectiveness of transcatheter tricuspid valve repair with the Freeman PILAR Transcatheter Valve Repair System and optimal medical therapy (OMT) compared to OMT alone in patients with tricuspid regurgitation.  She is scheduled for transesophageal echo and transthoracic echo on 2/7/2023.  We discussed the MURTAZA procedure, risks, and instructions.  She states her questions were answered to satisfaction and wishes to proceed.    4.  Obstructive sleep apnea: Untreated.  She was encouraged to make the appointment for sleep study to evaluate for sleep apnea.  We discussed the consequences of untreated sleep apnea.  Encouraged treatment if indicated.     History of Present Illness/Subjective    HPI: Misty Quezada is a 67 year old female who comes in for history and physical prior to transesophageal echo for the CLASP II TR study.  She has a history of permanent atrial fibrillation,  heart failure with preserved ejection fraction, severe tricuspid regurgitation, moderate aortic valve stenosis, lymphedema, obstructive sleep apnea, obesity.    Misty states that she feels pretty well.  She states that her lower extremity lymphedema is at baseline, though worsens if she has been standing a lot.  She gets short of breath with walking about half a block which she also reports is at baseline.  She does not feel she is retaining any fluid and reports that her weights are stable.  She denies chest discomfort, palpitations, abdominal fullness/bloating, shortness of breath at rest, paroxysmal nocturnal dyspnea, orthopnea, lightheadedness, dizziness, pre-syncope, or syncope.     Cardiographics (EKG personally reviewed):  EKG done 1/25/2023 shows atrial fibrillation with fairly well-controlled ventricular response at 94 bpm.    24-hour Holter monitor worn 10/4/2021 shows persistent atrial fibrillation with fairly well-controlled ventricular response with an average rate of 97 bpm and a range of  beats per minutes.  No significant bradycardia or pauses.  No significant ventricular ectopy.    ECHO done 9/24/2022:  1. Left ventricular size is normal. Mild concentric increase in wall  thickness. Systolic function is normal. The estimated left ventricular  ejection fraction is 55-60%.  2. Right ventricle is not well visualized. Size is probably at least  moderately enlarged. Systolic function appears normal.  3. Severe right atrial enlargement.  4. Calcified trileaflet aortic valve with moderate aortic stenosis. Peak  forward velocity measures 2.8 m/s, mean gradient 17 mm Hg, calculated aortic  valve area 1.1 cm2, and dimensionless index 0.44. Trace aortic regurgitation.  5. Severe functional tricuspid regurgitation.  6. At least mild pulmonary hypertension is present with an estimated pulmonary  artery systolic pressure of 39 mm Hg plus the right atrial pressure.  7. Compared to the prior study dated  "8/22/2021, there has been no significant  change.    I have reviewed and updated the patient's Past Medical History, Social History, Family History and Medication List.  Outside records personally reviewed.     Physical Examination  Review of Systems   Vitals: /68 (BP Location: Left arm, Patient Position: Left side, Cuff Size: Adult Regular)   Pulse 67   Resp 18   Ht 1.702 m (5' 7\")   Wt 108 kg (238 lb)   BMI 37.28 kg/m    BMI= Body mass index is 37.28 kg/m .  Wt Readings from Last 3 Encounters:   02/06/23 108 kg (238 lb)   01/25/23 105.7 kg (233 lb)   01/18/23 108.4 kg (239 lb)       General Appearance:   Alert, well-appearing and in no acute distress.   HEENT: Atraumatic, normocephalic.  No scleral icterus, normal conjunctivae, EOMs intact, PERRL.  Wearing a mask.   Chest/Lungs:   Chest symmetric, spine straight.  Respirations unlabored.  Lungs are clear to auscultation.   Cardiovascular:    Irregularly irregular rate and rhythm.  Normal first and second heart sounds with soft systolic murmur, no rubs or gallops; radial and posterior tibial pulses are intact, mild bilateral lower extremity edema, wearing bilateral compression wraps.   Abdomen:  Soft, obese, nondistended, bowel sounds present.   Extremities: No cyanosis or clubbing.   Musculoskeletal: Moves all extremities.    Skin: Warm, dry, intact.    Neurologic: Mood and affect are appropriate.  Alert and oriented to person, place, time, and situation.     ROS: 10 point ROS neg other than the symptoms noted above in the HPI.         Medical History  Surgical History Family History Social History   Past Medical History:   Diagnosis Date     (HFpEF) heart failure with preserved ejection fraction (H) 10/17/2022     Aortic valve stenosis 11/10/2022     Benign essential hypertension      Chronic atrial fibrillation (H)      Lymphedema 9/24/2022     Nonrheumatic tricuspid valve regurgitation 2/6/2023     Past Surgical History:   Procedure Laterality Date "     APPENDECTOMY       CHOLECYSTECTOMY       GASTRIC BYPASS       Family History   Problem Relation Age of Onset     No Known Problems Mother      No Known Problems Father         Social History     Socioeconomic History     Marital status:      Spouse name: Not on file     Number of children: Not on file     Years of education: Not on file     Highest education level: Not on file   Occupational History     Not on file   Tobacco Use     Smoking status: Never     Smokeless tobacco: Never   Substance and Sexual Activity     Alcohol use: Not Currently     Comment: Rarely Uses alcohol     Drug use: Never     Sexual activity: Not on file   Other Topics Concern     Not on file   Social History Narrative     Not on file     Social Determinants of Health     Financial Resource Strain: Not on file   Food Insecurity: Not on file   Transportation Needs: Not on file   Physical Activity: Not on file   Stress: Not on file   Social Connections: Not on file   Intimate Partner Violence: Not on file   Housing Stability: Not on file           Medications  Allergies   Current Outpatient Medications   Medication Sig Dispense Refill     acetaminophen (TYLENOL) 325 MG tablet Take 975 mg by mouth every 4 hours as needed for mild pain       apixaban (ELIQUIS) 5 mg Tab tablet [APIXABAN (ELIQUIS) 5 MG TAB TABLET] Take 5 mg by mouth 2 (two) times a day.       bumetanide (BUMEX) 2 MG tablet TAKE 1 TABLET(2 MG) BY MOUTH TWICE DAILY 180 tablet 3     CALCIUM/D3/MAG OX//TYRONE/ZN (CALTRATE + D3 PLUS MINERALS ORAL) [CALCIUM/D3/MAG OX//TYRONE/ZN (CALTRATE + D3 PLUS MINERALS ORAL)] Take 1 tablet by mouth daily.       cholecalciferol, vitamin D3, 2,000 unit Tab [CHOLECALCIFEROL, VITAMIN D3, 2,000 UNIT TAB] Take 2,000 Units by mouth daily.       citalopram (CELEXA) 10 MG tablet Take 20 mg by mouth daily       cyanocobalamin 1000 MCG tablet [CYANOCOBALAMIN 1000 MCG TABLET] Take 1,000 mcg by mouth daily.       ferrous sulfate (FEROSUL) 325 (65  Fe) MG tablet Take 1 tablet (325 mg) by mouth every other day       losartan (COZAAR) 25 MG tablet Take 1 tablet (25 mg) by mouth daily       melatonin 3 MG tablet Take 3 mg by mouth nightly as needed       metoprolol succinate (TOPROL-XL) 25 MG [METOPROLOL SUCCINATE (TOPROL-XL) 25 MG] TAKE 1 TABLET BY MOUTH DAILY. 90 tablet 2     MULTIVIT WITH CALCIUM,IRON,MIN (WOMEN'S DAILY MULTIVITAMIN ORAL) [MULTIVIT WITH CALCIUM,IRON,MIN (WOMEN'S DAILY MULTIVITAMIN ORAL)] Take 1 tablet by mouth daily.       polyethylene glycol (MIRALAX) 17 g packet Take 17 g by mouth daily as needed for constipation       potassium chloride ER (K-TAB/KLOR-CON) 10 MEQ CR tablet Take 1 tablet (10 mEq) by mouth daily 30 tablet 11     senna (SENOKOT) 8.6 MG tablet Take 17.2 mg by mouth 2 times daily as needed       spironolactone (ALDACTONE) 25 MG tablet Take 1 tablet (25 mg) by mouth daily 90 tablet 0     miconazole (MICATIN) 2 % external powder Apply topically 2 times daily (Patient not taking: Reported on 2/6/2023)       No Known Allergies       Lab Results    Chemistry/lipid CBC Cardiac Enzymes/BNP/TSH/INR   Recent Labs   Lab Test 09/09/16  1412   CHOL 163   HDL 42*      TRIG 81     Recent Labs   Lab Test 09/09/16  1412        Recent Labs   Lab Test 01/25/23  1440      POTASSIUM 4.2   CHLORIDE 99   CO2 26   GLC 98   BUN 23.5*   CR 0.78   GFRESTIMATED 82   TURNER 10.0     Recent Labs   Lab Test 01/25/23  1440 11/16/22  1242 10/24/22  1124   CR 0.78 0.88 0.89     Recent Labs   Lab Test 09/02/21  0457   A1C 6.2*      Recent Labs   Lab Test 01/25/23  1440   WBC 5.1   HGB 13.6   HCT 42.5   MCV 98        Recent Labs   Lab Test 01/25/23  1440 11/16/22  1242 10/24/22  1124   HGB 13.6 12.3 11.8    Recent Labs   Lab Test 08/21/21  1944 08/21/21  1426 08/21/21  0820   TROPONINI 1.32* 1.87* 1.75*     Recent Labs   Lab Test 09/24/22  0850 09/02/21  0457 08/21/21  0820   BNP  --  199* 281*   NTBNP 1,898*  --   --      Recent Labs    Lab Test 08/31/21  0720   TSH 4.92     Recent Labs   Lab Test 01/25/23  1440 08/21/21  0820   INR 1.13 1.44*

## 2023-02-06 NOTE — H&P (VIEW-ONLY)
HEART CARE ELECTROPHYSIOLOGY NOTE      [facility]  [phone]      Assessment/Recommendations   Assessment/Plan:  1.  Permanent atrial fibrillation: Ventricular rates controlled.  Asymptomatic.  Continue rate control with metoprolol succinate 25 mg daily.  She has a [id number] score of 3 for age 65-74, female gender, antiheart failure.  Continue Eliquis 5 mg twice daily for stroke prophylaxis.  She reports no missed doses or bleeding issues.      2.  Chronic heart failure with preserved ejection fraction: NYHA class II.  She appears well compensated with no sign of acute fluid retention on exam or significant heart failure symptoms.  Continue Bumex with potassium.    3.  Severe tricuspid regurgitation: Increasing lower extremity edema, fatigue, and shortness of breath likely related to valvular disease.  She is participating in the CLASP II TR study (Freeman PILAR TrAnScatheter Valve RePair System Pivotal Clinical Trial).  A prospective, multicenter, randomized, controlled pivotal trial to evaluate the safety and effectiveness of transcatheter tricuspid valve repair with the Freeman PILAR Transcatheter Valve Repair System and optimal medical therapy (OMT) compared to OMT alone in patients with tricuspid regurgitation.  She is scheduled for transesophageal echo and transthoracic echo on 2/7/2023.  We discussed the MURTAZA procedure, risks, and instructions.  She states her questions were answered to satisfaction and wishes to proceed.    4.  Obstructive sleep apnea: Untreated.  She was encouraged to make the appointment for sleep study to evaluate for sleep apnea.  We discussed the consequences of untreated sleep apnea.  Encouraged treatment if indicated.     History of Present Illness/Subjective    HPI: Misty Quezada is a 67 year old female who comes in for history and physical prior to transesophageal echo for the CLASP II TR study.  She has a history of permanent atrial fibrillation,  heart failure with preserved ejection fraction, severe tricuspid regurgitation, moderate aortic valve stenosis, lymphedema, obstructive sleep apnea, obesity.    Misty states that she feels pretty well.  She states that her lower extremity lymphedema is at baseline, though worsens if she has been standing a lot.  She gets short of breath with walking about half a block which she also reports is at baseline.  She does not feel she is retaining any fluid and reports that her weights are stable.  She denies chest discomfort, palpitations, abdominal fullness/bloating, shortness of breath at rest, paroxysmal nocturnal dyspnea, orthopnea, lightheadedness, dizziness, pre-syncope, or syncope.     Cardiographics (EKG personally reviewed):  EKG done 1/25/2023 shows atrial fibrillation with fairly well-controlled ventricular response at 94 bpm.    24-hour Holter monitor worn 10/4/2021 shows persistent atrial fibrillation with fairly well-controlled ventricular response with an average rate of 97 bpm and a range of  beats per minutes.  No significant bradycardia or pauses.  No significant ventricular ectopy.    ECHO done 9/24/2022:  1. Left ventricular size is normal. Mild concentric increase in wall  thickness. Systolic function is normal. The estimated left ventricular  ejection fraction is 55-60%.  2. Right ventricle is not well visualized. Size is probably at least  moderately enlarged. Systolic function appears normal.  3. Severe right atrial enlargement.  4. Calcified trileaflet aortic valve with moderate aortic stenosis. Peak  forward velocity measures 2.8 m/s, mean gradient 17 mm Hg, calculated aortic  valve area 1.1 cm2, and dimensionless index 0.44. Trace aortic regurgitation.  5. Severe functional tricuspid regurgitation.  6. At least mild pulmonary hypertension is present with an estimated pulmonary  artery systolic pressure of 39 mm Hg plus the right atrial pressure.  7. Compared to the prior study dated  "8/22/2021, there has been no significant  change.    I have reviewed and updated the patient's Past Medical History, Social History, Family History and Medication List.  Outside records personally reviewed.     Physical Examination  Review of Systems   Vitals: /68 (BP Location: Left arm, Patient Position: Left side, Cuff Size: Adult Regular)   Pulse 67   Resp 18   Ht 1.702 m (5' 7\")   Wt 108 kg (238 lb)   BMI 37.28 kg/m    BMI= Body mass index is 37.28 kg/m .  Wt Readings from Last 3 Encounters:   02/06/23 108 kg (238 lb)   01/25/23 105.7 kg (233 lb)   01/18/23 108.4 kg (239 lb)       General Appearance:   Alert, well-appearing and in no acute distress.   HEENT: Atraumatic, normocephalic.  No scleral icterus, normal conjunctivae, EOMs intact, PERRL.  Wearing a mask.   Chest/Lungs:   Chest symmetric, spine straight.  Respirations unlabored.  Lungs are clear to auscultation.   Cardiovascular:    Irregularly irregular rate and rhythm.  Normal first and second heart sounds with soft systolic murmur, no rubs or gallops; radial and posterior tibial pulses are intact, mild bilateral lower extremity edema, wearing bilateral compression wraps.   Abdomen:  Soft, obese, nondistended, bowel sounds present.   Extremities: No cyanosis or clubbing.   Musculoskeletal: Moves all extremities.    Skin: Warm, dry, intact.    Neurologic: Mood and affect are appropriate.  Alert and oriented to person, place, time, and situation.     ROS: 10 point ROS neg other than the symptoms noted above in the HPI.         Medical History  Surgical History Family History Social History   Past Medical History:   Diagnosis Date     (HFpEF) heart failure with preserved ejection fraction (H) 10/17/2022     Aortic valve stenosis 11/10/2022     Benign essential hypertension      Chronic atrial fibrillation (H)      Lymphedema 9/24/2022     Nonrheumatic tricuspid valve regurgitation 2/6/2023     Past Surgical History:   Procedure Laterality Date "     APPENDECTOMY       CHOLECYSTECTOMY       GASTRIC BYPASS       Family History   Problem Relation Age of Onset     No Known Problems Mother      No Known Problems Father         Social History     Socioeconomic History     Marital status:      Spouse name: Not on file     Number of children: Not on file     Years of education: Not on file     Highest education level: Not on file   Occupational History     Not on file   Tobacco Use     Smoking status: Never     Smokeless tobacco: Never   Substance and Sexual Activity     Alcohol use: Not Currently     Comment: Rarely Uses alcohol     Drug use: Never     Sexual activity: Not on file   Other Topics Concern     Not on file   Social History Narrative     Not on file     Social Determinants of Health     Financial Resource Strain: Not on file   Food Insecurity: Not on file   Transportation Needs: Not on file   Physical Activity: Not on file   Stress: Not on file   Social Connections: Not on file   Intimate Partner Violence: Not on file   Housing Stability: Not on file           Medications  Allergies   Current Outpatient Medications   Medication Sig Dispense Refill     acetaminophen (TYLENOL) 325 MG tablet Take 975 mg by mouth every 4 hours as needed for mild pain       apixaban (ELIQUIS) 5 mg Tab tablet [APIXABAN (ELIQUIS) 5 MG TAB TABLET] Take 5 mg by mouth 2 (two) times a day.       bumetanide (BUMEX) 2 MG tablet TAKE 1 TABLET(2 MG) BY MOUTH TWICE DAILY 180 tablet 3     CALCIUM/D3/MAG OX//TYRONE/ZN (CALTRATE + D3 PLUS MINERALS ORAL) [CALCIUM/D3/MAG OX//TYRONE/ZN (CALTRATE + D3 PLUS MINERALS ORAL)] Take 1 tablet by mouth daily.       cholecalciferol, vitamin D3, 2,000 unit Tab [CHOLECALCIFEROL, VITAMIN D3, 2,000 UNIT TAB] Take 2,000 Units by mouth daily.       citalopram (CELEXA) 10 MG tablet Take 20 mg by mouth daily       cyanocobalamin 1000 MCG tablet [CYANOCOBALAMIN 1000 MCG TABLET] Take 1,000 mcg by mouth daily.       ferrous sulfate (FEROSUL) 325 (65  Fe) MG tablet Take 1 tablet (325 mg) by mouth every other day       losartan (COZAAR) 25 MG tablet Take 1 tablet (25 mg) by mouth daily       melatonin 3 MG tablet Take 3 mg by mouth nightly as needed       metoprolol succinate (TOPROL-XL) 25 MG [METOPROLOL SUCCINATE (TOPROL-XL) 25 MG] TAKE 1 TABLET BY MOUTH DAILY. 90 tablet 2     MULTIVIT WITH CALCIUM,IRON,MIN (WOMEN'S DAILY MULTIVITAMIN ORAL) [MULTIVIT WITH CALCIUM,IRON,MIN (WOMEN'S DAILY MULTIVITAMIN ORAL)] Take 1 tablet by mouth daily.       polyethylene glycol (MIRALAX) 17 g packet Take 17 g by mouth daily as needed for constipation       potassium chloride ER (K-TAB/KLOR-CON) 10 MEQ CR tablet Take 1 tablet (10 mEq) by mouth daily 30 tablet 11     senna (SENOKOT) 8.6 MG tablet Take 17.2 mg by mouth 2 times daily as needed       spironolactone (ALDACTONE) 25 MG tablet Take 1 tablet (25 mg) by mouth daily 90 tablet 0     miconazole (MICATIN) 2 % external powder Apply topically 2 times daily (Patient not taking: Reported on 2/6/2023)       No Known Allergies       Lab Results    Chemistry/lipid CBC Cardiac Enzymes/BNP/TSH/INR   Recent Labs   Lab Test 09/09/16  1412   CHOL 163   HDL 42*      TRIG 81     Recent Labs   Lab Test 09/09/16  1412        Recent Labs   Lab Test 01/25/23  1440      POTASSIUM 4.2   CHLORIDE 99   CO2 26   GLC 98   BUN 23.5*   CR 0.78   GFRESTIMATED 82   TURNER 10.0     Recent Labs   Lab Test 01/25/23  1440 11/16/22  1242 10/24/22  1124   CR 0.78 0.88 0.89     Recent Labs   Lab Test 09/02/21  0457   A1C 6.2*      Recent Labs   Lab Test 01/25/23  1440   WBC 5.1   HGB 13.6   HCT 42.5   MCV 98        Recent Labs   Lab Test 01/25/23  1440 11/16/22  1242 10/24/22  1124   HGB 13.6 12.3 11.8    Recent Labs   Lab Test 08/21/21  1944 08/21/21  1426 08/21/21  0820   TROPONINI 1.32* 1.87* 1.75*     Recent Labs   Lab Test 09/24/22  0850 09/02/21  0457 08/21/21  0820   BNP  --  199* 281*   NTBNP 1,898*  --   --      Recent Labs    Lab Test 08/31/21  0720   TSH 4.92     Recent Labs   Lab Test 01/25/23  1440 08/21/21  0820   INR 1.13 1.44*

## 2023-02-06 NOTE — LETTER
2/6/2023    [facility]  [address]  [facility] [address]    RE: Misty Quezada       Dear Colleague,     I had the pleasure of seeing Misty Quezada in the [facility].    HEART CARE ELECTROPHYSIOLOGY NOTE      [facility]  [phone]      Assessment/Recommendations   Assessment/Plan:  1.  Permanent atrial fibrillation: Ventricular rates controlled.  Asymptomatic.  Continue rate control with metoprolol succinate 25 mg daily.  She has a [id number] score of 3 for age 65-74, female gender, antiheart failure.  Continue Eliquis 5 mg twice daily for stroke prophylaxis.  She reports no missed doses or bleeding issues.      2.  Chronic heart failure with preserved ejection fraction: NYHA class II.  She appears well compensated with no sign of acute fluid retention on exam or significant heart failure symptoms.  Continue Bumex with potassium.    3.  Severe tricuspid regurgitation: Increasing lower extremity edema, fatigue, and shortness of breath likely related to valvular disease.  She is participating in the CLASP II TR study (Freeman PILAR TrAnScatheter Valve RePair System Pivotal Clinical Trial).  A prospective, multicenter, randomized, controlled pivotal trial to evaluate the safety and effectiveness of transcatheter tricuspid valve repair with the Freeman PILAR Transcatheter Valve Repair System and optimal medical therapy (OMT) compared to OMT alone in patients with tricuspid regurgitation.  She is scheduled for transesophageal echo and transthoracic echo on 2/7/2023.  We discussed the MURTAZA procedure, risks, and instructions.  She states her questions were answered to satisfaction and wishes to proceed.    4.  Obstructive sleep apnea: Untreated.  She was encouraged to make the appointment for sleep study to evaluate for sleep apnea.  We discussed the consequences of untreated sleep apnea.  Encouraged treatment if indicated.     History of Present  Illness/Subjective    HPI: Misty Quezada is a 67 year old female who comes in for history and physical prior to transesophageal echo for the CLASP II TR study.  She has a history of permanent atrial fibrillation, heart failure with preserved ejection fraction, severe tricuspid regurgitation, moderate aortic valve stenosis, lymphedema, obstructive sleep apnea, obesity.    Misty states that she feels pretty well.  She states that her lower extremity lymphedema is at baseline, though worsens if she has been standing a lot.  She gets short of breath with walking about half a block which she also reports is at baseline.  She does not feel she is retaining any fluid and reports that her weights are stable.  She denies chest discomfort, palpitations, abdominal fullness/bloating, shortness of breath at rest, paroxysmal nocturnal dyspnea, orthopnea, lightheadedness, dizziness, pre-syncope, or syncope.     Cardiographics (EKG personally reviewed):  EKG done 1/25/2023 shows atrial fibrillation with fairly well-controlled ventricular response at 94 bpm.    24-hour Holter monitor worn 10/4/2021 shows persistent atrial fibrillation with fairly well-controlled ventricular response with an average rate of 97 bpm and a range of  beats per minutes.  No significant bradycardia or pauses.  No significant ventricular ectopy.    ECHO done 9/24/2022:  1. Left ventricular size is normal. Mild concentric increase in wall  thickness. Systolic function is normal. The estimated left ventricular  ejection fraction is 55-60%.  2. Right ventricle is not well visualized. Size is probably at least  moderately enlarged. Systolic function appears normal.  3. Severe right atrial enlargement.  4. Calcified trileaflet aortic valve with moderate aortic stenosis. Peak  forward velocity measures 2.8 m/s, mean gradient 17 mm Hg, calculated aortic  valve area 1.1 cm2, and dimensionless index 0.44. Trace aortic regurgitation.  5. Severe functional  "tricuspid regurgitation.  6. At least mild pulmonary hypertension is present with an estimated pulmonary  artery systolic pressure of 39 mm Hg plus the right atrial pressure.  7. Compared to the prior study dated 8/22/2021, there has been no significant  change.    I have reviewed and updated the patient's Past Medical History, Social History, Family History and Medication List.  Outside records personally reviewed.     Physical Examination  Review of Systems   Vitals: /68 (BP Location: Left arm, Patient Position: Left side, Cuff Size: Adult Regular)   Pulse 67   Resp 18   Ht 1.702 m (5' 7\")   Wt 108 kg (238 lb)   BMI 37.28 kg/m    BMI= Body mass index is 37.28 kg/m .  Wt Readings from Last 3 Encounters:   02/06/23 108 kg (238 lb)   01/25/23 105.7 kg (233 lb)   01/18/23 108.4 kg (239 lb)       General Appearance:   Alert, well-appearing and in no acute distress.   HEENT: Atraumatic, normocephalic.  No scleral icterus, normal conjunctivae, EOMs intact, PERRL.  Wearing a mask.   Chest/Lungs:   Chest symmetric, spine straight.  Respirations unlabored.  Lungs are clear to auscultation.   Cardiovascular:    Irregularly irregular rate and rhythm.  Normal first and second heart sounds with soft systolic murmur, no rubs or gallops; radial and posterior tibial pulses are intact, mild bilateral lower extremity edema, wearing bilateral compression wraps.   Abdomen:  Soft, obese, nondistended, bowel sounds present.   Extremities: No cyanosis or clubbing.   Musculoskeletal: Moves all extremities.    Skin: Warm, dry, intact.    Neurologic: Mood and affect are appropriate.  Alert and oriented to person, place, time, and situation.     ROS: 10 point ROS neg other than the symptoms noted above in the HPI.         Medical History  Surgical History Family History Social History   Past Medical History:   Diagnosis Date     (HFpEF) heart failure with preserved ejection fraction (H) 10/17/2022     Aortic valve stenosis " 11/10/2022     Benign essential hypertension      Chronic atrial fibrillation (H)      Lymphedema 9/24/2022     Nonrheumatic tricuspid valve regurgitation 2/6/2023     Past Surgical History:   Procedure Laterality Date     APPENDECTOMY       CHOLECYSTECTOMY       GASTRIC BYPASS       Family History   Problem Relation Age of Onset     No Known Problems Mother      No Known Problems Father         Social History     Socioeconomic History     Marital status:      Spouse name: Not on file     Number of children: Not on file     Years of education: Not on file     Highest education level: Not on file   Occupational History     Not on file   Tobacco Use     Smoking status: Never     Smokeless tobacco: Never   Substance and Sexual Activity     Alcohol use: Not Currently     Comment: Rarely Uses alcohol     Drug use: Never     Sexual activity: Not on file   Other Topics Concern     Not on file   Social History Narrative     Not on file     Social Determinants of Health     Financial Resource Strain: Not on file   Food Insecurity: Not on file   Transportation Needs: Not on file   Physical Activity: Not on file   Stress: Not on file   Social Connections: Not on file   Intimate Partner Violence: Not on file   Housing Stability: Not on file           Medications  Allergies   Current Outpatient Medications   Medication Sig Dispense Refill     acetaminophen (TYLENOL) 325 MG tablet Take 975 mg by mouth every 4 hours as needed for mild pain       apixaban (ELIQUIS) 5 mg Tab tablet [APIXABAN (ELIQUIS) 5 MG TAB TABLET] Take 5 mg by mouth 2 (two) times a day.       bumetanide (BUMEX) 2 MG tablet TAKE 1 TABLET(2 MG) BY MOUTH TWICE DAILY 180 tablet 3     CALCIUM/D3/MAG OX//TYRONE/ZN (CALTRATE + D3 PLUS MINERALS ORAL) [CALCIUM/D3/MAG OX//TYRONE/ZN (CALTRATE + D3 PLUS MINERALS ORAL)] Take 1 tablet by mouth daily.       cholecalciferol, vitamin D3, 2,000 unit Tab [CHOLECALCIFEROL, VITAMIN D3, 2,000 UNIT TAB] Take 2,000 Units by  mouth daily.       citalopram (CELEXA) 10 MG tablet Take 20 mg by mouth daily       cyanocobalamin 1000 MCG tablet [CYANOCOBALAMIN 1000 MCG TABLET] Take 1,000 mcg by mouth daily.       ferrous sulfate (FEROSUL) 325 (65 Fe) MG tablet Take 1 tablet (325 mg) by mouth every other day       losartan (COZAAR) 25 MG tablet Take 1 tablet (25 mg) by mouth daily       melatonin 3 MG tablet Take 3 mg by mouth nightly as needed       metoprolol succinate (TOPROL-XL) 25 MG [METOPROLOL SUCCINATE (TOPROL-XL) 25 MG] TAKE 1 TABLET BY MOUTH DAILY. 90 tablet 2     MULTIVIT WITH CALCIUM,IRON,MIN (WOMEN'S DAILY MULTIVITAMIN ORAL) [MULTIVIT WITH CALCIUM,IRON,MIN (WOMEN'S DAILY MULTIVITAMIN ORAL)] Take 1 tablet by mouth daily.       polyethylene glycol (MIRALAX) 17 g packet Take 17 g by mouth daily as needed for constipation       potassium chloride ER (K-TAB/KLOR-CON) 10 MEQ CR tablet Take 1 tablet (10 mEq) by mouth daily 30 tablet 11     senna (SENOKOT) 8.6 MG tablet Take 17.2 mg by mouth 2 times daily as needed       spironolactone (ALDACTONE) 25 MG tablet Take 1 tablet (25 mg) by mouth daily 90 tablet 0     miconazole (MICATIN) 2 % external powder Apply topically 2 times daily (Patient not taking: Reported on 2/6/2023)       No Known Allergies       Lab Results    Chemistry/lipid CBC Cardiac Enzymes/BNP/TSH/INR   Recent Labs   Lab Test 09/09/16  1412   CHOL 163   HDL 42*      TRIG 81     Recent Labs   Lab Test 09/09/16  1412        Recent Labs   Lab Test 01/25/23  1440      POTASSIUM 4.2   CHLORIDE 99   CO2 26   GLC 98   BUN 23.5*   CR 0.78   GFRESTIMATED 82   TURNER 10.0     Recent Labs   Lab Test 01/25/23  1440 11/16/22  1242 10/24/22  1124   CR 0.78 0.88 0.89     Recent Labs   Lab Test 09/02/21  0457   A1C 6.2*      Recent Labs   Lab Test 01/25/23  1440   WBC 5.1   HGB 13.6   HCT 42.5   MCV 98        Recent Labs   Lab Test 01/25/23  1440 11/16/22  1242 10/24/22  1124   HGB 13.6 12.3 11.8    Recent Labs   Lab  Test 08/21/21  1944 08/21/21  1426 08/21/21  0820   TROPONINI 1.32* 1.87* 1.75*     Recent Labs   Lab Test 09/24/22  0850 09/02/21  0457 08/21/21  0820   BNP  --  199* 281*   NTBNP 1,898*  --   --      Recent Labs   Lab Test 08/31/21  0720   TSH 4.92     Recent Labs   Lab Test 01/25/23  1440 08/21/21  0820   INR 1.13 1.44*              Thank you for allowing me to participate in the care of your patient.      Sincerely,     ERICK Alberto [facility]  cc:   No referring provider defined for this encounter.

## 2023-02-07 ENCOUNTER — HOSPITAL ENCOUNTER (OUTPATIENT)
Dept: CARDIOLOGY | Facility: HOSPITAL | Age: 68
Discharge: HOME | End: 2023-02-07
Attending: INTERNAL MEDICINE | Admitting: INTERNAL MEDICINE
Payer: MEDICARE

## 2023-02-07 ENCOUNTER — HOSPITAL ENCOUNTER (OUTPATIENT)
Dept: CARDIOLOGY | Facility: HOSPITAL | Age: 68
Discharge: HOME | End: 2023-02-07
Attending: INTERNAL MEDICINE
Payer: MEDICARE

## 2023-02-07 VITALS
HEART RATE: 80 BPM | TEMPERATURE: 97.9 F | RESPIRATION RATE: 23 BRPM | DIASTOLIC BLOOD PRESSURE: 55 MMHG | OXYGEN SATURATION: 94 % | SYSTOLIC BLOOD PRESSURE: 106 MMHG

## 2023-02-07 DIAGNOSIS — I07.1 TRICUSPID VALVE INSUFFICIENCY, UNSPECIFIED ETIOLOGY: ICD-10-CM

## 2023-02-07 DIAGNOSIS — Z00.6 EXAM FOR CLINICAL RESEARCH: ICD-10-CM

## 2023-02-07 DIAGNOSIS — I48.21 PERMANENT ATRIAL FIBRILLATION (H): ICD-10-CM

## 2023-02-07 DIAGNOSIS — I35.0 NONRHEUMATIC AORTIC VALVE STENOSIS: ICD-10-CM

## 2023-02-07 DIAGNOSIS — I50.32 CHRONIC HEART FAILURE WITH PRESERVED EJECTION FRACTION (H): ICD-10-CM

## 2023-02-07 LAB
BI-PLANE LVEF ECHO: NORMAL
LVEF ECHO: NORMAL

## 2023-02-07 PROCEDURE — 93325 DOPPLER ECHO COLOR FLOW MAPG: CPT | Mod: 26 | Performed by: GENERAL ACUTE CARE HOSPITAL

## 2023-02-07 PROCEDURE — 99153 MOD SED SAME PHYS/QHP EA: CPT | Performed by: GENERAL ACUTE CARE HOSPITAL

## 2023-02-07 PROCEDURE — 255N000002 HC RX 255 OP 636: Performed by: INTERNAL MEDICINE

## 2023-02-07 PROCEDURE — 93320 DOPPLER ECHO COMPLETE: CPT | Mod: 26 | Performed by: GENERAL ACUTE CARE HOSPITAL

## 2023-02-07 PROCEDURE — 93312 ECHO TRANSESOPHAGEAL: CPT | Mod: 26 | Performed by: GENERAL ACUTE CARE HOSPITAL

## 2023-02-07 PROCEDURE — 93321 DOPPLER ECHO F-UP/LMTD STD: CPT

## 2023-02-07 PROCEDURE — 258N000003 HC RX IP 258 OP 636: Performed by: INTERNAL MEDICINE

## 2023-02-07 PROCEDURE — 99152 MOD SED SAME PHYS/QHP 5/>YRS: CPT | Performed by: GENERAL ACUTE CARE HOSPITAL

## 2023-02-07 PROCEDURE — 250N000009 HC RX 250: Performed by: GENERAL ACUTE CARE HOSPITAL

## 2023-02-07 PROCEDURE — 250N000011 HC RX IP 250 OP 636: Performed by: GENERAL ACUTE CARE HOSPITAL

## 2023-02-07 PROCEDURE — 93325 DOPPLER ECHO COLOR FLOW MAPG: CPT

## 2023-02-07 PROCEDURE — 93306 TTE W/DOPPLER COMPLETE: CPT | Mod: 26 | Performed by: GENERAL ACUTE CARE HOSPITAL

## 2023-02-07 RX ORDER — LIDOCAINE HYDROCHLORIDE 20 MG/ML
SOLUTION OROPHARYNGEAL
Status: COMPLETED | OUTPATIENT
Start: 2023-02-07 | End: 2023-02-07

## 2023-02-07 RX ORDER — SODIUM CHLORIDE 9 MG/ML
INJECTION, SOLUTION INTRAVENOUS CONTINUOUS
Status: DISCONTINUED | OUTPATIENT
Start: 2023-02-07 | End: 2023-02-07 | Stop reason: HOSPADM

## 2023-02-07 RX ORDER — FENTANYL CITRATE 50 UG/ML
INJECTION, SOLUTION INTRAMUSCULAR; INTRAVENOUS
Status: COMPLETED | OUTPATIENT
Start: 2023-02-07 | End: 2023-02-07

## 2023-02-07 RX ADMIN — MIDAZOLAM 1 MG: 1 INJECTION INTRAMUSCULAR; INTRAVENOUS at 10:08

## 2023-02-07 RX ADMIN — MIDAZOLAM 1 MG: 1 INJECTION INTRAMUSCULAR; INTRAVENOUS at 10:24

## 2023-02-07 RX ADMIN — FENTANYL CITRATE 25 MCG: 50 INJECTION, SOLUTION INTRAMUSCULAR; INTRAVENOUS at 10:07

## 2023-02-07 RX ADMIN — MIDAZOLAM 1 MG: 1 INJECTION INTRAMUSCULAR; INTRAVENOUS at 10:33

## 2023-02-07 RX ADMIN — MIDAZOLAM 1 MG: 1 INJECTION INTRAMUSCULAR; INTRAVENOUS at 10:19

## 2023-02-07 RX ADMIN — SODIUM CHLORIDE: 9 INJECTION, SOLUTION INTRAVENOUS at 09:26

## 2023-02-07 RX ADMIN — FENTANYL CITRATE 25 MCG: 50 INJECTION, SOLUTION INTRAMUSCULAR; INTRAVENOUS at 10:24

## 2023-02-07 RX ADMIN — MIDAZOLAM 2 MG: 1 INJECTION INTRAMUSCULAR; INTRAVENOUS at 10:01

## 2023-02-07 RX ADMIN — FENTANYL CITRATE 25 MCG: 50 INJECTION, SOLUTION INTRAMUSCULAR; INTRAVENOUS at 10:03

## 2023-02-07 RX ADMIN — BENZOCAINE 4 SPRAY: 220 SPRAY, METERED PERIODONTAL at 09:58

## 2023-02-07 RX ADMIN — FENTANYL CITRATE 25 MCG: 50 INJECTION, SOLUTION INTRAMUSCULAR; INTRAVENOUS at 10:32

## 2023-02-07 RX ADMIN — FENTANYL CITRATE 50 MCG: 50 INJECTION, SOLUTION INTRAMUSCULAR; INTRAVENOUS at 10:00

## 2023-02-07 RX ADMIN — FENTANYL CITRATE 25 MCG: 50 INJECTION, SOLUTION INTRAMUSCULAR; INTRAVENOUS at 10:19

## 2023-02-07 RX ADMIN — PERFLUTREN 2 ML: 6.52 INJECTION, SUSPENSION INTRAVENOUS at 11:28

## 2023-02-07 RX ADMIN — LIDOCAINE HYDROCHLORIDE 15 ML: 20 SOLUTION ORAL; TOPICAL at 09:56

## 2023-02-07 RX ADMIN — MIDAZOLAM 1 MG: 1 INJECTION INTRAMUSCULAR; INTRAVENOUS at 10:04

## 2023-02-07 ASSESSMENT — ACTIVITIES OF DAILY LIVING (ADL): ADLS_ACUITY_SCORE: 35

## 2023-02-07 NOTE — PROGRESS NOTES
CLASP II TR Lab And EKG  Baseline/Screening Review    Purpose: Freeman PILAR TrAnScatheter Valve RePair System Pivotal Clinical Trial (CLASP II TR): A prospective, multicenter, randomized, controlled pivotal trial to evaluate the safety and effectiveness of transcatheter tricuspid valve repair with the Freeman PILAR Transcatheter Valve Repair System and optimal medical therapy (OMT) compared to OMT alone in patients with tricuspid regurgitation.     Dr. Vásquez -- Please specify if the lab work and EKG is clinically significant or not. If clinical significant, please indicate what next actions should be!       Component      Latest Ref Rng & Units 1/25/2023 CS/NCS   Urea Nitrogen      8.0 - 23.0 mg/dL 23.5 (H) []  CS    [x]  NCS   Creatinine      0.51 - 0.95 mg/dL 0.78    ALP      35 - 104 U/L   113 (H)    []  CS    [x]  NCS      AST      10 - 35 U/L 33    ALT      10 - 35 U/L 27    Protein Total      6.4 - 8.3 g/dL 7.3    Albumin      3.5 - 5.2 g/dL 4.6    Bilirubin Total      <=1.2 mg/dL 0.8    GFR Estimate      >60 mL/min/1.73m2 82    WBC      4.0 - 11.0 10e3/uL 5.1    RBC Count      3.80 - 5.20 10e6/uL 4.36    Hemoglobin      11.7 - 15.7 g/dL 13.6    Hematocrit      35.0 - 47.0 % 42.5    Platelet Count      150 - 450 10e3/uL 275    Troponin I High Sensitivity      <54 ng/L 7    CK Total      26 - 192 U/L 29    Creatine Kinase MB Test      0.0 - 4.3 ng/mL 1.3    Uric Acid      2.4 - 5.7 mg/dL 6.5 (H) []  CS    [x]  NCS   GGT      5 - 36 U/L 52 (H) []  CS    [x]  NCS   PTT      22 - 38 Seconds 28    INR      0.85 - 1.15 1.13        []  CS    [x]  NCS -- For EKG on 1/25/2023    ECG results from 01/25/23   ECG 12-LEAD WITH MUSE (L[facility])     Value    Systolic Blood Pressure     Diastolic Blood Pressure     Ventricular Rate 94    Atrial Rate 120    FL Interval     QRS Duration 96        QTc 457    P Axis     R AXIS 70    T Axis -1    Interpretation ECG      Atrial fibrillation  Low voltage QRS  Abnormal QRS-T  angle, consider primary T wave abnormality  Abnormal ECG  When compared with ECG of 16-NOV-2022 13:45,  Nonspecific T wave abnormality no longer evident in Anterior leads  Confirmed by LISA PHILLIPS, LES LOC:STACI ([id number]) on 1/25/2023 3:26:53 PM         Sierra R. Behr-Holewinski

## 2023-02-07 NOTE — PRE-PROCEDURE
GENERAL PRE-PROCEDURE:     Verbal consent obtained?: Yes    Written consent obtained?: Yes    Risks and benefits: Risks, benefits and alternatives were discussed    Patient states understanding of procedure being performed: Yes    Patient's understanding of procedure matches consent: Yes    Procedure consent matches procedure scheduled: Yes    Expected level of sedation:  Moderate  Appropriately NPO:  Yes  ASA Class:  3  Mallampati  :  Grade 2- soft palate, base of uvula, tonsillar pillars, and portion of posterior pharyngeal wall visible  Lungs:  Lungs clear with good breath sounds bilaterally  Heart:  Normal heart sounds and rate, a-fib and systolic murmur  History & Physical reviewed:  History and physical reviewed and no updates needed  Statement of review:  I have reviewed the lab findings, diagnostic data, medications, and the plan for sedation

## 2023-02-07 NOTE — INTERVAL H&P NOTE
"I have reviewed the surgical (or preoperative) H&P that is linked to this encounter, and examined the patient. There are no significant changes    Clinical Conditions Present on Arrival:  Clinically Significant Risk Factors Present on Admission                  # Drug Induced Coagulation Defect: home medication list includes an anticoagulant medication   # Obesity: Estimated body mass index is 37.28 kg/m  as calculated from the following:    Height as of 2/6/23: 1.702 m (5' 7\").    Weight as of 2/6/23: 108 kg (238 lb).       "

## 2023-02-07 NOTE — DISCHARGE INSTRUCTIONS
1. You are required to have someone accompany you home.    2. Rest today. Do not drive or operate machinery today. Over-activity may produce nausea and dizziness.    3. You should follow your normal diet. Drink plenty of fluids. Do not drink any alcoholic beverages for 24 hours. *(Alcohol may interact with the medications you received today).    4. NO HOT FOODS or LIQUIDS FOR 6 HOURS after the procedure until 5pm this evening.    5. You may have a sore throat or cough. This is normal. These symptoms should resolve in 24 hours.     6. If you have further questions call your doctor:    Dr. Vásquez  [phone]

## 2023-02-16 ENCOUNTER — TELEPHONE (OUTPATIENT)
Dept: CARDIOLOGY | Facility: CLINIC | Age: 68
End: 2023-02-16
Payer: MEDICARE

## 2023-02-16 DIAGNOSIS — I07.1 TRICUSPID VALVE INSUFFICIENCY, UNSPECIFIED ETIOLOGY: Primary | ICD-10-CM

## 2023-02-16 NOTE — TELEPHONE ENCOUNTER
Valve Clinic RN Phone Call:  Call made on 2/16/2023 at 10:50 AM by Charlette Montana RN    Reason for call: discuss remainder of work up needed for TR clasp trial with patient     Symptom or request: Discussed with patient upcoming appointments related to her TR clasp trial and potential procedure. Patient will need to be scheduled for an angiogram/ R&L heart cath, HF clinic appt with Dr. Siegel and dental clearance. Patient noted she has FULL dentures, noted that she will NOT require dental clearance due to this. Writer discussed briefly what these appts entail. Questions answered. Patient asked that scheduling team wait until next week to schedule her appts so she can discuss them with her son.     Additional comments/Actions: Orders placed for coronary angiogram/R &L heart cath/ possible PCI. Msg sent to  to schedule pt for angio and HF clinic appt.     Instructions given to patient: Anticipate phone call to set up angiogram and HF clinic appointment. Letter with instructions will be sent prior, phone call to follow. Patient scheduled for CV surgery consult with Dr. Eng on 3/7/23. Patient encouraged to reach out to valve clinic with any further questions. Provided phone #. Pt verbalized understanding and agreed to plan.     Charlette Montana RN on 2/16/2023 at 11:15 AM

## 2023-03-02 ENCOUNTER — TELEPHONE (OUTPATIENT)
Dept: CARDIOLOGY | Facility: CLINIC | Age: 68
End: 2023-03-02
Payer: MEDICARE

## 2023-03-02 NOTE — TELEPHONE ENCOUNTER
Left message for patient about [employer] picking her up at 0530 for an admit time of 0600.     Sierra R. Behr-Holewinski

## 2023-03-07 ENCOUNTER — PREP FOR PROCEDURE (OUTPATIENT)
Dept: CARDIOLOGY | Facility: CLINIC | Age: 68
End: 2023-03-07

## 2023-03-07 ENCOUNTER — TELEPHONE (OUTPATIENT)
Dept: CARDIOLOGY | Facility: CLINIC | Age: 68
End: 2023-03-07

## 2023-03-07 DIAGNOSIS — I07.1 TRICUSPID VALVE INSUFFICIENCY, UNSPECIFIED ETIOLOGY: Primary | ICD-10-CM

## 2023-03-07 RX ORDER — SODIUM CHLORIDE 9 MG/ML
INJECTION, SOLUTION INTRAVENOUS CONTINUOUS
Status: CANCELLED | OUTPATIENT
Start: 2023-03-15

## 2023-03-07 RX ORDER — ASPIRIN 325 MG
325 TABLET ORAL ONCE
Status: CANCELLED | OUTPATIENT
Start: 2023-03-15 | End: 2023-03-07

## 2023-03-07 RX ORDER — FENTANYL CITRATE 50 UG/ML
25 INJECTION, SOLUTION INTRAMUSCULAR; INTRAVENOUS
Status: CANCELLED | OUTPATIENT
Start: 2023-03-15

## 2023-03-07 RX ORDER — ASPIRIN 81 MG/1
243 TABLET, CHEWABLE ORAL ONCE
Status: CANCELLED | OUTPATIENT
Start: 2023-03-15

## 2023-03-07 RX ORDER — LIDOCAINE 40 MG/G
CREAM TOPICAL
Status: CANCELLED | OUTPATIENT
Start: 2023-03-07

## 2023-03-07 NOTE — TELEPHONE ENCOUNTER
Pre-Procedure Angiogram Education     Misty Quezada  [address] [facility] [address]  [phone] (home)     Procedure cardiologist:  Dr. Plummer   [facility]P:  [facility]  H&P completed by: to be done by Dr. Flores- 3/8/23   Admit date 3/15/23  Arrival time:  6AM   Anticoagulation: Yes- pt on Eliquis for Afib   CPAP: No---> Possible MIKEL; no formal diagnosis and does not use CPAP   Previous PCI: No   Bypass Grafts: No  Renal Issues: No  Diabetic?: No  Device?: No  Type:  N/A    Angiogram Teaching    Reason for Visit:  Telephone call to discuss pre-procedure education in preparation for: coronary angiogram with possible percutaneous coronary intervention.     Letter with instructions sent to patients home prior to phone teach, pt confirmed she has received letter.     Procedure Prep:  Primary Cardiologist note dated: Dr. Rocha- 9/27/21, has been more recently evaluated by valve team and HF team  EKG, BMP, CBC and T&S- to be completed AM of procedure     Patient Education  Patient states understanding of procedure and risks and agrees to proceed.    Pre-procedure instructions  Patient instructed to be NPO after midnight.  Patient instructed to shower the evening before or the morning of the procedure.  Leave all valuables at home (jewlery, rings, watches, large amounts of money).  Patient understands there are two visitors allowed during patients stay. Visitor will need to wear a mask their entire stay and remain in the restricted area per guidelines.   Patient instructed to arrange for transportation home following procedure from a responsible family member of friend. No driving for at least 24 hours post-procedure.  Patient instructed to have a responsible adult with them for 24 hours post-procedure.  Post-procedure follow up process.  Conscious sedation discussed.    Pre-procedure medication instructions  Patient instructed on antiplatelet medication.  Continue medications as scheduled  up until the date of procedure unless indicated below.   Patient instructed to take Aspirin am of procedure: No-- Patient to be given in CSC/PRE Procedure area   Other medication: instructed to only take losartan and metoprolol a.m. of the procedure.  Instructed patient to hold all other prescription medications, OTC meds, supplements and vitamins the day of procedure.     Pt instructed to hold anticoagulation ELIQUIS beginning on Vinay 3/12/23.    *PATIENTS RECORDS AVAILABLE IN [facility] UNLESS OTHERWISE INDICATED*      Patient Active Problem List   Diagnosis     Elevated troponin     Chronic atrial fibrillation (H)     Lymphedema     Morbid obesity (H)     Hypomagnesemia     (HFpEF) heart failure with preserved ejection fraction (H)     Aortic valve stenosis     Nonrheumatic tricuspid valve regurgitation       Current Outpatient Medications   Medication Sig Dispense Refill     acetaminophen (TYLENOL) 325 MG tablet Take 975 mg by mouth every 4 hours as needed for mild pain       apixaban (ELIQUIS) 5 mg Tab tablet [APIXABAN (ELIQUIS) 5 MG TAB TABLET] Take 5 mg by mouth 2 (two) times a day.       bumetanide (BUMEX) 2 MG tablet TAKE 1 TABLET(2 MG) BY MOUTH TWICE DAILY 180 tablet 3     CALCIUM/D3/MAG OX//TYRONE/ZN (CALTRATE + D3 PLUS MINERALS ORAL) [CALCIUM/D3/MAG OX//TYRONE/ZN (CALTRATE + D3 PLUS MINERALS ORAL)] Take 1 tablet by mouth daily.       cholecalciferol, vitamin D3, 2,000 unit Tab [CHOLECALCIFEROL, VITAMIN D3, 2,000 UNIT TAB] Take 2,000 Units by mouth daily.       citalopram (CELEXA) 10 MG tablet Take 20 mg by mouth daily       cyanocobalamin 1000 MCG tablet [CYANOCOBALAMIN 1000 MCG TABLET] Take 1,000 mcg by mouth daily.       ferrous sulfate (FEROSUL) 325 (65 Fe) MG tablet Take 1 tablet (325 mg) by mouth every other day       losartan (COZAAR) 25 MG tablet Take 1 tablet (25 mg) by mouth daily       melatonin 3 MG tablet Take 3 mg by mouth nightly as needed       metoprolol succinate (TOPROL-XL) 25 MG  [METOPROLOL SUCCINATE (TOPROL-XL) 25 MG] TAKE 1 TABLET BY MOUTH DAILY. 90 tablet 2     MULTIVIT WITH CALCIUM,IRON,MIN (WOMEN'S DAILY MULTIVITAMIN ORAL) [MULTIVIT WITH CALCIUM,IRON,MIN (WOMEN'S DAILY MULTIVITAMIN ORAL)] Take 1 tablet by mouth daily.       polyethylene glycol (MIRALAX) 17 g packet Take 17 g by mouth daily as needed for constipation       potassium chloride ER (K-TAB/KLOR-CON) 10 MEQ CR tablet Take 1 tablet (10 mEq) by mouth daily 30 tablet 11     senna (SENOKOT) 8.6 MG tablet Take 17.2 mg by mouth 2 times daily as needed       spironolactone (ALDACTONE) 25 MG tablet Take 1 tablet (25 mg) by mouth daily 90 tablet 0       No Known Allergies    Orders placed.     Sunil Montana RN BSN  Structural Heart Coordinator   [facility]  [phone]

## 2023-03-09 ENCOUNTER — OFFICE VISIT (OUTPATIENT)
Dept: CARDIOLOGY | Facility: CLINIC | Age: 68
End: 2023-03-09
Payer: MEDICARE

## 2023-03-09 VITALS
HEIGHT: 67 IN | BODY MASS INDEX: 37.98 KG/M2 | WEIGHT: 242 LBS | HEART RATE: 86 BPM | DIASTOLIC BLOOD PRESSURE: 68 MMHG | RESPIRATION RATE: 18 BRPM | SYSTOLIC BLOOD PRESSURE: 102 MMHG

## 2023-03-09 DIAGNOSIS — I48.20 ATRIAL FIBRILLATION, CHRONIC (H): ICD-10-CM

## 2023-03-09 DIAGNOSIS — E66.01 MORBID OBESITY (H): ICD-10-CM

## 2023-03-09 DIAGNOSIS — I89.0 LYMPHEDEMA: ICD-10-CM

## 2023-03-09 DIAGNOSIS — I36.1 NONRHEUMATIC TRICUSPID VALVE REGURGITATION: ICD-10-CM

## 2023-03-09 DIAGNOSIS — I50.30 HEART FAILURE WITH PRESERVED EJECTION FRACTION, UNSPECIFIED HF CHRONICITY (H): ICD-10-CM

## 2023-03-09 DIAGNOSIS — I35.0 NONRHEUMATIC AORTIC VALVE STENOSIS: Primary | ICD-10-CM

## 2023-03-09 PROCEDURE — 99203 OFFICE O/P NEW LOW 30 MIN: CPT | Performed by: THORACIC SURGERY (CARDIOTHORACIC VASCULAR SURGERY)

## 2023-03-09 NOTE — H&P (VIEW-ONLY)
ASKED BY REFERRING PHYSICIAN: Dr. Plummer to evaluate this patient for open surgical versus a CLASP procedure for severe tricuspid regurgitation.    CHIEF COMPLAINT: Bad valves    HPI: Misty is a 67 year old female who presents with heart failure and chronic atrial fibrillation.  She has moderate aortic stenosis and severe tricuspid regurgitation.  She has peripheral edema and lymphedema.  She has HFpEF.    PAST MEDICAL HISTORY:  Past Medical History:   Diagnosis Date     (HFpEF) heart failure with preserved ejection fraction (H) 10/17/2022     Aortic valve stenosis 11/10/2022     Benign essential hypertension      Chronic atrial fibrillation (H)      Lymphedema 9/24/2022     Nonrheumatic tricuspid valve regurgitation 2/6/2023       PAST SURGICAL HISTORY:  Past Surgical History:   Procedure Laterality Date     APPENDECTOMY       CHOLECYSTECTOMY       GASTRIC BYPASS         FAMILY HISTORY:   Family History   Problem Relation Age of Onset     No Known Problems Mother      No Known Problems Father        SOCIAL HISTORY:  Social History     Socioeconomic History     Marital status:      Spouse name: Not on file     Number of children: Not on file     Years of education: Not on file     Highest education level: Not on file   Occupational History     Not on file   Tobacco Use     Smoking status: Never     Smokeless tobacco: Never   Substance and Sexual Activity     Alcohol use: Not Currently     Comment: Rarely Uses alcohol     Drug use: Never     Sexual activity: Not on file   Other Topics Concern     Not on file   Social History Narrative     Not on file     Social Determinants of Health     Financial Resource Strain: Not on file   Food Insecurity: Not on file   Transportation Needs: Not on file   Physical Activity: Not on file   Stress: Not on file   Social Connections: Not on file   Intimate Partner Violence: Not on file   Housing Stability: Not on file        ALLERGIES:   No Known Allergies    CURRENT  MEDICATIONS:   Prescription Medications as of 3/9/2023       Rx Number Disp Refills Start End Last Dispensed Date Next Fill Date Owning Pharmacy    acetaminophen (TYLENOL) 325 MG tablet            Sig: Take 975 mg by mouth every 4 hours as needed for mild pain    Class: Historical    Route: Oral    apixaban (ELIQUIS) 5 mg Tab tablet    3/3/2017        Sig: [APIXABAN (ELIQUIS) 5 MG TAB TABLET] Take 5 mg by mouth 2 (two) times a day.    Class: Historical    Route: Oral    bumetanide (BUMEX) 2 MG tablet  180 tablet 3 11/7/2022    [id number] - [place], [address] AT [place] [address]    Sig: TAKE 1 TABLET(2 MG) BY MOUTH TWICE DAILY    Class: E-Prescribe    Notes to Pharmacy: **Patient requests 90 days supply**    CALCIUM/D3/MAG OX//TYRONE/ZN (CALTRATE + D3 PLUS MINERALS ORAL)    3/3/2017        Sig: [CALCIUM/D3/MAG OX//TYRONE/ZN (CALTRATE + D3 PLUS MINERALS ORAL)] Take 1 tablet by mouth daily.    Class: Historical    Route: Oral    cholecalciferol, vitamin D3, 2,000 unit Tab    3/3/2017        Sig: [CHOLECALCIFEROL, VITAMIN D3, 2,000 UNIT TAB] Take 2,000 Units by mouth daily.    Class: Historical    Route: Oral    citalopram (CELEXA) 10 MG tablet            Sig: Take 20 mg by mouth daily    Class: Historical    Route: Oral    cyanocobalamin 1000 MCG tablet    3/3/2017        Sig: [CYANOCOBALAMIN 1000 MCG TABLET] Take 1,000 mcg by mouth daily.    Class: Historical    Route: Oral    ferrous sulfate (FEROSUL) 325 (65 Fe) MG tablet    10/9/2022    [address]    Sig: Take 1 tablet (325 mg) by mouth every other day    Class: Transitional    Route: Oral    losartan (COZAAR) 25 MG tablet    10/8/2022    [address]    Sig: Take 1 tablet (25 mg) by mouth daily    Class: Transitional    Route: Oral    melatonin 3 MG tablet            Sig: Take 3 mg by mouth nightly as needed     Class: Historical    Route: Oral    metoprolol succinate (TOPROL-XL) 25 MG  90 tablet 2 10/23/2017        Sig: [METOPROLOL SUCCINATE (TOPROL-XL) 25 MG] TAKE 1 TABLET BY MOUTH DAILY.    MULTIVIT WITH CALCIUM,IRON,MIN (WOMEN'S DAILY MULTIVITAMIN ORAL)    3/3/2017        Sig: [MULTIVIT WITH CALCIUM,IRON,MIN (WOMEN'S DAILY MULTIVITAMIN ORAL)] Take 1 tablet by mouth daily.    Class: Historical    Route: Oral    polyethylene glycol (MIRALAX) 17 g packet    10/7/2022    [facility] [place], [place] - [address] [place] [address]    Sig: Take 17 g by mouth daily as needed for constipation    Class: Transitional    Route: Oral    potassium chloride ER (K-TAB/KLOR-CON) 10 MEQ CR tablet  30 tablet 11 11/3/2022    [facility] #[id number] [address] AT [place] [address]    Sig: Take 1 tablet (10 mEq) by mouth daily    Class: E-Prescribe    Route: Oral    senna (SENOKOT) 8.6 MG tablet    10/31/2022        Sig: Take 17.2 mg by mouth 2 times daily as needed    Class: Historical    Route: Oral    spironolactone (ALDACTONE) 25 MG tablet  90 tablet 0 1/3/2023    [facility] - [facility] AT Portal to Registered [facility] Sites    Sig: Take 1 tablet (25 mg) by mouth daily    Class: E-Prescribe    Notes to Pharmacy: Instruct pt to sched follow-up for further refills.  mg    Route: Oral          REVIEW OF SYSTEMS:   Gen: denies frequent headaches, double/blurry vision, insomnia, fatigue, unexplained weight loss/gain. No previous anesthesia reactions.  CV: Peripheral edema, denies chest pain, shortness of breath, palpitations  Pulm: denies shortness of breath, asthma or wheezing  GI/: denies liver or kidney problems, blood in stool or BRBPR, difficulty urinating  Endo: denies thyroid problems or Diabetes  Heme/Onc: denies bleeding or clotting disorders, no family problems with bleeding/clotting diorders  MS: Arthritis in both knees and so  "uses a cane, no weakness, tremors or gait instability  Neuro: denies depression, memory problems, no dysesthesias, no previous strokes, no migraines, no dysphagia  Skin: No petechiae, purpura or rash.    PHYSICAL EXAMINATION:   /68 (BP Location: Left arm, Patient Position: Sitting, Cuff Size: Adult Large)   Pulse 86   Resp 18   Ht 1.702 m (5' 7\")   Wt 109.8 kg (242 lb)   BMI 37.90 kg/m    General: alert and oriented x 3, pleasant, no acute distress  CV: S1 S2, grade II/VI systolic murmur heard best at the right mid-sternum, rubs or gallops, irregular rate and rhythm, significant peripheral edema, no carotid or abdominal bruits, pulses in upper and lower extremities palpable  Pulm: bilateral breath sounds, clear to auscultation, easy work of breathing  GI: Obese, normal bowel sounds, soft non-tender and non-distended  : voiding without problems  MS: moves all extremities x 4,  5+/5+ equal strength bilaterally  Neuro: pupils equal round and reactive to light, cranial nerves, II-XII grossly intact, no gross neurologic deficits noted    LABS: Pending    PROCEDURES/IMAGING:  Chest X-Ray: Not done  Angio: Pending  Echo: Moderate aortic stenosis and severe tricuspid regurgitation.  CT: Not done  MRI: Not done  Carotid US: Not done     ASSESSMENT/PLAN:   Misty is a 67 year old woman who presents with HFpEF and symptoms primarily of right heart failure.  She has chronic atrial fibrillation and moderate aortic stenosis, severe tricuspid regurgitation and biatrial enlargement.  She may be a candidate for the CLASP trial and I support her candidacy.  She could also undergo tricuspid and aortic valve replacement should that be necessary. She understands that the coronary angiogram may determine what procedure she has done.  She seemed to understand the risks and benefits of the CLASP trial as well as open surgical replacement of her valves.    1. Complete outpatient work-up  2. CLASP trial if a candidate  3. AVR and " TVR if not a candidate for the CLASP trial      Approximately 30 minutes were spent with the patient in clinic at this visit.    CC  Patient Care Team:  [facility], [facility] as PCP - Liv Abreu APRN CNP as Assigned Heart and Vascular Provider

## 2023-03-09 NOTE — PROGRESS NOTES
ASKED BY REFERRING PHYSICIAN: Dr. Plummer to evaluate this patient for open surgical versus a CLASP procedure for severe tricuspid regurgitation.    CHIEF COMPLAINT: Bad valves    HPI: Misty is a 67 year old female who presents with heart failure and chronic atrial fibrillation.  She has moderate aortic stenosis and severe tricuspid regurgitation.  She has peripheral edema and lymphedema.  She has HFpEF.    PAST MEDICAL HISTORY:  Past Medical History:   Diagnosis Date     (HFpEF) heart failure with preserved ejection fraction (H) 10/17/2022     Aortic valve stenosis 11/10/2022     Benign essential hypertension      Chronic atrial fibrillation (H)      Lymphedema 9/24/2022     Nonrheumatic tricuspid valve regurgitation 2/6/2023       PAST SURGICAL HISTORY:  Past Surgical History:   Procedure Laterality Date     APPENDECTOMY       CHOLECYSTECTOMY       GASTRIC BYPASS         FAMILY HISTORY:   Family History   Problem Relation Age of Onset     No Known Problems Mother      No Known Problems Father        SOCIAL HISTORY:  Social History     Socioeconomic History     Marital status:      Spouse name: Not on file     Number of children: Not on file     Years of education: Not on file     Highest education level: Not on file   Occupational History     Not on file   Tobacco Use     Smoking status: Never     Smokeless tobacco: Never   Substance and Sexual Activity     Alcohol use: Not Currently     Comment: Rarely Uses alcohol     Drug use: Never     Sexual activity: Not on file   Other Topics Concern     Not on file   Social History Narrative     Not on file     Social Determinants of Health     Financial Resource Strain: Not on file   Food Insecurity: Not on file   Transportation Needs: Not on file   Physical Activity: Not on file   Stress: Not on file   Social Connections: Not on file   Intimate Partner Violence: Not on file   Housing Stability: Not on file        ALLERGIES:   No Known Allergies    CURRENT  MEDICATIONS:   Prescription Medications as of 3/9/2023       Rx Number Disp Refills Start End Last Dispensed Date Next Fill Date Owning Pharmacy    acetaminophen (TYLENOL) 325 MG tablet            Sig: Take 975 mg by mouth every 4 hours as needed for mild pain    Class: Historical    Route: Oral    apixaban (ELIQUIS) 5 mg Tab tablet    3/3/2017        Sig: [APIXABAN (ELIQUIS) 5 MG TAB TABLET] Take 5 mg by mouth 2 (two) times a day.    Class: Historical    Route: Oral    bumetanide (BUMEX) 2 MG tablet  180 tablet 3 11/7/2022    [id number] - [place], [address] AT [place] [address]    Sig: TAKE 1 TABLET(2 MG) BY MOUTH TWICE DAILY    Class: E-Prescribe    Notes to Pharmacy: **Patient requests 90 days supply**    CALCIUM/D3/MAG OX//TYRONE/ZN (CALTRATE + D3 PLUS MINERALS ORAL)    3/3/2017        Sig: [CALCIUM/D3/MAG OX//TYRONE/ZN (CALTRATE + D3 PLUS MINERALS ORAL)] Take 1 tablet by mouth daily.    Class: Historical    Route: Oral    cholecalciferol, vitamin D3, 2,000 unit Tab    3/3/2017        Sig: [CHOLECALCIFEROL, VITAMIN D3, 2,000 UNIT TAB] Take 2,000 Units by mouth daily.    Class: Historical    Route: Oral    citalopram (CELEXA) 10 MG tablet            Sig: Take 20 mg by mouth daily    Class: Historical    Route: Oral    cyanocobalamin 1000 MCG tablet    3/3/2017        Sig: [CYANOCOBALAMIN 1000 MCG TABLET] Take 1,000 mcg by mouth daily.    Class: Historical    Route: Oral    ferrous sulfate (FEROSUL) 325 (65 Fe) MG tablet    10/9/2022    [address]    Sig: Take 1 tablet (325 mg) by mouth every other day    Class: Transitional    Route: Oral    losartan (COZAAR) 25 MG tablet    10/8/2022    [address]    Sig: Take 1 tablet (25 mg) by mouth daily    Class: Transitional    Route: Oral    melatonin 3 MG tablet            Sig: Take 3 mg by mouth nightly as needed     Class: Historical    Route: Oral    metoprolol succinate (TOPROL-XL) 25 MG  90 tablet 2 10/23/2017        Sig: [METOPROLOL SUCCINATE (TOPROL-XL) 25 MG] TAKE 1 TABLET BY MOUTH DAILY.    MULTIVIT WITH CALCIUM,IRON,MIN (WOMEN'S DAILY MULTIVITAMIN ORAL)    3/3/2017        Sig: [MULTIVIT WITH CALCIUM,IRON,MIN (WOMEN'S DAILY MULTIVITAMIN ORAL)] Take 1 tablet by mouth daily.    Class: Historical    Route: Oral    polyethylene glycol (MIRALAX) 17 g packet    10/7/2022    [facility] [place], [place] - [address] [place] [address]    Sig: Take 17 g by mouth daily as needed for constipation    Class: Transitional    Route: Oral    potassium chloride ER (K-TAB/KLOR-CON) 10 MEQ CR tablet  30 tablet 11 11/3/2022    [facility] #[id number] [address] AT [place] [address]    Sig: Take 1 tablet (10 mEq) by mouth daily    Class: E-Prescribe    Route: Oral    senna (SENOKOT) 8.6 MG tablet    10/31/2022        Sig: Take 17.2 mg by mouth 2 times daily as needed    Class: Historical    Route: Oral    spironolactone (ALDACTONE) 25 MG tablet  90 tablet 0 1/3/2023    [facility] - [facility] AT Portal to Registered [facility] Sites    Sig: Take 1 tablet (25 mg) by mouth daily    Class: E-Prescribe    Notes to Pharmacy: Instruct pt to sched follow-up for further refills.  mg    Route: Oral          REVIEW OF SYSTEMS:   Gen: denies frequent headaches, double/blurry vision, insomnia, fatigue, unexplained weight loss/gain. No previous anesthesia reactions.  CV: Peripheral edema, denies chest pain, shortness of breath, palpitations  Pulm: denies shortness of breath, asthma or wheezing  GI/: denies liver or kidney problems, blood in stool or BRBPR, difficulty urinating  Endo: denies thyroid problems or Diabetes  Heme/Onc: denies bleeding or clotting disorders, no family problems with bleeding/clotting diorders  MS: Arthritis in both knees and so  "uses a cane, no weakness, tremors or gait instability  Neuro: denies depression, memory problems, no dysesthesias, no previous strokes, no migraines, no dysphagia  Skin: No petechiae, purpura or rash.    PHYSICAL EXAMINATION:   /68 (BP Location: Left arm, Patient Position: Sitting, Cuff Size: Adult Large)   Pulse 86   Resp 18   Ht 1.702 m (5' 7\")   Wt 109.8 kg (242 lb)   BMI 37.90 kg/m    General: alert and oriented x 3, pleasant, no acute distress  CV: S1 S2, grade II/VI systolic murmur heard best at the right mid-sternum, rubs or gallops, irregular rate and rhythm, significant peripheral edema, no carotid or abdominal bruits, pulses in upper and lower extremities palpable  Pulm: bilateral breath sounds, clear to auscultation, easy work of breathing  GI: Obese, normal bowel sounds, soft non-tender and non-distended  : voiding without problems  MS: moves all extremities x 4,  5+/5+ equal strength bilaterally  Neuro: pupils equal round and reactive to light, cranial nerves, II-XII grossly intact, no gross neurologic deficits noted    LABS: Pending    PROCEDURES/IMAGING:  Chest X-Ray: Not done  Angio: Pending  Echo: Moderate aortic stenosis and severe tricuspid regurgitation.  CT: Not done  MRI: Not done  Carotid US: Not done     ASSESSMENT/PLAN:   Misty is a 67 year old woman who presents with HFpEF and symptoms primarily of right heart failure.  She has chronic atrial fibrillation and moderate aortic stenosis, severe tricuspid regurgitation and biatrial enlargement.  She may be a candidate for the CLASP trial and I support her candidacy.  She could also undergo tricuspid and aortic valve replacement should that be necessary. She understands that the coronary angiogram may determine what procedure she has done.  She seemed to understand the risks and benefits of the CLASP trial as well as open surgical replacement of her valves.    1. Complete outpatient work-up  2. CLASP trial if a candidate  3. AVR and " TVR if not a candidate for the CLASP trial      Approximately 30 minutes were spent with the patient in clinic at this visit.    CC  Patient Care Team:  [facility], [facility] as PCP - Liv Abreu APRN CNP as Assigned Heart and Vascular Provider

## 2023-03-09 NOTE — LETTER
3/9/2023    [facility]  [address]  [facility] [address]    RE: Misty SYLVAIN Quezada       Dear Colleague,     I had the pleasure of seeing Misty Quezada in the [facility].  ASKED BY REFERRING PHYSICIAN: Dr. Plummer to evaluate this patient for open surgical versus a CLASP procedure for severe tricuspid regurgitation.    CHIEF COMPLAINT: Bad valves    HPI: Misty is a 67 year old female who presents with heart failure and chronic atrial fibrillation.  She has moderate aortic stenosis and severe tricuspid regurgitation.  She has peripheral edema and lymphedema.  She has HFpEF.    PAST MEDICAL HISTORY:  Past Medical History:   Diagnosis Date     (HFpEF) heart failure with preserved ejection fraction (H) 10/17/2022     Aortic valve stenosis 11/10/2022     Benign essential hypertension      Chronic atrial fibrillation (H)      Lymphedema 9/24/2022     Nonrheumatic tricuspid valve regurgitation 2/6/2023       PAST SURGICAL HISTORY:  Past Surgical History:   Procedure Laterality Date     APPENDECTOMY       CHOLECYSTECTOMY       GASTRIC BYPASS         FAMILY HISTORY:   Family History   Problem Relation Age of Onset     No Known Problems Mother      No Known Problems Father        SOCIAL HISTORY:  Social History     Socioeconomic History     Marital status:      Spouse name: Not on file     Number of children: Not on file     Years of education: Not on file     Highest education level: Not on file   Occupational History     Not on file   Tobacco Use     Smoking status: Never     Smokeless tobacco: Never   Substance and Sexual Activity     Alcohol use: Not Currently     Comment: Rarely Uses alcohol     Drug use: Never     Sexual activity: Not on file   Other Topics Concern     Not on file   Social History Narrative     Not on file     Social Determinants of Health     Financial Resource Strain: Not on file   Food Insecurity: Not on file   Transportation Needs: Not on file    Physical Activity: Not on file   Stress: Not on file   Social Connections: Not on file   Intimate Partner Violence: Not on file   Housing Stability: Not on file        ALLERGIES:   No Known Allergies    CURRENT MEDICATIONS:   Prescription Medications as of 3/9/2023       Rx Number Disp Refills Start End Last Dispensed Date Next Fill Date Owning Pharmacy    acetaminophen (TYLENOL) 325 MG tablet            Sig: Take 975 mg by mouth every 4 hours as needed for mild pain    Class: Historical    Route: Oral    apixaban (ELIQUIS) 5 mg Tab tablet    3/3/2017        Sig: [APIXABAN (ELIQUIS) 5 MG TAB TABLET] Take 5 mg by mouth 2 (two) times a day.    Class: Historical    Route: Oral    bumetanide (BUMEX) 2 MG tablet  180 tablet 3 11/7/2022    [facility][id number] [address] AT [place] [address]    Sig: TAKE 1 TABLET(2 MG) BY MOUTH TWICE DAILY    Class: E-Prescribe    Notes to Pharmacy: **Patient requests 90 days supply**    CALCIUM/D3/MAG OX//TYRONE/ZN (CALTRATE + D3 PLUS MINERALS ORAL)    3/3/2017        Sig: [CALCIUM/D3/MAG OX//TYRONE/ZN (CALTRATE + D3 PLUS MINERALS ORAL)] Take 1 tablet by mouth daily.    Class: Historical    Route: Oral    cholecalciferol, vitamin D3, 2,000 unit Tab    3/3/2017        Sig: [CHOLECALCIFEROL, VITAMIN D3, 2,000 UNIT TAB] Take 2,000 Units by mouth daily.    Class: Historical    Route: Oral    citalopram (CELEXA) 10 MG tablet            Sig: Take 20 mg by mouth daily    Class: Historical    Route: Oral    cyanocobalamin 1000 MCG tablet    3/3/2017        Sig: [CYANOCOBALAMIN 1000 MCG TABLET] Take 1,000 mcg by mouth daily.    Class: Historical    Route: Oral    ferrous sulfate (FEROSUL) 325 (65 Fe) MG tablet    10/9/2022    [address]    Sig: Take 1 tablet (325 mg) by mouth every other day    Class: Transitional    Route: Oral    losartan (COZAAR) 25 MG tablet    10/8/2022    [facility]  [address]    Sig: Take 1 tablet (25 mg) by mouth daily    Class: Transitional    Route: Oral    melatonin 3 MG tablet            Sig: Take 3 mg by mouth nightly as needed    Class: Historical    Route: Oral    metoprolol succinate (TOPROL-XL) 25 MG  90 tablet 2 10/23/2017        Sig: [METOPROLOL SUCCINATE (TOPROL-XL) 25 MG] TAKE 1 TABLET BY MOUTH DAILY.    MULTIVIT WITH CALCIUM,IRON,MIN (WOMEN'S DAILY MULTIVITAMIN ORAL)    3/3/2017        Sig: [MULTIVIT WITH CALCIUM,IRON,MIN (WOMEN'S DAILY MULTIVITAMIN ORAL)] Take 1 tablet by mouth daily.    Class: Historical    Route: Oral    polyethylene glycol (MIRALAX) 17 g packet    10/7/2022    [facility] [address]    Sig: Take 17 g by mouth daily as needed for constipation    Class: Transitional    Route: Oral    potassium chloride ER (K-TAB/KLOR-CON) 10 MEQ CR tablet  30 tablet 11 11/3/2022    [facility] #[id number] [address] AT [place] [address]    Sig: Take 1 tablet (10 mEq) by mouth daily    Class: E-Prescribe    Route: Oral    senna (SENOKOT) 8.6 MG tablet    10/31/2022        Sig: Take 17.2 mg by mouth 2 times daily as needed    Class: Historical    Route: Oral    spironolactone (ALDACTONE) 25 MG tablet  90 tablet 0 1/3/2023    [facility] - [facility] - [facility] AT Portal to [facility]    Sig: Take 1 tablet (25 mg) by mouth daily    Class: E-Prescribe    Notes to Pharmacy: Instruct pt to sched follow-up for further refills.  mg    Route: Oral          REVIEW OF SYSTEMS:   Gen: denies frequent headaches, double/blurry vision, insomnia, fatigue, unexplained weight loss/gain. No previous anesthesia reactions.  CV: Peripheral edema, denies chest pain, shortness of breath, palpitations  Pulm: denies shortness of breath, asthma or wheezing  GI/: denies liver or kidney problems, blood in stool  "or BRBPR, difficulty urinating  Endo: denies thyroid problems or Diabetes  Heme/Onc: denies bleeding or clotting disorders, no family problems with bleeding/clotting diorders  MS: Arthritis in both knees and so uses a cane, no weakness, tremors or gait instability  Neuro: denies depression, memory problems, no dysesthesias, no previous strokes, no migraines, no dysphagia  Skin: No petechiae, purpura or rash.    PHYSICAL EXAMINATION:   /68 (BP Location: Left arm, Patient Position: Sitting, Cuff Size: Adult Large)   Pulse 86   Resp 18   Ht 1.702 m (5' 7\")   Wt 109.8 kg (242 lb)   BMI 37.90 kg/m    General: alert and oriented x 3, pleasant, no acute distress  CV: S1 S2, grade II/VI systolic murmur heard best at the right mid-sternum, rubs or gallops, irregular rate and rhythm, significant peripheral edema, no carotid or abdominal bruits, pulses in upper and lower extremities palpable  Pulm: bilateral breath sounds, clear to auscultation, easy work of breathing  GI: Obese, normal bowel sounds, soft non-tender and non-distended  : voiding without problems  MS: moves all extremities x 4,  5+/5+ equal strength bilaterally  Neuro: pupils equal round and reactive to light, cranial nerves, II-XII grossly intact, no gross neurologic deficits noted    LABS: Pending    PROCEDURES/IMAGING:  Chest X-Ray: Not done  Angio: Pending  Echo: Moderate aortic stenosis and severe tricuspid regurgitation.  CT: Not done  MRI: Not done  Carotid US: Not done     ASSESSMENT/PLAN:   Misty is a 67 year old woman who presents with HFpEF and symptoms primarily of right heart failure.  She has chronic atrial fibrillation and moderate aortic stenosis, severe tricuspid regurgitation and biatrial enlargement.  She may be a candidate for the CLASP trial and I support her candidacy.  She could also undergo tricuspid and aortic valve replacement should that be necessary. She understands that the coronary angiogram may determine what procedure " she has done.  She seemed to understand the risks and benefits of the CLASP trial as well as open surgical replacement of her valves.    1. Complete outpatient work-up  2. CLASP trial if a candidate  3. AVR and TVR if not a candidate for the CLASP trial      Approximately 30 minutes were spent with the patient in clinic at this visit.    CC  Patient Care Team:  [facility], [facility] as PCP - Liv Abreu APRN CNP as Assigned Heart and Vascular Provider      Thank you for allowing me to participate in the care of your patient.      Sincerely,     Meagan Flores MD     [facility]  cc:   No referring provider defined for this encounter.

## 2023-03-15 ENCOUNTER — TELEPHONE (OUTPATIENT)
Dept: CARDIOLOGY | Facility: CLINIC | Age: 68
End: 2023-03-15

## 2023-03-15 ENCOUNTER — HOSPITAL ENCOUNTER (OUTPATIENT)
Facility: HOSPITAL | Age: 68
Discharge: HOME | End: 2023-03-15
Attending: INTERNAL MEDICINE | Admitting: INTERNAL MEDICINE
Payer: MEDICARE

## 2023-03-15 VITALS
HEIGHT: 67 IN | HEART RATE: 91 BPM | OXYGEN SATURATION: 94 % | BODY MASS INDEX: 37.51 KG/M2 | DIASTOLIC BLOOD PRESSURE: 71 MMHG | SYSTOLIC BLOOD PRESSURE: 96 MMHG | TEMPERATURE: 98.1 F | RESPIRATION RATE: 33 BRPM | WEIGHT: 239 LBS

## 2023-03-15 DIAGNOSIS — G47.33 OSA (OBSTRUCTIVE SLEEP APNEA): Primary | ICD-10-CM

## 2023-03-15 DIAGNOSIS — I07.1 TRICUSPID VALVE INSUFFICIENCY, UNSPECIFIED ETIOLOGY: ICD-10-CM

## 2023-03-15 DIAGNOSIS — I27.20 PULMONARY HYPERTENSION (H): ICD-10-CM

## 2023-03-15 LAB
ABO/RH(D): NORMAL
ANION GAP SERPL CALCULATED.3IONS-SCNC: 10 MMOL/L (ref 7–15)
ANTIBODY SCREEN: NEGATIVE
ATRIAL RATE - MUSE: 340 BPM
BASE EXCESS BLDA CALC-SCNC: 4.1 MMOL/L
BASE EXCESS BLDV CALC-SCNC: 5.7 MMOL/L
BUN SERPL-MCNC: 17.6 MG/DL (ref 8–23)
CALCIUM SERPL-MCNC: 9.3 MG/DL (ref 8.8–10.2)
CHLORIDE SERPL-SCNC: 103 MMOL/L (ref 98–107)
COHGB MFR BLD: 88 % (ref 95–96)
CREAT SERPL-MCNC: 0.74 MG/DL (ref 0.51–0.95)
DEPRECATED HCO3 PLAS-SCNC: 27 MMOL/L (ref 22–29)
DIASTOLIC BLOOD PRESSURE - MUSE: NORMAL MMHG
ERYTHROCYTE [DISTWIDTH] IN BLOOD BY AUTOMATED COUNT: 12.9 % (ref 10–15)
GFR SERPL CREATININE-BSD FRML MDRD: 88 ML/MIN/1.73M2
GLUCOSE SERPL-MCNC: 141 MG/DL (ref 70–99)
HCO3 BLDA-SCNC: 27 MMOL/L (ref 23–29)
HCO3 BLDV-SCNC: 28 MMOL/L (ref 24–30)
HCT VFR BLD AUTO: 40.1 % (ref 35–47)
HGB BLD-MCNC: 13.2 G/DL (ref 11.7–15.7)
INTERPRETATION ECG - MUSE: NORMAL
MCH RBC QN AUTO: 32.6 PG (ref 26.5–33)
MCHC RBC AUTO-ENTMCNC: 32.9 G/DL (ref 31.5–36.5)
MCV RBC AUTO: 99 FL (ref 78–100)
P AXIS - MUSE: NORMAL DEGREES
PCO2 BLDA: 49 MM HG (ref 35–45)
PCO2 BLDV: 53 MM HG (ref 35–50)
PH BLDA: 7.38 [PH] (ref 7.37–7.44)
PH BLDV: 7.38 [PH] (ref 7.35–7.45)
PLATELET # BLD AUTO: 247 10E3/UL (ref 150–450)
PO2 BLDA: 53 MM HG (ref 75–85)
PO2 BLDV: 35 MM HG (ref 25–47)
POTASSIUM SERPL-SCNC: 4.2 MMOL/L (ref 3.4–5.3)
PR INTERVAL - MUSE: NORMAL MS
QRS DURATION - MUSE: 96 MS
QT - MUSE: 366 MS
QTC - MUSE: 417 MS
R AXIS - MUSE: 123 DEGREES
RBC # BLD AUTO: 4.05 10E6/UL (ref 3.8–5.2)
SATV LHE POCT: 65.1 % (ref 70–75)
SODIUM SERPL-SCNC: 140 MMOL/L (ref 136–145)
SPECIMEN EXPIRATION DATE: NORMAL
SYSTOLIC BLOOD PRESSURE - MUSE: NORMAL MMHG
T AXIS - MUSE: -15 DEGREES
VENTRICULAR RATE- MUSE: 78 BPM
WBC # BLD AUTO: 5.7 10E3/UL (ref 4–11)

## 2023-03-15 PROCEDURE — 85027 COMPLETE CBC AUTOMATED: CPT | Performed by: INTERNAL MEDICINE

## 2023-03-15 PROCEDURE — 86850 RBC ANTIBODY SCREEN: CPT | Performed by: INTERNAL MEDICINE

## 2023-03-15 PROCEDURE — C1769 GUIDE WIRE: HCPCS | Performed by: INTERNAL MEDICINE

## 2023-03-15 PROCEDURE — 82947 ASSAY GLUCOSE BLOOD QUANT: CPT | Performed by: INTERNAL MEDICINE

## 2023-03-15 PROCEDURE — 36415 COLL VENOUS BLD VENIPUNCTURE: CPT | Performed by: INTERNAL MEDICINE

## 2023-03-15 PROCEDURE — 82310 ASSAY OF CALCIUM: CPT | Performed by: INTERNAL MEDICINE

## 2023-03-15 PROCEDURE — C1894 INTRO/SHEATH, NON-LASER: HCPCS | Performed by: INTERNAL MEDICINE

## 2023-03-15 PROCEDURE — 250N000013 HC RX MED GY IP 250 OP 250 PS 637: Performed by: INTERNAL MEDICINE

## 2023-03-15 PROCEDURE — 93010 ELECTROCARDIOGRAM REPORT: CPT | Mod: HOP | Performed by: INTERNAL MEDICINE

## 2023-03-15 PROCEDURE — 99152 MOD SED SAME PHYS/QHP 5/>YRS: CPT | Performed by: INTERNAL MEDICINE

## 2023-03-15 PROCEDURE — 82805 BLOOD GASES W/O2 SATURATION: CPT

## 2023-03-15 PROCEDURE — 86901 BLOOD TYPING SEROLOGIC RH(D): CPT | Performed by: INTERNAL MEDICINE

## 2023-03-15 PROCEDURE — 999N000248 HC STATISTIC IV INSERT WITH US BY RN

## 2023-03-15 PROCEDURE — 93460 R&L HRT ART/VENTRICLE ANGIO: CPT | Performed by: INTERNAL MEDICINE

## 2023-03-15 PROCEDURE — 250N000011 HC RX IP 250 OP 636: Performed by: INTERNAL MEDICINE

## 2023-03-15 PROCEDURE — C1887 CATHETER, GUIDING: HCPCS | Performed by: INTERNAL MEDICINE

## 2023-03-15 PROCEDURE — 93460 R&L HRT ART/VENTRICLE ANGIO: CPT | Mod: 26 | Performed by: INTERNAL MEDICINE

## 2023-03-15 PROCEDURE — 93005 ELECTROCARDIOGRAM TRACING: CPT

## 2023-03-15 PROCEDURE — 250N000009 HC RX 250: Performed by: INTERNAL MEDICINE

## 2023-03-15 PROCEDURE — 258N000003 HC RX IP 258 OP 636: Performed by: INTERNAL MEDICINE

## 2023-03-15 PROCEDURE — 250N000013 HC RX MED GY IP 250 OP 250 PS 637: Performed by: NURSE PRACTITIONER

## 2023-03-15 PROCEDURE — 255N000002 HC RX 255 OP 636: Performed by: INTERNAL MEDICINE

## 2023-03-15 PROCEDURE — 272N000001 HC OR GENERAL SUPPLY STERILE: Performed by: INTERNAL MEDICINE

## 2023-03-15 PROCEDURE — 999N000054 HC STATISTIC EKG NON-CHARGEABLE

## 2023-03-15 RX ORDER — LIDOCAINE 40 MG/G
CREAM TOPICAL
Status: DISCONTINUED | OUTPATIENT
Start: 2023-03-15 | End: 2023-03-15 | Stop reason: HOSPADM

## 2023-03-15 RX ORDER — ASPIRIN 81 MG/1
243 TABLET, CHEWABLE ORAL ONCE
Status: COMPLETED | OUTPATIENT
Start: 2023-03-15 | End: 2023-03-15

## 2023-03-15 RX ORDER — FLUMAZENIL 0.1 MG/ML
0.2 INJECTION, SOLUTION INTRAVENOUS
Status: DISCONTINUED | OUTPATIENT
Start: 2023-03-15 | End: 2023-03-15 | Stop reason: HOSPADM

## 2023-03-15 RX ORDER — NALOXONE HYDROCHLORIDE 0.4 MG/ML
0.4 INJECTION, SOLUTION INTRAMUSCULAR; INTRAVENOUS; SUBCUTANEOUS
Status: DISCONTINUED | OUTPATIENT
Start: 2023-03-15 | End: 2023-03-15 | Stop reason: HOSPADM

## 2023-03-15 RX ORDER — NALOXONE HYDROCHLORIDE 0.4 MG/ML
0.2 INJECTION, SOLUTION INTRAMUSCULAR; INTRAVENOUS; SUBCUTANEOUS
Status: DISCONTINUED | OUTPATIENT
Start: 2023-03-15 | End: 2023-03-15 | Stop reason: HOSPADM

## 2023-03-15 RX ORDER — FENTANYL CITRATE 50 UG/ML
25 INJECTION, SOLUTION INTRAMUSCULAR; INTRAVENOUS
Status: DISCONTINUED | OUTPATIENT
Start: 2023-03-15 | End: 2023-03-15 | Stop reason: HOSPADM

## 2023-03-15 RX ORDER — OXYCODONE HYDROCHLORIDE 5 MG/1
10 TABLET ORAL EVERY 4 HOURS PRN
Status: DISCONTINUED | OUTPATIENT
Start: 2023-03-15 | End: 2023-03-15 | Stop reason: HOSPADM

## 2023-03-15 RX ORDER — ACETAMINOPHEN 325 MG/1
650 TABLET ORAL EVERY 4 HOURS PRN
Status: DISCONTINUED | OUTPATIENT
Start: 2023-03-15 | End: 2023-03-15 | Stop reason: HOSPADM

## 2023-03-15 RX ORDER — DIAZEPAM 5 MG
5 TABLET ORAL ONCE
Status: COMPLETED | OUTPATIENT
Start: 2023-03-15 | End: 2023-03-15

## 2023-03-15 RX ORDER — OXYCODONE HYDROCHLORIDE 5 MG/1
5 TABLET ORAL EVERY 4 HOURS PRN
Status: DISCONTINUED | OUTPATIENT
Start: 2023-03-15 | End: 2023-03-15 | Stop reason: HOSPADM

## 2023-03-15 RX ORDER — ASPIRIN 325 MG
325 TABLET ORAL ONCE
Status: COMPLETED | OUTPATIENT
Start: 2023-03-15 | End: 2023-03-15

## 2023-03-15 RX ORDER — SODIUM CHLORIDE 9 MG/ML
INJECTION, SOLUTION INTRAVENOUS CONTINUOUS
Status: DISCONTINUED | OUTPATIENT
Start: 2023-03-15 | End: 2023-03-15 | Stop reason: HOSPADM

## 2023-03-15 RX ORDER — FENTANYL CITRATE 50 UG/ML
INJECTION, SOLUTION INTRAMUSCULAR; INTRAVENOUS
Status: DISCONTINUED | OUTPATIENT
Start: 2023-03-15 | End: 2023-03-15 | Stop reason: HOSPADM

## 2023-03-15 RX ORDER — ATROPINE SULFATE 0.1 MG/ML
0.5 INJECTION INTRAVENOUS
Status: DISCONTINUED | OUTPATIENT
Start: 2023-03-15 | End: 2023-03-15 | Stop reason: HOSPADM

## 2023-03-15 RX ORDER — IODIXANOL 320 MG/ML
INJECTION, SOLUTION INTRAVASCULAR
Status: DISCONTINUED | OUTPATIENT
Start: 2023-03-15 | End: 2023-03-15 | Stop reason: HOSPADM

## 2023-03-15 RX ADMIN — SODIUM CHLORIDE: 9 INJECTION, SOLUTION INTRAVENOUS at 07:45

## 2023-03-15 RX ADMIN — ASPIRIN 325 MG ORAL TABLET 325 MG: 325 PILL ORAL at 07:40

## 2023-03-15 RX ADMIN — DIAZEPAM 5 MG: 5 TABLET ORAL at 07:41

## 2023-03-15 ASSESSMENT — ACTIVITIES OF DAILY LIVING (ADL)
ADLS_ACUITY_SCORE: 35

## 2023-03-15 ASSESSMENT — EJECTION FRACTION: EF_VALUE: .25

## 2023-03-15 NOTE — TELEPHONE ENCOUNTER
===View-only below this line===  ----- Message -----  From: Howard Plummer MD  Sent: 3/15/2023   9:22 AM CDT  To: Sierra R Behr-Holewinski, Mudassar Ahmed, MD, *    Team,    I just met , a 67 year old old female with severe TR, at least mod aortic stenosis, HFPEF, AF, untreated MIKEL, high BMI who is here for angio/RHC for evaluation prior to possible valve intervention (CLASP TR vs. OHS).    Her angio showed no obstructive CAD.  RHC was significant for elevated R-sided, normal L-sided filling pressures, moderate pulmonary HTN w/ pulmonary arterial component (not due to L-sided HF) with transpulmonary gradient of 39-17=23; preserved CO/CI by Kapil.    I think we can continue screening for CLASP trial candidacy, but in parallel recommend establishing follow up w/ Pulmonology, establish treatment for MIKEL, consider other pHTN treatment options.    Thx!  Howard

## 2023-03-15 NOTE — DISCHARGE INSTRUCTIONS

## 2023-03-15 NOTE — PRE-PROCEDURE
GENERAL PRE-PROCEDURE:   Procedure:  Coronary angiogram with possible PCI, right and left heart cathterization  Date/Time:  3/15/2023 7:09 AM    Written consent obtained?: Yes    Risks and benefits: Risks, benefits and alternatives were discussed    Consent given by:  Patient  Patient states understanding of procedure being performed: Yes    Patient's understanding of procedure matches consent: Yes    Procedure consent matches procedure scheduled: Yes    Expected level of sedation:  Moderate  Appropriately NPO:  Yes  ASA Class:  4 (HFpEF; NYHA class II, permanent AF, moderate AS, severe TR, MIKEL, Class II obesity; BMI 37.43kg/m2)  Mallampati  :  Grade 4- soft palate obscured by base of tongue  Lungs:  Lungs clear with good breath sounds bilaterally  Heart:  Systolic murmur and a-fib  History & Physical reviewed:  History and physical reviewed and no updates needed  Statement of review:  I have reviewed the lab findings, diagnostic data, medications, and the plan for sedation

## 2023-03-15 NOTE — TELEPHONE ENCOUNTER
Pulmonology referral placed per Dr. Plummer, provider indicated preference for pt to see  if possible.     Charlette Montana RN on 3/15/2023 at 12:35 PM

## 2023-03-15 NOTE — PLAN OF CARE
Coronary angiogram with right heart cath and possible stent due to Valve disease.  No questions.

## 2023-03-15 NOTE — INTERVAL H&P NOTE
"I have reviewed the surgical (or preoperative) H&P that is linked to this encounter, and examined the patient. There are no significant changes    Clinical Conditions Present on Arrival:  Clinically Significant Risk Factors Present on Admission                  # Drug Induced Coagulation Defect: home medication list includes an anticoagulant medication   # Obesity: Estimated body mass index is 37.43 kg/m  as calculated from the following:    Height as of this encounter: 1.702 m (5' 7\").    Weight as of this encounter: 108.4 kg (239 lb).       "

## 2023-03-17 ENCOUNTER — PREP FOR PROCEDURE (OUTPATIENT)
Dept: ADMINISTRATIVE | Facility: CLINIC | Age: 68
End: 2023-03-17
Payer: MEDICARE

## 2023-03-17 DIAGNOSIS — I27.20 PULMONARY HYPERTENSION (H): ICD-10-CM

## 2023-03-17 DIAGNOSIS — G47.33 OSA (OBSTRUCTIVE SLEEP APNEA): Primary | ICD-10-CM

## 2023-03-28 ENCOUNTER — OFFICE VISIT (OUTPATIENT)
Dept: CARDIOLOGY | Facility: CLINIC | Age: 68
End: 2023-03-28
Payer: MEDICARE

## 2023-03-28 VITALS
RESPIRATION RATE: 18 BRPM | WEIGHT: 249 LBS | BODY MASS INDEX: 39.08 KG/M2 | HEART RATE: 82 BPM | SYSTOLIC BLOOD PRESSURE: 98 MMHG | DIASTOLIC BLOOD PRESSURE: 68 MMHG | HEIGHT: 67 IN

## 2023-03-28 DIAGNOSIS — I50.30 HEART FAILURE WITH PRESERVED EJECTION FRACTION, UNSPECIFIED HF CHRONICITY (H): Primary | ICD-10-CM

## 2023-03-28 PROCEDURE — 99214 OFFICE O/P EST MOD 30 MIN: CPT | Performed by: INTERNAL MEDICINE

## 2023-03-28 NOTE — LETTER
3/28/2023    LAVELL WALLACE MD  [facility] [address]  [facility] [address]    RE: Misty Quezada       Dear Colleague,     I had the pleasure of seeing Misty Quezada in the [facility].    HEART CARE NOTE          Assessment/Recommendations     1. HFpEF, Valvular heart disease c/b ADHF  Assessment / Plan    Near euvolemia on physical exam; denies HF symptoms of orthopnea, PND, edema    GDMT as detailed below; mainstay of treatment for HFpEF includes diuretics and adequate BP control (class I) and SGLT2-I (class 2a); additional medical therapy (ARNI, MRA, ARB) demonstrated less robust evidence for indication but may be considered per guideline recommendations (2b); no indication for BBlockers      Current Pharmacotherapy AHA Guideline-Directed Medical Therapy   Losartan 25 mg daily ARNI/ARB   Spironolactone 25 mg  MRA   SGLT2 inhibitor not started SGLT2-I    Bumex 2 mg BID Loop diuretic        2. Atrial fibrillation  Assessment / Plan    Rate controlled - continue metoprolol, and apixaban      3. Moderate aortic stenosis  Assessment / Plan    LORRAINE 1.1, pk/mn 2.78/17.4 - will discuss with valve clinic regarding potential interventions    History of Present Illness/Subjective      Ms. Misty Quezada is a 67 year old female with a PMHx significant for atrial fibrillation who presented in ADHF in the setting of valvular heart disease.     Today, Mrs. Quezada denies any acute cardiac events or complaints; She denies HF symptoms; reports weight has been stable to decreasing since discharge; Management plan as detailed above - no changes to plan of care today     ECG: Personally reviewed. nonspecific ST and T waves changes, atrial fibrillation, with RVR     ECHO (personnaly Reviewed):  Interpretation Summary     1. Left ventricular size is normal. Mild concentric increase in wall  thickness. Systolic function is normal. The estimated left ventricular  ejection  "fraction is 55-60%.  2. Right ventricle is not well visualized. Size is probably at least  moderately enlarged. Systolic function appears normal.  3. Severe right atrial enlargement.  4. Calcified trileaflet aortic valve with moderate aortic stenosis. Peak  forward velocity measures 2.8 m/s, mean gradient 17 mm Hg, calculated aortic  valve area 1.1 cm2, and dimensionless index 0.44. Trace aortic regurgitation.  5. Severe functional tricuspid regurgitation.  6. At least mild pulmonary hypertension is present with an estimated pulmonary  artery systolic pressure of 39 mm Hg plus the right atrial pressure.  7. Compared to the prior study dated 8/22/2021, there has been no significant  change.          Physical Examination Review of Systems   BP 98/68 (BP Location: Right arm, Patient Position: Sitting, Cuff Size: Adult Regular)   Pulse 82   Resp 18   Ht 1.702 m (5' 7\")   Wt 112.9 kg (249 lb)   BMI 39.00 kg/m    Body mass index is 39 kg/m .  Wt Readings from Last 3 Encounters:   03/28/23 112.9 kg (249 lb)   03/15/23 108.4 kg (239 lb)   03/09/23 109.8 kg (242 lb)     General Appearance:   no distress, normal body habitus   ENT/Mouth: membranes moist, no oral lesions or bleeding gums.      EYES:  no scleral icterus, normal conjunctivae   Neck: no carotid bruits or thyromegaly   Chest/Lungs:   lungs are clear to auscultation, no rales or wheezing, equal chest wall expansion    Cardiovascular:   Irregular. Normal first and second heart sounds with + JOHN; no rubs, or gallops; the carotid, radial and posterior tibial pulses are intact, no JVD and trace LE edema bilaterally    Abdomen:  no organomegaly, masses, bruits, or tenderness; bowel sounds are present   Extremities: no cyanosis or clubbing   Skin: no xanthelasma, warm.    Neurologic: alert and oriented x3, calm     Psychiatric: alert and oriented x3, calm     A complete 10 systems ROS was reviewed  And is negative except what is listed in the HPI.          Medical " History  Surgical History Family History Social History   Past Medical History:   Diagnosis Date     (HFpEF) heart failure with preserved ejection fraction (H) 10/17/2022     Aortic valve stenosis 11/10/2022     Benign essential hypertension      Chronic atrial fibrillation (H)      Lymphedema 9/24/2022     Nonrheumatic tricuspid valve regurgitation 2/6/2023    Past Surgical History:   Procedure Laterality Date     APPENDECTOMY       CHOLECYSTECTOMY       CV CORONARY ANGIOGRAM N/A 3/15/2023    Procedure: Coronary Angiogram;  Surgeon: Howard Plummer MD;  Location: [facility] CATH LAB CV     CV LEFT HEART CATH N/A 3/15/2023    Procedure: Left Heart Catheterization;  Surgeon: Howard Plummer MD;  Location: [facility] CATH LAB CV     CV RIGHT HEART CATH MEASUREMENTS RECORDED N/A 3/15/2023    Procedure: Right Heart Catheterization;  Surgeon: Howard Plummer MD;  Location: [facility] CATH LAB CV     GASTRIC BYPASS      no family history of premature coronary artery disease Social History     Socioeconomic History     Marital status:      Spouse name: Not on file     Number of children: Not on file     Years of education: Not on file     Highest education level: Not on file   Occupational History     Not on file   Tobacco Use     Smoking status: Never     Smokeless tobacco: Never   Substance and Sexual Activity     Alcohol use: Not Currently     Comment: Rarely Uses alcohol     Drug use: Never     Sexual activity: Not on file   Other Topics Concern     Not on file   Social History Narrative     Not on file     Social Determinants of Health     Financial Resource Strain: Not on file   Food Insecurity: Not on file   Transportation Needs: Not on file   Physical Activity: Not on file   Stress: Not on file   Social Connections: Not on file   Intimate Partner Violence: Not on file   Housing Stability: Not on file           Lab Results    Chemistry/lipid CBC Cardiac Enzymes/BNP/TSH/INR   Lab Results   Component Value  Date    CHOL 163 09/09/2016    HDL 42 (L) 09/09/2016    TRIG 81 09/09/2016    BUN 17.6 03/15/2023     03/15/2023    CO2 27 03/15/2023    Lab Results   Component Value Date    WBC 5.7 03/15/2023    HGB 13.2 03/15/2023    HCT 40.1 03/15/2023    MCV 99 03/15/2023     03/15/2023    Lab Results   Component Value Date    TROPONINI 1.32 (HH) 08/21/2021     (H) 09/02/2021    TSH 4.92 08/31/2021    INR 1.13 01/25/2023     Lab Results   Component Value Date    TROPONINI 1.32 (HH) 08/21/2021          Weight:    Wt Readings from Last 3 Encounters:   03/28/23 112.9 kg (249 lb)   03/15/23 108.4 kg (239 lb)   03/09/23 109.8 kg (242 lb)       Allergies  No Known Allergies      Surgical History  Past Surgical History:   Procedure Laterality Date     APPENDECTOMY       CHOLECYSTECTOMY       CV CORONARY ANGIOGRAM N/A 3/15/2023    Procedure: Coronary Angiogram;  Surgeon: Howard Plummer MD;  Location: [facility] CATH LAB CV     CV LEFT HEART CATH N/A 3/15/2023    Procedure: Left Heart Catheterization;  Surgeon: Howard Plummer MD;  Location: [facility] CATH LAB CV     CV RIGHT HEART CATH MEASUREMENTS RECORDED N/A 3/15/2023    Procedure: Right Heart Catheterization;  Surgeon: Howard Plummer MD;  Location: [facility] CATH LAB CV     GASTRIC BYPASS         Social History  Tobacco:   History   Smoking Status     Never   Smokeless Tobacco     Never    Alcohol:   Social History    Substance and Sexual Activity      Alcohol use: Not Currently        Comment: Rarely Uses alcohol   Illicit Drugs:   History   Drug Use Unknown       Family History  Family History   Problem Relation Age of Onset     No Known Problems Mother      No Known Problems Father             Thank you for allowing me to participate in the care of your patient.    Sincerely,     Tara Siegel MD     [facility]

## 2023-03-31 ENCOUNTER — TELEPHONE (OUTPATIENT)
Dept: CARDIOLOGY | Facility: CLINIC | Age: 68
End: 2023-03-31
Payer: MEDICARE

## 2023-03-31 NOTE — TELEPHONE ENCOUNTER
Spoke with patient about CLASP TR randomization results. She will be in the registry cohort and considered roll-in #3.     Implant date set for 5/3/23.     Told her that Danitza will be giving her a call to talk about pre-op dates.     No further questions at this time.     Sierra R. Behr-Holewinski

## 2023-04-11 DIAGNOSIS — I36.1 NONRHEUMATIC TRICUSPID VALVE REGURGITATION: Primary | ICD-10-CM

## 2023-04-17 ENCOUNTER — OFFICE VISIT (OUTPATIENT)
Dept: CARDIOLOGY | Facility: CLINIC | Age: 68
End: 2023-04-17
Payer: MEDICARE

## 2023-04-17 VITALS
HEART RATE: 94 BPM | RESPIRATION RATE: 16 BRPM | SYSTOLIC BLOOD PRESSURE: 108 MMHG | WEIGHT: 253 LBS | DIASTOLIC BLOOD PRESSURE: 82 MMHG | BODY MASS INDEX: 39.63 KG/M2 | OXYGEN SATURATION: 96 %

## 2023-04-17 DIAGNOSIS — I50.32 CHRONIC HEART FAILURE WITH PRESERVED EJECTION FRACTION (H): ICD-10-CM

## 2023-04-17 DIAGNOSIS — I07.1 TRICUSPID VALVE INSUFFICIENCY, UNSPECIFIED ETIOLOGY: ICD-10-CM

## 2023-04-17 DIAGNOSIS — I35.0 NONRHEUMATIC AORTIC VALVE STENOSIS: ICD-10-CM

## 2023-04-17 DIAGNOSIS — G47.33 OBSTRUCTIVE SLEEP APNEA SYNDROME: ICD-10-CM

## 2023-04-17 DIAGNOSIS — I48.21 PERMANENT ATRIAL FIBRILLATION (H): ICD-10-CM

## 2023-04-17 DIAGNOSIS — R60.0 BILATERAL LOWER EXTREMITY EDEMA: Primary | ICD-10-CM

## 2023-04-17 PROCEDURE — 99214 OFFICE O/P EST MOD 30 MIN: CPT | Performed by: INTERNAL MEDICINE

## 2023-04-17 NOTE — LETTER
4/17/2023    LIZBET WALLACE MD  [facility] [address]  [facility] [id number]    RE: Misty Quezada       Dear Colleague,     I had the pleasure of seeing Misty Espitiajonas in the [facility].         [facility]   [address], [place] [address]   [email or web address]   OFFICE: [phone]          Thank you Lizbet Dean for asking the [facility] team to participate in the care of your patient, Misty Quezada.     Impression and Plan     1. Atrial fibrillation (permanent). She has been maintained on apixaban for CVA prophylaxis.  Ventricular response appears well controlled.  Plan:  Continue metoprolol succinate.  Continue apixaban (Eliquis ) for CVA prophylaxis.    2.  Aortic stenosis.  This is felt moderate in degree on most recent echocardiogram 7 February 2023.  The peak forward velocity measures 2.7 m/s, mean gradient 18 mm Hg, calculated aortic valve area 1.0 cm2, and dimensionless index 0.34. No signifcant regurgitation.     3.  Tricuspid insufficiency.  She has been seen in the Valve Clinic for consideration of the Clasp TR trial, which is evaluating the Janette device for transcatheter [id number] tricuspid valve repair (Misty has follow-up scheduled 1 May 2023 with the Valve Clinic Team).     4.  Lower extremity edema.  Suspect that this is multifactorial in nature though primarily related to impeded venous return vis-à-vis significant central obesity.  As aforementioned, patient with suspected sleep disordered breathing/sleep apnea leading to some degree of pulmonary hypertension which likely is a contributor as well.  With the exception of her lower extremity edema, she otherwise appears fairly volume neutral on exam.  Specifically, jugular venous pressure appears normal and lungs are fairly clear.      In addition, her weights have been stable since her last visit with us.   Consequently, do not feel further augmentation and diuretic regimen is necessarily required.    Continue bumetanide 2 mg twice daily.    5.  Hypertension.  Blood pressure is reasonable in the office today at 108/82 mmHg.    6.  Obstructive sleep apnea.  As aforementioned, Misty has historically been advised to have consultation with Pulmonary Sleep Medicine for her sleep apnea.  Indeed, this was recommended by me upon dismissal from hospital back in 27 August 2021.  Nonetheless, she still had not had this performed.  Documentation indicates that my colleague, Dr. Howard Michelle has recently made such a referral once again.  Misty states that she does have an appointment with Pulmonary Medicine/Sleep Medicine in May 2023.    Again, Misty has appointment with scheduled 1 May 2023 with the Valve Clinic Team.  Follow-up with myself to be determined.      35 minutes spent reviewing prior records (including documentation, laboratory studies, cardiac testing/imaging), interview with patient along with physical exam, planning, and subsequent documentation/crafting of note).           History of Present Illness    Once again I would like to thank you again for asking me to participate in the care of your patient, Misty Quezada.  As you know, but to reiterate for my own records, Misty Quezada is a 67 year old female with permanent atrial fibrillation.      In addition, she has a history of aortic stenosis felt moderate in degree on echocardiogram 7 February 2023 as well as tricuspid insufficiency felt severe on that same echocardiogram.      On interview today, Misty states her breathing is comfortable.  She denies any chest pain.  No palpitations despite atrial fibrillation.  She has chronic lower extremity edema though this has been stable subjectively.  She also reports her weights have been stable.    Further review of systems is otherwise negative/noncontributory (medical record and 13 point review of systems reviewed  as well and pertinent positives noted).         Cardiac Diagnostics      Echocardiogram 7 February 2023:  Left ventricular size is normal. Mild concentric increase in wall thickness. Systolic function is normal. The calculated left ventricular  ejection fraction is 59%.  Right ventricular size is moderately enlarged. Systolic function is low normal-to-mildly reduced.  Severe biatrial enlargement.  Calcified trileaflet aortic valve with moderate aortic stenosis. Peak forward velocity measures 2.7 m/s, mean gradient 18 mm Hg, calculated aortic valve area 1.0 cm2, and dimensionless index 0.34. No signifcant regurgitation.  Severe functional tricuspid regurgitation. EROA 0.61 cm2, regurgitant volume 47 mL.  Moderate pulmonary hypertension is present with an estimated pulmonary artery systolic pressure of 54 mm Hg.  Compared to the prior study dated 24 September 2022, there has been no significant change.    Transesophageal echocardiogram 7 February 2023:  Left ventricular size and systolic function are normal. The estimated left ventricular ejection fraction is 55-60%.  Right ventricular size is moderately enlarged. Systolic function is mildly reduced.  Severe biatrial enlargement.  Calcified trileaflet aortic valve with moderate aortic stenosis. Peak forward velocity measures 2.1 m/s, mean gradient 12 mm Hg, calculated aortic  vavle area 0.8 cm2, and dimensionless index 0.28. No significant regurgitation.  Severe functional tricuspid regurgitation. EROA 0.51 cm2, regurgitant volume 47 mL.  Possible thrombus in the left atrial appendage.    Echocardiogram 22 August 2021:  Normal left ventricular size and systolic performance with ejection fraction of 55-60%.  Abnormal septal motion consistent with right ventricular volume/pressure overload.  Moderate aortic stenosis.  Mean gradient documented at 14 mmHg with peak velocity of 2.6 m/s.  Moderate-severe tricuspid insufficiency.  Moderate right ventricular enlargement with  normal right ventricular systolic performance.  Mild to moderate left atrial enlargement.  Moderate to severe right atrial enlargement.  Right ventricular systolic pressure relative to right to pressure is mildly increased.  Pulmonary artery pressure estimated at 40-45 mmHg plus right atrial pressure.    Echocardiogram 22 June 2021:  Normal left ventricular size with hyperdynamic left ventricular systolic performance.  Ejection fraction 70-75%.  Mild aortic stenosis.  Moderate to severe tricuspid insufficiency.  Mild right ventricular enlargement with normal right ventricular systolic performance.  Mild left atrial enlargement.  Moderate right atrial enlargement.  Right ventricular systolic pressure relative to right to pressure is at the upper limits of normal.  Pulmonary artery pressure estimated at 30-35 mmHg plus right to pressure.    Echocardiogram 9 February 2017:  Normal left ventricular size and systolic performance with ejection fraction of 50-55%.  Mild concentric increased left ventricular wall thickness.  Moderate tricuspid insufficiency.  Mild left atrial enlargement. Moderate right atrial enlargement.    Coronary angiogram 15 March 2023:  Left main coronary artery: No obstruction.  Left anterior descending coronary artery: Mild irregularities.  Circumflex coronary artery: Mild irregularities.  Right coronary artery: Dominant vessel with no obstruction.  Left ventricular end-diastolic pressure 17.    Right heart catheterization 15 March 2023:  RA:17   RV:55/4  PA:56/29 (39)  PCWP:17 V's to 20  SvO2:64  AO sat: 88  CO/CI:  Kapil:5.23/2.41    24-hour Holter monitor 18 December 2019:  Persistent atrial fibrillation with controlled ventricular response.    24-hour Holter monitor 15 March 2017:  Persistent atrial fibrillation with mildly elevated ventricular response.     Twelve-lead ECG (personally viewed) 8/21/2021: Atrial fibrillation with heart rate of 115 bpm.  Low voltage.         Physical Examination        /82 (BP Location: Right arm, Patient Position: Sitting, Cuff Size: Adult Large)   Pulse 94   Resp 16   Wt 114.8 kg (253 lb)   SpO2 96%   BMI 39.63 kg/m          Wt Readings from Last 3 Encounters:   04/17/23 114.8 kg (253 lb)   03/28/23 112.9 kg (249 lb)   03/15/23 108.4 kg (239 lb)       The patient is alert and oriented times three. Sclerae are anicteric. Mucosal membranes are moist. Jugular venous pressure is reasonable no significant adenopathy/thyromegally appreciated. Lungs are fairly clear with reasonable expansion. On cardiovascular exam, the patient has a regular S1 and S2.  2/6 systolic murmur.  Abdomen is soft and non-tender. Extremities reveal no clubbing, cyanosis, with bilateral lower extremity edema with left somewhat more so than right.           Medications  Allergies   Current Outpatient Medications   Medication Sig Dispense Refill    acetaminophen (TYLENOL) 325 MG tablet Take 975 mg by mouth every 4 hours as needed for mild pain      apixaban (ELIQUIS) 5 mg Tab tablet [APIXABAN (ELIQUIS) 5 MG TAB TABLET] Take 5 mg by mouth 2 (two) times a day.      bumetanide (BUMEX) 2 MG tablet TAKE 1 TABLET(2 MG) BY MOUTH TWICE DAILY 180 tablet 3    CALCIUM/D3/MAG OX//TYRONE/ZN (CALTRATE + D3 PLUS MINERALS ORAL) [CALCIUM/D3/MAG OX//TYRONE/ZN (CALTRATE + D3 PLUS MINERALS ORAL)] Take 1 tablet by mouth daily.      cholecalciferol, vitamin D3, 2,000 unit Tab [CHOLECALCIFEROL, VITAMIN D3, 2,000 UNIT TAB] Take 2,000 Units by mouth daily.      citalopram (CELEXA) 10 MG tablet Take 20 mg by mouth daily      cyanocobalamin 1000 MCG tablet [CYANOCOBALAMIN 1000 MCG TABLET] Take 1,000 mcg by mouth daily.      ferrous sulfate (FEROSUL) 325 (65 Fe) MG tablet Take 1 tablet (325 mg) by mouth every other day      losartan (COZAAR) 25 MG tablet Take 1 tablet (25 mg) by mouth daily      melatonin 3 MG tablet Take 3 mg by mouth nightly as needed      metoprolol succinate (TOPROL-XL) 25 MG [METOPROLOL SUCCINATE  (TOPROL-XL) 25 MG] TAKE 1 TABLET BY MOUTH DAILY. 90 tablet 2    MULTIVIT WITH CALCIUM,IRON,MIN (WOMEN'S DAILY MULTIVITAMIN ORAL) [MULTIVIT WITH CALCIUM,IRON,MIN (WOMEN'S DAILY MULTIVITAMIN ORAL)] Take 1 tablet by mouth daily.      polyethylene glycol (MIRALAX) 17 g packet Take 17 g by mouth daily as needed for constipation      potassium chloride ER (K-TAB/KLOR-CON) 10 MEQ CR tablet Take 1 tablet (10 mEq) by mouth daily 30 tablet 11    senna (SENOKOT) 8.6 MG tablet Take 17.2 mg by mouth 2 times daily as needed      spironolactone (ALDACTONE) 25 MG tablet Take 1 tablet (25 mg) by mouth daily 90 tablet 0     No Known Allergies       Lab Results    Chemistry/lipid CBC Cardiac Enzymes/BNP/TSH/INR   Recent Labs   Lab Test 09/09/16  1412   CHOL 163   HDL 42*      TRIG 81     Recent Labs   Lab Test 09/09/16  1412        Recent Labs   Lab Test 03/15/23  0648      POTASSIUM 4.2   CHLORIDE 103   CO2 27   *   BUN 17.6   CR 0.74   GFRESTIMATED 88   TURNER 9.3     Recent Labs   Lab Test 03/15/23  0648 01/25/23  1440 11/16/22  1242   CR 0.74 0.78 0.88     Recent Labs   Lab Test 09/02/21  0457   A1C 6.2*          Recent Labs   Lab Test 03/15/23  0648   WBC 5.7   HGB 13.2   HCT 40.1   MCV 99        Recent Labs   Lab Test 03/15/23  0648 01/25/23  1440 11/16/22  1242   HGB 13.2 13.6 12.3    Recent Labs   Lab Test 08/21/21  1944 08/21/21  1426 08/21/21  0820   TROPONINI 1.32* 1.87* 1.75*     Recent Labs   Lab Test 09/24/22  0850 09/02/21  0457 08/21/21  0820   BNP  --  199* 281*   NTBNP 1,898*  --   --      Recent Labs   Lab Test 08/31/21  0720   TSH 4.92     Recent Labs   Lab Test 01/25/23  1440 08/21/21  0820   INR 1.13 1.44*        Medical History  Surgical History Family History Social History   Past Medical History:   Diagnosis Date    (HFpEF) heart failure with preserved ejection fraction (H) 10/17/2022    Aortic valve stenosis 11/10/2022    Benign essential hypertension     Chronic atrial  fibrillation (H)     Lymphedema 9/24/2022    Nonrheumatic tricuspid valve regurgitation 2/6/2023     Past Surgical History:   Procedure Laterality Date    APPENDECTOMY      CHOLECYSTECTOMY      CV CORONARY ANGIOGRAM N/A 3/15/2023    Procedure: Coronary Angiogram;  Surgeon: Howard Plummer MD;  Location: [facility] CATH LAB CV    CV LEFT HEART CATH N/A 3/15/2023    Procedure: Left Heart Catheterization;  Surgeon: Howard Plummer MD;  Location: [facility] CATH LAB CV    CV RIGHT HEART CATH MEASUREMENTS RECORDED N/A 3/15/2023    Procedure: Right Heart Catheterization;  Surgeon: Howard Plummer MD;  Location: [facility] CATH LAB CV    GASTRIC BYPASS       Family History   Problem Relation Age of Onset    No Known Problems Mother     No Known Problems Father         Social History     Socioeconomic History    Marital status:      Spouse name: Not on file    Number of children: Not on file    Years of education: Not on file    Highest education level: Not on file   Occupational History    Not on file   Tobacco Use    Smoking status: Never    Smokeless tobacco: Never   Vaping Use    Vaping status: Not on file   Substance and Sexual Activity    Alcohol use: Not Currently     Comment: Rarely Uses alcohol    Drug use: Never    Sexual activity: Not on file   Other Topics Concern    Not on file   Social History Narrative    Not on file     Social Determinants of Health     Financial Resource Strain: Not on file   Food Insecurity: Not on file   Transportation Needs: Not on file   Physical Activity: Not on file   Stress: Not on file   Social Connections: Not on file   Intimate Partner Violence: Not on file   Housing Stability: Not on file                      Thank you for allowing me to participate in the care of your patient.      Sincerely,     Adia Rocha MD     [facility]  cc:   Adia Rocha MD  [address]   [facility]   [place] [address]

## 2023-04-17 NOTE — PROGRESS NOTES
[facility] HEART CARE   [address], [place] [address]   [email or web address]   OFFICE: [phone]          Thank you Lizbet Dean for asking the [facility] Heart Care team to participate in the care of your patient, Misty Quezada.     Impression and Plan     1. Atrial fibrillation (permanent). She has been maintained on apixaban for CVA prophylaxis.  Ventricular response appears well controlled.  Plan:    Continue metoprolol succinate.    Continue apixaban (Eliquis ) for CVA prophylaxis.    2.  Aortic stenosis.  This is felt moderate in degree on most recent echocardiogram 7 February 2023.  The peak forward velocity measures 2.7 m/s, mean gradient 18 mm Hg, calculated aortic valve area 1.0 cm2, and dimensionless index 0.34. No signifcant regurgitation.     3.  Tricuspid insufficiency.  She has been seen in the Valve Clinic for consideration of the Clasp TR trial, which is evaluating the Janette device for transcatheter [id number] tricuspid valve repair (Misty has follow-up scheduled 1 May 2023 with the Valve Clinic Team).     4.  Lower extremity edema.  Suspect that this is multifactorial in nature though primarily related to impeded venous return vis-à-vis significant central obesity.  As aforementioned, patient with suspected sleep disordered breathing/sleep apnea leading to some degree of pulmonary hypertension which likely is a contributor as well.  With the exception of her lower extremity edema, she otherwise appears fairly volume neutral on exam.  Specifically, jugular venous pressure appears normal and lungs are fairly clear.      In addition, her weights have been stable since her last visit with us.  Consequently, do not feel further augmentation and diuretic regimen is necessarily required.      Continue bumetanide 2 mg twice daily.    5.  Hypertension.  Blood pressure is reasonable in the office today at 108/82 mmHg.    6.  Obstructive sleep  apnea.  As aforementioned, Misty has historically been advised to have consultation with Pulmonary Sleep Medicine for her sleep apnea.  Indeed, this was recommended by me upon dismissal from hospital back in 27 August 2021.  Nonetheless, she still had not had this performed.  Documentation indicates that my colleague, Dr. Howard Michelle has recently made such a referral once again.  Misty states that she does have an appointment with Pulmonary Medicine/Sleep Medicine in May 2023.    Again, Misty has appointment with scheduled 1 May 2023 with the Valve Clinic Team.  Follow-up with myself to be determined.      35 minutes spent reviewing prior records (including documentation, laboratory studies, cardiac testing/imaging), interview with patient along with physical exam, planning, and subsequent documentation/crafting of note).           History of Present Illness    Once again I would like to thank you again for asking me to participate in the care of your patient, Misty Quezada.  As you know, but to reiterate for my own records, Misty Quezada is a 67 year old female with permanent atrial fibrillation.      In addition, she has a history of aortic stenosis felt moderate in degree on echocardiogram 7 February 2023 as well as tricuspid insufficiency felt severe on that same echocardiogram.      On interview today, Misty states her breathing is comfortable.  She denies any chest pain.  No palpitations despite atrial fibrillation.  She has chronic lower extremity edema though this has been stable subjectively.  She also reports her weights have been stable.    Further review of systems is otherwise negative/noncontributory (medical record and 13 point review of systems reviewed as well and pertinent positives noted).         Cardiac Diagnostics      Echocardiogram 7 February 2023:  1. Left ventricular size is normal. Mild concentric increase in wall thickness. Systolic function is normal. The calculated left  ventricular  2. ejection fraction is 59%.  3. Right ventricular size is moderately enlarged. Systolic function is low normal-to-mildly reduced.  4. Severe biatrial enlargement.  5. Calcified trileaflet aortic valve with moderate aortic stenosis. Peak forward velocity measures 2.7 m/s, mean gradient 18 mm Hg, calculated aortic valve area 1.0 cm2, and dimensionless index 0.34. No signifcant regurgitation.  6. Severe functional tricuspid regurgitation. EROA 0.61 cm2, regurgitant volume 47 mL.  7. Moderate pulmonary hypertension is present with an estimated pulmonary artery systolic pressure of 54 mm Hg.  o Compared to the prior study dated 24 September 2022, there has been no significant change.    Transesophageal echocardiogram 7 February 2023:  1. Left ventricular size and systolic function are normal. The estimated left ventricular ejection fraction is 55-60%.  2. Right ventricular size is moderately enlarged. Systolic function is mildly reduced.  3. Severe biatrial enlargement.  4. Calcified trileaflet aortic valve with moderate aortic stenosis. Peak forward velocity measures 2.1 m/s, mean gradient 12 mm Hg, calculated aortic  5. vavle area 0.8 cm2, and dimensionless index 0.28. No significant regurgitation.  6. Severe functional tricuspid regurgitation. EROA 0.51 cm2, regurgitant volume 47 mL.  7. Possible thrombus in the left atrial appendage.    Echocardiogram 22 August 2021:  1. Normal left ventricular size and systolic performance with ejection fraction of 55-60%.  2. Abnormal septal motion consistent with right ventricular volume/pressure overload.  3. Moderate aortic stenosis.  4. Mean gradient documented at 14 mmHg with peak velocity of 2.6 m/s.  5. Moderate-severe tricuspid insufficiency.  6. Moderate right ventricular enlargement with normal right ventricular systolic performance.  7. Mild to moderate left atrial enlargement.  Moderate to severe right atrial enlargement.  8. Right ventricular systolic  pressure relative to right to pressure is mildly increased.  Pulmonary artery pressure estimated at 40-45 mmHg plus right atrial pressure.    Echocardiogram 22 June 2021:  1. Normal left ventricular size with hyperdynamic left ventricular systolic performance.  Ejection fraction 70-75%.  2. Mild aortic stenosis.  3. Moderate to severe tricuspid insufficiency.  4. Mild right ventricular enlargement with normal right ventricular systolic performance.  5. Mild left atrial enlargement.  Moderate right atrial enlargement.  6. Right ventricular systolic pressure relative to right to pressure is at the upper limits of normal.  Pulmonary artery pressure estimated at 30-35 mmHg plus right to pressure.    Echocardiogram 9 February 2017:  1. Normal left ventricular size and systolic performance with ejection fraction of 50-55%.  2. Mild concentric increased left ventricular wall thickness.  3. Moderate tricuspid insufficiency.  4. Mild left atrial enlargement. Moderate right atrial enlargement.    Coronary angiogram 15 March 2023:  1. Left main coronary artery: No obstruction.  2. Left anterior descending coronary artery: Mild irregularities.  3. Circumflex coronary artery: Mild irregularities.  4. Right coronary artery: Dominant vessel with no obstruction.  5. Left ventricular end-diastolic pressure 17.    Right heart catheterization 15 March 2023:  1. RA:17   2. RV:55/4  3. PA:56/29 (39)  4. PCWP:17 V's to 20  5. SvO2:64  6. AO sat: 88  7. CO/CI:  a. Kapil:5.23/2.41    24-hour Holter monitor 18 December 2019:  1. Persistent atrial fibrillation with controlled ventricular response.    24-hour Holter monitor 15 March 2017:  1. Persistent atrial fibrillation with mildly elevated ventricular response.     Twelve-lead ECG (personally viewed) 8/21/2021: Atrial fibrillation with heart rate of 115 bpm.  Low voltage.         Physical Examination       /82 (BP Location: Right arm, Patient Position: Sitting, Cuff Size: Adult  Large)   Pulse 94   Resp 16   Wt 114.8 kg (253 lb)   SpO2 96%   BMI 39.63 kg/m          Wt Readings from Last 3 Encounters:   04/17/23 114.8 kg (253 lb)   03/28/23 112.9 kg (249 lb)   03/15/23 108.4 kg (239 lb)       The patient is alert and oriented times three. Sclerae are anicteric. Mucosal membranes are moist. Jugular venous pressure is reasonable no significant adenopathy/thyromegally appreciated. Lungs are fairly clear with reasonable expansion. On cardiovascular exam, the patient has a regular S1 and S2.  2/6 systolic murmur.  Abdomen is soft and non-tender. Extremities reveal no clubbing, cyanosis, with bilateral lower extremity edema with left somewhat more so than right.           Medications  Allergies   Current Outpatient Medications   Medication Sig Dispense Refill     acetaminophen (TYLENOL) 325 MG tablet Take 975 mg by mouth every 4 hours as needed for mild pain       apixaban (ELIQUIS) 5 mg Tab tablet [APIXABAN (ELIQUIS) 5 MG TAB TABLET] Take 5 mg by mouth 2 (two) times a day.       bumetanide (BUMEX) 2 MG tablet TAKE 1 TABLET(2 MG) BY MOUTH TWICE DAILY 180 tablet 3     CALCIUM/D3/MAG OX//TYRONE/ZN (CALTRATE + D3 PLUS MINERALS ORAL) [CALCIUM/D3/MAG OX//TYRONE/ZN (CALTRATE + D3 PLUS MINERALS ORAL)] Take 1 tablet by mouth daily.       cholecalciferol, vitamin D3, 2,000 unit Tab [CHOLECALCIFEROL, VITAMIN D3, 2,000 UNIT TAB] Take 2,000 Units by mouth daily.       citalopram (CELEXA) 10 MG tablet Take 20 mg by mouth daily       cyanocobalamin 1000 MCG tablet [CYANOCOBALAMIN 1000 MCG TABLET] Take 1,000 mcg by mouth daily.       ferrous sulfate (FEROSUL) 325 (65 Fe) MG tablet Take 1 tablet (325 mg) by mouth every other day       losartan (COZAAR) 25 MG tablet Take 1 tablet (25 mg) by mouth daily       melatonin 3 MG tablet Take 3 mg by mouth nightly as needed       metoprolol succinate (TOPROL-XL) 25 MG [METOPROLOL SUCCINATE (TOPROL-XL) 25 MG] TAKE 1 TABLET BY MOUTH DAILY. 90 tablet 2     MULTIVIT  WITH CALCIUM,IRON,MIN (WOMEN'S DAILY MULTIVITAMIN ORAL) [MULTIVIT WITH CALCIUM,IRON,MIN (WOMEN'S DAILY MULTIVITAMIN ORAL)] Take 1 tablet by mouth daily.       polyethylene glycol (MIRALAX) 17 g packet Take 17 g by mouth daily as needed for constipation       potassium chloride ER (K-TAB/KLOR-CON) 10 MEQ CR tablet Take 1 tablet (10 mEq) by mouth daily 30 tablet 11     senna (SENOKOT) 8.6 MG tablet Take 17.2 mg by mouth 2 times daily as needed       spironolactone (ALDACTONE) 25 MG tablet Take 1 tablet (25 mg) by mouth daily 90 tablet 0     No Known Allergies       Lab Results    Chemistry/lipid CBC Cardiac Enzymes/BNP/TSH/INR   Recent Labs   Lab Test 09/09/16  1412   CHOL 163   HDL 42*      TRIG 81     Recent Labs   Lab Test 09/09/16  1412        Recent Labs   Lab Test 03/15/23  0648      POTASSIUM 4.2   CHLORIDE 103   CO2 27   *   BUN 17.6   CR 0.74   GFRESTIMATED 88   TURNER 9.3     Recent Labs   Lab Test 03/15/23  0648 01/25/23  1440 11/16/22  1242   CR 0.74 0.78 0.88     Recent Labs   Lab Test 09/02/21  0457   A1C 6.2*          Recent Labs   Lab Test 03/15/23  0648   WBC 5.7   HGB 13.2   HCT 40.1   MCV 99        Recent Labs   Lab Test 03/15/23  0648 01/25/23  1440 11/16/22  1242   HGB 13.2 13.6 12.3    Recent Labs   Lab Test 08/21/21  1944 08/21/21  1426 08/21/21  0820   TROPONINI 1.32* 1.87* 1.75*     Recent Labs   Lab Test 09/24/22  0850 09/02/21  0457 08/21/21  0820   BNP  --  199* 281*   NTBNP 1,898*  --   --      Recent Labs   Lab Test 08/31/21  0720   TSH 4.92     Recent Labs   Lab Test 01/25/23  1440 08/21/21  0820   INR 1.13 1.44*        Medical History  Surgical History Family History Social History   Past Medical History:   Diagnosis Date     (HFpEF) heart failure with preserved ejection fraction (H) 10/17/2022     Aortic valve stenosis 11/10/2022     Benign essential hypertension      Chronic atrial fibrillation (H)      Lymphedema 9/24/2022     Nonrheumatic tricuspid  valve regurgitation 2/6/2023     Past Surgical History:   Procedure Laterality Date     APPENDECTOMY       CHOLECYSTECTOMY       CV CORONARY ANGIOGRAM N/A 3/15/2023    Procedure: Coronary Angiogram;  Surgeon: Howard Plummer MD;  Location: [facility] CATH LAB CV     CV LEFT HEART CATH N/A 3/15/2023    Procedure: Left Heart Catheterization;  Surgeon: Howard Plummer MD;  Location: [facility] CATH LAB CV     CV RIGHT HEART CATH MEASUREMENTS RECORDED N/A 3/15/2023    Procedure: Right Heart Catheterization;  Surgeon: Howard Plummer MD;  Location: [facility] CATH LAB CV     GASTRIC BYPASS       Family History   Problem Relation Age of Onset     No Known Problems Mother      No Known Problems Father         Social History     Socioeconomic History     Marital status:      Spouse name: Not on file     Number of children: Not on file     Years of education: Not on file     Highest education level: Not on file   Occupational History     Not on file   Tobacco Use     Smoking status: Never     Smokeless tobacco: Never   Vaping Use     Vaping status: Not on file   Substance and Sexual Activity     Alcohol use: Not Currently     Comment: Rarely Uses alcohol     Drug use: Never     Sexual activity: Not on file   Other Topics Concern     Not on file   Social History Narrative     Not on file     Social Determinants of Health     Financial Resource Strain: Not on file   Food Insecurity: Not on file   Transportation Needs: Not on file   Physical Activity: Not on file   Stress: Not on file   Social Connections: Not on file   Intimate Partner Violence: Not on file   Housing Stability: Not on file

## 2023-04-25 ENCOUNTER — TELEPHONE (OUTPATIENT)
Dept: CARDIOLOGY | Facility: CLINIC | Age: 68
End: 2023-04-25
Payer: MEDICARE

## 2023-04-25 DIAGNOSIS — I50.32 CHRONIC HEART FAILURE WITH PRESERVED EJECTION FRACTION (H): ICD-10-CM

## 2023-04-25 DIAGNOSIS — I07.1 TRICUSPID VALVE INSUFFICIENCY, UNSPECIFIED ETIOLOGY: ICD-10-CM

## 2023-04-25 DIAGNOSIS — I35.0 NONRHEUMATIC AORTIC VALVE STENOSIS: Primary | ICD-10-CM

## 2023-04-25 NOTE — TELEPHONE ENCOUNTER
Called patient to remind her last dose of Eliquis will be on Thursday 4/27, patient needs to begin holding on Friday 4/28 through procedure date on 5/3. Patient had no questions and verbalized understanding.     Charlette Montana RN on 4/25/2023 at 11:19 AM

## 2023-04-30 LAB
ABO/RH(D): NORMAL
ANTIBODY SCREEN: NEGATIVE
SPECIMEN EXPIRATION DATE: NORMAL

## 2023-05-01 ENCOUNTER — PREP FOR PROCEDURE (OUTPATIENT)
Dept: CARDIOLOGY | Facility: CLINIC | Age: 68
End: 2023-05-01

## 2023-05-01 ENCOUNTER — ALLIED HEALTH/NURSE VISIT (OUTPATIENT)
Dept: CARDIOLOGY | Facility: CLINIC | Age: 68
End: 2023-05-01
Payer: MEDICARE

## 2023-05-01 ENCOUNTER — OFFICE VISIT (OUTPATIENT)
Dept: CARDIOLOGY | Facility: CLINIC | Age: 68
End: 2023-05-01
Payer: MEDICARE

## 2023-05-01 ENCOUNTER — LAB (OUTPATIENT)
Dept: CARDIOLOGY | Facility: CLINIC | Age: 68
End: 2023-05-01
Payer: MEDICARE

## 2023-05-01 VITALS
WEIGHT: 254 LBS | DIASTOLIC BLOOD PRESSURE: 55 MMHG | OXYGEN SATURATION: 93 % | HEART RATE: 95 BPM | RESPIRATION RATE: 20 BRPM | SYSTOLIC BLOOD PRESSURE: 91 MMHG | BODY MASS INDEX: 39.78 KG/M2

## 2023-05-01 DIAGNOSIS — I50.32 CHRONIC HEART FAILURE WITH PRESERVED EJECTION FRACTION (H): ICD-10-CM

## 2023-05-01 DIAGNOSIS — I89.0 LYMPHEDEMA: ICD-10-CM

## 2023-05-01 DIAGNOSIS — I07.1 TRICUSPID VALVE INSUFFICIENCY, UNSPECIFIED ETIOLOGY: Primary | ICD-10-CM

## 2023-05-01 DIAGNOSIS — I07.1 TRICUSPID VALVE INSUFFICIENCY, UNSPECIFIED ETIOLOGY: ICD-10-CM

## 2023-05-01 DIAGNOSIS — E66.01 MORBID OBESITY (H): Primary | ICD-10-CM

## 2023-05-01 DIAGNOSIS — I48.20 CHRONIC ATRIAL FIBRILLATION (H): ICD-10-CM

## 2023-05-01 DIAGNOSIS — I50.30 HEART FAILURE WITH PRESERVED EJECTION FRACTION, UNSPECIFIED HF CHRONICITY (H): ICD-10-CM

## 2023-05-01 LAB
ALBUMIN SERPL BCG-MCNC: 4 G/DL (ref 3.5–5.2)
ALP SERPL-CCNC: 107 U/L (ref 35–104)
ALT SERPL W P-5'-P-CCNC: 21 U/L (ref 10–35)
ANION GAP SERPL CALCULATED.3IONS-SCNC: 11 MMOL/L (ref 7–15)
AST SERPL W P-5'-P-CCNC: 28 U/L (ref 10–35)
BASOPHILS # BLD AUTO: 0 10E3/UL (ref 0–0.2)
BASOPHILS NFR BLD AUTO: 1 %
BILIRUB SERPL-MCNC: 0.8 MG/DL
BUN SERPL-MCNC: 21.7 MG/DL (ref 8–23)
CALCIUM SERPL-MCNC: 9.3 MG/DL (ref 8.8–10.2)
CHLORIDE SERPL-SCNC: 104 MMOL/L (ref 98–107)
CREAT SERPL-MCNC: 0.79 MG/DL (ref 0.51–0.95)
DEPRECATED HCO3 PLAS-SCNC: 27 MMOL/L (ref 22–29)
EOSINOPHIL # BLD AUTO: 0.1 10E3/UL (ref 0–0.7)
EOSINOPHIL NFR BLD AUTO: 3 %
ERYTHROCYTE [DISTWIDTH] IN BLOOD BY AUTOMATED COUNT: 13.4 % (ref 10–15)
GFR SERPL CREATININE-BSD FRML MDRD: 81 ML/MIN/1.73M2
GLUCOSE SERPL-MCNC: 116 MG/DL (ref 70–99)
HCT VFR BLD AUTO: 41.4 % (ref 35–47)
HGB BLD-MCNC: 13.2 G/DL (ref 11.7–15.7)
IMM GRANULOCYTES # BLD: 0 10E3/UL
IMM GRANULOCYTES NFR BLD: 0 %
INR PPP: 1.07 (ref 0.85–1.15)
LYMPHOCYTES # BLD AUTO: 1.6 10E3/UL (ref 0.8–5.3)
LYMPHOCYTES NFR BLD AUTO: 33 %
MAGNESIUM SERPL-MCNC: 2.2 MG/DL (ref 1.7–2.3)
MCH RBC QN AUTO: 31.8 PG (ref 26.5–33)
MCHC RBC AUTO-ENTMCNC: 31.9 G/DL (ref 31.5–36.5)
MCV RBC AUTO: 100 FL (ref 78–100)
MONOCYTES # BLD AUTO: 0.5 10E3/UL (ref 0–1.3)
MONOCYTES NFR BLD AUTO: 10 %
NEUTROPHILS # BLD AUTO: 2.5 10E3/UL (ref 1.6–8.3)
NEUTROPHILS NFR BLD AUTO: 53 %
NRBC # BLD AUTO: 0 10E3/UL
NRBC BLD AUTO-RTO: 0 /100
NT-PROBNP SERPL-MCNC: 945 PG/ML (ref 0–900)
PLATELET # BLD AUTO: 267 10E3/UL (ref 150–450)
POTASSIUM SERPL-SCNC: 4.4 MMOL/L (ref 3.4–5.3)
PROT SERPL-MCNC: 7.1 G/DL (ref 6.4–8.3)
RBC # BLD AUTO: 4.15 10E6/UL (ref 3.8–5.2)
SODIUM SERPL-SCNC: 142 MMOL/L (ref 136–145)
WBC # BLD AUTO: 4.7 10E3/UL (ref 4–11)

## 2023-05-01 PROCEDURE — 86901 BLOOD TYPING SEROLOGIC RH(D): CPT

## 2023-05-01 PROCEDURE — 99214 OFFICE O/P EST MOD 30 MIN: CPT | Performed by: INTERNAL MEDICINE

## 2023-05-01 PROCEDURE — 80053 COMPREHEN METABOLIC PANEL: CPT

## 2023-05-01 PROCEDURE — 93000 ELECTROCARDIOGRAM COMPLETE: CPT | Performed by: INTERNAL MEDICINE

## 2023-05-01 PROCEDURE — 36415 COLL VENOUS BLD VENIPUNCTURE: CPT

## 2023-05-01 PROCEDURE — 85025 COMPLETE CBC W/AUTO DIFF WBC: CPT

## 2023-05-01 PROCEDURE — 85610 PROTHROMBIN TIME: CPT

## 2023-05-01 PROCEDURE — 86900 BLOOD TYPING SEROLOGIC ABO: CPT

## 2023-05-01 PROCEDURE — 83735 ASSAY OF MAGNESIUM: CPT

## 2023-05-01 PROCEDURE — 83880 ASSAY OF NATRIURETIC PEPTIDE: CPT

## 2023-05-01 PROCEDURE — 86850 RBC ANTIBODY SCREEN: CPT

## 2023-05-01 PROCEDURE — 99207 PR NO CHARGE LOS: CPT

## 2023-05-01 RX ORDER — SODIUM CHLORIDE 9 MG/ML
INJECTION, SOLUTION INTRAVENOUS CONTINUOUS
Status: CANCELLED | OUTPATIENT
Start: 2023-05-03

## 2023-05-01 RX ORDER — CEFAZOLIN SODIUM/WATER 2 G/20 ML
2 SYRINGE (ML) INTRAVENOUS
Status: CANCELLED | OUTPATIENT
Start: 2023-05-03

## 2023-05-01 RX ORDER — ASPIRIN 325 MG
325 TABLET ORAL ONCE
Status: CANCELLED | OUTPATIENT
Start: 2023-05-03 | End: 2023-05-01

## 2023-05-01 RX ORDER — LIDOCAINE 40 MG/G
CREAM TOPICAL
Status: CANCELLED | OUTPATIENT
Start: 2023-05-01

## 2023-05-01 NOTE — PATIENT INSTRUCTIONS
Misty Quezada,    It was a pleasure to see you today in the clinic regarding your upcoming transcatheter tricuspid valve repair.     My recommendations after this visit include:     - report to [facility] on Wednesday morning at the instructed time      If you have questions or concerns, please call using the numbers below:    Valve Clinic Phone   [phone]    After Hours/Scheduling  [phone]    Otherwise you can dial the nurse directly at:    Lizabeth Espinal RN  [phone]    Sunil Montana RN  [phone]

## 2023-05-01 NOTE — PROGRESS NOTES
HEART CARE ENCOUNTER NOTE       [facility]  [phone]    Assessment/Recommendations   Problem List Items Addressed This Visit        Digestive    Morbid obesity (H) - Primary       Circulatory    Chronic atrial fibrillation (H)    (HFpEF) heart failure with preserved ejection fraction (H)    Tricuspid valve insufficiency, unspecified etiology       Other    Lymphedema       1.    Severe functional tricuspid regurgitation - this is now causing significant dyspnea on exertion and LE edema.  She has been evaluated by a multidisciplinary team and it has been determined that because of her multiple comorbidities, she is not a good surgical candidate.  She has consented to participating in the CLASP II TR study (Freeman PILAR TrAnScatheter Valve RePair System Pivotal Clinical Trial), which is a prospective, multicenter, randomized, controlled pivotal trial to evaluate the safety and effectiveness of transcatheter tricuspid valve repair with the Freeman PILAR Transcatheter Valve Repair System and optimal medical therapy (OMT) compared to OMT alone in patients with tricuspid regurgitation.        Details of the procedure were discussed with the patient and there is understanding of the risks and benefits.   All questions were answered   Consents were signed and witnessed by me.  She has no prior history of trouble with anesthesia.  Labs today reveal Hgb 13.2, normal WBC, electrolytes WNL and creat 0.79     Misty Quezada will report Wednesday morning for the procedure and all instructions regarding times and medications were given by [employer] coordinator RN.     2.  Hypertension -BP today is at goal.  Patient should continue taking spironolactone 25 mg daily, losartan 25 mg daily and metoprolol succinate 25 mg daily    3.  Chronic heart failure with preserved ejection fraction, NYHA class II -currently compensated.  Patient should continue Bumex and spironolactone.  Blood pressures are well  controlled.  NT pro-BNP is slightly elevated, but probably secondary to her Afib.     4.  Permanent atrial fibrillation - fairly rate controlled on metoprolol succinate 25 mg daily.  Patient normally takes Eliquis for stroke prophylaxis, but this is currently on hold for anticipated procedure.       History of Present Illness/Subjective    Misty Quezada is a 67 year old female who comes in today for history and physical prior to transcatheter [id number] tricuspid valve repair, as part of the CLASP II TR study trial.     Misty has history of permanent atrial fibrillation, heart failure with preserved ejection fraction, severe tricuspid regurgitation, moderate aortic valve stenosis, lymphedema, morbid obesity and obstructive sleep apnea.  Overall, she does quite well and does not have extreme amount of shortness of breath, but does get short of breath with walking about a half a block which has been baseline for her for a while.  Her weights are stable.  She does not feel like she is retaining any fluid currently    Misty Quezada denies chest discomfort, palpitations, paroxysmal nocturnal dyspnea, orthopnea, lightheadedness, dizziness, pre-syncope, or syncope.  Misty Quezada also denies any weight loss, changes in appetite, nausea or vomiting.     Medical, surgical, family, social history, and medications were reviewed and updated as necessary.    EC2023 shows atrial fibrillation with ventricular rate 101 bpm    MURTAZA done on :  Interpretation Summary     1. Left ventricular size is normal. Mild concentric increase in wall  thickness. Systolic function is normal. The calculated left ventricular  ejection fraction is 59%.  2. Right ventricular size is moderately enlarged. Systolic function is low  normal-to-mildly reduced.  3. Severe biatrial enlargement.  4. Calcified trileaflet aortic valve with moderate aortic stenosis. Peak  forward velocity measures 2.7 m/s, mean gradient 18 mm Hg, calculated  aortic  valve area 1.0 cm2, and dimensionless index 0.34. No signifcant regurgitation.  5. Severe functional tricuspid regurgitation. EROA 0.61 cm2, regurgitant  volume 47 mL.  6. Moderate pulmonary hypertension is present with an estimated pulmonary  artery systolic pressure of 54 mm Hg.  7. Compared to the prior study dated 9/24/2022, there has been no significant  change.    RHC/Left heart CATH done on March 15:  Findings:  LM:no obstruction  LAD:mildly irregular  Lcx:mildly irregular  RCA:dominant, no obstruction     LVEDP:17     RHC:  RA:17   RV:55/4  PA:56/29 (39)  PCWP:17 V's to 20     SvO2:64  AO sat: 88     CO/CI:  Kapil:5.23/2.41     AV pullback gradient: 14         Physical Examination Review of Systems   Vitals: BP 91/55 (BP Location: Left arm, Patient Position: Sitting, Cuff Size: Adult Large)   Pulse 95   Resp 20   Wt 115.2 kg (254 lb)   SpO2 93%   BMI 39.78 kg/m    BMI= Body mass index is 39.78 kg/m .  Wt Readings from Last 3 Encounters:   05/01/23 115.2 kg (254 lb)   04/17/23 114.8 kg (253 lb)   03/28/23 112.9 kg (249 lb)       General Appearance:   Alert, cooperative and in no acute distress   ENT/Mouth: membranes moist, no oral lesions or bleeding gums.      EYES:  no scleral icterus, normal conjunctivae   Neck: no carotid bruits.  Thyroid not visualized   Chest/Lungs:   lungs are clear to auscultation, no rales or wheezing   Cardiovascular:   Irregularly irregular. Normal first and second heart sounds with a soft systolic murmur.  No rubs or gallops; the carotid, radial and posterior tibial pulses are intact, mild edema bilaterally    Abdomen:  Soft and nontender. Bowel sounds are present in all quadrants   Extremities: no cyanosis or clubbing   Skin: no xanthelasma, warm.    Neurologic: normal gait, normal  bilateral, no tremors   Psychiatric: Normal mood and affect       Please refer above for cardiac ROS details.      Medical History  Surgical History Family History Social History    Past Medical History:   Diagnosis Date     (HFpEF) heart failure with preserved ejection fraction (H) 10/17/2022     Aortic valve stenosis 11/10/2022     Benign essential hypertension      Chronic atrial fibrillation (H)      Lymphedema 9/24/2022     Nonrheumatic tricuspid valve regurgitation 2/6/2023     Past Surgical History:   Procedure Laterality Date     APPENDECTOMY       CHOLECYSTECTOMY       CV CORONARY ANGIOGRAM N/A 3/15/2023    Procedure: Coronary Angiogram;  Surgeon: Howard Plummer MD;  Location: [facility]     CV LEFT HEART CATH N/A 3/15/2023    Procedure: Left Heart Catheterization;  Surgeon: Howard Plummer MD;  Location: [facility]     CV RIGHT HEART CATH MEASUREMENTS RECORDED N/A 3/15/2023    Procedure: Right Heart Catheterization;  Surgeon: Howard Plummer MD;  Location: [facility]     GASTRIC BYPASS       Family History   Problem Relation Age of Onset     No Known Problems Mother      No Known Problems Father     Social History     Socioeconomic History     Marital status:      Spouse name: Not on file     Number of children: Not on file     Years of education: Not on file     Highest education level: Not on file   Occupational History     Not on file   Tobacco Use     Smoking status: Never     Smokeless tobacco: Never   Vaping Use     Vaping status: Not on file   Substance and Sexual Activity     Alcohol use: Not Currently     Comment: Rarely Uses alcohol     Drug use: Never     Sexual activity: Not on file   Other Topics Concern     Not on file   Social History Narrative     Not on file     Social Determinants of Health     Financial Resource Strain: Not on file   Food Insecurity: Not on file   Transportation Needs: Not on file   Physical Activity: Not on file   Stress: Not on file   Social Connections: Not on file   Intimate Partner Violence: Not on file   Housing Stability: Not on file          Medications  Allergies   Current Outpatient  Medications   Medication Sig Dispense Refill     acetaminophen (TYLENOL) 325 MG tablet Take 975 mg by mouth every 4 hours as needed for mild pain       bumetanide (BUMEX) 2 MG tablet TAKE 1 TABLET(2 MG) BY MOUTH TWICE DAILY 180 tablet 3     CALCIUM/D3/MAG OX//TYRONE/ZN (CALTRATE + D3 PLUS MINERALS ORAL) [CALCIUM/D3/MAG OX//TYRONE/ZN (CALTRATE + D3 PLUS MINERALS ORAL)] Take 1 tablet by mouth daily.       cholecalciferol, vitamin D3, 2,000 unit Tab [CHOLECALCIFEROL, VITAMIN D3, 2,000 UNIT TAB] Take 2,000 Units by mouth daily.       citalopram (CELEXA) 10 MG tablet Take 20 mg by mouth daily       cyanocobalamin 1000 MCG tablet [CYANOCOBALAMIN 1000 MCG TABLET] Take 1,000 mcg by mouth daily.       ferrous sulfate (FEROSUL) 325 (65 Fe) MG tablet Take 1 tablet (325 mg) by mouth every other day       losartan (COZAAR) 25 MG tablet Take 1 tablet (25 mg) by mouth daily       melatonin 3 MG tablet Take 3 mg by mouth nightly as needed       metoprolol succinate (TOPROL-XL) 25 MG [METOPROLOL SUCCINATE (TOPROL-XL) 25 MG] TAKE 1 TABLET BY MOUTH DAILY. 90 tablet 2     MULTIVIT WITH CALCIUM,IRON,MIN (WOMEN'S DAILY MULTIVITAMIN ORAL) [MULTIVIT WITH CALCIUM,IRON,MIN (WOMEN'S DAILY MULTIVITAMIN ORAL)] Take 1 tablet by mouth daily.       polyethylene glycol (MIRALAX) 17 g packet Take 17 g by mouth daily as needed for constipation       potassium chloride ER (K-TAB/KLOR-CON) 10 MEQ CR tablet Take 1 tablet (10 mEq) by mouth daily 30 tablet 11     senna (SENOKOT) 8.6 MG tablet Take 17.2 mg by mouth 2 times daily as needed       spironolactone (ALDACTONE) 25 MG tablet Take 1 tablet (25 mg) by mouth daily 90 tablet 0     apixaban (ELIQUIS) 5 mg Tab tablet [APIXABAN (ELIQUIS) 5 MG TAB TABLET] Take 5 mg by mouth 2 (two) times a day. (Patient not taking: Reported on 5/1/2023)      No Known Allergies      Lab Results    Chemistry/lipid CBC Cardiac Enzymes/BNP/TSH/INR   Recent Labs   Lab Test 09/09/16  1412   CHOL 163   HDL 42*      TRIG  81     Recent Labs   Lab Test 09/09/16  1412        Recent Labs   Lab Test 03/15/23  0648      POTASSIUM 4.2   CHLORIDE 103   CO2 27   *   BUN 17.6   CR 0.74   GFRESTIMATED 88   TURNER 9.3     Recent Labs   Lab Test 03/15/23  0648 01/25/23  1440 11/16/22  1242   CR 0.74 0.78 0.88     Recent Labs   Lab Test 09/02/21  0457   A1C 6.2*    Recent Labs   Lab Test 03/15/23  0648   WBC 5.7   HGB 13.2   HCT 40.1   MCV 99        Recent Labs   Lab Test 03/15/23  0648 01/25/23  1440 11/16/22  1242   HGB 13.2 13.6 12.3    Recent Labs   Lab Test 08/21/21  1944 08/21/21  1426 08/21/21  0820   TROPONINI 1.32* 1.87* 1.75*     Recent Labs   Lab Test 09/24/22  0850 09/02/21  0457 08/21/21  0820   BNP  --  199* 281*   NTBNP 1,898*  --   --      Recent Labs   Lab Test 08/31/21  0720   TSH 4.92     Recent Labs   Lab Test 01/25/23  1440 08/21/21  0820   INR 1.13 1.44*        30 minutes spent on the date of encounter doing education, consent signing, chart prep/review, review of test results, patient visit and documentation.        This note has been dictated using voice recognition software. Any grammatical or context distortions are unintentional and inherent to the software.

## 2023-05-01 NOTE — PROGRESS NOTES
TIARA Pre-Op RN Visit     Patient in to see RN for Pre- visit on 5/1/2023    All pre-procedure labs drawn: Yes    EKG obtained: Yes   Labs reviewed: pending at time of visit; will be reviewed by provider Blanca Martini PA-C       Patient instructed on the following medications:   AM of procedure take losartan and metoprolol.     Loading dose of ASA to be given in pre-procedure/CSC.     Patient given instructions on bathing including no use of deodorant/lotions/creams/powders.      Patient has advanced directive: no documents on file; patient previously full code. Patient would be full bailout if needed.     Education was given to patient regarding what to expect pre-procedure.     Patient was informed procedure will be done at [facility]: Main Entrance at [address] [place] [address]; [place] [address] and their arrival time is at 0530AM.     TR clasp procedure, blood and MURTAZA consents signed at the time of the appt: Yes; scanned into Epic.     All questions from patient were answered by RN.    Patient presented independently to clinic today. She lives with both of her sons who will be assisting with transport/helping patient after her procedure.     Orders placed.      Sunil Montana RN BSN- Structural Heart Coordinator   Structural Heart Coordinator  [facility]   [phone]

## 2023-05-01 NOTE — H&P (VIEW-ONLY)
HEART CARE ENCOUNTER NOTE       [facility]  [phone]    Assessment/Recommendations   Problem List Items Addressed This Visit        Digestive    Morbid obesity (H) - Primary       Circulatory    Chronic atrial fibrillation (H)    (HFpEF) heart failure with preserved ejection fraction (H)    Tricuspid valve insufficiency, unspecified etiology       Other    Lymphedema       1.    Severe functional tricuspid regurgitation - this is now causing significant dyspnea on exertion and LE edema.  She has been evaluated by a multidisciplinary team and it has been determined that because of her multiple comorbidities, she is not a good surgical candidate.  She has consented to participating in the CLASP II TR study (Freeman PILAR TrAnScatheter Valve RePair System Pivotal Clinical Trial), which is a prospective, multicenter, randomized, controlled pivotal trial to evaluate the safety and effectiveness of transcatheter tricuspid valve repair with the Freeman PILAR Transcatheter Valve Repair System and optimal medical therapy (OMT) compared to OMT alone in patients with tricuspid regurgitation.        Details of the procedure were discussed with the patient and there is understanding of the risks and benefits.   All questions were answered   Consents were signed and witnessed by me.  She has no prior history of trouble with anesthesia.  Labs today reveal Hgb 13.2, normal WBC, electrolytes WNL and creat 0.79     Misty Quezada will report Wednesday morning for the procedure and all instructions regarding times and medications were given by [employer] coordinator RN.     2.  Hypertension -BP today is at goal.  Patient should continue taking spironolactone 25 mg daily, losartan 25 mg daily and metoprolol succinate 25 mg daily    3.  Chronic heart failure with preserved ejection fraction, NYHA class II -currently compensated.  Patient should continue Bumex and spironolactone.  Blood pressures are well  controlled.  NT pro-BNP is slightly elevated, but probably secondary to her Afib.     4.  Permanent atrial fibrillation - fairly rate controlled on metoprolol succinate 25 mg daily.  Patient normally takes Eliquis for stroke prophylaxis, but this is currently on hold for anticipated procedure.       History of Present Illness/Subjective    Misty Quezada is a 67 year old female who comes in today for history and physical prior to transcatheter [id number] tricuspid valve repair, as part of the CLASP II TR study trial.     Misty has history of permanent atrial fibrillation, heart failure with preserved ejection fraction, severe tricuspid regurgitation, moderate aortic valve stenosis, lymphedema, morbid obesity and obstructive sleep apnea.  Overall, she does quite well and does not have extreme amount of shortness of breath, but does get short of breath with walking about a half a block which has been baseline for her for a while.  Her weights are stable.  She does not feel like she is retaining any fluid currently    Misty Quezada denies chest discomfort, palpitations, paroxysmal nocturnal dyspnea, orthopnea, lightheadedness, dizziness, pre-syncope, or syncope.  Misty Quezada also denies any weight loss, changes in appetite, nausea or vomiting.     Medical, surgical, family, social history, and medications were reviewed and updated as necessary.    EC2023 shows atrial fibrillation with ventricular rate 101 bpm    MURTAZA done on :  Interpretation Summary     1. Left ventricular size is normal. Mild concentric increase in wall  thickness. Systolic function is normal. The calculated left ventricular  ejection fraction is 59%.  2. Right ventricular size is moderately enlarged. Systolic function is low  normal-to-mildly reduced.  3. Severe biatrial enlargement.  4. Calcified trileaflet aortic valve with moderate aortic stenosis. Peak  forward velocity measures 2.7 m/s, mean gradient 18 mm Hg, calculated  aortic  valve area 1.0 cm2, and dimensionless index 0.34. No signifcant regurgitation.  5. Severe functional tricuspid regurgitation. EROA 0.61 cm2, regurgitant  volume 47 mL.  6. Moderate pulmonary hypertension is present with an estimated pulmonary  artery systolic pressure of 54 mm Hg.  7. Compared to the prior study dated 9/24/2022, there has been no significant  change.    RHC/Left heart CATH done on March 15:  Findings:  LM:no obstruction  LAD:mildly irregular  Lcx:mildly irregular  RCA:dominant, no obstruction     LVEDP:17     RHC:  RA:17   RV:55/4  PA:56/29 (39)  PCWP:17 V's to 20     SvO2:64  AO sat: 88     CO/CI:  Kapil:5.23/2.41     AV pullback gradient: 14         Physical Examination Review of Systems   Vitals: BP 91/55 (BP Location: Left arm, Patient Position: Sitting, Cuff Size: Adult Large)   Pulse 95   Resp 20   Wt 115.2 kg (254 lb)   SpO2 93%   BMI 39.78 kg/m    BMI= Body mass index is 39.78 kg/m .  Wt Readings from Last 3 Encounters:   05/01/23 115.2 kg (254 lb)   04/17/23 114.8 kg (253 lb)   03/28/23 112.9 kg (249 lb)       General Appearance:   Alert, cooperative and in no acute distress   ENT/Mouth: membranes moist, no oral lesions or bleeding gums.      EYES:  no scleral icterus, normal conjunctivae   Neck: no carotid bruits.  Thyroid not visualized   Chest/Lungs:   lungs are clear to auscultation, no rales or wheezing   Cardiovascular:   Irregularly irregular. Normal first and second heart sounds with a soft systolic murmur.  No rubs or gallops; the carotid, radial and posterior tibial pulses are intact, mild edema bilaterally    Abdomen:  Soft and nontender. Bowel sounds are present in all quadrants   Extremities: no cyanosis or clubbing   Skin: no xanthelasma, warm.    Neurologic: normal gait, normal  bilateral, no tremors   Psychiatric: Normal mood and affect       Please refer above for cardiac ROS details.      Medical History  Surgical History Family History Social History    Past Medical History:   Diagnosis Date     (HFpEF) heart failure with preserved ejection fraction (H) 10/17/2022     Aortic valve stenosis 11/10/2022     Benign essential hypertension      Chronic atrial fibrillation (H)      Lymphedema 9/24/2022     Nonrheumatic tricuspid valve regurgitation 2/6/2023     Past Surgical History:   Procedure Laterality Date     APPENDECTOMY       CHOLECYSTECTOMY       CV CORONARY ANGIOGRAM N/A 3/15/2023    Procedure: Coronary Angiogram;  Surgeon: Howard Plummer MD;  Location: [facility]     CV LEFT HEART CATH N/A 3/15/2023    Procedure: Left Heart Catheterization;  Surgeon: Howard Plummer MD;  Location: [facility]     CV RIGHT HEART CATH MEASUREMENTS RECORDED N/A 3/15/2023    Procedure: Right Heart Catheterization;  Surgeon: Howard Plummer MD;  Location: [facility]     GASTRIC BYPASS       Family History   Problem Relation Age of Onset     No Known Problems Mother      No Known Problems Father     Social History     Socioeconomic History     Marital status:      Spouse name: Not on file     Number of children: Not on file     Years of education: Not on file     Highest education level: Not on file   Occupational History     Not on file   Tobacco Use     Smoking status: Never     Smokeless tobacco: Never   Vaping Use     Vaping status: Not on file   Substance and Sexual Activity     Alcohol use: Not Currently     Comment: Rarely Uses alcohol     Drug use: Never     Sexual activity: Not on file   Other Topics Concern     Not on file   Social History Narrative     Not on file     Social Determinants of Health     Financial Resource Strain: Not on file   Food Insecurity: Not on file   Transportation Needs: Not on file   Physical Activity: Not on file   Stress: Not on file   Social Connections: Not on file   Intimate Partner Violence: Not on file   Housing Stability: Not on file          Medications  Allergies   Current Outpatient  Medications   Medication Sig Dispense Refill     acetaminophen (TYLENOL) 325 MG tablet Take 975 mg by mouth every 4 hours as needed for mild pain       bumetanide (BUMEX) 2 MG tablet TAKE 1 TABLET(2 MG) BY MOUTH TWICE DAILY 180 tablet 3     CALCIUM/D3/MAG OX//TYRONE/ZN (CALTRATE + D3 PLUS MINERALS ORAL) [CALCIUM/D3/MAG OX//TYRONE/ZN (CALTRATE + D3 PLUS MINERALS ORAL)] Take 1 tablet by mouth daily.       cholecalciferol, vitamin D3, 2,000 unit Tab [CHOLECALCIFEROL, VITAMIN D3, 2,000 UNIT TAB] Take 2,000 Units by mouth daily.       citalopram (CELEXA) 10 MG tablet Take 20 mg by mouth daily       cyanocobalamin 1000 MCG tablet [CYANOCOBALAMIN 1000 MCG TABLET] Take 1,000 mcg by mouth daily.       ferrous sulfate (FEROSUL) 325 (65 Fe) MG tablet Take 1 tablet (325 mg) by mouth every other day       losartan (COZAAR) 25 MG tablet Take 1 tablet (25 mg) by mouth daily       melatonin 3 MG tablet Take 3 mg by mouth nightly as needed       metoprolol succinate (TOPROL-XL) 25 MG [METOPROLOL SUCCINATE (TOPROL-XL) 25 MG] TAKE 1 TABLET BY MOUTH DAILY. 90 tablet 2     MULTIVIT WITH CALCIUM,IRON,MIN (WOMEN'S DAILY MULTIVITAMIN ORAL) [MULTIVIT WITH CALCIUM,IRON,MIN (WOMEN'S DAILY MULTIVITAMIN ORAL)] Take 1 tablet by mouth daily.       polyethylene glycol (MIRALAX) 17 g packet Take 17 g by mouth daily as needed for constipation       potassium chloride ER (K-TAB/KLOR-CON) 10 MEQ CR tablet Take 1 tablet (10 mEq) by mouth daily 30 tablet 11     senna (SENOKOT) 8.6 MG tablet Take 17.2 mg by mouth 2 times daily as needed       spironolactone (ALDACTONE) 25 MG tablet Take 1 tablet (25 mg) by mouth daily 90 tablet 0     apixaban (ELIQUIS) 5 mg Tab tablet [APIXABAN (ELIQUIS) 5 MG TAB TABLET] Take 5 mg by mouth 2 (two) times a day. (Patient not taking: Reported on 5/1/2023)      No Known Allergies      Lab Results    Chemistry/lipid CBC Cardiac Enzymes/BNP/TSH/INR   Recent Labs   Lab Test 09/09/16  1412   CHOL 163   HDL 42*      TRIG  81     Recent Labs   Lab Test 09/09/16  1412        Recent Labs   Lab Test 03/15/23  0648      POTASSIUM 4.2   CHLORIDE 103   CO2 27   *   BUN 17.6   CR 0.74   GFRESTIMATED 88   TURNER 9.3     Recent Labs   Lab Test 03/15/23  0648 01/25/23  1440 11/16/22  1242   CR 0.74 0.78 0.88     Recent Labs   Lab Test 09/02/21  0457   A1C 6.2*    Recent Labs   Lab Test 03/15/23  0648   WBC 5.7   HGB 13.2   HCT 40.1   MCV 99        Recent Labs   Lab Test 03/15/23  0648 01/25/23  1440 11/16/22  1242   HGB 13.2 13.6 12.3    Recent Labs   Lab Test 08/21/21  1944 08/21/21  1426 08/21/21  0820   TROPONINI 1.32* 1.87* 1.75*     Recent Labs   Lab Test 09/24/22  0850 09/02/21  0457 08/21/21  0820   BNP  --  199* 281*   NTBNP 1,898*  --   --      Recent Labs   Lab Test 08/31/21  0720   TSH 4.92     Recent Labs   Lab Test 01/25/23  1440 08/21/21  0820   INR 1.13 1.44*        30 minutes spent on the date of encounter doing education, consent signing, chart prep/review, review of test results, patient visit and documentation.        This note has been dictated using voice recognition software. Any grammatical or context distortions are unintentional and inherent to the software.

## 2023-05-01 NOTE — LETTER
5/1/2023    LAVELL WALLACE MD  [facility] [address]  [facility] [address]    RE: Misty Quezada       Dear Colleague,     I had the pleasure of seeing Misty Quezada in the [facility].  HEART CARE ENCOUNTER NOTE       [facility]  [phone]    Assessment/Recommendations   Problem List Items Addressed This Visit          Digestive    Morbid obesity (H) - Primary       Circulatory    Chronic atrial fibrillation (H)    (HFpEF) heart failure with preserved ejection fraction (H)    Tricuspid valve insufficiency, unspecified etiology       Other    Lymphedema       1.    Severe functional tricuspid regurgitation - this is now causing significant dyspnea on exertion and LE edema.  She has been evaluated by a multidisciplinary team and it has been determined that because of her multiple comorbidities, she is not a good surgical candidate.  She has consented to participating in the CLASP II TR study (Freeman PILAR TrAnScatheter Valve RePair System Pivotal Clinical Trial), which is a prospective, multicenter, randomized, controlled pivotal trial to evaluate the safety and effectiveness of transcatheter tricuspid valve repair with the Freeman PILAR Transcatheter Valve Repair System and optimal medical therapy (OMT) compared to OMT alone in patients with tricuspid regurgitation.        Details of the procedure were discussed with the patient and there is understanding of the risks and benefits.   All questions were answered   Consents were signed and witnessed by me.  She has no prior history of trouble with anesthesia.  Labs today reveal Hgb 13.2, normal WBC, electrolytes WNL and creat 0.79     Misty SYLVAIN Quezada will report Wednesday morning for the procedure and all instructions regarding times and medications were given by MitraClip coordinator RN.     2.  Hypertension -BP today is at goal.  Patient should continue taking spironolactone 25 mg  daily, losartan 25 mg daily and metoprolol succinate 25 mg daily    3.  Chronic heart failure with preserved ejection fraction, NYHA class II -currently compensated.  Patient should continue Bumex and spironolactone.  Blood pressures are well controlled.  NT pro-BNP is slightly elevated, but probably secondary to her Afib.     4.  Permanent atrial fibrillation - fairly rate controlled on metoprolol succinate 25 mg daily.  Patient normally takes Eliquis for stroke prophylaxis, but this is currently on hold for anticipated procedure.       History of Present Illness/Subjective    Misty Quezada is a 67 year old female who comes in today for history and physical prior to transcatheter lcho-qk-jusc tricuspid valve repair, as part of the CLASP II TR study trial.     Misty has history of permanent atrial fibrillation, heart failure with preserved ejection fraction, severe tricuspid regurgitation, moderate aortic valve stenosis, lymphedema, morbid obesity and obstructive sleep apnea.  Overall, she does quite well and does not have extreme amount of shortness of breath, but does get short of breath with walking about a half a block which has been baseline for her for a while.  Her weights are stable.  She does not feel like she is retaining any fluid currently    Misty Quezada denies chest discomfort, palpitations, paroxysmal nocturnal dyspnea, orthopnea, lightheadedness, dizziness, pre-syncope, or syncope.  Misty Quezada also denies any weight loss, changes in appetite, nausea or vomiting.     Medical, surgical, family, social history, and medications were reviewed and updated as necessary.    EC2023 shows atrial fibrillation with ventricular rate 101 bpm    MURTAZA done on Feb 7:  Interpretation Summary     1. Left ventricular size is normal. Mild concentric increase in wall  thickness. Systolic function is normal. The calculated left ventricular  ejection fraction is 59%.  2. Right ventricular size is moderately  enlarged. Systolic function is low  normal-to-mildly reduced.  3. Severe biatrial enlargement.  4. Calcified trileaflet aortic valve with moderate aortic stenosis. Peak  forward velocity measures 2.7 m/s, mean gradient 18 mm Hg, calculated aortic  valve area 1.0 cm2, and dimensionless index 0.34. No signifcant regurgitation.  5. Severe functional tricuspid regurgitation. EROA 0.61 cm2, regurgitant  volume 47 mL.  6. Moderate pulmonary hypertension is present with an estimated pulmonary  artery systolic pressure of 54 mm Hg.  7. Compared to the prior study dated 9/24/2022, there has been no significant  change.    RHC/Left heart CATH done on March 15:  Findings:  LM:no obstruction  LAD:mildly irregular  Lcx:mildly irregular  RCA:dominant, no obstruction     LVEDP:17     RHC:  RA:17   RV:55/4  PA:56/29 (39)  PCWP:17 V's to 20     SvO2:64  AO sat: 88     CO/CI:  Kapil:5.23/2.41     AV pullback gradient: 14         Physical Examination Review of Systems   Vitals: BP 91/55 (BP Location: Left arm, Patient Position: Sitting, Cuff Size: Adult Large)   Pulse 95   Resp 20   Wt 115.2 kg (254 lb)   SpO2 93%   BMI 39.78 kg/m    BMI= Body mass index is 39.78 kg/m .  Wt Readings from Last 3 Encounters:   05/01/23 115.2 kg (254 lb)   04/17/23 114.8 kg (253 lb)   03/28/23 112.9 kg (249 lb)       General Appearance:   Alert, cooperative and in no acute distress   ENT/Mouth: membranes moist, no oral lesions or bleeding gums.      EYES:  no scleral icterus, normal conjunctivae   Neck: no carotid bruits.  Thyroid not visualized   Chest/Lungs:   lungs are clear to auscultation, no rales or wheezing   Cardiovascular:   Irregularly irregular. Normal first and second heart sounds with a soft systolic murmur.  No rubs or gallops; the carotid, radial and posterior tibial pulses are intact, mild edema bilaterally    Abdomen:  Soft and nontender. Bowel sounds are present in all quadrants   Extremities: no cyanosis or clubbing   Skin: no  xanthelasma, warm.    Neurologic: normal gait, normal  bilateral, no tremors   Psychiatric: Normal mood and affect       Please refer above for cardiac ROS details.      Medical History  Surgical History Family History Social History   Past Medical History:   Diagnosis Date    (HFpEF) heart failure with preserved ejection fraction (H) 10/17/2022    Aortic valve stenosis 11/10/2022    Benign essential hypertension     Chronic atrial fibrillation (H)     Lymphedema 9/24/2022    Nonrheumatic tricuspid valve regurgitation 2/6/2023     Past Surgical History:   Procedure Laterality Date    APPENDECTOMY      CHOLECYSTECTOMY      CV CORONARY ANGIOGRAM N/A 3/15/2023    Procedure: Coronary Angiogram;  Surgeon: Howard Plummer MD;  Location: [facility] CATH LAB CV    CV LEFT HEART CATH N/A 3/15/2023    Procedure: Left Heart Catheterization;  Surgeon: Howard Plummer MD;  Location: [facility] CATH LAB CV    CV RIGHT HEART CATH MEASUREMENTS RECORDED N/A 3/15/2023    Procedure: Right Heart Catheterization;  Surgeon: Howard Plummer MD;  Location: [facility] CATH LAB CV    GASTRIC BYPASS       Family History   Problem Relation Age of Onset    No Known Problems Mother     No Known Problems Father     Social History     Socioeconomic History    Marital status:      Spouse name: Not on file    Number of children: Not on file    Years of education: Not on file    Highest education level: Not on file   Occupational History    Not on file   Tobacco Use    Smoking status: Never    Smokeless tobacco: Never   Vaping Use    Vaping status: Not on file   Substance and Sexual Activity    Alcohol use: Not Currently     Comment: Rarely Uses alcohol    Drug use: Never    Sexual activity: Not on file   Other Topics Concern    Not on file   Social History Narrative    Not on file     Social Determinants of Health     Financial Resource Strain: Not on file   Food Insecurity: Not on file   Transportation Needs: Not on file   Physical  Activity: Not on file   Stress: Not on file   Social Connections: Not on file   Intimate Partner Violence: Not on file   Housing Stability: Not on file          Medications  Allergies   Current Outpatient Medications   Medication Sig Dispense Refill    acetaminophen (TYLENOL) 325 MG tablet Take 975 mg by mouth every 4 hours as needed for mild pain      bumetanide (BUMEX) 2 MG tablet TAKE 1 TABLET(2 MG) BY MOUTH TWICE DAILY 180 tablet 3    CALCIUM/D3/MAG OX//TYRONE/ZN (CALTRATE + D3 PLUS MINERALS ORAL) [CALCIUM/D3/MAG OX//TYRONE/ZN (CALTRATE + D3 PLUS MINERALS ORAL)] Take 1 tablet by mouth daily.      cholecalciferol, vitamin D3, 2,000 unit Tab [CHOLECALCIFEROL, VITAMIN D3, 2,000 UNIT TAB] Take 2,000 Units by mouth daily.      citalopram (CELEXA) 10 MG tablet Take 20 mg by mouth daily      cyanocobalamin 1000 MCG tablet [CYANOCOBALAMIN 1000 MCG TABLET] Take 1,000 mcg by mouth daily.      ferrous sulfate (FEROSUL) 325 (65 Fe) MG tablet Take 1 tablet (325 mg) by mouth every other day      losartan (COZAAR) 25 MG tablet Take 1 tablet (25 mg) by mouth daily      melatonin 3 MG tablet Take 3 mg by mouth nightly as needed      metoprolol succinate (TOPROL-XL) 25 MG [METOPROLOL SUCCINATE (TOPROL-XL) 25 MG] TAKE 1 TABLET BY MOUTH DAILY. 90 tablet 2    MULTIVIT WITH CALCIUM,IRON,MIN (WOMEN'S DAILY MULTIVITAMIN ORAL) [MULTIVIT WITH CALCIUM,IRON,MIN (WOMEN'S DAILY MULTIVITAMIN ORAL)] Take 1 tablet by mouth daily.      polyethylene glycol (MIRALAX) 17 g packet Take 17 g by mouth daily as needed for constipation      potassium chloride ER (K-TAB/KLOR-CON) 10 MEQ CR tablet Take 1 tablet (10 mEq) by mouth daily 30 tablet 11    senna (SENOKOT) 8.6 MG tablet Take 17.2 mg by mouth 2 times daily as needed      spironolactone (ALDACTONE) 25 MG tablet Take 1 tablet (25 mg) by mouth daily 90 tablet 0    apixaban (ELIQUIS) 5 mg Tab tablet [APIXABAN (ELIQUIS) 5 MG TAB TABLET] Take 5 mg by mouth 2 (two) times a day. (Patient not taking:  Reported on 5/1/2023)      No Known Allergies      Lab Results    Chemistry/lipid CBC Cardiac Enzymes/BNP/TSH/INR   Recent Labs   Lab Test 09/09/16  1412   CHOL 163   HDL 42*      TRIG 81     Recent Labs   Lab Test 09/09/16  1412        Recent Labs   Lab Test 03/15/23  0648      POTASSIUM 4.2   CHLORIDE 103   CO2 27   *   BUN 17.6   CR 0.74   GFRESTIMATED 88   TURNER 9.3     Recent Labs   Lab Test 03/15/23  0648 01/25/23  1440 11/16/22  1242   CR 0.74 0.78 0.88     Recent Labs   Lab Test 09/02/21  0457   A1C 6.2*    Recent Labs   Lab Test 03/15/23  0648   WBC 5.7   HGB 13.2   HCT 40.1   MCV 99        Recent Labs   Lab Test 03/15/23  0648 01/25/23  1440 11/16/22  1242   HGB 13.2 13.6 12.3    Recent Labs   Lab Test 08/21/21  1944 08/21/21  1426 08/21/21  0820   TROPONINI 1.32* 1.87* 1.75*     Recent Labs   Lab Test 09/24/22  0850 09/02/21  0457 08/21/21  0820   BNP  --  199* 281*   NTBNP 1,898*  --   --      Recent Labs   Lab Test 08/31/21  0720   TSH 4.92     Recent Labs   Lab Test 01/25/23  1440 08/21/21  0820   INR 1.13 1.44*        30 minutes spent on the date of encounter doing education, consent signing, chart prep/review, review of test results, patient visit and documentation.        This note has been dictated using voice recognition software. Any grammatical or context distortions are unintentional and inherent to the software.            Thank you for allowing me to participate in the care of your patient.      Sincerely,     CLYDE Weems [facility]  cc:   No referring provider defined for this encounter.

## 2023-05-02 ENCOUNTER — ANESTHESIA EVENT (OUTPATIENT)
Dept: CARDIOLOGY | Facility: HOSPITAL | Age: 68
DRG: 266 | End: 2023-05-02
Payer: MEDICARE

## 2023-05-02 LAB
ATRIAL RATE - MUSE: NORMAL BPM
DIASTOLIC BLOOD PRESSURE - MUSE: NORMAL MMHG
INTERPRETATION ECG - MUSE: NORMAL
P AXIS - MUSE: NORMAL DEGREES
PR INTERVAL - MUSE: NORMAL MS
QRS DURATION - MUSE: 96 MS
QT - MUSE: 328 MS
QTC - MUSE: 425 MS
R AXIS - MUSE: 52 DEGREES
SYSTOLIC BLOOD PRESSURE - MUSE: NORMAL MMHG
T AXIS - MUSE: -2 DEGREES
VENTRICULAR RATE- MUSE: 101 BPM

## 2023-05-02 NOTE — DISCHARGE INSTRUCTIONS
Patient Instructions - Going Home after transcatheter awzq-ji-[id number] mitral valve repair using MITRACLIP:    Going Home: It s normal to feel a little anxious after leaving the hospital and when fewer people are nearby. People do much better when they feel as though they have support.  If you live alone we suggest you arrange to have someone stay with you for a day or two to help you recover.     Medications:  Take all of your medications as directed in your discharge summary. Do not stop taking these medications without speaking with your cardiologist. If you have any questions about any of your medications, speak to your pharmacist or provider.     Follow up Appointments  You will follow up with Blanca Martini PA-C on May 10 at 2:50 pm.  You will have a repeat echocardiogram on June 1 at 1:45 pm.  You will have a follow up with Blanca Martini PA-C on June 7 at 3:15 pm (for labs, EKG and visit.  You DO NOT have to fast for these labs).          If you need to reschedule any of these appointments, please call:  [phone]    See your regular cardiologist in 6 months.  Your regular heart doctor will continue to be your heart specialist.   See your family doctor in 2 weeks.  Make an appointment once you get home.    Site Care: Shower every day.  Let the soap and water run over your incision or access site, but do not rub the area. No tub baths, pools or hot-tubs for the 1st week you are at home. Do not put ointments, powders, or lotions on your access site and/or incision.   It is normal to feel a small lump the size of a marble in the groin access site.  That is the closure device used to close the vein/artery.  If you are experiencing discomfort, bleeding, drainage, redness or increasing swelling, please call us.    Dental Work: Avoid major dental work for the next 6 months if possible. . You will need antibiotics before any dental cleanings, work or surgery. This will decrease the risk of infection.      Driving:  You should not drive for the first 5 days after your procedure. The first time you drive, you must have someone with you. You may ride in a car immediately after the procedure.      Activity: People recover at different rates depending on their general health and the type of heart valve procedure. Most people take a few weeks to feel fully recovered. Daily activity and exercise are an important part of your recovery.  Do not lift, push or pull anything > 10 lb. for the first 5 days.    CALL THE VALVE CLINIC IF YOU HAVE ANY QUESTIONS OR CONCERNS:  [phone]    For the first 5 days after your valve procedure     Do Not:   Lift, push, or pull more than ten pounds (including pets, laundry, groceries, etc)  Garden, including lawn mowing and raking  Excessively bear down or strain when having a bowel movement   Ride a bike  Do:  Weigh yourself every day in the morning after using the bathroom.  If you notice weight gain of more than 3 pounds in 1 day or more than 5 pounds in 1 week, call the cardiology clinic.   Get up and get dressed every day. Do not stay in bed.  Set a daily routine.   Walk as much as you can. You may walk up and down stairs. When you are tired, rest.   Cough and deep breathe. Use your incentive spirometer 5-10 times each hour when you are awake.   We strongly recommend you participate in a cardiac rehabilitation program. This type of program will help you learn about your heart health, prevent more heart problems, participate in safe and heart-healthy activities, and learn how to return to your activities of daily living and hobbies.   Let the cardiology clinic know if you need to leave town before your first follow-up visit.     When to call the Cardiology Clinic to speak with a nurse?   Swelling of the legs, ankles, or feet  Increasing shortness of breath  Change in the color or temperature of your lower legs and feet  Abdominal pain or unrelieved nausea and/or vomiting  Redness and  warmth around your access site and/or incision that does not go away  Yellow or green drainage from your access site and/or incision Weight gain of 3 pounds in one day or 5 pounds in one week  Develop any numbness, tingling, or limited movement of your arms or legs.   Chest pain that does not go away  New confusion or you cannot think clearly  Fever and chills         [facility]:  [phone]  If you are calling after hours, please listen to the entire voicemail, a live  will answer at the end of the message

## 2023-05-03 ENCOUNTER — HOSPITAL ENCOUNTER (INPATIENT)
Facility: HOSPITAL | Age: 68
LOS: 1 days | Discharge: HOME | DRG: 266 | End: 2023-05-04
Attending: INTERNAL MEDICINE | Admitting: INTERNAL MEDICINE
Payer: MEDICARE

## 2023-05-03 ENCOUNTER — HOSPITAL ENCOUNTER (OUTPATIENT)
Dept: CARDIOLOGY | Facility: HOSPITAL | Age: 68
Discharge: HOME | DRG: 266 | End: 2023-05-03
Attending: INTERNAL MEDICINE | Admitting: INTERNAL MEDICINE
Payer: MEDICARE

## 2023-05-03 ENCOUNTER — ANESTHESIA (OUTPATIENT)
Dept: CARDIOLOGY | Facility: HOSPITAL | Age: 68
DRG: 266 | End: 2023-05-03
Payer: MEDICARE

## 2023-05-03 DIAGNOSIS — Z91.89 AT HIGH RISK FOR IMPAIRED SKIN INTEGRITY: ICD-10-CM

## 2023-05-03 DIAGNOSIS — I36.1 NONRHEUMATIC TRICUSPID VALVE REGURGITATION: ICD-10-CM

## 2023-05-03 DIAGNOSIS — Z98.890 S/P TVR (TRICUSPID VALVE REPAIR): Primary | ICD-10-CM

## 2023-05-03 DIAGNOSIS — I07.1 TRICUSPID VALVE INSUFFICIENCY, UNSPECIFIED ETIOLOGY: ICD-10-CM

## 2023-05-03 DIAGNOSIS — J81.0 ACUTE PULMONARY EDEMA (H): ICD-10-CM

## 2023-05-03 DIAGNOSIS — I48.20 CHRONIC ATRIAL FIBRILLATION (H): ICD-10-CM

## 2023-05-03 DIAGNOSIS — Z98.890 S/P TRICUSPID VALVE REPAIR: Primary | ICD-10-CM

## 2023-05-03 PROBLEM — I34.0 MITRAL REGURGITATION: Status: ACTIVE | Noted: 2023-05-03

## 2023-05-03 LAB
ACT BLD: 182 SECONDS (ref 74–150)
ACT BLD: 211 SECONDS (ref 74–150)
ACT BLD: 220 SECONDS (ref 74–150)
ACT BLD: 228 SECONDS (ref 74–150)
ACT BLD: 233 SECONDS (ref 74–150)
ACT BLD: 241 SECONDS (ref 74–150)
ACT BLD: 263 SECONDS (ref 74–150)
ACT BLD: 297 SECONDS (ref 74–150)
ACT BLD: 297 SECONDS (ref 74–150)
ATRIAL RATE - MUSE: 86 BPM
BLD PROD TYP BPU: NORMAL
BLD PROD TYP BPU: NORMAL
BLOOD COMPONENT TYPE: NORMAL
BLOOD COMPONENT TYPE: NORMAL
CODING SYSTEM: NORMAL
CODING SYSTEM: NORMAL
CROSSMATCH: NORMAL
CROSSMATCH: NORMAL
DIASTOLIC BLOOD PRESSURE - MUSE: NORMAL MMHG
INTERPRETATION ECG - MUSE: NORMAL
LVEF ECHO: NORMAL
MAGNESIUM SERPL-MCNC: 2 MG/DL (ref 1.7–2.3)
P AXIS - MUSE: NORMAL DEGREES
POTASSIUM SERPL-SCNC: 4.7 MMOL/L (ref 3.4–5.3)
PR INTERVAL - MUSE: NORMAL MS
QRS DURATION - MUSE: 98 MS
QT - MUSE: 266 MS
QTC - MUSE: 346 MS
R AXIS - MUSE: 124 DEGREES
SYSTOLIC BLOOD PRESSURE - MUSE: NORMAL MMHG
T AXIS - MUSE: 184 DEGREES
UNIT ABO/RH: NORMAL
UNIT ABO/RH: NORMAL
UNIT NUMBER: NORMAL
UNIT NUMBER: NORMAL
UNIT STATUS: NORMAL
UNIT STATUS: NORMAL
UNIT TYPE ISBT: 6200
UNIT TYPE ISBT: 6200
VENTRICULAR RATE- MUSE: 102 BPM

## 2023-05-03 PROCEDURE — C1725 CATH, TRANSLUMIN NON-LASER: HCPCS | Performed by: INTERNAL MEDICINE

## 2023-05-03 PROCEDURE — 82805 BLOOD GASES W/O2 SATURATION: CPT | Performed by: INTERNAL MEDICINE

## 2023-05-03 PROCEDURE — 370N000017 HC ANESTHESIA TECHNICAL FEE, PER MIN: Performed by: INTERNAL MEDICINE

## 2023-05-03 PROCEDURE — 83735 ASSAY OF MAGNESIUM: CPT | Performed by: INTERNAL MEDICINE

## 2023-05-03 PROCEDURE — 85347 COAGULATION TIME ACTIVATED: CPT

## 2023-05-03 PROCEDURE — 93355 ECHO TRANSESOPHAGEAL (TEE): CPT

## 2023-05-03 PROCEDURE — 93355 ECHO TRANSESOPHAGEAL (TEE): CPT | Performed by: GENERAL ACUTE CARE HOSPITAL

## 2023-05-03 PROCEDURE — 258N000003 HC RX IP 258 OP 636: Performed by: INTERNAL MEDICINE

## 2023-05-03 PROCEDURE — 93010 ELECTROCARDIOGRAM REPORT: CPT | Mod: HIP | Performed by: INTERNAL MEDICINE

## 2023-05-03 PROCEDURE — C1769 GUIDE WIRE: HCPCS | Performed by: INTERNAL MEDICINE

## 2023-05-03 PROCEDURE — 278N000051 HC OR IMPLANT GENERAL: Performed by: INTERNAL MEDICINE

## 2023-05-03 PROCEDURE — 210N000001 HC R&B IMCU HEART CARE

## 2023-05-03 PROCEDURE — 93005 ELECTROCARDIOGRAM TRACING: CPT

## 2023-05-03 PROCEDURE — 250N000009 HC RX 250: Performed by: INTERNAL MEDICINE

## 2023-05-03 PROCEDURE — C1894 INTRO/SHEATH, NON-LASER: HCPCS | Performed by: INTERNAL MEDICINE

## 2023-05-03 PROCEDURE — C1760 CLOSURE DEV, VASC: HCPCS | Performed by: INTERNAL MEDICINE

## 2023-05-03 PROCEDURE — 258N000003 HC RX IP 258 OP 636: Performed by: NURSE ANESTHETIST, CERTIFIED REGISTERED

## 2023-05-03 PROCEDURE — 710N000010 HC RECOVERY PHASE 1, LEVEL 2, PER MIN

## 2023-05-03 PROCEDURE — 02RJ3JZ REPLACEMENT OF TRICUSPID VALVE WITH SYNTHETIC SUBSTITUTE, PERCUTANEOUS APPROACH: ICD-10-PCS | Performed by: INTERNAL MEDICINE

## 2023-05-03 PROCEDURE — 250N000013 HC RX MED GY IP 250 OP 250 PS 637: Performed by: INTERNAL MEDICINE

## 2023-05-03 PROCEDURE — 86923 COMPATIBILITY TEST ELECTRIC: CPT | Performed by: INTERNAL MEDICINE

## 2023-05-03 PROCEDURE — 84132 ASSAY OF SERUM POTASSIUM: CPT | Performed by: INTERNAL MEDICINE

## 2023-05-03 PROCEDURE — 272N000001 HC OR GENERAL SUPPLY STERILE: Performed by: INTERNAL MEDICINE

## 2023-05-03 PROCEDURE — 0569T TTVR PERQ APPR 1ST PROSTH: CPT | Performed by: INTERNAL MEDICINE

## 2023-05-03 PROCEDURE — 250N000009 HC RX 250: Performed by: NURSE ANESTHETIST, CERTIFIED REGISTERED

## 2023-05-03 PROCEDURE — 99232 SBSQ HOSP IP/OBS MODERATE 35: CPT | Performed by: EMERGENCY MEDICINE

## 2023-05-03 PROCEDURE — 0569T TTVR PERQ APPR 1ST PROSTH: CPT | Mod: Q1 | Performed by: INTERNAL MEDICINE

## 2023-05-03 PROCEDURE — 250N000011 HC RX IP 250 OP 636: Performed by: NURSE ANESTHETIST, CERTIFIED REGISTERED

## 2023-05-03 PROCEDURE — 36415 COLL VENOUS BLD VENIPUNCTURE: CPT | Performed by: INTERNAL MEDICINE

## 2023-05-03 DEVICE — IMPLANTABLE DEVICE: Type: IMPLANTABLE DEVICE | Site: CHEST | Status: FUNCTIONAL

## 2023-05-03 RX ORDER — DEXAMETHASONE SODIUM PHOSPHATE 4 MG/ML
INJECTION, SOLUTION INTRA-ARTICULAR; INTRALESIONAL; INTRAMUSCULAR; INTRAVENOUS; SOFT TISSUE PRN
Status: DISCONTINUED | OUTPATIENT
Start: 2023-05-03 | End: 2023-05-03

## 2023-05-03 RX ORDER — OXYCODONE HYDROCHLORIDE 5 MG/1
10 TABLET ORAL EVERY 4 HOURS PRN
Status: DISCONTINUED | OUTPATIENT
Start: 2023-05-03 | End: 2023-05-04 | Stop reason: HOSPADM

## 2023-05-03 RX ORDER — NALOXONE HYDROCHLORIDE 0.4 MG/ML
0.4 INJECTION, SOLUTION INTRAMUSCULAR; INTRAVENOUS; SUBCUTANEOUS
Status: DISCONTINUED | OUTPATIENT
Start: 2023-05-03 | End: 2023-05-04 | Stop reason: HOSPADM

## 2023-05-03 RX ORDER — OXYCODONE HYDROCHLORIDE 5 MG/1
5 TABLET ORAL EVERY 4 HOURS PRN
Status: DISCONTINUED | OUTPATIENT
Start: 2023-05-03 | End: 2023-05-04 | Stop reason: HOSPADM

## 2023-05-03 RX ORDER — HYDRALAZINE HYDROCHLORIDE 20 MG/ML
10 INJECTION INTRAMUSCULAR; INTRAVENOUS
Status: DISCONTINUED | OUTPATIENT
Start: 2023-05-03 | End: 2023-05-04 | Stop reason: HOSPADM

## 2023-05-03 RX ORDER — NITROGLYCERIN 0.4 MG/1
0.4 TABLET SUBLINGUAL EVERY 5 MIN PRN
Status: DISCONTINUED | OUTPATIENT
Start: 2023-05-03 | End: 2023-05-04 | Stop reason: HOSPADM

## 2023-05-03 RX ORDER — ONDANSETRON 4 MG/1
4 TABLET, ORALLY DISINTEGRATING ORAL EVERY 6 HOURS PRN
Status: DISCONTINUED | OUTPATIENT
Start: 2023-05-03 | End: 2023-05-04 | Stop reason: HOSPADM

## 2023-05-03 RX ORDER — CEFAZOLIN SODIUM 1 G/3ML
INJECTION, POWDER, FOR SOLUTION INTRAMUSCULAR; INTRAVENOUS PRN
Status: DISCONTINUED | OUTPATIENT
Start: 2023-05-03 | End: 2023-05-03

## 2023-05-03 RX ORDER — POTASSIUM CHLORIDE 750 MG/1
10 TABLET, EXTENDED RELEASE ORAL DAILY
Status: DISCONTINUED | OUTPATIENT
Start: 2023-05-04 | End: 2023-05-04 | Stop reason: HOSPADM

## 2023-05-03 RX ORDER — LIDOCAINE HYDROCHLORIDE 10 MG/ML
INJECTION, SOLUTION INFILTRATION; PERINEURAL PRN
Status: DISCONTINUED | OUTPATIENT
Start: 2023-05-03 | End: 2023-05-03

## 2023-05-03 RX ORDER — METOPROLOL TARTRATE 1 MG/ML
INJECTION, SOLUTION INTRAVENOUS PRN
Status: DISCONTINUED | OUTPATIENT
Start: 2023-05-03 | End: 2023-05-03

## 2023-05-03 RX ORDER — SODIUM CHLORIDE 9 MG/ML
INJECTION, SOLUTION INTRAVENOUS CONTINUOUS PRN
Status: DISCONTINUED | OUTPATIENT
Start: 2023-05-03 | End: 2023-05-03

## 2023-05-03 RX ORDER — SODIUM CHLORIDE 9 MG/ML
INJECTION, SOLUTION INTRAVENOUS CONTINUOUS
Status: ACTIVE | OUTPATIENT
Start: 2023-05-03 | End: 2023-05-03

## 2023-05-03 RX ORDER — ONDANSETRON 2 MG/ML
INJECTION INTRAMUSCULAR; INTRAVENOUS PRN
Status: DISCONTINUED | OUTPATIENT
Start: 2023-05-03 | End: 2023-05-03

## 2023-05-03 RX ORDER — ONDANSETRON 2 MG/ML
4 INJECTION INTRAMUSCULAR; INTRAVENOUS EVERY 6 HOURS PRN
Status: DISCONTINUED | OUTPATIENT
Start: 2023-05-03 | End: 2023-05-04 | Stop reason: HOSPADM

## 2023-05-03 RX ORDER — BUMETANIDE 2 MG/1
2 TABLET ORAL
Status: DISCONTINUED | OUTPATIENT
Start: 2023-05-03 | End: 2023-05-04

## 2023-05-03 RX ORDER — NALOXONE HYDROCHLORIDE 0.4 MG/ML
0.2 INJECTION, SOLUTION INTRAMUSCULAR; INTRAVENOUS; SUBCUTANEOUS
Status: DISCONTINUED | OUTPATIENT
Start: 2023-05-03 | End: 2023-05-04 | Stop reason: HOSPADM

## 2023-05-03 RX ORDER — SODIUM CHLORIDE, SODIUM LACTATE, POTASSIUM CHLORIDE, CALCIUM CHLORIDE 600; 310; 30; 20 MG/100ML; MG/100ML; MG/100ML; MG/100ML
INJECTION, SOLUTION INTRAVENOUS CONTINUOUS
Status: DISCONTINUED | OUTPATIENT
Start: 2023-05-03 | End: 2023-05-03 | Stop reason: HOSPADM

## 2023-05-03 RX ORDER — LIDOCAINE 40 MG/G
CREAM TOPICAL
Status: DISCONTINUED | OUTPATIENT
Start: 2023-05-03 | End: 2023-05-03 | Stop reason: HOSPADM

## 2023-05-03 RX ORDER — CEFAZOLIN SODIUM/WATER 2 G/20 ML
2 SYRINGE (ML) INTRAVENOUS
Status: DISCONTINUED | OUTPATIENT
Start: 2023-05-03 | End: 2023-05-03 | Stop reason: HOSPADM

## 2023-05-03 RX ORDER — PROPOFOL 10 MG/ML
INJECTION, EMULSION INTRAVENOUS PRN
Status: DISCONTINUED | OUTPATIENT
Start: 2023-05-03 | End: 2023-05-03

## 2023-05-03 RX ORDER — SPIRONOLACTONE 25 MG/1
25 TABLET ORAL DAILY
Status: DISCONTINUED | OUTPATIENT
Start: 2023-05-04 | End: 2023-05-04 | Stop reason: HOSPADM

## 2023-05-03 RX ORDER — LIDOCAINE 40 MG/G
CREAM TOPICAL
Status: DISCONTINUED | OUTPATIENT
Start: 2023-05-03 | End: 2023-05-04 | Stop reason: HOSPADM

## 2023-05-03 RX ORDER — FUROSEMIDE 10 MG/ML
INJECTION INTRAMUSCULAR; INTRAVENOUS PRN
Status: DISCONTINUED | OUTPATIENT
Start: 2023-05-03 | End: 2023-05-03

## 2023-05-03 RX ORDER — FENTANYL CITRATE 50 UG/ML
INJECTION, SOLUTION INTRAMUSCULAR; INTRAVENOUS PRN
Status: DISCONTINUED | OUTPATIENT
Start: 2023-05-03 | End: 2023-05-03

## 2023-05-03 RX ORDER — CITALOPRAM HYDROBROMIDE 20 MG/1
20 TABLET ORAL DAILY
Status: DISCONTINUED | OUTPATIENT
Start: 2023-05-04 | End: 2023-05-04 | Stop reason: HOSPADM

## 2023-05-03 RX ORDER — SODIUM CHLORIDE 9 MG/ML
INJECTION, SOLUTION INTRAVENOUS CONTINUOUS
Status: DISCONTINUED | OUTPATIENT
Start: 2023-05-03 | End: 2023-05-03 | Stop reason: HOSPADM

## 2023-05-03 RX ORDER — KETAMINE HYDROCHLORIDE 10 MG/ML
INJECTION INTRAMUSCULAR; INTRAVENOUS PRN
Status: DISCONTINUED | OUTPATIENT
Start: 2023-05-03 | End: 2023-05-03

## 2023-05-03 RX ORDER — ASPIRIN 325 MG
325 TABLET ORAL ONCE
Status: COMPLETED | OUTPATIENT
Start: 2023-05-03 | End: 2023-05-03

## 2023-05-03 RX ORDER — EPHEDRINE SULFATE 50 MG/ML
INJECTION, SOLUTION INTRAMUSCULAR; INTRAVENOUS; SUBCUTANEOUS PRN
Status: DISCONTINUED | OUTPATIENT
Start: 2023-05-03 | End: 2023-05-03

## 2023-05-03 RX ORDER — HEPARIN SODIUM 1000 [USP'U]/ML
INJECTION, SOLUTION INTRAVENOUS; SUBCUTANEOUS PRN
Status: DISCONTINUED | OUTPATIENT
Start: 2023-05-03 | End: 2023-05-03

## 2023-05-03 RX ADMIN — Medication 5 MG: at 09:27

## 2023-05-03 RX ADMIN — Medication 5 MG: at 08:31

## 2023-05-03 RX ADMIN — ROCURONIUM BROMIDE 50 MG: 50 INJECTION, SOLUTION INTRAVENOUS at 08:08

## 2023-05-03 RX ADMIN — ROCURONIUM BROMIDE 50 MG: 50 INJECTION, SOLUTION INTRAVENOUS at 09:26

## 2023-05-03 RX ADMIN — PHENYLEPHRINE HYDROCHLORIDE 100 MCG: 10 INJECTION INTRAVENOUS at 09:46

## 2023-05-03 RX ADMIN — SODIUM CHLORIDE: 9 INJECTION, SOLUTION INTRAVENOUS at 08:33

## 2023-05-03 RX ADMIN — HEPARIN SODIUM 4000 UNITS: 1000 INJECTION INTRAVENOUS; SUBCUTANEOUS at 09:09

## 2023-05-03 RX ADMIN — PROPOFOL 100 MG: 10 INJECTION, EMULSION INTRAVENOUS at 07:21

## 2023-05-03 RX ADMIN — Medication 5 MG: at 09:47

## 2023-05-03 RX ADMIN — Medication 5 MG: at 09:52

## 2023-05-03 RX ADMIN — HEPARIN SODIUM 10000 UNITS: 1000 INJECTION INTRAVENOUS; SUBCUTANEOUS at 08:35

## 2023-05-03 RX ADMIN — HEPARIN SODIUM 4000 UNITS: 1000 INJECTION INTRAVENOUS; SUBCUTANEOUS at 09:00

## 2023-05-03 RX ADMIN — FENTANYL CITRATE 100 MCG: 50 INJECTION, SOLUTION INTRAMUSCULAR; INTRAVENOUS at 08:06

## 2023-05-03 RX ADMIN — PHENYLEPHRINE HYDROCHLORIDE 100 MCG: 10 INJECTION INTRAVENOUS at 08:15

## 2023-05-03 RX ADMIN — DEXAMETHASONE SODIUM PHOSPHATE 10 MG: 4 INJECTION, SOLUTION INTRA-ARTICULAR; INTRALESIONAL; INTRAMUSCULAR; INTRAVENOUS; SOFT TISSUE at 07:32

## 2023-05-03 RX ADMIN — KETAMINE HYDROCHLORIDE 50 MG: 10 INJECTION, SOLUTION INTRAMUSCULAR; INTRAVENOUS at 07:21

## 2023-05-03 RX ADMIN — LIDOCAINE HYDROCHLORIDE 30 MG: 10 INJECTION, SOLUTION INFILTRATION; PERINEURAL at 07:20

## 2023-05-03 RX ADMIN — SODIUM CHLORIDE: 9 INJECTION, SOLUTION INTRAVENOUS at 07:01

## 2023-05-03 RX ADMIN — ASPIRIN 325 MG ORAL TABLET 325 MG: 325 PILL ORAL at 06:09

## 2023-05-03 RX ADMIN — HEPARIN SODIUM 2000 UNITS: 1000 INJECTION INTRAVENOUS; SUBCUTANEOUS at 09:24

## 2023-05-03 RX ADMIN — METOPROLOL TARTRATE 2.5 MG: 5 INJECTION INTRAVENOUS at 10:29

## 2023-05-03 RX ADMIN — ROCURONIUM BROMIDE 50 MG: 50 INJECTION, SOLUTION INTRAVENOUS at 07:22

## 2023-05-03 RX ADMIN — CEFAZOLIN 2 G: 1 INJECTION, POWDER, FOR SOLUTION INTRAMUSCULAR; INTRAVENOUS at 07:09

## 2023-05-03 RX ADMIN — ONDANSETRON 4 MG: 2 INJECTION INTRAMUSCULAR; INTRAVENOUS at 10:28

## 2023-05-03 RX ADMIN — SODIUM CHLORIDE: 9 INJECTION, SOLUTION INTRAVENOUS at 11:10

## 2023-05-03 RX ADMIN — SUGAMMADEX 200 MG: 100 INJECTION, SOLUTION INTRAVENOUS at 10:32

## 2023-05-03 RX ADMIN — SODIUM CHLORIDE: 9 INJECTION, SOLUTION INTRAVENOUS at 06:05

## 2023-05-03 RX ADMIN — OXYCODONE HYDROCHLORIDE 5 MG: 5 TABLET ORAL at 16:19

## 2023-05-03 RX ADMIN — APIXABAN 5 MG: 5 TABLET, FILM COATED ORAL at 20:09

## 2023-05-03 RX ADMIN — Medication 5 MG: at 08:15

## 2023-05-03 RX ADMIN — HEPARIN SODIUM 3000 UNITS: 1000 INJECTION INTRAVENOUS; SUBCUTANEOUS at 08:49

## 2023-05-03 RX ADMIN — BUMETANIDE 2 MG: 2 TABLET ORAL at 20:09

## 2023-05-03 RX ADMIN — OXYCODONE HYDROCHLORIDE 5 MG: 5 TABLET ORAL at 20:56

## 2023-05-03 RX ADMIN — FUROSEMIDE 40 MG: 10 INJECTION, SOLUTION INTRAMUSCULAR; INTRAVENOUS at 10:27

## 2023-05-03 ASSESSMENT — ACTIVITIES OF DAILY LIVING (ADL)
WEAR_GLASSES_OR_BLIND: YES
DIFFICULTY_EATING/SWALLOWING: NO
ADLS_ACUITY_SCORE: 26
CHANGE_IN_FUNCTIONAL_STATUS_SINCE_ONSET_OF_CURRENT_ILLNESS/INJURY: NO
ADLS_ACUITY_SCORE: 35
FALL_HISTORY_WITHIN_LAST_SIX_MONTHS: NO
WALKING_OR_CLIMBING_STAIRS_DIFFICULTY: NO
ADLS_ACUITY_SCORE: 26
ADLS_ACUITY_SCORE: 35
EQUIPMENT_CURRENTLY_USED_AT_HOME: CANE, STRAIGHT
DOING_ERRANDS_INDEPENDENTLY_DIFFICULTY: NO
ADLS_ACUITY_SCORE: 35
ADLS_ACUITY_SCORE: 35
ADLS_ACUITY_SCORE: 26
CONCENTRATING,_REMEMBERING_OR_MAKING_DECISIONS_DIFFICULTY: NO
TOILETING_ISSUES: NO
DRESSING/BATHING_DIFFICULTY: NO

## 2023-05-03 NOTE — INTERVAL H&P NOTE
"I have reviewed the surgical (or preoperative) H&P that is linked to this encounter, and examined the patient. There are no significant changes    Clinical Conditions Present on Arrival:  Clinically Significant Risk Factors Present on Admission                # Drug Induced Coagulation Defect: home medication list includes an anticoagulant medication   # Obesity: Estimated body mass index is 39.78 kg/m  as calculated from the following:    Height as of this encounter: 1.702 m (5' 7\").    Weight as of this encounter: 115.2 kg (254 lb).       "

## 2023-05-03 NOTE — ANESTHESIA PROCEDURE NOTES
Airway       Patient location during procedure: OR       Procedure Start/Stop Times: 5/3/2023 7:23 AM  Staff -        CRNA: Rod Diaz APRN CRNA       Performed By: CRNA  Consent for Airway        Urgency: elective  Indications and Patient Condition       Indications for airway management: bhavana-procedural         Mask difficulty assessment: 1 - vent by mask    Final Airway Details       Final airway type: endotracheal airway       Successful airway: ETT - single  Endotracheal Airway Details        ETT size (mm): 7.5       Cuffed: yes       Successful intubation technique: direct laryngoscopy       DL Blade Type: Escalante 2       Grade View of Cords: 1       Adjucts: stylet       Position: Right       Measured from: lips       Secured at (cm): 20       Bite block used: None    Post intubation assessment        Placement verified by: capnometry, equal breath sounds and chest rise        Number of attempts at approach: 1       Number of other approaches attempted: 0       Secured with: silk tape       Ease of procedure: easy       Dentition: Intact and Unchanged       Dental guard used and removed.    Medication(s) Administered   Medication Administration Time: 5/3/2023 7:23 AM

## 2023-05-03 NOTE — PHARMACY-ADMISSION MEDICATION HISTORY
Pharmacist Admission Medication History    Admission medication history is complete. The information provided in this note is only as accurate as the sources available at the time of the update.    Medication reconciliation/reorder completed by provider prior to medication history? No    Information Source(s): Patient, Clinic records and CareEverywhere/SureScripts via in-person    Pertinent Information: none    Changes made to PTA medication list:    Added: None    Deleted: Senna    Changed: None    Medication Affordability:       Allergies reviewed with patient and updates made in EHR: yes    Medication History Completed By: Shahbaz Somers [facility] 5/3/2023 7:05 AM    Prior to Admission medications    Medication Sig Last Dose Taking? Auth Provider Long Term End Date   acetaminophen (TYLENOL) 325 MG tablet Take 975 mg by mouth every 4 hours as needed for mild pain 5/2/2023 Yes Unknown, Entered By History     apixaban (ELIQUIS) 5 mg Tab tablet Take 5 mg by mouth 2 times daily 4/27/2023 Yes Provider, Historical     bumetanide (BUMEX) 2 MG tablet TAKE 1 TABLET(2 MG) BY MOUTH TWICE DAILY 5/2/2023 Yes Cony Vanegas NP Yes    CALCIUM/D3/MAG OX//TYRONE/ZN (CALTRATE + D3 PLUS MINERALS ORAL) [CALCIUM/D3/MAG OX//TYRONE/ZN (CALTRATE + D3 PLUS MINERALS ORAL)] Take 1 tablet by mouth daily. 5/2/2023 Yes Provider, Historical     cholecalciferol, vitamin D3, 2,000 unit Tab [CHOLECALCIFEROL, VITAMIN D3, 2,000 UNIT TAB] Take 2,000 Units by mouth daily. 5/2/2023 Yes Provider, Historical     citalopram (CELEXA) 10 MG tablet Take 20 mg by mouth daily 5/2/2023 Yes Unknown, Entered By History Yes    cyanocobalamin 1000 MCG tablet [CYANOCOBALAMIN 1000 MCG TABLET] Take 1,000 mcg by mouth daily. 5/2/2023 Yes Provider, Historical     ferrous sulfate (FEROSUL) 325 (65 Fe) MG tablet Take 1 tablet (325 mg) by mouth every other day 5/2/2023 Yes Frandy Bella MD     losartan (COZAAR) 25 MG tablet Take 1 tablet (25 mg) by mouth daily  5/3/2023 Yes Frandy Bella MD Yes    melatonin 3 MG tablet Take 3 mg by mouth nightly as needed Past Month Yes Reported, Patient     metoprolol succinate (TOPROL-XL) 25 MG [METOPROLOL SUCCINATE (TOPROL-XL) 25 MG] TAKE 1 TABLET BY MOUTH DAILY. 5/3/2023 Yes Adia Rocha MD Yes    MULTIVIT WITH CALCIUM,IRON,MIN (WOMEN'S DAILY MULTIVITAMIN ORAL) [MULTIVIT WITH CALCIUM,IRON,MIN (WOMEN'S DAILY MULTIVITAMIN ORAL)] Take 1 tablet by mouth daily. 5/2/2023 Yes Provider, Historical     polyethylene glycol (MIRALAX) 17 g packet Take 17 g by mouth daily as needed for constipation More than a month Yes Frandy Bella MD     potassium chloride ER (K-TAB/KLOR-CON) 10 MEQ CR tablet Take 1 tablet (10 mEq) by mouth daily 5/2/2023 Yes Cony Vanegas, ASHLIE     spironolactone (ALDACTONE) 25 MG tablet Take 1 tablet (25 mg) by mouth daily 5/2/2023 Yes Adia Rocha MD Yes

## 2023-05-03 NOTE — PLAN OF CARE
Goal Outcome Evaluation:             Pt admitted for TTVR. Pt prepped and ready for procedure. Pt states no family here and no one to call and update.    Leticia Eduardo RN

## 2023-05-03 NOTE — ANESTHESIA POSTPROCEDURE EVALUATION
Patient: Misty Quezada    Procedure: Procedure(s):  Transcatheter Tricuspid Valve Repair  OR TRANSCATHETER TRICUSPID VALVE REPLACEMENT, FEMORAL PERCUTANEOUS APPROACH (STANDBY)       Anesthesia Type:  General    Note:  Disposition: Outpatient   Postop Pain Control: Uneventful            Sign Out: Well controlled pain   PONV: No   Neuro/Psych: Uneventful            Sign Out: Acceptable/Baseline neuro status   Airway/Respiratory: Uneventful            Sign Out: Acceptable/Baseline resp. status   CV/Hemodynamics: Uneventful            Sign Out: Acceptable CV status; No obvious hypovolemia; No obvious fluid overload   Other NRE: NONE   DID A NON-ROUTINE EVENT OCCUR? No           Last vitals:  Vitals Value Taken Time   /66 05/03/23 1715   Temp 37  C (98.6  F) 05/03/23 1115   Pulse 93 05/03/23 1721   Resp 22 05/03/23 1721   SpO2 96 % 05/03/23 1721   Vitals shown include unvalidated device data.    Electronically Signed By: Nicky Gutierrez MD  May 3, 2023  5:22 PM

## 2023-05-03 NOTE — Clinical Note
Potential access sites were evaluated for patency using ultrasound.   The left femoral vein was selected. Access was obtained under with Sonosite and Fluoroscopic guidance using a micropuncture 21 gauge needle with direct visualization of needle entry.

## 2023-05-03 NOTE — ANESTHESIA CARE TRANSFER NOTE
Patient: Misty Quezada    Procedure: Procedure(s):  Transcatheter Tricuspid Valve Repair  OR TRANSCATHETER TRICUSPID VALVE REPLACEMENT, FEMORAL PERCUTANEOUS APPROACH (STANDBY)       Diagnosis: valvular disease  Diagnosis Additional Information: No value filed.    Anesthesia Type:   General     Note:    Oropharynx: oropharynx clear of all foreign objects and spontaneously breathing  Level of Consciousness: drowsy  Oxygen Supplementation: face mask  Level of Supplemental Oxygen (L/min / FiO2): 6  Independent Airway: airway patency satisfactory and stable    Vital Signs Stable: post-procedure vital signs reviewed and stable  Report to RN Given: handoff report given  Patient transferred to: Cardiac Special Care          Vitals:  Vitals Value Taken Time   BP     Temp     Pulse     Resp     SpO2         Electronically Signed By: ERICK Tavarez CRNA  May 3, 2023  10:43 AM

## 2023-05-04 ENCOUNTER — ALLIED HEALTH/NURSE VISIT (OUTPATIENT)
Dept: CARDIOLOGY | Facility: CLINIC | Age: 68
End: 2023-05-04

## 2023-05-04 ENCOUNTER — APPOINTMENT (OUTPATIENT)
Dept: OCCUPATIONAL THERAPY | Facility: HOSPITAL | Age: 68
DRG: 266 | End: 2023-05-04
Attending: INTERNAL MEDICINE
Payer: MEDICARE

## 2023-05-04 ENCOUNTER — APPOINTMENT (OUTPATIENT)
Dept: RADIOLOGY | Facility: HOSPITAL | Age: 68
DRG: 266 | End: 2023-05-04
Attending: INTERNAL MEDICINE
Payer: MEDICARE

## 2023-05-04 ENCOUNTER — APPOINTMENT (OUTPATIENT)
Dept: CARDIOLOGY | Facility: HOSPITAL | Age: 68
DRG: 266 | End: 2023-05-04
Attending: INTERNAL MEDICINE
Payer: MEDICARE

## 2023-05-04 VITALS
HEART RATE: 98 BPM | OXYGEN SATURATION: 94 % | DIASTOLIC BLOOD PRESSURE: 62 MMHG | RESPIRATION RATE: 21 BRPM | HEIGHT: 67 IN | BODY MASS INDEX: 39.39 KG/M2 | TEMPERATURE: 98.3 F | SYSTOLIC BLOOD PRESSURE: 105 MMHG | WEIGHT: 251 LBS

## 2023-05-04 DIAGNOSIS — I07.1 TRICUSPID VALVE INSUFFICIENCY, UNSPECIFIED ETIOLOGY: Primary | ICD-10-CM

## 2023-05-04 PROBLEM — I89.0 LYMPHEDEMA: Status: ACTIVE | Noted: 2022-09-24

## 2023-05-04 PROBLEM — I48.20 CHRONIC ATRIAL FIBRILLATION (H): Status: ACTIVE | Noted: 2021-08-21

## 2023-05-04 PROBLEM — E66.01 MORBID OBESITY (H): Status: ACTIVE | Noted: 2022-09-24

## 2023-05-04 PROBLEM — I50.33 ACUTE ON CHRONIC HEART FAILURE WITH PRESERVED EJECTION FRACTION (H): Status: ACTIVE | Noted: 2022-10-17

## 2023-05-04 LAB
ALBUMIN SERPL BCG-MCNC: 3.9 G/DL (ref 3.5–5.2)
ALP SERPL-CCNC: 99 U/L (ref 35–104)
ALT SERPL W P-5'-P-CCNC: 26 U/L (ref 10–35)
ANION GAP SERPL CALCULATED.3IONS-SCNC: 11 MMOL/L (ref 7–15)
ANION GAP SERPL CALCULATED.3IONS-SCNC: 15 MMOL/L (ref 7–15)
APTT PPP: 26 SECONDS (ref 22–38)
AST SERPL W P-5'-P-CCNC: 40 U/L (ref 10–35)
ATRIAL RATE - MUSE: 147 BPM
BILIRUB SERPL-MCNC: 0.5 MG/DL
BUN SERPL-MCNC: 26.1 MG/DL (ref 8–23)
BUN SERPL-MCNC: 26.2 MG/DL (ref 8–23)
CALCIUM SERPL-MCNC: 9.2 MG/DL (ref 8.8–10.2)
CALCIUM SERPL-MCNC: 9.3 MG/DL (ref 8.8–10.2)
CHLORIDE SERPL-SCNC: 103 MMOL/L (ref 98–107)
CHLORIDE SERPL-SCNC: 103 MMOL/L (ref 98–107)
CK SERPL-CCNC: 39 U/L (ref 26–192)
CREAT SERPL-MCNC: 0.83 MG/DL (ref 0.51–0.95)
CREAT SERPL-MCNC: 0.85 MG/DL (ref 0.51–0.95)
DEPRECATED HCO3 PLAS-SCNC: 24 MMOL/L (ref 22–29)
DEPRECATED HCO3 PLAS-SCNC: 26 MMOL/L (ref 22–29)
DIASTOLIC BLOOD PRESSURE - MUSE: NORMAL MMHG
ERYTHROCYTE [DISTWIDTH] IN BLOOD BY AUTOMATED COUNT: 13.4 % (ref 10–15)
GFR SERPL CREATININE-BSD FRML MDRD: 74 ML/MIN/1.73M2
GFR SERPL CREATININE-BSD FRML MDRD: 76 ML/MIN/1.73M2
GGT SERPL-CCNC: 44 U/L (ref 5–36)
GLUCOSE SERPL-MCNC: 119 MG/DL (ref 70–99)
GLUCOSE SERPL-MCNC: 120 MG/DL (ref 70–99)
HCT VFR BLD AUTO: 38.9 % (ref 35–47)
HGB BLD-MCNC: 12.5 G/DL (ref 11.7–15.7)
HOLD SPECIMEN: NORMAL
INR PPP: 1.21 (ref 0.85–1.15)
INTERPRETATION ECG - MUSE: NORMAL
LVEF ECHO: NORMAL
MAGNESIUM SERPL-MCNC: 1.9 MG/DL (ref 1.7–2.3)
MCH RBC QN AUTO: 32.2 PG (ref 26.5–33)
MCHC RBC AUTO-ENTMCNC: 32.1 G/DL (ref 31.5–36.5)
MCV RBC AUTO: 100 FL (ref 78–100)
P AXIS - MUSE: NORMAL DEGREES
PLATELET # BLD AUTO: 242 10E3/UL (ref 150–450)
POTASSIUM SERPL-SCNC: 3.9 MMOL/L (ref 3.4–5.3)
POTASSIUM SERPL-SCNC: 4 MMOL/L (ref 3.4–5.3)
PR INTERVAL - MUSE: NORMAL MS
PROT SERPL-MCNC: 6.9 G/DL (ref 6.4–8.3)
QRS DURATION - MUSE: 96 MS
QT - MUSE: 364 MS
QTC - MUSE: 442 MS
R AXIS - MUSE: 103 DEGREES
RBC # BLD AUTO: 3.88 10E6/UL (ref 3.8–5.2)
SODIUM SERPL-SCNC: 140 MMOL/L (ref 136–145)
SODIUM SERPL-SCNC: 142 MMOL/L (ref 136–145)
SYSTOLIC BLOOD PRESSURE - MUSE: NORMAL MMHG
T AXIS - MUSE: 31 DEGREES
TROPONIN T SERPL HS-MCNC: 12 NG/L
URATE SERPL-MCNC: 7.4 MG/DL (ref 2.4–5.7)
VENTRICULAR RATE- MUSE: 89 BPM
WBC # BLD AUTO: 8.3 10E3/UL (ref 4–11)

## 2023-05-04 PROCEDURE — 99232 SBSQ HOSP IP/OBS MODERATE 35: CPT | Mod: 25 | Performed by: INTERNAL MEDICINE

## 2023-05-04 PROCEDURE — 97535 SELF CARE MNGMENT TRAINING: CPT | Mod: GO

## 2023-05-04 PROCEDURE — 71045 X-RAY EXAM CHEST 1 VIEW: CPT

## 2023-05-04 PROCEDURE — 93010 ELECTROCARDIOGRAM REPORT: CPT | Mod: HIP | Performed by: INTERNAL MEDICINE

## 2023-05-04 PROCEDURE — 97165 OT EVAL LOW COMPLEX 30 MIN: CPT | Mod: GO

## 2023-05-04 PROCEDURE — 250N000013 HC RX MED GY IP 250 OP 250 PS 637: Performed by: INTERNAL MEDICINE

## 2023-05-04 PROCEDURE — 97110 THERAPEUTIC EXERCISES: CPT | Mod: GO

## 2023-05-04 PROCEDURE — 36415 COLL VENOUS BLD VENIPUNCTURE: CPT | Performed by: INTERNAL MEDICINE

## 2023-05-04 PROCEDURE — 80053 COMPREHEN METABOLIC PANEL: CPT | Performed by: INTERNAL MEDICINE

## 2023-05-04 PROCEDURE — 82977 ASSAY OF GGT: CPT | Performed by: INTERNAL MEDICINE

## 2023-05-04 PROCEDURE — 99232 SBSQ HOSP IP/OBS MODERATE 35: CPT | Performed by: INTERNAL MEDICINE

## 2023-05-04 PROCEDURE — 93306 TTE W/DOPPLER COMPLETE: CPT

## 2023-05-04 PROCEDURE — 82550 ASSAY OF CK (CPK): CPT | Performed by: INTERNAL MEDICINE

## 2023-05-04 PROCEDURE — 94799 UNLISTED PULMONARY SVC/PX: CPT

## 2023-05-04 PROCEDURE — 84484 ASSAY OF TROPONIN QUANT: CPT | Performed by: INTERNAL MEDICINE

## 2023-05-04 PROCEDURE — 85610 PROTHROMBIN TIME: CPT | Performed by: INTERNAL MEDICINE

## 2023-05-04 PROCEDURE — 85027 COMPLETE CBC AUTOMATED: CPT | Performed by: INTERNAL MEDICINE

## 2023-05-04 PROCEDURE — 85730 THROMBOPLASTIN TIME PARTIAL: CPT | Performed by: INTERNAL MEDICINE

## 2023-05-04 PROCEDURE — 250N000011 HC RX IP 250 OP 636: Performed by: INTERNAL MEDICINE

## 2023-05-04 PROCEDURE — 999N000197 HC STATISTIC WOC PT EDUCATION, 0-15 MIN

## 2023-05-04 PROCEDURE — 83735 ASSAY OF MAGNESIUM: CPT | Performed by: INTERNAL MEDICINE

## 2023-05-04 PROCEDURE — 84550 ASSAY OF BLOOD/URIC ACID: CPT | Performed by: INTERNAL MEDICINE

## 2023-05-04 PROCEDURE — 93306 TTE W/DOPPLER COMPLETE: CPT | Mod: 26 | Performed by: INTERNAL MEDICINE

## 2023-05-04 PROCEDURE — 93005 ELECTROCARDIOGRAM TRACING: CPT

## 2023-05-04 RX ORDER — LOSARTAN POTASSIUM 25 MG/1
12.5 TABLET ORAL DAILY
Start: 2023-05-04 | End: 2023-07-18

## 2023-05-04 RX ORDER — ACETAMINOPHEN 325 MG/1
650 TABLET ORAL EVERY 4 HOURS PRN
Status: DISCONTINUED | OUTPATIENT
Start: 2023-05-04 | End: 2023-05-04 | Stop reason: HOSPADM

## 2023-05-04 RX ORDER — BUMETANIDE 2 MG/1
2 TABLET ORAL
Status: DISCONTINUED | OUTPATIENT
Start: 2023-05-05 | End: 2023-05-04 | Stop reason: HOSPADM

## 2023-05-04 RX ORDER — FERROUS SULFATE 325(65) MG
325 TABLET ORAL EVERY OTHER DAY
Status: DISCONTINUED | OUTPATIENT
Start: 2023-05-04 | End: 2023-05-04 | Stop reason: HOSPADM

## 2023-05-04 RX ORDER — NYSTATIN 100000 U/G
CREAM TOPICAL 2 TIMES DAILY
Status: DISCONTINUED | OUTPATIENT
Start: 2023-05-04 | End: 2023-05-04 | Stop reason: HOSPADM

## 2023-05-04 RX ORDER — METOPROLOL SUCCINATE 25 MG/1
37.5 TABLET, EXTENDED RELEASE ORAL DAILY
Qty: 90 TABLET | Refills: 2
Start: 2023-05-04 | End: 2023-05-10

## 2023-05-04 RX ORDER — LANOLIN ALCOHOL/MO/W.PET/CERES
1000 CREAM (GRAM) TOPICAL DAILY
Status: DISCONTINUED | OUTPATIENT
Start: 2023-05-04 | End: 2023-05-04 | Stop reason: HOSPADM

## 2023-05-04 RX ADMIN — METOPROLOL SUCCINATE 37.5 MG: 25 TABLET, EXTENDED RELEASE ORAL at 08:17

## 2023-05-04 RX ADMIN — Medication 1000 MCG: at 10:53

## 2023-05-04 RX ADMIN — BUMETANIDE 2 MG: 2 TABLET ORAL at 08:17

## 2023-05-04 RX ADMIN — CITALOPRAM HYDROBROMIDE 20 MG: 20 TABLET ORAL at 08:25

## 2023-05-04 RX ADMIN — APIXABAN 5 MG: 5 TABLET, FILM COATED ORAL at 08:17

## 2023-05-04 RX ADMIN — ACETAMINOPHEN 650 MG: 325 TABLET ORAL at 08:25

## 2023-05-04 RX ADMIN — NYSTATIN: 100000 CREAM TOPICAL at 08:27

## 2023-05-04 RX ADMIN — Medication 1 LOZENGE: at 08:45

## 2023-05-04 RX ADMIN — POTASSIUM CHLORIDE 10 MEQ: 750 TABLET, EXTENDED RELEASE ORAL at 08:17

## 2023-05-04 RX ADMIN — FERROUS SULFATE TAB 325 MG (65 MG ELEMENTAL FE) 325 MG: 325 (65 FE) TAB at 10:53

## 2023-05-04 RX ADMIN — ONDANSETRON 4 MG: 4 TABLET, ORALLY DISINTEGRATING ORAL at 08:25

## 2023-05-04 RX ADMIN — NYSTATIN: 100000 CREAM TOPICAL at 01:03

## 2023-05-04 RX ADMIN — SPIRONOLACTONE 25 MG: 25 TABLET, FILM COATED ORAL at 08:17

## 2023-05-04 ASSESSMENT — ACTIVITIES OF DAILY LIVING (ADL)
ADLS_ACUITY_SCORE: 26
ADLS_ACUITY_SCORE: 26
DEPENDENT_IADLS:: TRANSPORTATION
ADLS_ACUITY_SCORE: 26

## 2023-05-04 NOTE — PLAN OF CARE
Lymphedema Evaluation: Orders received. Chart reviewed and discussed with care team.? Pt discharging home today with assist from family. No acute lymphedema therapy needs. Will complete orders.       Clarisse Gayle, PT, DPT  5/4/2023

## 2023-05-04 NOTE — PLAN OF CARE
Goal Outcome Evaluation:       Patient discharging to home in stable condition.  Discussed home instruction with patient using AVS.  She is aware of upcoming appointments and changes made to medications.  Interdry arrived from [employer] and applied to breast folds.  Rash improving.  Hemostasis maintained to groin sites post procedure.  Pt reports no questions or concerns over information on AVS.  Hospitalist informed that patient was not seen by lymphedema team prior to discharge.

## 2023-05-04 NOTE — PROVIDER NOTIFICATION
Cozaar held this AM.  BP 89/61.  Metoprolol and Aldactone was given this AM when BP was 102/62.  Pateint is asymptomatic.  Cardiology notified.    blood glucose testing

## 2023-05-04 NOTE — PROGRESS NOTES
HEART CARE CONSULTATON NOTE        Assessment/Recommendations   Assessment:  1. Severe functional tricuspid regurgitation - s/p TIARA with 1 PILAR ACE device  2. Hypertension - BP has been mostly stable since procedure.  She had 2 low readings, but was asymptomatic.   3. Acute on chronic heart failure with preserved ejection fraction - she got about 800cc of fluid during case yesterday and diuresed > 1700cc of urine with 1 dose of IV lasix post-procedure                   - by echo, she is fluid overloaded with high RA pressures and flattened septum  4. Permanent atrial fibrillation  5. Lymphedema  6. Obesity  7. CLASP II TR study trial participant - labs ordered for study purposes    Plan:   1. Cardiac rehab  2. Continue IS and flutter valve  3. Eliquis for stroke prophylaxis  4. Metoprolol dose increased for better HR control; Losartan dose decreased, but was held this AM b/c of soft BP.  May need to discontinue all together  5. Continue bumex 2 mg BID - may give afternoon dose IV today.  Continue aldactone  6. Home later today or tomorrow based on how BPs look the rest of morning       History of Present Illness/Subjective    HPI: Misty Quezada is a 67 year old female with history of permanent atrial fibrillation, heart failure with preserved ejection fraction, severe tricuspid regurgitation, moderate aortic valve stenosis, lymphedema, morbid obesity and obstructive sleep apnea.      She has been evaluated by a multidisciplinary team and was selected for the registry arm of the CLASP TR study trial.  She came in yesterday for the planned, elective procedure.     Using MURTAZA and angiography for guidance one PILAR ACE device was placed in Anterior-Septal location w/ TR reduction from 5+ to 2-3+. After a thorough evaluation we chose to not intervene on the posterior jet due to elevation in TV gradient to 3-4mm Hg (albeit, in the setting of AF w/ RVR at 100-120). The sense was that TR reduction is possibly  "under-estimated, as the patient received a significant ~800cc fluid during the procedure.  She was given 1 dose of IV lasix in [facility]    This morning she is sitting in the chair.  She's had one low BP, but is asymptomatic.  She says she thinks her breathing may feel slightly better, but she hasn't walked yet.       ECHO 5/4/23  Interpretation Summary     Left ventricular size, wall motion and function are normal. The ejection  fraction is 60-65%.  There is borderline concentric left ventricular hypertrophy.  The ascending aorta is Borderline dilated.  Flattened septum is consistent with RV pressure/volume overload.  The right ventricle is moderately dilated.  There is mod-severe to severe (3-4+) tricuspid regurgitation.  Tricuspid valve clip fixed in place.  Eccentric TR jet directed towards atrial septum.  Mildly decreased right ventricular systolic function  Right ventricular systolic pressure is elevated, consistent with moderate  pulmonary hypertension.  IVC diameter >2.1 cm collapsing <50% with sniff suggests a high RA pressure  estimated at 15 mmHg or greater.  The left atrial appendage is not well visualized.  Suspected DEJAH thrombus noted on MURTAZA noted 5/4/2023 could not be imaged on this  study.  There is no pericardial effusion.     Physical Examination  Review of Systems   VITALS: /62 (BP Location: Right arm)   Pulse 98   Temp 98.3  F (36.8  C) (Oral)   Resp 21   Ht 1.702 m (5' 7\")   Wt 113.9 kg (251 lb)   SpO2 94%   BMI 39.31 kg/m    BMI: Body mass index is 39.31 kg/m .  Wt Readings from Last 3 Encounters:   05/04/23 113.9 kg (251 lb)   05/01/23 115.2 kg (254 lb)   04/17/23 114.8 kg (253 lb)       Intake/Output Summary (Last 24 hours) at 5/4/2023 1137  Last data filed at 5/4/2023 0845  Gross per 24 hour   Intake 240 ml   Output 3225 ml   Net -2985 ml     General Appearance:   no distress, normal body habitus   ENT/Mouth: membranes moist, no oral lesions or bleeding gums.      EYES:  no scleral " icterus, normal conjunctivae   Neck: no carotid bruits or thyromegaly   Chest/Lungs:   lungs are clear to auscultation, no rales or wheezing, equal chest wall expansion    Cardiovascular:   Irregularly irregular. Normal first and second heart sounds with a soft systolic murmurs, rubs, or gallops; the carotid, radial and posterior tibial pulses are intact, stable LE edema bilaterally    Abdomen:  no organomegaly, masses, bruits, or tenderness; bowel sounds are present   Extremities: no cyanosis or clubbing   Skin: no xanthelasma, warm.    Neurologic: normal  bilateral, no tremors     Psychiatric: alert and oriented x3, calm     Review Of Systems  Skin: negative  Eyes: negative  Ears/Nose/Throat: negative  Respiratory: Shortness of breath- mild, but improved  Cardiovascular: negative  Gastrointestinal: negative  Genitourinary: negative  Musculoskeletal: negative  Neurologic: negative  Psychiatric: negative  Hematologic/Lymphatic/Immunologic: negative  Endocrine: negative          Lab Results    Chemistry/lipid CBC Cardiac Enzymes/BNP/TSH/INR   Recent Labs   Lab Test 09/09/16  1412   CHOL 163   HDL 42*      TRIG 81     Recent Labs   Lab Test 09/09/16  1412        Recent Labs   Lab Test 05/04/23  0501     140   POTASSIUM 4.0  3.9   CHLORIDE 103  103   CO2 24  26   *  120*   BUN 26.2*  26.1*   CR 0.85  0.83   GFRESTIMATED 74  76   TURNER 9.3  9.2     Recent Labs   Lab Test 05/04/23  0501 05/01/23  1329 03/15/23  0648   CR 0.85  0.83 0.79 0.74     Recent Labs   Lab Test 09/02/21  0457   A1C 6.2*          Recent Labs   Lab Test 05/04/23  0501   WBC 8.3   HGB 12.5   HCT 38.9           Recent Labs   Lab Test 05/04/23  0501 05/01/23  1329 03/15/23  0648   HGB 12.5 13.2 13.2    Recent Labs   Lab Test 08/21/21  1944 08/21/21  1426 08/21/21  0820   TROPONINI 1.32* 1.87* 1.75*     Recent Labs   Lab Test 05/01/23  1329 09/24/22  0850 09/02/21  0457 08/21/21  0820   BNP  --   --   199* 281*   NTBNP 945* 1,898*  --   --      Recent Labs   Lab Test 08/31/21  0720   TSH 4.92     Recent Labs   Lab Test 05/04/23  0832 05/01/23  1329 01/25/23  1440   INR 1.21* 1.07 1.13        Medical History  Surgical History Family History Social History   Past Medical History:   Diagnosis Date     (HFpEF) heart failure with preserved ejection fraction (H) 10/17/2022     Aortic valve stenosis 11/10/2022     Benign essential hypertension      Chronic atrial fibrillation (H)      Lymphedema 9/24/2022     Nonrheumatic tricuspid valve regurgitation 2/6/2023     Past Surgical History:   Procedure Laterality Date     APPENDECTOMY       CHOLECYSTECTOMY       CV CORONARY ANGIOGRAM N/A 3/15/2023    Procedure: Coronary Angiogram;  Surgeon: Howard Plummer MD;  Location: [facility] CATH LAB CV     CV LEFT HEART CATH N/A 3/15/2023    Procedure: Left Heart Catheterization;  Surgeon: Howard Plummer MD;  Location: [facility] CATH LAB CV     CV RIGHT HEART CATH MEASUREMENTS RECORDED N/A 3/15/2023    Procedure: Right Heart Catheterization;  Surgeon: Howard Plummer MD;  Location: [facility] CATH LAB CV     GASTRIC BYPASS       Family History   Problem Relation Age of Onset     No Known Problems Mother      No Known Problems Father         Social History     Socioeconomic History     Marital status:      Spouse name: Not on file     Number of children: Not on file     Years of education: Not on file     Highest education level: Not on file   Occupational History     Not on file   Tobacco Use     Smoking status: Never     Smokeless tobacco: Never   Vaping Use     Vaping status: Not on file   Substance and Sexual Activity     Alcohol use: Not Currently     Comment: Rarely Uses alcohol     Drug use: Never     Sexual activity: Not on file   Other Topics Concern     Not on file   Social History Narrative     Not on file     Social Determinants of Health     Financial Resource Strain: Not on file   Food Insecurity: Not on  file   Transportation Needs: Not on file   Physical Activity: Not on file   Stress: Not on file   Social Connections: Not on file   Intimate Partner Violence: Not on file   Housing Stability: Not on file         Medications  Allergies   Current Facility-Administered Medications   Medication     acetaminophen (TYLENOL) tablet 650 mg     apixaban ANTICOAGULANT (ELIQUIS) tablet 5 mg     benzocaine-menthol (CHLORASEPTIC) 6-10 MG lozenge 1 lozenge     [START ON 5/5/2023] bumetanide (BUMEX) tablet 2 mg     citalopram (celeXA) tablet 20 mg     cyanocobalamin (VITAMIN B-12) tablet 1,000 mcg     ferrous sulfate (FEROSUL) tablet 325 mg     HOLD:  Metformin and metformin containing medications if patient received IV contrast with acute kidney injury or severe chronic kidney disease (stage IV or stage V; i.e., eGFR less than 30)     hydrALAZINE (APRESOLINE) injection 10 mg     lidocaine (LMX4) cream     lidocaine 1 % 0.1-1 mL     losartan (COZAAR) half-tab 12.5 mg     metoprolol succinate ER (TOPROL-XL) 24 hr half-tab 37.5 mg     naloxone (NARCAN) injection 0.2 mg     naloxone (NARCAN) injection 0.2 mg     naloxone (NARCAN) injection 0.4 mg     naloxone (NARCAN) injection 0.4 mg     nitroGLYcerin (NITROSTAT) sublingual tablet 0.4 mg     nystatin (MYCOSTATIN) cream     ondansetron (ZOFRAN ODT) ODT tab 4 mg    Or     ondansetron (ZOFRAN) injection 4 mg     oxyCODONE (ROXICODONE) tablet 5 mg    Or     oxyCODONE (ROXICODONE) tablet 10 mg     potassium chloride ER (KLOR-CON M) CR tablet 10 mEq     sodium chloride (PF) 0.9% PF flush 3 mL     sodium chloride (PF) 0.9% PF flush 3 mL     spironolactone (ALDACTONE) tablet 25 mg        No Known Allergies

## 2023-05-04 NOTE — PROGRESS NOTES
Care Management Initial Consult    General Information  Assessment completed with: VM-chart review,    Type of CM/SW Visit: Chart Assessment    Primary Care Provider verified and updated as needed: Yes   Readmission within the last 30 days: no previous admission in last 30 days      Reason for Consult: discharge planning  Advance Care Planning: Advance Care Planning Reviewed: no concerns identified          Communication Assessment  Patient's communication style: spoken language (English or Bilingual)    Hearing Difficulty or Deaf: no   Wear Glasses or Blind: yes    Cognitive  Cognitive/Neuro/Behavioral: WDL                      Living Environment:   People in home: child(david), adult     Current living Arrangements: house      Able to return to prior arrangements: yes       Family/Social Support:  Care provided by: child(david), self  Provides care for: no one  Marital Status:   Children          Description of Support System: Supportive    Support Assessment: Adequate family and caregiver support    Current Resources:   Patient receiving home care services: No     Community Resources: None  Equipment currently used at home: cane, straight  Supplies currently used at home: None    Employment/Financial:  Employment Status:          Financial Concerns:             Does the patient's insurance plan have a 3 day qualifying hospital stay waiver?  No    Lifestyle & Psychosocial Needs:  Social Determinants of Health     Tobacco Use: Low Risk  (5/3/2023)    Patient History      Smoking Tobacco Use: Never      Smokeless Tobacco Use: Never      Passive Exposure: Not on file   Alcohol Use: Not on file   Financial Resource Strain: Not on file   Food Insecurity: Not on file   Transportation Needs: Not on file   Physical Activity: Not on file   Stress: Not on file   Social Connections: Not on file   Intimate Partner Violence: Not on file   Depression: Not on file   Housing Stability: Not on file       Functional  Status:  Prior to admission patient needed assistance:   Dependent ADLs:: Ambulation-cane  Dependent IADLs:: Transportation  Assesssment of Functional Status: At functional baseline    Mental Health Status:          Chemical Dependency Status:                Values/Beliefs:  Spiritual, Cultural Beliefs, Anab[employer] Practices, Values that affect care:                 Additional Information:  Chart reviewed. Pt is a 67 year old female with a hx of Tricuspid valve insufficiency/regurgitation.  She was admitted on 05/03/22 for a Tricuspid valve repair.     Pt lives in a private home with her two sons Samuel and Slade.  She uses a straight cane to help her get around but is mostly ind at baseline.  Sons will help with errands and transportation as pt no longer drives much.  Plan will be to return home on discharge. CM will continue to follow for any appropriate referrals.     Agatha Ferguson RN     Care Management Discharge Note    Discharge Date: 05/04/2023       Discharge Disposition: Home    Discharge Services: None    Discharge DME: None    Discharge Transportation: family or friend will provide    Private pay costs discussed: Not applicable    Does the patient's insurance plan have a 3 day qualifying hospital stay waiver?  No    PAS Confirmation Code:    Patient/family educated on Medicare website which has current facility and service quality ratings: no    Education Provided on the Discharge Plan: Yes  Persons Notified of Discharge Plans: pt, family Nurse  Patient/Family in Agreement with the Plan: yes    Handoff Referral Completed: Yes    Additional Information:  Pt to be discharged to home in care of family. Family to provide transportation. No CM needs identified    Agatha Ferguson RN

## 2023-05-04 NOTE — PROGRESS NOTES
"[facility]    Medicine Progress Note - Hospitalist Service    Date of Admission:  5/3/2023    Assessment & Plan    67-year-old female with past medical history of morbid obesity, chronic atrial fibrillation, HFpEF, lower extremity lymphedema, severe functional tricuspid regurgitation who underwent elective transcatheter tricuspid valve repair on 5/3/2023 and admitted.  Hospitalist service consulted for medical management.    Essential hypertension;  Postoperative hypotension, asymptomatic; likely due to medications  -- PTA medication includes losartan, Toprol-XL, Bumex, Aldactone.  -- BMP fairly unremarkable except slightly elevated BUN, hold evening dose Bumex.  Holding parameters with PTA blood pressure medications    Chronic heart failure with preserved EF;  -- Currently does not look volume overloaded  -- PTA medications as mentioned above  -- Monitor intake/output, weight, labs closely    Permanent atrial fibrillation;  -- Heart rate controlled with PTA metoprolol.  PTA Eliquis    Functional severe TR s/p elective transcatheter tricuspid valve repair on 5/3;  -- Postoperative care per cardiology team    Chronic lower extremity lymphedema;  -- Patient reports fairly stable.  Discussed with patient and consulted lymphedema team   status post transcatheter tricuspid valve repair May 3.  [facility] consulted for medical management.          Diet: Regular Diet Adult    DVT Prophylaxis: DOAC  Hi Catheter: Not present  Lines: None     Cardiac Monitoring: ACTIVE order. Indication: Transcatheter structural interventions (24 hours)  Code Status: Full Code      Clinically Significant Risk Factors                        # Obesity: Estimated body mass index is 39.31 kg/m  as calculated from the following:    Height as of this encounter: 1.702 m (5' 7\").    Weight as of this encounter: 113.9 kg (251 lb)., PRESENT ON ADMISSION         Disposition Plan     Expected Discharge Date: 05/04/2023              "     Isidro CANTU MD  Hospitalist Service  [facility]  Securely message with [facility] (more info)  Text page via [employer] Paging/Directory   ______________________________________________________________________    Interval History   Patient seen and examined.  Notes, labs, imaging report personally reviewed.  No acute issues reported overnight.  Patient denied chest pain, shortness of breath, dizziness.  Denied abdomen pain, nausea, vomiting.  Reported chronic lower extremity swelling fairly stable.  Discussed with nursing staffs.    Physical Exam   Vital Signs: Temp: 97.9  F (36.6  C) Temp src: Oral BP: (!) 89/61 Pulse: 96   Resp: 20 SpO2: 94 % O2 Device: None (Room air) Oxygen Delivery: 2 LPM  Weight: 251 lbs 0 oz      General: Not in obvious distress.  HEENT: Normocephalic, supple neck  Chest: Clear to auscultation bilateral anteriorly, no wheezing  Heart: S1S2 normal  Abdomen: Soft. NT, ND. Bowel sounds- active.  Extremities: Chronic lower extremity lymphedema  Neuro: alert and awake, grossly non-focal          Medical Decision Making             Data     I have personally reviewed the following data over the past 24 hrs:    8.3  \   12.5   / 242     140; 142 103; 103 26.1 (H); 26.2 (H) /  120 (H); 119 (H)   3.9; 4.0 26; 24 0.83; 0.85 \       ALT: 26 AST: 40 (H) AP: 99 TBILI: 0.5   ALB: 3.9 TOT PROTEIN: 6.9 LIPASE: N/A       Trop: 12 BNP: N/A       INR:  1.21 (H) PTT:  26   D-dimer:  N/A Fibrinogen:  N/A       Imaging results reviewed over the past 24 hrs:   Recent Results (from the past 24 hour(s))   Echocardiogram MURTAZA   Result Value    LVEF  55-60%    Narrative    [id number]  [id number]  [id number]  [id number]^NOREEN^[place] [address]     Name: BEN BENTLEY  MRN: [id number]  : 1955  Study Date: 2023 07:15 AM  Age: 67 yrs  Gender: Female  Patient [facility]  Reason For Study: Nonrheumatic tricuspid valve  regurgitation  Ordering Physician: ARNOLDO SORTO  Referring Physician: ARNOLDO SORTO  Performed By: BW-BP     BSA: 2.2 m2  Height: 67 in  Weight: 254 lb  HR: 122  BP: 141/84 mmHg  ______________________________________________________________________________  Procedure  Complete MURTAZA Adult. 3D image acquisition, reconstruction, and real-time  interpretation was performed. Technically difficult study.  ______________________________________________________________________________  Interpretation Summary     Intraoperative Tricuspid Janette CLASP MURTAZA:  Pre-Janette  Massive functonal tricuspid regurgitation with the most prominent aspect of  the regurgitant jet originating from the anteroseptal commissure. EROA 1.05  cm2, regurgitant volume 77 mL.  No tricuspid stenosis with mean gradient of 2  mmHg. Hepatic veins demonstrated systolic flow reversal.  Possible DEJAH  thrombus. Pre clip LVEF: 55-60%.  No pericardial effusion.     Post-Janette  After placement of 1 Janette-Ace device to the anterior and septal leaflets,  there was moderate-to-severe residual tricuspid regurgitation primarily  posterior to the device, with mean gradient of 2-4 mm Hg at an average heart  rate of 110 bpm. There was slightly reduction in the degree of systoilc flow  reversal in the hepatic veins. Adequate bileaflet tip insertion was visualized  in both device clasp arms prior to closing device.  Tricuspid leaflet  insertion was visualized after close of device and prior to release of the  Janette clasp.  Janette was in stable position before and after release.  No new  pericardial effusion after removal of Janette system. LVEF: 55-60%. No acute  complication related to MURTAZA.     Results communicated to Dr. Sorto  ______________________________________________________________________________  I      WMSI = 1.00     % Normal = 100     X - Cannot   0 -                      (2) - Mildly 2 -          Segments  Size  Interpret    Hyperkinetic 1  - Normal  Hypokinetic  Hypokinetic  1-2     small                                                     7 -          3-5      moderate  3 - Akinetic 4 -          5 -         6 - Akinetic Dyskinetic   6-14    large               Dyskinetic   Aneurysmal  w/scar       w/scar       15-16   diffuse     MURTAZA  3D image acquisition, reconstruction, and real-time interpretation was  performed. CLASP TR implantation. The procedure was performed in the Heart  Catherization Lab. Informed consent for Transesophegeal echo obtained. The MURTAZA  probe was inserted prior to our arrival by anesthesia. The Transducer was  inserted without difficulty . Complications None. Sedation, endotracheal  intubation, and mechanical ventilation were initiated prior to the MURTAZA and  were monitored by anesthesia. The patient tolerated the procedure well.     Left Ventricle  The left ventricle is normal in size. Left ventricular systolic function is  normal. The visual ejection fraction is 55-60%.     Right Ventricle  The right ventricle is mildly dilated. The right ventricular systolic function  is normal.     Atria  The left atrium is severely dilated. The right atrium is severely dilated.  Intact atrial septum. A mass is seen in the left atrial appendage that is  suggestive of a left atrial thrombus.     Mitral Valve  Mitral valve leaflets appear normal. There is trace mitral regurgitation.  There is no mitral valve stenosis.     Tricuspid Valve  There is dilated tricuspid annulus. Tricuspid valve fails to coapt. There is  massive functonal tricuspid regurgitation with the most prominent aspect of  the regurgitant jet originating from the anteroseptal commissure. EROA 1.05  cm2, regurgitant volume 77 mL. There is no tricuspid stenosis. The Tricuspid  valve mean diastolic gradient is 2.0 mmHg mmHg.     Aortic Valve  There is moderate trileaflet aortic sclerosis. There is trace to mild aortic  regurgitation. No aortic stenosis is present.     Pulmonic  Valve  The pulmonic valve is not well seen, but is grossly normal. There is trace  pulmonic valvular regurgitation. There is no pulmonic valvular stenosis.     Vessels  Normal size aorta. Mild atherosclerotic plaque(s) in the descending aorta. IVC  diameter >2.1 cm collapsing <50% with sniff suggests a high RA pressure  estimated at 15 mmHg or greater. Hepatic vein flow consistent with severe  tricuspid insufficiency is present. Normal pulmonary venous drainage.     Pericardial/Pleural  There is no pericardial effusion.     Rhythm  The rhythm was atrial fibrillation.  ______________________________________________________________________________  MMode/2D Measurements & Calculations  Ao root diam: 3.1 cm     asc Aorta Diam: 3.3 cm     Doppler Measurements & Calculations  MV max P.4 mmHg  TV V2 max: 94.0 cm/sec  TV max PG: 3.5 mmHg  TV mean P.0 mmHg  TV V2 VTI: 13.4 cm  TR max zachary: 215.0 cm/sec  TR max P.5 mmHg     ______________________________________________________________________________  Report approved by: Adin Trejo 2023 02:37 PM         XR Chest Port 1 View    Narrative    EXAM: XR CHEST PORT 1 VIEW  LOCATION: [facility]  DATE/TIME: 2023 6:45 AM CDT    INDICATION: POD #1 S P Mitral valve repair   Degeneration of Mitral Valve  COMPARISON: 2022      Impression    IMPRESSION: Stable cardiomegaly. Resolution of interstitial edema. Lungs clear with no pneumothorax nor pleural effusion.   Echocardiogram Complete   Result Value    LVEF  60-65%    Narrative    [id number]  [id number]  [id number]  [id number]^YOLI^[place] [address]     Name: BEN BENTLEY  MRN: [id number]  : 1955  Study Date: 2023 06:55 AM  Age: 67 yrs  Gender: Female  Patient Location: [facility]  Reason For Study: Mitral Valve Repair  Ordering Physician: NADIA KENT  Referring Physician: ARNOLDO SORTO  Performed By: IMELDA      BSA: 2.2 m2  Height: 67 in  Weight: 254 lb  HR: 83  ______________________________________________________________________________  Procedure  Complete Portable Echo Adult.  ______________________________________________________________________________  Interpretation Summary     Left ventricular size, wall motion and function are normal. The ejection  fraction is 60-65%.  There is borderline concentric left ventricular hypertrophy.  The ascending aorta is Borderline dilated.  Flattened septum is consistent with RV pressure/volume overload.  The right ventricle is moderately dilated.  There is mod-severe to severe (3-4+) tricuspid regurgitation.  Tricuspid valve clip fixed in place.  Eccentric TR jet directed towards atrial septum.  Mildly decreased right ventricular systolic function  Right ventricular systolic pressure is elevated, consistent with moderate  pulmonary hypertension.  IVC diameter >2.1 cm collapsing <50% with sniff suggests a high RA pressure  estimated at 15 mmHg or greater.  The left atrial appendage is not well visualized.  Suspected DEJAH thrombus noted on MURTAZA noted 5/4/2023 could not be imaged on this  study.  There is no pericardial effusion.  ______________________________________________________________________________  Left Ventricle  Left ventricular size, wall motion and function are normal. The ejection  fraction is 60-65%. There is borderline concentric left ventricular  hypertrophy. Left ventricular diastolic function is abnormal. Flattened septum  is consistent with RV pressure/volume overload.     Right Ventricle  The right ventricle is moderately dilated. Mildly decreased right ventricular  systolic function. TAPSE is abnormal, which is consistent with abnormal right  ventricular systolic function.     Atria  The left atrium is severely dilated. The right atrium is severely dilated. The  left atrial appendage is not well visualized. Suspected DEJAH thrombus noted on  MURTAZA noted 5/4/2023  could not be imaged on this study.     Mitral Valve  Thickened mitral valve posterior leaflet. There is trace mitral regurgitation.     Tricuspid Valve  Tricuspid valve clip fixed in place. There is mod-severe to severe (3-4+)  tricuspid regurgitation. Eccentric TR jet directed towards atrial septum.  Right ventricular systolic pressure is elevated, consistent with moderate  pulmonary hypertension. There is no tricuspid stenosis.     Aortic Valve  The aortic valve is trileaflet with aortic valve sclerosis. No aortic  regurgitation is present. No hemodynamically significant valvular aortic  stenosis.     Pulmonic Valve  The pulmonic valve is normal in structure and function.     Vessels  The aorta root is normal. The ascending aorta is Borderline dilated. IVC  diameter >2.1 cm collapsing <50% with sniff suggests a high RA pressure  estimated at 15 mmHg or greater.     Pericardium  There is no pericardial effusion.     ______________________________________________________________________________  MMode/2D Measurements & Calculations  IVSd: 1.1 cm  LVIDd: 4.7 cm  LVIDs: 3.2 cm  LVPWd: 1.3 cm  FS: 33.3 %     LV mass(C)d: 220.0 grams  LV mass(C)dI: 98.3 grams/m2  Ao root diam: 3.4 cm  LA dimension: 4.4 cm  asc Aorta Diam: 3.9 cm  LA/Ao: 1.3  LVOT diam: 2.0 cm  LVOT area: 3.1 cm2  LA Volume Indexed (AL/bp): 45.5 ml/m2  RV Base: 3.2 cm  RWT: 0.55  TAPSE: 1.7 cm     Doppler Measurements & Calculations  MV E max zachary: 119.0 cm/sec  MV max P.1 mmHg  MV mean P.7 mmHg  MV V2 VTI: 25.0 cm  MVA(VTI): 2.2 cm2  MV dec slope: 658.3 cm/sec2  MV dec time: 0.19 sec  Ao V2 max: 269.8 cm/sec  Ao max P.0 mmHg  Ao V2 mean: 217.0 cm/sec  Ao mean P.0 mmHg  Ao V2 VTI: 64.7 cm  LORRAINE(I,D): 0.84 cm2  LORRAINE(V,D): 0.99 cm2  LV V1 max P.9 mmHg  LV V1 max: 85.2 cm/sec  LV V1 VTI: 17.3 cm  SV(LVOT): 54.3 ml  SI(LVOT): 24.3 ml/m2  TV V2 max: 100.4 cm/sec  TV max P.0 mmHg  TV mean P.3 mmHg  TV V2 VTI: 19.8 cm  PA acc time:  0.13 sec  TR max markus: 353.0 cm/sec  TR max P.8 mmHg  AV Markus Ratio (DI): 0.32  LORRAINE Index (cm2/m2): 0.38  E/E' av.5  Lateral E/e': 9.7  Medial E/e': 15.4  RV S Markus: 11.9 cm/sec     ______________________________________________________________________________  Report approved by: Adin Herrera 2023 09:09 AM

## 2023-05-04 NOTE — PLAN OF CARE
Goal Outcome Evaluation:    A/O. VSS. BP soft. Tele a-fib, HR 80s to low 100s. LS clear. 2L NC. Prn oxycodone given for pain. R groin site leaking, new dressing applied, site unchanged. L groin site clean, dry, intact. CMS intact, no bleeding or hematoma. Bedrest done at 2230. K and Mag protocol, AM recheck. Purewick in place. Bed alarm on, call light in reach.       Problem: Pain Acute  Goal: Optimal Pain Control and Function  5/3/2023 2141 by Linsey Cai RN  Outcome: Progressing  5/3/2023 1837 by Linsey Cai RN  Outcome: Progressing  Intervention: Prevent or Manage Pain  Recent Flowsheet Documentation  Taken 5/3/2023 2135 by Linsey Cai RN  Medication Review/Management: medications reviewed  Taken 5/3/2023 1812 by Linsey Cai RN  Medication Review/Management: medications reviewed     Problem: Cardiac Catheterization (Diagnostic/Interventional)  Goal: Stable Heart Rate and Rhythm  5/3/2023 2141 by Linsey Cai RN  Outcome: Progressing  5/3/2023 1837 by Linsey Cai RN  Outcome: Progressing  Goal: Absence of Bleeding  5/3/2023 2141 by Linsey Cai RN  Outcome: Progressing  5/3/2023 1837 by Linsey Cai RN  Outcome: Progressing  Goal: Absence of Contrast-Induced Injury  5/3/2023 2141 by Linsey Cai, RN  Outcome: Progressing  5/3/2023 1837 by Linsey Cai RN  Outcome: Progressing  Goal: Absence of Embolism Signs and Symptoms  5/3/2023 2141 by Linsey Cai RN  Outcome: Progressing  5/3/2023 1837 by Linsey Cai RN  Outcome: Progressing  Goal: Anesthesia/Sedation Recovery  5/3/2023 2141 by Linsey Cai RN  Outcome: Progressing  5/3/2023 1837 by Linsey Cai RN  Outcome: Progressing  Intervention: Optimize Anesthesia Recovery  Recent Flowsheet Documentation  Taken 5/3/2023 2135 by Linsey Cai RN  Safety Promotion/Fall Prevention: activity supervised  Taken 5/3/2023 1812 by Linsey Cai RN  Safety  Promotion/Fall Prevention: activity supervised  Goal: Acceptable Pain Control  5/3/2023 2141 by Linsey Cai RN  Outcome: Progressing  5/3/2023 1837 by Linsey Cai RN  Outcome: Progressing  Goal: Absence of Vascular Access Complication  5/3/2023 2141 by Linsey Cai, RN  Outcome: Progressing  5/3/2023 1837 by Linsey Cai, RN  Outcome: Progressing  Intervention: Prevent Access Site Complications  Recent Flowsheet Documentation  Taken 5/3/2023 2135 by Linsey Cai RN  Activity Management: bedrest  Taken 5/3/2023 1812 by Linsey Cai RN  Activity Management: bedrest     Problem: Dysrhythmia  Goal: Normalized Cardiac Rhythm  5/3/2023 2141 by Linsey Cai RN  Outcome: Progressing  5/3/2023 1837 by Linsey Cai RN  Outcome: Progressing

## 2023-05-04 NOTE — PLAN OF CARE
Problem: Dysrhythmia  Goal: Normalized Cardiac Rhythm  Outcome: Progressing     Problem: Cardiac Catheterization (Diagnostic/Interventional)  Goal: Absence of Bleeding  Outcome: Progressing  Goal: Acceptable Pain Control  Outcome: Progressing  Goal: Absence of Vascular Access Complication  Outcome: Progressing  Intervention: Prevent Access Site Complications  Recent Flowsheet Documentation  Taken 5/4/2023 0427 by Bety Wetzel, RN  Activity Management: ambulated to bathroom  Taken 5/4/2023 0008 by Bety Wetzel, RN  Activity Management: ambulated to bathroom     Goal Outcome Evaluation:         Had TAVR yesterday. Bilateral groin site, no complication. Afib. HR controlled. BP soft, 95/59 and 94/61. HR 90's. On magnesium and potassium protocol.

## 2023-05-04 NOTE — PROGRESS NOTES
Occupational Therapy      05/04/23 1130   Appointment Info   Signing Clinician's Name / Credentials (OT) Cindy Cannon OTR/L   Living Environment   People in Home child(david), adult  (pt lives in home w/ 2 adult sons)   Current Living Arrangements house   Home Accessibility stairs within home;stairs to enter home   Number of Stairs, Main Entrance 2   Stair Railings, Main Entrance railings safe and in good condition   Number of Stairs, Within Home, Primary seven  (split entry home 7 up; 7down)   Stair Railings, Within Home, Primary railings safe and in good condition;railings on both sides of stairs   Transportation Anticipated family or friend will provide   Living Environment Comments W/I shower w/ GB and shower chair   Self-Care   Usual Activity Tolerance moderate   Current Activity Tolerance moderate   Regular Exercise No   Equipment Currently Used at Home cane, straight   Fall history within last six months no   Activity/Exercise/Self-Care Comment Ind. w/ All ADL tasks   Instrumental Activities of Daily Living (IADL)   IADL Comments Pt and sons share household tasks- pt reports she drives and gets groceries but sons unload groceries   General Information   Onset of Illness/Injury or Date of Surgery 05/03/23   Referring Physician Howard Plummer MD   Additional Occupational Profile Info/Pertinent History of Current Problem 67-year-old female with past medical history of morbid obesity, chronic atrial fibrillation, HFpEF, lower extremity lymphedema, severe functional tricuspid regurgitation who underwent elective transcatheter tricuspid valve repair on 5/3/2023 and admitted   Existing Precautions/Restrictions fall;cardiac  (s/p TAVR 5/3)   Cognitive Status Examination   Orientation Status orientation to person, place and time   Bed Mobility   Bed Mobility supine-sit   Supine-Sit [place] (Bed Mobility) supervision   Transfers   Transfers sit-stand transfer   Sit-Stand Transfer   Sit-Stand [place]  (Transfers) supervision;verbal cues   Assistive Device (Sit-Stand Transfers) cane, straight   Sit/Stand Transfer Comments Pt able to stand and march in place w/o LOB pt should be able to perform stairs   Activities of Daily Living   BADL Assessment/Intervention lower body dressing   Lower Body Dressing Assessment/Training   [place] Level (Lower Body Dressing) verbal cues;supervision   Clinical Impression   Criteria for Skilled Therapeutic Interventions Met (OT) Yes, treatment indicated   OT Diagnosis decreased endurance/act. tolerance   OT Problem List-Impairments impacting ADL problems related to;balance;activity tolerance impaired;strength;post-surgical precautions   Assessment of Occupational Performance 1-3 Performance Deficits   Planned Therapy Interventions (OT) ADL retraining;balance training;transfer training;strengthening;home program guidelines   Clinical Decision Making Complexity (OT) low complexity   Risk & Benefits of therapy have been explained evaluation/treatment results reviewed;patient   OT Total Evaluation Time   OT Eval, Low Complexity Minutes (09160) 10   OT Goals   Therapy Frequency (OT) Daily   OT Predicted Duration/Target Date for Goal Attainment 05/10/23   OT Goals Cardiac Phase 1   OT: Understanding of cardiac education to maximize quality of life, condition management, and health outcomes Patient;Verbalize;Demonstrate   OT: Perform aerobic activity with stable cardiovascular response continuous;10 minutes   OT: Functional/aerobic ambulation tolerance with stable cardiovascular response in order to return to home and community environment Modified independent;200 feet;Straight cane   OT: Navigation of stairs simulating home set up with stable cardiovascular response in order to return to home and community environment Modified independent;7 stairs   Self-Care/Home Management   Self-Care/Home Mgmt/ADL, Compensatory, Meal Prep Minutes (99183) 8   Symptoms Noted During/After Treatment  (Meal Preparation/Planning Training) fatigue   Treatment Detail/Skilled Intervention Pt provided w/ education/training following TAVR procedure, HEP, outpatient cardiac rehab- pt verbalzing understanding. Mod.I bed mobility, STS from EOB cues for tech w/ cane Supervision no LOB. Pt ambulated to bathroom w/ Superivsion w/ cane, STS toileting [place] no LOB.   Therapeutic Procedures/Exercise   Therapeutic Procedure: strength, endurance, ROM, flexibillity minutes (52109) 10   Ambulation   Workload 110ft   Symptoms Fatigue   Cardiovascular Response Normal   Exercise Details Supervision w/ cane- pt reporting feeling very fatigued but no shortness of breath/dizziness   Vital Signs Details pre act. HR 87 /83 post -107 /8   Stairs   Workload 6   Symptoms Fatigue   Cardiovascular Response Normal   Exercise Details SBA for stairs no LOB at own pace cues for safety   Vital Signs Details Pre act.  /83 post -97 /84   Cardiac Education   Education Provided Daily weights;Emergency plan;Energy conservation;Home exercise program;Outpatient Cardiac Rehab;Precautions;Resuming home activities;Signs and symptoms   Education Packet Given to Patient Yes   All Patient Education Handouts Reviewed with Patient and/or Family Yes   Cardiac Rehab Phase II Plan   Phase II Order Received Yes   Phase II Appointment Status Scheduled   Date/Time 5/15/23   Location [facility]   OT Discharge Planning   OT Plan endurance/act. tolerance, review stairs, TAVR precautions   OT Discharge Recommendation (DC Rec) home with outpatient cardiac rehab;home with assist   OT Rationale for DC Rec Pt ambulating well w/ supervision w/ cane no LOB- pt demonstrating decreased activity tolerance/endurance. Pt lives in home w/ split entry (7 up/7 down). Pt lives w/ sons who can assist as needed, expect safe d/c home when medically readcy   OT Brief overview of current status SBA/Supervison w/ cane   Total Session Time   Timed Code  Treatment Minutes 18   Total Session Time (sum of timed and untimed services) 28      Normal vision: sees adequately in most situations; can see medication labels, newsprint

## 2023-05-04 NOTE — PROGRESS NOTES
Daily Progress Note    Assessment/Plan:  67-year-old female status post transcatheter tricuspid valve repair May 3.  [facility] [place]    1.  Severe tricuspid regurgitation: Status post repair today, management per cardiac service.    2.  Chronic heart failure with preserved ejection fraction, NYHA class II: Currently appears compensated.  Resuming home Bumex, losartan and spironolactone, management per cardiology.    3.  Permanent atrial fibrillation: On metoprolol and Eliquis which are being resumed    4.  Hypertension: Continue usual meds.  Blood pressures are running soft, will place hold parameters.    5.  Lymphedema    6.  Obesity: BMI 39.78    Code status:Full Code        Barriers to Discharge: Postprocedural recovery    Disposition: Per primary cardiac service    Subjective:  Misty reports no current postoperative pain.  No chest pain or shortness of breath, no fevers or chills.        Current Medications Reviewed via EHR List    Objective:  Vital signs in last 24 hours:  [unfilled]  .prog  Weight:   @THISENCWEIGHTS(1)@  Weight change:   Body mass index is 39.78 kg/m .    Intake/Output last 3 shifts:  I/O last 3 completed shifts:  In: 800 [I.V.:800]  Out: 800 [Urine:800]  Intake/Output this shift:  I/O this shift:  In: 240 [P.O.:240]  Out: 1400 [Urine:1400]    Physical Exam:  General: No apparent distress  CV: Regular rate and rhythm, 1+ bilateral edema, chronic per patient.  Lungs: Decreased breath sounds bilaterally but clear  Abdomen: Soft, nontender        Imaging:  Personally Reviewed.  Cardiac Catheterization    Result Date: 5/3/2023  Procedure: Transcatheter tricuspid valve repair w/ PILAR device Indication: Severe symptomatic TR with the patient felt to be an appropriate candidate for transcatheter MV repair after a thorough multidisciplinary approach, including a visit with a surgeon. Operators: Interventional Cardiology: Howard Plummer MD Echocardiography: Transesophageal  "Approach: R transfemoral Anesthesia: General Procedure Details: Informed consent obtained before the patient was brought to the procedure room.  Appropriate timeout was performed before the procedure was started. Access was obtained in the R femoral vein, and then in the L femoral artery and vein using the modified Seldinger technique.  The right femoral venootomy was then \"preclosed\" with two Perclose devices deployed at 90  to each other.  After appropriate anticoagulation, the guide was advanced over a stiff wire and device advanced to the TV. Using MURTAZA and angiography for guidance one PILAR ACE device was placed in Anterior-Septal location w/ TR reduction from 5+ to 2-3+. After a thorough evaluation we chose to not intervene on the posterior jet due to elevation in TV gradient to 3-4mm Hg (albeit, in the setting of AF w/ RVR at 100-120). The sense was that TR reduction is possibly under-estimated, as the patient received a significant ~800cc fluid during the procedure. Post deployment the patient had good hemodynamics, with 2-3 +TR, and a RA pressure of 14 (from 11 at the onset of procedure). The delivery system and sheath were then removed, with successful hemostasis of the venotomy by deployment of the previously placed Perclose sutures. The left femoral artery and venous sheaths were sutured in place to be removed in the ICU. Conclusions: 1. Severe symptomatic tricuspid regurgitation status post successful transcatheter tricuspid valve repair w/ a PILAR ACE device, delivered via the right transfemoral approach. 2.  2-3+ mitral insufficiency post deployment with a mean gradient of 3mmHg. Postprocedure patient status: Patient planned to be extubated in the OR and then transferred to the ICU for ongoing management. Howard Plummer MD     Echocardiogram MURTAZA    Result Date: 5/3/2023  [id number]^NOREEN^[place] [address]  Name: SHEREE" BEN NARAYAN MRN: [id number] : 1955 Study Date: 2023 07:15 AM Age: 67 yrs Gender: Female Patient Location: [facility] Reason For Study: Nonrheumatic tricuspid valve regurgitation Ordering Physician: ARNOLDO SORTO Referring Physician: ARNOLDO SORTO Performed By: BW-BP  BSA: 2.2 m2 Height: 67 in Weight: 254 lb HR: 122 BP: 141/84 mmHg ______________________________________________________________________________ Procedure Complete MURTAZA Adult. 3D image acquisition, reconstruction, and real-time interpretation was performed. Technically difficult study. ______________________________________________________________________________ Interpretation Summary  Intraoperative Tricuspid Janette CLASP MURTAZA: Pre-Janette Massive functonal tricuspid regurgitation with the most prominent aspect of the regurgitant jet originating from the anteroseptal commissure. EROA 1.05 cm2, regurgitant volume 77 mL.  No tricuspid stenosis with mean gradient of 2 mmHg. Hepatic veins demonstrated systolic flow reversal.  Possible DEJAH thrombus. Pre clip LVEF: 55-60%. No pericardial effusion.  Post-Janette After placement of 1 Janette-Ace device to the anterior and septal leaflets, there was moderate-to-severe residual tricuspid regurgitation primarily posterior to the device, with mean gradient of 2-4 mm Hg at an average heart rate of 110 bpm. There was slightly reduction in the degree of systoilc flow reversal in the hepatic veins. Adequate bileaflet tip insertion was visualized in both device clasp arms prior to closing device.  Tricuspid leaflet insertion was visualized after close of device and prior to release of the Janette clasp.  Janette was in stable position before and after release.  No new pericardial effusion after removal of Janette system. LVEF: 55-60%. No acute complication related to MURTAZA.  Results communicated to Dr. Sorto ______________________________________________________________________________ I      WMSI = 1.00     %  Normal = 100  X - Cannot   0 -                      (2) - Mildly 2 -          Segments  Size Interpret    Hyperkinetic 1 - Normal  Hypokinetic  Hypokinetic  1-2     small                                                    7 -          3-5    moderate 3 - Akinetic 4 -          5 -         6 - Akinetic Dyskinetic   6-14    large              Dyskinetic   Aneurysmal  w/scar       w/scar       15-16   diffuse  MURTAZA 3D image acquisition, reconstruction, and real-time interpretation was performed. CLASP TR implantation. The procedure was performed in the Heart Catherization Lab. Informed consent for Transesophegeal echo obtained. The MURTAZA probe was inserted prior to our arrival by anesthesia. The Transducer was inserted without difficulty . Complications None. Sedation, endotracheal intubation, and mechanical ventilation were initiated prior to the MURTAZA and were monitored by anesthesia. The patient tolerated the procedure well.  Left Ventricle The left ventricle is normal in size. Left ventricular systolic function is normal. The visual ejection fraction is 55-60%.  Right Ventricle The right ventricle is mildly dilated. The right ventricular systolic function is normal.  Atria The left atrium is severely dilated. The right atrium is severely dilated. Intact atrial septum. A mass is seen in the left atrial appendage that is suggestive of a left atrial thrombus.  Mitral Valve Mitral valve leaflets appear normal. There is trace mitral regurgitation. There is no mitral valve stenosis.  Tricuspid Valve There is dilated tricuspid annulus. Tricuspid valve fails to coapt. There is massive functonal tricuspid regurgitation with the most prominent aspect of the regurgitant jet originating from the anteroseptal commissure. EROA 1.05 cm2, regurgitant volume 77 mL. There is no tricuspid stenosis. The Tricuspid valve mean diastolic gradient is 2.0 mmHg mmHg.  Aortic Valve There is moderate trileaflet aortic sclerosis. There is trace to  mild aortic regurgitation. No aortic stenosis is present.  Pulmonic Valve The pulmonic valve is not well seen, but is grossly normal. There is trace pulmonic valvular regurgitation. There is no pulmonic valvular stenosis.  Vessels Normal size aorta. Mild atherosclerotic plaque(s) in the descending aorta. IVC diameter >2.1 cm collapsing <50% with sniff suggests a high RA pressure estimated at 15 mmHg or greater. Hepatic vein flow consistent with severe tricuspid insufficiency is present. Normal pulmonary venous drainage.  Pericardial/Pleural There is no pericardial effusion.  Rhythm The rhythm was atrial fibrillation. ______________________________________________________________________________ MMode/2D Measurements & Calculations Ao root diam: 3.1 cm  asc Aorta Diam: 3.3 cm  Doppler Measurements & Calculations MV max P.4 mmHg TV V2 max: 94.0 cm/sec TV max PG: 3.5 mmHg TV mean P.0 mmHg TV V2 VTI: 13.4 cm TR max zachary: 215.0 cm/sec TR max P.5 mmHg  ______________________________________________________________________________ Report approved by: Adin Trejo 2023 02:37 PM         Lab Results:  Personally Reviewed.   Fingerstick Blood Glucose: @[id number](POCGLUFGR:10)@    Last Hbg A1C: No results found for: HGBA1C   Lab Results   Component Value Date    INR 1.07 2023    INR 1.13 2023    INR 1.44 (H) 2021     Recent Results (from the past 24 hour(s))   Prepare red blood cells (unit)    Collection Time: 23  5:41 AM   Result Value Ref Range    Blood Component Type Red Blood Cells     Product Code V7969A57     Unit Status Ready for issue     Unit Number [id number]     CROSSMATCH Compatible     CODING SYSTEM [id number]    Prepare red blood cells (unit)    Collection Time: 23  5:41 AM   Result Value Ref Range    Blood Component Type Red Blood Cells     Product Code [id number]     Unit Status Ready for issue     Unit Number [id number]     CROSSMATCH Compatible      CODING SYSTEM [id number]    Activated clotting time celite, POCT    Collection Time: 05/03/23  8:42 AM   Result Value Ref Range    Activated Clotting Time (Celite) POCT 220 (H) 74 - 150 seconds   Activated clotting time celite, POCT    Collection Time: 05/03/23  8:54 AM   Result Value Ref Range    Activated Clotting Time (Celite) POCT 228 (H) 74 - 150 seconds   Activated clotting time celite, POCT    Collection Time: 05/03/23  9:06 AM   Result Value Ref Range    Activated Clotting Time (Celite) POCT 233 (H) 74 - 150 seconds   Activated clotting time celite, POCT    Collection Time: 05/03/23  9:17 AM   Result Value Ref Range    Activated Clotting Time (Celite) POCT 263 (H) 74 - 150 seconds   Activated clotting time celite, POCT    Collection Time: 05/03/23  9:35 AM   Result Value Ref Range    Activated Clotting Time (Celite) POCT 297 (H) 74 - 150 seconds   Activated clotting time celite, POCT    Collection Time: 05/03/23 10:02 AM   Result Value Ref Range    Activated Clotting Time (Celite) POCT 297 (H) 74 - 150 seconds   Echocardiogram MURTAZA    Collection Time: 05/03/23 10:54 AM   Result Value Ref Range    LVEF  55-60%    ECG with 12 lead and MUSE [LHE]    Collection Time: 05/03/23 11:40 AM   Result Value Ref Range    Systolic Blood Pressure  mmHg    Diastolic Blood Pressure  mmHg    Ventricular Rate 102 BPM    Atrial Rate 86 BPM    MA Interval  ms    QRS Duration 98 ms     ms    QTc 346 ms    P Axis  degrees    R AXIS 124 degrees    T Axis 184 degrees    Interpretation ECG       Atrial fibrillation with rapid ventricular response  Low voltage QRS  Right axis deviation  Pulmonary disease pattern Poor R-wave progression  Nonspecific T wave abnormality , probably digitalis effect  Abnormal ECG  When compared with ECG of 01-MAY-2023 13:23,  Nonspecific T wave abnormality now evident in Anterolateral leads  QT has shortened  Confirmed by LISA PHILLIPS, LES LOC:WW ([id number]) on 5/3/2023 4:49:47 PM     Activated clotting time  celite, POCT    Collection Time: 05/03/23 12:55 PM   Result Value Ref Range    Activated Clotting Time (Celite) POCT 241 (H) 74 - 150 seconds   Activated clotting time celite, POCT    Collection Time: 05/03/23  2:41 PM   Result Value Ref Range    Activated Clotting Time (Celite) POCT 211 (H) 74 - 150 seconds   Activated clotting time celite, POCT    Collection Time: 05/03/23  4:15 PM   Result Value Ref Range    Activated Clotting Time (Celite) POCT 182 (H) 74 - 150 seconds   Potassium    Collection Time: 05/03/23  7:15 PM   Result Value Ref Range    Potassium 4.7 3.4 - 5.3 mmol/L   Magnesium    Collection Time: 05/03/23  7:15 PM   Result Value Ref Range    Magnesium 2.0 1.7 - 2.3 mg/dL           Advanced Care Planning    Medical Decision Making       35 MINUTES SPENT BY ME on the date of service doing chart review, history, exam, documentation & further activities per the note.      Jason Patterson MD  Date: 5/3/2023  Time: 8:10 PM  [facility]  [facility]

## 2023-05-04 NOTE — PROGRESS NOTES
[facility] Note - Courtesy visit with skin survey    Patient noted by skin survey team to have erythema and moisture in breast folds.  Interdry Ag ordered from stores for patient to use.    Nancy Guerin MSN RN CWOCN  Pager no longer is use, please contact through [employer]ible group: [facility] [email or web address]

## 2023-05-04 NOTE — PROGRESS NOTES
CLASP II TR Pre/Discharge     Purpose: Freeman PILAR TrAnScatheter Valve RePair System Pivotal Clinical Trial (CLASP II TR): A prospective, multicenter, randomized, controlled pivotal trial to evaluate the safety and effectiveness of transcatheter tricuspid valve repair with the Freeman PILAR Transcatheter Valve Repair System and optimal medical therapy (OMT) compared to OMT alone in patients with tricuspid regurgitation.         Plan: Patient was given the implant card prior to discharge. TTE was done.       Sierra R. Behr-Holewinski

## 2023-05-05 ENCOUNTER — PATIENT OUTREACH (OUTPATIENT)
Dept: CARE COORDINATION | Facility: CLINIC | Age: 68
End: 2023-05-05
Payer: MEDICARE

## 2023-05-05 NOTE — PLAN OF CARE
Occupational Therapy Discharge Summary    Reason for therapy discharge:    Discharged to home with home therapy.    Progress towards therapy goal(s). See goals on Care Plan in [facility] electronic health record for goal details.  Goals partially met.  Barriers to achieving goals:   discharge from facility.    Therapy recommendation(s):    Continued therapy is recommended.  Rationale/Recommendations:  Home w/ home therapy .     DOC Somers, RAUL/L, 5/5/2023, 7:02 AM      Goal Outcome Evaluation:

## 2023-05-05 NOTE — DISCHARGE SUMMARY
[facility]  CARDIOLOGY DISCHARGE SUMMARY     Primary Care Physician: Lizbet Baer  Admission Date: 5/3/2023   Discharge Provider: Blanca Martini PA-C Discharge Date: 5/5/2023   Diet: resume previous home diet   Code Status: Prior   Activity: No lifting > 10 lb, no driving for 5 days        Condition at Discharge: Stable      BRIEF HPI:   Misty Quezada is a 67 year old female with history of permanent atrial fibrillation, heart failure with preserved ejection fraction, severe tricuspid regurgitation, moderate aortic valve stenosis, lymphedema, morbid obesity and obstructive sleep apnea.       She was evaluated by a multidisciplinary team and was selected for the registry arm of the CLASP TR study trial.  She came in yesterday for the planned, elective procedure.     PRINCIPAL & ACTIVE DISCHARGE DIAGNOSES     Principal Problem:    Mitral regurgitation  Active Problems:    Chronic atrial fibrillation (H)    Lymphedema    Morbid obesity (H)    Nonrheumatic tricuspid valve regurgitation    S/P tricuspid valve repair      SIGNIFICANT FINDINGS (Imaging, labs):   ECHO 5/4/23  Interpretation Summary     Left ventricular size, wall motion and function are normal. The ejection  fraction is 60-65%.  There is borderline concentric left ventricular hypertrophy.  The ascending aorta is Borderline dilated.  Flattened septum is consistent with RV pressure/volume overload.  The right ventricle is moderately dilated.  There is mod-severe to severe (3-4+) tricuspid regurgitation.  Tricuspid valve clip fixed in place.  Eccentric TR jet directed towards atrial septum.  Mildly decreased right ventricular systolic function  Right ventricular systolic pressure is elevated, consistent with moderate  pulmonary hypertension.  IVC diameter >2.1 cm collapsing <50% with sniff suggests a high RA pressure  estimated at 15 mmHg or greater.  The left atrial appendage is not well visualized.  Suspected DEJAH thrombus  noted on MURTAZA noted 5/4/2023 could not be imaged on this  study.  There is no pericardial effusion.    CXR 5/4/23  EXAM: XR CHEST PORT 1 VIEW  LOCATION: [facility]  DATE/TIME: 5/4/2023 6:45 AM CDT     INDICATION: POD #1 S P Mitral valve repair   Degeneration of Mitral Valve  COMPARISON: 09/24/2022                                                                      IMPRESSION: Stable cardiomegaly. Resolution of interstitial edema. Lungs clear with no pneumothorax nor pleural effusion.    LABS or IMAGING REQUIRING FOLLOW-UP AFTER DISCHARGE:     Echocardiogram on June 1 at 1:45 pm    PROCEDURES ( this hospitalization only)      Procedure(s):  Transcatheter Tricuspid Valve Repair  OR TRANSCATHETER TRICUSPID VALVE REPLACEMENT, FEMORAL PERCUTANEOUS APPROACH (STANDBY)    RECOMMENDATIONS TO OUTPATIENT PROVIDER FOR F/U VISIT     With Blanca Martini PA-C on May 10 at 2:50 pm    With Blanca Martini PA-C for 1 month visit on June 7 at 3:15 pm    DISPOSITION     Home    HOSPITAL COURSE BY DIAGNOSIS:      #Severe functional tricuspid regurgitation, now s/p TIARA with 1 PILAR ACE device  #Acute on chronic heart failure with preserved ejection fraction  As part of the CLASP II TR study trial, patient underwent above-mentioned procedure.  There were no complications.  TR reduced to 2+.  Patient received 800 cc of fluid during the procedure and was given 1 dose of IV Lasix postprocedure because of fluid overload.  This a.m., sheath sites were soft without hematoma.  Stitch was removed without difficulty.  Neuro's intact.  Patient was having minor nausea; this was resolved with Zofran.  Patient did walk with cardiac rehab and did not experience any dizziness or lightheadedness.  Patient denied chest pain or shortness of breath    -Continue Bumex 2 mg twice daily at discharge  -Continue spironolactone 25 mg daily     - OP Cardiac rehab  - Daily weights  - Lifelong antibiotic prophylaxis prior to all dental  procedures.  - follow ups have been arranged as listed above    #HTN  BP has been essentially stable since procedure.  She had to low readings, but was asymptomatic    - Continue PTA  losartan, but decrease dose to 12.5 mg daily  - Continue PTA metoprolol succinate, but increase dose to 37.5 mg daily  - Continue PTA spironolactone 25 mg daily    #Chronic atrial fibrillation  With heart rates .  Metoprolol succinate increased to 37.5 mg daily    -Continue Eliquis twice daily for stroke prophylaxis    #Chronic lower extremity lymphedema  stable    Discharge Medications with Med changes:        Review of your medicines      CONTINUE these medicines which may have CHANGED, or have new prescriptions. If we are uncertain of the size of tablets/capsules you have at home, strength may be listed as something that might have changed.      Dose / Directions   losartan 25 MG tablet  Commonly known as: COZAAR  This may have changed: how much to take  Used for: Acute pulmonary edema (H)      Dose: 12.5 mg  Take 0.5 tablets (12.5 mg) by mouth daily  Refills: 0     metoprolol succinate ER 25 MG 24 hr tablet  Commonly known as: TOPROL XL  This may have changed: See the new instructions.  Used for: Chronic atrial fibrillation (H)      Dose: 37.5 mg  Take 1.5 tablets (37.5 mg) by mouth daily  Quantity: 90 tablet  Refills: 2        CONTINUE these medicines which have NOT CHANGED      Dose / Directions   acetaminophen 325 MG tablet  Commonly known as: TYLENOL      Dose: 975 mg  Take 975 mg by mouth every 4 hours as needed for mild pain  Refills: 0     apixaban ANTICOAGULANT 5 MG tablet  Commonly known as: ELIQUIS      Dose: 5 mg  Take 5 mg by mouth 2 times daily  Refills: 0     bumetanide 2 MG tablet  Commonly known as: BUMEX  Used for: Chronic heart failure with preserved ejection fraction (H)      TAKE 1 TABLET(2 MG) BY MOUTH TWICE DAILY  Quantity: 180 tablet  Refills: 3     CALCIUM CITRATE-VITAMIN D3 PO      Dose: 1  tablet  [CALCIUM/D3/MAG OX//TYRONE/ZN (CALTRATE + D3 PLUS MINERALS ORAL)] Take 1 tablet by mouth daily.  Refills: 0     cholecalciferol 50 MCG (2000 UT) tablet      Dose: 2,000 Units  [CHOLECALCIFEROL, VITAMIN D3, 2,000 UNIT TAB] Take 2,000 Units by mouth daily.  Refills: 0     citalopram 10 MG tablet  Commonly known as: celeXA      Dose: 20 mg  Take 20 mg by mouth daily  Refills: 0     ferrous sulfate 325 (65 Fe) MG tablet  Commonly known as: FEROSUL  Used for: Acute pulmonary edema (H)      Dose: 325 mg  Take 1 tablet (325 mg) by mouth every other day  Refills: 0     melatonin 3 MG tablet      Dose: 3 mg  Take 3 mg by mouth nightly as needed  Refills: 0     polyethylene glycol 17 g packet  Commonly known as: MIRALAX  Used for: Acute pulmonary edema (H)      Dose: 17 g  Take 17 g by mouth daily as needed for constipation  Refills: 0     potassium chloride ER 10 MEQ CR tablet  Commonly known as: K-TAB/KLOR-CON  Used for: Chronic heart failure with preserved ejection fraction (H)      Dose: 10 mEq  Take 1 tablet (10 mEq) by mouth daily  Quantity: 30 tablet  Refills: 11     spironolactone 25 MG tablet  Commonly known as: ALDACTONE  Used for: Nonrheumatic aortic valve stenosis, Chronic heart failure with preserved ejection fraction (H), Permanent atrial fibrillation (H)      Dose: 25 mg  Take 1 tablet (25 mg) by mouth daily  Quantity: 90 tablet  Refills: 0     vitamin B-12 1000 MCG tablet  Commonly known as: CYANOCOBALAMIN      Dose: 1,000 mcg  [CYANOCOBALAMIN 1000 MCG TABLET] Take 1,000 mcg by mouth daily.  Refills: 0     WOMENS DAILY FORMULA PO      Dose: 1 tablet  [MULTIVIT WITH CALCIUM,IRON,MIN (WOMEN'S DAILY MULTIVITAMIN ORAL)] Take 1 tablet by mouth daily.  Refills: 0                  Rationale for medication changes:      Increase metoprolol for better heart rate control  Decrease losartan to decrease risk for hypotension        Consults   Hospitalist  Cardiac rehab       Pertinent Labs     Lab Results    Component Value Date    WBC 8.3 05/04/2023    HGB 12.5 05/04/2023    HCT 38.9 05/04/2023     05/04/2023     05/04/2023     Lab Results   Component Value Date    CR 0.83 05/04/2023    CR 0.85 05/04/2023     Lab Results   Component Value Date     05/04/2023     05/04/2023    POTASSIUM 3.9 05/04/2023    POTASSIUM 4.0 05/04/2023    CHLORIDE 103 05/04/2023    CHLORIDE 103 05/04/2023    CO2 26 05/04/2023    CO2 24 05/04/2023     (H) 05/04/2023     (H) 05/04/2023     Lab Results   Component Value Date    GFRESTIMATED 76 05/04/2023    GFRESTIMATED 74 05/04/2023    GFRESTBLACK >60 03/22/2018     Lab Results   Component Value Date     (H) 05/04/2023     (H) 05/04/2023     Lab Results   Component Value Date    INR 1.21 (H) 05/04/2023     Lab Results   Component Value Date    BUN 26.1 (H) 05/04/2023    BUN 26.2 (H) 05/04/2023    CR 0.83 05/04/2023    CR 0.85 05/04/2023           Discharge Orders     Discharge Procedure Orders   Cardiac Rehab Referral   Standing Status: Future   Referral Priority: Routine: Next available opening Referral Type: Rehab Therapy Cardiac Therapy   Number of Visits Requested: 1     Reason for your hospital stay   Order Comments: Tricuspid valve repair     Follow-up and recommended labs and tests   Order Comments: Follow up  - refer to AVS for appointments already made     Activity   Order Comments: See activity restrictions on AVS printout     Order Specific Question Answer Comments   Is discharge order? Yes      Diet   Order Comments: Resume previous home diet     Order Specific Question Answer Comments   Is discharge order? Yes        Please see EMR for more detailed significant labs, imaging, consultant notes etc.

## 2023-05-05 NOTE — PROGRESS NOTES
"Clinic Care Coordination Contact  [facility]: Post-Discharge Note  SITUATION                                                      Admission:    Admission Date: 05/03/23   Reason for Admission: Mitral regurgitation  Discharge:   Discharge Date: 05/04/23  Discharge Diagnosis: Mitral regurgitation    BACKGROUND                                                      Per hospital discharge summary and inpatient provider notes:    Misty Quezada is a 67 year old female who comes in today for history and physical prior to transcatheter [id number] tricuspid valve repair, as part of the CLASP II TR study trial.      Misty has history of permanent atrial fibrillation, heart failure with preserved ejection fraction, severe tricuspid regurgitation, moderate aortic valve stenosis, lymphedema, morbid obesity and obstructive sleep apnea.  Overall, she does quite well and does not have extreme amount of shortness of breath, but does get short of breath with walking about a half a block which has been baseline for her for a while.  Her weights are stable.  She does not feel like she is retaining any fluid currently     Misty Quezada denies chest discomfort, palpitations, paroxysmal nocturnal dyspnea, orthopnea, lightheadedness, dizziness, pre-syncope, or syncope.  Misty Quezada also denies any weight loss, changes in appetite, nausea or vomiting.     ASSESSMENT      Discharge Assessment  How are you doing now that you are home?: \"Okay\"  How are your symptoms? (Red Flag symptoms escalate to triage hotline per guidelines): Improved  Do you feel your condition is stable enough to be safe at home until your provider visit?: Yes  Does the patient have their discharge instructions? : Yes  Does the patient have questions regarding their discharge instructions? : No  Were you started on any new medications or were there changes to any of your previous medications? : Yes  Does the patient have all of their medications?: Yes  Do " you have questions regarding any of your medications? : No  Do you have all of your needed medical supplies or equipment (DME)?  (i.e. oxygen tank, CPAP, cane, etc.): Yes  Discharge follow-up appointment scheduled within 14 calendar days? : Yes  Discharge Follow Up Appointment Scheduled with?: Specialty Care Provider    Post-op ([facility] Only)  If the patient had a surgery or procedure, do they have any questions for a nurse?: No    PLAN                                                      Outpatient Plan:      See below appointments already scheduled:    Future Appointments   Date Time Provider Department Center   5/10/2023  2:50 PM [facility]   5/15/2023 10:00 AM [facility] REHAB RESOURCE 2 [facility]   5/30/2023  3:00 PM MP[facility] PFT RM 1 [facility]   5/30/2023  4:00 PM Mohan Schultz MD [facility]   6/1/2023  1:45 PM JN HC ECHO STAFF [facility]NTS [facility]   6/7/2023  3:15 PM [facility]   6/7/2023  3:30 PM Blanca Martini PA-C [facility]         For any urgent concerns, please contact our 24 hour nurse triage line: [phone])         Clarisse Zuniga  Community Health Worker  [facility]  Ph: [phone]

## 2023-05-05 NOTE — OP NOTE
Cardiac surgery surgical stand-by    I was on surgical backup for 2 hours from 8AM-10AM for this transcatheter tricuspid [id number] repair. No surgical intervention was required.    Kana Calderón MD

## 2023-05-10 ENCOUNTER — OFFICE VISIT (OUTPATIENT)
Dept: CARDIOLOGY | Facility: CLINIC | Age: 68
End: 2023-05-10
Payer: MEDICARE

## 2023-05-10 VITALS
WEIGHT: 251 LBS | BODY MASS INDEX: 39.31 KG/M2 | RESPIRATION RATE: 16 BRPM | HEART RATE: 80 BPM | SYSTOLIC BLOOD PRESSURE: 108 MMHG | DIASTOLIC BLOOD PRESSURE: 70 MMHG

## 2023-05-10 DIAGNOSIS — E66.01 MORBID OBESITY (H): ICD-10-CM

## 2023-05-10 DIAGNOSIS — Z98.890 S/P TRICUSPID VALVE REPAIR: ICD-10-CM

## 2023-05-10 DIAGNOSIS — I48.20 CHRONIC ATRIAL FIBRILLATION (H): ICD-10-CM

## 2023-05-10 DIAGNOSIS — I36.1 NONRHEUMATIC TRICUSPID VALVE REGURGITATION: Primary | ICD-10-CM

## 2023-05-10 PROBLEM — I50.32 CHRONIC HEART FAILURE WITH PRESERVED EJECTION FRACTION (H): Status: ACTIVE | Noted: 2022-10-17

## 2023-05-10 PROCEDURE — 99214 OFFICE O/P EST MOD 30 MIN: CPT | Performed by: INTERNAL MEDICINE

## 2023-05-10 RX ORDER — METOPROLOL SUCCINATE 25 MG/1
50 TABLET, EXTENDED RELEASE ORAL DAILY
Qty: 90 TABLET | Refills: 2
Start: 2023-05-10 | End: 2023-07-18

## 2023-05-10 NOTE — LETTER
5/10/2023    LAVELL WALLACE MD  [facility] [address]  [facility] [address]    RE: Misty NARAYAN Sotojayyjonas       Dear Colleague,     I had the pleasure of seeing Misty Quezada in the [facility].  HEART CARE ENCOUNTER NOTE       [facility]  [phone]    Assessment/Recommendations   Assessment:  Severe functional tricuspid regurgitation - s/p TIARA with 1 JANETTE ACE device  Hypertension - BP today is at goal   Chronic heart failure with preserved ejection fraction - appears euvolemic.  Weight is down about 3 lbs, she denies PND or orthopnea and has stable LE edema  Permanent atrial fibrillation  Lymphedema  Obesity  CLASP II TR study trial participant    Plan:  Okay to resume all activities at this time, including driving with no further restrictions  Cardiac rehab starting Monday, May 15  Continue Bumex 2 mg twice daily and spironolactone 25 mg daily  Continue Eliquis for stroke prophylaxis  Increase metoprolol succinate to 50 mg daily for better heart rate control  Continue losartan 12.5 mg daily  Monitor blood pressures.  Patient is to call with increased dizziness/lightheadedness, fatigue or lethargy  Repeat echocardiogram on June 1 and 1 month follow-up with me on June 7  We will plan on her seeing Dr. Siegel in about 3 to 6 months    Thank you for the opportunity to participate in the care of Misty Quezada. It's been a pleasure working with her.  Please do not hesitate to call with any questions or concerns.       History of Present Illness/Subjective    I had the opportunity to see Misty Quezada at the [facility] for her 1 week follow-up visit after transcatheter tricuspid valve repair with Janette device as part of the CLASP II TR study trial.    Misty Quezada is a 67 year old female with history of permanent atrial fibrillation, heart failure with preserved ejection fraction, severe tricuspid regurgitation,  moderate aortic valve stenosis, lymphedema, morbid obesity and obstructive sleep apnea.       She was evaluated by a multidisciplinary team and was selected for the registry arm of the CLASP TR study trial.  She underwent procedure with 1 Janette ACE device placed on May 3.  Since then, patient says she is feeling better.  She thinks her breathing has improved.  She continues to have a dry cough intermittently, but this is unchanged.  Lower extremity edema is stable    Misty Quezada denies exertional chest discomfort, palpitations, shortness of breath at rest, PND, orthopnea, lightheadedness, dizziness, pre-syncope or syncope.  Misty Quezada also denies any recent weight loss, changes in appetite, nausea or vomiting.   ____________________________________________________________  ECHO 5/4/23  Interpretation Summary     Left ventricular size, wall motion and function are normal. The ejection  fraction is 60-65%.  There is borderline concentric left ventricular hypertrophy.  The ascending aorta is Borderline dilated.  Flattened septum is consistent with RV pressure/volume overload.  The right ventricle is moderately dilated.  There is mod-severe to severe (3-4+) tricuspid regurgitation.  Tricuspid valve clip fixed in place.  Eccentric TR jet directed towards atrial septum.  Mildly decreased right ventricular systolic function  Right ventricular systolic pressure is elevated, consistent with moderate  pulmonary hypertension.  IVC diameter >2.1 cm collapsing <50% with sniff suggests a high RA pressure  estimated at 15 mmHg or greater.  The left atrial appendage is not well visualized.  Suspected DEJAH thrombus noted on MURTAZA noted 5/4/2023 could not be imaged on this  study.  There is no pericardial effusion.     Physical Examination Review of Systems   Vitals: /70 (BP Location: Right arm, Patient Position: Sitting, Cuff Size: Adult Large)   Pulse 80   Resp 16   Wt 113.9 kg (251 lb)   BMI 39.31 kg/m    BMI=  Body mass index is 39.31 kg/m .  Wt Readings from Last 3 Encounters:   05/10/23 113.9 kg (251 lb)   05/04/23 113.9 kg (251 lb)   05/01/23 115.2 kg (254 lb)       General Appearance:   Alert, cooperative and in no acute distress   ENT/Mouth: membranes moist, no oral lesions or bleeding gums.      EYES:  no scleral icterus, normal conjunctivae   Neck: Supple without lymphadenopathy.  Thyroid not visualized   Chest/Lungs:   lungs are clear to auscultation, no rales or wheezing   Cardiovascular:    Irregularly irregular. Normal first and second heart sounds with a soft systolic murmur.  No rubs, or gallops; the carotid, radial and posterior tibial pulses are intact, minimal edema bilaterally    Abdomen:  Soft and nontender. Bowel sounds are present in all quadrants   Extremities: no cyanosis or clubbing.  Puncture site is soft and nontender with minimal to no bruising bilaterally   Skin: no xanthelasma, warm.    Neurologic: normal gait, normal  bilateral, no tremors   Psychiatric: Normal mood and affect       Please refer above for cardiac ROS details.      Medical History  Surgical History Family History Social History   Past Medical History:   Diagnosis Date    (HFpEF) heart failure with preserved ejection fraction (H) 10/17/2022    Aortic valve stenosis 11/10/2022    Benign essential hypertension     Chronic atrial fibrillation (H)     Lymphedema 9/24/2022    Nonrheumatic tricuspid valve regurgitation 2/6/2023     Past Surgical History:   Procedure Laterality Date    APPENDECTOMY      CHOLECYSTECTOMY      CV CORONARY ANGIOGRAM N/A 3/15/2023    Procedure: Coronary Angiogram;  Surgeon: Howard Plummer MD;  Location: [facility] CATH LAB CV    CV LEFT HEART CATH N/A 3/15/2023    Procedure: Left Heart Catheterization;  Surgeon: Howard Plummer MD;  Location: [facility] CATH LAB CV    CV RIGHT HEART CATH MEASUREMENTS RECORDED N/A 3/15/2023    Procedure: Right Heart Catheterization;  Surgeon: Howard Plummer MD;   Location: [facility] LAB CV    CV TRANSCATHETER TRICUSPID VALVE REPAIR N/A 5/3/2023    Procedure: Transcatheter Tricuspid Valve Repair;  Surgeon: Howard Plummer MD;  Location: [facility] CV    GASTRIC BYPASS      OR TRANSCATHETER TRICUSPID VALVE REPLACEMENT, FEMORAL PERCUTANEOUS APPROACH (STANDBY) N/A 5/3/2023    Procedure: OR TRANSCATHETER TRICUSPID VALVE REPLACEMENT, FEMORAL PERCUTANEOUS APPROACH (STANDBY);  Surgeon: Kana Calderón MD;  Location: [facility] CV     Family History   Problem Relation Age of Onset    No Known Problems Mother     No Known Problems Father     Social History     Socioeconomic History    Marital status:      Spouse name: Not on file    Number of children: Not on file    Years of education: Not on file    Highest education level: Not on file   Occupational History    Not on file   Tobacco Use    Smoking status: Never    Smokeless tobacco: Never   Vaping Use    Vaping status: Not on file   Substance and Sexual Activity    Alcohol use: Not Currently     Comment: Rarely Uses alcohol    Drug use: Never    Sexual activity: Not on file   Other Topics Concern    Not on file   Social History Narrative    Not on file     Social Determinants of Health     Financial Resource Strain: Not on file   Food Insecurity: Not on file   Transportation Needs: Not on file   Physical Activity: Not on file   Stress: Not on file   Social Connections: Not on file   Intimate Partner Violence: Not on file   Housing Stability: Not on file          Medications  Allergies   Current Outpatient Medications   Medication Sig Dispense Refill    acetaminophen (TYLENOL) 325 MG tablet Take 975 mg by mouth every 4 hours as needed for mild pain      apixaban (ELIQUIS) 5 mg Tab tablet Take 5 mg by mouth 2 times daily      bumetanide (BUMEX) 2 MG tablet TAKE 1 TABLET(2 MG) BY MOUTH TWICE DAILY 180 tablet 3    CALCIUM/D3/MAG OX//TYRONE/ZN (CALTRATE + D3 PLUS MINERALS ORAL) [CALCIUM/D3/MAG  OX//TYRONE/ZN (CALTRATE + D3 PLUS MINERALS ORAL)] Take 1 tablet by mouth daily.      cholecalciferol, vitamin D3, 2,000 unit Tab [CHOLECALCIFEROL, VITAMIN D3, 2,000 UNIT TAB] Take 2,000 Units by mouth daily.      citalopram (CELEXA) 10 MG tablet Take 20 mg by mouth daily      cyanocobalamin 1000 MCG tablet [CYANOCOBALAMIN 1000 MCG TABLET] Take 1,000 mcg by mouth daily.      ferrous sulfate (FEROSUL) 325 (65 Fe) MG tablet Take 1 tablet (325 mg) by mouth every other day      losartan (COZAAR) 25 MG tablet Take 0.5 tablets (12.5 mg) by mouth daily      melatonin 3 MG tablet Take 3 mg by mouth nightly as needed      metoprolol succinate ER (TOPROL XL) 25 MG 24 hr tablet Take 2 tablets (50 mg) by mouth daily 90 tablet 2    MULTIVIT WITH CALCIUM,IRON,MIN (WOMEN'S DAILY MULTIVITAMIN ORAL) [MULTIVIT WITH CALCIUM,IRON,MIN (WOMEN'S DAILY MULTIVITAMIN ORAL)] Take 1 tablet by mouth daily.      polyethylene glycol (MIRALAX) 17 g packet Take 17 g by mouth daily as needed for constipation      potassium chloride ER (K-TAB/KLOR-CON) 10 MEQ CR tablet Take 1 tablet (10 mEq) by mouth daily 30 tablet 11    spironolactone (ALDACTONE) 25 MG tablet Take 1 tablet (25 mg) by mouth daily 90 tablet 0    No Known Allergies      Lab Results    Chemistry/lipid CBC Cardiac Enzymes/BNP/TSH/INR   Recent Labs   Lab Test 09/09/16  1412   CHOL 163   HDL 42*      TRIG 81     Recent Labs   Lab Test 09/09/16  1412        Recent Labs   Lab Test 05/04/23  0501     140   POTASSIUM 4.0  3.9   CHLORIDE 103  103   CO2 24  26   *  120*   BUN 26.2*  26.1*   CR 0.85  0.83   GFRESTIMATED 74  76   TURNER 9.3  9.2     Recent Labs   Lab Test 05/04/23  0501 05/01/23  1329 03/15/23  0648   CR 0.85  0.83 0.79 0.74     Recent Labs   Lab Test 09/02/21  0457   A1C 6.2*    Recent Labs   Lab Test 05/04/23  0501   WBC 8.3   HGB 12.5   HCT 38.9           Recent Labs   Lab Test 05/04/23  0501 05/01/23  1329 03/15/23  0648   HGB  12.5 13.2 13.2    Recent Labs   Lab Test 08/21/21  1944 08/21/21  1426 08/21/21  0820   TROPONINI 1.32* 1.87* 1.75*     Recent Labs   Lab Test 05/01/23  1329 09/24/22  0850 09/02/21  0457 08/21/21  0820   BNP  --   --  199* 281*   NTBNP 945* 1,898*  --   --      Recent Labs   Lab Test 08/31/21  0720   TSH 4.92     Recent Labs   Lab Test 05/04/23  0832 05/01/23  1329 01/25/23  1440   INR 1.21* 1.07 1.13        32 minutes spent on the date of encounter doing chart review, review of test results, patient visit and documentation.      This note has been dictated using voice recognition software. Any grammatical or context distortions are unintentional and inherent to the software.                Thank you for allowing me to participate in the care of your patient.      Sincerely,     Blanca Martini PA-C     [facility]  cc:   No referring provider defined for this encounter.

## 2023-05-10 NOTE — PROGRESS NOTES
HEART CARE ENCOUNTER NOTE       [facility]  [phone]    Assessment/Recommendations   Assessment:  1. Severe functional tricuspid regurgitation - s/p TIARA with 1 PILAR ACE device  2. Hypertension - BP today is at goal   3. Chronic heart failure with preserved ejection fraction - appears euvolemic.  Weight is down about 3 lbs, she denies PND or orthopnea and has stable LE edema  4. Permanent atrial fibrillation  5. Lymphedema  6. Obesity  7. CLASP II TR study trial participant    Plan:  1. Okay to resume all activities at this time, including driving with no further restrictions  2. Cardiac rehab starting Monday, May 15  3. Continue Bumex 2 mg twice daily and spironolactone 25 mg daily  4. Continue Eliquis for stroke prophylaxis  5. Increase metoprolol succinate to 50 mg daily for better heart rate control  6. Continue losartan 12.5 mg daily  7. Monitor blood pressures.  Patient is to call with increased dizziness/lightheadedness, fatigue or lethargy  8. Repeat echocardiogram on June 1 and 1 month follow-up with me on June 7  9. We will plan on her seeing Dr. Siegel in about 3 to 6 months    Thank you for the opportunity to participate in the care of Misty Quezada. It's been a pleasure working with her.  Please do not hesitate to call with any questions or concerns.       History of Present Illness/Subjective    I had the opportunity to see Misty Quezada at the [facility] for her 1 week follow-up visit after transcatheter tricuspid valve repair with Pilar device as part of the CLASP II TR study trial.    Misty Quezada is a 67 year old female with history of permanent atrial fibrillation, heart failure with preserved ejection fraction, severe tricuspid regurgitation, moderate aortic valve stenosis, lymphedema, morbid obesity and obstructive sleep apnea.       She was evaluated by a multidisciplinary team and was selected for the registry arm of the CLASP TR study  trial.  She underwent procedure with 1 Janette ACE device placed on May 3.  Since then, patient says she is feeling better.  She thinks her breathing has improved.  She continues to have a dry cough intermittently, but this is unchanged.  Lower extremity edema is stable    Misty Quezada denies exertional chest discomfort, palpitations, shortness of breath at rest, PND, orthopnea, lightheadedness, dizziness, pre-syncope or syncope.  Misty Quezada also denies any recent weight loss, changes in appetite, nausea or vomiting.   ____________________________________________________________  ECHO 5/4/23  Interpretation Summary     Left ventricular size, wall motion and function are normal. The ejection  fraction is 60-65%.  There is borderline concentric left ventricular hypertrophy.  The ascending aorta is Borderline dilated.  Flattened septum is consistent with RV pressure/volume overload.  The right ventricle is moderately dilated.  There is mod-severe to severe (3-4+) tricuspid regurgitation.  Tricuspid valve clip fixed in place.  Eccentric TR jet directed towards atrial septum.  Mildly decreased right ventricular systolic function  Right ventricular systolic pressure is elevated, consistent with moderate  pulmonary hypertension.  IVC diameter >2.1 cm collapsing <50% with sniff suggests a high RA pressure  estimated at 15 mmHg or greater.  The left atrial appendage is not well visualized.  Suspected DEJAH thrombus noted on MURTAZA noted 5/4/2023 could not be imaged on this  study.  There is no pericardial effusion.     Physical Examination Review of Systems   Vitals: /70 (BP Location: Right arm, Patient Position: Sitting, Cuff Size: Adult Large)   Pulse 80   Resp 16   Wt 113.9 kg (251 lb)   BMI 39.31 kg/m    BMI= Body mass index is 39.31 kg/m .  Wt Readings from Last 3 Encounters:   05/10/23 113.9 kg (251 lb)   05/04/23 113.9 kg (251 lb)   05/01/23 115.2 kg (254 lb)       General Appearance:   Alert, cooperative  and in no acute distress   ENT/Mouth: membranes moist, no oral lesions or bleeding gums.      EYES:  no scleral icterus, normal conjunctivae   Neck: Supple without lymphadenopathy.  Thyroid not visualized   Chest/Lungs:   lungs are clear to auscultation, no rales or wheezing   Cardiovascular:    Irregularly irregular. Normal first and second heart sounds with a soft systolic murmur.  No rubs, or gallops; the carotid, radial and posterior tibial pulses are intact, minimal edema bilaterally    Abdomen:  Soft and nontender. Bowel sounds are present in all quadrants   Extremities: no cyanosis or clubbing.  Puncture site is soft and nontender with minimal to no bruising bilaterally   Skin: no xanthelasma, warm.    Neurologic: normal gait, normal  bilateral, no tremors   Psychiatric: Normal mood and affect       Please refer above for cardiac ROS details.      Medical History  Surgical History Family History Social History   Past Medical History:   Diagnosis Date     (HFpEF) heart failure with preserved ejection fraction (H) 10/17/2022     Aortic valve stenosis 11/10/2022     Benign essential hypertension      Chronic atrial fibrillation (H)      Lymphedema 9/24/2022     Nonrheumatic tricuspid valve regurgitation 2/6/2023     Past Surgical History:   Procedure Laterality Date     APPENDECTOMY       CHOLECYSTECTOMY       CV CORONARY ANGIOGRAM N/A 3/15/2023    Procedure: Coronary Angiogram;  Surgeon: Howard Plummer MD;  Location: [facility]     CV LEFT HEART CATH N/A 3/15/2023    Procedure: Left Heart Catheterization;  Surgeon: Howard Plummer MD;  Location: [facility]     CV RIGHT HEART CATH MEASUREMENTS RECORDED N/A 3/15/2023    Procedure: Right Heart Catheterization;  Surgeon: Howard Plummer MD;  Location: [facility]     CV TRANSCATHETER TRICUSPID VALVE REPAIR N/A 5/3/2023    Procedure: Transcatheter Tricuspid Valve Repair;  Surgeon: Howard Plummer MD;  Location: [facility]  CATH LAB CV     GASTRIC BYPASS       OR TRANSCATHETER TRICUSPID VALVE REPLACEMENT, FEMORAL PERCUTANEOUS APPROACH (STANDBY) N/A 5/3/2023    Procedure: OR TRANSCATHETER TRICUSPID VALVE REPLACEMENT, FEMORAL PERCUTANEOUS APPROACH (STANDBY);  Surgeon: Kana Calderón MD;  Location: [facility] CATH LAB CV     Family History   Problem Relation Age of Onset     No Known Problems Mother      No Known Problems Father     Social History     Socioeconomic History     Marital status:      Spouse name: Not on file     Number of children: Not on file     Years of education: Not on file     Highest education level: Not on file   Occupational History     Not on file   Tobacco Use     Smoking status: Never     Smokeless tobacco: Never   Vaping Use     Vaping status: Not on file   Substance and Sexual Activity     Alcohol use: Not Currently     Comment: Rarely Uses alcohol     Drug use: Never     Sexual activity: Not on file   Other Topics Concern     Not on file   Social History Narrative     Not on file     Social Determinants of Health     Financial Resource Strain: Not on file   Food Insecurity: Not on file   Transportation Needs: Not on file   Physical Activity: Not on file   Stress: Not on file   Social Connections: Not on file   Intimate Partner Violence: Not on file   Housing Stability: Not on file          Medications  Allergies   Current Outpatient Medications   Medication Sig Dispense Refill     acetaminophen (TYLENOL) 325 MG tablet Take 975 mg by mouth every 4 hours as needed for mild pain       apixaban (ELIQUIS) 5 mg Tab tablet Take 5 mg by mouth 2 times daily       bumetanide (BUMEX) 2 MG tablet TAKE 1 TABLET(2 MG) BY MOUTH TWICE DAILY 180 tablet 3     CALCIUM/D3/MAG OX//TYRONE/ZN (CALTRATE + D3 PLUS MINERALS ORAL) [CALCIUM/D3/MAG OX//TYRONE/ZN (CALTRATE + D3 PLUS MINERALS ORAL)] Take 1 tablet by mouth daily.       cholecalciferol, vitamin D3, 2,000 unit Tab [CHOLECALCIFEROL, VITAMIN D3, 2,000 UNIT  TAB] Take 2,000 Units by mouth daily.       citalopram (CELEXA) 10 MG tablet Take 20 mg by mouth daily       cyanocobalamin 1000 MCG tablet [CYANOCOBALAMIN 1000 MCG TABLET] Take 1,000 mcg by mouth daily.       ferrous sulfate (FEROSUL) 325 (65 Fe) MG tablet Take 1 tablet (325 mg) by mouth every other day       losartan (COZAAR) 25 MG tablet Take 0.5 tablets (12.5 mg) by mouth daily       melatonin 3 MG tablet Take 3 mg by mouth nightly as needed       metoprolol succinate ER (TOPROL XL) 25 MG 24 hr tablet Take 2 tablets (50 mg) by mouth daily 90 tablet 2     MULTIVIT WITH CALCIUM,IRON,MIN (WOMEN'S DAILY MULTIVITAMIN ORAL) [MULTIVIT WITH CALCIUM,IRON,MIN (WOMEN'S DAILY MULTIVITAMIN ORAL)] Take 1 tablet by mouth daily.       polyethylene glycol (MIRALAX) 17 g packet Take 17 g by mouth daily as needed for constipation       potassium chloride ER (K-TAB/KLOR-CON) 10 MEQ CR tablet Take 1 tablet (10 mEq) by mouth daily 30 tablet 11     spironolactone (ALDACTONE) 25 MG tablet Take 1 tablet (25 mg) by mouth daily 90 tablet 0    No Known Allergies      Lab Results    Chemistry/lipid CBC Cardiac Enzymes/BNP/TSH/INR   Recent Labs   Lab Test 09/09/16  1412   CHOL 163   HDL 42*      TRIG 81     Recent Labs   Lab Test 09/09/16  1412        Recent Labs   Lab Test 05/04/23  0501     140   POTASSIUM 4.0  3.9   CHLORIDE 103  103   CO2 24  26   *  120*   BUN 26.2*  26.1*   CR 0.85  0.83   GFRESTIMATED 74  76   TURNER 9.3  9.2     Recent Labs   Lab Test 05/04/23  0501 05/01/23  1329 03/15/23  0648   CR 0.85  0.83 0.79 0.74     Recent Labs   Lab Test 09/02/21  0457   A1C 6.2*    Recent Labs   Lab Test 05/04/23  0501   WBC 8.3   HGB 12.5   HCT 38.9           Recent Labs   Lab Test 05/04/23  0501 05/01/23  1329 03/15/23  0648   HGB 12.5 13.2 13.2    Recent Labs   Lab Test 08/21/21  1944 08/21/21  1426 08/21/21  0820   TROPONINI 1.32* 1.87* 1.75*     Recent Labs   Lab Test 05/01/23  1325  09/24/22  0850 09/02/21  0457 08/21/21  0820   BNP  --   --  199* 281*   NTBNP 945* 1,898*  --   --      Recent Labs   Lab Test 08/31/21  0720   TSH 4.92     Recent Labs   Lab Test 05/04/23  0832 05/01/23  1329 01/25/23  1440   INR 1.21* 1.07 1.13        32 minutes spent on the date of encounter doing chart review, review of test results, patient visit and documentation.      This note has been dictated using voice recognition software. Any grammatical or context distortions are unintentional and inherent to the software.

## 2023-05-11 ENCOUNTER — ALLIED HEALTH/NURSE VISIT (OUTPATIENT)
Dept: CARDIOLOGY | Facility: CLINIC | Age: 68
End: 2023-05-11
Payer: MEDICARE

## 2023-05-11 DIAGNOSIS — I34.0 MITRAL VALVE INSUFFICIENCY, UNSPECIFIED ETIOLOGY: Primary | ICD-10-CM

## 2023-05-11 NOTE — PROGRESS NOTES
CLASP II TR Lab And EKG  Implant/Discharge  Review    Purpose: Freeman PILAR TrAnScatheter Valve RePair System Pivotal Clinical Trial (CLASP II TR): A prospective, multicenter, randomized, controlled pivotal trial to evaluate the safety and effectiveness of transcatheter tricuspid valve repair with the Freeman PILAR Transcatheter Valve Repair System and optimal medical therapy (OMT) compared to OMT alone in patients with tricuspid regurgitation.     Dr. Plummer -- Please specify if the lab work and EKG is clinically significant or not. If clinical significant, please indicate what next actions should be!       If CS, please indicate next actions!     []  CS    [x]  NCS     EKG post-procedure: 5/3/23 1140  Component      Latest Ref Rng 5/3/2023  11:40 AM   Systolic Blood Pressure      mmHg    Diastolic Blood Pressure      mmHg    Ventricular Rate          Atrial Rate      BPM 86    QRS Duration      ms 98    QT      ms 266    QTc      ms 346    R AXIS      degrees 124    T Axis      degrees 184    Interpretation ECG Atrial fibrillation with rapid ventricular response   Low voltage QRS   Right axis deviation   Pulmonary disease pattern Poor R-wave progression   Nonspecific T wave abnormality , probably digitalis effect   Abnormal ECG   When compared with ECG of 01-MAY-2023 13:23,   Nonspecific T wave abnormality now evident in Anterolateral leads   QT has shortened   Confirmed by LISA PHILLIPS, LES LOC:WW ([id number]) on 5/3/2023 4:49:47 PM          []  CS    [x]  NCS   EKG pre-discharge 5/4/23 0545  Component      Latest Ref Rng 5/4/2023  5:45 AM   Systolic Blood Pressure      mmHg    Diastolic Blood Pressure      mmHg    Ventricular Rate      BPM 89    Atrial Rate          QRS Duration      ms 96    QT      ms 364    QTc      ms 442    R AXIS      degrees 103    T Axis      degrees 31    Interpretation ECG Atrial fibrillation with rapid ventricular response   Abnormal ECG   When compared with ECG of  15-MAR-2023 06:57,   Nonspecific T wave abnormality no longer evident in Lateral leads   Confirmed by SEDRICK KAPOOR MD LOC: ([id number]) on 5/2/2023 1:48:26 PM          Lab work post discharge: 5/4/23  Component      Latest Ref Rng 5/4/2023  5:01 AM 5/4/2023  8:32 AM CS/NCS   Urea Nitrogen      8.0 - 23.0 mg/dL 26.2 (H)   []  CS    [x]  NCS    Creatinine      0.51 - 0.95 mg/dL 0.85      Alkaline Phosphatase      35 - 104 U/L 99      AST      10 - 35 U/L 40 (H)   []  CS    [x]  NCS    ALT      10 - 35 U/L 26      Albumin      3.5 - 5.2 g/dL 3.9      Bilirubin Total      <=1.2 mg/dL 0.5      GFR Estimate      >60 mL/min/1.73m2 74      WBC      4.0 - 11.0 10e3/uL 8.3      RBC Count      3.80 - 5.20 10e6/uL 3.88      Hemoglobin      11.7 - 15.7 g/dL 12.5      Hematocrit      35.0 - 47.0 % 38.9      Platelet Count      150 - 450 10e3/uL 242      Troponin T, High Sensitivity      <=14 ng/L 12      CK Total      26 - 192 U/L 39      GGT      5 - 36 U/L 44 (H)   []  CS    [x]  NCS    Uric Acid      2.4 - 5.7 mg/dL 7.4 (H)   []  CS    [x]  NCS    INR      0.85 - 1.15   1.21 (H)  []  CS    [x]  NCS    PTT      22 - 38 Seconds  26        Legend:  (H) High      If Clinically significant: please indicate next steps!        Sierra R. Behr-Holewinski

## 2023-05-15 ENCOUNTER — HOSPITAL ENCOUNTER (OUTPATIENT)
Dept: CARDIAC REHAB | Facility: HOSPITAL | Age: 68
Discharge: HOME | End: 2023-05-15
Attending: INTERNAL MEDICINE
Payer: MEDICARE

## 2023-05-15 DIAGNOSIS — Z98.890 S/P TVR (TRICUSPID VALVE REPAIR): ICD-10-CM

## 2023-05-15 PROCEDURE — 93797 PHYS/QHP OP CAR RHAB WO ECG: CPT | Mod: 59

## 2023-05-15 PROCEDURE — 93798 PHYS/QHP OP CAR RHAB W/ECG: CPT

## 2023-05-17 ENCOUNTER — HOSPITAL ENCOUNTER (OUTPATIENT)
Dept: CARDIAC REHAB | Facility: HOSPITAL | Age: 68
Discharge: HOME | End: 2023-05-17
Attending: INTERNAL MEDICINE
Payer: MEDICARE

## 2023-05-17 PROCEDURE — 93798 PHYS/QHP OP CAR RHAB W/ECG: CPT

## 2023-05-19 ENCOUNTER — HOSPITAL ENCOUNTER (OUTPATIENT)
Dept: CARDIAC REHAB | Facility: HOSPITAL | Age: 68
Discharge: HOME | End: 2023-05-19
Attending: INTERNAL MEDICINE
Payer: MEDICARE

## 2023-05-19 PROCEDURE — 93798 PHYS/QHP OP CAR RHAB W/ECG: CPT

## 2023-05-22 ENCOUNTER — HOSPITAL ENCOUNTER (OUTPATIENT)
Dept: CARDIAC REHAB | Facility: HOSPITAL | Age: 68
Discharge: HOME | End: 2023-05-22
Attending: INTERNAL MEDICINE
Payer: MEDICARE

## 2023-05-22 PROCEDURE — 93798 PHYS/QHP OP CAR RHAB W/ECG: CPT

## 2023-05-24 ENCOUNTER — HOSPITAL ENCOUNTER (OUTPATIENT)
Dept: CARDIAC REHAB | Facility: HOSPITAL | Age: 68
Discharge: HOME | End: 2023-05-24
Attending: INTERNAL MEDICINE
Payer: MEDICARE

## 2023-05-24 PROCEDURE — 93798 PHYS/QHP OP CAR RHAB W/ECG: CPT

## 2023-05-26 ENCOUNTER — HOSPITAL ENCOUNTER (OUTPATIENT)
Dept: CARDIAC REHAB | Facility: HOSPITAL | Age: 68
Discharge: HOME | End: 2023-05-26
Attending: INTERNAL MEDICINE
Payer: MEDICARE

## 2023-05-26 PROCEDURE — 93798 PHYS/QHP OP CAR RHAB W/ECG: CPT

## 2023-05-30 ENCOUNTER — OFFICE VISIT (OUTPATIENT)
Dept: PULMONOLOGY | Facility: CLINIC | Age: 68
End: 2023-05-30
Payer: MEDICARE

## 2023-05-30 ENCOUNTER — ALLIED HEALTH/NURSE VISIT (OUTPATIENT)
Dept: PULMONOLOGY | Facility: CLINIC | Age: 68
End: 2023-05-30
Payer: MEDICARE

## 2023-05-30 VITALS
BODY MASS INDEX: 39.83 KG/M2 | HEIGHT: 67 IN | OXYGEN SATURATION: 94 % | SYSTOLIC BLOOD PRESSURE: 110 MMHG | WEIGHT: 253.8 LBS | DIASTOLIC BLOOD PRESSURE: 60 MMHG | HEART RATE: 83 BPM

## 2023-05-30 DIAGNOSIS — I27.20 PULMONARY HYPERTENSION (H): ICD-10-CM

## 2023-05-30 DIAGNOSIS — G47.33 OSA (OBSTRUCTIVE SLEEP APNEA): ICD-10-CM

## 2023-05-30 DIAGNOSIS — R06.00 DYSPNEA, UNSPECIFIED TYPE: ICD-10-CM

## 2023-05-30 DIAGNOSIS — I36.1 NONRHEUMATIC TRICUSPID VALVE REGURGITATION: Primary | ICD-10-CM

## 2023-05-30 LAB — HGB BLD-MCNC: 13.7 G/DL

## 2023-05-30 PROCEDURE — 94729 DIFFUSING CAPACITY: CPT | Performed by: INTERNAL MEDICINE

## 2023-05-30 PROCEDURE — 94726 PLETHYSMOGRAPHY LUNG VOLUMES: CPT | Performed by: INTERNAL MEDICINE

## 2023-05-30 PROCEDURE — 85018 HEMOGLOBIN: CPT | Performed by: INTERNAL MEDICINE

## 2023-05-30 PROCEDURE — 94060 EVALUATION OF WHEEZING: CPT | Performed by: INTERNAL MEDICINE

## 2023-05-30 PROCEDURE — 99204 OFFICE O/P NEW MOD 45 MIN: CPT | Mod: 25 | Performed by: INTERNAL MEDICINE

## 2023-05-30 NOTE — PROGRESS NOTES
SOB after 1/2 block   Activity has improved  Dry cough  No wheezes  Mild swelling LEs.   Two pillows  Bedtime 2-3 AM , up 10-12 AM  Refresh   No naps  No witness apneas, ? Snoring   No tobacco, passive smoking

## 2023-05-30 NOTE — PROGRESS NOTES
Patient oxygen saturation on RA at rest is 96%.  Oxygen saturation after ambulating 300ft on RA is 93%.         Patient is ambulatory within his/her home.    Zunilda Cleveland MA  [facility]

## 2023-05-30 NOTE — PATIENT INSTRUCTIONS
Referral to sleep clinic   Increase physical activity , let me know about referral to physical therapy  Follow up in 6 months

## 2023-05-30 NOTE — PROGRESS NOTES
PULMONARY OUTPATIENT CONSULT NOTE        Assessment:     1. Dyspnea  Secondary to cardiomyopathy and physical deconditioning.   Non tobacco use, PFTs showed a mild restrictive lung disease, normal diffusion capacity. Restrictive pattern is likely related to body habitus.   Follows by cardiology for HFpEF, valvular disease, severe TR s/p valve repair, pulmonary hypertension. Cardiology recommended a sleep apnea evaluation.Unclear snoring, no witness apneas, Refresh in AM. [place] sleeping scale was 8/24. Mallampati III/IV. Will refer patient to sleep clinic.   No desaturation with activities.   Referral to physical therapy.   2. Severe tricuspid regurgitation s/p status post successful transcatheter tricuspid valve repair w/ a PILAR ACE device, residual 2-3+ tricuspid insufficiency post deployment with a mean gradient of 3mmHg  3. HTN, HFpEF  4. Chronic atrial fibrillation , anticoagulated  5. Pulmonary hypertension  Related to valvular disease. S/p tricuspid valve repair.   Cardiology recommended for sleep apnea evaluation.   Will refer patient to sleep clinic.   6. Physical deconditioning   7. Obesity Body mass index is 39.75 kg/m .     Plan:      1. Referral to sleep clinic   2. Increase physical activity , let me know about referral to physical therapy  3. Follow up in 6 months      Mohan Alcantar  Pulmonary / Critical Care  May 30, 2023        CC:     Chief Complaint   Patient presents with     Consult     MIKEL (obstructive sleep apnea) [G47.33]  Pulmonary hypertension      HPI:         Misty Quezada is a 67 year old female who presents for evaluation of sleep apnea..  Patient has history of permanent atrial fibrillation, HTN, HFpEF, severe symptomatic tricuspid regurgitation status post successful transcatheter tricuspid valve repair w/ a PILAR ACE device, residual 2-3+ tricuspid insufficiency post deployment with a mean gradient of 3mmHg, lymphedema, obesity Body mass index is 39.75  kg/m .  Patient reports exertional dyspnea, able to walk 1/2 block.   Activity level has improved post tricuspid valve repair.   Reports mild dry cough, no wheezes.  Mild swelling LEs. Uses two pillows to sleep,   Denies chest pain, orthopnea or PND.   Bedtime between 2-3 AM , up 10-12 AM  Refresh. No naps  No witness apneas, ? Snoring   No tobacco, passive smoking. 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      Past Medical History :     Past Medical History:   Diagnosis Date     (HFpEF) heart failure with preserved ejection fraction (H) 10/17/2022     Aortic valve stenosis 11/10/2022     Benign essential hypertension      Chronic atrial fibrillation (H)      Lymphedema 9/24/2022     Nonrheumatic tricuspid valve regurgitation 2/6/2023      Medications:     Current Outpatient Medications   Medication     acetaminophen (TYLENOL) 325 MG tablet     apixaban (ELIQUIS) 5 mg Tab tablet     bumetanide (BUMEX) 2 MG tablet     CALCIUM/D3/MAG OX//TYRONE/ZN (CALTRATE + D3 PLUS MINERALS ORAL)     cholecalciferol, vitamin D3, 2,000 unit Tab     citalopram (CELEXA) 10 MG tablet     cyanocobalamin 1000 MCG tablet     ferrous sulfate (FEROSUL) 325 (65 Fe) MG tablet     losartan (COZAAR) 25 MG tablet     melatonin 3 MG tablet     metoprolol succinate ER (TOPROL XL) 25 MG 24 hr tablet     MULTIVIT WITH CALCIUM,IRON,MIN (WOMEN'S DAILY MULTIVITAMIN ORAL)     polyethylene glycol (MIRALAX) 17 g packet     potassium chloride ER (K-TAB/KLOR-CON) 10 MEQ CR tablet     spironolactone (ALDACTONE) 25 MG tablet     No current facility-administered medications for this visit.      Social History :     Social History     Socioeconomic History     Marital status:      Spouse name: Not on file     Number of children: Not on file     Years of education: Not on file     Highest education level: Not on file   Occupational History     Not on file   Tobacco Use     Smoking status: Never     Smokeless tobacco: Never  "  Vaping Use     Vaping status: Not on file   Substance and Sexual Activity     Alcohol use: Not Currently     Comment: Rarely Uses alcohol     Drug use: Never     Sexual activity: Not on file   Other Topics Concern     Not on file   Social History Narrative     Not on file     Social Determinants of Health     Financial Resource Strain: Not on file   Food Insecurity: Not on file   Transportation Needs: Not on file   Physical Activity: Not on file   Stress: Not on file   Social Connections: Not on file   Intimate Partner Violence: Not on file   Housing Stability: Not on file        Family History :     Family History   Problem Relation Age of Onset     No Known Problems Mother      No Known Problems Father        Review of Systems  A 12 point comprehensive review of systems was negative except as noted.        Objective:     /60 (BP Location: Right arm, Patient Position: Sitting, Cuff Size: Adult Large)   Pulse 83   Ht 1.702 m (5' 7\")   Wt 115.1 kg (253 lb 12.8 oz)   SpO2 94%   BMI 39.75 kg/m    Patient oxygen saturation on RA at rest is 96%.  Oxygen saturation after ambulating 300ft on RA is 93%.    Gen: obese, awake, alert, no distress  HEENT: pink conjunctiva, moist mucosa, Mallampati III/IV  Neck: no thyromegaly, masses or JVD  Lungs: clear  CV: regular, no murmurs or gallops appreciated  Abdomen: soft, NT, BS wnl  Ext: swelling of edema  Neuro: CN II-XII intact, non focal      Diagnostic tests:         PFTs: (5/30/2023)     FVC 1.78 L (59%)  FEV1 1.57 L (67%)  FEV1/FVC 89  No significant post - bronchodilator response  TLC 4.05 L (72%)   RV 2.16 L (102%)  DLCO 95%       IMAGES:     Right and Left heart cath 3/15/2023  Findings:  LM:no obstruction  LAD:mildly irregular  Lcx:mildly irregular  RCA:dominant, no obstruction     LVEDP:17     RHC:  RA:17   RV:55/4  PA:56/29 (39)  PCWP:17 V's to 20     SvO2:64  AO sat: 88     CO/CI:  Kapil:5.23/2.41      Echocardiogram 5/4/2023  Interpretation Summary   "   Left ventricular size, wall motion and function are normal. The ejection fraction is 60-65%.  There is borderline concentric left ventricular hypertrophy.  The ascending aorta is Borderline dilated.  Flattened septum is consistent with RV pressure/volume overload.  The right ventricle is moderately dilated.  There is mod-severe to severe (3-4+) tricuspid regurgitation.  Tricuspid valve clip fixed in place.  Eccentric TR jet directed towards atrial septum.  Mildly decreased right ventricular systolic function  Right ventricular systolic pressure is elevated, consistent with moderate  pulmonary hypertension.  IVC diameter >2.1 cm collapsing <50% with sniff suggests a high RA pressure  estimated at 15 mmHg or greater.  The left atrial appendage is not well visualized.  Suspected DEJAH thrombus noted on MURTAZA noted 5/4/2023 could not be imaged on this study.  There is no pericardial effusion.    RA Pressures  8:35 AM   11    12    14      93          8:49 AM   16    16    20      102         10:15 AM   15    17    19      111        RV Pressures  7:06 AM       4752           8:35 AM 33    8       14     90        PA Pressures  8:33 AM 26    13    15        102          8:33 AM 38    18    23        86        AO Pressures  8:23 AM 95    61    73        95          8:49     80    101        102         10:15     76    90        111          Using MURTAZA and angiography for guidance one PILAR ACE device was placed in Anterior-Septal location w/ TR reduction from 5+ to 2-3+.       XR CHEST PORT 1 VIEW  LOCATION: [facility]  DATE/TIME: 5/4/2023 6:45 AM CDT  INDICATION: POD #1 S P Mitral valve repair   Degeneration of Mitral Valve  COMPARISON: 09/24/2022         IMPRESSION: Stable cardiomegaly. Resolution of interstitial edema. Lungs clear with no pneumothorax nor pleural effusion.

## 2023-05-31 ENCOUNTER — HOSPITAL ENCOUNTER (OUTPATIENT)
Dept: CARDIAC REHAB | Facility: HOSPITAL | Age: 68
Discharge: HOME | End: 2023-05-31
Attending: INTERNAL MEDICINE
Payer: MEDICARE

## 2023-05-31 DIAGNOSIS — I34.0 MITRAL VALVE INSUFFICIENCY, UNSPECIFIED ETIOLOGY: ICD-10-CM

## 2023-05-31 DIAGNOSIS — Z98.890 S/P TRICUSPID VALVE REPAIR: Primary | ICD-10-CM

## 2023-05-31 LAB
DLCOCOR-%PRED-PRE: 94 %
DLCOCOR-PRE: 19.45 ML/MIN/MMHG
DLCOUNC-%PRED-PRE: 95 %
DLCOUNC-PRE: 19.63 ML/MIN/MMHG
DLCOUNC-PRED: 20.6 ML/MIN/MMHG
ERV-%PRED-PRE: 17 %
ERV-PRE: 0.15 L
ERV-PRED: 0.85 L
EXPTIME-PRE: 5.48 SEC
FEF2575-%PRED-POST: 163 %
FEF2575-%PRED-PRE: 108 %
FEF2575-POST: 3.21 L/SEC
FEF2575-PRE: 2.14 L/SEC
FEF2575-PRED: 1.97 L/SEC
FEFMAX-%PRED-PRE: 53 %
FEFMAX-PRE: 3.37 L/SEC
FEFMAX-PRED: 6.26 L/SEC
FEV1-%PRED-PRE: 67 %
FEV1-PRE: 1.57 L
FEV1FEV6-PRE: 89 %
FEV1FEV6-PRED: 79 %
FEV1FVC-PRE: 89 %
FEV1FVC-PRED: 79 %
FEV1SVC-PRE: 84 %
FEV1SVC-PRED: 68 %
FIFMAX-PRE: 2.54 L/SEC
FRCPLETH-%PRED-PRE: 74 %
FRCPLETH-PRE: 2.31 L
FRCPLETH-PRED: 3.12 L
FVC-%PRED-PRE: 59 %
FVC-PRE: 1.78 L
FVC-PRED: 2.97 L
IC-%PRED-PRE: 69 %
IC-PRE: 1.74 L
IC-PRED: 2.5 L
RVPLETH-%PRED-PRE: 102 %
RVPLETH-PRE: 2.16 L
RVPLETH-PRED: 2.11 L
TLCPLETH-%PRED-PRE: 72 %
TLCPLETH-PRE: 4.05 L
TLCPLETH-PRED: 5.55 L
VA-%PRED-PRE: 69 %
VA-PRE: 3.5 L
VC-%PRED-PRE: 55 %
VC-PRE: 1.88 L
VC-PRED: 3.4 L

## 2023-05-31 PROCEDURE — 93798 PHYS/QHP OP CAR RHAB W/ECG: CPT

## 2023-06-01 ENCOUNTER — HOSPITAL ENCOUNTER (OUTPATIENT)
Dept: CARDIOLOGY | Facility: HOSPITAL | Age: 68
Discharge: HOME | End: 2023-06-01
Attending: INTERNAL MEDICINE | Admitting: INTERNAL MEDICINE
Payer: MEDICARE

## 2023-06-01 DIAGNOSIS — I36.1 NONRHEUMATIC TRICUSPID VALVE REGURGITATION: ICD-10-CM

## 2023-06-01 LAB — LVEF ECHO: NORMAL

## 2023-06-01 PROCEDURE — 93306 TTE W/DOPPLER COMPLETE: CPT

## 2023-06-01 PROCEDURE — 93306 TTE W/DOPPLER COMPLETE: CPT | Mod: 26 | Performed by: INTERNAL MEDICINE

## 2023-06-02 ENCOUNTER — HOSPITAL ENCOUNTER (OUTPATIENT)
Dept: CARDIAC REHAB | Facility: HOSPITAL | Age: 68
Discharge: HOME | End: 2023-06-02
Attending: INTERNAL MEDICINE
Payer: MEDICARE

## 2023-06-02 PROCEDURE — 93798 PHYS/QHP OP CAR RHAB W/ECG: CPT

## 2023-06-05 ENCOUNTER — HOSPITAL ENCOUNTER (OUTPATIENT)
Dept: CARDIAC REHAB | Facility: HOSPITAL | Age: 68
Discharge: HOME | End: 2023-06-05
Attending: INTERNAL MEDICINE
Payer: MEDICARE

## 2023-06-05 PROCEDURE — 93798 PHYS/QHP OP CAR RHAB W/ECG: CPT

## 2023-06-07 ENCOUNTER — OFFICE VISIT (OUTPATIENT)
Dept: CARDIOLOGY | Facility: CLINIC | Age: 68
End: 2023-06-07
Payer: MEDICARE

## 2023-06-07 ENCOUNTER — LAB (OUTPATIENT)
Dept: CARDIOLOGY | Facility: CLINIC | Age: 68
End: 2023-06-07
Payer: MEDICARE

## 2023-06-07 VITALS — TEMPERATURE: 97.9 F | DIASTOLIC BLOOD PRESSURE: 68 MMHG | HEART RATE: 75 BPM | SYSTOLIC BLOOD PRESSURE: 100 MMHG

## 2023-06-07 DIAGNOSIS — I50.32 CHRONIC HEART FAILURE WITH PRESERVED EJECTION FRACTION (H): ICD-10-CM

## 2023-06-07 DIAGNOSIS — I35.0 NONRHEUMATIC AORTIC VALVE STENOSIS: ICD-10-CM

## 2023-06-07 DIAGNOSIS — Z98.890 S/P TRICUSPID VALVE REPAIR: ICD-10-CM

## 2023-06-07 DIAGNOSIS — I34.0 MITRAL VALVE INSUFFICIENCY, UNSPECIFIED ETIOLOGY: ICD-10-CM

## 2023-06-07 DIAGNOSIS — Z00.6 EXAMINATION OF PARTICIPANT OR CONTROL IN CLINICAL RESEARCH: ICD-10-CM

## 2023-06-07 DIAGNOSIS — I36.1 NONRHEUMATIC TRICUSPID VALVE REGURGITATION: Primary | ICD-10-CM

## 2023-06-07 DIAGNOSIS — E66.01 MORBID OBESITY (H): ICD-10-CM

## 2023-06-07 DIAGNOSIS — Z00.6 EXAMINATION OF PARTICIPANT OR CONTROL IN CLINICAL RESEARCH: Primary | ICD-10-CM

## 2023-06-07 LAB
ALBUMIN SERPL BCG-MCNC: 4 G/DL (ref 3.5–5.2)
ALP SERPL-CCNC: 113 U/L (ref 35–104)
ALT SERPL W P-5'-P-CCNC: 29 U/L (ref 10–35)
ANION GAP SERPL CALCULATED.3IONS-SCNC: 13 MMOL/L (ref 7–15)
AST SERPL W P-5'-P-CCNC: 36 U/L (ref 10–35)
ATRIAL RATE - MUSE: 61 BPM
BILIRUB SERPL-MCNC: 0.6 MG/DL
BUN SERPL-MCNC: 16.3 MG/DL (ref 8–23)
CALCIUM SERPL-MCNC: 9.4 MG/DL (ref 8.8–10.2)
CHLORIDE SERPL-SCNC: 102 MMOL/L (ref 98–107)
CREAT SERPL-MCNC: 0.71 MG/DL (ref 0.51–0.95)
DEPRECATED HCO3 PLAS-SCNC: 24 MMOL/L (ref 22–29)
DIASTOLIC BLOOD PRESSURE - MUSE: NORMAL MMHG
ERYTHROCYTE [DISTWIDTH] IN BLOOD BY AUTOMATED COUNT: 13 % (ref 10–15)
GFR SERPL CREATININE-BSD FRML MDRD: >90 ML/MIN/1.73M2
GGT SERPL-CCNC: 36 U/L (ref 5–36)
GLUCOSE SERPL-MCNC: 99 MG/DL (ref 70–99)
HCT VFR BLD AUTO: 42.3 % (ref 35–47)
HGB BLD-MCNC: 14.8 G/DL (ref 11.7–15.7)
INTERPRETATION ECG - MUSE: NORMAL
MCH RBC QN AUTO: 34.7 PG (ref 26.5–33)
MCHC RBC AUTO-ENTMCNC: 35 G/DL (ref 31.5–36.5)
MCV RBC AUTO: 99 FL (ref 78–100)
P AXIS - MUSE: NORMAL DEGREES
PLATELET # BLD AUTO: 282 10E3/UL (ref 150–450)
POTASSIUM SERPL-SCNC: 4.4 MMOL/L (ref 3.4–5.3)
PR INTERVAL - MUSE: NORMAL MS
PROT SERPL-MCNC: 6.9 G/DL (ref 6.4–8.3)
QRS DURATION - MUSE: 94 MS
QT - MUSE: 388 MS
QTC - MUSE: 430 MS
R AXIS - MUSE: 134 DEGREES
RBC # BLD AUTO: 4.26 10E6/UL (ref 3.8–5.2)
SODIUM SERPL-SCNC: 139 MMOL/L (ref 136–145)
SYSTOLIC BLOOD PRESSURE - MUSE: NORMAL MMHG
T AXIS - MUSE: 4 DEGREES
URATE SERPL-MCNC: 6.1 MG/DL (ref 2.4–5.7)
VENTRICULAR RATE- MUSE: 74 BPM
WBC # BLD AUTO: 4.7 10E3/UL (ref 4–11)

## 2023-06-07 PROCEDURE — 84550 ASSAY OF BLOOD/URIC ACID: CPT

## 2023-06-07 PROCEDURE — 93000 ELECTROCARDIOGRAM COMPLETE: CPT | Performed by: INTERNAL MEDICINE

## 2023-06-07 PROCEDURE — 80053 COMPREHEN METABOLIC PANEL: CPT

## 2023-06-07 PROCEDURE — 36415 COLL VENOUS BLD VENIPUNCTURE: CPT

## 2023-06-07 PROCEDURE — 85027 COMPLETE CBC AUTOMATED: CPT

## 2023-06-07 PROCEDURE — 99207 PR NO CHARGE-RESEARCH SERVICE: CPT

## 2023-06-07 PROCEDURE — 82977 ASSAY OF GGT: CPT

## 2023-06-07 PROCEDURE — 99214 OFFICE O/P EST MOD 30 MIN: CPT | Performed by: INTERNAL MEDICINE

## 2023-06-07 NOTE — PATIENT INSTRUCTIONS
Misty Quezada,    It was a pleasure to see you today in the clinic regarding your tricuspid valve clip 1 month visit.     My recommendations after this visit include:     - no changes to medications at this time    You should followup with Dr. Siegel in September and the structural team again in May 2024      If you have questions or concerns, please call using the numbers below:    Valve Clinic Phone   [phone]    After Hours/Scheduling  [phone]    Otherwise you can dial the nurse directly at:    Lizabeth Espinal RN  [phone]    Sunil Montana RN  [phone]

## 2023-06-07 NOTE — PROGRESS NOTES
HEART CARE ENCOUNTER NOTE       [facility]  [phone]    Assessment/Recommendations   Assessment:  1. Severe functional tricuspid regurgitation - s/p TIARA with 1 PILAR ACE device, resulting in just slight reduction in TR.  By recent echo there is still 3+ residual TR  2. Hypertension - BP today is at goal   3. Chronic heart failure with preserved ejection fraction, NYHA class III - appears euvolemic.  She denies PND or orthopnea and has stable LE edema  4. Permanent atrial fibrillation - rate controlled  5. Lymphedema  6. Obesity  7. CLASP II TR study trial participant  8. Moderate aortic stenosis    Plan:  1. Continue cardiac rehab   2. Continue Bumex 2 mg twice daily and spironolactone 25 mg daily  3. Continue Eliquis for stroke prophylaxis  4. Continue metoprolol succinate to 50 mg daily   5. Continue losartan 12.5 mg daily  6. Follow-up with Dr. Siegel in September and with the structural team again in May 2024.    7. Plan for repeat echo in May 2024, but sooner if symptoms progress    Thank you for the opportunity to participate in the care of Misty Quezada. It's been a pleasure working with her.  Please do not hesitate to call with any questions or concerns.       History of Present Illness/Subjective    I had the opportunity to see Misty Quezada at the [facility] for her 1 month follow-up visit after transcatheter tricuspid valve repair with Pilar device as part of the CLASP II TR study trial.    Misty Quezada is a 67 year old female with history of permanent atrial fibrillation, heart failure with preserved ejection fraction, severe tricuspid regurgitation, moderate aortic valve stenosis, lymphedema, morbid obesity and obstructive sleep apnea.       As part of the CLASP TR study trial, she underwent transcatheter tricuspid valve repair with 1 Pilar ACE device on May 3.  Misty continues to have some shortness of breath, but says that it's better than it was.   She has no dizziness or lightheadedness.  She is participating in cardiac rehab and says this is going well.  She thinks her lower extremity edema is for the most part stable.  She does notice sometimes she has more swelling, but she also does not wear her compression stockings like she should.    Misty Quezada denies exertional chest discomfort, palpitations, shortness of breath at rest, PND, orthopnea, lightheadedness, dizziness, pre-syncope or syncope.  Misty Quezada also denies any recent weight loss, changes in appetite, nausea or vomiting.   ____________________________________________________________  ECHO 6/1/23  Interpretation Summary     1. The left ventricle is normal in size. Left ventricular function is  normal.The ejection fraction is 55-60%.  2. The right ventricle is borderline dilated. Mildly decreased right  ventricular systolic function.  3. The left atrium is severely dilated. The right atrium is severely dilated.  4. A single Janette-Ace device is attached to the anterior and septal leaflets  of tricuspid valve with moderate (+3) eccentric tricuspid regurgitation with  mean gradient of 3 mm Hg.  5. Moderate valvular aortic stenosis (LORRAINE:1.2cm2, Peakvel:2.5 m/sec, DI: 0.28,  SVi: 28 ml/m2). There is mild (1+) aortic regurgitation.  6. The right ventricular systolic pressure is approximated at 37mmHg plus the  right atrial pressure. High RA pressure estimated at 15 mmHg or greater.  There is no pericardial effusion.     Physical Examination Review of Systems   Vitals: /68   Pulse 75   Temp 97.9  F (36.6  C)   BMI= There is no height or weight on file to calculate BMI.  Wt Readings from Last 3 Encounters:   05/30/23 115.1 kg (253 lb 12.8 oz)   05/10/23 113.9 kg (251 lb)   05/04/23 113.9 kg (251 lb)       General Appearance:   Alert, cooperative and in no acute distress   ENT/Mouth: membranes moist, no oral lesions or bleeding gums.      EYES:  no scleral icterus, normal conjunctivae    Neck: Supple without lymphadenopathy.  Thyroid not visualized   Chest/Lungs:   lungs are clear to auscultation, no rales or wheezing   Cardiovascular:    Irregularly irregular. Normal first and second heart sounds with a soft systolic murmur.  No rubs, or gallops; the carotid, radial and posterior tibial pulses are intact, minimal edema bilaterally    Abdomen:  Soft and nontender. Bowel sounds are present in all quadrants   Extremities: no cyanosis or clubbing.     Skin: no xanthelasma, warm.    Neurologic: normal gait, normal  bilateral, no tremors   Psychiatric: Normal mood and affect       Please refer above for cardiac ROS details.      Medical History  Surgical History Family History Social History   Past Medical History:   Diagnosis Date     (HFpEF) heart failure with preserved ejection fraction (H) 10/17/2022     Aortic valve stenosis 11/10/2022     Benign essential hypertension      Chronic atrial fibrillation (H)      Lymphedema 9/24/2022     Nonrheumatic tricuspid valve regurgitation 2/6/2023     Past Surgical History:   Procedure Laterality Date     APPENDECTOMY       CHOLECYSTECTOMY       CV CORONARY ANGIOGRAM N/A 3/15/2023    Procedure: Coronary Angiogram;  Surgeon: Howard Plummer MD;  Location: [facility] CV     CV LEFT HEART CATH N/A 3/15/2023    Procedure: Left Heart Catheterization;  Surgeon: Howard Plummer MD;  Location: [facility] CATH [facility]     CV RIGHT HEART CATH MEASUREMENTS RECORDED N/A 3/15/2023    Procedure: Right Heart Catheterization;  Surgeon: Howard Plummer MD;  Location: [facility] CATH [facility]     CV TRANSCATHETER TRICUSPID VALVE REPAIR N/A 5/3/2023    Procedure: Transcatheter Tricuspid Valve Repair;  Surgeon: Howard Plummer MD;  Location: [facility] CATH LAB CV     GASTRIC BYPASS       OR TRANSCATHETER TRICUSPID VALVE REPLACEMENT, FEMORAL PERCUTANEOUS APPROACH (STANDBY) N/A 5/3/2023    Procedure: OR TRANSCATHETER TRICUSPID VALVE REPLACEMENT, FEMORAL PERCUTANEOUS  APPROACH (STANDUYEN);  Surgeon: Kana Calderón MD;  Location: [facility] CV     Family History   Problem Relation Age of Onset     No Known Problems Mother      No Known Problems Father     Social History     Socioeconomic History     Marital status:      Spouse name: Not on file     Number of children: Not on file     Years of education: Not on file     Highest education level: Not on file   Occupational History     Not on file   Tobacco Use     Smoking status: Never     Smokeless tobacco: Never   Vaping Use     Vaping status: Not on file   Substance and Sexual Activity     Alcohol use: Not Currently     Comment: Rarely Uses alcohol     Drug use: Never     Sexual activity: Not on file   Other Topics Concern     Not on file   Social History Narrative     Not on file     Social Determinants of Health     Financial Resource Strain: Not on file   Food Insecurity: Not on file   Transportation Needs: Not on file   Physical Activity: Not on file   Stress: Not on file   Social Connections: Not on file   Intimate Partner Violence: Not on file   Housing Stability: Not on file          Medications  Allergies   Current Outpatient Medications   Medication Sig Dispense Refill     acetaminophen (TYLENOL) 325 MG tablet Take 975 mg by mouth every 4 hours as needed for mild pain       apixaban (ELIQUIS) 5 mg Tab tablet Take 5 mg by mouth 2 times daily       bumetanide (BUMEX) 2 MG tablet TAKE 1 TABLET(2 MG) BY MOUTH TWICE DAILY 180 tablet 3     CALCIUM/D3/MAG OX//TYRONE/ZN (CALTRATE + D3 PLUS MINERALS ORAL) [CALCIUM/D3/MAG OX//TYRONE/ZN (CALTRATE + D3 PLUS MINERALS ORAL)] Take 1 tablet by mouth daily.       cholecalciferol, vitamin D3, 2,000 unit Tab [CHOLECALCIFEROL, VITAMIN D3, 2,000 UNIT TAB] Take 2,000 Units by mouth daily.       citalopram (CELEXA) 10 MG tablet Take 20 mg by mouth daily       cyanocobalamin 1000 MCG tablet [CYANOCOBALAMIN 1000 MCG TABLET] Take 1,000 mcg by mouth daily.       ferrous  sulfate (FEROSUL) 325 (65 Fe) MG tablet Take 1 tablet (325 mg) by mouth every other day       losartan (COZAAR) 25 MG tablet Take 0.5 tablets (12.5 mg) by mouth daily       melatonin 3 MG tablet Take 3 mg by mouth nightly as needed       metoprolol succinate ER (TOPROL XL) 25 MG 24 hr tablet Take 2 tablets (50 mg) by mouth daily 90 tablet 2     MULTIVIT WITH CALCIUM,IRON,MIN (WOMEN'S DAILY MULTIVITAMIN ORAL) [MULTIVIT WITH CALCIUM,IRON,MIN (WOMEN'S DAILY MULTIVITAMIN ORAL)] Take 1 tablet by mouth daily.       polyethylene glycol (MIRALAX) 17 g packet Take 17 g by mouth daily as needed for constipation       potassium chloride ER (K-TAB/KLOR-CON) 10 MEQ CR tablet Take 1 tablet (10 mEq) by mouth daily 30 tablet 11     spironolactone (ALDACTONE) 25 MG tablet Take 1 tablet (25 mg) by mouth daily 90 tablet 0    No Known Allergies      Lab Results    Chemistry/lipid CBC Cardiac Enzymes/BNP/TSH/INR   Recent Labs   Lab Test 09/09/16  1412   CHOL 163   HDL 42*      TRIG 81     Recent Labs   Lab Test 09/09/16  1412        Recent Labs   Lab Test 05/04/23  0501     140   POTASSIUM 4.0  3.9   CHLORIDE 103  103   CO2 24  26   *  120*   BUN 26.2*  26.1*   CR 0.85  0.83   GFRESTIMATED 74  76   TURNER 9.3  9.2     Recent Labs   Lab Test 05/04/23  0501 05/01/23  1329 03/15/23  0648   CR 0.85  0.83 0.79 0.74     Recent Labs   Lab Test 09/02/21  0457   A1C 6.2*    Recent Labs   Lab Test 05/04/23  0501   WBC 8.3   HGB 12.5   HCT 38.9           Recent Labs   Lab Test 05/04/23  0501 05/01/23  1329 03/15/23  0648   HGB 12.5 13.2 13.2    Recent Labs   Lab Test 08/21/21  1944 08/21/21  1426 08/21/21  0820   TROPONINI 1.32* 1.87* 1.75*     Recent Labs   Lab Test 05/01/23  1329 09/24/22  0850 09/02/21  0457 08/21/21  0820   BNP  --   --  199* 281*   NTBNP 945* 1,898*  --   --      Recent Labs   Lab Test 08/31/21  0720   TSH 4.92     Recent Labs   Lab Test 05/04/23  0832 05/01/23  1326  01/25/23  1440   INR 1.21* 1.07 1.13          This note has been dictated using voice recognition software. Any grammatical or context distortions are unintentional and inherent to the software.

## 2023-06-07 NOTE — PROGRESS NOTES
"CLASP II TR Follow-Up Visit 30 day    Purpose: Freeman PILAR TrAnScatheter Valve RePair System Pivotal Clinical Trial (CLASP II TR): A prospective, multicenter, randomized, controlled pivotal trial to evaluate the safety and effectiveness of transcatheter tricuspid valve repair with the Freeman PILAR Transcatheter Valve Repair System and optimal medical therapy (OMT) compared to OMT alone in patients with tricuspid regurgitation.     Misty Quezada was seen in clinic today for the CLASP II TR 30 day  Visit.     Demography  Age 67 year old    Gender female    Ethnicity White   Race White      Vitals:    06/07/23 1424   BP: 100/68   BP Location: Left arm   Patient Position: Sitting   Cuff Size: Adult Regular   Pulse: 75   Resp: 16   Temp: 97.9  F (36.6  C)   Weight: 114.3 kg (252 lb)   Height: 1.702 m (5' 7\")          Current Outpatient Medications:      acetaminophen (TYLENOL) 325 MG tablet, Take 975 mg by mouth every 4 hours as needed for mild pain, Disp: , Rfl:      apixaban (ELIQUIS) 5 mg Tab tablet, Take 5 mg by mouth 2 times daily, Disp: , Rfl:      bumetanide (BUMEX) 2 MG tablet, TAKE 1 TABLET(2 MG) BY MOUTH TWICE DAILY, Disp: 180 tablet, Rfl: 3     CALCIUM/D3/MAG OX//TYRONE/ZN (CALTRATE + D3 PLUS MINERALS ORAL), [CALCIUM/D3/MAG OX//TYRONE/ZN (CALTRATE + D3 PLUS MINERALS ORAL)] Take 1 tablet by mouth daily., Disp: , Rfl:      cholecalciferol, vitamin D3, 2,000 unit Tab, [CHOLECALCIFEROL, VITAMIN D3, 2,000 UNIT TAB] Take 2,000 Units by mouth daily., Disp: , Rfl:      citalopram (CELEXA) 10 MG tablet, Take 20 mg by mouth daily, Disp: , Rfl:      cyanocobalamin 1000 MCG tablet, [CYANOCOBALAMIN 1000 MCG TABLET] Take 1,000 mcg by mouth daily., Disp: , Rfl:      ferrous sulfate (FEROSUL) 325 (65 Fe) MG tablet, Take 1 tablet (325 mg) by mouth every other day, Disp: , Rfl:      losartan (COZAAR) 25 MG tablet, Take 0.5 tablets (12.5 mg) by mouth daily, Disp: , Rfl:      melatonin 3 MG tablet, Take 3 mg by mouth " nightly as needed, Disp: , Rfl:      metoprolol succinate ER (TOPROL XL) 25 MG 24 hr tablet, Take 2 tablets (50 mg) by mouth daily, Disp: 90 tablet, Rfl: 2     MULTIVIT WITH CALCIUM,IRON,MIN (WOMEN'S DAILY MULTIVITAMIN ORAL), [MULTIVIT WITH CALCIUM,IRON,MIN (WOMEN'S DAILY MULTIVITAMIN ORAL)] Take 1 tablet by mouth daily., Disp: , Rfl:      polyethylene glycol (MIRALAX) 17 g packet, Take 17 g by mouth daily as needed for constipation, Disp: , Rfl:      potassium chloride ER (K-TAB/KLOR-CON) 10 MEQ CR tablet, Take 1 tablet (10 mEq) by mouth daily, Disp: 30 tablet, Rfl: 11     spironolactone (ALDACTONE) 25 MG tablet, Take 1 tablet (25 mg) by mouth daily, Disp: 90 tablet, Rfl: 0     NYHA: 6/7/2023  EKG -- through 1 year 6/7/2023  TTE: 6/1/2023  6 Minute walk test -through 2 years  6/7/2023  Surveys (KCCQ, SF-36, EQ-5D-5L) -- through 2 years 6/7/2023  Caregiver Survey -- 6 month visit ONLY  N/A  Labs Completed -- through 1 year 6/7/2023     Edema Grading Questionaire  Lower Limb Edema Grading: [] Absent   [x] Grade 1+ (2 mm or less: slight pitting, no visible distortion, disappears rapidly)  []Grade 2+ (2-4 mm indent: somewhat deeper pit, no readably detectable distortion, disappears in 10-25 seconds)  [] Grade 3+ (4-6 mm: pit is noticeably deep. May last more than a minute. Dependent extremity looks swollen and macedo)  []Grade 4+ (6-8 mm: pt is very deep. Lasts for 2-5 minutes. Dependent extremity is grossly distorted)   Ankle Circumference Measurements: Right Ankle Circumference: 29 cm  Left Ankle Circumference: 30 cm      1. Over the last week, how often did you have swelling in your ankles?  [x] Every day   [] 6 days    [] 5 days   []4 days    [] 3 days    []2 days   []1 day   [] Not at all    2. What level of swelling did you experience? [] Extreme swelling    [] Quite a bit of swelling   [x] Mild swelling    [] Very little swelling   [] None    3. How much were you bothered by swelling in your ankles?  []  Extremely    [] Quite a bit    [] Moderately   [] A little    [x] Not at all    4. During what times of day did you have swelling in your ankles? [x] Morning only    [] afternoon only    [] evening only  [] Morning & evening    [] morning & afternoon  [] afternoon & evening   [] morning, afternoon & evening  [] Not at all    5. Did swelling in your ankles limit your normal activity? [] Extremely   [] Quite a bit  [] Moderately  [] A little  [x] Not at all    6. Did you have any difficulty answering these questions?  [] Yes  [x] No             Plan: Plan for 6 month follow-up Oct 2023.     Sierra R. Behr-Holewinski

## 2023-06-07 NOTE — Clinical Note
6/7/2023    LAVELL WALLACE MD  [facility] [address]  [facility] [address]    RE: Misty SYLVAIN Quezada       Dear Colleague,     I had the pleasure of seeing Misty Valerajet in the [facility].  CLASP II TR Follow-Up Visit ( 30 day, 6 month, Year 1-5)     Purpose: Freeman PILAR TrAnScatheter Valve RePair System Pivotal Clinical Trial (CLASP II TR): A prospective, multicenter, randomized, controlled pivotal trial to evaluate the safety and effectiveness of transcatheter tricuspid valve repair with the Freeman PILAR Transcatheter Valve Repair System and optimal medical therapy (OMT) compared to OMT alone in patients with tricuspid regurgitation.     Misty NARAYAN Nupurjonas was seen in clinic today for the CLASP II TR 30 day  Visit.     Demography  Age 67 year old    Gender female    Ethnicity White   Race White      Vitals:    06/07/23 1424   BP: 100/68   BP Location: Left arm   Patient Position: Sitting   Cuff Size: Adult Regular   Pulse: 75   Temp: 97.9  F (36.6  C)          Current Outpatient Medications:      acetaminophen (TYLENOL) 325 MG tablet, Take 975 mg by mouth every 4 hours as needed for mild pain, Disp: , Rfl:      apixaban (ELIQUIS) 5 mg Tab tablet, Take 5 mg by mouth 2 times daily, Disp: , Rfl:      bumetanide (BUMEX) 2 MG tablet, TAKE 1 TABLET(2 MG) BY MOUTH TWICE DAILY, Disp: 180 tablet, Rfl: 3     CALCIUM/D3/MAG OX//TYRONE/ZN (CALTRATE + D3 PLUS MINERALS ORAL), [CALCIUM/D3/MAG OX//TYRONE/ZN (CALTRATE + D3 PLUS MINERALS ORAL)] Take 1 tablet by mouth daily., Disp: , Rfl:      cholecalciferol, vitamin D3, 2,000 unit Tab, [CHOLECALCIFEROL, VITAMIN D3, 2,000 UNIT TAB] Take 2,000 Units by mouth daily., Disp: , Rfl:      citalopram (CELEXA) 10 MG tablet, Take 20 mg by mouth daily, Disp: , Rfl:      cyanocobalamin 1000 MCG tablet, [CYANOCOBALAMIN 1000 MCG TABLET] Take 1,000 mcg by mouth daily., Disp: , Rfl:      ferrous sulfate (FEROSUL) 325 (65 Fe) MG tablet,  Take 1 tablet (325 mg) by mouth every other day, Disp: , Rfl:      losartan (COZAAR) 25 MG tablet, Take 0.5 tablets (12.5 mg) by mouth daily, Disp: , Rfl:      melatonin 3 MG tablet, Take 3 mg by mouth nightly as needed, Disp: , Rfl:      metoprolol succinate ER (TOPROL XL) 25 MG 24 hr tablet, Take 2 tablets (50 mg) by mouth daily, Disp: 90 tablet, Rfl: 2     MULTIVIT WITH CALCIUM,IRON,MIN (WOMEN'S DAILY MULTIVITAMIN ORAL), [MULTIVIT WITH CALCIUM,IRON,MIN (WOMEN'S DAILY MULTIVITAMIN ORAL)] Take 1 tablet by mouth daily., Disp: , Rfl:      polyethylene glycol (MIRALAX) 17 g packet, Take 17 g by mouth daily as needed for constipation, Disp: , Rfl:      potassium chloride ER (K-TAB/KLOR-CON) 10 MEQ CR tablet, Take 1 tablet (10 mEq) by mouth daily, Disp: 30 tablet, Rfl: 11     spironolactone (ALDACTONE) 25 MG tablet, Take 1 tablet (25 mg) by mouth daily, Disp: 90 tablet, Rfl: 0     NYHA: ***  EKG -- through 1 year ***  TTE: 6/1/2023  6 Minute walk test -through 2 years  ***  Surveys (KCCQ, SF-36, EQ-5D-5L) -- through 2 years 6/7/2023  Caregiver Survey -- 6 month visit ONLY  N/A  Labs Completed -- through 1 year ***     Edema Grading Questionaire  Lower Limb Edema Grading: [] Absent   [x] Grade 1+ (2 mm or less: slight pitting, no visible distortion, disappears rapidly)  []Grade 2+ (2-4 mm indent: somewhat deeper pit, no readably detectable distortion, disappears in 10-25 seconds)  [] Grade 3+ (4-6 mm: pit is noticeably deep. May last more than a minute. Dependent extremity looks swollen and macedo)  []Grade 4+ (6-8 mm: pt is very deep. Lasts for 2-5 minutes. Dependent extremity is grossly distorted)   Ankle Circumference Measurements: Right Ankle Circumference: 29 cm  Left Ankle Circumference: 30 cm      1. Over the last week, how often did you have swelling in your ankles?  [] Every day   [] 6 days    [] 5 days   []4 days    [] 3 days    []2 days   []1 day   [] Not at all    2. What level of swelling did you experience?  [] Extreme swelling    [] Quite a bit of swelling   [x] Mild swelling    [] Very little swelling   [] None    3. How much were you bothered by swelling in your ankles?  [] Extremely    [] Quite a bit    [] Moderately   [] A little    [x] Not at all    4. During what times of day did you have swelling in your ankles? [x] Morning only    [] afternoon only    [] evening only  [] Morning & evening    [] morning & afternoon  [] afternoon & evening   [] morning, afternoon & evening  [] Not at all    5. Did swelling in your ankles limit your normal activity? [] Extremely   [] Quite a bit  [] Moderately  [] A little  [x] Not at all    6. Did you have any difficulty answering these questions?  [] Yes  [x] No             Plan: ***    Sierra R. Behr-Holewinski           Thank you for allowing me to participate in the care of your patient.      Sincerely,     Sierra R. Behr-Holewinski     [facility]  cc:   No referring provider defined for this encounter.

## 2023-06-07 NOTE — LETTER
6/7/2023    LAVELL WALLACE MD  [facility] [address]  [facility] [address]    RE: Misty Espitiajonas       Dear Colleague,     I had the pleasure of seeing Misty Quezada in the [facility].  HEART CARE ENCOUNTER NOTE       [facility]  [phone]    Assessment/Recommendations   Assessment:  Severe functional tricuspid regurgitation - s/p TIARA with 1 JANETTE ACE device, resulting in just slight reduction in TR.  By recent echo there is still 3+ residual TR  Hypertension - BP today is at goal   Chronic heart failure with preserved ejection fraction, NYHA class III - appears euvolemic.  She denies PND or orthopnea and has stable LE edema  Permanent atrial fibrillation - rate controlled  Lymphedema  Obesity  CLASP II TR study trial participant  Moderate aortic stenosis    Plan:  Continue cardiac rehab   Continue Bumex 2 mg twice daily and spironolactone 25 mg daily  Continue Eliquis for stroke prophylaxis  Continue metoprolol succinate to 50 mg daily   Continue losartan 12.5 mg daily  Follow-up with Dr. Siegel in September and with the structural team again in May 2024.    Plan for repeat echo in May 2024, but sooner if symptoms progress    Thank you for the opportunity to participate in the care of Misty Quezada. It's been a pleasure working with her.  Please do not hesitate to call with any questions or concerns.       History of Present Illness/Subjective    I had the opportunity to see Misty Quezada at the [facility] for her 1 month follow-up visit after transcatheter tricuspid valve repair with Janette device as part of the CLASP II TR study trial.    Misty Quezada is a 67 year old female with history of permanent atrial fibrillation, heart failure with preserved ejection fraction, severe tricuspid regurgitation, moderate aortic valve stenosis, lymphedema, morbid obesity and obstructive sleep apnea.       As part of  the CLASP TR study trial, she underwent transcatheter tricuspid valve repair with 1 Janette ACE device on May 3.  Misty continues to have some shortness of breath, but says that it's better than it was.  She has no dizziness or lightheadedness.  She is participating in cardiac rehab and says this is going well.  She thinks her lower extremity edema is for the most part stable.  She does notice sometimes she has more swelling, but she also does not wear her compression stockings like she should.    Misty Quezada denies exertional chest discomfort, palpitations, shortness of breath at rest, PND, orthopnea, lightheadedness, dizziness, pre-syncope or syncope.  Misty Quezada also denies any recent weight loss, changes in appetite, nausea or vomiting.   ____________________________________________________________  ECHO 6/1/23  Interpretation Summary     1. The left ventricle is normal in size. Left ventricular function is  normal.The ejection fraction is 55-60%.  2. The right ventricle is borderline dilated. Mildly decreased right  ventricular systolic function.  3. The left atrium is severely dilated. The right atrium is severely dilated.  4. A single Janette-Ace device is attached to the anterior and septal leaflets  of tricuspid valve with moderate (+3) eccentric tricuspid regurgitation with  mean gradient of 3 mm Hg.  5. Moderate valvular aortic stenosis (LORRAINE:1.2cm2, Peakvel:2.5 m/sec, DI: 0.28,  SVi: 28 ml/m2). There is mild (1+) aortic regurgitation.  6. The right ventricular systolic pressure is approximated at 37mmHg plus the  right atrial pressure. High RA pressure estimated at 15 mmHg or greater.  There is no pericardial effusion.     Physical Examination Review of Systems   Vitals: /68   Pulse 75   Temp 97.9  F (36.6  C)   BMI= There is no height or weight on file to calculate BMI.  Wt Readings from Last 3 Encounters:   05/30/23 115.1 kg (253 lb 12.8 oz)   05/10/23 113.9 kg (251 lb)   05/04/23 113.9 kg  (251 lb)       General Appearance:   Alert, cooperative and in no acute distress   ENT/Mouth: membranes moist, no oral lesions or bleeding gums.      EYES:  no scleral icterus, normal conjunctivae   Neck: Supple without lymphadenopathy.  Thyroid not visualized   Chest/Lungs:   lungs are clear to auscultation, no rales or wheezing   Cardiovascular:    Irregularly irregular. Normal first and second heart sounds with a soft systolic murmur.  No rubs, or gallops; the carotid, radial and posterior tibial pulses are intact, minimal edema bilaterally    Abdomen:  Soft and nontender. Bowel sounds are present in all quadrants   Extremities: no cyanosis or clubbing.     Skin: no xanthelasma, warm.    Neurologic: normal gait, normal  bilateral, no tremors   Psychiatric: Normal mood and affect       Please refer above for cardiac ROS details.      Medical History  Surgical History Family History Social History   Past Medical History:   Diagnosis Date    (HFpEF) heart failure with preserved ejection fraction (H) 10/17/2022    Aortic valve stenosis 11/10/2022    Benign essential hypertension     Chronic atrial fibrillation (H)     Lymphedema 9/24/2022    Nonrheumatic tricuspid valve regurgitation 2/6/2023     Past Surgical History:   Procedure Laterality Date    APPENDECTOMY      CHOLECYSTECTOMY      CV CORONARY ANGIOGRAM N/A 3/15/2023    Procedure: Coronary Angiogram;  Surgeon: Howard Plummer MD;  Location: [facility]    CV LEFT HEART CATH N/A 3/15/2023    Procedure: Left Heart Catheterization;  Surgeon: Howard Plummer MD;  Location: [facility]    CV RIGHT HEART CATH MEASUREMENTS RECORDED N/A 3/15/2023    Procedure: Right Heart Catheterization;  Surgeon: Howard Plummer MD;  Location: [facility]    CV TRANSCATHETER TRICUSPID VALVE REPAIR N/A 5/3/2023    Procedure: Transcatheter Tricuspid Valve Repair;  Surgeon: Howard Plummer MD;  Location: [facility] CV    GASTRIC BYPASS       OR TRANSCATHETER TRICUSPID VALVE REPLACEMENT, FEMORAL PERCUTANEOUS APPROACH (STANDBY) N/A 5/3/2023    Procedure: OR TRANSCATHETER TRICUSPID VALVE REPLACEMENT, FEMORAL PERCUTANEOUS APPROACH (STANDBY);  Surgeon: Kana Calderón MD;  Location: [facility] LAB CV     Family History   Problem Relation Age of Onset    No Known Problems Mother     No Known Problems Father     Social History     Socioeconomic History    Marital status:      Spouse name: Not on file    Number of children: Not on file    Years of education: Not on file    Highest education level: Not on file   Occupational History    Not on file   Tobacco Use    Smoking status: Never    Smokeless tobacco: Never   Vaping Use    Vaping status: Not on file   Substance and Sexual Activity    Alcohol use: Not Currently     Comment: Rarely Uses alcohol    Drug use: Never    Sexual activity: Not on file   Other Topics Concern    Not on file   Social History Narrative    Not on file     Social Determinants of Health     Financial Resource Strain: Not on file   Food Insecurity: Not on file   Transportation Needs: Not on file   Physical Activity: Not on file   Stress: Not on file   Social Connections: Not on file   Intimate Partner Violence: Not on file   Housing Stability: Not on file          Medications  Allergies   Current Outpatient Medications   Medication Sig Dispense Refill    acetaminophen (TYLENOL) 325 MG tablet Take 975 mg by mouth every 4 hours as needed for mild pain      apixaban (ELIQUIS) 5 mg Tab tablet Take 5 mg by mouth 2 times daily      bumetanide (BUMEX) 2 MG tablet TAKE 1 TABLET(2 MG) BY MOUTH TWICE DAILY 180 tablet 3    CALCIUM/D3/MAG OX//TYRONE/ZN (CALTRATE + D3 PLUS MINERALS ORAL) [CALCIUM/D3/MAG OX//TYRONE/ZN (CALTRATE + D3 PLUS MINERALS ORAL)] Take 1 tablet by mouth daily.      cholecalciferol, vitamin D3, 2,000 unit Tab [CHOLECALCIFEROL, VITAMIN D3, 2,000 UNIT TAB] Take 2,000 Units by mouth daily.      citalopram  (CELEXA) 10 MG tablet Take 20 mg by mouth daily      cyanocobalamin 1000 MCG tablet [CYANOCOBALAMIN 1000 MCG TABLET] Take 1,000 mcg by mouth daily.      ferrous sulfate (FEROSUL) 325 (65 Fe) MG tablet Take 1 tablet (325 mg) by mouth every other day      losartan (COZAAR) 25 MG tablet Take 0.5 tablets (12.5 mg) by mouth daily      melatonin 3 MG tablet Take 3 mg by mouth nightly as needed      metoprolol succinate ER (TOPROL XL) 25 MG 24 hr tablet Take 2 tablets (50 mg) by mouth daily 90 tablet 2    MULTIVIT WITH CALCIUM,IRON,MIN (WOMEN'S DAILY MULTIVITAMIN ORAL) [MULTIVIT WITH CALCIUM,IRON,MIN (WOMEN'S DAILY MULTIVITAMIN ORAL)] Take 1 tablet by mouth daily.      polyethylene glycol (MIRALAX) 17 g packet Take 17 g by mouth daily as needed for constipation      potassium chloride ER (K-TAB/KLOR-CON) 10 MEQ CR tablet Take 1 tablet (10 mEq) by mouth daily 30 tablet 11    spironolactone (ALDACTONE) 25 MG tablet Take 1 tablet (25 mg) by mouth daily 90 tablet 0    No Known Allergies      Lab Results    Chemistry/lipid CBC Cardiac Enzymes/BNP/TSH/INR   Recent Labs   Lab Test 09/09/16  1412   CHOL 163   HDL 42*      TRIG 81     Recent Labs   Lab Test 09/09/16  1412        Recent Labs   Lab Test 05/04/23  0501     140   POTASSIUM 4.0  3.9   CHLORIDE 103  103   CO2 24  26   *  120*   BUN 26.2*  26.1*   CR 0.85  0.83   GFRESTIMATED 74  76   TURNER 9.3  9.2     Recent Labs   Lab Test 05/04/23  0501 05/01/23  1329 03/15/23  0648   CR 0.85  0.83 0.79 0.74     Recent Labs   Lab Test 09/02/21  0457   A1C 6.2*    Recent Labs   Lab Test 05/04/23  0501   WBC 8.3   HGB 12.5   HCT 38.9           Recent Labs   Lab Test 05/04/23  0501 05/01/23  1329 03/15/23  0648   HGB 12.5 13.2 13.2    Recent Labs   Lab Test 08/21/21  1944 08/21/21  1426 08/21/21  0820   TROPONINI 1.32* 1.87* 1.75*     Recent Labs   Lab Test 05/01/23  1329 09/24/22  0850 09/02/21  0457 08/21/21  0820   BNP  --   --  199*  281*   NTBNP 945* 1,898*  --   --      Recent Labs   Lab Test 08/31/21  0720   TSH 4.92     Recent Labs   Lab Test 05/04/23  0832 05/01/23  1329 01/25/23  1440   INR 1.21* 1.07 1.13          This note has been dictated using voice recognition software. Any grammatical or context distortions are unintentional and inherent to the software.            Thank you for allowing me to participate in the care of your patient.      Sincerely,     NADIA KENT PA-C     [facility]  cc:   No referring provider defined for this encounter.

## 2023-06-08 VITALS
DIASTOLIC BLOOD PRESSURE: 68 MMHG | BODY MASS INDEX: 39.55 KG/M2 | HEIGHT: 67 IN | TEMPERATURE: 97.9 F | HEART RATE: 75 BPM | WEIGHT: 252 LBS | RESPIRATION RATE: 16 BRPM | SYSTOLIC BLOOD PRESSURE: 100 MMHG

## 2023-06-08 NOTE — PROGRESS NOTES
CLASP II TR Study Re-Consent Visit    Purpose: Freeman PILAR TrAnScatheter Valve RePair System Pivotal Clinical Trial (CLASP II TR): A prospective, multicenter, randomized, controlled pivotal trial to evaluate the safety and effectiveness of transcatheter tricuspid valve repair with the Freeman PILAR Transcatheter Valve Repair System and optimal medical therapy (OMT) compared to OMT alone in patients with tricuspid regurgitation.     Patient was spoken to regarding updated enrollment and study information. All of the patients questions were answered to their satisfaction.    The consent form version 23 MAY 2023 was signed 6/7/2023.     The subject was provided with a copy of the consent.    Sierra R. Behr-Holewinski

## 2023-06-09 ENCOUNTER — HOSPITAL ENCOUNTER (OUTPATIENT)
Dept: CARDIAC REHAB | Facility: HOSPITAL | Age: 68
Discharge: HOME | End: 2023-06-09
Attending: INTERNAL MEDICINE
Payer: MEDICARE

## 2023-06-09 PROCEDURE — 93798 PHYS/QHP OP CAR RHAB W/ECG: CPT

## 2023-06-12 ENCOUNTER — TELEPHONE (OUTPATIENT)
Dept: CARDIOLOGY | Facility: CLINIC | Age: 68
End: 2023-06-12
Payer: MEDICARE

## 2023-06-12 ENCOUNTER — HOSPITAL ENCOUNTER (OUTPATIENT)
Dept: CARDIAC REHAB | Facility: HOSPITAL | Age: 68
Discharge: HOME | End: 2023-06-12
Attending: INTERNAL MEDICINE
Payer: MEDICARE

## 2023-06-12 PROCEDURE — 93798 PHYS/QHP OP CAR RHAB W/ECG: CPT

## 2023-06-12 NOTE — PROGRESS NOTES
CLASP II TR Lab And EKG  30 day  Review    Purpose: Freeman PILAR TrAnScatheter Valve RePair System Pivotal Clinical Trial (CLASP II TR): A prospective, multicenter, randomized, controlled pivotal trial to evaluate the safety and effectiveness of transcatheter tricuspid valve repair with the Freeman PILAR Transcatheter Valve Repair System and optimal medical therapy (OMT) compared to OMT alone in patients with tricuspid regurgitation.     Dr. Plummer -- Please specify if the lab work and EKG is clinically significant or not. If clinical significant, please indicate what next actions should be!     If CS, please indicate next actions!       Component      Latest Ref Rng 6/7/2023  3:46 PM CS/NCS   Urea Nitrogen      8.0 - 23.0 mg/dL 16.3     Creatinine      0.51 - 0.95 mg/dL 0.71     Alkaline Phosphatase      35 - 104 U/L 113 (H)  []  CS    [x]  NCS   AST      10 - 35 U/L 36 (H)  []  CS    [x]  NCS   ALT      10 - 35 U/L 29     Albumin      3.5 - 5.2 g/dL 4.0     Bilirubin Total      <=1.2 mg/dL 0.6     GFR Estimate      >60 mL/min/1.73m2 >90     WBC      4.0 - 11.0 10e3/uL 4.7     RBC Count      3.80 - 5.20 10e6/uL 4.26     Hemoglobin      11.7 - 15.7 g/dL 14.8     Hematocrit      35.0 - 47.0 % 42.3     Platelet Count      150 - 450 10e3/uL 282     Uric Acid      2.4 - 5.7 mg/dL 6.1 (H)  []  CS    [x]  NCS   GGT      5 - 36 U/L 36        Legend:  (H) High      EKG 6/7/2023 -- []  CS    [x]  NCS       6/7/23 3:42 PM     Systolic Blood Pressure mmHg       Diastolic Blood Pressure mmHg       Ventricular Rate BPM 74       Atrial Rate BPM 61       NM Interval ms       QRS Duration ms 94      QT ms 388       QTc ms 430       P Axis degrees       R AXIS degrees 134       T Axis degrees 4      Interpretation ECG  Atrial fibrillation   Right axis deviation   Possible Anterior infarct (cited on or before 04-MAY-2023)   Abnormal ECG   When compared with ECG of 04-MAY-2023 05:45,   QRS axis Shifted right   Confirmed by  EMELIA PHILLIPS, SEDRICK LOC:WW ([id number]) on 6/7/2023 3:51:31 PM             Sierra R. Behr-Holewinski    Addendum added 2/5/24: Original writer of note is no longer a [facility] - resent note to PI for cosign.    Lydia Ramos

## 2023-06-12 NOTE — TELEPHONE ENCOUNTER
===View-only below this line===  ----- Message -----  From: Behr-Holewinski, Sierra R  Sent: 6/12/2023  11:01 AM CDT  To: Charlette Montana RN; Lizabeth Espinal RN  Subject: FW: Cosign-Required Note                         How would you like me to go about this?     Thanks!   Ernestine   ----- Message -----  From: Howard Plummer MD  Sent: 6/12/2023  10:56 AM CDT  To: Sierra R Behr-Holewinski  Subject: RE: Cosign-Required Note                         Recommend follow up w/ PCP for lab re-check in 1 mos  ----- Message -----  From: Behr-Holewinski, Sierra R  Sent: 6/12/2023  10:23 AM CDT  To: Howard Plummer MD  Subject: Cosign-Required Note

## 2023-06-12 NOTE — TELEPHONE ENCOUNTER
Labs and message/letter were faxed over to patients PCP office with recommendation to follow up with labs in one month.

## 2023-06-12 NOTE — PROGRESS NOTES
CLASP II TR Lab And EKG  30 day  Review     Purpose: Freeman PILAR TrAnScatheter Valve RePair System Pivotal Clinical Trial (CLASP II TR): A prospective, multicenter, randomized, controlled pivotal trial to evaluate the safety and effectiveness of transcatheter tricuspid valve repair with the Freeman PILAR Transcatheter Valve Repair System and optimal medical therapy (OMT) compared to OMT alone in patients with tricuspid regurgitation.      Dr. Plummer -- Please specify if the lab work and EKG is clinically significant or not. If clinical significant, please indicate what next actions should be!      If CS, please indicate next actions!         Component      Latest Ref Rng 6/7/2023  3:46 PM CS/NCS   Urea Nitrogen      8.0 - 23.0 mg/dL 16.3      Creatinine      0.51 - 0.95 mg/dL 0.71      Alkaline Phosphatase      35 - 104 U/L 113 (H)  []  CS    [x]  NCS   AST      10 - 35 U/L 36 (H)  []  CS    [x]  NCS   ALT      10 - 35 U/L 29      Albumin      3.5 - 5.2 g/dL 4.0      Bilirubin Total      <=1.2 mg/dL 0.6      GFR Estimate      >60 mL/min/1.73m2 >90      WBC      4.0 - 11.0 10e3/uL 4.7      RBC Count      3.80 - 5.20 10e6/uL 4.26      Hemoglobin      11.7 - 15.7 g/dL 14.8      Hematocrit      35.0 - 47.0 % 42.3      Platelet Count      150 - 450 10e3/uL 282      Uric Acid      2.4 - 5.7 mg/dL 6.1 (H)  []  CS    [x]  NCS   GGT      5 - 36 U/L 36         Legend:  (H) High        EKG 6/7/2023 -- []  CS    [x]  NCS           6/7/23 3:42 PM      Systolic Blood Pressure mmHg        Diastolic Blood Pressure mmHg        Ventricular Rate BPM 74       Atrial Rate BPM 61       SC Interval ms        QRS Duration ms 94       QT ms 388       QTc ms 430       P Axis degrees        R AXIS degrees 134       T Axis degrees 4       Interpretation ECG   Atrial fibrillation   Right axis deviation   Possible Anterior infarct (cited on or before 04-MAY-2023)   Abnormal ECG   When compared with ECG of 04-MAY-2023 05:45,   QRS axis  Shifted right   Confirmed by SEDRICK KAPOOR MD LOC:WW ([id number]) on 6/7/2023 3:51:31 PM                  Sierra R. Behr-Holewinski

## 2023-06-12 NOTE — TELEPHONE ENCOUNTER
Patient was contacted to notify her lab work is stable at this point and no recommendations for changes except that Dr. Plummer recommends patient follow up with her PCP in one month and they can recheck any lab work needed at that point. Discussed with patient that I sent a fax over to PCP with her lab work included and the recommendation to recheck and follow with them in one month. Patient verbalized understanding of this recommendation.    Patient wanted to clarify next appts- discussed next visit is 9/1 at 135 PM with Dr. Siegel for 6 month check in. Discussed we will see pt again in valve clinic in 1 year and then she should follow with a general cardiologist from there on out whether she continues to see Dr. Siegel or a gen card provider. Patient verbalized understanding. Pt encouraged to contact valve clinic or research coordinator if she has any additional questions.     Charlette Montana RN on 6/12/2023 at 11:48 AM

## 2023-06-13 LAB
BASE EXCESS BLDA CALC-SCNC: -0.1 MMOL/L
BASE EXCESS BLDV CALC-SCNC: 1.4 MMOL/L
COHGB MFR BLD: 99.2 % (ref 95–96)
HCO3 BLDA-SCNC: 24 MMOL/L (ref 23–29)
HCO3 BLDV-SCNC: 25 MMOL/L (ref 24–30)
PCO2 BLDA: 40 MM HG (ref 35–45)
PCO2 BLDV: 44 MM HG (ref 35–50)
PH BLDA: 7.4 [PH] (ref 7.37–7.44)
PH BLDV: 7.39 [PH] (ref 7.35–7.45)
PO2 BLDA: 119 MM HG (ref 75–85)
PO2 BLDV: 41 MM HG (ref 25–47)
SATV LHE POCT: 76.9 % (ref 70–75)

## 2023-06-16 ENCOUNTER — HOSPITAL ENCOUNTER (OUTPATIENT)
Dept: CARDIAC REHAB | Facility: HOSPITAL | Age: 68
Discharge: HOME | End: 2023-06-16
Attending: INTERNAL MEDICINE
Payer: MEDICARE

## 2023-06-16 PROCEDURE — 93798 PHYS/QHP OP CAR RHAB W/ECG: CPT

## 2023-06-19 ENCOUNTER — HOSPITAL ENCOUNTER (OUTPATIENT)
Dept: CARDIAC REHAB | Facility: HOSPITAL | Age: 68
Discharge: HOME | End: 2023-06-19
Attending: INTERNAL MEDICINE
Payer: MEDICARE

## 2023-06-19 PROCEDURE — 93798 PHYS/QHP OP CAR RHAB W/ECG: CPT

## 2023-06-21 ENCOUNTER — HOSPITAL ENCOUNTER (OUTPATIENT)
Dept: CARDIAC REHAB | Facility: HOSPITAL | Age: 68
Discharge: HOME | End: 2023-06-21
Attending: INTERNAL MEDICINE
Payer: MEDICARE

## 2023-06-21 PROCEDURE — 93798 PHYS/QHP OP CAR RHAB W/ECG: CPT

## 2023-06-23 ENCOUNTER — HOSPITAL ENCOUNTER (OUTPATIENT)
Dept: CARDIAC REHAB | Facility: HOSPITAL | Age: 68
Discharge: HOME | End: 2023-06-23
Attending: INTERNAL MEDICINE
Payer: MEDICARE

## 2023-06-23 PROCEDURE — 93798 PHYS/QHP OP CAR RHAB W/ECG: CPT

## 2023-06-26 ENCOUNTER — HOSPITAL ENCOUNTER (OUTPATIENT)
Dept: CARDIAC REHAB | Facility: HOSPITAL | Age: 68
Discharge: HOME | End: 2023-06-26
Attending: INTERNAL MEDICINE
Payer: MEDICARE

## 2023-06-26 PROCEDURE — 93798 PHYS/QHP OP CAR RHAB W/ECG: CPT

## 2023-06-28 ENCOUNTER — HOSPITAL ENCOUNTER (OUTPATIENT)
Dept: CARDIAC REHAB | Facility: HOSPITAL | Age: 68
Discharge: HOME | End: 2023-06-28
Attending: INTERNAL MEDICINE
Payer: MEDICARE

## 2023-06-28 PROCEDURE — 93797 PHYS/QHP OP CAR RHAB WO ECG: CPT

## 2023-06-28 PROCEDURE — 93798 PHYS/QHP OP CAR RHAB W/ECG: CPT

## 2023-06-30 ENCOUNTER — HOSPITAL ENCOUNTER (OUTPATIENT)
Dept: CARDIAC REHAB | Facility: HOSPITAL | Age: 68
Discharge: HOME | End: 2023-06-30
Attending: INTERNAL MEDICINE
Payer: MEDICARE

## 2023-06-30 PROCEDURE — 93798 PHYS/QHP OP CAR RHAB W/ECG: CPT

## 2023-07-05 ENCOUNTER — HOSPITAL ENCOUNTER (OUTPATIENT)
Dept: CARDIAC REHAB | Facility: HOSPITAL | Age: 68
Discharge: HOME | End: 2023-07-05
Attending: INTERNAL MEDICINE
Payer: MEDICARE

## 2023-07-05 PROCEDURE — 93798 PHYS/QHP OP CAR RHAB W/ECG: CPT

## 2023-07-07 ENCOUNTER — HOSPITAL ENCOUNTER (OUTPATIENT)
Dept: CARDIAC REHAB | Facility: HOSPITAL | Age: 68
Discharge: HOME | End: 2023-07-07
Attending: INTERNAL MEDICINE
Payer: MEDICARE

## 2023-07-07 PROCEDURE — 93798 PHYS/QHP OP CAR RHAB W/ECG: CPT

## 2023-07-10 ENCOUNTER — HOSPITAL ENCOUNTER (OUTPATIENT)
Dept: CARDIAC REHAB | Facility: HOSPITAL | Age: 68
Discharge: HOME | End: 2023-07-10
Attending: INTERNAL MEDICINE
Payer: MEDICARE

## 2023-07-10 PROCEDURE — 93798 PHYS/QHP OP CAR RHAB W/ECG: CPT

## 2023-07-14 ENCOUNTER — HOSPITAL ENCOUNTER (OUTPATIENT)
Dept: CARDIAC REHAB | Facility: HOSPITAL | Age: 68
Discharge: HOME | End: 2023-07-14
Attending: INTERNAL MEDICINE
Payer: MEDICARE

## 2023-07-14 PROCEDURE — 93798 PHYS/QHP OP CAR RHAB W/ECG: CPT

## 2023-07-17 ENCOUNTER — HOSPITAL ENCOUNTER (OUTPATIENT)
Dept: CARDIAC REHAB | Facility: HOSPITAL | Age: 68
Discharge: HOME | End: 2023-07-17
Attending: INTERNAL MEDICINE
Payer: MEDICARE

## 2023-07-17 PROCEDURE — 93798 PHYS/QHP OP CAR RHAB W/ECG: CPT

## 2023-07-19 ENCOUNTER — HOSPITAL ENCOUNTER (OUTPATIENT)
Dept: CARDIAC REHAB | Facility: HOSPITAL | Age: 68
Discharge: HOME | End: 2023-07-19
Attending: INTERNAL MEDICINE
Payer: MEDICARE

## 2023-07-19 PROCEDURE — 93798 PHYS/QHP OP CAR RHAB W/ECG: CPT

## 2023-07-21 ENCOUNTER — HOSPITAL ENCOUNTER (OUTPATIENT)
Dept: CARDIAC REHAB | Facility: HOSPITAL | Age: 68
Discharge: HOME | End: 2023-07-21
Attending: INTERNAL MEDICINE
Payer: MEDICARE

## 2023-07-21 PROCEDURE — 93798 PHYS/QHP OP CAR RHAB W/ECG: CPT

## 2023-07-24 ENCOUNTER — HOSPITAL ENCOUNTER (OUTPATIENT)
Dept: CARDIAC REHAB | Facility: HOSPITAL | Age: 68
Discharge: HOME | End: 2023-07-24
Attending: INTERNAL MEDICINE
Payer: MEDICARE

## 2023-07-24 PROCEDURE — 93798 PHYS/QHP OP CAR RHAB W/ECG: CPT

## 2023-07-26 ENCOUNTER — HOSPITAL ENCOUNTER (OUTPATIENT)
Dept: CARDIAC REHAB | Facility: HOSPITAL | Age: 68
Discharge: HOME | End: 2023-07-26
Attending: INTERNAL MEDICINE
Payer: MEDICARE

## 2023-07-26 PROCEDURE — 93798 PHYS/QHP OP CAR RHAB W/ECG: CPT

## 2023-07-26 NOTE — ANESTHESIA PREPROCEDURE EVALUATION
Anesthesia Pre-Procedure Evaluation    Patient: Misty Quezada   MRN: [id number] : 1955        Procedure : Procedure(s):  Transcatheter Tricuspid Valve Repair  OR TRANSCATHETER TRICUSPID VALVE REPLACEMENT, FEMORAL PERCUTANEOUS APPROACH (STANDBY)          Past Medical History:   Diagnosis Date     (HFpEF) heart failure with preserved ejection fraction (H) 10/17/2022     Aortic valve stenosis 11/10/2022     Benign essential hypertension      Chronic atrial fibrillation (H)      Lymphedema 2022     Nonrheumatic tricuspid valve regurgitation 2023      Past Surgical History:   Procedure Laterality Date     APPENDECTOMY       CHOLECYSTECTOMY       CV CORONARY ANGIOGRAM N/A 3/15/2023    Procedure: Coronary Angiogram;  Surgeon: Howard Plummer MD;  Location: [facility] CATH LAB CV     CV LEFT HEART CATH N/A 3/15/2023    Procedure: Left Heart Catheterization;  Surgeon: Howard Plummer MD;  Location: [facility] CATH LAB CV     CV RIGHT HEART CATH MEASUREMENTS RECORDED N/A 3/15/2023    Procedure: Right Heart Catheterization;  Surgeon: Howard Plummer MD;  Location: [facility] CATH LAB CV     GASTRIC BYPASS        No Known Allergies   Social History     Tobacco Use     Smoking status: Never     Smokeless tobacco: Never   Vaping Use     Vaping status: Not on file   Substance Use Topics     Alcohol use: Not Currently     Comment: Rarely Uses alcohol      Wt Readings from Last 1 Encounters:   23 115.2 kg (254 lb)        Anesthesia Evaluation   Pt has had prior anesthetic.     No history of anesthetic complications       ROS/MED HX  ENT/Pulmonary:       Neurologic:       Cardiovascular:     (+) hypertension-----valvular problems/murmurs     METS/Exercise Tolerance:     Hematologic:       Musculoskeletal:       GI/Hepatic:       Renal/Genitourinary:       Endo:     (+) Obesity,     Psychiatric/Substance Use:       Infectious Disease:       Malignancy:       Other:            Physical Exam    Airway         Mallampati: II    Neck ROM: full     Respiratory Devices and Support         Dental       (+) Edentulous      Cardiovascular           (+) murmur       Pulmonary   pulmonary exam normal                OUTSIDE LABS:  CBC:   Lab Results   Component Value Date    WBC 4.7 05/01/2023    WBC 5.7 03/15/2023    HGB 13.2 05/01/2023    HGB 13.2 03/15/2023    HCT 41.4 05/01/2023    HCT 40.1 03/15/2023     05/01/2023     03/15/2023     BMP:   Lab Results   Component Value Date     05/01/2023     03/15/2023    POTASSIUM 4.4 05/01/2023    POTASSIUM 4.2 03/15/2023    CHLORIDE 104 05/01/2023    CHLORIDE 103 03/15/2023    CO2 27 05/01/2023    CO2 27 03/15/2023    BUN 21.7 05/01/2023    BUN 17.6 03/15/2023    CR 0.79 05/01/2023    CR 0.74 03/15/2023     (H) 05/01/2023     (H) 03/15/2023     COAGS:   Lab Results   Component Value Date    PTT 28 01/25/2023    INR 1.07 05/01/2023     POC: No results found for: BGM, HCG, HCGS  HEPATIC:   Lab Results   Component Value Date    ALBUMIN 4.0 05/01/2023    PROTTOTAL 7.1 05/01/2023    ALT 21 05/01/2023    AST 28 05/01/2023    GGT 52 (H) 01/25/2023    ALKPHOS 107 (H) 05/01/2023    BILITOTAL 0.8 05/01/2023     OTHER:   Lab Results   Component Value Date    PH 7.38 03/15/2023    LACT 0.9 08/22/2021    A1C 6.2 (H) 09/02/2021    TURNER 9.3 05/01/2023    MAG 2.2 05/01/2023    TSH 4.92 08/31/2021    CRP 1.8 (H) 09/21/2021    SED 47 (H) 09/02/2021       Anesthesia Plan    ASA Status:  4      Anesthesia Type: General.     - Airway: ETT              Consents    Anesthesia Plan(s) and associated risks, benefits, and realistic alternatives discussed. Questions answered and patient/representative(s) expressed understanding.     - Discussed: Risks, Benefits and Alternatives for the PROCEDURE were discussed     - Discussed with:  Patient      - Extended Intubation/Ventilatory Support Discussed: No.      - Patient is DNR/DNI Status: No    Use of blood products discussed:  No .     Postoperative Care    Pain management: Multi-modal analgesia.   PONV prophylaxis: Ondansetron (or other 5HT-3), Dexamethasone or Solumedrol     Comments:                Nicky Gutierrez MD   soft/nontender

## 2023-07-31 ENCOUNTER — HOSPITAL ENCOUNTER (OUTPATIENT)
Dept: CARDIAC REHAB | Facility: HOSPITAL | Age: 68
Discharge: HOME | End: 2023-07-31
Attending: INTERNAL MEDICINE
Payer: MEDICARE

## 2023-07-31 PROCEDURE — 93798 PHYS/QHP OP CAR RHAB W/ECG: CPT

## 2023-08-02 ENCOUNTER — HOSPITAL ENCOUNTER (OUTPATIENT)
Dept: CARDIAC REHAB | Facility: HOSPITAL | Age: 68
Discharge: HOME | End: 2023-08-02
Attending: INTERNAL MEDICINE
Payer: MEDICARE

## 2023-08-02 PROCEDURE — 93798 PHYS/QHP OP CAR RHAB W/ECG: CPT

## 2023-08-03 ENCOUNTER — HOSPITAL ENCOUNTER (OUTPATIENT)
Dept: CARDIAC REHAB | Facility: HOSPITAL | Age: 68
Discharge: HOME | End: 2023-08-03
Attending: INTERNAL MEDICINE
Payer: MEDICARE

## 2023-08-03 PROCEDURE — 93798 PHYS/QHP OP CAR RHAB W/ECG: CPT

## 2023-08-03 PROCEDURE — 93797 PHYS/QHP OP CAR RHAB WO ECG: CPT

## 2023-08-08 ENCOUNTER — OFFICE VISIT (OUTPATIENT)
Dept: CARDIOLOGY | Facility: CLINIC | Age: 68
End: 2023-08-08
Attending: INTERNAL MEDICINE
Payer: MEDICARE

## 2023-08-08 VITALS
BODY MASS INDEX: 40.57 KG/M2 | RESPIRATION RATE: 18 BRPM | SYSTOLIC BLOOD PRESSURE: 106 MMHG | DIASTOLIC BLOOD PRESSURE: 66 MMHG | HEART RATE: 92 BPM | WEIGHT: 259 LBS | OXYGEN SATURATION: 95 %

## 2023-08-08 DIAGNOSIS — I89.0 LYMPHEDEMA: ICD-10-CM

## 2023-08-08 DIAGNOSIS — E66.01 MORBID OBESITY (H): ICD-10-CM

## 2023-08-08 DIAGNOSIS — I48.20 CHRONIC ATRIAL FIBRILLATION (H): ICD-10-CM

## 2023-08-08 DIAGNOSIS — I36.1 NONRHEUMATIC TRICUSPID VALVE REGURGITATION: ICD-10-CM

## 2023-08-08 DIAGNOSIS — I50.32 CHRONIC HEART FAILURE WITH PRESERVED EJECTION FRACTION (H): Primary | ICD-10-CM

## 2023-08-08 DIAGNOSIS — I35.0 NONRHEUMATIC AORTIC VALVE STENOSIS: ICD-10-CM

## 2023-08-08 DIAGNOSIS — I95.9 HYPOTENSION, UNSPECIFIED HYPOTENSION TYPE: ICD-10-CM

## 2023-08-08 PROCEDURE — 99214 OFFICE O/P EST MOD 30 MIN: CPT | Performed by: NURSE PRACTITIONER

## 2023-08-08 NOTE — PATIENT INSTRUCTIONS
Misty Quezada,    It was a pleasure to see you today at the [facility].     My recommendations after this visit include:    - No medications changes made today    - Please call if your systolic (top) blood pressure is persistently running <90 with lightheaded, dizziness, or excessive fatigue or weakness    - Follow up with Dr. Rocha in 4-5 months    - Follow up with Dr. Siegel in September as scheduled     - Please call Heart Failure Nurse Line at [phone], if you have any questions or concerns

## 2023-08-08 NOTE — PROGRESS NOTES
Assessment/Recommendations   Assessment:      Hypotension: Patient called in in July with hypotension.  She was instructed to stop losartan.  Blood pressure today is 106/66.  Reviewed cardiac rehab note.  Blood pressure is stable.  On 8/2/2023, her systolic blood pressure was 100.  She denies symptoms.    2.  Heart failure with preserved ejection fraction: On Bumex 2 mg twice a day and spironolactone 25 mg daily.  BMP stable on 6/7/2023.    Current weight is 257 to 259 pounds.    Chronic lymphedema    Patient is well compensated on exam.    3.  Severe functional tricuspid regurgitation with status post TIARA with 1 PASCALACE device resulting in slight reduction in tricuspid regurgitation: Most recent echo showed still 3+ residual tricuspid regurgitation.  Patient reports slight improvement in her shortness of breath since tricuspid valve repair in May 2023.    4.  Obesity with BMI of 40.57:    5.  Moderate aortic stenosis: Heart failure stable.    6.  Permanent atrial fibrillation:  On Eliquis for stroke prophylaxis.    Plan/Recommendation:  -No medication changes made today  -Continue current diuretic regimen  -Continue current hypertension regimen.  -Patient was encouraged to call if double persistent systolic blood pressure in the 90s with lightheadedness or dizziness.  -Continue low-sodium less than 2 g/day, daily weight monitoring, and maintain for intake of 50 to 60 ounces per day      Follow up with Dr. Siegel as scheduled in September.  Follow-up with Dr. Rocha in 4-5 months     History of Present Illness/Subjective    Ms. Misty Quezada is a 68 year old female with a past medical history of primary atrial fibrillation, heart failure preserved ejection fraction, severe tricuspid regurgitation, moderate aortic valve stenosis, lymphedema, morbid obesity, and obstructive sleep apnea who is seen at [facility] for follow-up recommendation from   Aj.    Today, Misty reports feeling some improvement in her shortness of breath since her tricuspid valve repair in May 2023.  She denies fatigue, lightheadedness, shortness of breath, orthopnea, PND, palpitations, chest pain, and abdominal fullness/bloating.  She denies bleeding complications.  She reports following low-sodium diet.  She reports adequate fluid intake.    ECHO from 6/1/23-Reviewed:   Interpretation Summary   1. The left ventricle is normal in size. Left ventricular function is  normal.The ejection fraction is 55-60%.  2. The right ventricle is borderline dilated. Mildly decreased right  ventricular systolic function.  3. The left atrium is severely dilated. The right atrium is severely dilated.  4. A single Janette-Ace device is attached to the anterior and septal leaflets  of tricuspid valve with moderate (+3) eccentric tricuspid regurgitation with  mean gradient of 3 mm Hg.  5. Moderate valvular aortic stenosis (LORRAINE:1.2cm2, Peakvel:2.5 m/sec, DI: 0.28,  SVi: 28 ml/m2). There is mild (1+) aortic regurgitation.  6. The right ventricular systolic pressure is approximated at 37mmHg plus the  right atrial pressure. High RA pressure estimated at 15 mmHg or greater.  ______________________________________________________________________________  Left Ventricle  The left ventricle is normal in size. Left ventricular function is normal.The  ejection fraction is 55-60%. There is normal left ventricular wall thickness.  Normal left ventricular wall motion.     Physical Examination Review of Systems   /66 (BP Location: Left arm, Patient Position: Sitting, Cuff Size: Adult Large)   Pulse 92   Resp 18   Wt 117.5 kg (259 lb)   SpO2 95%   BMI 40.57 kg/m    Body mass index is 40.57 kg/m .  Wt Readings from Last 3 Encounters:   08/08/23 117.5 kg (259 lb)   06/07/23 114.3 kg (252 lb)   05/30/23 115.1 kg (253 lb 12.8 oz)     General Appearance:   no distress, normal body habitus   ENT/Mouth: membranes  moist, no oral lesions or bleeding gums.      EYES:  no scleral icterus, normal conjunctivae   Neck: no carotid bruits or thyromegaly   Chest/Lungs:   lungs are clear to auscultation, no rales or wheezing, equal chest wall expansion    Cardiovascular:   Irregular irregular. Normal first and second heart sounds with no murmurs, rubs, or gallops; the carotid, radial and posterior tibial pulses are intact, JVP is difficult to assess due to the patient's obesity and body habitus   , no edema bilaterally    Abdomen:  no organomegaly, masses, bruits, or tenderness; bowel sounds are present   Extremities   no cyanosis or clubbing   Radial pulses and Pedal pulses intact and symmetrical.  CMS intact.   Skin: no xanthelasma, warm.    Neurologic: normal  bilateral, no tremors     Psychiatric: alert and oriented x3, calm                                                        Negative unless noted in HPI     Medical History  Surgical History Family History Social History   Past Medical History:   Diagnosis Date    (HFpEF) heart failure with preserved ejection fraction (H) 10/17/2022    Aortic valve stenosis 11/10/2022    Benign essential hypertension     Chronic atrial fibrillation (H)     Lymphedema 9/24/2022    Nonrheumatic tricuspid valve regurgitation 2/6/2023    Past Surgical History:   Procedure Laterality Date    APPENDECTOMY      CHOLECYSTECTOMY      CV CORONARY ANGIOGRAM N/A 3/15/2023    Procedure: Coronary Angiogram;  Surgeon: Howard Plummer MD;  Location: [facility] CV    CV LEFT HEART CATH N/A 3/15/2023    Procedure: Left Heart Catheterization;  Surgeon: Howard Plummer MD;  Location: [facility]    CV RIGHT HEART CATH MEASUREMENTS RECORDED N/A 3/15/2023    Procedure: Right Heart Catheterization;  Surgeon: Howard Plummer MD;  Location: [facility] CV    CV TRANSCATHETER TRICUSPID VALVE REPAIR N/A 5/3/2023    Procedure: Transcatheter Tricuspid Valve Repair;  Surgeon: Kavitha  MD Howard;  Location: [facility] CV    GASTRIC BYPASS      OR TRANSCATHETER TRICUSPID VALVE REPLACEMENT, FEMORAL PERCUTANEOUS APPROACH (STANDBY) N/A 5/3/2023    Procedure: OR TRANSCATHETER TRICUSPID VALVE REPLACEMENT, FEMORAL PERCUTANEOUS APPROACH ([facility]);  Surgeon: Kana Calderón MD;  Location: [facility] CV    Family History   Problem Relation Age of Onset    No Known Problems Mother     No Known Problems Father     Social History     Socioeconomic History    Marital status:      Spouse name: Not on file    Number of children: Not on file    Years of education: Not on file    Highest education level: Not on file   Occupational History    Not on file   Tobacco Use    Smoking status: Never    Smokeless tobacco: Never   Substance and Sexual Activity    Alcohol use: Not Currently     Comment: Rarely Uses alcohol    Drug use: Never    Sexual activity: Not on file   Other Topics Concern    Not on file   Social History Narrative    Not on file     Social Determinants of Health     Financial Resource Strain: Not on file   Food Insecurity: Not on file   Transportation Needs: Not on file   Physical Activity: Not on file   Stress: Not on file   Social Connections: Not on file   Intimate Partner Violence: Not on file   Housing Stability: Not on file          Medications  Allergies   Current Outpatient Medications   Medication Sig Dispense Refill    acetaminophen (TYLENOL) 325 MG tablet Take 975 mg by mouth every 4 hours as needed for mild pain      apixaban (ELIQUIS) 5 mg Tab tablet Take 5 mg by mouth 2 times daily      ascorbic acid 1000 MG TABS tablet Take 1 tablet by mouth daily      bumetanide (BUMEX) 2 MG tablet TAKE 1 TABLET(2 MG) BY MOUTH TWICE DAILY 180 tablet 3    CALCIUM/D3/MAG OX//TYRONE/ZN (CALTRATE + D3 PLUS MINERALS ORAL) [CALCIUM/D3/MAG OX//TYRONE/ZN (CALTRATE + D3 PLUS MINERALS ORAL)] Take 1 tablet by mouth daily.      cholecalciferol, vitamin D3, 2,000 unit Tab  [CHOLECALCIFEROL, VITAMIN D3, 2,000 UNIT TAB] Take 2,000 Units by mouth daily.      citalopram (CELEXA) 10 MG tablet Take 20 mg by mouth daily      cyanocobalamin 1000 MCG tablet [CYANOCOBALAMIN 1000 MCG TABLET] Take 1,000 mcg by mouth daily.      ferrous sulfate (FEROSUL) 325 (65 Fe) MG tablet Take 1 tablet (325 mg) by mouth every other day      melatonin 3 MG tablet Take 3 mg by mouth nightly as needed      metoprolol succinate ER (TOPROL XL) 25 MG 24 hr tablet Take 2 tablets (50 mg) by mouth daily 180 tablet 3    MULTIVIT WITH CALCIUM,IRON,MIN (WOMEN'S DAILY MULTIVITAMIN ORAL) [MULTIVIT WITH CALCIUM,IRON,MIN (WOMEN'S DAILY MULTIVITAMIN ORAL)] Take 1 tablet by mouth daily.      polyethylene glycol (MIRALAX) 17 g packet Take 17 g by mouth daily as needed for constipation      potassium chloride ER (K-TAB/KLOR-CON) 10 MEQ CR tablet Take 1 tablet (10 mEq) by mouth daily 30 tablet 11    spironolactone (ALDACTONE) 25 MG tablet Take 1 tablet (25 mg) by mouth daily 90 tablet 0    No Known Allergies      Lab Results    Chemistry/lipid CBC Cardiac Enzymes/BNP/TSH/INR   Lab Results   Component Value Date    CHOL 163 09/09/2016    HDL 42 (L) 09/09/2016    TRIG 81 09/09/2016    BUN 16.3 06/07/2023     06/07/2023    CO2 24 06/07/2023    Lab Results   Component Value Date    WBC 4.7 06/07/2023    HGB 14.8 06/07/2023    HCT 42.3 06/07/2023    MCV 99 06/07/2023     06/07/2023    Lab Results   Component Value Date    TROPONINI 1.32 (HH) 08/21/2021     (H) 09/02/2021    TSH 4.92 08/31/2021    INR 1.21 (H) 05/04/2023        30  minutes spent on the date of encounter doing chart review, review of test results, patient visit, and documentation.        This note has been dictated using voice recognition software. Any grammatical, typographical, or context distortions are unintentional and inherent to the software

## 2023-08-08 NOTE — LETTER
8/8/2023    LAVELL WALLACE MD  [facility] [address]  [facility] [address]    RE: Misty Quezada       Dear Colleague,     I had the pleasure of seeing Misty Quezada in the [facility].          Assessment/Recommendations   Assessment:      Hypotension: Patient called in in July with hypotension.  She was instructed to stop losartan.  Blood pressure today is 106/66.  Reviewed cardiac rehab note.  Blood pressure is stable.  On 8/2/2023, her systolic blood pressure was 100.  She denies symptoms.    2.  Heart failure with preserved ejection fraction: On Bumex 2 mg twice a day and spironolactone 25 mg daily.  BMP stable on 6/7/2023.    Current weight is 257 to 259 pounds.    Chronic lymphedema    Patient is well compensated on exam.    3.  Severe functional tricuspid regurgitation with status post TIARA with 1 PASCALACE device resulting in slight reduction in tricuspid regurgitation: Most recent echo showed still 3+ residual tricuspid regurgitation.  Patient reports slight improvement in her shortness of breath since tricuspid valve repair in May 2023.    4.  Obesity with BMI of 40.57:    5.  Moderate aortic stenosis: Heart failure stable.    6.  Permanent atrial fibrillation:  On Eliquis for stroke prophylaxis.    Plan/Recommendation:  -No medication changes made today  -Continue current diuretic regimen  -Continue current hypertension regimen.  -Patient was encouraged to call if double persistent systolic blood pressure in the 90s with lightheadedness or dizziness.  -Continue low-sodium less than 2 g/day, daily weight monitoring, and maintain for intake of 50 to 60 ounces per day      Follow up with Dr. Siegel as scheduled in September.  Follow-up with Dr. Rocha in 4-5 months     History of Present Illness/Subjective    Ms. Misty Quezada is a 68 year old female with a past medical history of primary atrial fibrillation, heart failure preserved ejection  fraction, severe tricuspid regurgitation, moderate aortic valve stenosis, lymphedema, morbid obesity, and obstructive sleep apnea who is seen at [facility] for follow-up recommendation from Dr. Rocha.    Today, Misty reports feeling some improvement in her shortness of breath since her tricuspid valve repair in May 2023.  She denies fatigue, lightheadedness, shortness of breath, orthopnea, PND, palpitations, chest pain, and abdominal fullness/bloating.  She denies bleeding complications.  She reports following low-sodium diet.  She reports adequate fluid intake.    ECHO from 6/1/23-Reviewed:   Interpretation Summary   1. The left ventricle is normal in size. Left ventricular function is  normal.The ejection fraction is 55-60%.  2. The right ventricle is borderline dilated. Mildly decreased right  ventricular systolic function.  3. The left atrium is severely dilated. The right atrium is severely dilated.  4. A single Janette-Ace device is attached to the anterior and septal leaflets  of tricuspid valve with moderate (+3) eccentric tricuspid regurgitation with  mean gradient of 3 mm Hg.  5. Moderate valvular aortic stenosis (LORRAINE:1.2cm2, Peakvel:2.5 m/sec, DI: 0.28,  SVi: 28 ml/m2). There is mild (1+) aortic regurgitation.  6. The right ventricular systolic pressure is approximated at 37mmHg plus the  right atrial pressure. High RA pressure estimated at 15 mmHg or greater.  ______________________________________________________________________________  Left Ventricle  The left ventricle is normal in size. Left ventricular function is normal.The  ejection fraction is 55-60%. There is normal left ventricular wall thickness.  Normal left ventricular wall motion.     Physical Examination Review of Systems   /66 (BP Location: Left arm, Patient Position: Sitting, Cuff Size: Adult Large)   Pulse 92   Resp 18   Wt 117.5 kg (259 lb)   SpO2 95%   BMI 40.57 kg/m    Body mass index  is 40.57 kg/m .  Wt Readings from Last 3 Encounters:   08/08/23 117.5 kg (259 lb)   06/07/23 114.3 kg (252 lb)   05/30/23 115.1 kg (253 lb 12.8 oz)     General Appearance:   no distress, normal body habitus   ENT/Mouth: membranes moist, no oral lesions or bleeding gums.      EYES:  no scleral icterus, normal conjunctivae   Neck: no carotid bruits or thyromegaly   Chest/Lungs:   lungs are clear to auscultation, no rales or wheezing, equal chest wall expansion    Cardiovascular:   Irregular irregular. Normal first and second heart sounds with no murmurs, rubs, or gallops; the carotid, radial and posterior tibial pulses are intact, JVP is difficult to assess due to the patient's obesity and body habitus   , no edema bilaterally    Abdomen:  no organomegaly, masses, bruits, or tenderness; bowel sounds are present   Extremities   no cyanosis or clubbing   Radial pulses and Pedal pulses intact and symmetrical.  CMS intact.   Skin: no xanthelasma, warm.    Neurologic: normal  bilateral, no tremors     Psychiatric: alert and oriented x3, calm                                                        Negative unless noted in HPI     Medical History  Surgical History Family History Social History   Past Medical History:   Diagnosis Date    (HFpEF) heart failure with preserved ejection fraction (H) 10/17/2022    Aortic valve stenosis 11/10/2022    Benign essential hypertension     Chronic atrial fibrillation (H)     Lymphedema 9/24/2022    Nonrheumatic tricuspid valve regurgitation 2/6/2023    Past Surgical History:   Procedure Laterality Date    APPENDECTOMY      CHOLECYSTECTOMY      CV CORONARY ANGIOGRAM N/A 3/15/2023    Procedure: Coronary Angiogram;  Surgeon: Howard Plummer MD;  Location: [facility] CV    CV LEFT HEART CATH N/A 3/15/2023    Procedure: Left Heart Catheterization;  Surgeon: Howard Plummer MD;  Location: [facility] C[facility] CV    CV RIGHT HEART CATH MEASUREMENTS RECORDED N/A 3/15/2023     Procedure: Right Heart Catheterization;  Surgeon: Howard Plummer MD;  Location: [facility] CV    CV TRANSCATHETER TRICUSPID VALVE REPAIR N/A 5/3/2023    Procedure: Transcatheter Tricuspid Valve Repair;  Surgeon: Howard Plummer MD;  Location: [facility] CV    GASTRIC BYPASS      OR TRANSCATHETER TRICUSPID VALVE REPLACEMENT, FEMORAL PERCUTANEOUS APPROACH (STANDBY) N/A 5/3/2023    Procedure: OR TRANSCATHETER TRICUSPID VALVE REPLACEMENT, FEMORAL PERCUTANEOUS APPROACH ([facility]);  Surgeon: Kana Calderón MD;  Location: [facility] CV    Family History   Problem Relation Age of Onset    No Known Problems Mother     No Known Problems Father     Social History     Socioeconomic History    Marital status:      Spouse name: Not on file    Number of children: Not on file    Years of education: Not on file    Highest education level: Not on file   Occupational History    Not on file   Tobacco Use    Smoking status: Never    Smokeless tobacco: Never   Substance and Sexual Activity    Alcohol use: Not Currently     Comment: Rarely Uses alcohol    Drug use: Never    Sexual activity: Not on file   Other Topics Concern    Not on file   Social History Narrative    Not on file     Social Determinants of Health     Financial Resource Strain: Not on file   Food Insecurity: Not on file   Transportation Needs: Not on file   Physical Activity: Not on file   Stress: Not on file   Social Connections: Not on file   Intimate Partner Violence: Not on file   Housing Stability: Not on file          Medications  Allergies   Current Outpatient Medications   Medication Sig Dispense Refill    acetaminophen (TYLENOL) 325 MG tablet Take 975 mg by mouth every 4 hours as needed for mild pain      apixaban (ELIQUIS) 5 mg Tab tablet Take 5 mg by mouth 2 times daily      ascorbic acid 1000 MG TABS tablet Take 1 tablet by mouth daily      bumetanide (BUMEX) 2 MG tablet TAKE 1 TABLET(2 MG) BY MOUTH TWICE DAILY 180  tablet 3    CALCIUM/D3/MAG OX//TYRONE/ZN (CALTRATE + D3 PLUS MINERALS ORAL) [CALCIUM/D3/MAG OX//TYRONE/ZN (CALTRATE + D3 PLUS MINERALS ORAL)] Take 1 tablet by mouth daily.      cholecalciferol, vitamin D3, 2,000 unit Tab [CHOLECALCIFEROL, VITAMIN D3, 2,000 UNIT TAB] Take 2,000 Units by mouth daily.      citalopram (CELEXA) 10 MG tablet Take 20 mg by mouth daily      cyanocobalamin 1000 MCG tablet [CYANOCOBALAMIN 1000 MCG TABLET] Take 1,000 mcg by mouth daily.      ferrous sulfate (FEROSUL) 325 (65 Fe) MG tablet Take 1 tablet (325 mg) by mouth every other day      melatonin 3 MG tablet Take 3 mg by mouth nightly as needed      metoprolol succinate ER (TOPROL XL) 25 MG 24 hr tablet Take 2 tablets (50 mg) by mouth daily 180 tablet 3    MULTIVIT WITH CALCIUM,IRON,MIN (WOMEN'S DAILY MULTIVITAMIN ORAL) [MULTIVIT WITH CALCIUM,IRON,MIN (WOMEN'S DAILY MULTIVITAMIN ORAL)] Take 1 tablet by mouth daily.      polyethylene glycol (MIRALAX) 17 g packet Take 17 g by mouth daily as needed for constipation      potassium chloride ER (K-TAB/KLOR-CON) 10 MEQ CR tablet Take 1 tablet (10 mEq) by mouth daily 30 tablet 11    spironolactone (ALDACTONE) 25 MG tablet Take 1 tablet (25 mg) by mouth daily 90 tablet 0    No Known Allergies      Lab Results    Chemistry/lipid CBC Cardiac Enzymes/BNP/TSH/INR   Lab Results   Component Value Date    CHOL 163 09/09/2016    HDL 42 (L) 09/09/2016    TRIG 81 09/09/2016    BUN 16.3 06/07/2023     06/07/2023    CO2 24 06/07/2023    Lab Results   Component Value Date    WBC 4.7 06/07/2023    HGB 14.8 06/07/2023    HCT 42.3 06/07/2023    MCV 99 06/07/2023     06/07/2023    Lab Results   Component Value Date    TROPONINI 1.32 (HH) 08/21/2021     (H) 09/02/2021    TSH 4.92 08/31/2021    INR 1.21 (H) 05/04/2023        30  minutes spent on the date of encounter doing chart review, review of test results, patient visit, and documentation.        This note has been dictated using voice  recognition software. Any grammatical, typographical, or context distortions are unintentional and inherent to the software          Thank you for allowing me to participate in the care of your patient.      Sincerely,     ERICK Mar CNP     [facility]  cc:   Adia Rocha MD  [address] [address]

## 2023-08-16 DIAGNOSIS — I50.32 CHRONIC HEART FAILURE WITH PRESERVED EJECTION FRACTION (H): Primary | ICD-10-CM

## 2023-08-16 DIAGNOSIS — I35.0 NONRHEUMATIC AORTIC VALVE STENOSIS: ICD-10-CM

## 2023-08-16 DIAGNOSIS — I48.21 PERMANENT ATRIAL FIBRILLATION (H): ICD-10-CM

## 2023-08-16 RX ORDER — SPIRONOLACTONE 25 MG/1
25 TABLET ORAL DAILY
Qty: 90 TABLET | Refills: 4 | Status: SHIPPED | OUTPATIENT
Start: 2023-08-16 | End: 2023-12-22

## 2023-09-01 ENCOUNTER — OFFICE VISIT (OUTPATIENT)
Dept: CARDIOLOGY | Facility: CLINIC | Age: 68
End: 2023-09-01
Payer: MEDICARE

## 2023-09-01 VITALS
WEIGHT: 270 LBS | RESPIRATION RATE: 18 BRPM | SYSTOLIC BLOOD PRESSURE: 118 MMHG | HEIGHT: 67 IN | DIASTOLIC BLOOD PRESSURE: 64 MMHG | HEART RATE: 68 BPM | BODY MASS INDEX: 42.38 KG/M2

## 2023-09-01 DIAGNOSIS — I50.30 NYHA CLASS 3 HEART FAILURE WITH PRESERVED EJECTION FRACTION (H): Primary | ICD-10-CM

## 2023-09-01 PROCEDURE — 96374 THER/PROPH/DIAG INJ IV PUSH: CPT | Performed by: INTERNAL MEDICINE

## 2023-09-01 PROCEDURE — 99214 OFFICE O/P EST MOD 30 MIN: CPT | Mod: 25 | Performed by: INTERNAL MEDICINE

## 2023-09-01 RX ORDER — BUMETANIDE 0.25 MG/ML
6 INJECTION INTRAMUSCULAR; INTRAVENOUS ONCE
Status: COMPLETED | OUTPATIENT
Start: 2023-09-01 | End: 2023-09-01

## 2023-09-01 RX ADMIN — BUMETANIDE 6 MG: 0.25 INJECTION, SOLUTION INTRAMUSCULAR; INTRAVENOUS at 14:39

## 2023-09-01 NOTE — PROGRESS NOTES
HEART CARE NOTE          Assessment/Recommendations     1. HFpEF, Valvular heart disease c/b ADHF  Assessment / Plan  Near euvolemia on physical exam; denies HF symptoms of orthopnea, PND, edema - no changes to regimen at this time  GDMT as detailed below; mainstay of treatment for HFpEF includes diuretics and adequate BP control (class I) and SGLT2-I (class 2a); additional medical therapy (ARNI, MRA, ARB) demonstrated less robust evidence for indication but may be considered per guideline recommendations (2b); no indication for BBlockers      Current Pharmacotherapy AHA Guideline-Directed Medical Therapy   Losartan 25 mg daily - on hold 2/2 hypotension ARNI/ARB   Spironolactone 25 mg  MRA   SGLT2 inhibitor not started SGLT2-I    Bumex 2 mg BID Loop diuretic        2. Atrial fibrillation  Assessment / Plan  Rate controlled - currently on metoprolol, and apixaban      3. Valvular heart disease  Assessment / Plan  LORRAINE 1.1, pk/mn 2.78/17.4  Severe TR currently enrolled in CLASP II TR study - s/p 1 PASCALACE device    30 minutes spent reviewing prior records (including documentation, laboratory studies, cardiac testing/imaging), history and physical exam, planning, and subsequent documentation.      History of Present Illness/Subjective    Ms. Misty Quezada is a 67 year old female with a PMHx significant for atrial fibrillation who presented in ADHF in the setting of valvular heart disease.     Today, Mrs. Quezada denies any acute cardiac events or complaints; Management plan as detailed above    ECG: Personally reviewed. nonspecific ST and T waves changes, atrial fibrillation, with RVR     ECHO (personnaly Reviewed 9/1/23):  Interpretation Summary     1. Left ventricular size is normal. Mild concentric increase in wall  thickness. Systolic function is normal. The estimated left ventricular  ejection fraction is 55-60%.  2. Right ventricle is not well visualized. Size is probably at least  moderately enlarged.  "Systolic function appears normal.  3. Severe right atrial enlargement.  4. Calcified trileaflet aortic valve with moderate aortic stenosis. Peak  forward velocity measures 2.8 m/s, mean gradient 17 mm Hg, calculated aortic  valve area 1.1 cm2, and dimensionless index 0.44. Trace aortic regurgitation.  5. Severe functional tricuspid regurgitation.  6. At least mild pulmonary hypertension is present with an estimated pulmonary  artery systolic pressure of 39 mm Hg plus the right atrial pressure.  7. Compared to the prior study dated 8/22/2021, there has been no significant  change    Lab results: personally reviewed September 1, 2023; notable for stable renal function    Medical history and pertinent documents reviewed in Care Everywhere please where applicable see details above        Physical Examination Review of Systems   /64 (BP Location: Right arm, Patient Position: Sitting, Cuff Size: Adult Large)   Pulse 68   Resp 18   Ht 1.702 m (5' 7\")   Wt 122.5 kg (270 lb)   BMI 42.29 kg/m    Body mass index is 42.29 kg/m .  Wt Readings from Last 3 Encounters:   09/01/23 122.5 kg (270 lb)   08/08/23 117.5 kg (259 lb)   06/07/23 114.3 kg (252 lb)     General Appearance:   no distress, normal body habitus   ENT/Mouth: membranes moist, no oral lesions or bleeding gums.      EYES:  no scleral icterus, normal conjunctivae   Neck: no carotid bruits or thyromegaly   Chest/Lungs:   lungs are clear to auscultation, no rales or wheezing, equal chest wall expansion    Cardiovascular:   Regular. Normal first and second heart sounds with no murmurs, rubs, or gallops; the carotid, radial and posterior tibial pulses are intact, no JVD or LE edema bilaterally    Abdomen:  no organomegaly, masses, bruits, or tenderness; bowel sounds are present   Extremities: no cyanosis or clubbing   Skin: no xanthelasma, warm.    Neurologic: NAD     Psychiatric: alert and oriented x3, calm     A complete 10 systems ROS was reviewed  And is " negative except what is listed in the HPI.          Medical History  Surgical History Family History Social History   Past Medical History:   Diagnosis Date    (HFpEF) heart failure with preserved ejection fraction (H) 10/17/2022    Aortic valve stenosis 11/10/2022    Benign essential hypertension     Chronic atrial fibrillation (H)     Lymphedema 9/24/2022    Nonrheumatic tricuspid valve regurgitation 2/6/2023    Past Surgical History:   Procedure Laterality Date    APPENDECTOMY      CHOLECYSTECTOMY      CV CORONARY ANGIOGRAM N/A 3/15/2023    Procedure: Coronary Angiogram;  Surgeon: Howard Plummer MD;  Location: [facility] CATH LAB CV    CV LEFT HEART CATH N/A 3/15/2023    Procedure: Left Heart Catheterization;  Surgeon: Howard Plummer MD;  Location: [facility] CV    CV RIGHT HEART CATH MEASUREMENTS RECORDED N/A 3/15/2023    Procedure: Right Heart Catheterization;  Surgeon: Howard Plummer MD;  Location: [facility] CV    CV TRANSCATHETER TRICUSPID VALVE REPAIR N/A 5/3/2023    Procedure: Transcatheter Tricuspid Valve Repair;  Surgeon: Howard Plummer MD;  Location: [facility] LAB CV    GASTRIC BYPASS      OR TRANSCATHETER TRICUSPID VALVE REPLACEMENT, FEMORAL PERCUTANEOUS APPROACH (STANDBY) N/A 5/3/2023    Procedure: OR TRANSCATHETER TRICUSPID VALVE REPLACEMENT, FEMORAL PERCUTANEOUS APPROACH (STANDBY);  Surgeon: Kana Calderón MD;  Location: [facility] LAB CV    no family history of premature coronary artery disease Social History     Socioeconomic History    Marital status:      Spouse name: Not on file    Number of children: Not on file    Years of education: Not on file    Highest education level: Not on file   Occupational History    Not on file   Tobacco Use    Smoking status: Never    Smokeless tobacco: Never   Substance and Sexual Activity    Alcohol use: Not Currently     Comment: Rarely Uses alcohol    Drug use: Never    Sexual activity: Not on file   Other  Topics Concern    Not on file   Social History Narrative    Not on file     Social Determinants of Health     Financial Resource Strain: Not on file   Food Insecurity: Not on file   Transportation Needs: Not on file   Physical Activity: Not on file   Stress: Not on file   Social Connections: Not on file   Intimate Partner Violence: Not on file   Housing Stability: Not on file           Lab Results    Chemistry/lipid CBC Cardiac Enzymes/BNP/TSH/INR   Lab Results   Component Value Date    CHOL 163 09/09/2016    HDL 42 (L) 09/09/2016    TRIG 81 09/09/2016    BUN 16.3 06/07/2023     06/07/2023    CO2 24 06/07/2023    Lab Results   Component Value Date    WBC 4.7 06/07/2023    HGB 14.8 06/07/2023    HCT 42.3 06/07/2023    MCV 99 06/07/2023     06/07/2023    Lab Results   Component Value Date    TROPONINI 1.32 (HH) 08/21/2021     (H) 09/02/2021    TSH 4.92 08/31/2021    INR 1.21 (H) 05/04/2023     Lab Results   Component Value Date    TROPONINI 1.32 (HH) 08/21/2021          Weight:    Wt Readings from Last 3 Encounters:   09/01/23 122.5 kg (270 lb)   08/08/23 117.5 kg (259 lb)   06/07/23 114.3 kg (252 lb)       Allergies  No Known Allergies      Surgical History  Past Surgical History:   Procedure Laterality Date    APPENDECTOMY      CHOLECYSTECTOMY      CV CORONARY ANGIOGRAM N/A 3/15/2023    Procedure: Coronary Angiogram;  Surgeon: Howard Plummer MD;  Location: [facility] CATH LAB CV    CV LEFT HEART CATH N/A 3/15/2023    Procedure: Left Heart Catheterization;  Surgeon: Howard Plummer MD;  Location: [facility] CATH LAB CV    CV RIGHT HEART CATH MEASUREMENTS RECORDED N/A 3/15/2023    Procedure: Right Heart Catheterization;  Surgeon: Howard Plummer MD;  Location: [facility] CATH LAB CV    CV TRANSCATHETER TRICUSPID VALVE REPAIR N/A 5/3/2023    Procedure: Transcatheter Tricuspid Valve Repair;  Surgeon: Howard Plumemr MD;  Location: [facility] CATH LAB CV    GASTRIC BYPASS      OR TRANSCATHETER  TRICUSPID VALVE REPLACEMENT, FEMORAL PERCUTANEOUS APPROACH (STANDBY) N/A 5/3/2023    Procedure: OR TRANSCATHETER TRICUSPID VALVE REPLACEMENT, FEMORAL PERCUTANEOUS APPROACH (STANDBY);  Surgeon: Kana Calderón MD;  Location: [facility] CATH LAB CV       Social History  Tobacco:   History   Smoking Status    Never   Smokeless Tobacco    Never    Alcohol:   Social History    Substance and Sexual Activity      Alcohol use: Not Currently        Comment: Rarely Uses alcohol   Illicit Drugs:   History   Drug Use Unknown       Family History  Family History   Problem Relation Age of Onset    No Known Problems Mother     No Known Problems Father           Tara Siegel MD on 9/1/2023      cc: Lizbet Baer

## 2023-09-01 NOTE — LETTER
9/1/2023    LAVELL WALLACE MD  [facility] [address]  [facility] [address]    RE: Misty Espitiajonas       Dear Colleague,     I had the pleasure of seeing Misty Quezada in the [facility].    HEART CARE NOTE          Assessment/Recommendations     1. HFpEF, Valvular heart disease c/b ADHF  Assessment / Plan  Near euvolemia on physical exam; denies HF symptoms of orthopnea, PND, edema - no changes to regimen at this time  GDMT as detailed below; mainstay of treatment for HFpEF includes diuretics and adequate BP control (class I) and SGLT2-I (class 2a); additional medical therapy (ARNI, MRA, ARB) demonstrated less robust evidence for indication but may be considered per guideline recommendations (2b); no indication for BBlockers      Current Pharmacotherapy AHA Guideline-Directed Medical Therapy   Losartan 25 mg daily - on hold 2/2 hypotension ARNI/ARB   Spironolactone 25 mg  MRA   SGLT2 inhibitor not started SGLT2-I    Bumex 2 mg BID Loop diuretic        2. Atrial fibrillation  Assessment / Plan  Rate controlled - currently on metoprolol, and apixaban      3. Valvular heart disease  Assessment / Plan  LORRAINE 1.1, pk/mn 2.78/17.4  Severe TR currently enrolled in CLASP II TR study - s/p 1 PASCALACE device    30 minutes spent reviewing prior records (including documentation, laboratory studies, cardiac testing/imaging), history and physical exam, planning, and subsequent documentation.      History of Present Illness/Subjective    Ms. Misty Quezada is a 67 year old female with a PMHx significant for atrial fibrillation who presented in ADHF in the setting of valvular heart disease.     Today, Mrs. Quezada denies any acute cardiac events or complaints; Management plan as detailed above    ECG: Personally reviewed. nonspecific ST and T waves changes, atrial fibrillation, with RVR     ECHO (personnaly Reviewed 9/1/23):  Interpretation Summary     1. Left ventricular  "size is normal. Mild concentric increase in wall  thickness. Systolic function is normal. The estimated left ventricular  ejection fraction is 55-60%.  2. Right ventricle is not well visualized. Size is probably at least  moderately enlarged. Systolic function appears normal.  3. Severe right atrial enlargement.  4. Calcified trileaflet aortic valve with moderate aortic stenosis. Peak  forward velocity measures 2.8 m/s, mean gradient 17 mm Hg, calculated aortic  valve area 1.1 cm2, and dimensionless index 0.44. Trace aortic regurgitation.  5. Severe functional tricuspid regurgitation.  6. At least mild pulmonary hypertension is present with an estimated pulmonary  artery systolic pressure of 39 mm Hg plus the right atrial pressure.  7. Compared to the prior study dated 8/22/2021, there has been no significant  change    Lab results: personally reviewed September 1, 2023; notable for stable renal function    Medical history and pertinent documents reviewed in Care Everywhere please where applicable see details above        Physical Examination Review of Systems   /64 (BP Location: Right arm, Patient Position: Sitting, Cuff Size: Adult Large)   Pulse 68   Resp 18   Ht 1.702 m (5' 7\")   Wt 122.5 kg (270 lb)   BMI 42.29 kg/m    Body mass index is 42.29 kg/m .  Wt Readings from Last 3 Encounters:   09/01/23 122.5 kg (270 lb)   08/08/23 117.5 kg (259 lb)   06/07/23 114.3 kg (252 lb)     General Appearance:   no distress, normal body habitus   ENT/Mouth: membranes moist, no oral lesions or bleeding gums.      EYES:  no scleral icterus, normal conjunctivae   Neck: no carotid bruits or thyromegaly   Chest/Lungs:   lungs are clear to auscultation, no rales or wheezing, equal chest wall expansion    Cardiovascular:   Regular. Normal first and second heart sounds with no murmurs, rubs, or gallops; the carotid, radial and posterior tibial pulses are intact, no JVD or LE edema bilaterally    Abdomen:  no organomegaly, " masses, bruits, or tenderness; bowel sounds are present   Extremities: no cyanosis or clubbing   Skin: no xanthelasma, warm.    Neurologic: NAD     Psychiatric: alert and oriented x3, calm     A complete 10 systems ROS was reviewed  And is negative except what is listed in the HPI.          Medical History  Surgical History Family History Social History   Past Medical History:   Diagnosis Date    (HFpEF) heart failure with preserved ejection fraction (H) 10/17/2022    Aortic valve stenosis 11/10/2022    Benign essential hypertension     Chronic atrial fibrillation (H)     Lymphedema 9/24/2022    Nonrheumatic tricuspid valve regurgitation 2/6/2023    Past Surgical History:   Procedure Laterality Date    APPENDECTOMY      CHOLECYSTECTOMY      CV CORONARY ANGIOGRAM N/A 3/15/2023    Procedure: Coronary Angiogram;  Surgeon: Howard Plummer MD;  Location: [facility] CATH LAB CV    CV LEFT HEART CATH N/A 3/15/2023    Procedure: Left Heart Catheterization;  Surgeon: Howard Plummer MD;  Location: [facility] CATH LAB CV    CV RIGHT HEART CATH MEASUREMENTS RECORDED N/A 3/15/2023    Procedure: Right Heart Catheterization;  Surgeon: Howard Plummer MD;  Location: [facility] CATH LAB CV    CV TRANSCATHETER TRICUSPID VALVE REPAIR N/A 5/3/2023    Procedure: Transcatheter Tricuspid Valve Repair;  Surgeon: Howard Plummer MD;  Location: [facility] CATH LAB CV    GASTRIC BYPASS      OR TRANSCATHETER TRICUSPID VALVE REPLACEMENT, FEMORAL PERCUTANEOUS APPROACH (STANDBY) N/A 5/3/2023    Procedure: OR TRANSCATHETER TRICUSPID VALVE REPLACEMENT, FEMORAL PERCUTANEOUS APPROACH (STANDBY);  Surgeon: Kana Calderón MD;  Location: [facility] CATH LAB CV    no family history of premature coronary artery disease Social History     Socioeconomic History    Marital status:      Spouse name: Not on file    Number of children: Not on file    Years of education: Not on file    Highest education level: Not on file   Occupational  History    Not on file   Tobacco Use    Smoking status: Never    Smokeless tobacco: Never   Substance and Sexual Activity    Alcohol use: Not Currently     Comment: Rarely Uses alcohol    Drug use: Never    Sexual activity: Not on file   Other Topics Concern    Not on file   Social History Narrative    Not on file     Social Determinants of Health     Financial Resource Strain: Not on file   Food Insecurity: Not on file   Transportation Needs: Not on file   Physical Activity: Not on file   Stress: Not on file   Social Connections: Not on file   Intimate Partner Violence: Not on file   Housing Stability: Not on file           Lab Results    Chemistry/lipid CBC Cardiac Enzymes/BNP/TSH/INR   Lab Results   Component Value Date    CHOL 163 09/09/2016    HDL 42 (L) 09/09/2016    TRIG 81 09/09/2016    BUN 16.3 06/07/2023     06/07/2023    CO2 24 06/07/2023    Lab Results   Component Value Date    WBC 4.7 06/07/2023    HGB 14.8 06/07/2023    HCT 42.3 06/07/2023    MCV 99 06/07/2023     06/07/2023    Lab Results   Component Value Date    TROPONINI 1.32 (HH) 08/21/2021     (H) 09/02/2021    TSH 4.92 08/31/2021    INR 1.21 (H) 05/04/2023     Lab Results   Component Value Date    TROPONINI 1.32 (HH) 08/21/2021          Weight:    Wt Readings from Last 3 Encounters:   09/01/23 122.5 kg (270 lb)   08/08/23 117.5 kg (259 lb)   06/07/23 114.3 kg (252 lb)       Allergies  No Known Allergies      Surgical History  Past Surgical History:   Procedure Laterality Date    APPENDECTOMY      CHOLECYSTECTOMY      CV CORONARY ANGIOGRAM N/A 3/15/2023    Procedure: Coronary Angiogram;  Surgeon: Howard Plummer MD;  Location: [facility] CATH LAB CV    CV LEFT HEART CATH N/A 3/15/2023    Procedure: Left Heart Catheterization;  Surgeon: Howard Plummer MD;  Location: [facility] CATH LAB CV    CV RIGHT HEART CATH MEASUREMENTS RECORDED N/A 3/15/2023    Procedure: Right Heart Catheterization;  Surgeon: Howard Plummer MD;   Location: [facility] LAB CV    CV TRANSCATHETER TRICUSPID VALVE REPAIR N/A 5/3/2023    Procedure: Transcatheter Tricuspid Valve Repair;  Surgeon: Howard Plummer MD;  Location: [facility] CV    GASTRIC BYPASS      OR TRANSCATHETER TRICUSPID VALVE REPLACEMENT, FEMORAL PERCUTANEOUS APPROACH (STANDBY) N/A 5/3/2023    Procedure: OR TRANSCATHETER TRICUSPID VALVE REPLACEMENT, FEMORAL PERCUTANEOUS APPROACH (STANDBY);  Surgeon: Kana Calderón MD;  Location: [facility] CATH LAB CV       Social History  Tobacco:   History   Smoking Status    Never   Smokeless Tobacco    Never    Alcohol:   Social History    Substance and Sexual Activity      Alcohol use: Not Currently        Comment: Rarely Uses alcohol   Illicit Drugs:   History   Drug Use Unknown       Family History  Family History   Problem Relation Age of Onset    No Known Problems Mother     No Known Problems Father           Tara Siegel MD on 9/1/2023      cc: Lizbet Baer      6mg IV bumex given in clinic. CORE RN to follow up Tuesday 9/5 to assess response.    Taz SÁNCHEZ RN  BSN      Thank you for allowing me to participate in the care of your patient.      Sincerely,     Tara Siegel MD     [facility]  cc:   No referring provider defined for this encounter.

## 2023-09-01 NOTE — PROGRESS NOTES
6mg IV bumex given in clinic. CORE RN to follow up Tuesday 9/5 to assess response.    Taz SÁNCHEZ RN  BSN

## 2023-09-02 ENCOUNTER — LAB (OUTPATIENT)
Dept: LAB | Facility: HOSPITAL | Age: 68
End: 2023-09-02
Payer: MEDICARE

## 2023-09-02 DIAGNOSIS — I50.30 NYHA CLASS 3 HEART FAILURE WITH PRESERVED EJECTION FRACTION (H): ICD-10-CM

## 2023-09-02 LAB
ANION GAP SERPL CALCULATED.3IONS-SCNC: 12 MMOL/L (ref 7–15)
BUN SERPL-MCNC: 15 MG/DL (ref 8–23)
CALCIUM SERPL-MCNC: 9.4 MG/DL (ref 8.8–10.2)
CHLORIDE SERPL-SCNC: 104 MMOL/L (ref 98–107)
CREAT SERPL-MCNC: 0.78 MG/DL (ref 0.51–0.95)
DEPRECATED HCO3 PLAS-SCNC: 26 MMOL/L (ref 22–29)
GFR SERPL CREATININE-BSD FRML MDRD: 82 ML/MIN/1.73M2
GLUCOSE SERPL-MCNC: 120 MG/DL (ref 70–99)
POTASSIUM SERPL-SCNC: 4.2 MMOL/L (ref 3.4–5.3)
SODIUM SERPL-SCNC: 142 MMOL/L (ref 136–145)

## 2023-09-02 PROCEDURE — 36415 COLL VENOUS BLD VENIPUNCTURE: CPT

## 2023-09-02 PROCEDURE — 80048 BASIC METABOLIC PNL TOTAL CA: CPT

## 2023-09-15 ENCOUNTER — TELEPHONE (OUTPATIENT)
Dept: CARDIOLOGY | Facility: CLINIC | Age: 68
End: 2023-09-15
Payer: MEDICARE

## 2023-09-15 NOTE — TELEPHONE ENCOUNTER
Left VM for patient regarding scheduling TTE and 6 month study visit for CLASP II TR trial. Left writer callback number.

## 2023-09-19 DIAGNOSIS — I36.1 TRICUSPID VALVE REGURGITATION, NONRHEUMATIC: ICD-10-CM

## 2023-09-19 DIAGNOSIS — Z00.6 EXAMINATION OF PARTICIPANT OR CONTROL IN CLINICAL RESEARCH: Primary | ICD-10-CM

## 2023-10-12 ENCOUNTER — TELEPHONE (OUTPATIENT)
Dept: SLEEP MEDICINE | Facility: CLINIC | Age: 68
End: 2023-10-12

## 2023-10-12 ENCOUNTER — OFFICE VISIT (OUTPATIENT)
Dept: SLEEP MEDICINE | Facility: CLINIC | Age: 68
End: 2023-10-12
Attending: INTERNAL MEDICINE
Payer: MEDICARE

## 2023-10-12 VITALS
SYSTOLIC BLOOD PRESSURE: 132 MMHG | BODY MASS INDEX: 44.75 KG/M2 | DIASTOLIC BLOOD PRESSURE: 83 MMHG | HEART RATE: 92 BPM | HEIGHT: 67 IN | OXYGEN SATURATION: 94 % | WEIGHT: 285.13 LBS

## 2023-10-12 DIAGNOSIS — E66.01 MORBID OBESITY (H): ICD-10-CM

## 2023-10-12 DIAGNOSIS — I27.20 PULMONARY HYPERTENSION (H): ICD-10-CM

## 2023-10-12 DIAGNOSIS — G47.00 PERSISTENT INSOMNIA: ICD-10-CM

## 2023-10-12 DIAGNOSIS — I48.20 CHRONIC ATRIAL FIBRILLATION (H): ICD-10-CM

## 2023-10-12 DIAGNOSIS — G47.34 SLEEP RELATED HYPOXIA: Primary | ICD-10-CM

## 2023-10-12 DIAGNOSIS — I50.32 CHRONIC HEART FAILURE WITH PRESERVED EJECTION FRACTION (H): ICD-10-CM

## 2023-10-12 DIAGNOSIS — R53.82 CHRONIC FATIGUE: Primary | ICD-10-CM

## 2023-10-12 DIAGNOSIS — R53.82 CHRONIC FATIGUE: ICD-10-CM

## 2023-10-12 PROCEDURE — 99204 OFFICE O/P NEW MOD 45 MIN: CPT | Performed by: INTERNAL MEDICINE

## 2023-10-12 ASSESSMENT — SLEEP AND FATIGUE QUESTIONNAIRES
HOW LIKELY ARE YOU TO NOD OFF OR FALL ASLEEP WHILE SITTING QUIETLY AFTER LUNCH WITHOUT ALCOHOL: WOULD NEVER DOZE
HOW LIKELY ARE YOU TO NOD OFF OR FALL ASLEEP WHEN YOU ARE A PASSENGER IN A CAR FOR AN HOUR WITHOUT A BREAK: WOULD NEVER DOZE
HOW LIKELY ARE YOU TO NOD OFF OR FALL ASLEEP WHILE LYING DOWN TO REST IN THE AFTERNOON WHEN CIRCUMSTANCES PERMIT: SLIGHT CHANCE OF DOZING
HOW LIKELY ARE YOU TO NOD OFF OR FALL ASLEEP WHILE SITTING AND TALKING TO SOMEONE: WOULD NEVER DOZE
HOW LIKELY ARE YOU TO NOD OFF OR FALL ASLEEP WHILE SITTING INACTIVE IN A PUBLIC PLACE: WOULD NEVER DOZE
HOW LIKELY ARE YOU TO NOD OFF OR FALL ASLEEP WHILE SITTING AND READING: SLIGHT CHANCE OF DOZING
HOW LIKELY ARE YOU TO NOD OFF OR FALL ASLEEP WHILE WATCHING TV: SLIGHT CHANCE OF DOZING
HOW LIKELY ARE YOU TO NOD OFF OR FALL ASLEEP IN A CAR, WHILE STOPPED FOR A FEW MINUTES IN TRAFFIC: WOULD NEVER DOZE

## 2023-10-12 NOTE — NURSING NOTE
Face sheet and ROSIE order faxed to [facility].    SILVA Garza [facility]    October 13, 2023 9:56 AM    [facility] faxed stating they are not accepting new patient's for overnight oximetry test. Face sheet and ROSIE order faxed to [facility].     SILVA Garza [facility]

## 2023-10-12 NOTE — NURSING NOTE
"Chief Complaint   Patient presents with    Consult     For sleep study       Initial /83   Pulse 92   Ht 1.702 m (5' 7\")   Wt 129.3 kg (285 lb 2 oz)   SpO2 94%   BMI 44.66 kg/m   Estimated body mass index is 44.66 kg/m  as calculated from the following:    Height as of this encounter: 1.702 m (5' 7\").    Weight as of this encounter: 129.3 kg (285 lb 2 oz).    Medication Reconciliation: complete    Neck circumference:  inches / 39 centimeters.    DME:     SILVA Garza [facility]      "

## 2023-10-12 NOTE — PROGRESS NOTES
Additional 15 minutes on the date of service was spent performing the following:    -Preparing to see the patient  -Obtaining and/or reviewing separately obtained history   -Ordering medications, tests, or procedures   -Documenting clinical information in the electronic or other health record     Assessment and Plan:    In summary Misty Quezada is a 68 year old year old female here for sleep disturbance.  1.  Chronic fatigue/Persistent Insomnia/Morbid Obesity/Pulmonary Hypertension/Chronic Atrial Fibrillation/Heart Failure   I informed the patient that I do not have enough evidence to proceed directly with a sleep study at this point in time.  I will however order a nocturnal oximetry study look for abnormalities due to her heavy cardiovascular history.  If positive I may consider proceeding with a sleep study.  The patient expressed understanding and agreed.    History of present illness:    She is a 68 year old female who comes to the clinic with a chief complaint of chronic fatigue has been going on for more than a year.  The patient denies any episodes of witnessed apnea or snoring.  She reports difficulty initiating and maintaining sleep.  She would frequently wake up every 2-3 hours to use the bathroom.  She also has a heavy cardiovascular history including pulmonary hypertension, tricuspid regurgitation, chronic atrial fibrillation and heart failure.     Ideal Sleep-Wake Cycle(devoid of societal pressure):      TIME IN BED:    1) Work/School Days:    Do you work or go to school? No   What time do you usually get into bed? 200 am   About how long does it take you to fall asleep? half an hour to an hour   How often do you have trouble falling asleep? depends on how tired she is   How often do you wake up during the night? every 2-3hours   Do you work days/evenings/nights/rotating shifts?    What wakes you up at night? Use the bathroom   How often do you have trouble falling back to sleep? depends   About how  long does it take to fall back to sleep? half an hour to an hour   What do you usually do if you have trouble getting back to sleep? watch tv   What time do you usually get out of bed to start your day? depends   Do you use an alarm? No   2) Weekends/Non-work Days/All Other Days    What time do you usually get into bed? depends   About how long does it take you to fall asleep? half an hour to an hour   What time do you usually get out of bed to start your day? depends   Do you use an alarm? No   SLEEP NEED    On average, about how much sleep do you think you get? 7-9 hours   About how much sleep do you think you need? 7-9 hours   SLEEP POSITION    Which sleep positions do you prefer? Side   Do you do any of the following activities in bed? Watch TV   How often do you take a nap on purpose? 1-2 times week   About how long are your naps? 2 hours   Do you feel better after naps? Yes   How often do you doze off unintentionally? once depends   Have you ever had a driving accident or near-miss due to sleepiness/drowsiness? No     Stop Bang Questionnaire    Question 10/12/2023  1:32 PM CDT - Filed by Patient   Do you SNORE loudly (louder than talking or loud enough to be heard through closed doors)? No   Do you often feel TIRED, fatigued, or sleepy during daytime? Yes   Has anyone OBSERVED you stop breathing during your sleep? No   Do you have or are you being treated for high blood PRESSURE? Yes   BMI more than 35kg/m2? Yes   AGE over 50 years old? Yes   NECK circumference > 16 inches (40cm)? No   GENDER: Male? No   STOP-BANG Total Score (range: 0 - 8) 5 (High risk of MIKEL) Critical      NATALY:  NATALY Total Score: 8  Total score - [place]: 3 (10/12/2023  1:31 PM)      Patient Active Problem List   Diagnosis    Elevated troponin    Chronic atrial fibrillation (H)    Lymphedema    Morbid obesity (H)    Hypomagnesemia    Chronic heart failure with preserved ejection fraction (H)    Aortic valve stenosis    Nonrheumatic tricuspid  valve regurgitation    S/P tricuspid valve repair    Mitral regurgitation    Hypotension, unspecified hypotension type       Past Medical History  Past Medical History:   Diagnosis Date    (HFpEF) heart failure with preserved ejection fraction (H) 10/17/2022    Aortic valve stenosis 11/10/2022    Benign essential hypertension     Chronic atrial fibrillation (H)     Lymphedema 9/24/2022    Nonrheumatic tricuspid valve regurgitation 2/6/2023        Past Surgical History  Past Surgical History:   Procedure Laterality Date    APPENDECTOMY      CHOLECYSTECTOMY      CV CORONARY ANGIOGRAM N/A 3/15/2023    Procedure: Coronary Angiogram;  Surgeon: Howard Plummer MD;  Location: [facility] CATH [facility] CV    CV LEFT HEART CATH N/A 3/15/2023    Procedure: Left Heart Catheterization;  Surgeon: Howard Plummer MD;  Location: [facility] CATH LAB     CV RIGHT HEART CATH MEASUREMENTS RECORDED N/A 3/15/2023    Procedure: Right Heart Catheterization;  Surgeon: Howard Plummer MD;  Location: [facility] LAB CV    CV TRANSCATHETER TRICUSPID VALVE REPAIR N/A 5/3/2023    Procedure: Transcatheter Tricuspid Valve Repair;  Surgeon: Howard Plummer MD;  Location: [facility] CV    GASTRIC BYPASS      OR TRANSCATHETER TRICUSPID VALVE REPLACEMENT, FEMORAL PERCUTANEOUS APPROACH (STANDBY) N/A 5/3/2023    Procedure: OR TRANSCATHETER TRICUSPID VALVE REPLACEMENT, FEMORAL PERCUTANEOUS APPROACH (STANDBY);  Surgeon: Kana Calderón MD;  Location: [facility] LAB CV        Meds  Current Outpatient Medications   Medication Sig Dispense Refill    acetaminophen (TYLENOL) 325 MG tablet Take 975 mg by mouth every 4 hours as needed for mild pain      apixaban (ELIQUIS) 5 mg Tab tablet Take 5 mg by mouth 2 times daily      ascorbic acid 1000 MG TABS tablet Take 1 tablet by mouth daily      bumetanide (BUMEX) 2 MG tablet TAKE 1 TABLET(2 MG) BY MOUTH TWICE DAILY 180 tablet 3    CALCIUM/D3/MAG OX//TYRONE/ZN (CALTRATE + D3 PLUS  MINERALS ORAL) [CALCIUM/D3/MAG OX//TYRONE/ZN (CALTRATE + D3 PLUS MINERALS ORAL)] Take 1 tablet by mouth daily.      cholecalciferol, vitamin D3, 2,000 unit Tab [CHOLECALCIFEROL, VITAMIN D3, 2,000 UNIT TAB] Take 2,000 Units by mouth daily.      citalopram (CELEXA) 10 MG tablet Take 20 mg by mouth daily      cyanocobalamin 1000 MCG tablet [CYANOCOBALAMIN 1000 MCG TABLET] Take 1,000 mcg by mouth daily.      ferrous sulfate (FEROSUL) 325 (65 Fe) MG tablet Take 1 tablet (325 mg) by mouth every other day      melatonin 3 MG tablet Take 3 mg by mouth nightly as needed      metoprolol succinate ER (TOPROL XL) 25 MG 24 hr tablet Take 2 tablets (50 mg) by mouth daily 180 tablet 3    MULTIVIT WITH CALCIUM,IRON,MIN (WOMEN'S DAILY MULTIVITAMIN ORAL) [MULTIVIT WITH CALCIUM,IRON,MIN (WOMEN'S DAILY MULTIVITAMIN ORAL)] Take 1 tablet by mouth daily.      polyethylene glycol (MIRALAX) 17 g packet Take 17 g by mouth daily as needed for constipation      potassium chloride ER (K-TAB/KLOR-CON) 10 MEQ CR tablet Take 1 tablet (10 mEq) by mouth daily 30 tablet 11    spironolactone (ALDACTONE) 25 MG tablet Take 1 tablet (25 mg) by mouth daily 90 tablet 4        Allergies  Patient has no known allergies.     Social History  Social History     Socioeconomic History    Marital status:      Spouse name: Not on file    Number of children: Not on file    Years of education: Not on file    Highest education level: Not on file   Occupational History    Not on file   Tobacco Use    Smoking status: Never    Smokeless tobacco: Never   Substance and Sexual Activity    Alcohol use: Not Currently     Comment: Rarely Uses alcohol    Drug use: Never    Sexual activity: Not on file   Other Topics Concern    Not on file   Social History Narrative    Not on file     Social Determinants of Health     Financial Resource Strain: Not on file   Food Insecurity: Not on file   Transportation Needs: Not on file   Physical Activity: Not on file   Stress: Not on  "file   Social Connections: Not on file   Interpersonal Safety: Not on file   Housing Stability: Not on file        Family History  Family History   Problem Relation Age of Onset    No Known Problems Mother     No Known Problems Father      Patient's family history was not pertinent to chief complaint.     Review of Systems:  Constitutional: Negative except as noted in HPI.   Eyes: Negative except as noted in HPI.   ENT: Negative except as noted in HPI.   Cardiovascular: Negative except as noted in HPI.   Respiratory: Negative except as noted in HPI.   Gastrointestinal: Negative except as noted in HPI.   Genitourinary: Negative except as noted in HPI.   Musculoskeletal: Negative except as noted in HPI.   Integumentary: Negative except as noted in HPI.   Neurological: Negative except as noted in HPI.   Psychiatric: Negative except as noted in HPI.   Endocrine: Negative except as noted in HPI.   Hematologic/Lymphatic: Negative except as noted in HPI.      Physical Exam:  /83   Pulse 92   Ht 1.702 m (5' 7\")   Wt 129.3 kg (285 lb 2 oz)   SpO2 94%   BMI 44.66 kg/m    BMI:Body mass index is 44.66 kg/m .   GEN: NAD, morbidly obese  Head: Normocephalic.  EYES: PERRLA, EOMI  ENT: Oropharynx is clear, Martinez class 4+ airway.   Nasal mucosa is moist without erythema  Neck : Thyroid is within normal limits.   CV: Regular rate and rhythm, S1 & S2 positive.Systolic Murmur 3/6 appreciated.  LUNGS: Bilateral breathsounds heard.   ABDOMEN: Positive bowel sounds in all quadrants, soft, no rebound or guarding  MUSCULOSKELETAL: Bilateral 3+ leg swelling  SKIN: warm, dry, no rashes  Neurological: Alert, Gait is normal. Strength 5/5 in all extremities.  Psych: normal mood, normal affect     Labs/Studies:     Lab Results   Component Value Date    PH 7.40 05/03/2023    PH 7.38 03/15/2023    PO2 119 (H) 05/03/2023    PO2 53 (L) 03/15/2023    PCO2 40 05/03/2023    PCO2 49 (H) 03/15/2023    HCO3 24 05/03/2023    HCO3 27 03/15/2023 " "   ALLYSSA -0.1 05/03/2023    ALLYSSA 4.1 03/15/2023     Lab Results   Component Value Date    TSH 4.92 08/31/2021     Lab Results   Component Value Date     (H) 09/02/2023    GLC 99 06/07/2023     Lab Results   Component Value Date    HGB 14.8 06/07/2023    HGB 13.7 05/30/2023     Lab Results   Component Value Date    BUN 15.0 09/02/2023    BUN 16.3 06/07/2023    CR 0.78 09/02/2023    CR 0.71 06/07/2023     Lab Results   Component Value Date    AST 36 (H) 06/07/2023    AST 40 (H) 05/04/2023    ALT 29 06/07/2023    ALT 26 05/04/2023    GGT 36 06/07/2023    GGT 44 (H) 05/04/2023    ALKPHOS 113 (H) 06/07/2023    ALKPHOS 99 05/04/2023    BILITOTAL 0.6 06/07/2023    BILITOTAL 0.5 05/04/2023     No results found for: \"UAMP\", \"UBARB\", \"BENZODIAZEUR\", \"UCANN\", \"UCOC\", \"OPIT\", \"UPCP\"      Patient verbalized understanding of these issues, agrees with the plan and all questions were answered today. Patient was given an opportuntity to voice any other symptoms or concerns not listed above. Patient did not have any other symptoms or concerns.         oYng Matamoros DO,   Board Certified in Internal Medicine and Sleep Medicine    (Note created with Dragon voice recognition and unintended spelling errors and word substitutions may occur)     "

## 2023-10-12 NOTE — TELEPHONE ENCOUNTER
General Call    Contacts         Type Contact Phone/Fax    10/12/2023 04:22 PM CDT Phone (Incoming) Misty Quezada (Self) [phone] (M)          Reason for Call:  oximetry    What are your questions or concerns:  Misty called to ask questions about the test and was concerned and worried that she would make a mistake. She would like more information on the test or different options if available. She also like to schedule appointment when team calls her and answer her questions    Date of last appointment with provider: 10/12/2023    Okay to leave a detailed message?: Yes at Home number on file [phone] (home)

## 2023-10-16 NOTE — TELEPHONE ENCOUNTER
Left message for patient to return call to nurse at [phone]    Eva GUPTA RN  [facility]

## 2023-10-18 ENCOUNTER — OFFICE VISIT (OUTPATIENT)
Dept: CARDIOLOGY | Facility: CLINIC | Age: 68
End: 2023-10-18
Payer: MEDICARE

## 2023-10-18 ENCOUNTER — HOSPITAL ENCOUNTER (OUTPATIENT)
Dept: CARDIOLOGY | Facility: HOSPITAL | Age: 68
Discharge: HOME | End: 2023-10-18
Attending: INTERNAL MEDICINE | Admitting: INTERNAL MEDICINE
Payer: MEDICARE

## 2023-10-18 VITALS
SYSTOLIC BLOOD PRESSURE: 130 MMHG | HEIGHT: 67 IN | BODY MASS INDEX: 45.67 KG/M2 | RESPIRATION RATE: 18 BRPM | HEART RATE: 90 BPM | WEIGHT: 291 LBS | DIASTOLIC BLOOD PRESSURE: 78 MMHG

## 2023-10-18 DIAGNOSIS — Z00.6 EXAMINATION OF PARTICIPANT OR CONTROL IN CLINICAL RESEARCH: ICD-10-CM

## 2023-10-18 DIAGNOSIS — I36.1 TRICUSPID VALVE REGURGITATION, NONRHEUMATIC: ICD-10-CM

## 2023-10-18 DIAGNOSIS — Z00.6 EXAMINATION OF PARTICIPANT OR CONTROL IN CLINICAL RESEARCH: Primary | ICD-10-CM

## 2023-10-18 LAB
ALBUMIN SERPL BCG-MCNC: 4.1 G/DL (ref 3.5–5.2)
ALP SERPL-CCNC: 118 U/L (ref 35–104)
ALT SERPL W P-5'-P-CCNC: 19 U/L (ref 0–50)
ANION GAP SERPL CALCULATED.3IONS-SCNC: 11 MMOL/L (ref 7–15)
AST SERPL W P-5'-P-CCNC: 29 U/L (ref 0–45)
BILIRUB SERPL-MCNC: 0.7 MG/DL
BUN SERPL-MCNC: 12.5 MG/DL (ref 8–23)
CALCIUM SERPL-MCNC: 9.2 MG/DL (ref 8.8–10.2)
CHLORIDE SERPL-SCNC: 104 MMOL/L (ref 98–107)
CREAT SERPL-MCNC: 0.72 MG/DL (ref 0.51–0.95)
DEPRECATED HCO3 PLAS-SCNC: 24 MMOL/L (ref 22–29)
EGFRCR SERPLBLD CKD-EPI 2021: 90 ML/MIN/1.73M2
ERYTHROCYTE [DISTWIDTH] IN BLOOD BY AUTOMATED COUNT: 13.3 % (ref 10–15)
GGT SERPL-CCNC: 43 U/L (ref 5–36)
GLUCOSE SERPL-MCNC: 115 MG/DL (ref 70–99)
HCT VFR BLD AUTO: 40.9 % (ref 35–47)
HGB BLD-MCNC: 13 G/DL (ref 11.7–15.7)
LVEF ECHO: NORMAL
MCH RBC QN AUTO: 32.3 PG (ref 26.5–33)
MCHC RBC AUTO-ENTMCNC: 31.8 G/DL (ref 31.5–36.5)
MCV RBC AUTO: 102 FL (ref 78–100)
NT-PROBNP SERPL-MCNC: 1910 PG/ML (ref 0–900)
PLATELET # BLD AUTO: 231 10E3/UL (ref 150–450)
POTASSIUM SERPL-SCNC: 4.3 MMOL/L (ref 3.4–5.3)
PROT SERPL-MCNC: 7.1 G/DL (ref 6.4–8.3)
RBC # BLD AUTO: 4.03 10E6/UL (ref 3.8–5.2)
SODIUM SERPL-SCNC: 139 MMOL/L (ref 135–145)
URATE SERPL-MCNC: 5.6 MG/DL (ref 2.4–5.7)
WBC # BLD AUTO: 6.2 10E3/UL (ref 4–11)

## 2023-10-18 PROCEDURE — 93306 TTE W/DOPPLER COMPLETE: CPT

## 2023-10-18 PROCEDURE — 93306 TTE W/DOPPLER COMPLETE: CPT | Mod: 26 | Performed by: GENERAL ACUTE CARE HOSPITAL

## 2023-10-18 PROCEDURE — 99207 PR NO CHARGE-RESEARCH SERVICE: CPT

## 2023-10-18 PROCEDURE — 85027 COMPLETE CBC AUTOMATED: CPT

## 2023-10-18 PROCEDURE — 84550 ASSAY OF BLOOD/URIC ACID: CPT

## 2023-10-18 PROCEDURE — 93000 ELECTROCARDIOGRAM COMPLETE: CPT | Performed by: INTERNAL MEDICINE

## 2023-10-18 PROCEDURE — 83880 ASSAY OF NATRIURETIC PEPTIDE: CPT

## 2023-10-18 PROCEDURE — 36415 COLL VENOUS BLD VENIPUNCTURE: CPT

## 2023-10-18 PROCEDURE — 82977 ASSAY OF GGT: CPT

## 2023-10-18 PROCEDURE — 80053 COMPREHEN METABOLIC PANEL: CPT

## 2023-10-18 NOTE — PROGRESS NOTES
"CLASP II TR Follow-Up Visit (6 month)    Purpose: Freeman PILAR TrAnScatheter Valve RePair System Pivotal Clinical Trial (CLASP II TR): A prospective, multicenter, randomized, controlled pivotal trial to evaluate the safety and effectiveness of transcatheter tricuspid valve repair with the Freeman PILAR Transcatheter Valve Repair System and optimal medical therapy (OMT) compared to OMT alone in patients with tricuspid regurgitation.     Misty Quezada was seen in clinic today for the CLASP II TR 6 month visit.     Demography  Age 68 year old    Gender female    Ethnicity White   Race White      Vitals:    10/18/23 1219   BP: 130/78   BP Location: Right arm   Patient Position: Sitting   Cuff Size: Adult Regular   Pulse: 90   Resp: 18   Weight: 132 kg (291 lb)   Height: 1.702 m (5' 7\")   Temp: 97.6 F      Current Outpatient Medications:     acetaminophen (TYLENOL) 325 MG tablet, Take 975 mg by mouth every 4 hours as needed for mild pain, Disp: , Rfl:     apixaban (ELIQUIS) 5 mg Tab tablet, Take 5 mg by mouth 2 times daily, Disp: , Rfl:     ascorbic acid 1000 MG TABS tablet, Take 1 tablet by mouth daily, Disp: , Rfl:     bumetanide (BUMEX) 2 MG tablet, TAKE 1 TABLET(2 MG) BY MOUTH TWICE DAILY, Disp: 180 tablet, Rfl: 3    CALCIUM/D3/MAG OX//TYRONE/ZN (CALTRATE + D3 PLUS MINERALS ORAL), [CALCIUM/D3/MAG OX//TYRONE/ZN (CALTRATE + D3 PLUS MINERALS ORAL)] Take 1 tablet by mouth daily., Disp: , Rfl:     cholecalciferol, vitamin D3, 2,000 unit Tab, [CHOLECALCIFEROL, VITAMIN D3, 2,000 UNIT TAB] Take 2,000 Units by mouth daily., Disp: , Rfl:     citalopram (CELEXA) 10 MG tablet, Take 20 mg by mouth daily, Disp: , Rfl:     cyanocobalamin 1000 MCG tablet, [CYANOCOBALAMIN 1000 MCG TABLET] Take 1,000 mcg by mouth daily., Disp: , Rfl:     ferrous sulfate (FEROSUL) 325 (65 Fe) MG tablet, Take 1 tablet (325 mg) by mouth every other day, Disp: , Rfl:     melatonin 3 MG tablet, Take 3 mg by mouth nightly as needed, Disp: , Rfl:     " metoprolol succinate ER (TOPROL XL) 25 MG 24 hr tablet, Take 2 tablets (50 mg) by mouth daily, Disp: 180 tablet, Rfl: 3    MULTIVIT WITH CALCIUM,IRON,MIN (WOMEN'S DAILY MULTIVITAMIN ORAL), [MULTIVIT WITH CALCIUM,IRON,MIN (WOMEN'S DAILY MULTIVITAMIN ORAL)] Take 1 tablet by mouth daily., Disp: , Rfl:     polyethylene glycol (MIRALAX) 17 g packet, Take 17 g by mouth daily as needed for constipation, Disp: , Rfl:     potassium chloride ER (K-TAB/KLOR-CON) 10 MEQ CR tablet, Take 1 tablet (10 mEq) by mouth daily, Disp: 30 tablet, Rfl: 11    spironolactone (ALDACTONE) 25 MG tablet, Take 1 tablet (25 mg) by mouth daily, Disp: 90 tablet, Rfl: 4     NYHA: Class II - see Anh Solis RN note on 11/28/23  EKG -- through 1 year 10/18/23  TTE: 10/18/23  6 Minute walk test -through 2 years  10/18/23  Surveys (KCCQ, SF-36, EQ-5D-5L) -- through 2 years 10/18/23  Caregiver Survey -- 6 month visit ONLY  N/A  Labs Completed -- through 1 year 10/18/23     Edema Grading Questionaire  Lower Limb Edema Grading: [] Absent   [] Grade 1+ (2 mm or less: slight pitting, no visible distortion, disappears rapidly)  []Grade 2+ (2-4 mm indent: somewhat deeper pit, no readably detectable distortion, disappears in 10-25 seconds)  [x] Grade 3+ (4-6 mm: pit is noticeably deep. May last more than a minute. Dependent extremity looks swollen and macedo)  []Grade 4+ (6-8 mm: pt is very deep. Lasts for 2-5 minutes. Dependent extremity is grossly distorted)   Ankle Circumference Measurements: Right Ankle Circumference: 32.0 cm  Left Ankle Circumference: 30.0 cm      Over the last week, how often did you have swelling in your ankles?  [x] Every day   [] 6 days    [] 5 days   []4 days    [] 3 days    []2 days   []1 day   [] Not at all    2. What level of swelling did you experience? [] Extreme swelling    [x] Quite a bit of swelling   [] Mild swelling    [] Very little swelling   [] None    3. How much were you bothered by swelling in your ankles?  []  Extremely    [] Quite a bit    [] Moderately   [x] A little    [] Not at all    4. During what times of day did you have swelling in your ankles? [] Morning only    [] afternoon only    [] evening only  [] Morning & evening    [] morning & afternoon  [] afternoon & evening   [x] morning, afternoon & evening  [] Not at all    5. Did swelling in your ankles limit your normal activity? [] Extremely   [] Quite a bit  [] Moderately  [] A little  [x] Not at all    6. Did you have any difficulty answering these questions?  [] Yes  [x] No         Plan: Plan for 1 year follow up April 2024.    Lydia Ramos

## 2023-10-18 NOTE — LETTER
"10/18/2023    LAVELL WALLACE MD  [facility] [address] [place] [address] [address]  [facility] [id number]    RE: Misty NARAYAN Damien       Dear Colleague,     I had the pleasure of seeing Misty Quezada in the [facility].  CLASP II TR Follow-Up Visit (6 month)    Purpose: Freeman PILAR TrAnScatheter Valve RePair System Pivotal Clinical Trial (CLASP II TR): A prospective, multicenter, randomized, controlled pivotal trial to evaluate the safety and effectiveness of transcatheter tricuspid valve repair with the Freeman PILAR Transcatheter Valve Repair System and optimal medical therapy (OMT) compared to OMT alone in patients with tricuspid regurgitation.     Misty Quezada was seen in clinic today for the CLASP II TR 6 month visit.     Demography  Age 68 year old    Gender female    Ethnicity White   Race White      Vitals:    10/18/23 1219   BP: 130/78   BP Location: Right arm   Patient Position: Sitting   Cuff Size: Adult Regular   Pulse: 90   Resp: 18   Weight: 132 kg (291 lb)   Height: 1.702 m (5' 7\")          Current Outpatient Medications:     acetaminophen (TYLENOL) 325 MG tablet, Take 975 mg by mouth every 4 hours as needed for mild pain, Disp: , Rfl:     apixaban (ELIQUIS) 5 mg Tab tablet, Take 5 mg by mouth 2 times daily, Disp: , Rfl:     ascorbic acid 1000 MG TABS tablet, Take 1 tablet by mouth daily, Disp: , Rfl:     bumetanide (BUMEX) 2 MG tablet, TAKE 1 TABLET(2 MG) BY MOUTH TWICE DAILY, Disp: 180 tablet, Rfl: 3    CALCIUM/D3/MAG OX//TYRONE/ZN (CALTRATE + D3 PLUS MINERALS ORAL), [CALCIUM/D3/MAG OX//TYRONE/ZN (CALTRATE + D3 PLUS MINERALS ORAL)] Take 1 tablet by mouth daily., Disp: , Rfl:     cholecalciferol, vitamin D3, 2,000 unit Tab, [CHOLECALCIFEROL, VITAMIN D3, 2,000 UNIT TAB] Take 2,000 Units by mouth daily., Disp: , Rfl:     citalopram (CELEXA) 10 MG tablet, Take 20 mg by mouth daily, Disp: , Rfl:     cyanocobalamin 1000 MCG tablet, [CYANOCOBALAMIN 1000 MCG TABLET] Take " 1,000 mcg by mouth daily., Disp: , Rfl:     ferrous sulfate (FEROSUL) 325 (65 Fe) MG tablet, Take 1 tablet (325 mg) by mouth every other day, Disp: , Rfl:     melatonin 3 MG tablet, Take 3 mg by mouth nightly as needed, Disp: , Rfl:     metoprolol succinate ER (TOPROL XL) 25 MG 24 hr tablet, Take 2 tablets (50 mg) by mouth daily, Disp: 180 tablet, Rfl: 3    MULTIVIT WITH CALCIUM,IRON,MIN (WOMEN'S DAILY MULTIVITAMIN ORAL), [MULTIVIT WITH CALCIUM,IRON,MIN (WOMEN'S DAILY MULTIVITAMIN ORAL)] Take 1 tablet by mouth daily., Disp: , Rfl:     polyethylene glycol (MIRALAX) 17 g packet, Take 17 g by mouth daily as needed for constipation, Disp: , Rfl:     potassium chloride ER (K-TAB/KLOR-CON) 10 MEQ CR tablet, Take 1 tablet (10 mEq) by mouth daily, Disp: 30 tablet, Rfl: 11    spironolactone (ALDACTONE) 25 MG tablet, Take 1 tablet (25 mg) by mouth daily, Disp: 90 tablet, Rfl: 4     NYHA: Class III  EKG -- through 1 year 10/18/23  TTE: 10/18/23  6 Minute walk test -through 2 years  10/18/23  Surveys (KCCQ, SF-36, EQ-5D-5L) -- through 2 years 10/18/23  Caregiver Survey -- 6 month visit ONLY  N/A  Labs Completed -- through 1 year 10/18/23     Edema Grading Questionaire  Lower Limb Edema Grading: [] Absent   [] Grade 1+ (2 mm or less: slight pitting, no visible distortion, disappears rapidly)  []Grade 2+ (2-4 mm indent: somewhat deeper pit, no readably detectable distortion, disappears in 10-25 seconds)  [x] Grade 3+ (4-6 mm: pit is noticeably deep. May last more than a minute. Dependent extremity looks swollen and macedo)  []Grade 4+ (6-8 mm: pt is very deep. Lasts for 2-5 minutes. Dependent extremity is grossly distorted)   Ankle Circumference Measurements: Right Ankle Circumference: 32.0 cm  Left Ankle Circumference: 30.0 cm      Over the last week, how often did you have swelling in your ankles?  [x] Every day   [] 6 days    [] 5 days   []4 days    [] 3 days    []2 days   []1 day   [] Not at all    2. What level of swelling  did you experience? [] Extreme swelling    [x] Quite a bit of swelling   [] Mild swelling    [] Very little swelling   [] None    3. How much were you bothered by swelling in your ankles?  [] Extremely    [] Quite a bit    [] Moderately   [x] A little    [] Not at all    4. During what times of day did you have swelling in your ankles? [] Morning only    [] afternoon only    [] evening only  [] Morning & evening    [] morning & afternoon  [] afternoon & evening   [x] morning, afternoon & evening  [] Not at all    5. Did swelling in your ankles limit your normal activity? [] Extremely   [] Quite a bit  [] Moderately  [] A little  [x] Not at all    6. Did you have any difficulty answering these questions?  [] Yes  [x] No         Plan: Plan for 1 year follow up April 2023.    Lydia Ramos          Thank you for allowing me to participate in the care of your patient.      Sincerely,     Lydia Ramos     [facility]  cc: No referring provider defined for this encounter.

## 2023-10-19 ENCOUNTER — ALLIED HEALTH/NURSE VISIT (OUTPATIENT)
Dept: CARDIOLOGY | Facility: CLINIC | Age: 68
End: 2023-10-19
Payer: MEDICARE

## 2023-10-19 DIAGNOSIS — Z00.6 EXAMINATION OF PARTICIPANT OR CONTROL IN CLINICAL RESEARCH: Primary | ICD-10-CM

## 2023-10-19 LAB
ATRIAL RATE - MUSE: 54 BPM
DIASTOLIC BLOOD PRESSURE - MUSE: NORMAL MMHG
INTERPRETATION ECG - MUSE: NORMAL
P AXIS - MUSE: NORMAL DEGREES
PR INTERVAL - MUSE: NORMAL MS
QRS DURATION - MUSE: 88 MS
QT - MUSE: 352 MS
QTC - MUSE: 444 MS
R AXIS - MUSE: 107 DEGREES
SYSTOLIC BLOOD PRESSURE - MUSE: NORMAL MMHG
T AXIS - MUSE: -2 DEGREES
VENTRICULAR RATE- MUSE: 96 BPM

## 2023-10-19 PROCEDURE — 93000 ELECTROCARDIOGRAM COMPLETE: CPT | Mod: RTG | Performed by: INTERNAL MEDICINE

## 2023-10-19 PROCEDURE — 99207 PR NO CHARGE-RESEARCH SERVICE: CPT

## 2023-10-19 NOTE — PROGRESS NOTES
CLASP II TR Lab And EKG  6 Month Review    Purpose: Freeman PILAR TrAnScatheter Valve RePair System Pivotal Clinical Trial (CLASP II TR): A prospective, multicenter, randomized, controlled pivotal trial to evaluate the safety and effectiveness of transcatheter tricuspid valve repair with the Freeman PILAR Transcatheter Valve Repair System and optimal medical therapy (OMT) compared to OMT alone in patients with tricuspid regurgitation.     Dr. Plummer -- Please specify if the lab work and EKG is clinically significant or not. If clinical significant, please indicate what next actions should be!     If CS, please indicate next actions!     EKG 10/18/23 1:25 PM -- []  CS    [x]  NCS  Recent Results (from the past 24 hour(s))   ECG 12-LEAD WITH MUSE ([facility])    Collection Time: 10/18/23  1:25 PM   Result Value Ref Range    Systolic Blood Pressure  mmHg    Diastolic Blood Pressure  mmHg    Ventricular Rate 96 BPM    Atrial Rate 54 BPM    UT Interval  ms    QRS Duration 88 ms     ms    QTc 444 ms    P Axis  degrees    R AXIS 107 degrees    T Axis -2 degrees    Interpretation ECG       Atrial fibrillation  Rightward axis  Low voltage QRS  Abnormal QRS-T angle, consider primary T wave abnormality  Abnormal ECG  When compared with ECG of 07-JUN-2023 15:42,  No significant change was found  Confirmed by GREG GRAY MD LOC:[facility] ([id number]) on 10/19/2023 11:17:14 AM       Component      Latest Ref Rng 10/18/2023  1:18 PM CS/NCS   Urea Nitrogen      8.0 - 23.0 mg/dL 12.5     Creatinine      0.51 - 0.95 mg/dL 0.72     GFR Estimate      >60 mL/min/1.73m2 90     Alkaline Phosphatase      35 - 104 U/L 118 (H)  []  CS    [x]  NCS   AST      0 - 45 U/L 29     ALT      0 - 50 U/L 19     Albumin      3.5 - 5.2 g/dL 4.1     Bilirubin Total      <=1.2 mg/dL 0.7     WBC      4.0 - 11.0 10e3/uL 6.2     RBC Count      3.80 - 5.20 10e6/uL 4.03     Hemoglobin      11.7 - 15.7 g/dL 13.0     Hematocrit      35.0 - 47.0 % 40.9     Platelet  Count      150 - 450 10e3/uL 231     Uric Acid      2.4 - 5.7 mg/dL 5.6     GGT      5 - 36 U/L 43 (H)  []  CS    [x]  NCS   N-Terminal Pro Bnp      0 - 900 pg/mL 1,910 (H)  []  CS    [x]  NCS      Legend:  (H) High Lydia Ramos

## 2023-10-30 NOTE — PROGRESS NOTES
"        Assessment/Recommendations   Assessment:      Acute on chronic heart failure with preserved ejection fraction:  NTproBNP in 1900' on 10/18/23    Clinic weight is up approximately 30+ pounds.  She has not been taking her Bumex regularly over the last 1 month due to personal issues and inconvenience with urinary frequency.    Decompensated.  She reports increased shortness of breath on exertion.  Noted crackles in bilateral bases and bilateral lower extremity edema extending into left lower posterior thigh.  Known chronic lymphedema.    Weight was 257-259 pounds in August 2023.  Reports home weight is around 290 pounds about a week ago.  Previously on  Bumex 2 mg twice a day  On spironolactone 25 mg daily.  BMP stable on 10/18/23 with na+ 139, K+ 4.3, BUN 12.5 and Cr. 0.72    2. Hypertension: Blood pressure today is 148/94.  Likely due to volume overload.  Losartan was discontinued in the past due to symptomatic hypotension.    3.  Severe functional tricuspid regurgitation with status post TIARA with 1 PASCALACE device resulting in slight reduction in tricuspid regurgitation: Echocardiogram on 10/18/2023, \"A single Janette-Ace [id number] repair device is securely attached to the tricuspid valve leaflets. There is mild-to-moderate residual tricuspid regurgitation. No evidence of significant stenosis with peak inflow velocity1.2 m/s and mean inflow gradient 3 mmHg at a heart rate of approximately 90 beats per minute\".    4.  Obesity with BMI of 40.57: Weight is up 30+ pounds.  Likely due to volume overload.    5.  Mild to moderate aortic stenosis: Stable.      Plan/Recommendation:  -We discussed about IV Bumex today but patient declined stating she has already planned.  -Instructed to start Bumex 4 mg daily.  May take 2 mg twice a day if develop lightheadedness or dizziness.  -Plan IV Bumex on Monday.  -We will plan BMP on Tuesday.  -Patient was encouraged to seek medical attention if develop persistent " worsening shortness of breath, lightheadedness, dizziness or syncope    She has not scheduled follow-up appointment with Dr. Rocha.  She declined to make appointment.  She would like to continue to follow-up with me and Dr. Siegel for heart failure management.  Follow-up with me in 2 weeks in heart failure clinic     History of Present Illness/Subjective    Ms. Misty Quezada is a 68 year old female with a past medical history of permanent atrial fibrillation,  severe tricuspid regurgitation, moderate aortic valve stenosis, lymphedema, morbid obesity, obstructive sleep apnea, and acute on chronic heart failure with preserved ejection fraction who is seen at [facility] for continued heart failure follow-up    During last heart failure clinic visit, patient noted improvement in her shortness of breath since tricuspid valve repair in May 2023.  She had a follow-up with Dr. Siegel in September.    Today, Misty reports persistent weight gain with shortness of breath on exertion.  She reports that she has been not taking her Bumex regularly due to being busy with her personal life and also incontinence with urinary frequency.    She denies fatigue, lightheadedness, shortness of breath, orthopnea, PND, palpitations, chest pain, and abdominal fullness/bloating.  She does not report any bleeding complications.  She is trying to watch salt in her diet.  She reports adequate fluid intake.    Patient had a follow-up with sleep clinic and did not think she has sleep apnea.  She underwent some testing and the results pending.    Echocardiogram on 10/18/2020 - reviewed:  Interpretation Summary   1. Left ventricular chamber size, wall thickness and systolic function are  normal. The visually estimated left ventricular ejection fraction is 55-60%.  2. Right ventricle is not well visualized. Chamber size may be mildly  enlarged. Systolic function may be mildly reduced.  3. Severe right  atrial enlargement. Moderate left atrial enlargement.  4. A single Janette-Ace [id number] repair device is securely attached to the  tricuspid valve leaflets. There is mild-to-moderate residual tricuspid  regurgitation. No evidence of significant stenosis with peak inflow velocity  1.2 m/s and mean inflow gradient 3 mmHg at a heart rate of approximately 90  beats per minute.  5. Calcified trileaflet aoritc valve with paradoxical low flow, low gradient  (stroke volume index 22.2 mL/m2) aortic stenosis felt to be mild-to-moderate  in severity. Peak forward velocity measures 2.5 m/s, mean gradient 14 mmHg,  calculated aortic valve area 1.1 cm2, and dimensionless index 0.27. No  significant aortic regurgitation.  6. Mild pulmonary hypertension is present with an estimated pulmonary artery  systolic pressure of 43 mmHg.  7. Compared to the prior study dated 6/1/2023, the degree of tricuspid  regurgitation is less.    ECHO from 6/1/23-Reviewed:   Interpretation Summary   1. The left ventricle is normal in size. Left ventricular function is  normal.The ejection fraction is 55-60%.  2. The right ventricle is borderline dilated. Mildly decreased right  ventricular systolic function.  3. The left atrium is severely dilated. The right atrium is severely dilated.  4. A single Janette-Ace device is attached to the anterior and septal leaflets  of tricuspid valve with moderate (+3) eccentric tricuspid regurgitation with  mean gradient of 3 mm Hg.  5. Moderate valvular aortic stenosis (LORRAINE:1.2cm2, Peakvel:2.5 m/sec, DI: 0.28,  SVi: 28 ml/m2). There is mild (1+) aortic regurgitation.  6. The right ventricular systolic pressure is approximated at 37mmHg plus the  right atrial pressure. High RA pressure estimated at 15 mmHg or greater.  ______________________________________________________________________________  Left Ventricle  The left ventricle is normal in size. Left ventricular function is normal.The  ejection fraction is  "55-60%. There is normal left ventricular wall thickness.  Normal left ventricular wall motion.     Physical Examination Review of Systems   BP (!) 148/94 (BP Location: Right arm, Patient Position: Sitting, Cuff Size: Adult Large)   Pulse 98   Resp 16   Ht 1.702 m (5' 7\")   Wt 134.3 kg (296 lb)   BMI 46.36 kg/m    Body mass index is 46.36 kg/m .  Wt Readings from Last 3 Encounters:   10/31/23 134.3 kg (296 lb)   10/18/23 132 kg (291 lb)   10/12/23 129.3 kg (285 lb 2 oz)     General Appearance:   no distress, normal body habitus   ENT/Mouth: membranes moist, no oral lesions or bleeding gums.      EYES:  no scleral icterus, normal conjunctivae   Neck: no carotid bruits or thyromegaly   Chest/Lungs:   lungs are clear to auscultation, no rales or wheezing, equal chest wall expansion except noted crackles in bilateral bases   Cardiovascular:   Irregular irregular. Normal first and second heart sounds with no murmurs, rubs, or gallops; the carotid, radial and posterior tibial pulses are intact, JVP is difficult to assess due to the patient's obesity and body habitus   , mild to moderate  edema bilaterally (extending into left posterior thigh)   Abdomen:  Large but soft, no organomegaly, masses, bruits, or tenderness; bowel sounds are present   Extremities   no cyanosis or clubbing    CMS intact.   Skin: no xanthelasma, warm.    Neurologic:  no tremors   Psychiatric: alert and oriented x3, calm                                                        Negative unless noted in HPI     Medical History  Surgical History Family History Social History   Past Medical History:   Diagnosis Date    (HFpEF) heart failure with preserved ejection fraction (H) 10/17/2022    Aortic valve stenosis 11/10/2022    Benign essential hypertension     Chronic atrial fibrillation (H)     Lymphedema 9/24/2022    Nonrheumatic tricuspid valve regurgitation 2/6/2023    Past Surgical History:   Procedure Laterality Date    APPENDECTOMY      " CHOLECYSTECTOMY      CV CORONARY ANGIOGRAM N/A 3/15/2023    Procedure: Coronary Angiogram;  Surgeon: Howard Plummer MD;  Location: [facility] CATH LAB CV    CV LEFT HEART CATH N/A 3/15/2023    Procedure: Left Heart Catheterization;  Surgeon: Howard Plummer MD;  Location: [facility] CATH LAB CV    CV RIGHT HEART CATH MEASUREMENTS RECORDED N/A 3/15/2023    Procedure: Right Heart Catheterization;  Surgeon: Howard Plummer MD;  Location: [facility] CATH LAB CV    CV TRANSCATHETER TRICUSPID VALVE REPAIR N/A 5/3/2023    Procedure: Transcatheter Tricuspid Valve Repair;  Surgeon: Howard Plummer MD;  Location: [facility] CATH LAB CV    GASTRIC BYPASS      OR TRANSCATHETER TRICUSPID VALVE REPLACEMENT, FEMORAL PERCUTANEOUS APPROACH (STANDBY) N/A 5/3/2023    Procedure: OR TRANSCATHETER TRICUSPID VALVE REPLACEMENT, FEMORAL PERCUTANEOUS APPROACH (STANDBY);  Surgeon: Kana Calderón MD;  Location: [facility] CATH LAB CV    Family History   Problem Relation Age of Onset    No Known Problems Mother     No Known Problems Father     Social History     Socioeconomic History    Marital status:      Spouse name: Not on file    Number of children: Not on file    Years of education: Not on file    Highest education level: Not on file   Occupational History    Not on file   Tobacco Use    Smoking status: Never    Smokeless tobacco: Never   Substance and Sexual Activity    Alcohol use: Not Currently     Comment: Rarely Uses alcohol    Drug use: Never    Sexual activity: Not on file   Other Topics Concern    Not on file   Social History Narrative    Not on file     Social Determinants of Health     Financial Resource Strain: Not on file   Food Insecurity: Not on file   Transportation Needs: Not on file   Physical Activity: Not on file   Stress: Not on file   Social Connections: Not on file   Interpersonal Safety: Not on file   Housing Stability: Not on file          Medications  Allergies   Current Outpatient  Medications   Medication Sig Dispense Refill    acetaminophen (TYLENOL) 325 MG tablet Take 975 mg by mouth every 4 hours as needed for mild pain      apixaban (ELIQUIS) 5 mg Tab tablet Take 5 mg by mouth 2 times daily      ascorbic acid 1000 MG TABS tablet Take 1 tablet by mouth daily      bumetanide (BUMEX) 2 MG tablet Take 2 tablets (4 mg) by mouth daily 180 tablet 1    CALCIUM/D3/MAG OX//TYRONE/ZN (CALTRATE + D3 PLUS MINERALS ORAL) [CALCIUM/D3/MAG OX//TYRONE/ZN (CALTRATE + D3 PLUS MINERALS ORAL)] Take 1 tablet by mouth daily.      cholecalciferol, vitamin D3, 2,000 unit Tab [CHOLECALCIFEROL, VITAMIN D3, 2,000 UNIT TAB] Take 2,000 Units by mouth daily.      citalopram (CELEXA) 10 MG tablet Take 20 mg by mouth daily      cyanocobalamin 1000 MCG tablet [CYANOCOBALAMIN 1000 MCG TABLET] Take 1,000 mcg by mouth daily.      ferrous sulfate (FEROSUL) 325 (65 Fe) MG tablet Take 1 tablet (325 mg) by mouth every other day      melatonin 3 MG tablet Take 3 mg by mouth nightly as needed      metoprolol succinate ER (TOPROL XL) 25 MG 24 hr tablet Take 2 tablets (50 mg) by mouth daily 180 tablet 3    MULTIVIT WITH CALCIUM,IRON,MIN (WOMEN'S DAILY MULTIVITAMIN ORAL) [MULTIVIT WITH CALCIUM,IRON,MIN (WOMEN'S DAILY MULTIVITAMIN ORAL)] Take 1 tablet by mouth daily.      polyethylene glycol (MIRALAX) 17 g packet Take 17 g by mouth daily as needed for constipation      potassium chloride ER (K-TAB/KLOR-CON) 10 MEQ CR tablet Take 1 tablet (10 mEq) by mouth daily 30 tablet 11    spironolactone (ALDACTONE) 25 MG tablet Take 1 tablet (25 mg) by mouth daily 90 tablet 4    No Known Allergies      Lab Results    Chemistry/lipid CBC Cardiac Enzymes/BNP/TSH/INR   Lab Results   Component Value Date    CHOL 163 09/09/2016    HDL 42 (L) 09/09/2016    TRIG 81 09/09/2016    BUN 12.5 10/18/2023     10/18/2023    CO2 24 10/18/2023    Lab Results   Component Value Date    WBC 6.2 10/18/2023    HGB 13.0 10/18/2023    HCT 40.9 10/18/2023    MCV  102 (H) 10/18/2023     10/18/2023    Lab Results   Component Value Date    TROPONINI 1.32 (HH) 08/21/2021     (H) 09/02/2021    TSH 4.92 08/31/2021    INR 1.21 (H) 05/04/2023        38  minutes spent on the date of encounter doing chart review, review of test results, patient visit, and documentation.        This note has been dictated using voice recognition software. Any grammatical, typographical, or context distortions are unintentional and inherent to the software

## 2023-10-31 ENCOUNTER — OFFICE VISIT (OUTPATIENT)
Dept: CARDIOLOGY | Facility: CLINIC | Age: 68
End: 2023-10-31
Attending: INTERNAL MEDICINE
Payer: MEDICARE

## 2023-10-31 VITALS
RESPIRATION RATE: 16 BRPM | WEIGHT: 293 LBS | HEART RATE: 98 BPM | DIASTOLIC BLOOD PRESSURE: 94 MMHG | HEIGHT: 67 IN | SYSTOLIC BLOOD PRESSURE: 148 MMHG | BODY MASS INDEX: 45.99 KG/M2

## 2023-10-31 DIAGNOSIS — I10 ESSENTIAL HYPERTENSION: ICD-10-CM

## 2023-10-31 DIAGNOSIS — I36.1 NONRHEUMATIC TRICUSPID VALVE REGURGITATION: ICD-10-CM

## 2023-10-31 DIAGNOSIS — Z98.890 S/P TRICUSPID VALVE REPAIR: ICD-10-CM

## 2023-10-31 DIAGNOSIS — I35.0 NONRHEUMATIC AORTIC VALVE STENOSIS: ICD-10-CM

## 2023-10-31 DIAGNOSIS — I50.33 ACUTE ON CHRONIC HEART FAILURE WITH PRESERVED EJECTION FRACTION (H): Primary | ICD-10-CM

## 2023-10-31 PROCEDURE — 99214 OFFICE O/P EST MOD 30 MIN: CPT | Performed by: NURSE PRACTITIONER

## 2023-10-31 RX ORDER — BUMETANIDE 2 MG/1
4 TABLET ORAL DAILY
Qty: 180 TABLET | Refills: 1 | Status: SHIPPED | OUTPATIENT
Start: 2023-10-31 | End: 2024-04-23

## 2023-10-31 NOTE — LETTER
"10/31/2023    LAVELL WALLACE MD  [facility] [address]  [facility] [id number]    RE: Misty Quezada       Dear Colleague,     I had the pleasure of seeing Misty Quezada in the [facility].          Assessment/Recommendations   Assessment:      Acute on chronic heart failure with preserved ejection fraction:  NTproBNP in 1900' on 10/18/23    Clinic weight is up approximately 30+ pounds.  She has not been taking her Bumex regularly over the last 1 month due to personal issues and inconvenience with urinary frequency.    Decompensated.  She reports increased shortness of breath on exertion.  Noted crackles in bilateral bases and bilateral lower extremity edema extending into left lower posterior thigh.  Known chronic lymphedema.    Weight was 257-259 pounds in August 2023.  Reports home weight is around 290 pounds about a week ago.  Previously on  Bumex 2 mg twice a day  On spironolactone 25 mg daily.  BMP stable on 10/18/23 with na+ 139, K+ 4.3, BUN 12.5 and Cr. 0.72    2. Hypertension: Blood pressure today is 148/94.  Likely due to volume overload.  Losartan was discontinued in the past due to symptomatic hypotension.    3.  Severe functional tricuspid regurgitation with status post TIARA with 1 PASCALACE device resulting in slight reduction in tricuspid regurgitation: Echocardiogram on 10/18/2023, \"A single Janette-Ace nabv-rh-zxae repair device is securely attached to the tricuspid valve leaflets. There is mild-to-moderate residual tricuspid regurgitation. No evidence of significant stenosis with peak inflow velocity1.2 m/s and mean inflow gradient 3 mmHg at a heart rate of approximately 90 beats per minute\".    4.  Obesity with BMI of 40.57: Weight is up 30+ pounds.  Likely due to volume overload.    5.  Mild to moderate aortic stenosis: Stable.      Plan/Recommendation:  -We discussed about IV Bumex today but patient declined stating she has already " planned.  -Instructed to start Bumex 4 mg daily.  May take 2 mg twice a day if develop lightheadedness or dizziness.  -Plan IV Bumex on Monday.  -We will plan BMP on Tuesday.  -Patient was encouraged to seek medical attention if develop persistent worsening shortness of breath, lightheadedness, dizziness or syncope    She has not scheduled follow-up appointment with Dr. Rocha.  She declined to make appointment.  She would like to continue to follow-up with me and Dr. Siegel for heart failure management.  Follow-up with me in 2 weeks in heart failure clinic     History of Present Illness/Subjective    Ms. Misty Quezada is a 68 year old female with a past medical history of permanent atrial fibrillation,  severe tricuspid regurgitation, moderate aortic valve stenosis, lymphedema, morbid obesity, obstructive sleep apnea, and acute on chronic heart failure with preserved ejection fraction who is seen at [facility] for continued heart failure follow-up    During last heart failure clinic visit, patient noted improvement in her shortness of breath since tricuspid valve repair in May 2023.  She had a follow-up with Dr. Siegel in September.    Today, Misty reports persistent weight gain with shortness of breath on exertion.  She reports that she has been not taking her Bumex regularly due to being busy with her personal life and also incontinence with urinary frequency.    She denies fatigue, lightheadedness, shortness of breath, orthopnea, PND, palpitations, chest pain, and abdominal fullness/bloating.  She does not report any bleeding complications.  She is trying to watch salt in her diet.  She reports adequate fluid intake.    Patient had a follow-up with sleep clinic and did not think she has sleep apnea.  She underwent some testing and the results pending.    Echocardiogram on 10/18/2020 - reviewed:  Interpretation Summary   1. Left ventricular chamber size, wall thickness  and systolic function are  normal. The visually estimated left ventricular ejection fraction is 55-60%.  2. Right ventricle is not well visualized. Chamber size may be mildly  enlarged. Systolic function may be mildly reduced.  3. Severe right atrial enlargement. Moderate left atrial enlargement.  4. A single Jnaette-Ace ukni-cp-warj repair device is securely attached to the  tricuspid valve leaflets. There is mild-to-moderate residual tricuspid  regurgitation. No evidence of significant stenosis with peak inflow velocity  1.2 m/s and mean inflow gradient 3 mmHg at a heart rate of approximately 90  beats per minute.  5. Calcified trileaflet aoritc valve with paradoxical low flow, low gradient  (stroke volume index 22.2 mL/m2) aortic stenosis felt to be mild-to-moderate  in severity. Peak forward velocity measures 2.5 m/s, mean gradient 14 mmHg,  calculated aortic valve area 1.1 cm2, and dimensionless index 0.27. No  significant aortic regurgitation.  6. Mild pulmonary hypertension is present with an estimated pulmonary artery  systolic pressure of 43 mmHg.  7. Compared to the prior study dated 6/1/2023, the degree of tricuspid  regurgitation is less.    ECHO from 6/1/23-Reviewed:   Interpretation Summary   1. The left ventricle is normal in size. Left ventricular function is  normal.The ejection fraction is 55-60%.  2. The right ventricle is borderline dilated. Mildly decreased right  ventricular systolic function.  3. The left atrium is severely dilated. The right atrium is severely dilated.  4. A single Janette-Ace device is attached to the anterior and septal leaflets  of tricuspid valve with moderate (+3) eccentric tricuspid regurgitation with  mean gradient of 3 mm Hg.  5. Moderate valvular aortic stenosis (LORRAINE:1.2cm2, Peakvel:2.5 m/sec, DI: 0.28,  SVi: 28 ml/m2). There is mild (1+) aortic regurgitation.  6. The right ventricular systolic pressure is approximated at 37mmHg plus the  right atrial pressure. High RA  "pressure estimated at 15 mmHg or greater.  ______________________________________________________________________________  Left Ventricle  The left ventricle is normal in size. Left ventricular function is normal.The  ejection fraction is 55-60%. There is normal left ventricular wall thickness.  Normal left ventricular wall motion.     Physical Examination Review of Systems   BP (!) 148/94 (BP Location: Right arm, Patient Position: Sitting, Cuff Size: Adult Large)   Pulse 98   Resp 16   Ht 1.702 m (5' 7\")   Wt 134.3 kg (296 lb)   BMI 46.36 kg/m    Body mass index is 46.36 kg/m .  Wt Readings from Last 3 Encounters:   10/31/23 134.3 kg (296 lb)   10/18/23 132 kg (291 lb)   10/12/23 129.3 kg (285 lb 2 oz)     General Appearance:   no distress, normal body habitus   ENT/Mouth: membranes moist, no oral lesions or bleeding gums.      EYES:  no scleral icterus, normal conjunctivae   Neck: no carotid bruits or thyromegaly   Chest/Lungs:   lungs are clear to auscultation, no rales or wheezing, equal chest wall expansion except noted crackles in bilateral bases   Cardiovascular:   Irregular irregular. Normal first and second heart sounds with no murmurs, rubs, or gallops; the carotid, radial and posterior tibial pulses are intact, JVP is difficult to assess due to the patient's obesity and body habitus   , mild to moderate  edema bilaterally (extending into left posterior thigh)   Abdomen:  Large but soft, no organomegaly, masses, bruits, or tenderness; bowel sounds are present   Extremities   no cyanosis or clubbing    CMS intact.   Skin: no xanthelasma, warm.    Neurologic:  no tremors   Psychiatric: alert and oriented x3, calm                                                        Negative unless noted in HPI     Medical History  Surgical History Family History Social History   Past Medical History:   Diagnosis Date    (HFpEF) heart failure with preserved ejection fraction (H) 10/17/2022    Aortic valve stenosis " 11/10/2022    Benign essential hypertension     Chronic atrial fibrillation (H)     Lymphedema 9/24/2022    Nonrheumatic tricuspid valve regurgitation 2/6/2023    Past Surgical History:   Procedure Laterality Date    APPENDECTOMY      CHOLECYSTECTOMY      CV CORONARY ANGIOGRAM N/A 3/15/2023    Procedure: Coronary Angiogram;  Surgeon: Howard Plummer MD;  Location: [facility] CATH LAB CV    CV LEFT HEART CATH N/A 3/15/2023    Procedure: Left Heart Catheterization;  Surgeon: Howard Plummer MD;  Location: [facility] CATH LAB CV    CV RIGHT HEART CATH MEASUREMENTS RECORDED N/A 3/15/2023    Procedure: Right Heart Catheterization;  Surgeon: Howard Plummer MD;  Location: [facility] CATH LAB CV    CV TRANSCATHETER TRICUSPID VALVE REPAIR N/A 5/3/2023    Procedure: Transcatheter Tricuspid Valve Repair;  Surgeon: Howard Plummer MD;  Location: [facility] CATH LAB CV    GASTRIC BYPASS      OR TRANSCATHETER TRICUSPID VALVE REPLACEMENT, FEMORAL PERCUTANEOUS APPROACH (STANDBY) N/A 5/3/2023    Procedure: OR TRANSCATHETER TRICUSPID VALVE REPLACEMENT, FEMORAL PERCUTANEOUS APPROACH (STANDBY);  Surgeon: Kana Calderón MD;  Location: [facility] CATH LAB CV    Family History   Problem Relation Age of Onset    No Known Problems Mother     No Known Problems Father     Social History     Socioeconomic History    Marital status:      Spouse name: Not on file    Number of children: Not on file    Years of education: Not on file    Highest education level: Not on file   Occupational History    Not on file   Tobacco Use    Smoking status: Never    Smokeless tobacco: Never   Substance and Sexual Activity    Alcohol use: Not Currently     Comment: Rarely Uses alcohol    Drug use: Never    Sexual activity: Not on file   Other Topics Concern    Not on file   Social History Narrative    Not on file     Social Determinants of Health     Financial Resource Strain: Not on file   Food Insecurity: Not on file   Transportation  Needs: Not on file   Physical Activity: Not on file   Stress: Not on file   Social Connections: Not on file   Interpersonal Safety: Not on file   Housing Stability: Not on file          Medications  Allergies   Current Outpatient Medications   Medication Sig Dispense Refill    acetaminophen (TYLENOL) 325 MG tablet Take 975 mg by mouth every 4 hours as needed for mild pain      apixaban (ELIQUIS) 5 mg Tab tablet Take 5 mg by mouth 2 times daily      ascorbic acid 1000 MG TABS tablet Take 1 tablet by mouth daily      bumetanide (BUMEX) 2 MG tablet Take 2 tablets (4 mg) by mouth daily 180 tablet 1    CALCIUM/D3/MAG OX//TYRONE/ZN (CALTRATE + D3 PLUS MINERALS ORAL) [CALCIUM/D3/MAG OX//TYRONE/ZN (CALTRATE + D3 PLUS MINERALS ORAL)] Take 1 tablet by mouth daily.      cholecalciferol, vitamin D3, 2,000 unit Tab [CHOLECALCIFEROL, VITAMIN D3, 2,000 UNIT TAB] Take 2,000 Units by mouth daily.      citalopram (CELEXA) 10 MG tablet Take 20 mg by mouth daily      cyanocobalamin 1000 MCG tablet [CYANOCOBALAMIN 1000 MCG TABLET] Take 1,000 mcg by mouth daily.      ferrous sulfate (FEROSUL) 325 (65 Fe) MG tablet Take 1 tablet (325 mg) by mouth every other day      melatonin 3 MG tablet Take 3 mg by mouth nightly as needed      metoprolol succinate ER (TOPROL XL) 25 MG 24 hr tablet Take 2 tablets (50 mg) by mouth daily 180 tablet 3    MULTIVIT WITH CALCIUM,IRON,MIN (WOMEN'S DAILY MULTIVITAMIN ORAL) [MULTIVIT WITH CALCIUM,IRON,MIN (WOMEN'S DAILY MULTIVITAMIN ORAL)] Take 1 tablet by mouth daily.      polyethylene glycol (MIRALAX) 17 g packet Take 17 g by mouth daily as needed for constipation      potassium chloride ER (K-TAB/KLOR-CON) 10 MEQ CR tablet Take 1 tablet (10 mEq) by mouth daily 30 tablet 11    spironolactone (ALDACTONE) 25 MG tablet Take 1 tablet (25 mg) by mouth daily 90 tablet 4    No Known Allergies      Lab Results    Chemistry/lipid CBC Cardiac Enzymes/BNP/TSH/INR   Lab Results   Component Value Date    CHOL 163  09/09/2016    HDL 42 (L) 09/09/2016    TRIG 81 09/09/2016    BUN 12.5 10/18/2023     10/18/2023    CO2 24 10/18/2023    Lab Results   Component Value Date    WBC 6.2 10/18/2023    HGB 13.0 10/18/2023    HCT 40.9 10/18/2023     (H) 10/18/2023     10/18/2023    Lab Results   Component Value Date    TROPONINI 1.32 (HH) 08/21/2021     (H) 09/02/2021    TSH 4.92 08/31/2021    INR 1.21 (H) 05/04/2023        38  minutes spent on the date of encounter doing chart review, review of test results, patient visit, and documentation.        This note has been dictated using voice recognition software. Any grammatical, typographical, or context distortions are unintentional and inherent to the software          Thank you for allowing me to participate in the care of your patient.      Sincerely,     ERICK [facility]  cc:   Tara Siegel MD  [address] [place] [address]

## 2023-11-01 NOTE — TELEPHONE ENCOUNTER
"Please call the patient with the message below.    \"We were able to obtain your overnight nocturnal oximetry study which was performed on 10/26/1974.  The patient's basal SaO2 was 86.69%.  The patient's lowest O2 sat was 77%.  There were episodes of cyclical desaturation associated with heart rate variability.    This is an abnormal nocturnal oximetry study.  If you are interested please call to schedule for an in lab sleep study.\"    Order is in the chart. Thanks.      "

## 2023-11-02 NOTE — TELEPHONE ENCOUNTER
Please inform the patient that she does not have to do anything she does not want to. Will cancel the order for the sleep study. If she changes her mind, please have her contact us and I'd be happy to order it again at that time. Thanks.

## 2023-11-02 NOTE — TELEPHONE ENCOUNTER
Spoke with patient. She prefers to not do testing unless it is necessary. Please advise.     Eva GUPTA RN  [facility]

## 2023-11-06 ENCOUNTER — ALLIED HEALTH/NURSE VISIT (OUTPATIENT)
Dept: CARDIOLOGY | Facility: CLINIC | Age: 68
End: 2023-11-06
Payer: MEDICARE

## 2023-11-06 ENCOUNTER — TELEPHONE (OUTPATIENT)
Dept: CARDIOLOGY | Facility: CLINIC | Age: 68
End: 2023-11-06

## 2023-11-06 VITALS
HEIGHT: 67 IN | DIASTOLIC BLOOD PRESSURE: 78 MMHG | HEART RATE: 78 BPM | RESPIRATION RATE: 18 BRPM | SYSTOLIC BLOOD PRESSURE: 118 MMHG | WEIGHT: 278 LBS | BODY MASS INDEX: 43.63 KG/M2

## 2023-11-06 DIAGNOSIS — I50.32 CHRONIC HEART FAILURE WITH PRESERVED EJECTION FRACTION (H): Primary | ICD-10-CM

## 2023-11-06 PROCEDURE — 96374 THER/PROPH/DIAG INJ IV PUSH: CPT

## 2023-11-06 PROCEDURE — 99207 PR NO CHARGE LOS: CPT

## 2023-11-06 PROCEDURE — S0171 BUMETANIDE 0.5 MG: HCPCS | Mod: JZ | Performed by: NURSE PRACTITIONER

## 2023-11-06 RX ORDER — BUMETANIDE 0.25 MG/ML
8 INJECTION INTRAMUSCULAR; INTRAVENOUS ONCE
Status: COMPLETED | OUTPATIENT
Start: 2023-11-06 | End: 2023-11-06

## 2023-11-06 RX ADMIN — BUMETANIDE 8 MG: 0.25 INJECTION, SOLUTION INTRAMUSCULAR; INTRAVENOUS at 14:56

## 2023-11-06 NOTE — PROGRESS NOTES
Bumex 8mg IVP given In clinic today tolerated well and flushed with saline 10ml., pt advised to have BMP tomorrow in hospital outpt lab between 8-4pm  Myra WEBB RN BSN, CHFN, PCCN-K

## 2023-11-06 NOTE — TELEPHONE ENCOUNTER
F/U call patient to discuss current response to IV Bumex 8mg in clinic on Monday.  Myra WEBB RN BSN, CHFN, PCCN-K

## 2023-11-07 ENCOUNTER — LAB (OUTPATIENT)
Dept: LAB | Facility: HOSPITAL | Age: 68
End: 2023-11-07
Payer: MEDICARE

## 2023-11-07 DIAGNOSIS — I50.32 CHRONIC HEART FAILURE WITH PRESERVED EJECTION FRACTION (H): ICD-10-CM

## 2023-11-07 LAB
ANION GAP SERPL CALCULATED.3IONS-SCNC: 10 MMOL/L (ref 7–15)
BUN SERPL-MCNC: 21.1 MG/DL (ref 8–23)
CALCIUM SERPL-MCNC: 9.2 MG/DL (ref 8.8–10.2)
CHLORIDE SERPL-SCNC: 102 MMOL/L (ref 98–107)
CREAT SERPL-MCNC: 0.9 MG/DL (ref 0.51–0.95)
DEPRECATED HCO3 PLAS-SCNC: 32 MMOL/L (ref 22–29)
EGFRCR SERPLBLD CKD-EPI 2021: 69 ML/MIN/1.73M2
GLUCOSE SERPL-MCNC: 120 MG/DL (ref 70–99)
POTASSIUM SERPL-SCNC: 3.9 MMOL/L (ref 3.4–5.3)
SODIUM SERPL-SCNC: 144 MMOL/L (ref 135–145)

## 2023-11-07 PROCEDURE — 36415 COLL VENOUS BLD VENIPUNCTURE: CPT

## 2023-11-07 PROCEDURE — 82374 ASSAY BLOOD CARBON DIOXIDE: CPT

## 2023-11-07 NOTE — TELEPHONE ENCOUNTER
LM for patient to return call to discuss response to IV diuretic yesterday in clinic. Myra WEBB RN BSN, CHFN, PCCN-K

## 2023-11-07 NOTE — TELEPHONE ENCOUNTER
Health Call Center    Phone Message    May a detailed message be left on voicemail: yes     Reason for Call: Other: Misty called back returning Myra's call to discuss her IV diuretic she received in clinic yesterday but wanted to note she is busy for the rest of the day so it would be best if she got a call back tomorrow to discuss. Please reach out to Misty tomorrow. Thank you!     Action Taken: Other: Cardiology    Travel Screening: Not Applicable    Thank you!  [facility]

## 2023-11-08 NOTE — TELEPHONE ENCOUNTER
"Misty is reporting a weight today of 270# down approximately 8# from previous. This is following Bumex 8mg IVP two days ago. She reports going home and eating ham sandwich and chips. She was advised that the sodium content was mostly likely more than her full day's allotment.    She does see her error, but has difficulty \"no self control\" she states.    Reinforced 600mg goal for her sodium each of three meals per day. Fluid intake not to exceed 60 oz total of all fluids. She will see Medardo Warner CNP in clinic next week.   She noted some improvement in her LE edema, and breathing easier, but realizes she has more fluid to get rid of.  Labs are reviewed as stable today. She  continues on Bumex 4mg daily.  Myra WEBB RN BSN, CHFN, PCCN-K    FYI to Medardo Warner CNP    "

## 2023-11-13 NOTE — PROGRESS NOTES
Assessment/Recommendations   Assessment:      Chronic heart failure with preserved ejection fraction:  NTproBNP in 1900' on 10/18/23    Current home weight is 262 pounds.  Weight is down 28 pounds.Weight was 257-259 pounds in August 2023.     Mild hypervolemic on exam with residual swelling in lower extremities more on the left side.    Currently taking Bumex 4 mg daily.   Known chronic lymphedema.    BMP stable on 11/7/2023.    2.  Hypotension: Blood pressure today is 96/76.  She is asymptomatic.  Losartan was discontinued in the past due to symptomatic hypotension.    3.  Severe functional tricuspid regurgitation with status post TIARA with 1 PASCALACE device resulting in slight reduction in tricuspid regurgitation: Stable per echo on 10/18/2023    4.  Obesity with BMI of 41.97:    5.  Mild to moderate aortic stenosis: Stable      Plan/Recommendation:  -take extra Bumex 2 mg daily for 3 days on Friday, Saturday and Sunday.  She will come back on 11/20/2023 for a BMP check.  -We will plan to follow-up with lab result    She has not scheduled follow-up appointment with Dr. Rocha.  She declined to make appointment.     Follow-up with Dr. Siegel in 4 to 6 weeks.  Follow-up with me in 3 months     History of Present Illness/Subjective    Ms. Misty Quezada is a 68 year old female with a past medical history of permanent atrial fibrillation,  severe tricuspid regurgitation, moderate aortic valve stenosis, lymphedema, morbid obesity, obstructive sleep apnea, and acute on chronic heart failure with preserved ejection fraction who is seen at [facility] for continued heart failure follow-up    During last heart failure clinic visit, patient reported 30+ and found volume overload due to missing Bumex for about a month.  She was given IV Bumex as an outpatient and also resumed her oral Bumex.    Today, Misty reports overall feeling better with her shortness of breath and leg  swelling.    She reported compliant with taking her Bumex as prescribed.      She denies fatigue, lightheadedness, shortness of breath, orthopnea, PND, palpitations, chest pain, and abdominal fullness/bloating.  She is trying to follow some diet.  She reports adequate fluid intake.    Patient had a follow-up with sleep clinic and did not think she has sleep apnea.  She underwent some testing and the results pending.    Echocardiogram on 10/18/2020 - reviewed:  Interpretation Summary   1. Left ventricular chamber size, wall thickness and systolic function are  normal. The visually estimated left ventricular ejection fraction is 55-60%.  2. Right ventricle is not well visualized. Chamber size may be mildly  enlarged. Systolic function may be mildly reduced.  3. Severe right atrial enlargement. Moderate left atrial enlargement.  4. A single Janette-Ace [id number] repair device is securely attached to the  tricuspid valve leaflets. There is mild-to-moderate residual tricuspid  regurgitation. No evidence of significant stenosis with peak inflow velocity  1.2 m/s and mean inflow gradient 3 mmHg at a heart rate of approximately 90  beats per minute.  5. Calcified trileaflet aoritc valve with paradoxical low flow, low gradient  (stroke volume index 22.2 mL/m2) aortic stenosis felt to be mild-to-moderate  in severity. Peak forward velocity measures 2.5 m/s, mean gradient 14 mmHg,  calculated aortic valve area 1.1 cm2, and dimensionless index 0.27. No  significant aortic regurgitation.  6. Mild pulmonary hypertension is present with an estimated pulmonary artery  systolic pressure of 43 mmHg.  7. Compared to the prior study dated 6/1/2023, the degree of tricuspid  regurgitation is less.    ECHO from 6/1/23-Reviewed:   Interpretation Summary   1. The left ventricle is normal in size. Left ventricular function is  normal.The ejection fraction is 55-60%.  2. The right ventricle is borderline dilated. Mildly decreased  right  ventricular systolic function.  3. The left atrium is severely dilated. The right atrium is severely dilated.  4. A single Janette-Ace device is attached to the anterior and septal leaflets  of tricuspid valve with moderate (+3) eccentric tricuspid regurgitation with  mean gradient of 3 mm Hg.  5. Moderate valvular aortic stenosis (LORRAINE:1.2cm2, Peakvel:2.5 m/sec, DI: 0.28,  SVi: 28 ml/m2). There is mild (1+) aortic regurgitation.  6. The right ventricular systolic pressure is approximated at 37mmHg plus the  right atrial pressure. High RA pressure estimated at 15 mmHg or greater.  ______________________________________________________________________________  Left Ventricle  The left ventricle is normal in size. Left ventricular function is normal.The  ejection fraction is 55-60%. There is normal left ventricular wall thickness.  Normal left ventricular wall motion.     Physical Examination Review of Systems   BP 96/76 (BP Location: Right arm, Patient Position: Sitting, Cuff Size: Adult Large)   Pulse 84   Resp 14   Wt 121.6 kg (268 lb)   BMI 41.97 kg/m    Body mass index is 41.97 kg/m .  Wt Readings from Last 3 Encounters:   11/14/23 121.6 kg (268 lb)   11/06/23 126.1 kg (278 lb)   10/31/23 134.3 kg (296 lb)     General Appearance:   no distress, normal body habitus   ENT/Mouth: membranes moist, no oral lesions or bleeding gums.      EYES:  no scleral icterus, normal conjunctivae   Neck: no carotid bruits or thyromegaly   Chest/Lungs:   lungs are clear to auscultation, no rales or wheezing, equal chest wall expansion    Cardiovascular:   Irregular irregular. Normal first and second heart sounds with no murmurs, rubs, or gallops; the carotid, radial and posterior tibial pulses are intact, JVP is difficult to assess due to the patient's obesity and body habitus   , mild edema bilaterally (extending into left posterior thigh)   Abdomen:  Large but soft, no organomegaly, masses, bruits, or tenderness; bowel  sounds are present   Extremities   no cyanosis or clubbing    CMS intact.   Skin: no xanthelasma, warm.    Neurologic:  no tremors   Psychiatric: alert and oriented x3, calm                                                        Negative unless noted in HPI     Medical History  Surgical History Family History Social History   Past Medical History:   Diagnosis Date    (HFpEF) heart failure with preserved ejection fraction (H) 10/17/2022    Aortic valve stenosis 11/10/2022    Benign essential hypertension     Chronic atrial fibrillation (H)     Lymphedema 9/24/2022    Nonrheumatic tricuspid valve regurgitation 2/6/2023    Past Surgical History:   Procedure Laterality Date    APPENDECTOMY      CHOLECYSTECTOMY      CV CORONARY ANGIOGRAM N/A 3/15/2023    Procedure: Coronary Angiogram;  Surgeon: Howard Plummer MD;  Location: [facility] CATH LAB CV    CV LEFT HEART CATH N/A 3/15/2023    Procedure: Left Heart Catheterization;  Surgeon: Howard Plummer MD;  Location: [facility] CATH LAB CV    CV RIGHT HEART CATH MEASUREMENTS RECORDED N/A 3/15/2023    Procedure: Right Heart Catheterization;  Surgeon: Howard Plummer MD;  Location: [facility] CATH [facility] CV    CV TRANSCATHETER TRICUSPID VALVE REPAIR N/A 5/3/2023    Procedure: Transcatheter Tricuspid Valve Repair;  Surgeon: Howard Plummer MD;  Location: [facility] CATH LAB CV    GASTRIC BYPASS      OR TRANSCATHETER TRICUSPID VALVE REPLACEMENT, FEMORAL PERCUTANEOUS APPROACH (STANDBY) N/A 5/3/2023    Procedure: OR TRANSCATHETER TRICUSPID VALVE REPLACEMENT, FEMORAL PERCUTANEOUS APPROACH (STANDBY);  Surgeon: Kana Calderón MD;  Location: [facility] CATH LAB CV    Family History   Problem Relation Age of Onset    No Known Problems Mother     No Known Problems Father     Social History     Socioeconomic History    Marital status:      Spouse name: Not on file    Number of children: Not on file    Years of education: Not on file    Highest education level: Not on  file   Occupational History    Not on file   Tobacco Use    Smoking status: Never    Smokeless tobacco: Never   Substance and Sexual Activity    Alcohol use: Not Currently     Comment: Rarely Uses alcohol    Drug use: Never    Sexual activity: Not on file   Other Topics Concern    Not on file   Social History Narrative    Not on file     Social Determinants of Health     Financial Resource Strain: Not on file   Food Insecurity: Not on file   Transportation Needs: Not on file   Physical Activity: Not on file   Stress: Not on file   Social Connections: Not on file   Interpersonal Safety: Not on file   Housing Stability: Not on file          Medications  Allergies   Current Outpatient Medications   Medication Sig Dispense Refill    acetaminophen (TYLENOL) 325 MG tablet Take 975 mg by mouth every 4 hours as needed for mild pain      apixaban (ELIQUIS) 5 mg Tab tablet Take 5 mg by mouth 2 times daily      ascorbic acid 1000 MG TABS tablet Take 1 tablet by mouth daily      bumetanide (BUMEX) 2 MG tablet Take 2 tablets (4 mg) by mouth daily 180 tablet 1    CALCIUM/D3/MAG OX//TYRONE/ZN (CALTRATE + D3 PLUS MINERALS ORAL) [CALCIUM/D3/MAG OX//TYRONE/ZN (CALTRATE + D3 PLUS MINERALS ORAL)] Take 1 tablet by mouth daily.      cholecalciferol, vitamin D3, 2,000 unit Tab [CHOLECALCIFEROL, VITAMIN D3, 2,000 UNIT TAB] Take 2,000 Units by mouth daily.      citalopram (CELEXA) 10 MG tablet Take 20 mg by mouth daily      cyanocobalamin 1000 MCG tablet [CYANOCOBALAMIN 1000 MCG TABLET] Take 1,000 mcg by mouth daily.      ferrous sulfate (FEROSUL) 325 (65 Fe) MG tablet Take 1 tablet (325 mg) by mouth every other day      melatonin 3 MG tablet Take 3 mg by mouth nightly as needed      metoprolol succinate ER (TOPROL XL) 25 MG 24 hr tablet Take 2 tablets (50 mg) by mouth daily 180 tablet 3    MULTIVIT WITH CALCIUM,IRON,MIN (WOMEN'S DAILY MULTIVITAMIN ORAL) [MULTIVIT WITH CALCIUM,IRON,MIN (WOMEN'S DAILY MULTIVITAMIN ORAL)] Take 1 tablet by  mouth daily.      polyethylene glycol (MIRALAX) 17 g packet Take 17 g by mouth daily as needed for constipation      potassium chloride ER (K-TAB/KLOR-CON) 10 MEQ CR tablet Take 1 tablet (10 mEq) by mouth daily 30 tablet 11    spironolactone (ALDACTONE) 25 MG tablet Take 1 tablet (25 mg) by mouth daily 90 tablet 4    No Known Allergies      Lab Results    Chemistry/lipid CBC Cardiac Enzymes/BNP/TSH/INR   Lab Results   Component Value Date    CHOL 163 09/09/2016    HDL 42 (L) 09/09/2016    TRIG 81 09/09/2016    BUN 21.1 11/07/2023     11/07/2023    CO2 32 (H) 11/07/2023    Lab Results   Component Value Date    WBC 6.2 10/18/2023    HGB 13.0 10/18/2023    HCT 40.9 10/18/2023     (H) 10/18/2023     10/18/2023    Lab Results   Component Value Date    TROPONINI 1.32 (HH) 08/21/2021     (H) 09/02/2021    TSH 4.92 08/31/2021    INR 1.21 (H) 05/04/2023        25  minutes spent on the date of encounter doing chart review, review of test results, patient visit, and documentation.        This note has been dictated using voice recognition software. Any grammatical, typographical, or context distortions are unintentional and inherent to the software

## 2023-11-14 ENCOUNTER — OFFICE VISIT (OUTPATIENT)
Dept: CARDIOLOGY | Facility: CLINIC | Age: 68
End: 2023-11-14
Attending: NURSE PRACTITIONER
Payer: MEDICARE

## 2023-11-14 VITALS
RESPIRATION RATE: 14 BRPM | HEART RATE: 84 BPM | BODY MASS INDEX: 41.97 KG/M2 | SYSTOLIC BLOOD PRESSURE: 96 MMHG | DIASTOLIC BLOOD PRESSURE: 76 MMHG | WEIGHT: 268 LBS

## 2023-11-14 DIAGNOSIS — Z98.890 S/P TRICUSPID VALVE REPAIR: ICD-10-CM

## 2023-11-14 DIAGNOSIS — I95.9 HYPOTENSION, UNSPECIFIED HYPOTENSION TYPE: ICD-10-CM

## 2023-11-14 DIAGNOSIS — I50.32 CHRONIC HEART FAILURE WITH PRESERVED EJECTION FRACTION (H): Primary | ICD-10-CM

## 2023-11-14 DIAGNOSIS — I89.0 LYMPHEDEMA: ICD-10-CM

## 2023-11-14 DIAGNOSIS — E66.01 MORBID OBESITY (H): ICD-10-CM

## 2023-11-14 PROCEDURE — 99213 OFFICE O/P EST LOW 20 MIN: CPT | Performed by: NURSE PRACTITIONER

## 2023-11-14 NOTE — TELEPHONE ENCOUNTER
Patient given provider message as written. No further questions.     Eva GUPTA RN  [facility]

## 2023-11-14 NOTE — LETTER
11/14/2023    LAVELL WALLACE MD  [facility] [address]  [facility] [address]    RE: Misty Quezada       Dear Colleague,     I had the pleasure of seeing Misty Quezada in the [facility].          Assessment/Recommendations   Assessment:      Chronic heart failure with preserved ejection fraction:  NTproBNP in 1900' on 10/18/23    Current home weight is 262 pounds.  Weight is down 28 pounds.Weight was 257-259 pounds in August 2023.     Mild hypervolemic on exam with residual swelling in lower extremities more on the left side.    Currently taking Bumex 4 mg daily.   Known chronic lymphedema.    BMP stable on 11/7/2023.    2.  Hypotension: Blood pressure today is 96/76.  She is asymptomatic.  Losartan was discontinued in the past due to symptomatic hypotension.    3.  Severe functional tricuspid regurgitation with status post TIARA with 1 PASCALACE device resulting in slight reduction in tricuspid regurgitation: Stable per echo on 10/18/2023    4.  Obesity with BMI of 41.97:    5.  Mild to moderate aortic stenosis: Stable      Plan/Recommendation:  -take extra Bumex 2 mg daily for 3 days on Friday, Saturday and Sunday.  She will come back on 11/20/2023 for a BMP check.  -We will plan to follow-up with lab result    She has not scheduled follow-up appointment with Dr. Rocha.  She declined to make appointment.     Follow-up with Dr. Siegel in 4 to 6 weeks.  Follow-up with me in 3 months     History of Present Illness/Subjective    Ms. Misty Quezada is a 68 year old female with a past medical history of permanent atrial fibrillation,  severe tricuspid regurgitation, moderate aortic valve stenosis, lymphedema, morbid obesity, obstructive sleep apnea, and acute on chronic heart failure with preserved ejection fraction who is seen at [facility] for continued heart failure follow-up    During last heart failure clinic  visit, patient reported 30+ and found volume overload due to missing Bumex for about a month.  She was given IV Bumex as an outpatient and also resumed her oral Bumex.    Today, Misty reports overall feeling better with her shortness of breath and leg swelling.    She reported compliant with taking her Bumex as prescribed.      She denies fatigue, lightheadedness, shortness of breath, orthopnea, PND, palpitations, chest pain, and abdominal fullness/bloating.  She is trying to follow some diet.  She reports adequate fluid intake.    Patient had a follow-up with sleep clinic and did not think she has sleep apnea.  She underwent some testing and the results pending.    Echocardiogram on 10/18/2020 - reviewed:  Interpretation Summary   1. Left ventricular chamber size, wall thickness and systolic function are  normal. The visually estimated left ventricular ejection fraction is 55-60%.  2. Right ventricle is not well visualized. Chamber size may be mildly  enlarged. Systolic function may be mildly reduced.  3. Severe right atrial enlargement. Moderate left atrial enlargement.  4. A single Janette-Ace [id number] repair device is securely attached to the  tricuspid valve leaflets. There is mild-to-moderate residual tricuspid  regurgitation. No evidence of significant stenosis with peak inflow velocity  1.2 m/s and mean inflow gradient 3 mmHg at a heart rate of approximately 90  beats per minute.  5. Calcified trileaflet aoritc valve with paradoxical low flow, low gradient  (stroke volume index 22.2 mL/m2) aortic stenosis felt to be mild-to-moderate  in severity. Peak forward velocity measures 2.5 m/s, mean gradient 14 mmHg,  calculated aortic valve area 1.1 cm2, and dimensionless index 0.27. No  significant aortic regurgitation.  6. Mild pulmonary hypertension is present with an estimated pulmonary artery  systolic pressure of 43 mmHg.  7. Compared to the prior study dated 6/1/2023, the degree of tricuspid  regurgitation  is less.    ECHO from 6/1/23-Reviewed:   Interpretation Summary   1. The left ventricle is normal in size. Left ventricular function is  normal.The ejection fraction is 55-60%.  2. The right ventricle is borderline dilated. Mildly decreased right  ventricular systolic function.  3. The left atrium is severely dilated. The right atrium is severely dilated.  4. A single Janette-Ace device is attached to the anterior and septal leaflets  of tricuspid valve with moderate (+3) eccentric tricuspid regurgitation with  mean gradient of 3 mm Hg.  5. Moderate valvular aortic stenosis (LORRAINE:1.2cm2, Peakvel:2.5 m/sec, DI: 0.28,  SVi: 28 ml/m2). There is mild (1+) aortic regurgitation.  6. The right ventricular systolic pressure is approximated at 37mmHg plus the  right atrial pressure. High RA pressure estimated at 15 mmHg or greater.  ______________________________________________________________________________  Left Ventricle  The left ventricle is normal in size. Left ventricular function is normal.The  ejection fraction is 55-60%. There is normal left ventricular wall thickness.  Normal left ventricular wall motion.     Physical Examination Review of Systems   BP 96/76 (BP Location: Right arm, Patient Position: Sitting, Cuff Size: Adult Large)   Pulse 84   Resp 14   Wt 121.6 kg (268 lb)   BMI 41.97 kg/m    Body mass index is 41.97 kg/m .  Wt Readings from Last 3 Encounters:   11/14/23 121.6 kg (268 lb)   11/06/23 126.1 kg (278 lb)   10/31/23 134.3 kg (296 lb)     General Appearance:   no distress, normal body habitus   ENT/Mouth: membranes moist, no oral lesions or bleeding gums.      EYES:  no scleral icterus, normal conjunctivae   Neck: no carotid bruits or thyromegaly   Chest/Lungs:   lungs are clear to auscultation, no rales or wheezing, equal chest wall expansion    Cardiovascular:   Irregular irregular. Normal first and second heart sounds with no murmurs, rubs, or gallops; the carotid, radial and posterior tibial  pulses are intact, JVP is difficult to assess due to the patient's obesity and body habitus   , mild edema bilaterally (extending into left posterior thigh)   Abdomen:  Large but soft, no organomegaly, masses, bruits, or tenderness; bowel sounds are present   Extremities   no cyanosis or clubbing    CMS intact.   Skin: no xanthelasma, warm.    Neurologic:  no tremors   Psychiatric: alert and oriented x3, calm                                                        Negative unless noted in HPI     Medical History  Surgical History Family History Social History   Past Medical History:   Diagnosis Date    (HFpEF) heart failure with preserved ejection fraction (H) 10/17/2022    Aortic valve stenosis 11/10/2022    Benign essential hypertension     Chronic atrial fibrillation (H)     Lymphedema 9/24/2022    Nonrheumatic tricuspid valve regurgitation 2/6/2023    Past Surgical History:   Procedure Laterality Date    APPENDECTOMY      CHOLECYSTECTOMY      CV CORONARY ANGIOGRAM N/A 3/15/2023    Procedure: Coronary Angiogram;  Surgeon: Howard Plummer MD;  Location: [facility]    CV LEFT HEART CATH N/A 3/15/2023    Procedure: Left Heart Catheterization;  Surgeon: Howard Plummer MD;  Location: [facility]    CV RIGHT HEART CATH MEASUREMENTS RECORDED N/A 3/15/2023    Procedure: Right Heart Catheterization;  Surgeon: Howard Plummer MD;  Location: [facility]    CV TRANSCATHETER TRICUSPID VALVE REPAIR N/A 5/3/2023    Procedure: Transcatheter Tricuspid Valve Repair;  Surgeon: Howard Plummer MD;  Location: [place] [facility] CV    GASTRIC BYPASS      OR TRANSCATHETER TRICUSPID VALVE REPLACEMENT, FEMORAL PERCUTANEOUS APPROACH (STANDBY) N/A 5/3/2023    Procedure: OR TRANSCATHETER TRICUSPID VALVE REPLACEMENT, FEMORAL PERCUTANEOUS APPROACH (STANDBY);  Surgeon: Kana Calderón MD;  Location: [place] [facility] CV    Family History   Problem Relation Age of Onset    No Known Problems  Mother     No Known Problems Father     Social History     Socioeconomic History    Marital status:      Spouse name: Not on file    Number of children: Not on file    Years of education: Not on file    Highest education level: Not on file   Occupational History    Not on file   Tobacco Use    Smoking status: Never    Smokeless tobacco: Never   Substance and Sexual Activity    Alcohol use: Not Currently     Comment: Rarely Uses alcohol    Drug use: Never    Sexual activity: Not on file   Other Topics Concern    Not on file   Social History Narrative    Not on file     Social Determinants of Health     Financial Resource Strain: Not on file   Food Insecurity: Not on file   Transportation Needs: Not on file   Physical Activity: Not on file   Stress: Not on file   Social Connections: Not on file   Interpersonal Safety: Not on file   Housing Stability: Not on file          Medications  Allergies   Current Outpatient Medications   Medication Sig Dispense Refill    acetaminophen (TYLENOL) 325 MG tablet Take 975 mg by mouth every 4 hours as needed for mild pain      apixaban (ELIQUIS) 5 mg Tab tablet Take 5 mg by mouth 2 times daily      ascorbic acid 1000 MG TABS tablet Take 1 tablet by mouth daily      bumetanide (BUMEX) 2 MG tablet Take 2 tablets (4 mg) by mouth daily 180 tablet 1    CALCIUM/D3/MAG OX//TYRONE/ZN (CALTRATE + D3 PLUS MINERALS ORAL) [CALCIUM/D3/MAG OX//TYRONE/ZN (CALTRATE + D3 PLUS MINERALS ORAL)] Take 1 tablet by mouth daily.      cholecalciferol, vitamin D3, 2,000 unit Tab [CHOLECALCIFEROL, VITAMIN D3, 2,000 UNIT TAB] Take 2,000 Units by mouth daily.      citalopram (CELEXA) 10 MG tablet Take 20 mg by mouth daily      cyanocobalamin 1000 MCG tablet [CYANOCOBALAMIN 1000 MCG TABLET] Take 1,000 mcg by mouth daily.      ferrous sulfate (FEROSUL) 325 (65 Fe) MG tablet Take 1 tablet (325 mg) by mouth every other day      melatonin 3 MG tablet Take 3 mg by mouth nightly as needed      metoprolol  succinate ER (TOPROL XL) 25 MG 24 hr tablet Take 2 tablets (50 mg) by mouth daily 180 tablet 3    MULTIVIT WITH CALCIUM,IRON,MIN (WOMEN'S DAILY MULTIVITAMIN ORAL) [MULTIVIT WITH CALCIUM,IRON,MIN (WOMEN'S DAILY MULTIVITAMIN ORAL)] Take 1 tablet by mouth daily.      polyethylene glycol (MIRALAX) 17 g packet Take 17 g by mouth daily as needed for constipation      potassium chloride ER (K-TAB/KLOR-CON) 10 MEQ CR tablet Take 1 tablet (10 mEq) by mouth daily 30 tablet 11    spironolactone (ALDACTONE) 25 MG tablet Take 1 tablet (25 mg) by mouth daily 90 tablet 4    No Known Allergies      Lab Results    Chemistry/lipid CBC Cardiac Enzymes/BNP/TSH/INR   Lab Results   Component Value Date    CHOL 163 09/09/2016    HDL 42 (L) 09/09/2016    TRIG 81 09/09/2016    BUN 21.1 11/07/2023     11/07/2023    CO2 32 (H) 11/07/2023    Lab Results   Component Value Date    WBC 6.2 10/18/2023    HGB 13.0 10/18/2023    HCT 40.9 10/18/2023     (H) 10/18/2023     10/18/2023    Lab Results   Component Value Date    TROPONINI 1.32 (HH) 08/21/2021     (H) 09/02/2021    TSH 4.92 08/31/2021    INR 1.21 (H) 05/04/2023        25  minutes spent on the date of encounter doing chart review, review of test results, patient visit, and documentation.        This note has been dictated using voice recognition software. Any grammatical, typographical, or context distortions are unintentional and inherent to the software        Thank you for allowing me to participate in the care of your patient.      Sincerely,     ERICK Mar CNP     [facility]  cc:   ERICK Mar CNP  [address]  [place] [address]

## 2023-11-14 NOTE — PATIENT INSTRUCTIONS
Misty Quezada,    It was a pleasure to see you today at the [facility].     My recommendations after this visit include:    - Take extra Bumex 2 mg around 6 pm for 3 days (Friday, Saturday, and Sunday)     - Please get your blood work (BMP-kidney function) on Monday 11/20 from [facility] lab     - Follow up with Medardo in 3 months or sooner if needed    - Follow up with Dr. Siegel in 4-6 weeks    - Please call Heart Failure Nurse Line at [phone], if you have any questions or concerns

## 2023-11-20 ENCOUNTER — LAB (OUTPATIENT)
Dept: LAB | Facility: HOSPITAL | Age: 68
End: 2023-11-20
Payer: MEDICARE

## 2023-11-20 DIAGNOSIS — I50.32 CHRONIC HEART FAILURE WITH PRESERVED EJECTION FRACTION (H): ICD-10-CM

## 2023-11-20 LAB
ANION GAP SERPL CALCULATED.3IONS-SCNC: 10 MMOL/L (ref 7–15)
BUN SERPL-MCNC: 30.2 MG/DL (ref 8–23)
CALCIUM SERPL-MCNC: 9.6 MG/DL (ref 8.8–10.2)
CHLORIDE SERPL-SCNC: 100 MMOL/L (ref 98–107)
CREAT SERPL-MCNC: 1 MG/DL (ref 0.51–0.95)
DEPRECATED HCO3 PLAS-SCNC: 30 MMOL/L (ref 22–29)
EGFRCR SERPLBLD CKD-EPI 2021: 61 ML/MIN/1.73M2
GLUCOSE SERPL-MCNC: 120 MG/DL (ref 70–99)
POTASSIUM SERPL-SCNC: 3.9 MMOL/L (ref 3.4–5.3)
SODIUM SERPL-SCNC: 140 MMOL/L (ref 135–145)

## 2023-11-20 PROCEDURE — 80048 BASIC METABOLIC PNL TOTAL CA: CPT

## 2023-11-20 PROCEDURE — 36415 COLL VENOUS BLD VENIPUNCTURE: CPT

## 2023-11-28 ENCOUNTER — TELEPHONE (OUTPATIENT)
Dept: CARDIOLOGY | Facility: CLINIC | Age: 68
End: 2023-11-28
Payer: MEDICARE

## 2023-11-28 DIAGNOSIS — I50.32 CHRONIC HEART FAILURE WITH PRESERVED EJECTION FRACTION (H): Primary | ICD-10-CM

## 2023-11-28 DIAGNOSIS — Z00.6 EXAMINATION OF PARTICIPANT OR CONTROL IN CLINICAL RESEARCH: ICD-10-CM

## 2023-11-28 NOTE — TELEPHONE ENCOUNTER
CLASP II TR    The primary objective of this study is to evaluate the safety and effectiveness of the PILAR System with OMT compared to OMT alone in participants with symptomatic severe tricuspid regurgitation who may not be ideal candidates for tricuspid valve surgery and ay be eligible for transcatheter tricuspid valve repair.     Misyt Quezada called for NYHA assessment for the CLASP II TR study    NYHA Classification:  [] I [x] II  []  III []  IV    Anh Solis RN, BSN  Clinical Trials Nurse

## 2023-12-20 DIAGNOSIS — I48.21 PERMANENT ATRIAL FIBRILLATION (H): ICD-10-CM

## 2023-12-20 DIAGNOSIS — I50.32 CHRONIC HEART FAILURE WITH PRESERVED EJECTION FRACTION (H): ICD-10-CM

## 2023-12-20 DIAGNOSIS — I35.0 NONRHEUMATIC AORTIC VALVE STENOSIS: ICD-10-CM

## 2023-12-22 RX ORDER — POTASSIUM CHLORIDE 750 MG/1
10 TABLET, EXTENDED RELEASE ORAL DAILY
Qty: 30 TABLET | Refills: 2 | Status: SHIPPED | OUTPATIENT
Start: 2023-12-22 | End: 2024-09-10

## 2023-12-22 RX ORDER — SPIRONOLACTONE 25 MG/1
25 TABLET ORAL DAILY
Qty: 30 TABLET | Refills: 2 | Status: SHIPPED | OUTPATIENT
Start: 2023-12-22 | End: 2024-03-14

## 2024-02-01 NOTE — PROGRESS NOTES
[facility] Hospitalist H&P      Chief Complaint   Patient presents with    Nausea    Chest Pain       History Of Present Illness  Abelardo is a 71 year old male presenting with past medical history significant for hypertension and GERD who presents to the ED with complaints of worsening epigastric abdominal pain associate with nausea and abdominal distention.. Patient states pain radiates to his back and has been worsened with food.  He has lost his appetite over the last 2 days.  Per wife patient has lost about 5 to 7 pounds which she thinks is because he has not eaten in 1 day.  Patient noted change in color of his urine he states it is dark in color.  And also has yellowing of his eyes.  In the ED patient had workup including CMP pertinent for sodium of 132, potassium of 3.1, total bili of 10.2, AST of 74, ALT of 202 and alk phos of 192, lipase of 1947, magnesium of 1.5, CBC within normal limits.  Patient also had imaging including a chest x-ray which was unremarkable, CTA of the chest abdomen and pelvis which showed moderate atherosclerosis, acute interstitial edematous pancreatitis, dilated CBD up to 1.47 cm.  Patient at this time is awake alert [place] x 3.  Denies fevers chills, no history of alcohol use or any drug use      Past Medical History  Hypertension GERD    Surgical History  Denies  Social History   Non-smoker, occasional drinker    Family History   with children lives at home with his wife     Allergies  ALLERGIES:  Cephalexin    Medications  (Not in a hospital admission)      Current Facility-Administered Medications   Medication Dose Route Frequency Provider Last Rate Last Admin    amLODIPine (NORVASC) tablet 5 mg  5 mg Oral Daily Earle Harris DO        lisinopril-hydroCHLOROthiazide 20-12.5 mg   Oral Daily Earle Harris DO        [Held by provider] pantoprazole (PROTONIX) EC tablet 40 mg  40 mg Oral Daily Earle Harris, DO        sodium chloride 0.9 % flush bag 25 mL  25 mL Intravenous PRN  Care Management Follow Up    Length of Stay (days): 10    Expected Discharge Date: 10/05/2022     Concerns to be Addressed:       Patient plan of care discussed at interdisciplinary rounds: Yes    Anticipated Discharge Disposition: Transitional Care     Anticipated Discharge Services:  TCU  Anticipated Discharge DME:  n/a    Patient/family educated on Medicare website which has current facility and service quality ratings:  yes  Education Provided on the Discharge Plan:  yes  Patient/Family in Agreement with the Plan:  yes    Referrals Placed by CM/SW:  yes  Private pay costs discussed: private room/amenity fees and transportation costs    Additional Information:  Social Work Care Coordinator spoke with [facility] admissions in regards to discontinue planning; pt's barriers to discharge are IV diuresis, cardiology recommendations. Pt still accepted to facility and bariatric bed is available (one bed at this time but they will need to fill if another pt is medically stable first). Admissions will continue to update SW on bed status. SW will continue to follow on discharge plan.  3:37 PM    COURTNEY Haque         Earle Harris DO        sodium chloride 0.9 % injection 2 mL  2 mL Intracatheter 2 times per day Earle Harris DO        enoxaparin (LOVENOX) injection 40 mg  40 mg Subcutaneous Daily Earle Harris DO        ondansetron (ZOFRAN ODT) disintegrating tablet 4 mg  4 mg Oral Q12H PRN Earle Harris DO        Or    ondansetron (ZOFRAN) injection 4 mg  4 mg Intravenous Q12H PRN Earle Harris DO        Potassium Standard Replacement Protocol (Levels 3.5 and lower)   Does not apply See Admin Instructions Earle Harris DO         Current Outpatient Medications   Medication Sig Dispense Refill    amLODIPine (NORVASC) 5 MG tablet Take 5 mg by mouth daily.      lisinopril-hydroCHLOROthiazide (ZESTORETIC) 20-25 MG per tablet Take 1 tablet by mouth daily.      pantoprazole (PROTONIX) 40 MG tablet Take 40 mg by mouth daily.         Review of Systems  Eye Problem(s):negative  ENT Problem(s):negative  Cardiovascular problem(s):negative  Respiratory problem(s):negative  Gastro-intestinal problem(s):negative GI and abdominal pain  Genito-urinary problem(s):negative  Musculoskeletal problem(s):negative  Integumentary problem(s):negative  Neurological problem(s):negative  Psychiatric problem(s):negative  Endocrine problem(s):negative  Hematologic and/or Lymphatic problem(s):negative        Last Recorded Vitals  Vitals with min/max:    Vital Last Value 24 Hour Range   Temperature 98 °F (36.7 °C) (02/01/24 0652) Temp  Min: 98 °F (36.7 °C)  Max: 98 °F (36.7 °C)   Pulse 85 (02/01/24 1314) Pulse  Min: 76  Max: 85   Respiratory (!) 11 (02/01/24 1314) Resp  Min: 11  Max: 18   Non-Invasive  Blood Pressure 132/55 (02/01/24 1314) BP  Min: 132/55  Max: 165/72   Pulse Oximetry 94 % (02/01/24 1314) SpO2  Min: 92 %  Max: 98 %   Arterial   Blood Pressure   No data recorded        Physical Exam:  Gen alert, nad  Eyes anicteric, EOMI  ENT MMM, OP clear  cor rrr, no g/r  lungs ctab, normal lung excursion  abd distended, epigastric  abdominal tenderness, positive bowel sounds  skin no rash on UE, no pallor  MSK normal muscle bulk, no joint swelling  Neuro answers questions appropriately, no tremors  Psych calm, good eye contact    Labs:   Recent Labs   Lab 02/01/24  1332 02/01/24  0706   SODIUM 132* 131*   CHLORIDE 95* 93*   CO2 28 27   BUN 9 10   CREATININE 0.70 0.72   CALCIUM 9.0 9.0   ALBUMIN 2.8* 2.9*   BILIRUBIN 10.2* 9.4*   ALKPT 192* 197*   * 231*   AST 74* 90*   GLUCOSE 111* 109*      .No intake or output data in the 24 hours ending 02/01/24 1508       Imaging: CTA CHEST ABDOMEN PELVIS    Result Date: 2/1/2024  Narrative: EXAM: CTA CHEST ABDOMEN PELVIS CLINICAL INDICATION: chest pain, abd pain, radiating to back COMPARISON: None TECHNIQUE: Helical CTA was performed of the chest, abdomen and pelvis with IV contrast, with axial, coronal, and sagittal reconstructions performed and provided for review. Noncontrast imaging of the chest and abdomen was also included. Automated exposure control was utilized. 3D reconstructions (using MIP, volume rendering, and/or other techniques) were generated on acquisition workstation. FINDINGS: Chest: HEART: The heart is normal in size. There is no pericardial effusion. LYMPH NODES/MEDIASTINUM: There is no mediastinal, hilar, or axillary lymphadenopathy by CT size criteria. The esophagus is unremarkable. The visualized thyroid gland is grossly unremarkable. The trachea and central airways are adequately patent. LUNGS: There is no consolidation, pleural effusion, or pneumothorax. Mild bandlike atelectasis and scarring in both lungs. No suspicious pulmonary nodules are identified. Abdomen/pelvis: LIVER: Normal. BILIARY TREE AND GALLBLADDER: Contracted gallbladder, dilated CBD up to 1.4 cm. PANCREAS: Acute interstitial edematous pancreatitis involving the entire pancreas with no evidence of necrosis or peripancreatic fluid collection. SPLEEN: Normal. ADRENAL GLANDS: Normal. KIDNEYS AND URETERS:  Normal. BLADDER: Normal. REPRODUCTIVE ORGANS: Unremarkable prostate. Suspect retracted right testis in the right inguinal canal. GI TRACT: No bowel obstruction or acute inflammation. Appendix is not confidently seen. Mild colonic diverticulosis. PERITONEUM AND RETROPERITONEUM: No free air or pathologic free fluid. LYMPH NODES: No lymphadenopathy. Chest/abdomen/pelvis: VESSELS: No evidence of aortic dissection, rupture, acute intramural hematoma, occlusion, or other acute aortic process. There is overall moderate diffuse atherosclerotic disease. The thoracic aorta is normal in course and caliber. Patent aortic arch branch vessels. Mild coronary calcifications. The abdominal aorta is normal in course and caliber.  Patent celiac artery and its major branches. Patent SMA and its major branches.  Patent [place]. Patent bilateral renal arteries.  Patent bilateral common and external iliac arteries and visualized femoral arterial branches. Limited evaluation of the pulmonary arteries is without gross abnormality. A portosystemic varicosity connects the splenic vein to the left internal iliac vein, with the main portal vein appearing normal in caliber. BODY WALL: Small fat-containing left inguinal hernia. BONES: Mild spinal degeneration.     Impression: 1.   No aortic dissection or other acute arterial pathology. Moderate atherosclerosis. 2.   Acute interstitial edematous pancreatitis. 3.   Dilated common bile duct to 1.4 cm. Recommend MRCP or ERCP to assess for distal obstruction, particularly given patient's abnormal LFTs. 4.   A portosystemic shunt is present indicating portal hypertension, though the liver and portal vein appear normal as imaged. Electronically Signed by: JESSENIA ORDONEZ M.D. Signed on: 2/1/2024 9:32 AM Workstation ID: [id number]    XR CHEST AP OR PA    Result Date: 2/1/2024  Narrative: EXAM: XR CHEST AP OR PA CLINICAL INDICATION: Chest pain COMPARISON: None.     Impression: FINDINGS AND IMPRESSION:  Cardiomediastinal silhouette is normal in size and contour. Linear opacity in the right lower lobe probably represents atelectasis. No focal consolidation, pleural effusion, or detectable pneumothorax. Electronically Signed by: HARVEY RODRIGUES M.D. Signed on: 2/1/2024 8:17 AM Workstation ID: [id number]           A/P:  71-year-old male with a past medical history significant for hypertension and GERD who presents with obstructive jaundice and acute pancreatitis  -Patient with abnormal LFTs total bili of 10.2, AST of 74, ALT of 202, alk phos of 192, albumin of 2.8  -Lipase 1947  -CT of the chest abdomen pelvis consistent with moderate atherosclerosis and acute interstitial edematous pancreatitis with a dilated CBD of 1.4 cm  -N.p.o. IV fluids, pain control, IV Protonix  -Will get an MRCP and GI for consult for evaluation and recommendations  -Continue to monitor LFTs  #Hypokalemia/hypomag  -Continue to replete and monitor  #5 to 7 pound weight loss  -Will continue to monitor  #Hypertension  -We will cover on IV hydralazine during hospitalization and resume once patient able to tolerate orals on Norvasc  Continue current management      DVT Prophylaxis  Subcu Lovenox    Code Status    Code Status: Full Resuscitation    PCP  Johnathon Cerda MD        Based on the patient's presentation on admission, I expect the patient to require at least 2 midnights of medically necessary Hospital services  Questions addressed pt and his wife who is at the bedside  Greater than 50% of the time spent reviewing the patient records, coordinating patient care plan and discussing the above care plan  with the patient.  MD Cezar Carcamo MD  2/1/2024 3:08 PM

## 2024-02-29 NOTE — PATIENT INSTRUCTIONS
Misty Quezada,    It was a pleasure to see you today in the clinic regarding your recent tricuspid valve repair.     My recommendations after this visit include:     - increase metoprolol 50 mg daily (2 tablets)  - no other medication changes at this time    - ok to resume all activities at this time with no further restrictions.  This includes driving    You should followup with me again on June 7 (after echo on June 1)      If you have questions or concerns, please call using the numbers below:    Valve Clinic Phone   [phone]    After Hours/Scheduling  [phone]    Otherwise you can dial the nurse directly at:    Lizabeth Espinal RN  [phone]    Sunil Montana RN  [phone]                  
normal...

## 2024-03-14 DIAGNOSIS — I35.0 NONRHEUMATIC AORTIC VALVE STENOSIS: ICD-10-CM

## 2024-03-14 DIAGNOSIS — I50.32 CHRONIC HEART FAILURE WITH PRESERVED EJECTION FRACTION (H): ICD-10-CM

## 2024-03-14 DIAGNOSIS — I48.21 PERMANENT ATRIAL FIBRILLATION (H): ICD-10-CM

## 2024-03-14 RX ORDER — SPIRONOLACTONE 25 MG/1
25 TABLET ORAL DAILY
Qty: 90 TABLET | Refills: 1 | Status: SHIPPED | OUTPATIENT
Start: 2024-03-14

## 2024-03-14 NOTE — TELEPHONE ENCOUNTER
Pt has appt w/ Dr. Siegel on 4/23/24. MIRELLA w/ pt to keep this appt and that script will be sent today to [facility] on [address].     MEMO Bertrand RN

## 2024-03-31 NOTE — PATIENT INSTRUCTIONS
Misty Quezada,    It was a pleasure to see you today at the [facility].     My recommendations after this visit include:    - Resume Bumex 4 mg daily (take 2 mg tablets together) in AM. You may take 2 mg twice a day if you develop lightheaded or dizziness.     - Your appointment for IV Bumex is on 11/6/23 at 2:10 pm     - Follow up with Medardo in 2 weeks     - Please call Heart Failure Nurse Line at [phone], if you have any questions or concerns           
involved

## 2024-04-02 ENCOUNTER — ALLIED HEALTH/NURSE VISIT (OUTPATIENT)
Dept: CARDIOLOGY | Facility: CLINIC | Age: 69
End: 2024-04-02
Payer: MEDICARE

## 2024-04-02 DIAGNOSIS — Z00.6 EXAMINATION OF PARTICIPANT OR CONTROL IN CLINICAL RESEARCH: Primary | ICD-10-CM

## 2024-04-02 PROCEDURE — 99207 PR NO CHARGE-RESEARCH SERVICE: CPT

## 2024-04-02 NOTE — PROGRESS NOTES
CLASP II TR Adverse Event Note    Purpose: Freeman PILAR TrAnScatheter Valve RePair System Pivotal Clinical Trial (CLASP II TR): A prospective, multicenter, randomized, controlled pivotal trial to evaluate the safety and effectiveness of transcatheter tricuspid valve repair with the Freeman PILAR Transcatheter Valve Repair System and optimal medical therapy (OMT) compared to OMT alone in patients with tricuspid regurgitation.     Adverse Event Number: 001  Event Onset Date: 5/3/23  Date of site awareness: 4/1/24  Date sponsor notified: 4/2/24    AE term (Medical Diagnosis): Fluid overload  AE detailed description: Pt received 800cc of fluid during implant procedure and was diuresed with IV lasix post-procedure.    Dr. Plummer: Please provide your assessment of relationship     Event Relationship:   Related to study device [x] Not related []  Unlikely  [] Possible [] Probable [] Causal     Related to study procedure [x] Not related []  Unlikely  [] Possible [] Probable [] Causal   Related to COVID-19 [] Yes [x] No [] Unknown     Serious Adverse Event Category:    Death  No   Serious deterioration in the health of a subject that led to life threatening illness or injury  No   Serious deterioration in the health of a subject that led to permanent impairment of a body structure or body function  No   Serious deterioration in the health of a subject that resulted in hospitalization or prolongation of hospitalization  No   Serious deterioration in the health of a subject that resulted in medical or surgical intervention to prevent life threatening illness or injury or permanent impairment to a body structure or a body function  No   Serious deterioration in the health of a subject that resulted in chronic disease  No   Fetal distress, fetal death, or a congenital abnormality or birth defect including physical or mental impairment  No     Is adverse event considered serious? No    Was there bleeding, anemia requiring  transfusion or transfusion only associated with this event? No    Action Taken: IV diuretics - Lasix   (None, CABG, explant, heart transplant, hospitalization/ER visit, medication, IV inotropes, permanent pacemaker, paracentesis, PCI, other percutaneous intervention, pericardial drainage, rental replacement therapy/hemodialysis/ultrafiltration, durable RVAD implantation, temporary RVAD implantation, surgery, ventilatory support > 48 hrs, tricuspid valve intervention, other)    Event Outcome: Recovered  (Ongoing, ongoing at study exit, recovered, recovered with sequelae, death, unknown)  Date Recovered: 5/3/23    Lydia Ramos

## 2024-04-02 NOTE — PROGRESS NOTES
CLASP II TR Adverse Event Note    Purpose: Freeman PILAR TrAnScatheter Valve RePair System Pivotal Clinical Trial (CLASP II TR): A prospective, multicenter, randomized, controlled pivotal trial to evaluate the safety and effectiveness of transcatheter tricuspid valve repair with the Freeman PILAR Transcatheter Valve Repair System and optimal medical therapy (OMT) compared to OMT alone in patients with tricuspid regurgitation.     Adverse Event Number: 002  Event Onset Date: 5/3/23  Date of site awareness: 4/1/24  Date sponsor notified: 4/2/24    AE term (Medical Diagnosis): Nausea  AE detailed description: Pt had minor nausea post procedure and was given Zofran.    Dr. Plummer: Please provide your assessment of relationship     Event Relationship:   Related to study device [x] Not related []  Unlikely  [] Possible [] Probable [] Causal     Related to study procedure [x] Not related []  Unlikely  [] Possible [] Probable [] Causal   Related to COVID-19 [] Yes [x] No [] Unknown     Serious Adverse Event Category:    Death  No   Serious deterioration in the health of a subject that led to life threatening illness or injury  No   Serious deterioration in the health of a subject that led to permanent impairment of a body structure or body function  No   Serious deterioration in the health of a subject that resulted in hospitalization or prolongation of hospitalization  No   Serious deterioration in the health of a subject that resulted in medical or surgical intervention to prevent life threatening illness or injury or permanent impairment to a body structure or a body function  No   Serious deterioration in the health of a subject that resulted in chronic disease  No   Fetal distress, fetal death, or a congenital abnormality or birth defect including physical or mental impairment  No     Is adverse event considered serious? No    Was there bleeding, anemia requiring transfusion or transfusion only associated with  this event? No    Action Taken: Medication   (None, CABG, explant, heart transplant, hospitalization/ER visit, medication, IV inotropes, permanent pacemaker, paracentesis, PCI, other percutaneous intervention, pericardial drainage, rental replacement therapy/hemodialysis/ultrafiltration, durable RVAD implantation, temporary RVAD implantation, surgery, ventilatory support > 48 hrs, tricuspid valve intervention, other)    Event Outcome: Recovered  (Ongoing, ongoing at study exit, recovered, recovered with sequelae, death, unknown)  Date Recovered: 5/3/23    Lydia Ramos

## 2024-04-22 DIAGNOSIS — Z00.6 EXAMINATION OF PARTICIPANT OR CONTROL IN CLINICAL RESEARCH: ICD-10-CM

## 2024-04-22 DIAGNOSIS — I07.1 TRICUSPID VALVE INSUFFICIENCY, UNSPECIFIED ETIOLOGY: Primary | ICD-10-CM

## 2024-04-22 DIAGNOSIS — Z98.890 S/P TRICUSPID VALVE REPAIR: ICD-10-CM

## 2024-04-22 DIAGNOSIS — I36.1 TRICUSPID VALVE REGURGITATION, NONRHEUMATIC: ICD-10-CM

## 2024-04-22 PROCEDURE — 99207 PR NO CHARGE-RESEARCH SERVICE: CPT | Performed by: INTERNAL MEDICINE

## 2024-04-23 ENCOUNTER — APPOINTMENT (OUTPATIENT)
Dept: RADIOLOGY | Facility: HOSPITAL | Age: 69
DRG: 291 | End: 2024-04-23
Attending: EMERGENCY MEDICINE
Payer: MEDICARE

## 2024-04-23 ENCOUNTER — HOSPITAL ENCOUNTER (INPATIENT)
Facility: HOSPITAL | Age: 69
LOS: 11 days | Discharge: NURSING, REHAB OR LONG-TERM CARE | DRG: 291 | End: 2024-05-04
Attending: EMERGENCY MEDICINE | Admitting: STUDENT IN AN ORGANIZED HEALTH CARE EDUCATION/TRAINING PROGRAM
Payer: MEDICARE

## 2024-04-23 ENCOUNTER — OFFICE VISIT (OUTPATIENT)
Dept: CARDIOLOGY | Facility: CLINIC | Age: 69
End: 2024-04-23
Attending: NURSE PRACTITIONER
Payer: MEDICARE

## 2024-04-23 VITALS
BODY MASS INDEX: 45.99 KG/M2 | SYSTOLIC BLOOD PRESSURE: 121 MMHG | RESPIRATION RATE: 18 BRPM | DIASTOLIC BLOOD PRESSURE: 80 MMHG | WEIGHT: 293 LBS | HEART RATE: 88 BPM | HEIGHT: 67 IN

## 2024-04-23 DIAGNOSIS — I50.32 CHRONIC HEART FAILURE WITH PRESERVED EJECTION FRACTION (H): ICD-10-CM

## 2024-04-23 DIAGNOSIS — Z91.148 NONCOMPLIANCE WITH MEDICATION REGIMEN: ICD-10-CM

## 2024-04-23 DIAGNOSIS — I95.9 HYPOTENSION, UNSPECIFIED HYPOTENSION TYPE: ICD-10-CM

## 2024-04-23 DIAGNOSIS — E66.01 MORBID OBESITY (H): ICD-10-CM

## 2024-04-23 DIAGNOSIS — I50.33 ACUTE ON CHRONIC DIASTOLIC CONGESTIVE HEART FAILURE (H): ICD-10-CM

## 2024-04-23 LAB
ALBUMIN UR-MCNC: 20 MG/DL
ANION GAP SERPL CALCULATED.3IONS-SCNC: 13 MMOL/L (ref 7–15)
APPEARANCE UR: CLEAR
BASE EXCESS BLDV CALC-SCNC: -0.7 MMOL/L (ref -3–3)
BASOPHILS # BLD AUTO: 0 10E3/UL (ref 0–0.2)
BASOPHILS NFR BLD AUTO: 1 %
BILIRUB UR QL STRIP: NEGATIVE
BUN SERPL-MCNC: 13.5 MG/DL (ref 8–23)
CALCIUM SERPL-MCNC: 9.1 MG/DL (ref 8.8–10.2)
CHLORIDE SERPL-SCNC: 107 MMOL/L (ref 98–107)
COLOR UR AUTO: ABNORMAL
CREAT SERPL-MCNC: 0.66 MG/DL (ref 0.51–0.95)
DEPRECATED HCO3 PLAS-SCNC: 23 MMOL/L (ref 22–29)
EGFRCR SERPLBLD CKD-EPI 2021: >90 ML/MIN/1.73M2
EOSINOPHIL # BLD AUTO: 0.1 10E3/UL (ref 0–0.7)
EOSINOPHIL NFR BLD AUTO: 2 %
ERYTHROCYTE [DISTWIDTH] IN BLOOD BY AUTOMATED COUNT: 14.6 % (ref 10–15)
FLUAV RNA SPEC QL NAA+PROBE: NEGATIVE
FLUBV RNA RESP QL NAA+PROBE: NEGATIVE
GLUCOSE SERPL-MCNC: 111 MG/DL (ref 70–99)
GLUCOSE UR STRIP-MCNC: NEGATIVE MG/DL
HBA1C MFR BLD: 5.8 %
HCO3 BLDV-SCNC: 25 MMOL/L (ref 21–28)
HCT VFR BLD AUTO: 42.3 % (ref 35–47)
HGB BLD-MCNC: 13.2 G/DL (ref 11.7–15.7)
HGB UR QL STRIP: NEGATIVE
IMM GRANULOCYTES # BLD: 0.1 10E3/UL
IMM GRANULOCYTES NFR BLD: 1 %
KETONES UR STRIP-MCNC: NEGATIVE MG/DL
LEUKOCYTE ESTERASE UR QL STRIP: ABNORMAL
LYMPHOCYTES # BLD AUTO: 1.2 10E3/UL (ref 0.8–5.3)
LYMPHOCYTES NFR BLD AUTO: 19 %
MAGNESIUM SERPL-MCNC: 2.1 MG/DL (ref 1.7–2.3)
MCH RBC QN AUTO: 31.3 PG (ref 26.5–33)
MCHC RBC AUTO-ENTMCNC: 31.2 G/DL (ref 31.5–36.5)
MCV RBC AUTO: 100 FL (ref 78–100)
MONOCYTES # BLD AUTO: 0.7 10E3/UL (ref 0–1.3)
MONOCYTES NFR BLD AUTO: 11 %
MUCOUS THREADS #/AREA URNS LPF: PRESENT /LPF
NEUTROPHILS # BLD AUTO: 4.1 10E3/UL (ref 1.6–8.3)
NEUTROPHILS NFR BLD AUTO: 66 %
NITRATE UR QL: NEGATIVE
NRBC # BLD AUTO: 0 10E3/UL
NRBC BLD AUTO-RTO: 0 /100
NT-PROBNP SERPL-MCNC: 2391 PG/ML (ref 0–900)
O2/TOTAL GAS SETTING VFR VENT: 21 %
OXYHGB MFR BLDV: 86 % (ref 70–75)
PCO2 BLDV: 45 MM HG (ref 40–50)
PH BLDV: 7.35 [PH] (ref 7.32–7.43)
PH UR STRIP: 5 [PH] (ref 5–7)
PHOSPHATE SERPL-MCNC: 3.4 MG/DL (ref 2.5–4.5)
PLATELET # BLD AUTO: 292 10E3/UL (ref 150–450)
PO2 BLDV: 54 MM HG (ref 25–47)
POTASSIUM SERPL-SCNC: 4.2 MMOL/L (ref 3.4–5.3)
RBC # BLD AUTO: 4.22 10E6/UL (ref 3.8–5.2)
RBC URINE: 1 /HPF
RSV RNA SPEC NAA+PROBE: NEGATIVE
SAO2 % BLDV: 87.1 % (ref 70–75)
SARS-COV-2 RNA RESP QL NAA+PROBE: NEGATIVE
SODIUM SERPL-SCNC: 143 MMOL/L (ref 135–145)
SP GR UR STRIP: 1.02 (ref 1–1.03)
SQUAMOUS EPITHELIAL: 5 /HPF
T4 FREE SERPL-MCNC: 1.27 NG/DL (ref 0.9–1.7)
TROPONIN T SERPL HS-MCNC: 23 NG/L
TROPONIN T SERPL HS-MCNC: 23 NG/L
TSH SERPL DL<=0.005 MIU/L-ACNC: 4.9 UIU/ML (ref 0.3–4.2)
UROBILINOGEN UR STRIP-MCNC: <2 MG/DL
WBC # BLD AUTO: 6.1 10E3/UL (ref 4–11)
WBC URINE: 1 /HPF

## 2024-04-23 PROCEDURE — 96376 TX/PRO/DX INJ SAME DRUG ADON: CPT

## 2024-04-23 PROCEDURE — 36415 COLL VENOUS BLD VENIPUNCTURE: CPT | Performed by: EMERGENCY MEDICINE

## 2024-04-23 PROCEDURE — 84100 ASSAY OF PHOSPHORUS: CPT | Performed by: STUDENT IN AN ORGANIZED HEALTH CARE EDUCATION/TRAINING PROGRAM

## 2024-04-23 PROCEDURE — 85025 COMPLETE CBC W/AUTO DIFF WBC: CPT | Performed by: EMERGENCY MEDICINE

## 2024-04-23 PROCEDURE — 250N000013 HC RX MED GY IP 250 OP 250 PS 637: Performed by: STUDENT IN AN ORGANIZED HEALTH CARE EDUCATION/TRAINING PROGRAM

## 2024-04-23 PROCEDURE — 99223 1ST HOSP IP/OBS HIGH 75: CPT | Mod: FS | Performed by: STUDENT IN AN ORGANIZED HEALTH CARE EDUCATION/TRAINING PROGRAM

## 2024-04-23 PROCEDURE — 99285 EMERGENCY DEPT VISIT HI MDM: CPT | Mod: 25

## 2024-04-23 PROCEDURE — 84484 ASSAY OF TROPONIN QUANT: CPT | Performed by: EMERGENCY MEDICINE

## 2024-04-23 PROCEDURE — G2211 COMPLEX E/M VISIT ADD ON: HCPCS | Performed by: INTERNAL MEDICINE

## 2024-04-23 PROCEDURE — 250N000011 HC RX IP 250 OP 636: Performed by: EMERGENCY MEDICINE

## 2024-04-23 PROCEDURE — 82374 ASSAY BLOOD CARBON DIOXIDE: CPT | Performed by: EMERGENCY MEDICINE

## 2024-04-23 PROCEDURE — 71046 X-RAY EXAM CHEST 2 VIEWS: CPT

## 2024-04-23 PROCEDURE — 120N000004 HC R&B MS OVERFLOW

## 2024-04-23 PROCEDURE — 81001 URINALYSIS AUTO W/SCOPE: CPT | Performed by: EMERGENCY MEDICINE

## 2024-04-23 PROCEDURE — 93005 ELECTROCARDIOGRAM TRACING: CPT | Performed by: EMERGENCY MEDICINE

## 2024-04-23 PROCEDURE — 84443 ASSAY THYROID STIM HORMONE: CPT | Performed by: EMERGENCY MEDICINE

## 2024-04-23 PROCEDURE — 83036 HEMOGLOBIN GLYCOSYLATED A1C: CPT | Performed by: STUDENT IN AN ORGANIZED HEALTH CARE EDUCATION/TRAINING PROGRAM

## 2024-04-23 PROCEDURE — 82805 BLOOD GASES W/O2 SATURATION: CPT | Performed by: EMERGENCY MEDICINE

## 2024-04-23 PROCEDURE — 96365 THER/PROPH/DIAG IV INF INIT: CPT

## 2024-04-23 PROCEDURE — 87637 SARSCOV2&INF A&B&RSV AMP PRB: CPT | Performed by: EMERGENCY MEDICINE

## 2024-04-23 PROCEDURE — 84439 ASSAY OF FREE THYROXINE: CPT | Performed by: EMERGENCY MEDICINE

## 2024-04-23 PROCEDURE — 250N000009 HC RX 250: Performed by: EMERGENCY MEDICINE

## 2024-04-23 PROCEDURE — 83735 ASSAY OF MAGNESIUM: CPT | Performed by: EMERGENCY MEDICINE

## 2024-04-23 PROCEDURE — 99215 OFFICE O/P EST HI 40 MIN: CPT | Performed by: INTERNAL MEDICINE

## 2024-04-23 PROCEDURE — 99207 PR APP CREDIT; MD BILLING SHARED VISIT: CPT

## 2024-04-23 PROCEDURE — 83880 ASSAY OF NATRIURETIC PEPTIDE: CPT | Performed by: EMERGENCY MEDICINE

## 2024-04-23 RX ORDER — CALCIUM CARBONATE 500 MG/1
1000 TABLET, CHEWABLE ORAL 4 TIMES DAILY PRN
Status: DISCONTINUED | OUTPATIENT
Start: 2024-04-23 | End: 2024-05-04 | Stop reason: HOSPADM

## 2024-04-23 RX ORDER — FUROSEMIDE 10 MG/ML
80 INJECTION INTRAMUSCULAR; INTRAVENOUS ONCE
Status: COMPLETED | OUTPATIENT
Start: 2024-04-23 | End: 2024-04-23

## 2024-04-23 RX ORDER — FERROUS SULFATE 325(65) MG
325 TABLET ORAL EVERY OTHER DAY
Status: DISCONTINUED | OUTPATIENT
Start: 2024-04-25 | End: 2024-05-04 | Stop reason: HOSPADM

## 2024-04-23 RX ORDER — SPIRONOLACTONE 25 MG/1
25 TABLET ORAL DAILY
Status: DISCONTINUED | OUTPATIENT
Start: 2024-04-24 | End: 2024-05-04 | Stop reason: HOSPADM

## 2024-04-23 RX ORDER — AMOXICILLIN 250 MG
1 CAPSULE ORAL 2 TIMES DAILY PRN
Status: DISCONTINUED | OUTPATIENT
Start: 2024-04-23 | End: 2024-05-04 | Stop reason: HOSPADM

## 2024-04-23 RX ORDER — AMOXICILLIN 250 MG
2 CAPSULE ORAL 2 TIMES DAILY PRN
Status: DISCONTINUED | OUTPATIENT
Start: 2024-04-23 | End: 2024-05-04 | Stop reason: HOSPADM

## 2024-04-23 RX ORDER — ONDANSETRON 4 MG/1
4 TABLET, ORALLY DISINTEGRATING ORAL EVERY 6 HOURS PRN
Status: DISCONTINUED | OUTPATIENT
Start: 2024-04-23 | End: 2024-05-04 | Stop reason: HOSPADM

## 2024-04-23 RX ORDER — POTASSIUM CHLORIDE 750 MG/1
10 TABLET, EXTENDED RELEASE ORAL DAILY
Status: DISCONTINUED | OUTPATIENT
Start: 2024-04-24 | End: 2024-05-04 | Stop reason: HOSPADM

## 2024-04-23 RX ORDER — ACETAMINOPHEN 500 MG
TABLET ORAL EVERY 6 HOURS PRN
Status: DISCONTINUED | OUTPATIENT
Start: 2024-04-23 | End: 2024-05-04 | Stop reason: HOSPADM

## 2024-04-23 RX ORDER — METOPROLOL SUCCINATE 50 MG/1
50 TABLET, EXTENDED RELEASE ORAL DAILY
Status: DISCONTINUED | OUTPATIENT
Start: 2024-04-24 | End: 2024-05-04 | Stop reason: HOSPADM

## 2024-04-23 RX ORDER — LIDOCAINE 40 MG/G
CREAM TOPICAL
Status: DISCONTINUED | OUTPATIENT
Start: 2024-04-23 | End: 2024-05-04 | Stop reason: HOSPADM

## 2024-04-23 RX ORDER — ACETAMINOPHEN 500 MG
2-3 TABLET ORAL EVERY 6 HOURS PRN
COMMUNITY

## 2024-04-23 RX ORDER — ONDANSETRON 2 MG/ML
4 INJECTION INTRAMUSCULAR; INTRAVENOUS EVERY 6 HOURS PRN
Status: DISCONTINUED | OUTPATIENT
Start: 2024-04-23 | End: 2024-05-04 | Stop reason: HOSPADM

## 2024-04-23 RX ADMIN — FUROSEMIDE 80 MG: 10 INJECTION, SOLUTION INTRAMUSCULAR; INTRAVENOUS at 16:42

## 2024-04-23 RX ADMIN — FUROSEMIDE 15 MG/HR: 10 INJECTION, SOLUTION INTRAVENOUS at 16:54

## 2024-04-23 RX ADMIN — APIXABAN 5 MG: 5 TABLET, FILM COATED ORAL at 20:16

## 2024-04-23 ASSESSMENT — COLUMBIA-SUICIDE SEVERITY RATING SCALE - C-SSRS
1. IN THE PAST MONTH, HAVE YOU WISHED YOU WERE DEAD OR WISHED YOU COULD GO TO SLEEP AND NOT WAKE UP?: NO
2. HAVE YOU ACTUALLY HAD ANY THOUGHTS OF KILLING YOURSELF IN THE PAST MONTH?: NO
6. HAVE YOU EVER DONE ANYTHING, STARTED TO DO ANYTHING, OR PREPARED TO DO ANYTHING TO END YOUR LIFE?: NO

## 2024-04-23 ASSESSMENT — ACTIVITIES OF DAILY LIVING (ADL)
ADLS_ACUITY_SCORE: 40
ADLS_ACUITY_SCORE: 38
ADLS_ACUITY_SCORE: 40
WEAR_GLASSES_OR_BLIND: NO
VISION_MANAGEMENT: READER
CONCENTRATING,_REMEMBERING_OR_MAKING_DECISIONS_DIFFICULTY: NO
HEARING_DIFFICULTY_OR_DEAF: NO

## 2024-04-23 NOTE — ED TRIAGE NOTES
Pt arrives from clinic after 70lb weight gain in past month, pt is SOB with respirations in the high 20's./  Pt  placed on 2L via NC for sats 88% on RA.       Triage Assessment (Adult)       Row Name 04/23/24 1532          Triage Assessment    Airway WDL WDL        Respiratory WDL    Respiratory WDL X        Skin Circulation/Temperature WDL    Skin Circulation/Temperature WDL WDL        Cardiac WDL    Cardiac WDL X        Peripheral/Neurovascular WDL    Peripheral Neurovascular WDL WDL        Cognitive/Neuro/Behavioral WDL    Cognitive/Neuro/Behavioral WDL WDL

## 2024-04-23 NOTE — PROGRESS NOTES
HEART CARE NOTE          Assessment/Recommendations   1. HFpEF, Valvular heart disease c/b ADHF  Assessment / Plan  Hypervolemic on physical  with > 65 lb of fluid retention in the setting of medication and diet non-compliance - will instruct patient to present to the ED for admission and IV diuresis; discussed with ED Attending and will given IV furosemide bolus and start gtt  GDMT as detailed below; mainstay of treatment for HFpEF includes diuretics and adequate BP control (class I) and SGLT2-I (class 2a); additional medical therapy (ARNI, MRA, ARB) demonstrated less robust evidence for indication but may be considered per guideline recommendations (2b); no indication for BBlockers      Current Pharmacotherapy AHA Guideline-Directed Medical Therapy   Losartan 25 mg daily - on hold 2/2 hypotension ARNI/ARB   Spironolactone 25 mg  MRA   SGLT2 inhibitor not started SGLT2-I    Bumex 2 mg BID - not compliant/taking Loop diuretic        2. Atrial fibrillation  Assessment / Plan  Rate controlled - currently on metoprolol and apixaban     3. Valvular heart disease  Assessment / Plan  LORRAINE 1.1, pk/mn 2.78/17.4  Severe TR currently enrolled in CLASP II TR study - s/p 1 PASCALACE device  Plan to repeat during admission      45 minutes spent reviewing prior records (including documentation, laboratory studies, cardiac testing/imaging), history and physical exam, planning, and subsequent documentation.    The longitudinal plan of care for HFpEF was addressed during this visit. Due to the added complexity in care, I will continue to support Ms. Misty Quezada in the subsequent management of this condition(s) and with the ongoing continuity of care of this condition(s).      History of Present Illness/Subjective    Ms. Misty Quezada is a 67 year old female with a PMHx significant for atrial fibrillation who presented in ADHF in the setting of valvular heart disease.     Today, Mrs. Quezada denies any acute cardiac events or  "complaints; Management plan as detailed above     ECG: Personally reviewed. nonspecific ST and T waves changes, atrial fibrillation, with RVR     ECHO (personnaly Reviewed 9/1/23): repeat echo pending  Interpretation Summary     1. Left ventricular size is normal. Mild concentric increase in wall  thickness. Systolic function is normal. The estimated left ventricular  ejection fraction is 55-60%.  2. Right ventricle is not well visualized. Size is probably at least  moderately enlarged. Systolic function appears normal.  3. Severe right atrial enlargement.  4. Calcified trileaflet aortic valve with moderate aortic stenosis. Peak  forward velocity measures 2.8 m/s, mean gradient 17 mm Hg, calculated aortic  valve area 1.1 cm2, and dimensionless index 0.44. Trace aortic regurgitation.  5. Severe functional tricuspid regurgitation.  6. At least mild pulmonary hypertension is present with an estimated pulmonary  artery systolic pressure of 39 mm Hg plus the right atrial pressure.  7. Compared to the prior study dated 8/22/2021, there has been no significant  change    Lab results: personally reviewed April 23, 2024; notable for evolving TAVIA noted on most recent lab work - repeat BMP pending    Medical history and pertinent documents reviewed in Care Everywhere please where applicable see details above        Physical Examination Review of Systems   /80 (BP Location: Right arm, Patient Position: Sitting, Cuff Size: Adult Regular)   Pulse 88   Resp 18   Ht 1.702 m (5' 7\")   Wt (!) 152.9 kg (337 lb)   BMI 52.78 kg/m    Body mass index is 52.78 kg/m .  Wt Readings from Last 3 Encounters:   04/23/24 (!) 152.9 kg (337 lb)   11/14/23 121.6 kg (268 lb)   11/06/23 126.1 kg (278 lb)     General Appearance:   no distress, normal body habitus   ENT/Mouth: membranes moist, no oral lesions or bleeding gums.      EYES:  no scleral icterus, normal conjunctivae   Neck: no carotid bruits or thyromegaly   Chest/Lungs:   lungs " are clear to auscultation, no rales or wheezing, equal chest wall expansion    Cardiovascular:   Irregular. Normal first and second heart sounds with no murmurs, rubs, or gallops; the carotid, radial and posterior tibial pulses are intact, + JVD and LE edema bilaterally    Abdomen:  no organomegaly, masses, bruits, or tenderness; bowel sounds are present   Extremities: no cyanosis or clubbing   Skin: no xanthelasma, warm.    Neurologic: NAD     Psychiatric: alert and oriented x3, calm     A complete 10 systems ROS was reviewed  And is negative except what is listed in the HPI.          Medical History  Surgical History Family History Social History   Past Medical History:   Diagnosis Date    (HFpEF) heart failure with preserved ejection fraction (H) 10/17/2022    Aortic valve stenosis 11/10/2022    Benign essential hypertension     Chronic atrial fibrillation (H)     Lymphedema 9/24/2022    Nonrheumatic tricuspid valve regurgitation 2/6/2023    Past Surgical History:   Procedure Laterality Date    APPENDECTOMY      CHOLECYSTECTOMY      CV CORONARY ANGIOGRAM N/A 3/15/2023    Procedure: Coronary Angiogram;  Surgeon: Howard Plummer MD;  Location: [facility]    CV LEFT HEART CATH N/A 3/15/2023    Procedure: Left Heart Catheterization;  Surgeon: Howard Plummer MD;  Location: [facility]    CV RIGHT HEART CATH MEASUREMENTS RECORDED N/A 3/15/2023    Procedure: Right Heart Catheterization;  Surgeon: Howard Plummer MD;  Location: [facility]    CV TRANSCATHETER TRICUSPID VALVE REPAIR N/A 5/3/2023    Procedure: Transcatheter Tricuspid Valve Repair;  Surgeon: Howard Plummer MD;  Location: [facility] CV    GASTRIC BYPASS      OR TRANSCATHETER TRICUSPID VALVE REPLACEMENT, FEMORAL PERCUTANEOUS APPROACH (STANDBY) N/A 5/3/2023    Procedure: OR TRANSCATHETER TRICUSPID VALVE REPLACEMENT, FEMORAL PERCUTANEOUS APPROACH (STANDBY);  Surgeon: Kana Calderón MD;  Location:   [facility] LAB CV    no family history of premature coronary artery disease Social History     Socioeconomic History    Marital status:      Spouse name: Not on file    Number of children: Not on file    Years of education: Not on file    Highest education level: Not on file   Occupational History    Not on file   Tobacco Use    Smoking status: Never    Smokeless tobacco: Never   Substance and Sexual Activity    Alcohol use: Not Currently     Comment: Rarely Uses alcohol    Drug use: Never    Sexual activity: Not on file   Other Topics Concern    Not on file   Social History Narrative    Not on file     Social Determinants of Health     Financial Resource Strain: Not on file   Food Insecurity: Not on file   Transportation Needs: Not on file   Physical Activity: Not on file   Stress: Not on file   Social Connections: Not on file   Interpersonal Safety: Not on file   Housing Stability: Not on file           Lab Results    Chemistry/lipid CBC Cardiac Enzymes/BNP/TSH/INR   Lab Results   Component Value Date    CHOL 163 09/09/2016    HDL 42 (L) 09/09/2016    TRIG 81 09/09/2016    BUN 30.2 (H) 11/20/2023     11/20/2023    CO2 30 (H) 11/20/2023    Lab Results   Component Value Date    WBC 6.2 10/18/2023    HGB 13.0 10/18/2023    HCT 40.9 10/18/2023     (H) 10/18/2023     10/18/2023    Lab Results   Component Value Date    TROPONINI 1.32 (HH) 08/21/2021     (H) 09/02/2021    TSH 4.92 08/31/2021    INR 1.21 (H) 05/04/2023     Lab Results   Component Value Date    TROPONINI 1.32 (HH) 08/21/2021          Weight:    Wt Readings from Last 3 Encounters:   04/23/24 (!) 152.9 kg (337 lb)   11/14/23 121.6 kg (268 lb)   11/06/23 126.1 kg (278 lb)       Allergies  No Known Allergies      Surgical History  Past Surgical History:   Procedure Laterality Date    APPENDECTOMY      CHOLECYSTECTOMY      CV CORONARY ANGIOGRAM N/A 3/15/2023    Procedure: Coronary Angiogram;  Surgeon: Howard Plummer MD;   Location: [facility] CATH LAB CV    CV LEFT HEART CATH N/A 3/15/2023    Procedure: Left Heart Catheterization;  Surgeon: Howard Plummer MD;  Location: [facility] CATH LAB CV    CV RIGHT HEART CATH MEASUREMENTS RECORDED N/A 3/15/2023    Procedure: Right Heart Catheterization;  Surgeon: Howard Plummer MD;  Location: [facility] CATH LAB CV    CV TRANSCATHETER TRICUSPID VALVE REPAIR N/A 5/3/2023    Procedure: Transcatheter Tricuspid Valve Repair;  Surgeon: Howard Plummer MD;  Location: [facility] LAB CV    GASTRIC BYPASS      OR TRANSCATHETER TRICUSPID VALVE REPLACEMENT, FEMORAL PERCUTANEOUS APPROACH (STANDBY) N/A 5/3/2023    Procedure: OR TRANSCATHETER TRICUSPID VALVE REPLACEMENT, FEMORAL PERCUTANEOUS APPROACH (STANDBY);  Surgeon: Kana Calderón MD;  Location: [facility] CATH LAB CV       Social History  Tobacco:   History   Smoking Status    Never   Smokeless Tobacco    Never    Alcohol:   Social History    Substance and Sexual Activity      Alcohol use: Not Currently        Comment: Rarely Uses alcohol   Illicit Drugs:   History   Drug Use Unknown       Family History  Family History   Problem Relation Age of Onset    No Known Problems Mother     No Known Problems Father           Tara Siegel MD on 4/23/2024      cc: Lizbet Baer

## 2024-04-23 NOTE — ED PROVIDER NOTES
EMERGENCY DEPARTMENT ENCOUNTER      NAME: Misty Quezada  AGE: 68 year old female  YOB: 1955  MRN: [id number]  EVALUATION DATE & TIME: No admission date for patient encounter.    PCP: Lizbet Baer    ED PROVIDER: Valeria Cruz M.D.      Chief Complaint   Patient presents with    Edema    Referral    Breathing Problem         FINAL IMPRESSION:  1. Acute on chronic diastolic congestive heart failure (H)    2. Noncompliance with medication regimen          ED COURSE & MEDICAL DECISION MAKING:    ED Course as of 04/23/24 1719   Tue Apr 23, 2024   1540 Patient with over one month complete dietary indiscretion and medication noncompliance with bumex with known CHF with over 70 lb weight gain without orthopena or PND but with sat 89% while laying down with bibasilar rales. EKG with known afib without RVR with HR around 100, examination with marked fluid overload, and discussion today with cardiology team with plan to admit for lasix 80mg IV bolus followed by gtt, supplemental 2L NC O2, bipap not begun as no retractions and she speaks in full sentences without extreme pulmonary edema but stat diuresis absolutely underway. Troponin and electrolytes with viral PCR and magnesium and TSH pending.   1654 Troponin = 23 and 2h delta troponin ordered for 1751   1654 Pt with small left pleural effusion on XR. Venous blood gas pH reassuring at 7.35 and CBC and chemistry WNL with BNP elevated as expected. TSH elevated and free T4 penidng. Hospitlaist paged for admission   1718 Pt endorsed to hospitalist to cardiac tele   3:23 PM Met with and introduced myself to the patient. Discussed history and plan of care.   5:17 PM Spoke with Dr.Gondal, Hospitalist, regarding plan for admission.     Pertinent Labs & Imaging studies reviewed. (See chart for details)    N95 worn  A face shield was worn also  COVID PPE      At the conclusion of the encounter I discussed the results of all of the tests and the disposition. The  "questions were answered. The patient or family acknowledged understanding and was agreeable with the care plan.     MEDICATIONS GIVEN IN THE EMERGENCY:  Medications   furosemide (LASIX) 500 mg/50mL infusion ADULT MAX CONC (15 mg/hr Intravenous $New Bag 4/23/24 1654)   furosemide (LASIX) injection 80 mg (80 mg Intravenous $Given 4/23/24 1642)       NEW PRESCRIPTIONS STARTED AT TODAY'S ER VISIT  New Prescriptions    No medications on file          =================================================================    HPI      Misty Quezada is a 68 year old female with PMHx of morbid obesity, chronic atrial fibrillation on eliquis, diastolic CHF on bumex diuretic therapy with echo from 9/1/2023 with EF 55-60%, HTN, aortic valve stenosis who presents to the ED today via private vehicle with fluid overload.    Per my chart review, patient presented to the [facility] cardiology clinic today with 70 pounds weight gain in one month, shortness of breath with noted medication and dietary noncompliance and was diagnosed with hypervolemia with CHF exacerbation. Cardiology did call prior to patient arrival in the ED and requested begin lasix therapy 80mg IV followed by lasix drip 15mg/h and admit to cardiology with no ability to directly admit due to lack of bed availability for direct admission at this time.    The patient has gained 70 Ibs of fluid and had stopped taking her medications for heart failure. Patient says \"no excuses\" as to why she stopped taking her medications for heart failure.The last time she took her Bumex was a month ago.  She has not been on much of a diet and has had shortness of breath for a month. Along with this, normally when moving around she gets short of breath, but today when laying down she is also feeling short of breath.     Denies cough, fever, and vomiting.       REVIEW OF SYSTEMS   All other systems reviewed and are negative except as noted above in HPI.    PAST MEDICAL HISTORY:  Past " Medical History:   Diagnosis Date    (HFpEF) heart failure with preserved ejection fraction (H) 10/17/2022    Aortic valve stenosis 11/10/2022    Benign essential hypertension     Chronic atrial fibrillation (H)     Lymphedema 9/24/2022    Nonrheumatic tricuspid valve regurgitation 2/6/2023       PAST SURGICAL HISTORY:  Past Surgical History:   Procedure Laterality Date    APPENDECTOMY      CHOLECYSTECTOMY      CV CORONARY ANGIOGRAM N/A 3/15/2023    Procedure: Coronary Angiogram;  Surgeon: Howard Plummer MD;  Location: [facility] CATH LAB CV    CV LEFT HEART CATH N/A 3/15/2023    Procedure: Left Heart Catheterization;  Surgeon: Howard Plummer MD;  Location: [facility] CATH LAB CV    CV RIGHT HEART CATH MEASUREMENTS RECORDED N/A 3/15/2023    Procedure: Right Heart Catheterization;  Surgeon: Howard Plummer MD;  Location: [facility] LAB CV    CV TRANSCATHETER TRICUSPID VALVE REPAIR N/A 5/3/2023    Procedure: Transcatheter Tricuspid Valve Repair;  Surgeon: Howard Plummer MD;  Location: [facility] CATH LAB CV    GASTRIC BYPASS      OR TRANSCATHETER TRICUSPID VALVE REPLACEMENT, FEMORAL PERCUTANEOUS APPROACH (STANDBY) N/A 5/3/2023    Procedure: OR TRANSCATHETER TRICUSPID VALVE REPLACEMENT, FEMORAL PERCUTANEOUS APPROACH (STANDBY);  Surgeon: Kana Calderón MD;  Location: [facility] CATH LAB CV       CURRENT MEDICATIONS:    acetaminophen (TYLENOL) 325 MG tablet  apixaban (ELIQUIS) 5 mg Tab tablet  ascorbic acid 1000 MG TABS tablet  bumetanide (BUMEX) 2 MG tablet  CALCIUM/D3/MAG OX//TYRONE/ZN (CALTRATE + D3 PLUS MINERALS ORAL)  cholecalciferol, vitamin D3, 2,000 unit Tab  citalopram (CELEXA) 10 MG tablet  cyanocobalamin 1000 MCG tablet  ferrous sulfate (FEROSUL) 325 (65 Fe) MG tablet  melatonin 3 MG tablet  metoprolol succinate ER (TOPROL XL) 25 MG 24 hr tablet  MULTIVIT WITH CALCIUM,IRON,MIN (WOMEN'S DAILY MULTIVITAMIN ORAL)  polyethylene glycol (MIRALAX) 17 g packet  potassium chloride ER  (K-TAB/KLOR-CON) 10 MEQ CR tablet  spironolactone (ALDACTONE) 25 MG tablet        ALLERGIES:  No Known Allergies    FAMILY HISTORY:  Family History   Problem Relation Age of Onset    No Known Problems Mother     No Known Problems Father        SOCIAL HISTORY:   Social History     Socioeconomic History    Marital status:      Spouse name: None    Number of children: None    Years of education: None    Highest education level: None   Tobacco Use    Smoking status: Never    Smokeless tobacco: Never   Substance and Sexual Activity    Alcohol use: Not Currently     Comment: Rarely Uses alcohol    Drug use: Never       VITALS:  Patient Vitals for the past 24 hrs:   BP Temp Temp src Pulse Resp SpO2 Weight   04/23/24 1609 -- -- -- 99 -- -- --   04/23/24 1608 -- -- -- -- -- 97 % --   04/23/24 1604 -- -- -- 103 26 96 % --   04/23/24 1545 (!) 147/72 -- -- 107 -- -- --   04/23/24 1531 (!) 132/96 97.5  F (36.4  C) Temporal 104 (!) 36 (!) 89 % (!) 152.9 kg (337 lb)       PHYSICAL EXAM    GENERAL: Awake, alert.  In no acute distress.   HEENT: Normocephalic, atraumatic.  Pupils equal, round and reactive.  Conjunctiva normal.  EOMI.  NECK: No stridor or apparent deformity.  PULMONARY: Symmetrical breath sounds without distress.  Lungs clear to auscultation bilaterally without wheezes, rhonchi or rales. Bibasilar rales.   CARDIO: Regular rate and rhythm.  No significant rub or gallop.  Radial pulses strong and symmetrical. Ischemia 2/6.   ABDOMINAL: Abdomen soft, non-distended and non-tender to palpation.  No CVAT, no palpable hepatosplenomegaly.  EXTREMITIES: No lower extremity swelling. 2+ bilateral pitting edema.   NEURO: Alert and oriented to person, place and time.  Cranial nerves grossly intact.  No focal motor deficit.  PSYCH: Normal mood and affect  SKIN: No rashes      LAB:  All pertinent labs reviewed and interpreted.  Results for orders placed or performed during the hospital encounter of 04/23/24   XR Chest 2 Views     Impression    IMPRESSION: A left basilar opacity is again seen and represents pleural fluid and mediastinal fat. There is a small left pleural effusion. Cannot assess for underlying airspace disease. No pneumothorax. The cardiomediastinal silhouette is enlarged. An   electronic device overlies the mediastinum.   Basic metabolic panel   Result Value Ref Range    Sodium 143 135 - 145 mmol/L    Potassium 4.2 3.4 - 5.3 mmol/L    Chloride 107 98 - 107 mmol/L    Carbon Dioxide (CO2) 23 22 - 29 mmol/L    Anion Gap 13 7 - 15 mmol/L    Urea Nitrogen 13.5 8.0 - 23.0 mg/dL    Creatinine 0.66 0.51 - 0.95 mg/dL    GFR Estimate >90 >60 mL/min/1.73m2    Calcium 9.1 8.8 - 10.2 mg/dL    Glucose 111 (H) 70 - 99 mg/dL   Result Value Ref Range    Troponin T, High Sensitivity 23 (H) <=14 ng/L   Result Value Ref Range    Magnesium 2.1 1.7 - 2.3 mg/dL   TSH with free T4 reflex   Result Value Ref Range    TSH 4.90 (H) 0.30 - 4.20 uIU/mL   Blood gas venous   Result Value Ref Range    pH Venous 7.35 7.32 - 7.43    pCO2 Venous 45 40 - 50 mm Hg    pO2 Venous 54 (H) 25 - 47 mm Hg    Bicarbonate Venous 25 21 - 28 mmol/L    Base Excess/Deficit Venous -0.7 -3.0 - 3.0 mmol/L    FIO2 21     Oxyhemoglobin Venous 86 (H) 70 - 75 %    O2 Sat, Venous 87.1 (H) 70.0 - 75.0 %   Nt probnp inpatient (BNP)   Result Value Ref Range    N terminal Pro BNP Inpatient 2,391 (H) 0 - 900 pg/mL   CBC with platelets and differential   Result Value Ref Range    WBC Count 6.1 4.0 - 11.0 10e3/uL    RBC Count 4.22 3.80 - 5.20 10e6/uL    Hemoglobin 13.2 11.7 - 15.7 g/dL    Hematocrit 42.3 35.0 - 47.0 %     78 - 100 fL    MCH 31.3 26.5 - 33.0 pg    MCHC 31.2 (L) 31.5 - 36.5 g/dL    RDW 14.6 10.0 - 15.0 %    Platelet Count 292 150 - 450 10e3/uL    % Neutrophils 66 %    % Lymphocytes 19 %    % Monocytes 11 %    % Eosinophils 2 %    % Basophils 1 %    % Immature Granulocytes 1 %    NRBCs per 100 WBC 0 <1 /100    Absolute Neutrophils 4.1 1.6 - 8.3 10e3/uL    Absolute  Lymphocytes 1.2 0.8 - 5.3 10e3/uL    Absolute Monocytes 0.7 0.0 - 1.3 10e3/uL    Absolute Eosinophils 0.1 0.0 - 0.7 10e3/uL    Absolute Basophils 0.0 0.0 - 0.2 10e3/uL    Absolute Immature Granulocytes 0.1 <=0.4 10e3/uL    Absolute NRBCs 0.0 10e3/uL   Symptomatic Influenza A/B, RSV, & SARS-CoV2 PCR (COVID-19) Nasopharyngeal    Specimen: Nasopharyngeal; Swab   Result Value Ref Range    Influenza A PCR Negative Negative    Influenza B PCR Negative Negative    RSV PCR Negative Negative    SARS CoV2 PCR Negative Negative   Result Value Ref Range    Free T4 1.27 0.90 - 1.70 ng/dL       RADIOLOGY:  Reviewed all pertinent imaging. Please see official radiology report.  XR Chest 2 Views   Final Result   IMPRESSION: A left basilar opacity is again seen and represents pleural fluid and mediastinal fat. There is a small left pleural effusion. Cannot assess for underlying airspace disease. No pneumothorax. The cardiomediastinal silhouette is enlarged. An    electronic device overlies the mediastinum.            EKG:    Reviewed and interpreted as: EKG done on 04/23/24, at 15:25. Vent. Rate 103 BPM. Atrial fibrillation and no ST abnormalities.       I have independently reviewed and interpreted the EKG(s) documented above.         Valeria Cruz MD  04/23/24 5614

## 2024-04-23 NOTE — H&P
"[facility]    History and Physical - Hospitalist Service       Date of Admission:  4/23/2024    Assessment & Plan      Misty Quezada is a 68 year old female with PMH CHFpEF, tricuspid regurgitation, HTN, A-fib, lymphedema admitted on 4/23/2024 with complaints of shortness of breath and increased leg swelling due to noncompliance with Bumex.  Patient states she stopped taking it a month ago and has gained 70 pounds since.    Acute on chronic CHF  BNP-1910  Lasix IV bolus x 1 in ED  Lasix drip per cardiology  PTA spironolactone  Cardiology consult  Echocardiogram  Cardiac monitoring  Strict I&O's  Daily weights  Fluid restriction  Hypoxia/Tachypnea-resolved ith 2L O2 per nc    Elevated troponin  No chest pain  No ischemia on EKG    A-fib  Rate controlled  PTA apixaban, metoprolol    Tricuspid regurgitation  Janette-Ace repair device in place          Diet: Combination Diet 2 gm NA Diet; No Caffeine Diet (and additional linked orders)  Fluid restriction 1800 ML FLUID (and additional linked orders)    DVT Prophylaxis: DOAC  Hi Catheter: Not present  Lines: None     Cardiac Monitoring: ACTIVE order. Indication: Acute decompensated heart failure (48 hours)  Code Status:  Full    Clinically Significant Risk Factors Present on Admission               # Drug Induced Coagulation Defect: home medication list includes an anticoagulant medication    # Hypertension: Noted on problem list  # Acute heart failure with preserved ejection fraction: heart failure noted on problem list, last echo with EF >50%, and receiving IV diuretics     # Severe Obesity: Estimated body mass index is 52.78 kg/m  as calculated from the following:    Height as of an earlier encounter on 4/23/24: 1.702 m (5' 7\").    Weight as of this encounter: 152.9 kg (337 lb).              Disposition Plan     Medically Ready for Discharge: Anticipated in 2-4 Days         The patient's care was discussed with the Attending Physician, Dr." Gondal and Patient.    Martha Jerez NP  Hospitalist Service  [facility]  Securely message with [employer] (more info)  Text page via [employer] Paging/Directory     ______________________________________________________________________    Chief Complaint       History of Present Illness   Misty Quezada is a 68 year old female who was sent to the ED by her cardiology clinic due to medication noncompliance with Bumex x 1 month with a 70 pound weight gain.  Reports shortness of breath with exertion.  89% on room air on presentation.  She states she knows she has been noncompliant with her medication and has not been following her dietary restrictions.  Chest x-ray reveals a small left pleural effusion.      Past Medical History    Past Medical History:   Diagnosis Date    (HFpEF) heart failure with preserved ejection fraction (H) 10/17/2022    Aortic valve stenosis 11/10/2022    Benign essential hypertension     Chronic atrial fibrillation (H)     Lymphedema 9/24/2022    Nonrheumatic tricuspid valve regurgitation 2/6/2023       Past Surgical History   Past Surgical History:   Procedure Laterality Date    APPENDECTOMY      CHOLECYSTECTOMY      CV CORONARY ANGIOGRAM N/A 3/15/2023    Procedure: Coronary Angiogram;  Surgeon: Howard Plummer MD;  Location: [facility]    CV LEFT HEART CATH N/A 3/15/2023    Procedure: Left Heart Catheterization;  Surgeon: Howard Plummer MD;  Location: [facility]    CV RIGHT HEART CATH MEASUREMENTS RECORDED N/A 3/15/2023    Procedure: Right Heart Catheterization;  Surgeon: Howard Plummer MD;  Location: [facility]    CV TRANSCATHETER TRICUSPID VALVE REPAIR N/A 5/3/2023    Procedure: Transcatheter Tricuspid Valve Repair;  Surgeon: Howard Plummer MD;  Location: [facility] CV    GASTRIC BYPASS      OR TRANSCATHETER TRICUSPID VALVE REPLACEMENT, FEMORAL PERCUTANEOUS APPROACH (STANDBY) N/A 5/3/2023    Procedure: OR  TRANSCATHETER TRICUSPID VALVE REPLACEMENT, FEMORAL PERCUTANEOUS APPROACH (STANDBY);  Surgeon: Kana Calderón MD;  Location: [facility] CV       Prior to Admission Medications   Prior to Admission Medications   Prescriptions Last Dose Informant Patient Reported? Taking?   CALCIUM/D3/MAG OX//TYRONE/ZN (CALTRATE + D3 PLUS MINERALS ORAL) 4/23/2024 at am  Yes Yes   Sig: Take 1 tablet by mouth every morning   MELATONIN PO Past Week at prn  Yes Yes   Sig: Take 1 tablet by mouth nightly as needed   MULTIVIT WITH CALCIUM,IRON,MIN (WOMEN'S DAILY MULTIVITAMIN ORAL) 4/23/2024 at am  Yes Yes   Sig: Take 1 tablet by mouth every morning   acetaminophen (TYLENOL) 500 MG tablet 4/23/2024 at am  Yes Yes   Sig: Take 2-3 tablets by mouth every 6 hours as needed for mild pain   apixaban (ELIQUIS) 5 mg Tab tablet 4/23/2024 at am  Yes Yes   Sig: Take 5 mg by mouth 2 times daily   ascorbic acid 1000 MG TABS tablet 4/23/2024 at am  Yes Yes   Sig: Take 1 tablet by mouth every morning   cholecalciferol, vitamin D3, 2,000 unit Tab 4/23/2024 at am  Yes Yes   Sig: Take 2,000 Units by mouth every morning   cyanocobalamin 1000 MCG tablet 4/23/2024 at am  Yes Yes   Sig: Take 1,000 mcg by mouth every morning   ferrous sulfate (FEROSUL) 325 (65 Fe) MG tablet 4/23/2024 at am  No Yes   Sig: Take 1 tablet (325 mg) by mouth every other day   metoprolol succinate ER (TOPROL XL) 25 MG 24 hr tablet 4/23/2024 at am  No Yes   Sig: Take 2 tablets (50 mg) by mouth daily   potassium chloride ER (K-TAB/KLOR-CON) 10 MEQ CR tablet 4/23/2024 at am  No Yes   Sig: TAKE 1 TABLET(10 MEQ) BY MOUTH DAILY   spironolactone (ALDACTONE) 25 MG tablet 4/23/2024 at am  No Yes   Sig: Take 1 tablet (25 mg) by mouth daily      Facility-Administered Medications: None           Physical Exam   Vital Signs: Temp: 97.5  F (36.4  C) Temp src: Temporal BP: (!) 129/90 Pulse: 99   Resp: 24 SpO2: 98 % O2 Device: Nasal cannula Oxygen Delivery: 2 LPM  Weight: 337 lbs 0  oz        Medical Decision Making             Data

## 2024-04-23 NOTE — ED NOTES
Expected Patient Referral to ED  2:58 PM    Referring Clinic/Provider:  Cardiology    Reason for referral/Clinical facts:  68-year-old female with shortness of breath at cardiology clinic.  Up 70 pounds of fluid over the past month and cardiology feels she needs admission as she has failed outpatient therapy.  Requesting initial 80 mg Lasix bolus followed by drip of Lasix 15 mg/mL    Recommendations provided:  Contact inpatient service to discuss direct admission    Caller was informed that this institution does possess the capabilities and/or resources to provide for patient and should be transferred to our facility.    Discussed that if direct admit is sought and any hurdles are encountered, this ED would be happy to see the patient and evaluate.    Informed caller that recommendations provided are recommendations based only on the facts provided and that they responsible to accept or reject the advice, or to seek a formal in person consultation as needed and that this ED will see/treat patient should they arrive.      MEIR WING MD  [facility] EMERGENCY DEPARTMENT  [address] [place] [id number]  [phone]       Meir Wing MD  04/23/24 1500

## 2024-04-23 NOTE — MEDICATION SCRIBE - ADMISSION MEDICATION HISTORY
Medication Scribe Admission Medication History    Admission medication history is complete. The information provided in this note is only as accurate as the sources available at the time of the update.    Information Source(s): Patient via in-person    Pertinent Information: patient reports self management of medications     Bumex: Patient reports that about a month ago due to some household construction, she was thrown off her normal schedule. She thus made a decision to stop taking Bumex.     Patient reports no longer taking: Celexa, Miralax    Changes made to PTA medication list:  Added: None  Deleted: Celexa, Miralax, Bumex  Changed: None    Allergies reviewed with patient and updates made in EHR: yes    Medication History Completed By: Mickey Quispe 4/23/2024 5:21 PM    PTA Med List   Medication Sig Last Dose    acetaminophen (TYLENOL) 500 MG tablet Take 2-3 tablets by mouth every 6 hours as needed for mild pain 4/23/2024 at am    apixaban (ELIQUIS) 5 mg Tab tablet Take 5 mg by mouth 2 times daily 4/23/2024 at am    ascorbic acid 1000 MG TABS tablet Take 1 tablet by mouth every morning 4/23/2024 at am    CALCIUM/D3/MAG OX//TYRONE/ZN (CALTRATE + D3 PLUS MINERALS ORAL) Take 1 tablet by mouth every morning 4/23/2024 at am    cholecalciferol, vitamin D3, 2,000 unit Tab Take 2,000 Units by mouth every morning 4/23/2024 at am    cyanocobalamin 1000 MCG tablet Take 1,000 mcg by mouth every morning 4/23/2024 at am    ferrous sulfate (FEROSUL) 325 (65 Fe) MG tablet Take 1 tablet (325 mg) by mouth every other day 4/23/2024 at am    MELATONIN PO Take 1 tablet by mouth nightly as needed Past Week at prn    metoprolol succinate ER (TOPROL XL) 25 MG 24 hr tablet Take 2 tablets (50 mg) by mouth daily 4/23/2024 at am    MULTIVIT WITH CALCIUM,IRON,MIN (WOMEN'S DAILY MULTIVITAMIN ORAL) Take 1 tablet by mouth every morning 4/23/2024 at am    potassium chloride ER (K-TAB/KLOR-CON) 10 MEQ CR tablet TAKE 1 TABLET(10 MEQ) BY MOUTH  DAILY 4/23/2024 at am    spironolactone (ALDACTONE) 25 MG tablet Take 1 tablet (25 mg) by mouth daily 4/23/2024 at am

## 2024-04-24 ENCOUNTER — APPOINTMENT (OUTPATIENT)
Dept: CARDIOLOGY | Facility: HOSPITAL | Age: 69
DRG: 291 | End: 2024-04-24
Attending: STUDENT IN AN ORGANIZED HEALTH CARE EDUCATION/TRAINING PROGRAM
Payer: MEDICARE

## 2024-04-24 ENCOUNTER — APPOINTMENT (OUTPATIENT)
Dept: OCCUPATIONAL THERAPY | Facility: HOSPITAL | Age: 69
DRG: 291 | End: 2024-04-24
Attending: STUDENT IN AN ORGANIZED HEALTH CARE EDUCATION/TRAINING PROGRAM
Payer: MEDICARE

## 2024-04-24 ENCOUNTER — TELEPHONE (OUTPATIENT)
Dept: CARDIOLOGY | Facility: CLINIC | Age: 69
End: 2024-04-24

## 2024-04-24 DIAGNOSIS — I50.9 ACUTE DECOMPENSATED HEART FAILURE (H): Primary | ICD-10-CM

## 2024-04-24 LAB
ALBUMIN SERPL BCG-MCNC: 3.7 G/DL (ref 3.5–5.2)
ALP SERPL-CCNC: 101 U/L (ref 40–150)
ALT SERPL W P-5'-P-CCNC: 11 U/L (ref 0–50)
ANION GAP SERPL CALCULATED.3IONS-SCNC: 14 MMOL/L (ref 7–15)
AST SERPL W P-5'-P-CCNC: 28 U/L (ref 0–45)
BILIRUB SERPL-MCNC: 0.7 MG/DL
BUN SERPL-MCNC: 12.3 MG/DL (ref 8–23)
CALCIUM SERPL-MCNC: 9.4 MG/DL (ref 8.8–10.2)
CHLORIDE SERPL-SCNC: 104 MMOL/L (ref 98–107)
CREAT SERPL-MCNC: 0.71 MG/DL (ref 0.51–0.95)
DEPRECATED HCO3 PLAS-SCNC: 26 MMOL/L (ref 22–29)
EGFRCR SERPLBLD CKD-EPI 2021: >90 ML/MIN/1.73M2
ERYTHROCYTE [DISTWIDTH] IN BLOOD BY AUTOMATED COUNT: 14.6 % (ref 10–15)
GLUCOSE BLDC GLUCOMTR-MCNC: 105 MG/DL (ref 70–99)
GLUCOSE BLDC GLUCOMTR-MCNC: 97 MG/DL (ref 70–99)
GLUCOSE BLDC GLUCOMTR-MCNC: 99 MG/DL (ref 70–99)
GLUCOSE SERPL-MCNC: 84 MG/DL (ref 70–99)
HCT VFR BLD AUTO: 42.2 % (ref 35–47)
HGB BLD-MCNC: 13 G/DL (ref 11.7–15.7)
LVEF ECHO: NORMAL
MAGNESIUM SERPL-MCNC: 1.8 MG/DL (ref 1.7–2.3)
MCH RBC QN AUTO: 31.4 PG (ref 26.5–33)
MCHC RBC AUTO-ENTMCNC: 30.8 G/DL (ref 31.5–36.5)
MCV RBC AUTO: 102 FL (ref 78–100)
PHOSPHATE SERPL-MCNC: 4 MG/DL (ref 2.5–4.5)
PLATELET # BLD AUTO: 252 10E3/UL (ref 150–450)
POTASSIUM SERPL-SCNC: 4.3 MMOL/L (ref 3.4–5.3)
PROT SERPL-MCNC: 6.5 G/DL (ref 6.4–8.3)
RBC # BLD AUTO: 4.14 10E6/UL (ref 3.8–5.2)
SODIUM SERPL-SCNC: 144 MMOL/L (ref 135–145)
TSH SERPL DL<=0.005 MIU/L-ACNC: 3.67 UIU/ML (ref 0.3–4.2)
WBC # BLD AUTO: 5.6 10E3/UL (ref 4–11)

## 2024-04-24 PROCEDURE — 97166 OT EVAL MOD COMPLEX 45 MIN: CPT | Mod: GO

## 2024-04-24 PROCEDURE — P9047 ALBUMIN (HUMAN), 25%, 50ML: HCPCS | Performed by: INTERNAL MEDICINE

## 2024-04-24 PROCEDURE — 83735 ASSAY OF MAGNESIUM: CPT | Performed by: STUDENT IN AN ORGANIZED HEALTH CARE EDUCATION/TRAINING PROGRAM

## 2024-04-24 PROCEDURE — 250N000013 HC RX MED GY IP 250 OP 250 PS 637: Performed by: STUDENT IN AN ORGANIZED HEALTH CARE EDUCATION/TRAINING PROGRAM

## 2024-04-24 PROCEDURE — 999N000208 ECHOCARDIOGRAM COMPLETE

## 2024-04-24 PROCEDURE — 84443 ASSAY THYROID STIM HORMONE: CPT | Performed by: STUDENT IN AN ORGANIZED HEALTH CARE EDUCATION/TRAINING PROGRAM

## 2024-04-24 PROCEDURE — 84100 ASSAY OF PHOSPHORUS: CPT | Performed by: STUDENT IN AN ORGANIZED HEALTH CARE EDUCATION/TRAINING PROGRAM

## 2024-04-24 PROCEDURE — 36415 COLL VENOUS BLD VENIPUNCTURE: CPT | Performed by: STUDENT IN AN ORGANIZED HEALTH CARE EDUCATION/TRAINING PROGRAM

## 2024-04-24 PROCEDURE — 93306 TTE W/DOPPLER COMPLETE: CPT | Mod: 26 | Performed by: INTERNAL MEDICINE

## 2024-04-24 PROCEDURE — 80053 COMPREHEN METABOLIC PANEL: CPT | Performed by: STUDENT IN AN ORGANIZED HEALTH CARE EDUCATION/TRAINING PROGRAM

## 2024-04-24 PROCEDURE — 250N000011 HC RX IP 250 OP 636: Performed by: INTERNAL MEDICINE

## 2024-04-24 PROCEDURE — 120N000004 HC R&B MS OVERFLOW

## 2024-04-24 PROCEDURE — 97535 SELF CARE MNGMENT TRAINING: CPT | Mod: GO

## 2024-04-24 PROCEDURE — 250N000009 HC RX 250: Performed by: EMERGENCY MEDICINE

## 2024-04-24 PROCEDURE — 85027 COMPLETE CBC AUTOMATED: CPT | Performed by: STUDENT IN AN ORGANIZED HEALTH CARE EDUCATION/TRAINING PROGRAM

## 2024-04-24 PROCEDURE — 99223 1ST HOSP IP/OBS HIGH 75: CPT | Performed by: INTERNAL MEDICINE

## 2024-04-24 PROCEDURE — 99233 SBSQ HOSP IP/OBS HIGH 50: CPT | Performed by: STUDENT IN AN ORGANIZED HEALTH CARE EDUCATION/TRAINING PROGRAM

## 2024-04-24 RX ORDER — ALBUMIN (HUMAN) 12.5 G/50ML
50 SOLUTION INTRAVENOUS ONCE
Status: COMPLETED | OUTPATIENT
Start: 2024-04-24 | End: 2024-04-24

## 2024-04-24 RX ADMIN — MICONAZOLE NITRATE: 20 POWDER TOPICAL at 20:19

## 2024-04-24 RX ADMIN — SPIRONOLACTONE 25 MG: 25 TABLET, FILM COATED ORAL at 07:47

## 2024-04-24 RX ADMIN — APIXABAN 5 MG: 5 TABLET, FILM COATED ORAL at 20:19

## 2024-04-24 RX ADMIN — APIXABAN 5 MG: 5 TABLET, FILM COATED ORAL at 07:47

## 2024-04-24 RX ADMIN — MICONAZOLE NITRATE: 20 POWDER TOPICAL at 11:04

## 2024-04-24 RX ADMIN — ALBUMIN HUMAN 50 G: 0.25 SOLUTION INTRAVENOUS at 08:03

## 2024-04-24 RX ADMIN — FUROSEMIDE 15 MG/HR: 10 INJECTION, SOLUTION INTRAVENOUS at 14:42

## 2024-04-24 RX ADMIN — METOPROLOL SUCCINATE 50 MG: 50 TABLET, EXTENDED RELEASE ORAL at 07:46

## 2024-04-24 RX ADMIN — POTASSIUM CHLORIDE 10 MEQ: 750 TABLET, EXTENDED RELEASE ORAL at 07:46

## 2024-04-24 ASSESSMENT — ACTIVITIES OF DAILY LIVING (ADL)
DIFFICULTY_EATING/SWALLOWING: NO
ADLS_ACUITY_SCORE: 25
ADLS_ACUITY_SCORE: 33
ADLS_ACUITY_SCORE: 36
FALL_HISTORY_WITHIN_LAST_SIX_MONTHS: NO
WEAR_GLASSES_OR_BLIND: YES
ADLS_ACUITY_SCORE: 36
ADLS_ACUITY_SCORE: 33
ADLS_ACUITY_SCORE: 25
ADLS_ACUITY_SCORE: 33
DEPENDENT_IADLS:: INDEPENDENT
HEARING_DIFFICULTY_OR_DEAF: NO
DRESSING/BATHING_DIFFICULTY: NO
TOILETING_ISSUES: NO
ADLS_ACUITY_SCORE: 36
DIFFICULTY_COMMUNICATING: NO
ADLS_ACUITY_SCORE: 36
ADLS_ACUITY_SCORE: 33
WALKING_OR_CLIMBING_STAIRS_DIFFICULTY: NO
ADLS_ACUITY_SCORE: 33
ADLS_ACUITY_SCORE: 36
DOING_ERRANDS_INDEPENDENTLY_DIFFICULTY: NO
VISION_MANAGEMENT: READING GLASSESS
ADLS_ACUITY_SCORE: 36
ADLS_ACUITY_SCORE: 33
CONCENTRATING,_REMEMBERING_OR_MAKING_DECISIONS_DIFFICULTY: NO
ADLS_ACUITY_SCORE: 36
ADLS_ACUITY_SCORE: 36
ADLS_ACUITY_SCORE: 25
EQUIPMENT_CURRENTLY_USED_AT_HOME: CANE, STRAIGHT
CHANGE_IN_FUNCTIONAL_STATUS_SINCE_ONSET_OF_CURRENT_ILLNESS/INJURY: NO
ADLS_ACUITY_SCORE: 36
ADLS_ACUITY_SCORE: 36

## 2024-04-24 NOTE — PROGRESS NOTES
Occupational Therapy      04/24/24 3724   Appointment Info   Signing Clinician's Name / Credentials (OT) Saumya Ralph, OTR/L   Living Environment   People in Home child(david), adult  (2 adult sons)   Current Living Arrangements house   Home Accessibility stairs within home   Number of Stairs, Within Home, Primary six  (split entry)   Stair Railings, Within Home, Primary railings safe and in good condition   Living Environment Comments Patient states she is independent with ADLs, sons assist with IADLs   Self-Care   Equipment Currently Used at Home cane, straight  (ambulates with 2 canes)   Fall history within last six months no   General Information   Onset of Illness/Injury or Date of Surgery 04/24/24   Referring Physician Gondal, Saad J, MD   Patient/Family Therapy Goal Statement (OT) none stated   Existing Precautions/Restrictions fall   Cognitive Status Examination   Orientation Status orientation to person, place and time   Range of Motion Comprehensive   General Range of Motion no range of motion deficits identified   Strength Comprehensive (MMT)   General Manual Muscle Testing (MMT) Assessment no strength deficits identified   Bed Mobility   Bed Mobility supine-sit   Supine-Sit [place] (Bed Mobility) minimum assist (75% patient effort)   Comment (Bed Mobility) HOB elevated, use of bedrail   Transfers   Transfers sit-stand transfer   Sit-Stand Transfer   Sit-Stand [place] (Transfers) minimum assist (75% patient effort)   Sit/Stand Transfer Comments STS from bed   Balance   Balance Comments Patient with SOB with limited acitvity, oxgyen between 88-91% on RA-patient does not wear oxygen @ baseline   Activities of Daily Living   BADL Assessment/Intervention lower body dressing   Lower Body Dressing Assessment/Training   [place] Level (Lower Body Dressing) maximum assist (25% patient effort);socks   Clinical Impression   Criteria for Skilled Therapeutic Interventions Met (OT) Yes, treatment  indicated   OT Diagnosis Decreased ADL independence   OT Problem List-Impairments impacting ADL problems related to;activity tolerance impaired;balance   Assessment of Occupational Performance 3-5 Performance Deficits   Identified Performance Deficits trsfs, endurance, dressing   Planned Therapy Interventions (OT) ADL retraining;strengthening;transfer training;home program guidelines;progressive activity/exercise;risk factor education   Clinical Decision Making Complexity (OT) detailed assessment/moderate complexity   Risk & Benefits of therapy have been explained evaluation/treatment results reviewed;care plan/treatment goals reviewed   OT Total Evaluation Time   OT Eval, Moderate Complexity Minutes (02475) 20   OT Goals   Therapy Frequency (OT) Daily   OT Predicted Duration/Target Date for Goal Attainment 05/01/24   OT Goals Cardiac Phase 1   OT: Understanding of cardiac education to maximize quality of life, condition management, and health outcomes Patient;Verbalize   OT: Functional/aerobic ambulation tolerance with stable cardiovascular response in order to return to home and community environment Modified independent;200 feet   OT: Navigation of stairs simulating home set up with stable cardiovascular response in order to return to home and community environment Independent;6 stairs   Self-Care/Home Management   Self-Care/Home Mgmt/ADL, Compensatory, Meal Prep Minutes (39518) 10   Treatment Detail/Skilled Intervention Patient ambulated short distance in room with 2 canes, SOB with limited activity. Patient practiced STS from chair wtih Min A. Patient was left in chair with call light and alarm on.   OT Discharge Planning   OT Plan Bring HF education, ambulation (bring oxygen)   OT Discharge Recommendation (DC Rec) home with assist   OT Rationale for DC Rec Patient lives with sons who can assist if needed   OT Brief overview of current status CGA for ADls, Min A at times, SOB with limited activity   Total  Session Time   Timed Code Treatment Minutes 10   Total Session Time (sum of timed and untimed services) 30

## 2024-04-24 NOTE — CONSULTS
Care Management Initial Consult    General Information  Assessment completed with: Misty Lynn  Type of CM/SW Visit: Initial Assessment    Primary Care Provider verified and updated as needed: Yes   Readmission within the last 30 days: no previous admission in last 30 days      Reason for Consult: discharge planning  Advance Care Planning: Advance Care Planning Reviewed: questions answered          Communication Assessment  Patient's communication style: spoken language (English or Bilingual)    Hearing Difficulty or Deaf: no   Wear Glasses or Blind: yes    Cognitive  Cognitive/Neuro/Behavioral: WDL                      Living Environment:   People in home: child(david), adult  Samuel and Slade  Current living Arrangements: house      Able to return to prior arrangements: yes       Family/Social Support:  Care provided by: self, child(david)  Provides care for: no one  Marital Status:   Children          Description of Support System: Supportive, Involved         Current Resources:   Patient receiving home care services: No     Community Resources: None  Equipment currently used at home: cane, straight  Supplies currently used at home:  none    Employment/Financial:  Employment Status: unemployed        Financial Concerns: none           Does the patient's insurance plan have a 3 day qualifying hospital stay waiver?  No    Lifestyle & Psychosocial Needs:  Social Determinants of Health     Food Insecurity: Not on file   Depression: Not at risk (7/8/2022)    Received from [facility]    PHQ-2     PHQ-2 Score: 2   Housing Stability: Not on file   Tobacco Use: Low Risk  (4/23/2024)    Patient History     Smoking Tobacco Use: Never     Smokeless Tobacco Use: Never     Passive Exposure: Not on file   Financial Resource Strain: Not on file   Alcohol Use: Not on file   Transportation Needs: Not on file   Physical Activity: Not on file   Interpersonal Safety: Not on file   Stress: Not on file   Social Connections: Not  on file   Health Literacy: Not on file       Functional Status:  Prior to admission patient needed assistance:   Dependent ADLs:: Ambulation-cane  Dependent IADLs:: Independent       Mental Health Status:  Mental Health Status: No Current Concerns       Chemical Dependency Status:  Chemical Dependency Status: No Current Concerns             Values/Beliefs:  Spiritual, Cultural Beliefs, Caodaism Practices, Values that affect care:       n/a          Additional Information:  [facility] intern met with pt in room to introduce self and role as well as to complete an initial assessment. Pt lives in a house with her two adult sons, Samuel and Slade.  Her support system also includes her neighbor. Pt is independent with most I/ADL's, uses a cane and grab bars at home. Pt states no financial concerns and uses no community resources. Pt has previously had home care, does not remember the agency. Pt does not have an advanced care directive, would like paperwork. [facility] intern printed and provided pt with paperwork. Son to transport at discharge. CM to follow for discharge plan.     KARMEN Delgado Care   4/24/2024

## 2024-04-24 NOTE — PLAN OF CARE
Heart Failure Care Map  GOALS TO BE MET BEFORE DISCHARGE:    1. Decrease congestion and/or edema with diuretic therapy to achieve near optimal volume status.     Dyspnea improved: No, further care required to meet this goal. Please explain LUJAN    Edema improved: No, further care required to meet this goal. Please explain Edema present on abdomin & BLE         Last 24 hour I/O:   Intake/Output Summary (Last 24 hours) at 4/24/2024 0555  Last data filed at 4/24/2024 0539  Gross per 24 hour   Intake 596 ml   Output 8230 ml   Net -7634 ml           Net I/O and Weights since admission:   03/25 0700 - 04/24 0659  In: 596 [P.O.:590; I.V.:6]  Out: 8230 [Urine:8230]  Net: -7634     Vitals:    04/23/24 1531 04/23/24 2305 04/24/24 0500   Weight: (!) 152.9 kg (337 lb) 144.8 kg (319 lb 3.2 oz) 141.3 kg (311 lb 9.6 oz)       2.  O2 sats > 90% on room air, or at prior home O2 therapy level.      Able to wean O2 this shift to keep sats above 90%?: No, further care required to meet this goal. Please explain Pt is on 2lpm NC    Does patient use Home O2? Yes-  NO          Current oxygenation status:   SpO2: 99 %     O2 Device: Nasal cannula, Oxygen Delivery: 2 LPM    3.  Tolerates ambulation and mobility near baseline.     Ambulation: No, further care required to meet this goal. Please explain Pt did not ambulate this shift, pt is exerted w/ minimal activities    Times patient ambulated with staff this shift: 0    Please review the Heart Failure Care Map for additional HF goal outcomes.    Quan Goldberg RN  4/24/2024       Pt is alert & oriented x4. On 2lpm O2 NC. Tele: Afib, rates controlled. On lasix gtt at 15mg/hr. Good UOP.

## 2024-04-24 NOTE — PLAN OF CARE
Problem: Adult Inpatient Plan of Care  Goal: Absence of Hospital-Acquired Illness or Injury  Intervention: Prevent Skin Injury  Recent Flowsheet Documentation  Taken 4/24/2024 0815 by Shyam De Guzman  Body Position: position changed independently  Device Skin Pressure Protection:   absorbent pad utilized/changed   tubing/devices free from skin contact  Intervention: Prevent Infection  Recent Flowsheet Documentation  Taken 4/24/2024 0815 by Shyam De Guzman  Infection Prevention:   environmental surveillance performed   equipment surfaces disinfected   hand hygiene promoted   rest/sleep promoted   personal protective equipment utilized   single patient room provided     Problem: Skin Injury Risk Increased  Goal: Skin Health and Integrity  Intervention: Plan: Nurse Driven Intervention: Moisture Management  Recent Flowsheet Documentation  Taken 4/24/2024 0815 by Shyam De Guzman  Moisture Interventions:   Urinary collection device   Perineal cleanser  Bathing/Skin Care: patient refused  Intervention: Plan: Nurse Driven Intervention: Friction and Shear  Recent Flowsheet Documentation  Taken 4/24/2024 0815 by Shyam De Guzman  Friction/Shear Interventions: HOB 30 degrees or less  Intervention: Optimize Skin Protection  Recent Flowsheet Documentation  Taken 4/24/2024 0815 by Shyam De Guzman  Pressure Reduction Techniques: frequent weight shift encouraged  Activity Management: activity adjusted per tolerance  Head of Bed (HOB) Positioning: HOB at 20-30 degrees   Goal Outcome Evaluation:         Shift Note  68 Yr old female admit 4/23, A/Ox4. VSS on 2L O2 NC, /71. Tele Afib.  Pt has pain to touch on lower extremities, denies N/V, SOB. Last BM was on 2/22. Takes pills whole with water.Diet: Combination Diet 2 gm NA Diet; No Caffeine Diet (and additional linked orders)  Fluid restriction 1800 ML FLUID. New IV line placed on left hand. Pt is using external catcher with continuous suctioning due to Laxis bolus IV.  Output 2300 mL. Discharge pending..

## 2024-04-24 NOTE — PROGRESS NOTES
[facility]    Medicine Progress Note - Hospitalist Service    Date of Admission:  4/23/2024    Assessment & Plan   Misty Quezada is a 68 year old female with PMH CHFpEF, tricuspid regurgitation, HTN, A-fib, lymphedema admitted on 4/23/2024 with complaints of shortness of breath and increased leg swelling due to noncompliance with low salt diet, fluid restriction, and Bumex.  Patient states she stopped taking it a month ago and has gained 70 pounds since. She went to cardiology clinic who recommend to come to ER. Admitted for acute on chronic CHF    Acute hypoxic respiratory failure  HFpEF acute on chronic  -- ProBNP:2,391  -- Lasix gtt per cardiology  -- c/w PTA losartan, spironolactone  -- Cardiology consulted  -- TTE:LVEF: 55-60%, flattened septum is consistent with RV pressure/volume overload.  -- Cardiac monitoring  -- Strict I&O's, Daily weights  -- Fluid restriction  -- Oxygen supplement via NC. Wean as able.  -- S/p Albumin on 04/24    Elevated troponin  -- No chest pain  -- No ischemia on EKG  -- likely 2/2 demand ischemia    A-fib  -- Rate controlled  -- PTA apixaban, metoprolol    Valvular heart disease  Moderate to severe AS  Severe TR-Janette-Ace repair device in place           Diet: Combination Diet 2 gm NA Diet; No Caffeine Diet (and additional linked orders)  Fluid restriction 1800 ML FLUID (and additional linked orders)  Fluid restriction 1800 ML FLUID    DVT Prophylaxis: Pneumatic Compression Devices  Hi Catheter: Not present  Lines: None     Cardiac Monitoring: ACTIVE order. Indication: Acute decompensated heart failure (48 hours)  Code Status: Full Code      Clinically Significant Risk Factors Present on Admission               # Drug Induced Coagulation Defect: home medication list includes an anticoagulant medication    # Hypertension: Noted on problem list  # Acute heart failure with preserved ejection fraction: heart failure noted on problem list, last echo with  "EF >50%, and receiving IV diuretics     # Severe Obesity: Estimated body mass index is 48.8 kg/m  as calculated from the following:    Height as of an earlier encounter on 4/23/24: 1.702 m (5' 7\").    Weight as of this encounter: 141.3 kg (311 lb 9.6 oz).              Disposition Plan     Medically Ready for Discharge: Anticipated in 2-4 Days             Aleta Wang MD  Hospitalist Service  [facility]  Securely message with [employer] (more info)  Text page via [email or web address] Paging/Directory   ______________________________________________________________________    Interval History   Patient is new to me today. Chart reviewed.  Patient is seen and examined at bedside.  Pt has shortness of breath.    Physical Exam   Vital Signs: Temp: 97.6  F (36.4  C) Temp src: Oral BP: 139/87 Pulse: 95   Resp: 20 SpO2: 99 % O2 Device: Nasal cannula Oxygen Delivery: 2 LPM  Weight: 311 lbs 9.6 oz    GEN: Alert and oriented. Not in acute distress.  HEENT: Atraumatic, mucous membrane- moist and pink.  Chest: Bilateral air entry.  CVS: S1S2 irregular.   Abdomen: Soft. Non-tender, non-distended. No organomegaly. No guarding or rigidity. Bowel sounds active.   Extremities: +edema.  CNS: No involuntary movements.  Skin: no cyanosis or clubbing.     Medical Decision Making       58 MINUTES SPENT BY ME on the date of service doing chart review, history, exam, documentation & further activities per the note.      Data     "

## 2024-04-24 NOTE — PROGRESS NOTES
[facility] ED Handoff Report    ED Chief Complaint: complaints of shortness of breath and increased leg swelling due to noncompliance with Bumex.     ED Diagnosis:  (I50.33) Acute on chronic diastolic congestive heart failure (H)  Comment: Patient states she stopped taking Bumex a month ago and has gained 70 pounds since.   Plan: Lasix IV bolus x 1 in ED  Lasix drip per cardiology  PTA spironolactone  Cardiology consult  Echocardiogram  Cardiac monitoring  Strict I&O's  Daily weights  Fluid restriction  Hypoxia/Tachypnea-resolved ith 2L O2 per nc       (Z91.148) Noncompliance with medication regimen  Comment:   Plan:        PMH:    Past Medical History:   Diagnosis Date    (HFpEF) heart failure with preserved ejection fraction (H) 10/17/2022    Aortic valve stenosis 11/10/2022    Benign essential hypertension     Chronic atrial fibrillation (H)     Lymphedema 9/24/2022    Nonrheumatic tricuspid valve regurgitation 2/6/2023        Code Status:  Full Code    Falls Risk: Yes Band: Applied    Current Living Situation/Residence: lives alone     Elimination Status: Continent: Not currently due to Lasix gtt.     Activity Level: 2 assist, bilat canes.    Patients Preferred Language:  English     Needed: No    Vital Signs:  /60 (BP Location: Left arm, Patient Position: Semi-Khan's, Cuff Size: Adult Regular)   Pulse 95   Temp 98.5  F (36.9  C) (Oral)   Resp 24   Wt (!) 152.9 kg (337 lb)   SpO2 99%   BMI 52.78 kg/m       Cardiac Rhythm: A Fib.    Pain Score: 0/10    Is the Patient Confused:  No    Last Food or Drink: 04/23/24 at 1800    Focused Assessment:  Pt is alert and oriented, uses call light appropriately.  Pt's Tele is A Fib.  Pt is up with 1-2 assist with bilat canes.  Pt is on Lasix gtt running at 15 mg /hr.  Pt is diuresing well.  Pt had on episode of incontinence when external catheter was full.      Tests Performed: Done: Labs and Imaging    Treatments Provided:  Lasix  gtt    Family Dynamics/Concerns: No    Family Updated On Visitor Policy: No    Plan of Care Communicated to Family: No    Who Was Updated about Plan of Care: No one present.    Belongings Checklist Done and Signed by Patient: No    Medications sent with patient: None    Covid: asymptomatic , Not tested.    Additional Information:     RN: Paty Martin RN 4/23/2024 10:27 PM

## 2024-04-24 NOTE — PLAN OF CARE
"Goal Outcome Evaluation:    Pt alert and oriented. Denied SOB and chest pain. VSS.  On 2 lpm O2 via NC. Purewick to wall suction for incontinence and I/O's.  Pt on lasix drip; diuresis well. On 1800 mL fluid restriction, compliant.  On A2 with FWW to bathroom. Call light within reach, make needs known.      Problem: Adult Inpatient Plan of Care  Goal: Plan of Care Review  Description: The Plan of Care Review/Shift note should be completed every shift.  The Outcome Evaluation is a brief statement about your assessment that the patient is improving, declining, or no change.  This information will be displayed automatically on your shift  note.  Outcome: Progressing  Goal: Patient-Specific Goal (Individualized)  Description: You can add care plan individualizations to a care plan. Examples of Individualization might be:  \"Parent requests to be called daily at 9am for status\", \"I have a hard time hearing out of my right ear\", or \"Do not touch me to wake me up as it startles  me\".  Outcome: Progressing  Goal: Absence of Hospital-Acquired Illness or Injury  Outcome: Progressing  Intervention: Identify and Manage Fall Risk  Recent Flowsheet Documentation  Taken 4/24/2024 1600 by Jim Jaquez, RN  Safety Promotion/Fall Prevention:   activity supervised   increase visualization of patient  Intervention: Prevent and Manage VTE (Venous Thromboembolism) Risk  Recent Flowsheet Documentation  Taken 4/24/2024 1600 by Jim Jaquez, RN  VTE Prevention/Management: SCDs (sequential compression devices) off  Goal: Optimal Comfort and Wellbeing  Outcome: Progressing  Goal: Readiness for Transition of Care  Outcome: Progressing     "

## 2024-04-24 NOTE — CONSULTS
"HEART CARE NOTE        Thank you, Dr. Wang, for asking the [facility] team to see Misty SYLVAIN Quezada to evaluate ADHF.      Assessment/Recommendations     1. HFpEF/Valvular heart disease c/b severe ADHF  Assessment / Plan  Hypervolemic on physical  with > 65 lb of fluid retention in the setting of medication and diet non-compliance - diuresing well on current regimen; no changes at this time  Patient is at increased risk for adverse cardiac events 2/2 advanced age, frailty, non-compliance, elevated NTproBNP  GDMT as detailed below; mainstay of treatment for HFpEF includes diuretics and adequate BP control (class I) and SGLT2-I (class 2a); additional medical therapy (ARNI, MRA, ARB) demonstrated less robust evidence for indication but may be considered per guideline recommendations (2b); no indication for BBlockers      Current Pharmacotherapy AHA Guideline-Directed Medical Therapy   Losartan 25 mg daily - on hold 2/2 hypotension ARNI/ARB   Spironolactone 25 mg  MRA   SGLT2 inhibitor not started SGLT2-I    Furosemide gtt Loop diuretic        2. Atrial fibrillation  Assessment / Plan  Rate controlled - currently on metoprolol and apixaban     3. Valvular heart disease  Assessment / Plan  LORRAINE 1.1, pk/mn 2.78/17.4; Severe TR currently enrolled in CLASP II TR study - s/p 1 PASCALACE device  Repeat echo pending    Clinically Significant Risk Factors Present on Admission               # Drug Induced Coagulation Defect: home medication list includes an anticoagulant medication    # Hypertension: Noted on problem list  # Acute heart failure with preserved ejection fraction: heart failure noted on problem list, last echo with EF >50%, and receiving IV diuretics     # Severe Obesity: Estimated body mass index is 48.8 kg/m  as calculated from the following:    Height as of an earlier encounter on 4/23/24: 1.702 m (5' 7\").    Weight as of this encounter: 141.3 kg (311 lb 9.6 oz).             Cardiac " Arrhythmia: Atrial fibrillation: Unspecified  Cardiomyopathy  Heart Valve Replacement  Non-Rheumatic Valve Disease: Aortic valve stenosis  Diastolic acute    Fluid overload, unspecified, Other fluid overload, and Other disorders of electrolyte and fluid balance, not elsewhere classified      85 minutes spent reviewing prior records (including documentation, laboratory studies, cardiac testing/imaging), history and physical exam, planning, and subsequent documentation.      History of Present Illness/Subjective    Ms. Misty Quezada is a 68 year old female with a PMHx significant for (per Epic notation) CHFpEF, tricuspid regurgitation, HTN, A-fib, lymphedema admitted on 4/23/2024 with complaints of shortness of breath and increased leg swelling due to noncompliance with Bumex.  Patient states she stopped taking it a month ago and has gained 70 pounds since.     Today, Mrs. Quezada endorses improvement in her presenting HF symptoms; denies acute cardiac events or complaints; Management plan as detailed above    ECG: Personally reviewed. Afib with RVR.    ECHO (personnaly Reviewed on 4/24/24): repeat study pending  1. Left ventricular chamber size, wall thickness and systolic function are  normal. The visually estimated left ventricular ejection fraction is 55-60%.  2. Right ventricle is not well visualized. Chamber size may be mildly  enlarged. Systolic function may be mildly reduced.  3. Severe right atrial enlargement. Moderate left atrial enlargement.  4. A single Janette-Ace [id number] repair device is securely attached to the  tricuspid valve leaflets. There is mild-to-moderate residual tricuspid  regurgitation. No evidence of significant stenosis with peak inflow velocity  1.2 m/s and mean inflow gradient 3 mmHg at a heart rate of approximately 90  beats per minute.  5. Calcified trileaflet aoritc valve with paradoxical low flow, low gradient  (stroke volume index 22.2 mL/m2) aortic stenosis felt to be  mild-to-moderate  in severity. Peak forward velocity measures 2.5 m/s, mean gradient 14 mmHg,  calculated aortic valve area 1.1 cm2, and dimensionless index 0.27. No  significant aortic regurgitation.  6. Mild pulmonary hypertension is present with an estimated pulmonary artery  systolic pressure of 43 mmHg.  7. Compared to the prior study dated 6/1/2023, the degree of tricuspid  regurgitation is less.    Telemetry: personally reviewed April 24, 2024; notable for atrial fibrillation     Lab results: personally reviewed April 24, 2024; notable for elevated NTproBNP    Medical history and pertinent documents reviewed in Care Everywhere please where applicable see details above          Physical Examination Review of Systems   /87 (BP Location: Right arm)   Pulse 95   Temp 97.6  F (36.4  C) (Oral)   Resp 20   Wt 141.3 kg (311 lb 9.6 oz)   SpO2 99%   BMI 48.80 kg/m    Body mass index is 48.8 kg/m .  Wt Readings from Last 3 Encounters:   04/24/24 141.3 kg (311 lb 9.6 oz)   04/23/24 (!) 152.9 kg (337 lb)   11/14/23 121.6 kg (268 lb)     General Appearance:   no distress, normal body habitus   ENT/Mouth: membranes moist, no oral lesions or bleeding gums.      EYES:  no scleral icterus, normal conjunctivae   Neck: no carotid bruits or thyromegaly   Chest/Lungs:   lungs are clear to auscultation, no rales or wheezing, equal chest wall expansion    Cardiovascular:   Irregular. Normal first and second heart sounds with +JOHN; no rubs, or gallops; the carotid, radial and posterior tibial pulses are intact, + JVD and anasarca    Abdomen:  no organomegaly, masses, bruits, or tenderness; bowel sounds are present   Extremities: no cyanosis or clubbing   Skin: no xanthelasma, warm.    Neurologic: NAD     Psychiatric: alert and oriented x3, calm     A complete 10 systems ROS was reviewed  And is negative except what is listed in the HPI.          Medical History  Surgical History Family History Social History   Past  Medical History:   Diagnosis Date    (HFpEF) heart failure with preserved ejection fraction (H) 10/17/2022    Aortic valve stenosis 11/10/2022    Benign essential hypertension     Chronic atrial fibrillation (H)     Lymphedema 9/24/2022    Nonrheumatic tricuspid valve regurgitation 2/6/2023    Past Surgical History:   Procedure Laterality Date    APPENDECTOMY      CHOLECYSTECTOMY      CV CORONARY ANGIOGRAM N/A 3/15/2023    Procedure: Coronary Angiogram;  Surgeon: Howard Plummer MD;  Location: [facility] CATH LAB CV    CV LEFT HEART CATH N/A 3/15/2023    Procedure: Left Heart Catheterization;  Surgeon: Howard Plummer MD;  Location: [facility] LAB CV    CV RIGHT HEART CATH MEASUREMENTS RECORDED N/A 3/15/2023    Procedure: Right Heart Catheterization;  Surgeon: Howard Plummer MD;  Location: [facility] CV    CV TRANSCATHETER TRICUSPID VALVE REPAIR N/A 5/3/2023    Procedure: Transcatheter Tricuspid Valve Repair;  Surgeon: Howard Plummer MD;  Location: [facility] CATH LAB CV    GASTRIC BYPASS      OR TRANSCATHETER TRICUSPID VALVE REPLACEMENT, FEMORAL PERCUTANEOUS APPROACH (STANDBY) N/A 5/3/2023    Procedure: OR TRANSCATHETER TRICUSPID VALVE REPLACEMENT, FEMORAL PERCUTANEOUS APPROACH (STANDBY);  Surgeon: Kana Calderón MD;  Location: [facility] CATH LAB CV    no family history of premature coronary artery disease Social History     Socioeconomic History    Marital status:      Spouse name: Not on file    Number of children: Not on file    Years of education: Not on file    Highest education level: Not on file   Occupational History    Not on file   Tobacco Use    Smoking status: Never    Smokeless tobacco: Never   Substance and Sexual Activity    Alcohol use: Not Currently     Comment: Rarely Uses alcohol    Drug use: Never    Sexual activity: Not on file   Other Topics Concern    Not on file   Social History Narrative    Not on file     Social Determinants of Health     Financial  Resource Strain: Not on file   Food Insecurity: Not on file   Transportation Needs: Not on file   Physical Activity: Not on file   Stress: Not on file   Social Connections: Not on file   Interpersonal Safety: Not on file   Housing Stability: Not on file           Lab Results    Chemistry/lipid CBC Cardiac Enzymes/BNP/TSH/INR   Lab Results   Component Value Date    CHOL 163 09/09/2016    HDL 42 (L) 09/09/2016    TRIG 81 09/09/2016    BUN 12.3 04/24/2024     04/24/2024    CO2 26 04/24/2024    Lab Results   Component Value Date    WBC 5.6 04/24/2024    HGB 13.0 04/24/2024    HCT 42.2 04/24/2024     (H) 04/24/2024     04/24/2024    Lab Results   Component Value Date    TROPONINI 1.32 (HH) 08/21/2021     (H) 09/02/2021    TSH 3.67 04/24/2024    INR 1.21 (H) 05/04/2023     Lab Results   Component Value Date    TROPONINI 1.32 (HH) 08/21/2021          Weight:    Wt Readings from Last 3 Encounters:   04/24/24 141.3 kg (311 lb 9.6 oz)   04/23/24 (!) 152.9 kg (337 lb)   11/14/23 121.6 kg (268 lb)       Allergies  No Known Allergies      Surgical History  Past Surgical History:   Procedure Laterality Date    APPENDECTOMY      CHOLECYSTECTOMY      CV CORONARY ANGIOGRAM N/A 3/15/2023    Procedure: Coronary Angiogram;  Surgeon: Howard Plummer MD;  Location: [facility] CATH LAB CV    CV LEFT HEART CATH N/A 3/15/2023    Procedure: Left Heart Catheterization;  Surgeon: Howard Plummer MD;  Location: [facility] CATH LAB CV    CV RIGHT HEART CATH MEASUREMENTS RECORDED N/A 3/15/2023    Procedure: Right Heart Catheterization;  Surgeon: Howard Plummer MD;  Location: [facility] CATH LAB CV    CV TRANSCATHETER TRICUSPID VALVE REPAIR N/A 5/3/2023    Procedure: Transcatheter Tricuspid Valve Repair;  Surgeon: Howard Plummer MD;  Location: [facility] CATH LAB CV    GASTRIC BYPASS      OR TRANSCATHETER TRICUSPID VALVE REPLACEMENT, FEMORAL PERCUTANEOUS APPROACH (STANDBY) N/A 5/3/2023    Procedure: OR  TRANSCATHETER TRICUSPID VALVE REPLACEMENT, FEMORAL PERCUTANEOUS APPROACH (STANDBY);  Surgeon: Kana Calderón MD;  Location: [facility] LAB CV       Social History  Tobacco:   History   Smoking Status    Never   Smokeless Tobacco    Never    Alcohol:   Social History    Substance and Sexual Activity      Alcohol use: Not Currently        Comment: Rarely Uses alcohol   Illicit Drugs:   History   Drug Use Unknown       Family History  Family History   Problem Relation Age of Onset    No Known Problems Mother     No Known Problems Father           Tara Siegel MD on 4/24/2024      cc: Lizbet Baer,

## 2024-04-24 NOTE — CONSULTS
REASON FOR ASSESSMENT:  CHF Consult for 2 gm NA Diet Education    NUTRITION HISTORY:  Information obtained from:  Pt     Previous diet instructions:  Some about a year ago, but welcomed a review    Living situation:   Home    Grocery shopping:  Pt    Meal preparation:  Pt    Misty said she stays up late and gets up late, so she often only eats 1 larger meal a day. Things like scrambled eggs and toast, or fruit and meat    CURRENT DIET:  2 gm Na+ diet, No caffeine, and 1800 mL fluid restriction    NUTRITION DIAGNOSIS:  Food- and nutrition-related knowledge deficit R/t some previous low sodium diet education in the past but welcomed a review AEB pt report     INTERVENTIONS:  Nutrition Prescription:  Follow a low sodium diet upon discharge     Implementation:  Assessed learning needs, learning preferences, and willingness to learn  Nutrition Education (Content):  Provided handouts:  Tips for Low Na Diet  Label Reading  Low Na Foods/Drinks  Seasoning Your Food Without Salt  Low Na Recipe Booklet  Discussed rational for limiting Na for CHF and stressed importance of following 2 gm Na guidelines   Encouraged patient to keep a daily food record  Nutrition Education (Application):  Discussed current eating habits and recommended alternative food choices  Anticipated good compliance  Diet Education - refer to Education Flowsheet    Goals:  Pt verbalized understanding by asking clarifying questions, restating the importance of checking the serving size on food labels, & describing changes they'll make to grocery shopping, like tracking Na intake and seeing what changes she can make.  All of the above goals met during the education session    Follow Up:  Provided RD contact information for future questions.  Recommend Out-Patient Nutrition Referral, if further diet instructions are needed.    Ajay Cavanaugh RD, LD

## 2024-04-25 ENCOUNTER — APPOINTMENT (OUTPATIENT)
Dept: OCCUPATIONAL THERAPY | Facility: HOSPITAL | Age: 69
DRG: 291 | End: 2024-04-25
Payer: MEDICARE

## 2024-04-25 LAB
ANION GAP SERPL CALCULATED.3IONS-SCNC: 9 MMOL/L (ref 7–15)
BUN SERPL-MCNC: 15.6 MG/DL (ref 8–23)
CALCIUM SERPL-MCNC: 8.9 MG/DL (ref 8.8–10.2)
CHLORIDE SERPL-SCNC: 98 MMOL/L (ref 98–107)
CREAT SERPL-MCNC: 0.84 MG/DL (ref 0.51–0.95)
DEPRECATED HCO3 PLAS-SCNC: 34 MMOL/L (ref 22–29)
EGFRCR SERPLBLD CKD-EPI 2021: 75 ML/MIN/1.73M2
GLUCOSE BLDC GLUCOMTR-MCNC: 104 MG/DL (ref 70–99)
GLUCOSE BLDC GLUCOMTR-MCNC: 122 MG/DL (ref 70–99)
GLUCOSE BLDC GLUCOMTR-MCNC: 96 MG/DL (ref 70–99)
GLUCOSE BLDC GLUCOMTR-MCNC: 99 MG/DL (ref 70–99)
GLUCOSE SERPL-MCNC: 98 MG/DL (ref 70–99)
MAGNESIUM SERPL-MCNC: 1.5 MG/DL (ref 1.7–2.3)
MAGNESIUM SERPL-MCNC: 2.1 MG/DL (ref 1.7–2.3)
POTASSIUM SERPL-SCNC: 3.3 MMOL/L (ref 3.4–5.3)
SODIUM SERPL-SCNC: 141 MMOL/L (ref 135–145)

## 2024-04-25 PROCEDURE — 97110 THERAPEUTIC EXERCISES: CPT | Mod: GO

## 2024-04-25 PROCEDURE — 80048 BASIC METABOLIC PNL TOTAL CA: CPT | Performed by: STUDENT IN AN ORGANIZED HEALTH CARE EDUCATION/TRAINING PROGRAM

## 2024-04-25 PROCEDURE — 250N000009 HC RX 250: Performed by: EMERGENCY MEDICINE

## 2024-04-25 PROCEDURE — 250N000011 HC RX IP 250 OP 636: Performed by: INTERNAL MEDICINE

## 2024-04-25 PROCEDURE — 250N000013 HC RX MED GY IP 250 OP 250 PS 637: Performed by: STUDENT IN AN ORGANIZED HEALTH CARE EDUCATION/TRAINING PROGRAM

## 2024-04-25 PROCEDURE — 250N000011 HC RX IP 250 OP 636: Performed by: HOSPITALIST

## 2024-04-25 PROCEDURE — 99233 SBSQ HOSP IP/OBS HIGH 50: CPT | Performed by: INTERNAL MEDICINE

## 2024-04-25 PROCEDURE — 120N000004 HC R&B MS OVERFLOW

## 2024-04-25 PROCEDURE — 97535 SELF CARE MNGMENT TRAINING: CPT | Mod: GO

## 2024-04-25 PROCEDURE — P9047 ALBUMIN (HUMAN), 25%, 50ML: HCPCS | Performed by: INTERNAL MEDICINE

## 2024-04-25 PROCEDURE — 36415 COLL VENOUS BLD VENIPUNCTURE: CPT | Performed by: STUDENT IN AN ORGANIZED HEALTH CARE EDUCATION/TRAINING PROGRAM

## 2024-04-25 PROCEDURE — 83735 ASSAY OF MAGNESIUM: CPT | Performed by: STUDENT IN AN ORGANIZED HEALTH CARE EDUCATION/TRAINING PROGRAM

## 2024-04-25 PROCEDURE — 99233 SBSQ HOSP IP/OBS HIGH 50: CPT | Performed by: STUDENT IN AN ORGANIZED HEALTH CARE EDUCATION/TRAINING PROGRAM

## 2024-04-25 RX ORDER — MAGNESIUM SULFATE 4 G/50ML
4 INJECTION INTRAVENOUS ONCE
Qty: 50 ML | Refills: 0 | Status: COMPLETED | OUTPATIENT
Start: 2024-04-25 | End: 2024-04-25

## 2024-04-25 RX ORDER — ALBUMIN (HUMAN) 12.5 G/50ML
50 SOLUTION INTRAVENOUS ONCE
Status: COMPLETED | OUTPATIENT
Start: 2024-04-25 | End: 2024-04-25

## 2024-04-25 RX ORDER — POTASSIUM CHLORIDE 1500 MG/1
40 TABLET, EXTENDED RELEASE ORAL ONCE
Status: COMPLETED | OUTPATIENT
Start: 2024-04-25 | End: 2024-04-25

## 2024-04-25 RX ADMIN — METOPROLOL SUCCINATE 50 MG: 50 TABLET, EXTENDED RELEASE ORAL at 08:11

## 2024-04-25 RX ADMIN — POTASSIUM CHLORIDE 40 MEQ: 1500 TABLET, EXTENDED RELEASE ORAL at 17:21

## 2024-04-25 RX ADMIN — MAGNESIUM SULFATE HEPTAHYDRATE 4 G: 80 INJECTION, SOLUTION INTRAVENOUS at 10:54

## 2024-04-25 RX ADMIN — ALBUMIN HUMAN 50 G: 0.25 SOLUTION INTRAVENOUS at 08:06

## 2024-04-25 RX ADMIN — FUROSEMIDE 15 MG/HR: 10 INJECTION, SOLUTION INTRAVENOUS at 10:57

## 2024-04-25 RX ADMIN — POTASSIUM CHLORIDE 10 MEQ: 750 TABLET, EXTENDED RELEASE ORAL at 08:11

## 2024-04-25 RX ADMIN — MICONAZOLE NITRATE: 20 POWDER TOPICAL at 08:12

## 2024-04-25 RX ADMIN — APIXABAN 5 MG: 5 TABLET, FILM COATED ORAL at 08:11

## 2024-04-25 RX ADMIN — SPIRONOLACTONE 25 MG: 25 TABLET, FILM COATED ORAL at 08:11

## 2024-04-25 RX ADMIN — ACETAMINOPHEN 1500 MG: 500 TABLET ORAL at 00:19

## 2024-04-25 RX ADMIN — FERROUS SULFATE TAB 325 MG (65 MG ELEMENTAL FE) 325 MG: 325 (65 FE) TAB at 08:11

## 2024-04-25 RX ADMIN — MICONAZOLE NITRATE: 20 POWDER TOPICAL at 21:17

## 2024-04-25 RX ADMIN — APIXABAN 5 MG: 5 TABLET, FILM COATED ORAL at 21:14

## 2024-04-25 ASSESSMENT — ACTIVITIES OF DAILY LIVING (ADL)
ADLS_ACUITY_SCORE: 31
ADLS_ACUITY_SCORE: 36
ADLS_ACUITY_SCORE: 31
ADLS_ACUITY_SCORE: 36
ADLS_ACUITY_SCORE: 31
ADLS_ACUITY_SCORE: 36
ADLS_ACUITY_SCORE: 31
ADLS_ACUITY_SCORE: 36
ADLS_ACUITY_SCORE: 31
ADLS_ACUITY_SCORE: 31
ADLS_ACUITY_SCORE: 36
ADLS_ACUITY_SCORE: 36
ADLS_ACUITY_SCORE: 31
ADLS_ACUITY_SCORE: 36
ADLS_ACUITY_SCORE: 31
ADLS_ACUITY_SCORE: 36
ADLS_ACUITY_SCORE: 31

## 2024-04-25 NOTE — PLAN OF CARE
Goal Outcome Evaluation:    Heart Failure Care Map  GOALS TO BE MET BEFORE DISCHARGE:    1. Decrease congestion and/or edema with diuretic therapy to achieve near optimal volume status.     Dyspnea improved: No, further care required to meet this goal. Please explain On 2 lpm O2 NC   Edema improved: No, further care required to meet this goal. Please explain +3 BLE edema        Last 24 hour I/O:   Intake/Output Summary (Last 24 hours) at 4/24/2024 2052  Last data filed at 4/24/2024 1900  Gross per 24 hour   Intake 1290 ml   Output 49933 ml   Net -9860 ml           Net I/O and Weights since admission:   03/25 2300 - 04/24 2259  In: 1396 [P.O.:1390; I.V.:6]  Out: 04727 [Urine:02098]  Net: -18692     Vitals:    04/23/24 1531 04/23/24 2305 04/24/24 0500   Weight: (!) 152.9 kg (337 lb) 144.8 kg (319 lb 3.2 oz) 141.3 kg (311 lb 9.6 oz)       2.  O2 sats > 90% on room air, or at prior home O2 therapy level.      Able to wean O2 this shift to keep sats above 90%?: No, further care required to meet this goal. Please explain 2 lpm O2 NC   Does patient use Home O2? No          Current oxygenation status:   SpO2: 95 %     O2 Device: Nasal cannula, Oxygen Delivery: 2 LPM    3.  Tolerates ambulation and mobility near baseline.     Ambulation: No, further care required to meet this goal. Please explain LUJAN noted. Pivot to/from bed/recliner. Double cane to bathroom.   Times patient ambulated with staff this shift: 0    Please review the Heart Failure Care Map for additional HF goal outcomes.    Jim Jaquez RN  4/24/2024

## 2024-04-25 NOTE — PLAN OF CARE
Goal Outcome Evaluation:    Pt Alert and Oriented.Pt c/o pain BLE, given PRN tylenol, effective.On 2L NC, Afib on tele monitor. Lasix running @ 1.5 ml/ hr. Good urine output.

## 2024-04-25 NOTE — PLAN OF CARE
Goal Outcome Evaluation:    Heart Failure Care Map  GOALS TO BE MET BEFORE DISCHARGE:    1. Decrease congestion and/or edema with diuretic therapy to achieve near optimal volume status.     Dyspnea improved: Yes, satisfactory for discharge.   Edema improved: No, further care required to meet this goal. Please explain +3 BLE        Last 24 hour I/O:   Intake/Output Summary (Last 24 hours) at 4/25/2024 1811  Last data filed at 4/25/2024 1200  Gross per 24 hour   Intake 600 ml   Output 4900 ml   Net -4300 ml           Net I/O and Weights since admission:   03/26 2300 - 04/25 2259  In: 1796 [P.O.:1790; I.V.:6]  Out: 23631 [Urine:98483]  Net: -49625     Vitals:    04/23/24 1531 04/23/24 2305 04/24/24 0500 04/25/24 0637   Weight: (!) 152.9 kg (337 lb) 144.8 kg (319 lb 3.2 oz) 141.3 kg (311 lb 9.6 oz) 137.1 kg (302 lb 3.2 oz)       2.  O2 sats > 90% on room air, or at prior home O2 therapy level.      Able to wean O2 this shift to keep sats above 90%?: No, further care required to meet this goal. Please explain Down from 2 lpm NC to 1 lpm   Does patient use Home O2? No          Current oxygenation status:   SpO2: 93 %     O2 Device: Nasal cannula, Oxygen Delivery: 1 LPM    3.  Tolerates ambulation and mobility near baseline.     Ambulation: No, further care required to meet this goal. Please explain LUJAN noted while ambulating to/from recliner/BR   Times patient ambulated with staff this shift: 0    Please review the Heart Failure Care Map for additional HF goal outcomes.    Jim Jaquez RN  4/25/2024

## 2024-04-25 NOTE — PROGRESS NOTES
HEART CARE NOTE          Assessment/Recommendations     1. HFpEF/Valvular heart disease c/b severe ADHF  Assessment / Plan  Hypervolemic on physical  with > 65 lb of fluid retention in the setting of medication and diet non-compliance - diuresing well on current regimen; no changes at this time  Patient is at increased risk for adverse cardiac events 2/2 advanced age, frailty, non-compliance, elevated NTproBNP  GDMT as detailed below; mainstay of treatment for HFpEF includes diuretics and adequate BP control (class I) and SGLT2-I (class 2a); additional medical therapy (ARNI, MRA, ARB) demonstrated less robust evidence for indication but may be considered per guideline recommendations (2b); no indication for BBlockers      Current Pharmacotherapy AHA Guideline-Directed Medical Therapy   Losartan 25 mg daily - on hold 2/2 hypotension ARNI/ARB   Spironolactone 25 mg  MRA   SGLT2 inhibitor not started SGLT2-I    Furosemide gtt Loop diuretic        2. Atrial fibrillation  Assessment / Plan  Rate controlled - currently on metoprolol and apixaban     3. Valvular heart disease  Assessment / Plan  LORRAINE 0.8 - will have patient follow-up with valve clinic in the outpatient setting  Severe TR currently enrolled in CLASP II TR study - s/p 1 PASCALACE device      Plan of care discussed on April 25, 2024 with patient at bedside, and primary team overseeing patient's care    History of Present Illness/Subjective    Ms. Misty Quezada is a 68 year old female with a PMHx significant for (per Epic notation) CHFpEF, tricuspid regurgitation, HTN, A-fib, lymphedema admitted on 4/23/2024 with complaints of shortness of breath and increased leg swelling due to noncompliance with Bumex.  Patient states she stopped taking it a month ago and has gained 70 pounds since.      Today, Mrs. Quezada denies acute cardiac events or complaints; Management plan as detailed above     ECG: Personally reviewed. Afib with RVR.     Repeat echo personally  reviewed 4/25/24:  There is moderate concentric left ventricular hypertrophy.  The visual ejection fraction is 55-60%.  Flattened septum is consistent with RV pressure/volume overload.  The right ventricular systolic function is normal.  Low flow, low gradient moderate to severe AS with mean gradient of 25 mmHg.  The calculated aortic valve area is 0.8cm2.  A single Janette-Ace [id number] repair device is securely attached to the  tricuspid leaflets. There is mild to moderate (1-2+) tricuspid regurgitation.  The right ventricular systolic pressure is approximated at 62 mmHg.  Compared to the prior study dated 10/18/2023, there are changes as noted. The  aortic valve stenosis appears to be more severe and pulmonary artery pressures  are higher.    ECHO (personnaly Reviewed on 4/24/24): r  1. Left ventricular chamber size, wall thickness and systolic function are  normal. The visually estimated left ventricular ejection fraction is 55-60%.  2. Right ventricle is not well visualized. Chamber size may be mildly  enlarged. Systolic function may be mildly reduced.  3. Severe right atrial enlargement. Moderate left atrial enlargement.  4. A single Janette-Ace ztce-fo-xnor repair device is securely attached to the  tricuspid valve leaflets. There is mild-to-moderate residual tricuspid  regurgitation. No evidence of significant stenosis with peak inflow velocity  1.2 m/s and mean inflow gradient 3 mmHg at a heart rate of approximately 90  beats per minute.  5. Calcified trileaflet aoritc valve with paradoxical low flow, low gradient  (stroke volume index 22.2 mL/m2) aortic stenosis felt to be mild-to-moderate  in severity. Peak forward velocity measures 2.5 m/s, mean gradient 14 mmHg,  calculated aortic valve area 1.1 cm2, and dimensionless index 0.27. No  significant aortic regurgitation.  6. Mild pulmonary hypertension is present with an estimated pulmonary artery  systolic pressure of 43 mmHg.  7. Compared to the prior  study dated 6/1/2023, the degree of tricuspid  regurgitation is less.    Telemetry: personally reviewed April 25, 2024; notable for atrial fibrillation     Lab results: personally reviewed April 25, 2024; notable for hypokalemia    Medical history and pertinent documents reviewed in Care Everywhere please where applicable see details above        Physical Examination Review of Systems   /82 (BP Location: Right arm)   Pulse 89   Temp 98.4  F (36.9  C) (Oral)   Resp 20   Wt 137.1 kg (302 lb 3.2 oz)   SpO2 97%   BMI 47.33 kg/m    Body mass index is 47.33 kg/m .  Wt Readings from Last 3 Encounters:   04/25/24 137.1 kg (302 lb 3.2 oz)   04/23/24 (!) 152.9 kg (337 lb)   11/14/23 121.6 kg (268 lb)     General Appearance:   no distress   ENT/Mouth: membranes moist, no oral lesions or bleeding gums.      EYES:  no scleral icterus, normal conjunctivae   Neck: no carotid bruits or thyromegaly   Chest/Lungs:   lungs are clear to auscultation, no rales or wheezing, equal chest wall expansion    Cardiovascular:   Irregular. Normal first and second heart sounds with +JOHN; no rubs, or gallops; the carotid, radial and posterior tibial pulses are intact, + JVD and LE edema bilaterally    Abdomen:  no organomegaly, masses, bruits, or tenderness; bowel sounds are present   Extremities: no cyanosis or clubbing   Skin: no xanthelasma, warm.    Neurologic: NAD     Psychiatric: alert and oriented x3, calm     A complete 10 systems ROS was reviewed  And is negative except what is listed in the HPI.          Medical History  Surgical History Family History Social History   Past Medical History:   Diagnosis Date    (HFpEF) heart failure with preserved ejection fraction (H) 10/17/2022    Aortic valve stenosis 11/10/2022    Benign essential hypertension     Chronic atrial fibrillation (H)     Lymphedema 9/24/2022    Nonrheumatic tricuspid valve regurgitation 2/6/2023    Past Surgical History:   Procedure Laterality Date     APPENDECTOMY      CHOLECYSTECTOMY      CV CORONARY ANGIOGRAM N/A 3/15/2023    Procedure: Coronary Angiogram;  Surgeon: Howard Plummer MD;  Location: [facility] CATH LAB CV    CV LEFT HEART CATH N/A 3/15/2023    Procedure: Left Heart Catheterization;  Surgeon: Howard Plummer MD;  Location: [facility] CATH LAB CV    CV RIGHT HEART CATH MEASUREMENTS RECORDED N/A 3/15/2023    Procedure: Right Heart Catheterization;  Surgeon: Howard Plummer MD;  Location: [facility] CATH [facility] CV    CV TRANSCATHETER TRICUSPID VALVE REPAIR N/A 5/3/2023    Procedure: Transcatheter Tricuspid Valve Repair;  Surgeon: Howard Plummer MD;  Location: [facility] CATH [facility] CV    GASTRIC BYPASS      OR TRANSCATHETER TRICUSPID VALVE REPLACEMENT, FEMORAL PERCUTANEOUS APPROACH (STANDBY) N/A 5/3/2023    Procedure: OR TRANSCATHETER TRICUSPID VALVE REPLACEMENT, FEMORAL PERCUTANEOUS APPROACH (STANDBY);  Surgeon: Kana Calderón MD;  Location: [facility] CATH LAB CV    no family history of premature coronary artery disease Social History     Socioeconomic History    Marital status:      Spouse name: Not on file    Number of children: Not on file    Years of education: Not on file    Highest education level: Not on file   Occupational History    Not on file   Tobacco Use    Smoking status: Never    Smokeless tobacco: Never   Substance and Sexual Activity    Alcohol use: Not Currently     Comment: Rarely Uses alcohol    Drug use: Never    Sexual activity: Not on file   Other Topics Concern    Not on file   Social History Narrative    Not on file     Social Determinants of Health     Financial Resource Strain: Not on file   Food Insecurity: Not on file   Transportation Needs: Not on file   Physical Activity: Not on file   Stress: Not on file   Social Connections: Not on file   Interpersonal Safety: Not on file   Housing Stability: Not on file           Lab Results    Chemistry/lipid CBC Cardiac Enzymes/BNP/TSH/INR   Lab Results   Component  Value Date    CHOL 163 09/09/2016    HDL 42 (L) 09/09/2016    TRIG 81 09/09/2016    BUN 15.6 04/25/2024     04/25/2024    CO2 34 (H) 04/25/2024    Lab Results   Component Value Date    WBC 5.6 04/24/2024    HGB 13.0 04/24/2024    HCT 42.2 04/24/2024     (H) 04/24/2024     04/24/2024    Lab Results   Component Value Date    TROPONINI 1.32 (HH) 08/21/2021     (H) 09/02/2021    TSH 3.67 04/24/2024    INR 1.21 (H) 05/04/2023     Lab Results   Component Value Date    TROPONINI 1.32 (HH) 08/21/2021          Weight:    Wt Readings from Last 3 Encounters:   04/25/24 137.1 kg (302 lb 3.2 oz)   04/23/24 (!) 152.9 kg (337 lb)   11/14/23 121.6 kg (268 lb)       Allergies  No Known Allergies      Surgical History  Past Surgical History:   Procedure Laterality Date    APPENDECTOMY      CHOLECYSTECTOMY      CV CORONARY ANGIOGRAM N/A 3/15/2023    Procedure: Coronary Angiogram;  Surgeon: Howard Plummer MD;  Location: [facility] CATH LAB CV    CV LEFT HEART CATH N/A 3/15/2023    Procedure: Left Heart Catheterization;  Surgeon: Howard Plummer MD;  Location: [facility] CATH LAB CV    CV RIGHT HEART CATH MEASUREMENTS RECORDED N/A 3/15/2023    Procedure: Right Heart Catheterization;  Surgeon: Howard Plummer MD;  Location: [facility] CATH LAB CV    CV TRANSCATHETER TRICUSPID VALVE REPAIR N/A 5/3/2023    Procedure: Transcatheter Tricuspid Valve Repair;  Surgeon: Howard Plummer MD;  Location: [facility] CATH LAB CV    GASTRIC BYPASS      OR TRANSCATHETER TRICUSPID VALVE REPLACEMENT, FEMORAL PERCUTANEOUS APPROACH (STANDBY) N/A 5/3/2023    Procedure: OR TRANSCATHETER TRICUSPID VALVE REPLACEMENT, FEMORAL PERCUTANEOUS APPROACH (STANDBY);  Surgeon: Kana Calderón MD;  Location: [facility] CATH LAB CV       Social History  Tobacco:   History   Smoking Status    Never   Smokeless Tobacco    Never    Alcohol:   Social History    Substance and Sexual Activity      Alcohol use: Not Currently         Comment: Rarely Uses alcohol   Illicit Drugs:   History   Drug Use Unknown       Family History  Family History   Problem Relation Age of Onset    No Known Problems Mother     No Known Problems Father           Tara Siegel MD on 4/25/2024      cc: Lizbet Baer

## 2024-04-25 NOTE — PROGRESS NOTES
[facility]    Medicine Progress Note - Hospitalist Service    Date of Admission:  4/23/2024    Assessment & Plan   Misty Quezada is a 68 year old female with PMH CHFpEF, tricuspid regurgitation, HTN, A-fib, lymphedema admitted on 4/23/2024 with complaints of shortness of breath and increased leg swelling due to noncompliance with low salt diet, fluid restriction, and Bumex.  Patient states she stopped taking it a month ago and has gained 70 pounds since. She went to cardiology clinic who recommend to come to ER. Admitted for acute on chronic CHF    Acute hypoxic respiratory failure  HFpEF acute on chronic  -- ProBNP:2,391  -- Lasix gtt per cardiology  -- c/w PTA losartan, spironolactone  -- Cardiology consult appreciated  -- TTE:LVEF: 55-60%, flattened septum is consistent with RV pressure/volume overload.  -- Cardiac monitoring  -- Strict I&O's, Daily weights  -- Fluid restriction  -- Oxygen supplement via NC. Wean as able.  -- S/p Albumin on 04/24 and 04/25    Hypokalemia  -- replaced  -- protocol ordered    Hypomagnesemia  -- replace per protocol    Elevated troponin  -- No chest pain, no e/o ischemia on EKG  -- likely 2/2 demand ischemia    A-fib  -- Rate controlled  -- PTA apixaban, metoprolol    Valvular heart disease  Moderate to severe AS  Severe TR-Janette-Ace repair device in place           Diet: Combination Diet 2 gm NA Diet; No Caffeine Diet (and additional linked orders)  Fluid restriction 1800 ML FLUID (and additional linked orders)  Fluid restriction 1800 ML FLUID    DVT Prophylaxis: Pneumatic Compression Devices  Hi Catheter: Not present  Lines: None     Cardiac Monitoring: ACTIVE order. Indication: Acute decompensated heart failure (48 hours)  Code Status: Full Code      Clinically Significant Risk Factors        # Hypokalemia: Lowest K = 3.3 mmol/L in last 2 days, will replace as needed     # Hypomagnesemia: Lowest Mg = 1.5 mg/dL in last 2 days, will replace as needed   "     # Hypertension: Noted on problem list  # Acute heart failure with preserved ejection fraction: heart failure noted on problem list, last echo with EF >50%, and receiving IV diuretics       # Severe Obesity: Estimated body mass index is 47.33 kg/m  as calculated from the following:    Height as of an earlier encounter on 4/23/24: 1.702 m (5' 7\").    Weight as of this encounter: 137.1 kg (302 lb 3.2 oz)., PRESENT ON ADMISSION     # Financial/Environmental Concerns: none         Disposition Plan     Medically Ready for Discharge: Anticipated in 2-4 Days             Aleta Wang MD  Hospitalist Service  [facility]  Securely message with [employer] (more info)  Text page via [employer] Paging/Directory   ______________________________________________________________________    Interval History   Patient is seen and examined at bedside.  No complaints.    Physical Exam   Vital Signs: Temp: 98.3  F (36.8  C) Temp src: Oral BP: 111/68 Pulse: 92   Resp: 20 SpO2: 94 % O2 Device: Nasal cannula Oxygen Delivery: 2 LPM  Weight: 302 lbs 3.2 oz    GEN: Alert and oriented. Not in acute distress.  HEENT: Atraumatic, mucous membrane- moist and pink.  Chest: Bilateral air entry.  CVS: S1S2 irregular.   Abdomen: Soft. Non-tender, non-distended. No organomegaly. No guarding or rigidity. Bowel sounds active.   Extremities: +edema.  CNS: No involuntary movements.  Skin: no cyanosis or clubbing.     Medical Decision Making       52 MINUTES SPENT BY ME on the date of service doing chart review, history, exam, documentation & further activities per the note.      Data     "

## 2024-04-25 NOTE — PROGRESS NOTES
Care Management Follow Up    Length of Stay (days): 2    Expected Discharge Date: 04/29/2024     Concerns to be Addressed:     Discharge planning - Pt on lasix   Patient plan of care discussed at interdisciplinary rounds: Yes    Anticipated Discharge Disposition:  Home      Anticipated Discharge Services:  per team  Anticipated Discharge DME:  Pt has home oxygen 2L     Patient/family educated on Medicare website which has current facility and service quality ratings:    Education Provided on the Discharge Plan:    Patient/Family in Agreement with the Plan:      Referrals Placed by CM/SW:  NA   Private pay costs discussed: Not applicable    Additional Information:  Pt not medically ready to discharge, per attending hospitalist, pt will continue on the IV lasix. P er chart review, pt lives with 2 adult sons, that will transport at discharge.     Meenakshi Chávez MSW

## 2024-04-26 ENCOUNTER — APPOINTMENT (OUTPATIENT)
Dept: OCCUPATIONAL THERAPY | Facility: HOSPITAL | Age: 69
DRG: 291 | End: 2024-04-26
Payer: MEDICARE

## 2024-04-26 ENCOUNTER — ALLIED HEALTH/NURSE VISIT (OUTPATIENT)
Dept: CARDIOLOGY | Facility: CLINIC | Age: 69
End: 2024-04-26
Payer: MEDICARE

## 2024-04-26 DIAGNOSIS — Z00.6 EXAMINATION OF PARTICIPANT OR CONTROL IN CLINICAL RESEARCH: Primary | ICD-10-CM

## 2024-04-26 LAB
ANION GAP SERPL CALCULATED.3IONS-SCNC: 13 MMOL/L (ref 7–15)
BUN SERPL-MCNC: 17.4 MG/DL (ref 8–23)
CALCIUM SERPL-MCNC: 9.1 MG/DL (ref 8.8–10.2)
CHLORIDE SERPL-SCNC: 95 MMOL/L (ref 98–107)
CREAT SERPL-MCNC: 0.8 MG/DL (ref 0.51–0.95)
DEPRECATED HCO3 PLAS-SCNC: 36 MMOL/L (ref 22–29)
EGFRCR SERPLBLD CKD-EPI 2021: 79 ML/MIN/1.73M2
GLUCOSE BLDC GLUCOMTR-MCNC: 100 MG/DL (ref 70–99)
GLUCOSE BLDC GLUCOMTR-MCNC: 100 MG/DL (ref 70–99)
GLUCOSE BLDC GLUCOMTR-MCNC: 121 MG/DL (ref 70–99)
GLUCOSE BLDC GLUCOMTR-MCNC: 97 MG/DL (ref 70–99)
GLUCOSE SERPL-MCNC: 93 MG/DL (ref 70–99)
HOLD SPECIMEN: NORMAL
MAGNESIUM SERPL-MCNC: 1.7 MG/DL (ref 1.7–2.3)
POTASSIUM SERPL-SCNC: 3.5 MMOL/L (ref 3.4–5.3)
SODIUM SERPL-SCNC: 144 MMOL/L (ref 135–145)

## 2024-04-26 PROCEDURE — 99207 PR NO CHARGE-RESEARCH SERVICE: CPT

## 2024-04-26 PROCEDURE — 120N000004 HC R&B MS OVERFLOW

## 2024-04-26 PROCEDURE — 80048 BASIC METABOLIC PNL TOTAL CA: CPT | Performed by: STUDENT IN AN ORGANIZED HEALTH CARE EDUCATION/TRAINING PROGRAM

## 2024-04-26 PROCEDURE — 250N000013 HC RX MED GY IP 250 OP 250 PS 637: Performed by: STUDENT IN AN ORGANIZED HEALTH CARE EDUCATION/TRAINING PROGRAM

## 2024-04-26 PROCEDURE — 97535 SELF CARE MNGMENT TRAINING: CPT | Mod: GO

## 2024-04-26 PROCEDURE — 36415 COLL VENOUS BLD VENIPUNCTURE: CPT | Performed by: STUDENT IN AN ORGANIZED HEALTH CARE EDUCATION/TRAINING PROGRAM

## 2024-04-26 PROCEDURE — 99233 SBSQ HOSP IP/OBS HIGH 50: CPT | Performed by: STUDENT IN AN ORGANIZED HEALTH CARE EDUCATION/TRAINING PROGRAM

## 2024-04-26 PROCEDURE — 99233 SBSQ HOSP IP/OBS HIGH 50: CPT | Performed by: INTERNAL MEDICINE

## 2024-04-26 PROCEDURE — 97110 THERAPEUTIC EXERCISES: CPT | Mod: GO

## 2024-04-26 PROCEDURE — 83735 ASSAY OF MAGNESIUM: CPT | Performed by: STUDENT IN AN ORGANIZED HEALTH CARE EDUCATION/TRAINING PROGRAM

## 2024-04-26 PROCEDURE — 250N000009 HC RX 250: Performed by: EMERGENCY MEDICINE

## 2024-04-26 RX ORDER — POTASSIUM CHLORIDE 1500 MG/1
20 TABLET, EXTENDED RELEASE ORAL ONCE
Status: COMPLETED | OUTPATIENT
Start: 2024-04-26 | End: 2024-04-26

## 2024-04-26 RX ADMIN — POTASSIUM CHLORIDE 20 MEQ: 1500 TABLET, EXTENDED RELEASE ORAL at 09:21

## 2024-04-26 RX ADMIN — APIXABAN 5 MG: 5 TABLET, FILM COATED ORAL at 09:21

## 2024-04-26 RX ADMIN — SPIRONOLACTONE 25 MG: 25 TABLET, FILM COATED ORAL at 09:21

## 2024-04-26 RX ADMIN — ACETAMINOPHEN 1000 MG: 500 TABLET ORAL at 15:56

## 2024-04-26 RX ADMIN — SENNOSIDES AND DOCUSATE SODIUM 1 TABLET: 8.6; 5 TABLET ORAL at 15:56

## 2024-04-26 RX ADMIN — METOPROLOL SUCCINATE 50 MG: 50 TABLET, EXTENDED RELEASE ORAL at 09:21

## 2024-04-26 RX ADMIN — FUROSEMIDE 15 MG/HR: 10 INJECTION, SOLUTION INTRAVENOUS at 15:52

## 2024-04-26 RX ADMIN — APIXABAN 5 MG: 5 TABLET, FILM COATED ORAL at 20:30

## 2024-04-26 RX ADMIN — MICONAZOLE NITRATE: 20 POWDER TOPICAL at 20:30

## 2024-04-26 RX ADMIN — MICONAZOLE NITRATE: 20 POWDER TOPICAL at 12:32

## 2024-04-26 RX ADMIN — ACETAMINOPHEN 1500 MG: 500 TABLET ORAL at 09:20

## 2024-04-26 ASSESSMENT — ACTIVITIES OF DAILY LIVING (ADL)
ADLS_ACUITY_SCORE: 39
ADLS_ACUITY_SCORE: 39
ADLS_ACUITY_SCORE: 32
ADLS_ACUITY_SCORE: 36
ADLS_ACUITY_SCORE: 32
ADLS_ACUITY_SCORE: 36
ADLS_ACUITY_SCORE: 32
ADLS_ACUITY_SCORE: 36
ADLS_ACUITY_SCORE: 39
ADLS_ACUITY_SCORE: 32
ADLS_ACUITY_SCORE: 32
ADLS_ACUITY_SCORE: 36
ADLS_ACUITY_SCORE: 39
ADLS_ACUITY_SCORE: 36
ADLS_ACUITY_SCORE: 32
ADLS_ACUITY_SCORE: 36
ADLS_ACUITY_SCORE: 36

## 2024-04-26 NOTE — PROGRESS NOTES
[facility]    Medicine Progress Note - Hospitalist Service    Date of Admission:  4/23/2024    Assessment & Plan   Misty Quezada is a 68 year old female with PMH CHFpEF, tricuspid regurgitation, HTN, A-fib, lymphedema admitted on 4/23/2024 with complaints of shortness of breath and increased leg swelling due to noncompliance with low salt diet, fluid restriction, and Bumex.  Patient states she stopped taking it a month ago and has gained 70 pounds since. She went to cardiology clinic who recommend to come to ER. Admitted for acute on chronic CHF    Acute hypoxic respiratory failure  HFpEF acute on chronic  -- ProBNP:2,391  -- Lasix gtt per cardiology  -- c/w PTA losartan, spironolactone  -- Cardiology consult appreciated  -- TTE:LVEF: 55-60%, flattened septum is consistent with RV pressure/volume overload.  -- Cardiac monitoring  -- Strict I&O's, Daily weights  -- Fluid restriction  -- Oxygen supplement via NC. Wean as able.  -- S/p Albumin on 04/24 and 04/25  -- 04/26: diuresing well. Lost upwards of 20Ib of water weight    Hypokalemia  -- replaced  -- protocol ordered    Hypomagnesemia  -- replace per protocol    Elevated troponin  -- No chest pain, no e/o ischemia on EKG  -- likely 2/2 demand ischemia    A-fib  -- Rate controlled  -- PTA apixaban, metoprolol    Valvular heart disease  Moderate to severe AS  Severe TR-Janette-Ace repair device in place           Diet: Combination Diet 2 gm NA Diet; No Caffeine Diet (and additional linked orders)  Fluid restriction 1800 ML FLUID (and additional linked orders)  Fluid restriction 1800 ML FLUID    DVT Prophylaxis: Pneumatic Compression Devices  Hi Catheter: Not present  Lines: None     Cardiac Monitoring: ACTIVE order. Indication: Acute decompensated heart failure (48 hours)  Code Status: Full Code      Clinically Significant Risk Factors        # Hypokalemia: Lowest K = 3.3 mmol/L in last 2 days, will replace as needed     #  "Hypomagnesemia: Lowest Mg = 1.5 mg/dL in last 2 days, will replace as needed       # Hypertension: Noted on problem list  # Acute heart failure with preserved ejection fraction: heart failure noted on problem list, last echo with EF >50%, and receiving IV diuretics       # Severe Obesity: Estimated body mass index is 45.64 kg/m  as calculated from the following:    Height as of an earlier encounter on 4/23/24: 1.702 m (5' 7\").    Weight as of this encounter: 132.2 kg (291 lb 6.4 oz)., PRESENT ON ADMISSION     # Financial/Environmental Concerns: none         Disposition Plan     Medically Ready for Discharge: Anticipated in 2-4 Days             Aleta Wang MD  Hospitalist Service  [facility]  Securely message with [email or web address] (more info)  Text page via [employer] Paging/Directory   ______________________________________________________________________    Interval History   Patient is seen and examined at bedside.  Did not sleep well last night.     Physical Exam   Vital Signs: Temp: 98.4  F (36.9  C) Temp src: Oral BP: 128/60 Pulse: 92   Resp: 20 SpO2: 91 % O2 Device: Nasal cannula Oxygen Delivery: 1 LPM  Weight: 291 lbs 6.4 oz    GEN: Alert and oriented. Not in acute distress.  HEENT: Atraumatic, mucous membrane- moist and pink.  Chest: Bilateral air entry.  CVS: S1S2 irregular.   Abdomen: Soft. Non-tender, non-distended. No organomegaly. No guarding or rigidity. Bowel sounds active.   Extremities: +edema.  CNS: No involuntary movements.  Skin: no cyanosis or clubbing.     Medical Decision Making       45 MINUTES SPENT BY ME on the date of service doing chart review, history, exam, documentation & further activities per the note.      Data     "

## 2024-04-26 NOTE — PROGRESS NOTES
CLASP II TR Adverse Event Note    Purpose: Freeman PILAR TrAnScatheter Valve RePair System Pivotal Clinical Trial (CLASP II TR): A prospective, multicenter, randomized, controlled pivotal trial to evaluate the safety and effectiveness of transcatheter tricuspid valve repair with the Freeman PILAR Transcatheter Valve Repair System and optimal medical therapy (OMT) compared to OMT alone in patients with tricuspid regurgitation.     Adverse Event Number: 003  Event Onset Date: 4/23/24  Date of site awareness: 4/26/24  Date sponsor notified: 4/26/24    AE term (Medical Diagnosis): Acute on chronic diastolic congestive heart failure  AE detailed description: Patient presented with weight gain over a month, SOB, and fluid overload. Pt admitted for IV diuresis and supplemental oxygen. Pt admits to not being compliant with medication regimen.    Dr. Plummer: Please provide your assessment of relationship     Event Relationship:   Related to study device [x] Not related []  Unlikely  [] Possible [] Probable [] Causal     Related to study procedure [x] Not related []  Unlikely  [] Possible [] Probable [] Causal   Related to COVID-19 [] Yes [x] No [] Unknown     Serious Adverse Event Category:    Death  No   Serious deterioration in the health of a subject that led to life threatening illness or injury  No   Serious deterioration in the health of a subject that led to permanent impairment of a body structure or body function  No   Serious deterioration in the health of a subject that resulted in hospitalization or prolongation of hospitalization  Yes   Serious deterioration in the health of a subject that resulted in medical or surgical intervention to prevent life threatening illness or injury or permanent impairment to a body structure or a body function  No   Serious deterioration in the health of a subject that resulted in chronic disease  No   Fetal distress, fetal death, or a congenital abnormality or birth defect  including physical or mental impairment  No     Is adverse event considered serious? Yes    Was there bleeding, anemia requiring transfusion or transfusion only associated with this event? No    Action Taken: Hospitalization, IV diuresis   (None, CABG, explant, heart transplant, hospitalization/ER visit, medication, IV inotropes, permanent pacemaker, paracentesis, PCI, other percutaneous intervention, pericardial drainage, rental replacement therapy/hemodialysis/ultrafiltration, durable RVAD implantation, temporary RVAD implantation, surgery, ventilatory support > 48 hrs, tricuspid valve intervention, other)    Event Outcome: Recovered  (Ongoing, ongoing at study exit, recovered, recovered with sequelae, death, unknown)  Date Recovered: 5/4/24    Lydia Ramos

## 2024-04-26 NOTE — PROGRESS NOTES
Physical Therapy: Orders received. Chart reviewed and discussed with care team.? Physical Therapy not indicated due to mobility and endurance goals being addressed by cardiac rehab.? Defer discharge recommendations to cardiac rehab.? Will complete orders.     Janina Antoine, PT  4/26/2024

## 2024-04-26 NOTE — PLAN OF CARE
Goal Outcome Evaluation:       Pt reports exhaustion & poor nights rest, pt is irritable with staff. Writer was with MD this AM in pt's room. Writer was encouraging activity = new PT orders. Pt requesting not to start PT till late this afternoon or tomorrow = MD aware. Pt did not want to cooperate with full nursing cares and AM ADL's at this time. Writer is grouping cares this AM for patient to  catch-up on needed rest, decrease risk of hospital delirium, to promote better cooperation after getting rest = MD aware. Pt belongings are close to her, pt reports that she is warm enough, Pt ate 100 percent breakfast, pure wick skin and placement checked, PRN tylenol given for bi-lat leg aches, room is darkened, call light in reach.

## 2024-04-26 NOTE — PROGRESS NOTES
Cardiology Progress Note    Assessment/Plan:  1.  Acute on chronic HFpEF, likely due to medication noncompliance.  Patient improving with diuresis and resumption of medical therapy.  2.  Chronic atrial fibrillation, rate controlled on metoprolol.  Patient on apixaban anticoagulation to minimize risks of thromboembolic events  3.  Severe aortic valve stenosis.  Plan is to pursue outpatient TAVR workup.  4.  Severe TR, currently enrolled in class II TR study status post PILAR ACE device with echocardiogram this admission demonstrating mild to moderate residual tricuspid insufficiency with moderate to severe pulmonary hypertension    Primary cardiologist: Dr. Alphonse Rocha    Subjective:  Patient without any specific complaints.    Current Facility-Administered Medications   Medication Dose Route Frequency Provider Last Rate Last Admin    apixaban ANTICOAGULANT (ELIQUIS) tablet 5 mg  5 mg Oral BID Gondal, Saad J, MD   5 mg at 04/26/24 0921    ferrous sulfate (FEROSUL) tablet 325 mg  325 mg Oral Every Other Day Gondal, Saad J, MD   325 mg at 04/25/24 0811    metoprolol succinate ER (TOPROL XL) 24 hr tablet 50 mg  50 mg Oral Daily Gondal, Saad J, MD   50 mg at 04/26/24 0921    miconazole (MICATIN) 2 % powder   Topical BID Aleta Wang MD   Given at 04/26/24 1232    potassium chloride laquita ER (KLOR-CON M10) CR tablet 10 mEq  10 mEq Oral Daily Gondal, Saad J, MD   10 mEq at 04/25/24 0811    sodium chloride (PF) 0.9% PF flush 3 mL  3 mL Intracatheter Q8H Gondal, Saad J, MD   3 mL at 04/26/24 1231    spironolactone (ALDACTONE) tablet 25 mg  25 mg Oral Daily Gondal, Saad J, MD   25 mg at 04/26/24 0921         Objective:   Vital signs in last 24 hours:  Temp:  [97.5  F (36.4  C)-98.4  F (36.9  C)] 98.3  F (36.8  C)  Pulse:  [80-92] 80  Resp:  [18-20] 18  BP: (110-128)/(53-80) 111/53  SpO2:  [85 %-93 %] 90 %  Weight:        Review of Systems:  Negative    Physical Exam:  General appearance: alert, cooperative, no  distress   Head: Normocephalic, without obvious abnormality, atraumatic  Neck: no JVD at 90 degrees  Lungs: Diffuse end expiratory wheezes throughout  Heart: Irregular rhythm.  S1, S2 normal.  2/6 mid peaking crescendo decrescendo systolic murmur heard at the left upper sternal border.  1/6 holosystolic murmur heard at the lower left sternal border  Extremities: 2+ edema bilaterally    Cardiographics (personally reviewed):   Telemetry demonstrates atrial fibrillation    Imaging (personally reviewed):   No new imaging    Lab Results (personally reviewed):     Recent Labs   Lab Test 09/09/16  1412   CHOL 163   HDL 42*      TRIG 81     Recent Labs   Lab Test 09/09/16  1412        Recent Labs   Lab Test 04/26/24  1158 04/26/24  0713 04/26/24  0427   NA  --   --  144   POTASSIUM  --   --  3.5   CHLORIDE  --   --  95*   CO2  --   --  36*   *   < > 93   BUN  --   --  17.4   CR  --   --  0.80   GFRESTIMATED  --   --  79   TURNER  --   --  9.1    < > = values in this interval not displayed.     Recent Labs   Lab Test 04/26/24  0427 04/25/24  0420 04/24/24  0445   CR 0.80 0.84 0.71     Recent Labs   Lab Test 04/23/24  1551 09/02/21  0457   A1C 5.8* 6.2*          Recent Labs   Lab Test 04/24/24  0445   WBC 5.6   HGB 13.0   HCT 42.2   *        Recent Labs   Lab Test 04/24/24  0445 04/23/24  1551 10/18/23  1318   HGB 13.0 13.2 13.0    Recent Labs   Lab Test 08/21/21  1944 08/21/21  1426 08/21/21  0820   TROPONINI 1.32* 1.87* 1.75*     Recent Labs   Lab Test 04/23/24  1551 10/18/23  1318 05/01/23  1329 09/24/22  0850 09/02/21  0457 08/21/21  0820   BNP  --   --   --   --  199* 281*   NTBNPI 2,391*  --   --   --   --   --    NTBNP  --  1,910* 945* 1,898*  --   --      Recent Labs   Lab Test 04/24/24  0445   TSH 3.67     Recent Labs   Lab Test 05/04/23  0832 05/01/23  1329 01/25/23  1440   INR 1.21* 1.07 1.13          Gissel Goodwin MD

## 2024-04-26 NOTE — PLAN OF CARE
"  Problem: Adult Inpatient Plan of Care  Goal: Plan of Care Review  Description: The Plan of Care Review/Shift note should be completed every shift.  The Outcome Evaluation is a brief statement about your assessment that the patient is improving, declining, or no change.  This information will be displayed automatically on your shift  note.  Outcome: Progressing  Flowsheets (Taken 4/26/2024 1509)  Plan of Care Reviewed With: patient  Goal: Patient-Specific Goal (Individualized)  Description: You can add care plan individualizations to a care plan. Examples of Individualization might be:  \"Parent requests to be called daily at 9am for status\", \"I have a hard time hearing out of my right ear\", or \"Do not touch me to wake me up as it startles  me\".  Outcome: Progressing  Goal: Absence of Hospital-Acquired Illness or Injury  Outcome: Progressing  Intervention: Identify and Manage Fall Risk  Recent Flowsheet Documentation  Taken 4/26/2024 1236 by Paty Gillis RN  Safety Promotion/Fall Prevention:   nonskid shoes/slippers when out of bed   patient and family education   room door open   safety round/check completed  Intervention: Prevent Skin Injury  Recent Flowsheet Documentation  Taken 4/26/2024 1236 by Paty Gillis RN  Device Skin Pressure Protection:   absorbent pad utilized/changed   positioning supports utilized  Taken 4/26/2024 1234 by Paty Gillis RN  Body Position: log-rolled  Taken 4/26/2024 0910 by Paty Gillis RN  Device Skin Pressure Protection:   absorbent pad utilized/changed   positioning supports utilized  Taken 4/26/2024 0900 by Paty Gillis RN  Body Position:   log-rolled   supine  Intervention: Prevent Infection  Recent Flowsheet Documentation  Taken 4/26/2024 1236 by Paty Gillis RN  Infection Prevention:   environmental surveillance performed   rest/sleep promoted  Taken 4/26/2024 0910 by Paty Gillis RN  Infection Prevention:   environmental surveillance performed   rest/sleep promoted  Goal: " Optimal Comfort and Wellbeing  Outcome: Progressing  Goal: Readiness for Transition of Care  Outcome: Progressing     Problem: Skin Injury Risk Increased  Goal: Skin Health and Integrity  Outcome: Progressing  Intervention: Plan: Nurse Driven Intervention: Moisture Management  Recent Flowsheet Documentation  Taken 4/26/2024 1236 by Paty Gillis RN  Moisture Interventions: Encourage regular toileting  Bathing/Skin Care:   back care   bath, partial   incontinence care  Taken 4/26/2024 0910 by Paty Gillis RN  Moisture Interventions: Encourage regular toileting  Intervention: Plan: Nurse Driven Intervention: Friction and Shear  Recent Flowsheet Documentation  Taken 4/26/2024 1236 by Paty Gillis RN  Friction/Shear Interventions: HOB 30 degrees or less  Taken 4/26/2024 0910 by Paty Gillis RN  Friction/Shear Interventions: HOB 30 degrees or less  Intervention: Optimize Skin Protection  Recent Flowsheet Documentation  Taken 4/26/2024 1236 by Paty Gillis RN  Pressure Reduction Techniques: frequent weight shift encouraged  Taken 4/26/2024 1234 by Paty Gillis RN  Activity Management: activity adjusted per tolerance  Head of Bed (HOB) Positioning: HOB at 20-30 degrees  Taken 4/26/2024 0910 by Paty Gillis RN  Pressure Reduction Techniques: frequent weight shift encouraged  Taken 4/26/2024 0900 by Paty Gillis RN  Activity Management: (Pt refusing up in chair, wants to rest this AM, poor nights sleep.) activity adjusted per tolerance  Head of Bed (HOB) Positioning: HOB at 20-30 degrees     Problem: Pain Acute  Goal: Optimal Pain Control and Function  Outcome: Progressing  Intervention: Prevent or Manage Pain  Recent Flowsheet Documentation  Taken 4/26/2024 1236 by Paty Gillis RN  Medication Review/Management: medications reviewed  Taken 4/26/2024 0910 by Paty Gillis RN  Complementary Therapy: (room darkened, will group cares.) other (see comments)  Intervention: Optimize Psychosocial Wellbeing  Recent Flowsheet  Documentation  Taken 4/26/2024 1236 by Paty Gillis RN  Supportive Measures:   active listening utilized   decision-making supported   goal-setting facilitated   positive reinforcement provided   relaxation techniques promoted   verbalization of feelings encouraged  Taken 4/26/2024 0910 by Paty Gillis RN  Supportive Measures:   active listening utilized   decision-making supported   goal-setting facilitated   positive reinforcement provided   relaxation techniques promoted   verbalization of feelings encouraged     Problem: Heart Failure  Goal: Optimal Coping  Outcome: Progressing  Intervention: Support Psychosocial Response  Recent Flowsheet Documentation  Taken 4/26/2024 1236 by Paty Gillis RN  Supportive Measures:   active listening utilized   decision-making supported   goal-setting facilitated   positive reinforcement provided   relaxation techniques promoted   verbalization of feelings encouraged  Taken 4/26/2024 0910 by Paty Gillis RN  Supportive Measures:   active listening utilized   decision-making supported   goal-setting facilitated   positive reinforcement provided   relaxation techniques promoted   verbalization of feelings encouraged  Goal: Optimal Cardiac Output  Outcome: Progressing  Intervention: Optimize Cardiac Output  Recent Flowsheet Documentation  Taken 4/26/2024 1236 by Paty Gillis RN  Environmental Support:   calm environment promoted   rest periods encouraged  Taken 4/26/2024 0910 by Paty Gillis RN  Environmental Support:   calm environment promoted   rest periods encouraged  Goal: Stable Heart Rate and Rhythm  Outcome: Progressing  Goal: Optimal Functional Ability  Outcome: Progressing  Intervention: Optimize Functional Ability  Recent Flowsheet Documentation  Taken 4/26/2024 1234 by Paty Gillis RN  Activity Management: activity adjusted per tolerance  Taken 4/26/2024 0900 by Paty Gillis RN  Activity Management: (Pt refusing up in chair, wants to rest this AM, poor nights  sleep.) activity adjusted per tolerance  Goal: Fluid and Electrolyte Balance  Outcome: Progressing  Goal: Improved Oral Intake  Outcome: Progressing  Goal: Effective Oxygenation and Ventilation  Outcome: Progressing  Intervention: Promote Airway Secretion Clearance  Recent Flowsheet Documentation  Taken 4/26/2024 1236 by Paty Gillis RN  Cough And Deep Breathing: done with encouragement  Taken 4/26/2024 1234 by Paty Gillis RN  Activity Management: activity adjusted per tolerance  Taken 4/26/2024 0910 by Paty Gillis RN  Cough And Deep Breathing: done with encouragement  Taken 4/26/2024 0900 by Paty Gillis RN  Activity Management: (Pt refusing up in chair, wants to rest this AM, poor nights sleep.) activity adjusted per tolerance  Intervention: Optimize Oxygenation and Ventilation  Recent Flowsheet Documentation  Taken 4/26/2024 1234 by Paty Gillis RN  Head of Bed (HOB) Positioning: HOB at 20-30 degrees  Taken 4/26/2024 0900 by Paty Gillis RN  Head of Bed (HOB) Positioning: HOB at 20-30 degrees  Goal: Effective Breathing Pattern During Sleep  Outcome: Progressing  Intervention: Monitor and Manage Obstructive Sleep Apnea  Recent Flowsheet Documentation  Taken 4/26/2024 1236 by Paty Gillis RN  Medication Review/Management: medications reviewed     Problem: Gas Exchange Impaired  Goal: Optimal Gas Exchange  Outcome: Progressing  Intervention: Optimize Oxygenation and Ventilation  Recent Flowsheet Documentation  Taken 4/26/2024 1234 by Paty Gillis RN  Head of Bed (HOB) Positioning: HOB at 20-30 degrees  Taken 4/26/2024 0900 by Paty Gillis RN  Head of Bed (HOB) Positioning: HOB at 20-30 degrees   Goal Outcome Evaluation:      Plan of Care Reviewed With: patient      Pt psychosocial needs were met today, with improved mood and interest in activity.  See writers previous note. VSS Controlled Afib, PRN tylenol given for generalized leg aches, not new per pt. Protocols as ordered with re-checks in am. Lasix  gtt as ordered, pharmacy notified for new bag. Assist 1-2 for OOB. Pt in chair for lunch. Pt was accepting to therapy this afternoon, where earlier she refused. Will continue to monitor pt's psychosocial needs.

## 2024-04-26 NOTE — PLAN OF CARE
Assumed care 1900 to 2330. A&O x 4. Assist x 1 with two canes. Tele is A.fib. Denies pain. Purewick in place to suction. 1L O2 via nasal cannula. Lasix gtt @ 15 mg/hr. Call light within reach, able to make needs known. Bed alarm on for safety.    Problem: Gas Exchange Impaired  Goal: Optimal Gas Exchange  Intervention: Optimize Oxygenation and Ventilation  Recent Flowsheet Documentation  Taken 4/25/2024 2105 by Meenakshi Velarde, RN  Head of Bed (HOB) Positioning: HOB at 20-30 degrees     Problem: Heart Failure  Goal: Optimal Functional Ability  Intervention: Optimize Functional Ability  Recent Flowsheet Documentation  Taken 4/25/2024 2105 by Meenakshi Velarde, RN  Activity Management: up in chair

## 2024-04-26 NOTE — PLAN OF CARE
Heart Failure Care Map  GOALS TO BE MET BEFORE DISCHARGE:    1. Decrease congestion and/or edema with diuretic therapy to achieve near optimal volume status.     Dyspnea improved: No, further care required to meet this goal. Please explain still LUJAN   Edema improved: No, further care required to meet this goal. Please explain still with edema to BLE        Last 24 hour I/O:   Intake/Output Summary (Last 24 hours) at 4/26/2024 0606  Last data filed at 4/26/2024 0519  Gross per 24 hour   Intake 400 ml   Output 3650 ml   Net -3250 ml           Net I/O and Weights since admission:   03/27 0700 - 04/26 0659  In: 1796 [P.O.:1790; I.V.:6]  Out: 46413 [Urine:72572]  Net: -88932     Vitals:    04/23/24 1531 04/23/24 2305 04/24/24 0500 04/25/24 0637   Weight: (!) 152.9 kg (337 lb) 144.8 kg (319 lb 3.2 oz) 141.3 kg (311 lb 9.6 oz) 137.1 kg (302 lb 3.2 oz)       2.  O2 sats > 90% on room air, or at prior home O2 therapy level.      Able to wean O2 this shift to keep sats above 90%?: No, further care required to meet this goal. Please explain still requiring 1LNC   Does patient use Home O2? No          Current oxygenation status:   SpO2: 92 %     O2 Device: Nasal cannula, Oxygen Delivery: 1 LPM    3.  Tolerates ambulation and mobility near baseline.     Ambulation: No, further care required to meet this goal. Please explain only ambulating to the bathroom   Times patient ambulated with staff this shift: 0    Please review the Heart Failure Care Map for additional HF goal outcomes.    Syeda Cordero RN  4/26/2024

## 2024-04-27 LAB
ANION GAP SERPL CALCULATED.3IONS-SCNC: 10 MMOL/L (ref 7–15)
BUN SERPL-MCNC: 20 MG/DL (ref 8–23)
CALCIUM SERPL-MCNC: 9.8 MG/DL (ref 8.8–10.2)
CHLORIDE SERPL-SCNC: 94 MMOL/L (ref 98–107)
CREAT SERPL-MCNC: 0.81 MG/DL (ref 0.51–0.95)
DEPRECATED HCO3 PLAS-SCNC: 40 MMOL/L (ref 22–29)
EGFRCR SERPLBLD CKD-EPI 2021: 78 ML/MIN/1.73M2
GLUCOSE BLDC GLUCOMTR-MCNC: 116 MG/DL (ref 70–99)
GLUCOSE BLDC GLUCOMTR-MCNC: 131 MG/DL (ref 70–99)
GLUCOSE BLDC GLUCOMTR-MCNC: 97 MG/DL (ref 70–99)
GLUCOSE SERPL-MCNC: 97 MG/DL (ref 70–99)
HOLD SPECIMEN: NORMAL
MAGNESIUM SERPL-MCNC: 1.6 MG/DL (ref 1.7–2.3)
POTASSIUM SERPL-SCNC: 3.2 MMOL/L (ref 3.4–5.3)
POTASSIUM SERPL-SCNC: 3.7 MMOL/L (ref 3.4–5.3)
SODIUM SERPL-SCNC: 144 MMOL/L (ref 135–145)

## 2024-04-27 PROCEDURE — 120N000004 HC R&B MS OVERFLOW

## 2024-04-27 PROCEDURE — 250N000009 HC RX 250: Performed by: EMERGENCY MEDICINE

## 2024-04-27 PROCEDURE — 99233 SBSQ HOSP IP/OBS HIGH 50: CPT | Performed by: STUDENT IN AN ORGANIZED HEALTH CARE EDUCATION/TRAINING PROGRAM

## 2024-04-27 PROCEDURE — 250N000011 HC RX IP 250 OP 636: Performed by: STUDENT IN AN ORGANIZED HEALTH CARE EDUCATION/TRAINING PROGRAM

## 2024-04-27 PROCEDURE — 250N000013 HC RX MED GY IP 250 OP 250 PS 637: Performed by: STUDENT IN AN ORGANIZED HEALTH CARE EDUCATION/TRAINING PROGRAM

## 2024-04-27 PROCEDURE — 258N000003 HC RX IP 258 OP 636: Performed by: STUDENT IN AN ORGANIZED HEALTH CARE EDUCATION/TRAINING PROGRAM

## 2024-04-27 PROCEDURE — 36415 COLL VENOUS BLD VENIPUNCTURE: CPT | Performed by: STUDENT IN AN ORGANIZED HEALTH CARE EDUCATION/TRAINING PROGRAM

## 2024-04-27 PROCEDURE — 80048 BASIC METABOLIC PNL TOTAL CA: CPT | Performed by: STUDENT IN AN ORGANIZED HEALTH CARE EDUCATION/TRAINING PROGRAM

## 2024-04-27 PROCEDURE — 83735 ASSAY OF MAGNESIUM: CPT | Performed by: STUDENT IN AN ORGANIZED HEALTH CARE EDUCATION/TRAINING PROGRAM

## 2024-04-27 PROCEDURE — 84132 ASSAY OF SERUM POTASSIUM: CPT | Performed by: STUDENT IN AN ORGANIZED HEALTH CARE EDUCATION/TRAINING PROGRAM

## 2024-04-27 PROCEDURE — 99232 SBSQ HOSP IP/OBS MODERATE 35: CPT | Performed by: INTERNAL MEDICINE

## 2024-04-27 RX ORDER — POTASSIUM CHLORIDE 1500 MG/1
40 TABLET, EXTENDED RELEASE ORAL ONCE
Status: COMPLETED | OUTPATIENT
Start: 2024-04-27 | End: 2024-04-27

## 2024-04-27 RX ORDER — POLYETHYLENE GLYCOL 3350 17 G/17G
17 POWDER, FOR SOLUTION ORAL DAILY
Status: DISCONTINUED | OUTPATIENT
Start: 2024-04-27 | End: 2024-05-04 | Stop reason: HOSPADM

## 2024-04-27 RX ORDER — POTASSIUM CHLORIDE 1500 MG/1
20 TABLET, EXTENDED RELEASE ORAL ONCE
Status: COMPLETED | OUTPATIENT
Start: 2024-04-27 | End: 2024-04-27

## 2024-04-27 RX ADMIN — MAGNESIUM SULFATE HEPTAHYDRATE 1 G: 500 INJECTION, SOLUTION INTRAMUSCULAR; INTRAVENOUS at 12:01

## 2024-04-27 RX ADMIN — Medication 5 MG: at 20:09

## 2024-04-27 RX ADMIN — POLYETHYLENE GLYCOL 3350 17 G: 17 POWDER, FOR SOLUTION ORAL at 12:01

## 2024-04-27 RX ADMIN — APIXABAN 5 MG: 5 TABLET, FILM COATED ORAL at 07:50

## 2024-04-27 RX ADMIN — SPIRONOLACTONE 25 MG: 25 TABLET, FILM COATED ORAL at 07:50

## 2024-04-27 RX ADMIN — MICONAZOLE NITRATE: 20 POWDER TOPICAL at 20:23

## 2024-04-27 RX ADMIN — APIXABAN 5 MG: 5 TABLET, FILM COATED ORAL at 20:09

## 2024-04-27 RX ADMIN — ACETAMINOPHEN 1000 MG: 500 TABLET ORAL at 16:30

## 2024-04-27 RX ADMIN — MICONAZOLE NITRATE: 20 POWDER TOPICAL at 07:52

## 2024-04-27 RX ADMIN — POTASSIUM CHLORIDE 20 MEQ: 1500 TABLET, EXTENDED RELEASE ORAL at 16:08

## 2024-04-27 RX ADMIN — FERROUS SULFATE TAB 325 MG (65 MG ELEMENTAL FE) 325 MG: 325 (65 FE) TAB at 07:52

## 2024-04-27 RX ADMIN — FUROSEMIDE 15 MG/HR: 10 INJECTION, SOLUTION INTRAVENOUS at 20:39

## 2024-04-27 RX ADMIN — POTASSIUM CHLORIDE 40 MEQ: 1500 TABLET, EXTENDED RELEASE ORAL at 06:54

## 2024-04-27 RX ADMIN — METOPROLOL SUCCINATE 50 MG: 50 TABLET, EXTENDED RELEASE ORAL at 07:50

## 2024-04-27 RX ADMIN — POTASSIUM CHLORIDE 10 MEQ: 750 TABLET, EXTENDED RELEASE ORAL at 07:50

## 2024-04-27 ASSESSMENT — ACTIVITIES OF DAILY LIVING (ADL)
ADLS_ACUITY_SCORE: 39
ADLS_ACUITY_SCORE: 40
ADLS_ACUITY_SCORE: 37
ADLS_ACUITY_SCORE: 35
ADLS_ACUITY_SCORE: 35
ADLS_ACUITY_SCORE: 40
ADLS_ACUITY_SCORE: 37
ADLS_ACUITY_SCORE: 35
ADLS_ACUITY_SCORE: 40
ADLS_ACUITY_SCORE: 39
ADLS_ACUITY_SCORE: 40
ADLS_ACUITY_SCORE: 37

## 2024-04-27 NOTE — PLAN OF CARE
Heart Failure Care Map  GOALS TO BE MET BEFORE DISCHARGE:    1. Decrease congestion and/or edema with diuretic therapy to achieve near optimal volume status.     Dyspnea improved: No, further care required to meet this goal. Please explain Pt occasionally need 1L NC this shift. Dyspnea is improving. Pt tolerated taking a shower with out NC O2.  Pt was at 88% RA, when activity finished & pt rested with DB, Pt rebounded to mid 90's.   Edema improved: No, further care required to meet this goal. Please explain IV lasix gtt continues as ordered.          Last 24 hour I/O:   Intake/Output Summary (Last 24 hours) at 4/27/2024 1509  Last data filed at 4/27/2024 1454  Gross per 24 hour   Intake 1040 ml   Output 2950 ml   Net -1910 ml           Net I/O and Weights since admission:   03/28 2300 - 04/27 2259  In: 3476 [P.O.:3470; I.V.:6]  Out: 42868 [Urine:29406]  Net: -65920     Vitals:    04/23/24 1531 04/23/24 2305 04/24/24 0500 04/25/24 0637   Weight: (!) 152.9 kg (337 lb) 144.8 kg (319 lb 3.2 oz) 141.3 kg (311 lb 9.6 oz) 137.1 kg (302 lb 3.2 oz)    04/26/24 0646 04/27/24 0307   Weight: 132.2 kg (291 lb 6.4 oz) 127.3 kg (280 lb 10.3 oz)       2.  O2 sats > 90% on room air, or at prior home O2 therapy level.      Able to wean O2 this shift to keep sats above 90%?: No, further care required to meet this goal. Please explain Improvement was noted this shift, pt used O2 only occasionally this shift.    Does patient use Home O2? No          Current oxygenation status:   SpO2: 92 %     O2 Device: None (Room air), Oxygen Delivery: 1 LPM    3.  Tolerates ambulation and mobility near baseline.     Ambulation: No, further care required to meet this goal. Please explain Pt tolerated ambulation in room, used BRP, shower this shift.    Times patient ambulated with staff this shift: 0    Please review the Heart Failure Care Map for additional HF goal outcomes.    Paty Gillis RN  4/27/2024

## 2024-04-27 NOTE — PROGRESS NOTES
Heart Failure Care Map  GOALS TO BE MET BEFORE DISCHARGE:    1. Decrease congestion and/or edema with diuretic therapy to achieve near optimal volume status.     Dyspnea improved:  some dypsnea on exertion. Recovers at rest.    Edema improved: No, further care required to meet this goal. Please explain +3 bilateral lower leg edema. Diuresing with lasix drip.         Last 24 hour I/O:   Intake/Output Summary (Last 24 hours) at 4/27/2024 0542  Last data filed at 4/27/2024 0500  Gross per 24 hour   Intake 1240 ml   Output 4000 ml   Net -2760 ml           Net I/O and Weights since admission:   03/28 0700 - 04/27 0659  In: 3036 [P.O.:3030; I.V.:6]  Out: 03452 [Urine:70644]  Net: -66948     Vitals:    04/23/24 1531 04/23/24 2305 04/24/24 0500 04/25/24 0637   Weight: (!) 152.9 kg (337 lb) 144.8 kg (319 lb 3.2 oz) 141.3 kg (311 lb 9.6 oz) 137.1 kg (302 lb 3.2 oz)    04/26/24 0646 04/27/24 0307   Weight: 132.2 kg (291 lb 6.4 oz) 127.3 kg (280 lb 10.3 oz)       2.  O2 sats > 90% on room air, or at prior home O2 therapy level.      Able to wean O2 this shift to keep sats above 90%?: No, further care required to meet this goal. Please explain occasionally desats on room air. Uses 1L oxygen through nasal canula or oxymask.    Does patient use Home O2? No          Current oxygenation status:   SpO2: 94 %     O2 Device: Oxymask, Oxygen Delivery: 1 LPM    3.  Tolerates ambulation and mobility near baseline.     Ambulation: Yes, satisfactory for discharge.   Times patient ambulated with staff this shift: 1    Please review the Heart Failure Care Map for additional HF goal outcomes.    Patient diuresing with heparin gtt with lots of urine output through external catheter. Bed changed for urine incontinence x2 during the night. Requested to get up at 0500. Up in recliner chair with assist of 1 to 2 using a walker and gait belt. +3 bilateral lower legs edema. Afib on cardiac monitor with controlled HR.     Hattie Barbour,  CHRISTOPHER  4/27/2024

## 2024-04-27 NOTE — PROGRESS NOTES
Cardiology Progress Note    Assessment/Plan:  1.  Acute on chronic HFpEF, likely due to medication noncompliance.  Patient progressing well with IV furosemide infusion along with spironolactone.  Now has 17 L output since admission.  Renal function stable.  Would continue with current regimen.  2.  Chronic atrial fibrillation, rate controlled on metoprolol.  Patient remains on apixaban anticoagulation to minimize risks of thromboembolic events  3.  Severe aortic valve stenosis.  Plan is to pursue outpatient TAVR workup  4.  Severe TR, currently enrolled in CLASP II TR study, s/p PILAR ACE device implant x 1.  Echocardiogram this admission demonstrates mild to moderate residual tricuspid regurgitation with moderate to severe pulmonary hypertension    Primary cardiologist: Dr. Alphonse Rocha    Subjective:  Patient without any complaints.  Reports improvement in breathing.  Continues to have significant lower extremity edema.    Current Facility-Administered Medications   Medication Dose Route Frequency Provider Last Rate Last Admin    apixaban ANTICOAGULANT (ELIQUIS) tablet 5 mg  5 mg Oral BID Gondal, Saad J, MD   5 mg at 04/27/24 0750    ferrous sulfate (FEROSUL) tablet 325 mg  325 mg Oral Every Other Day Gondal, Saad J, MD   325 mg at 04/27/24 0752    magnesium sulfate 1 g in sodium chloride 0.9 % 100 mL intermittent infusion  1 g Intravenous Once Aleta Wang MD        melatonin tablet 5 mg  5 mg Oral At Bedtime Aleta Wang MD        metoprolol succinate ER (TOPROL XL) 24 hr tablet 50 mg  50 mg Oral Daily Gondal, Saad J, MD   50 mg at 04/27/24 0750    miconazole (MICATIN) 2 % powder   Topical BID Aleta Wang MD   Given at 04/27/24 0752    polyethylene glycol (MIRALAX) Packet 17 g  17 g Oral Daily Aleta Wang MD        potassium chloride laquita ER (KLOR-CON M10) CR tablet 10 mEq  10 mEq Oral Daily Gondal, Saad J, MD   10 mEq at 04/27/24 0750    sodium chloride (PF) 0.9% PF flush 3  mL  3 mL Intracatheter Q8H Gondal, Saad J, MD   3 mL at 04/27/24 0753    spironolactone (ALDACTONE) tablet 25 mg  25 mg Oral Daily Gondal, Saad J, MD   25 mg at 04/27/24 0750         Objective:   Vital signs in last 24 hours:  Temp:  [97.3  F (36.3  C)-98.3  F (36.8  C)] 97.9  F (36.6  C)  Pulse:  [80-94] 94  Resp:  [18-24] 20  BP: (104-150)/(53-90) 150/90  SpO2:  [88 %-94 %] 88 %  Weight:        Review of Systems:  Negative    Physical Exam:  General appearance: alert, cooperative, no distress  Head: Normocephalic, without obvious abnormality, atraumatic  Neck: no JVD   Lungs: clear to auscultation bilaterally   Heart: Irregularly irregular rhythm S1 normal, S2 reduced.  2/6 crescendo decrescendo systolic murmur heard at the left sternal border  Extremities: 3+ pitting edema of the lower extremities    Cardiographics (personally reviewed):   Telemetry demonstrates atrial fibrillation with controlled ventricular response    Imaging (personally reviewed):   No new cardiac imaging    Lab Results (personally reviewed):     Recent Labs   Lab Test 09/09/16  1412   CHOL 163   HDL 42*      TRIG 81     Recent Labs   Lab Test 09/09/16  1412        Recent Labs   Lab Test 04/27/24  0710 04/27/24  0553   NA  --  144   POTASSIUM  --  3.2*   CHLORIDE  --  94*   CO2  --  40*   GLC 97 97   BUN  --  20.0   CR  --  0.81   GFRESTIMATED  --  78   TURNER  --  9.8     Recent Labs   Lab Test 04/27/24  0553 04/26/24  0427 04/25/24  0420   CR 0.81 0.80 0.84     Recent Labs   Lab Test 04/23/24  1551 09/02/21  0457   A1C 5.8* 6.2*          Recent Labs   Lab Test 04/24/24  0445   WBC 5.6   HGB 13.0   HCT 42.2   *        Recent Labs   Lab Test 04/24/24  0445 04/23/24  1551 10/18/23  1318   HGB 13.0 13.2 13.0    Recent Labs   Lab Test 08/21/21  1944 08/21/21  1426 08/21/21  0820   TROPONINI 1.32* 1.87* 1.75*     Recent Labs   Lab Test 04/23/24  1551 10/18/23  1318 05/01/23  1329 09/24/22  0850 09/02/21  0457  08/21/21  0820   BNP  --   --   --   --  199* 281*   NTBNPI 2,391*  --   --   --   --   --    NTBNP  --  1,910* 945* 1,898*  --   --      Recent Labs   Lab Test 04/24/24  0445   TSH 3.67     Recent Labs   Lab Test 05/04/23  0832 05/01/23  1329 01/25/23  1440   INR 1.21* 1.07 1.13          Gissel Goodwin MD

## 2024-04-27 NOTE — PLAN OF CARE
"  Problem: Adult Inpatient Plan of Care  Goal: Plan of Care Review  Description: The Plan of Care Review/Shift note should be completed every shift.  The Outcome Evaluation is a brief statement about your assessment that the patient is improving, declining, or no change.  This information will be displayed automatically on your shift  note.  Outcome: Progressing  Flowsheets (Taken 4/27/2024 1458)  Plan of Care Reviewed With: patient  Goal: Patient-Specific Goal (Individualized)  Description: You can add care plan individualizations to a care plan. Examples of Individualization might be:  \"Parent requests to be called daily at 9am for status\", \"I have a hard time hearing out of my right ear\", or \"Do not touch me to wake me up as it startles  me\".  Outcome: Progressing  Goal: Absence of Hospital-Acquired Illness or Injury  Outcome: Progressing  Intervention: Identify and Manage Fall Risk  Recent Flowsheet Documentation  Taken 4/27/2024 0827 by Paty Gillis RN  Safety Promotion/Fall Prevention:   nonskid shoes/slippers when out of bed   patient and family education   room door open   safety round/check completed  Intervention: Prevent Skin Injury  Recent Flowsheet Documentation  Taken 4/27/2024 0827 by Paty Gillis RN  Body Position:   log-rolled   legs elevated  Taken 4/27/2024 0805 by Paty Gillis RN  Body Position:   position maintained   weight shifting  Goal: Optimal Comfort and Wellbeing  Outcome: Progressing  Goal: Readiness for Transition of Care  Outcome: Progressing     Problem: Skin Injury Risk Increased  Goal: Skin Health and Integrity  Outcome: Progressing  Intervention: Plan: Nurse Driven Intervention: Moisture Management  Recent Flowsheet Documentation  Taken 4/27/2024 0827 by Paty Gillis RN  Moisture Interventions: Encourage regular toileting  Intervention: Plan: Nurse Driven Intervention: Friction and Shear  Recent Flowsheet Documentation  Taken 4/27/2024 0827 by Paty Gillis RN  Friction/Shear " Interventions: HOB 30 degrees or less  Intervention: Optimize Skin Protection  Recent Flowsheet Documentation  Taken 4/27/2024 0827 by Paty Gillis RN  Activity Management: activity adjusted per tolerance  Head of Bed (HOB) Positioning: HOB at 20-30 degrees  Taken 4/27/2024 0805 by Paty Gillis RN  Activity Management:   activity adjusted per tolerance   activity encouraged   ambulated to bathroom   up in chair     Problem: Pain Acute  Goal: Optimal Pain Control and Function  Outcome: Progressing  Intervention: Prevent or Manage Pain  Recent Flowsheet Documentation  Taken 4/27/2024 0827 by Paty Gillis RN  Medication Review/Management: medications reviewed     Problem: Heart Failure  Goal: Optimal Coping  Outcome: Progressing  Goal: Optimal Cardiac Output  Outcome: Progressing  Goal: Stable Heart Rate and Rhythm  Outcome: Progressing  Goal: Optimal Functional Ability  Outcome: Progressing  Intervention: Optimize Functional Ability  Recent Flowsheet Documentation  Taken 4/27/2024 0827 by Paty Gillis RN  Activity Management: activity adjusted per tolerance  Taken 4/27/2024 0805 by Paty Gillis RN  Activity Management:   activity adjusted per tolerance   activity encouraged   ambulated to bathroom   up in chair  Goal: Fluid and Electrolyte Balance  Outcome: Progressing  Goal: Improved Oral Intake  Outcome: Progressing  Goal: Effective Oxygenation and Ventilation  Outcome: Progressing  Intervention: Promote Airway Secretion Clearance  Recent Flowsheet Documentation  Taken 4/27/2024 0827 by Paty Gillis RN  Cough And Deep Breathing: done with encouragement  Activity Management: activity adjusted per tolerance  Taken 4/27/2024 0805 by Paty Gillis RN  Activity Management:   activity adjusted per tolerance   activity encouraged   ambulated to bathroom   up in chair  Intervention: Optimize Oxygenation and Ventilation  Recent Flowsheet Documentation  Taken 4/27/2024 0827 by Paty Gillis RN  Head of Bed (HOB)  Positioning: HOB at 20-30 degrees  Goal: Effective Breathing Pattern During Sleep  Outcome: Progressing  Intervention: Monitor and Manage Obstructive Sleep Apnea  Recent Flowsheet Documentation  Taken 4/27/2024 0827 by Paty Gillis RN  Medication Review/Management: medications reviewed     Problem: Gas Exchange Impaired  Goal: Optimal Gas Exchange  Outcome: Progressing  Intervention: Optimize Oxygenation and Ventilation  Recent Flowsheet Documentation  Taken 4/27/2024 0827 by Paty Gillis RN  Head of Bed (HOB) Positioning: HOB at 20-30 degrees   Goal Outcome Evaluation:      Plan of Care Reviewed With: patient      Da TANG with rate control, pt has denies pain reports the usual leg tightness that she is experiences. Writer talked with MD about lymphedema wraps - not ordered at this time. Protocols as ordered, IV lasix gtt as ordered. Assist 1 with gait belt and walker.  Pt showered this AM and tolerated activity well. Pt has had good PO intake.

## 2024-04-27 NOTE — PROGRESS NOTES
[facility]    Medicine Progress Note - Hospitalist Service    Date of Admission:  4/23/2024    Assessment & Plan   Misty Quezada is a 68 year old female with PMH CHFpEF, tricuspid regurgitation, HTN, A-fib, lymphedema admitted on 4/23/2024 with complaints of shortness of breath and increased leg swelling due to noncompliance with low salt diet, fluid restriction, and Bumex.  Patient states she stopped taking it a month ago and has gained 70 pounds since. She went to cardiology clinic who recommend to come to ER. Admitted for acute on chronic CHF    Acute hypoxic respiratory failure  HFpEF acute on chronic  -- ProBNP:2,391  -- Lasix gtt per cardiology  -- c/w PTA losartan, spironolactone  -- Cardiology consult appreciated  -- TTE:LVEF: 55-60%, flattened septum is consistent with RV pressure/volume overload.  -- Cardiac monitoring  -- Strict I&O's, Daily weights  -- Fluid restriction  -- Oxygen supplement via NC. Wean as able.  -- S/p Albumin on 04/24 and 04/25  -- 04/27: diuresing well. Lost upwards of 30Ib of water weight    Hypokalemia  -- replace per protocol    Hypomagnesemia  -- replace per protocol  -- magsulfate 1 gm on 04/27    Elevated troponin  -- No chest pain, no e/o ischemia on EKG  -- likely 2/2 demand ischemia    A-fib  -- Rate controlled  -- PTA apixaban, metoprolol    Valvular heart disease  Moderate to severe AS  Severe TR-Janette-Ace repair device in place           Diet: Combination Diet 2 gm NA Diet; No Caffeine Diet (and additional linked orders)  Fluid restriction 1800 ML FLUID (and additional linked orders)  Fluid restriction 1800 ML FLUID    DVT Prophylaxis: Pneumatic Compression Devices  Hi Catheter: Not present  Lines: None     Cardiac Monitoring: ACTIVE order. Indication: Acute decompensated heart failure (48 hours)  Code Status: Full Code      Clinically Significant Risk Factors        # Hypokalemia: Lowest K = 3.2 mmol/L in last 2 days, will replace as needed  "    # Hypomagnesemia: Lowest Mg = 1.6 mg/dL in last 2 days, will replace as needed       # Hypertension: Noted on problem list  # Acute heart failure with preserved ejection fraction: heart failure noted on problem list, last echo with EF >50%, and receiving IV diuretics       # Severe Obesity: Estimated body mass index is 43.96 kg/m  as calculated from the following:    Height as of an earlier encounter on 4/23/24: 1.702 m (5' 7\").    Weight as of this encounter: 127.3 kg (280 lb 10.3 oz)., PRESENT ON ADMISSION     # Financial/Environmental Concerns: none         Disposition Plan     Medically Ready for Discharge: Anticipated in 2-4 Days             Aleta Wang MD  Hospitalist Service  [facility]  Securely message with [email or web address] (more info)  Text page via [employer] Paging/Directory   ______________________________________________________________________    Interval History   Patient is seen and examined at bedside.  Pt encouraged to move. Walked to bathroom earlier.  No sob or chest pain    Physical Exam   Vital Signs: Temp: 98  F (36.7  C) Temp src: Oral BP: (!) 133/101 Pulse: 94   Resp: 20 SpO2: 92 % O2 Device: None (Room air) Oxygen Delivery: 1 LPM  Weight: 280 lbs 10.33 oz    GEN: Alert and oriented. Not in acute distress.  HEENT: Atraumatic, mucous membrane- moist and pink.  Chest: Bilateral air entry.  CVS: S1S2 irregular.   Abdomen: Soft. Non-tender, non-distended. No organomegaly. No guarding or rigidity. Bowel sounds active.   Extremities: +edema.  CNS: No involuntary movements.  Skin: no cyanosis or clubbing.     Medical Decision Making       44 MINUTES SPENT BY ME on the date of service doing chart review, history, exam, documentation & further activities per the note.      Data     " Anesthesia Volume In Cc (Will Not Render If 0): 0.5

## 2024-04-27 NOTE — PLAN OF CARE
Problem: Heart Failure  Goal: Optimal Coping  Outcome: Progressing  Goal: Optimal Cardiac Output  Outcome: Progressing  Goal: Stable Heart Rate and Rhythm  Outcome: Progressing  Goal: Optimal Functional Ability  Outcome: Progressing  Goal: Fluid and Electrolyte Balance  Outcome: Progressing  Goal: Improved Oral Intake  Outcome: Progressing  Goal: Effective Oxygenation and Ventilation  Outcome: Progressing  Goal: Effective Breathing Pattern During Sleep  Outcome: Progressing     Problem: Pain Acute  Goal: Optimal Pain Control and Function  Outcome: Progressing  Intervention: Develop Pain Management Plan  Recent Flowsheet Documentation  Taken 4/26/2024 0506 by Ben Campos RN  Pain Management Interventions: medication (see MAR)   Goal Outcome Evaluation:       Pt is calm cooperative and following fluid restrictions.   Pt has lasix infusion running at 15 mg/hr.     PT reports chronic  lower extremity pain 7/10. Pt was given apap.  Pt reports improvement in symptoms with diuresing.

## 2024-04-27 NOTE — PLAN OF CARE
Problem: Heart Failure  Goal: Fluid and Electrolyte Balance  Outcome: Progressing     Problem: Heart Failure  Goal: Effective Oxygenation and Ventilation  Outcome: Progressing     Problem: Heart Failure  Goal: Effective Breathing Pattern During Sleep  Outcome: Progressing   Goal Outcome Evaluation:

## 2024-04-28 LAB
ANION GAP SERPL CALCULATED.3IONS-SCNC: 9 MMOL/L (ref 7–15)
ATRIAL RATE - MUSE: 83 BPM
BUN SERPL-MCNC: 22.1 MG/DL (ref 8–23)
CALCIUM SERPL-MCNC: 9.7 MG/DL (ref 8.8–10.2)
CHLORIDE SERPL-SCNC: 93 MMOL/L (ref 98–107)
CREAT SERPL-MCNC: 0.75 MG/DL (ref 0.51–0.95)
DEPRECATED HCO3 PLAS-SCNC: 41 MMOL/L (ref 22–29)
DIASTOLIC BLOOD PRESSURE - MUSE: NORMAL MMHG
EGFRCR SERPLBLD CKD-EPI 2021: 86 ML/MIN/1.73M2
GLUCOSE BLDC GLUCOMTR-MCNC: 96 MG/DL (ref 70–99)
GLUCOSE SERPL-MCNC: 104 MG/DL (ref 70–99)
INTERPRETATION ECG - MUSE: NORMAL
MAGNESIUM SERPL-MCNC: 1.7 MG/DL (ref 1.7–2.3)
P AXIS - MUSE: NORMAL DEGREES
POTASSIUM SERPL-SCNC: 3.5 MMOL/L (ref 3.4–5.3)
PR INTERVAL - MUSE: NORMAL MS
QRS DURATION - MUSE: 84 MS
QT - MUSE: 322 MS
QTC - MUSE: 421 MS
R AXIS - MUSE: 154 DEGREES
SODIUM SERPL-SCNC: 143 MMOL/L (ref 135–145)
SYSTOLIC BLOOD PRESSURE - MUSE: NORMAL MMHG
T AXIS - MUSE: -16 DEGREES
VENTRICULAR RATE- MUSE: 103 BPM

## 2024-04-28 PROCEDURE — 99233 SBSQ HOSP IP/OBS HIGH 50: CPT | Performed by: STUDENT IN AN ORGANIZED HEALTH CARE EDUCATION/TRAINING PROGRAM

## 2024-04-28 PROCEDURE — 120N000004 HC R&B MS OVERFLOW

## 2024-04-28 PROCEDURE — 99232 SBSQ HOSP IP/OBS MODERATE 35: CPT | Performed by: INTERNAL MEDICINE

## 2024-04-28 PROCEDURE — 36415 COLL VENOUS BLD VENIPUNCTURE: CPT | Performed by: STUDENT IN AN ORGANIZED HEALTH CARE EDUCATION/TRAINING PROGRAM

## 2024-04-28 PROCEDURE — 250N000013 HC RX MED GY IP 250 OP 250 PS 637: Performed by: STUDENT IN AN ORGANIZED HEALTH CARE EDUCATION/TRAINING PROGRAM

## 2024-04-28 PROCEDURE — 83735 ASSAY OF MAGNESIUM: CPT | Performed by: STUDENT IN AN ORGANIZED HEALTH CARE EDUCATION/TRAINING PROGRAM

## 2024-04-28 PROCEDURE — 80048 BASIC METABOLIC PNL TOTAL CA: CPT | Performed by: STUDENT IN AN ORGANIZED HEALTH CARE EDUCATION/TRAINING PROGRAM

## 2024-04-28 RX ORDER — POTASSIUM CHLORIDE 1500 MG/1
20 TABLET, EXTENDED RELEASE ORAL ONCE
Status: COMPLETED | OUTPATIENT
Start: 2024-04-28 | End: 2024-04-28

## 2024-04-28 RX ADMIN — SPIRONOLACTONE 25 MG: 25 TABLET, FILM COATED ORAL at 08:39

## 2024-04-28 RX ADMIN — ACETAMINOPHEN 1000 MG: 500 TABLET ORAL at 14:21

## 2024-04-28 RX ADMIN — MICONAZOLE NITRATE: 20 POWDER TOPICAL at 08:42

## 2024-04-28 RX ADMIN — MICONAZOLE NITRATE: 20 POWDER TOPICAL at 20:23

## 2024-04-28 RX ADMIN — APIXABAN 5 MG: 5 TABLET, FILM COATED ORAL at 08:39

## 2024-04-28 RX ADMIN — APIXABAN 5 MG: 5 TABLET, FILM COATED ORAL at 20:23

## 2024-04-28 RX ADMIN — POTASSIUM CHLORIDE 10 MEQ: 750 TABLET, EXTENDED RELEASE ORAL at 08:39

## 2024-04-28 RX ADMIN — POTASSIUM CHLORIDE 20 MEQ: 1500 TABLET, EXTENDED RELEASE ORAL at 07:25

## 2024-04-28 RX ADMIN — Medication 5 MG: at 20:23

## 2024-04-28 RX ADMIN — METOPROLOL SUCCINATE 50 MG: 50 TABLET, EXTENDED RELEASE ORAL at 08:39

## 2024-04-28 ASSESSMENT — ACTIVITIES OF DAILY LIVING (ADL)
ADLS_ACUITY_SCORE: 32
ADLS_ACUITY_SCORE: 37
ADLS_ACUITY_SCORE: 31
ADLS_ACUITY_SCORE: 37
ADLS_ACUITY_SCORE: 31
ADLS_ACUITY_SCORE: 31
ADLS_ACUITY_SCORE: 37
ADLS_ACUITY_SCORE: 31
ADLS_ACUITY_SCORE: 37
ADLS_ACUITY_SCORE: 31
ADLS_ACUITY_SCORE: 37
ADLS_ACUITY_SCORE: 31
ADLS_ACUITY_SCORE: 37
ADLS_ACUITY_SCORE: 32
ADLS_ACUITY_SCORE: 37
ADLS_ACUITY_SCORE: 37
ADLS_ACUITY_SCORE: 31
ADLS_ACUITY_SCORE: 31
ADLS_ACUITY_SCORE: 35

## 2024-04-28 NOTE — PLAN OF CARE
Problem: Adult Inpatient Plan of Care  Goal: Plan of Care Review  Description: The Plan of Care Review/Shift note should be completed every shift.  The Outcome Evaluation is a brief statement about your assessment that the patient is improving, declining, or no change.  This information will be displayed automatically on your shift  note.  Outcome: Progressing   Goal Outcome Evaluation:  Patient is alert and oriented x 4. Patient only complains of some dull pressure on her legs from swelling, that is relieved with repositioning.  Patient VSS, Afib, on 3 liter nc.  Patient is assist of 1 person to the bathroom with 2 canes.  Patient has a Hi catheter, putting out clear yellow urine.  Patient's Lasix gtt was decreased by Dr. Goodwin this morning to 5mg/hr.  Dr. Wang discontinued blood sugar checks.  Patient has bee cooperative with poc.

## 2024-04-28 NOTE — PROGRESS NOTES
Heart Failure Care Map  GOALS TO BE MET BEFORE DISCHARGE:    1. Decrease congestion and/or edema with diuretic therapy to achieve near optimal volume status.     Dyspnea improved: No, further care required to meet this goal. Please explain SOB with activity.   Edema improved: No, further care required to meet this goal. Please explain +3 edema lower legs,ankle and feet.        Last 24 hour I/O:   Intake/Output Summary (Last 24 hours) at 4/28/2024 0327  Last data filed at 4/28/2024 0153  Gross per 24 hour   Intake 860 ml   Output 2700 ml   Net -1840 ml           Net I/O and Weights since admission:   03/29 0700 - 04/28 0659  In: 3776 [P.O.:3770; I.V.:6]  Out: 82398 [Urine:20943]  Net: -60670     Vitals:    04/23/24 1531 04/23/24 2305 04/24/24 0500 04/25/24 0637   Weight: (!) 152.9 kg (337 lb) 144.8 kg (319 lb 3.2 oz) 141.3 kg (311 lb 9.6 oz) 137.1 kg (302 lb 3.2 oz)    04/26/24 0646 04/27/24 0307   Weight: 132.2 kg (291 lb 6.4 oz) 127.3 kg (280 lb 10.3 oz)       2.  O2 sats > 90% on room air, or at prior home O2 therapy level.      Able to wean O2 this shift to keep sats above 90%?: No, further care required to meet this goal. Please explain desat's during sleep.   Does patient use Home O2? No          Current oxygenation status:   SpO2: 97 %     O2 Device: Nasal cannula, Oxygen Delivery: 3 LPM    3.  Tolerates ambulation and mobility near baseline.     Ambulation: No, further care required to meet this goal. Please explain bilateral lower extremities painful to walk.   Times patient ambulated with staff this shift: 0    Please review the Heart Failure Care Map for additional HF goal outcomes.  Patient is A&Ox4, denies pain at rest, only when neeraj pressure on lower extremities. Patient has excoriated rash in abdominal fold's, under breast and bhavana area from urine, antifungal powder applied and  talavera in place with good output from diuresing, on lasix gtt at 1.5 ml's/hr.  Mandi Rosenthal, CHRISTOPHER  4/28/2024

## 2024-04-28 NOTE — PROGRESS NOTES
Care Management Follow Up     Length of Stay (days): 5     Expected Discharge Date: 04/29/2024     Concerns to be Addressed:     Discharge planning - Pt on lasix   Patient plan of care discussed at interdisciplinary rounds: Yes     Anticipated Discharge Disposition:  Home      Anticipated Discharge Services:  none anticipate at this time   Anticipated Discharge DME:  Pt has home oxygen 2L      Patient/family educated on Medicare website which has current facility and service quality ratings:  NA at this time   Education Provided on the Discharge Plan:  per treatment team   Patient/Family in Agreement with the Plan:  NA     Referrals Placed by CM/SW:  None required at this time   Private pay costs discussed: Not applicable     Additional Information:  SW reviewed chart.  Anticipate patient will return home to prior living environment when medically ready. No CM needs identified at this time, however CM will continue to follow care progression to assist as needed with safe discharge planning.     Social History:  Pt lives in a house with her two adult sons, Samuel and Slade. Her support system also includes her neighbor. Pt is independent with most I/ADL's, uses a cane and grab bars at home.  Son to transport at discharge.     LISA Earl on 04/28/24

## 2024-04-28 NOTE — PROGRESS NOTES
Cardiology Progress Note    Assessment/Plan:  1.  Acute on chronic HFpEF, likely due to medication noncompliance.  Patient progressing well with IV furosemide infusion although CO2 level rising suggesting intravascular volume depletion.  Will decrease dose of IV Lasix to 5 mg/hour.  Initiated on spironolactone yesterday to help with potassium and aid in diuresis.  Continue to monitor.  Likely could transition to intermittent Lasix.  2.  Chronic atrial fibrillation, rate controlled on metoprolol.  Patient remains on apixaban anticoagulation to minimize thromboembolic events.  3.  Severe aortic valve stenosis, stable.  Plan is to pursue outpatient TAVR workup.  4.  Severe tricuspid regurgitation, currently enrolled in CLASP II TR study,s/p PILAR ACE device implant x 1.  Echocardiogram this admission demonstrates mild to moderate residual tricuspid regurgitation with moderate to severe pulmonary hypertension    Primary cardiologist: Dr. Alphonse Rocha    Subjective:  Patient without any complaints.    Current Facility-Administered Medications   Medication Dose Route Frequency Provider Last Rate Last Admin    apixaban ANTICOAGULANT (ELIQUIS) tablet 5 mg  5 mg Oral BID Gondal, Saad J, MD   5 mg at 04/27/24 2009    ferrous sulfate (FEROSUL) tablet 325 mg  325 mg Oral Every Other Day Gondal, Saad J, MD   325 mg at 04/27/24 0752    melatonin tablet 5 mg  5 mg Oral At Bedtime Aleta Wang MD   5 mg at 04/27/24 2009    metoprolol succinate ER (TOPROL XL) 24 hr tablet 50 mg  50 mg Oral Daily Gondal, Saad J, MD   50 mg at 04/27/24 0750    miconazole (MICATIN) 2 % powder   Topical BID Aleta Wang MD   Given at 04/27/24 2023    polyethylene glycol (MIRALAX) Packet 17 g  17 g Oral Daily Aleta Wang MD   17 g at 04/27/24 1201    potassium chloride lauqita ER (KLOR-CON M10) CR tablet 10 mEq  10 mEq Oral Daily Gondal, Saad J, MD   10 mEq at 04/27/24 0750    sodium chloride (PF) 0.9% PF flush 3 mL  3 mL  "Subjective:      Morenita Enriquez is a 7 m.o. female who presents with Other (tugging on right ear )            HPI  Morenita presents with mom who is the historian  Tugging on R ear,  Unsure if in pain but she does it when she gets tired as well.   +congestion, cough and runny nose x 1 week, seems a bit better now.   Not taking any OTC medications. Denies vomiting, diarrhea, rashes, ear drainage, wheezing or shortness of breath.  Normal appetite, sleeping okay, +wet diapers  +brother with cold like symptoms.   ROS  See above. All other systems reviewed and negative.   Objective:     Pulse 120   Temp 36.9 °C (98.5 °F)   Resp 32   Ht 0.705 m (2' 3.75\")   Wt 8.51 kg (18 lb 12.2 oz)   BMI 17.13 kg/m²      Physical Exam   Constitutional: She appears well-developed and well-nourished. She is active.   HENT:   Head: Anterior fontanelle is flat.   Right Ear: Tympanic membrane normal.   Left Ear: Tympanic membrane normal.   Nose: Rhinorrhea present.   Mouth/Throat: Mucous membranes are moist. Oropharynx is clear.   Eyes: Pupils are equal, round, and reactive to light. Conjunctivae and EOM are normal.   Neck: Normal range of motion. Neck supple.   Cardiovascular: Normal rate, regular rhythm, S1 normal and S2 normal.    Pulmonary/Chest: Effort normal and breath sounds normal. She has no wheezes. She has no rales.   Abdominal: Soft. Bowel sounds are normal.   Musculoskeletal: Normal range of motion.   Neurological: She is alert. She has normal strength.   Skin: Skin is warm and dry. Capillary refill takes less than 2 seconds. Turgor is normal. She is not diaphoretic.      Assessment/Plan:     1. Acute URI  1. Pathogenesis of viral infections discussed including typical length and natural progression.  2. Symptomatic care discussed at length - nasal saline, encourage fluids, honey/[place] for cough, humidifier, may prefer to sleep at incline.  3. Follow up if symptoms persist/worsen, new symptoms develop (fever, ear " Intracatheter Q8H Gondal, Saad J, MD   3 mL at 04/28/24 0008    spironolactone (ALDACTONE) tablet 25 mg  25 mg Oral Daily Gondal, Saad J, MD   25 mg at 04/27/24 0750         Objective:   Vital signs in last 24 hours:  Temp:  [97.3  F (36.3  C)-98.2  F (36.8  C)] 97.9  F (36.6  C)  Pulse:  [] 101  Resp:  [18-20] 18  BP: ()/() 110/59  SpO2:  [78 %-100 %] 93 %  Weight:        Review of Systems:  Negative    Physical Exam:  General appearance: alert, cooperative, no distress   Head: Normocephalic, without obvious abnormality, atraumatic  Neck: no JVD   Lungs: clear to auscultation bilaterally   Heart: Irregularly irregular rhythm.  S1 normal, S2 reduced.  2/6 crescendo decrescendo systolic murmur heard at the left upper sternal border.  Extremities: 1-2+ lower extremity edema    Cardiographics (personally reviewed):   Telemetry demonstrates atrial fibrillation with borderline to mildly rapid ventricular response    Imaging (personally reviewed):   No new cardiac imaging    Lab Results (personally reviewed):     Recent Labs   Lab Test 09/09/16  1412   CHOL 163   HDL 42*      TRIG 81     Recent Labs   Lab Test 09/09/16  1412        Recent Labs   Lab Test 04/28/24  0757 04/28/24  0600   NA  --  143   POTASSIUM  --  3.5   CHLORIDE  --  93*   CO2  --  41*   GLC 96 104*   BUN  --  22.1   CR  --  0.75   GFRESTIMATED  --  86   TURNER  --  9.7     Recent Labs   Lab Test 04/28/24  0600 04/27/24  0553 04/26/24  0427   CR 0.75 0.81 0.80     Recent Labs   Lab Test 04/23/24  1551 09/02/21  0457   A1C 5.8* 6.2*          Recent Labs   Lab Test 04/24/24  0445   WBC 5.6   HGB 13.0   HCT 42.2   *        Recent Labs   Lab Test 04/24/24  0445 04/23/24  1551 10/18/23  1318   HGB 13.0 13.2 13.0    Recent Labs   Lab Test 08/21/21  1944 08/21/21  1426 08/21/21  0820   TROPONINI 1.32* 1.87* 1.75*     Recent Labs   Lab Test 04/23/24  1551 10/18/23  1318 05/01/23  1329 09/24/22  0850 09/02/21  0457  pain, etc) or any other concerns arise.    2. Need for vaccination  Vaccine Information statements given for each vaccine if administered. Discussed benefits and side effects of each vaccine given with patient /family, answered all patient /family questions   I have placed the below orders and discussed them with an approved delegating provider. The MA is performing the below orders under the direction of Dr Hidalgo.    - DTAP IPV/HIB COMBINED VACCINE IM (6W-4Y)  - Hep B Ped/Adol <18 Y/O  - Prevnar 13 PCV-13  - Rotavirus Pentavalent 3 Dose Oral    3. Otalgia of right ear  We have discussed normal findings today and likely teething. She is also playing with her hair, so we discussed trimming hair close to her ears. Mother is to follow up on her well child check for 6 months that was missed. There is an appt set up for 9/12     08/21/21  0820   BNP  --   --   --   --  199* 281*   NTBNPI 2,391*  --   --   --   --   --    NTBNP  --  1,910* 945* 1,898*  --   --      Recent Labs   Lab Test 04/24/24  0445   TSH 3.67     Recent Labs   Lab Test 05/04/23  0832 05/01/23  1329 01/25/23  1440   INR 1.21* 1.07 1.13          Gissel Goodwin MD

## 2024-04-28 NOTE — PROGRESS NOTES
[facility]    Medicine Progress Note - Hospitalist Service    Date of Admission:  4/23/2024    Assessment & Plan   Misty Quezada is a 68 year old female with PMH CHFpEF, tricuspid regurgitation, HTN, A-fib, lymphedema admitted on 4/23/2024 with complaints of shortness of breath and increased leg swelling due to noncompliance with low salt diet, fluid restriction, and Bumex.  Patient states she stopped taking it a month ago and has gained 70 pounds since. She went to cardiology clinic who recommend to come to ER. Admitted for acute on chronic CHF    Acute hypoxic respiratory failure  HFpEF acute on chronic  -- ProBNP:2,391  -- c/w PTA spironolactone  -- Cardiology consult appreciated  -- TTE:LVEF: 55-60%, flattened septum is consistent with RV pressure/volume overload.  -- Cardiac monitoring  -- Strict I&O's, Daily weights  -- Fluid restriction  -- Oxygen supplement via NC. Wean as able.  -- S/p Albumin on 04/24 and 04/25  -- 04/28: diuresing well. Lost upwards of 37Ib of water weight. Laix gtt now at 5mg/hr    Hypokalemia  -- replace per protocol    Hypomagnesemia  -- replace per protocol  -- magsulfate 1 gm on 04/27    Elevated troponin  -- No chest pain, no e/o ischemia on EKG  -- likely 2/2 demand ischemia    A-fib  -- Rate controlled  -- PTA apixaban, metoprolol    Valvular heart disease  Moderate to severe AS  Severe TR-Janette-Ace repair device in place           Diet: Combination Diet 2 gm NA Diet; No Caffeine Diet (and additional linked orders)  Fluid restriction 1800 ML FLUID (and additional linked orders)  Fluid restriction 1800 ML FLUID    DVT Prophylaxis: Pneumatic Compression Devices  Hi Catheter: PRESENT, indication: Non-ICU: q2 hour intake/output with documentation  Lines: None     Cardiac Monitoring: ACTIVE order. Indication: Acute decompensated heart failure (48 hours)  Code Status: Full Code      Clinically Significant Risk Factors        # Hypokalemia: Lowest K = 3.2  "mmol/L in last 2 days, will replace as needed     # Hypomagnesemia: Lowest Mg = 1.6 mg/dL in last 2 days, will replace as needed       # Hypertension: Noted on problem list  # Acute heart failure with preserved ejection fraction: heart failure noted on problem list, last echo with EF >50%, and receiving IV diuretics       # Severe Obesity: Estimated body mass index is 42.96 kg/m  as calculated from the following:    Height as of an earlier encounter on 4/23/24: 1.702 m (5' 7\").    Weight as of this encounter: 124.4 kg (274 lb 4.8 oz).      # Financial/Environmental Concerns: none         Disposition Plan     Medically Ready for Discharge: Anticipated in 2-4 Days             Aleta Wang MD  Hospitalist Service  [facility]  Securely message with markedup (more info)  Text page via [employer] Paging/Directory   ______________________________________________________________________    Interval History   Patient is seen and examined at bedside.  No sob or chest pain    Physical Exam   Vital Signs: Temp: 98  F (36.7  C) Temp src: Oral BP: 100/61 Pulse: 98   Resp: 20 SpO2: 91 % O2 Device: Nasal cannula Oxygen Delivery: 3 LPM  Weight: 274 lbs 4.8 oz    GEN: Alert and oriented. Not in acute distress.  HEENT: Atraumatic, mucous membrane- moist and pink.  Chest: Bilateral air entry.  CVS: S1S2 irregular.   Abdomen: Soft. Non-tender, non-distended. No organomegaly. No guarding or rigidity. Bowel sounds active.   Extremities: +edema.  CNS: No involuntary movements.  Skin: no cyanosis or clubbing.     Medical Decision Making       44 MINUTES SPENT BY ME on the date of service doing chart review, history, exam, documentation & further activities per the note.      Data     "

## 2024-04-28 NOTE — PLAN OF CARE
Problem: Skin Injury Risk Increased  Goal: Skin Health and Integrity  4/27/2024 2055 by Ben Campos RN  Outcome: Progressing  4/27/2024 2055 by Ben Campos RN  Outcome: Not Progressing  Intervention: Plan: Nurse Driven Intervention: Moisture Management  Recent Flowsheet Documentation  Taken 4/27/2024 1630 by Ben Campos RN  Moisture Interventions: Encourage regular toileting  Intervention: Plan: Nurse Driven Intervention: Friction and Shear  Recent Flowsheet Documentation  Taken 4/27/2024 1630 by Ben Campos RN  Friction/Shear Interventions: HOB 30 degrees or less     Problem: Heart Failure  Goal: Optimal Coping  4/27/2024 2055 by Ben Campos RN  Outcome: Progressing  4/27/2024 2055 by Ben Campos RN  Outcome: Not Progressing  Goal: Optimal Cardiac Output  4/27/2024 2055 by Ben Campos RN  Outcome: Progressing  4/27/2024 2055 by Ben Campos RN  Outcome: Not Progressing  Goal: Stable Heart Rate and Rhythm  4/27/2024 2055 by Ben Campos RN  Outcome: Progressing  4/27/2024 2055 by Ben Campos RN  Outcome: Not Progressing  Goal: Optimal Functional Ability  4/27/2024 2055 by Ben Campos RN  Outcome: Progressing  4/27/2024 2055 by Ben Campos RN  Outcome: Not Progressing  Goal: Fluid and Electrolyte Balance  4/27/2024 2055 by Ben Campos RN  Outcome: Progressing  4/27/2024 2055 by Ben Campos RN  Outcome: Not Progressing  Goal: Improved Oral Intake  4/27/2024 2055 by Ben Campos RN  Outcome: Progressing  4/27/2024 2055 by Ben Campos RN  Outcome: Not Progressing  Goal: Effective Oxygenation and Ventilation  4/27/2024 2055 by Ben Campos RN  Outcome: Progressing  4/27/2024 2055 by Ben Campos RN  Outcome: Not Progressing  Goal: Effective Breathing Pattern During Sleep  4/27/2024 2055 by Ben Campos RN  Outcome: Progressing  4/27/2024 2055 by Andres, Ben J, RN  Outcome: Not Progressing   Goal Outcome Evaluation:         Pt reports discomfort from  external catheter.  Writer assessed area which has increased redness and sensitive skin from urine.   Writer paged MD and obtained order for talavera.  Pt has lasix infusion running and is urinating frequently.  Pt is heavy assist of 1 with walker and O2 and IV poles.     Pt reports ongoing chronic pain in BLE  apap was given.

## 2024-04-29 ENCOUNTER — APPOINTMENT (OUTPATIENT)
Dept: OCCUPATIONAL THERAPY | Facility: HOSPITAL | Age: 69
DRG: 291 | End: 2024-04-29
Payer: MEDICARE

## 2024-04-29 LAB
ANION GAP SERPL CALCULATED.3IONS-SCNC: 9 MMOL/L (ref 7–15)
BUN SERPL-MCNC: 25.6 MG/DL (ref 8–23)
CALCIUM SERPL-MCNC: 9.5 MG/DL (ref 8.8–10.2)
CHLORIDE SERPL-SCNC: 94 MMOL/L (ref 98–107)
CREAT SERPL-MCNC: 0.85 MG/DL (ref 0.51–0.95)
DEPRECATED HCO3 PLAS-SCNC: 39 MMOL/L (ref 22–29)
EGFRCR SERPLBLD CKD-EPI 2021: 74 ML/MIN/1.73M2
GLUCOSE SERPL-MCNC: 106 MG/DL (ref 70–99)
HOLD SPECIMEN: NORMAL
MAGNESIUM SERPL-MCNC: 1.9 MG/DL (ref 1.7–2.3)
POTASSIUM SERPL-SCNC: 3.8 MMOL/L (ref 3.4–5.3)
SODIUM SERPL-SCNC: 142 MMOL/L (ref 135–145)

## 2024-04-29 PROCEDURE — 120N000004 HC R&B MS OVERFLOW

## 2024-04-29 PROCEDURE — 99233 SBSQ HOSP IP/OBS HIGH 50: CPT | Performed by: INTERNAL MEDICINE

## 2024-04-29 PROCEDURE — 97110 THERAPEUTIC EXERCISES: CPT | Mod: GO

## 2024-04-29 PROCEDURE — 36415 COLL VENOUS BLD VENIPUNCTURE: CPT | Performed by: STUDENT IN AN ORGANIZED HEALTH CARE EDUCATION/TRAINING PROGRAM

## 2024-04-29 PROCEDURE — 250N000013 HC RX MED GY IP 250 OP 250 PS 637: Performed by: INTERNAL MEDICINE

## 2024-04-29 PROCEDURE — P9047 ALBUMIN (HUMAN), 25%, 50ML: HCPCS | Performed by: INTERNAL MEDICINE

## 2024-04-29 PROCEDURE — 80048 BASIC METABOLIC PNL TOTAL CA: CPT | Performed by: STUDENT IN AN ORGANIZED HEALTH CARE EDUCATION/TRAINING PROGRAM

## 2024-04-29 PROCEDURE — 250N000013 HC RX MED GY IP 250 OP 250 PS 637: Performed by: STUDENT IN AN ORGANIZED HEALTH CARE EDUCATION/TRAINING PROGRAM

## 2024-04-29 PROCEDURE — 83735 ASSAY OF MAGNESIUM: CPT | Performed by: STUDENT IN AN ORGANIZED HEALTH CARE EDUCATION/TRAINING PROGRAM

## 2024-04-29 PROCEDURE — 250N000011 HC RX IP 250 OP 636: Performed by: INTERNAL MEDICINE

## 2024-04-29 PROCEDURE — 97535 SELF CARE MNGMENT TRAINING: CPT | Mod: GO

## 2024-04-29 RX ORDER — ALBUMIN (HUMAN) 12.5 G/50ML
50 SOLUTION INTRAVENOUS ONCE
Status: COMPLETED | OUTPATIENT
Start: 2024-04-29 | End: 2024-04-29

## 2024-04-29 RX ORDER — POTASSIUM CHLORIDE 1500 MG/1
20 TABLET, EXTENDED RELEASE ORAL ONCE
Status: COMPLETED | OUTPATIENT
Start: 2024-04-29 | End: 2024-04-29

## 2024-04-29 RX ORDER — BUMETANIDE 2 MG/1
2 TABLET ORAL
Status: DISCONTINUED | OUTPATIENT
Start: 2024-04-29 | End: 2024-05-02

## 2024-04-29 RX ADMIN — FERROUS SULFATE TAB 325 MG (65 MG ELEMENTAL FE) 325 MG: 325 (65 FE) TAB at 08:29

## 2024-04-29 RX ADMIN — ALBUMIN HUMAN 50 G: 0.25 SOLUTION INTRAVENOUS at 08:56

## 2024-04-29 RX ADMIN — MICONAZOLE NITRATE: 20 POWDER TOPICAL at 21:39

## 2024-04-29 RX ADMIN — POTASSIUM CHLORIDE 10 MEQ: 750 TABLET, EXTENDED RELEASE ORAL at 08:29

## 2024-04-29 RX ADMIN — METOPROLOL SUCCINATE 50 MG: 50 TABLET, EXTENDED RELEASE ORAL at 10:01

## 2024-04-29 RX ADMIN — SPIRONOLACTONE 25 MG: 25 TABLET, FILM COATED ORAL at 10:01

## 2024-04-29 RX ADMIN — POTASSIUM CHLORIDE 20 MEQ: 1500 TABLET, EXTENDED RELEASE ORAL at 05:57

## 2024-04-29 RX ADMIN — BUMETANIDE 2 MG: 2 TABLET ORAL at 08:57

## 2024-04-29 RX ADMIN — APIXABAN 5 MG: 5 TABLET, FILM COATED ORAL at 21:34

## 2024-04-29 RX ADMIN — APIXABAN 5 MG: 5 TABLET, FILM COATED ORAL at 08:29

## 2024-04-29 RX ADMIN — Medication 5 MG: at 21:34

## 2024-04-29 RX ADMIN — MICONAZOLE NITRATE: 20 POWDER TOPICAL at 08:30

## 2024-04-29 RX ADMIN — BUMETANIDE 2 MG: 2 TABLET ORAL at 18:31

## 2024-04-29 ASSESSMENT — ACTIVITIES OF DAILY LIVING (ADL)
ADLS_ACUITY_SCORE: 31

## 2024-04-29 NOTE — PLAN OF CARE
Problem: Adult Inpatient Plan of Care  Goal: Plan of Care Review  Description: The Plan of Care Review/Shift note should be completed every shift.  The Outcome Evaluation is a brief statement about your assessment that the patient is improving, declining, or no change.  This information will be displayed automatically on your shift  note.  Outcome: Progressing     Problem: Skin Injury Risk Increased  Goal: Skin Health and Integrity  Outcome: Progressing  Intervention: Plan: Nurse Driven Intervention: Moisture Management  Recent Flowsheet Documentation  Taken 4/29/2024 1309 by Jody Frazier RN  Moisture Interventions:   Encourage regular toileting   Incontinence pad  Taken 4/29/2024 0900 by Jody Frazier RN  Moisture Interventions:   Encourage regular toileting   Incontinence pad  Intervention: Plan: Nurse Driven Intervention: Friction and Shear  Recent Flowsheet Documentation  Taken 4/29/2024 1309 by Jody Frazier RN  Friction/Shear Interventions: HOB 30 degrees or less  Taken 4/29/2024 0900 by Jody Frazier RN  Friction/Shear Interventions: HOB 30 degrees or less  Intervention: Optimize Skin Protection  Recent Flowsheet Documentation  Taken 4/29/2024 1309 by Jody Frazier RN  Pressure Reduction Techniques: weight shift assistance provided  Pressure Reduction Devices: alternating pressure pump (LYNNE)  Activity Management: activity adjusted per tolerance  Taken 4/29/2024 0900 by Jody Frazier RN  Pressure Reduction Techniques: weight shift assistance provided  Pressure Reduction Devices: alternating pressure pump (LYNNE)  Activity Management: activity adjusted per tolerance     Problem: Pain Acute  Goal: Optimal Pain Control and Function  Outcome: Progressing  Intervention: Prevent or Manage Pain  Recent Flowsheet Documentation  Taken 4/29/2024 1309 by Jody Frazier RN  Sensory Stimulation Regulation: care clustered  Medication Review/Management: medications  reviewed  Taken 4/29/2024 0900 by Jody Frazier RN  Sensory Stimulation Regulation: care clustered  Medication Review/Management: medications reviewed  Intervention: Optimize Psychosocial Wellbeing  Recent Flowsheet Documentation  Taken 4/29/2024 1309 by Jody Frazier RN  Supportive Measures: active listening utilized  Taken 4/29/2024 0900 by Jody Frazier RN  Supportive Measures: active listening utilized     Problem: Heart Failure  Goal: Optimal Coping  Outcome: Progressing  Intervention: Support Psychosocial Response  Recent Flowsheet Documentation  Taken 4/29/2024 1309 by Jody Frazier RN  Supportive Measures: active listening utilized  Taken 4/29/2024 0900 by Jody Frazier RN  Supportive Measures: active listening utilized     Problem: Gas Exchange Impaired  Goal: Optimal Gas Exchange  Outcome: Progressing     Problem: Adult Inpatient Plan of Care  Goal: Absence of Hospital-Acquired Illness or Injury  Intervention: Identify and Manage Fall Risk  Recent Flowsheet Documentation  Taken 4/29/2024 1309 by Jody Frazier RN  Safety Promotion/Fall Prevention: activity supervised  Taken 4/29/2024 0900 by Jody Frazier RN  Safety Promotion/Fall Prevention: activity supervised  Intervention: Prevent Skin Injury  Recent Flowsheet Documentation  Taken 4/29/2024 1309 by Jody Frazier RN  Device Skin Pressure Protection: adhesive use limited  Taken 4/29/2024 0900 by Jody Frazier RN  Device Skin Pressure Protection: adhesive use limited  Intervention: Prevent and Manage VTE (Venous Thromboembolism) Risk  Recent Flowsheet Documentation  Taken 4/29/2024 1309 by Jody Frazier RN  VTE Prevention/Management: SCDs (sequential compression devices) off  Taken 4/29/2024 0900 by Jody Frazier RN  VTE Prevention/Management: SCDs (sequential compression devices) off     Problem: Heart Failure  Goal: Optimal Cardiac Output  Intervention: Optimize Cardiac  Output  Recent Flowsheet Documentation  Taken 4/29/2024 1309 by Jody Frazier RN  Environmental Support: calm environment promoted  Taken 4/29/2024 0900 by Jody Frazier RN  Environmental Support: calm environment promoted  Goal: Optimal Functional Ability  Intervention: Optimize Functional Ability  Recent Flowsheet Documentation  Taken 4/29/2024 1309 by Jody Frazier RN  Activity Management: activity adjusted per tolerance  Taken 4/29/2024 0900 by Jody Frazier RN  Activity Management: activity adjusted per tolerance  Goal: Effective Oxygenation and Ventilation  Intervention: Promote Airway Secretion Clearance  Recent Flowsheet Documentation  Taken 4/29/2024 1309 by Jody Frazier RN  Cough And Deep Breathing: done with encouragement  Activity Management: activity adjusted per tolerance  Taken 4/29/2024 0900 by Jody Frazier RN  Cough And Deep Breathing: done with encouragement  Activity Management: activity adjusted per tolerance  Goal: Effective Breathing Pattern During Sleep  Intervention: Monitor and Manage Obstructive Sleep Apnea  Recent Flowsheet Documentation  Taken 4/29/2024 1309 by Jody Frazier RN  Medication Review/Management: medications reviewed  Taken 4/29/2024 0900 by Jody Frazier RN  Medication Review/Management: medications reviewed   Goal Outcome Evaluation:       Patient is alert and able to let her needs be known. Denies any pain when asked. Lasix drip was stopped and was started on PO bumex. IV albumin given for soft BP this morning. VSS and AFIB on the monitor.Wean oxygen as tolerated, on 2L oximask and sating above 90%. Hi in place with good urine output.Continue plan of care.

## 2024-04-29 NOTE — PROGRESS NOTES
[facility]    Medicine Progress Note - Hospitalist Service    Date of Admission:  4/23/2024    Assessment & Plan   Misty Quezada is a 68 year old female with PMH CHFpEF, tricuspid regurgitation, HTN, A-fib, lymphedema admitted on 4/23/2024 with complaints of shortness of breath and increased leg swelling due to noncompliance with low salt diet, fluid restriction, and Bumex.  Patient states she stopped taking it a month ago and has gained 70 pounds since. She went to cardiology clinic who recommend to come to ER. Admitted for acute on chronic CHF     Acute hypoxic respiratory failure  HFpEF acute on chronic  -- ProBNP:2,391  -- c/w PTA spironolactone  -- Cardiology consult appreciated  -- TTE:LVEF: 55-60%, flattened septum is consistent with RV pressure/volume overload.  -- Strict I&O's, Daily weights  -- Cont fluid restriction  -- Oxygen supplement via NC. Wean as able.  -- S/p Albumin on 04/24 and 04/25  -- Diuresed aggressively with lasix infusion, changed to PO bumex today     Hypokalemia  -- Replace per protocol     Hypomagnesemia  -- Replace per protocol     Elevated troponin  -- No chest pain, no e/o ischemia on EKG  -- Likely 2/2 demand ischemia     A-fib  -- Rate controlled  -- PTA apixaban, metoprolol     Valvular heart disease  Moderate to severe AS  Severe TR-Janette-Ace repair device in place           Diet: Combination Diet 2 gm NA Diet; No Caffeine Diet (and additional linked orders)  Fluid restriction 1800 ML FLUID (and additional linked orders)  Fluid restriction 1800 ML FLUID    DVT Prophylaxis: DOAC  Hi Catheter: PRESENT, indication: Non-ICU: q2 hour intake/output with documentation  Lines: None     Cardiac Monitoring: ACTIVE order. Indication: Acute decompensated heart failure (48 hours)  Code Status: Full Code      Clinically Significant Risk Factors                  # Hypertension: Noted on problem list  # Acute heart failure with preserved ejection fraction: heart  "failure noted on problem list, last echo with EF >50%, and receiving IV diuretics       # Severe Obesity: Estimated body mass index is 42.98 kg/m  as calculated from the following:    Height as of an earlier encounter on 4/23/24: 1.702 m (5' 7\").    Weight as of this encounter: 124.5 kg (274 lb 6.4 oz).      # Financial/Environmental Concerns: none         Disposition Plan     Medically Ready for Discharge: Anticipated in 2-4 Days             HERB Payan  Hospitalist Service  [facility]  Securely message with [employer] (more info)  Text page via [facility] Paging/Directory   ______________________________________________________________________    Interval History   Patient is new to me today. Sitting up in a chair. Denies any shortness of breath or chest pain at rest.     Physical Exam   Vital Signs: Temp: 98.1  F (36.7  C) Temp src: Oral BP: 92/70 Pulse: 95   Resp: 18 SpO2: 92 % O2 Device: Oxymask Oxygen Delivery: 2 LPM  Weight: 274 lbs 6.4 oz      General: Not in obvious distress.  HEENT: NC, AT   Chest: Clear to auscultation bilaterally  Heart: S1S2 normal, irregular. Soft systolic murmur in the aortic area  Abdomen: Soft. NT, ND. Bowel sounds- active.  Extremities: 2-3+ leg swelling bilaterally  Neuro: Awake, grossly non-focal      Medical Decision Making             Data     I have personally reviewed the following data over the past 24 hrs:    N/A  \   N/A   / N/A     142 94 (L) 25.6 (H) /  106 (H)   3.8 39 (H) 0.85 \       Imaging results reviewed over the past 24 hrs:   No results found for this or any previous visit (from the past 24 hour(s)).  "

## 2024-04-29 NOTE — PROGRESS NOTES
Heart Failure Care Map  GOALS TO BE MET BEFORE DISCHARGE:    1. Decrease congestion and/or edema with diuretic therapy to achieve near optimal volume status.     Dyspnea improved: No, further care required to meet this goal. Please explain SOB with activity.   Edema improved: No, further care required to meet this goal. Please explain improving in progress, but +3         Last 24 hour I/O:   Intake/Output Summary (Last 24 hours) at 4/29/2024 0224  Last data filed at 4/28/2024 2300  Gross per 24 hour   Intake 925.2 ml   Output 2200 ml   Net -1274.8 ml           Net I/O and Weights since admission:   03/30 0700 - 04/29 0659  In: 4801.2 [P.O.:4210; I.V.:591.2]  Out: 88817 [Urine:57774]  Net: -10353.8     Vitals:    04/23/24 1531 04/23/24 2305 04/24/24 0500 04/25/24 0637   Weight: (!) 152.9 kg (337 lb) 144.8 kg (319 lb 3.2 oz) 141.3 kg (311 lb 9.6 oz) 137.1 kg (302 lb 3.2 oz)    04/26/24 0646 04/27/24 0307 04/28/24 0453   Weight: 132.2 kg (291 lb 6.4 oz) 127.3 kg (280 lb 10.3 oz) 124.4 kg (274 lb 4.8 oz)       2.  O2 sats > 90% on room air, or at prior home O2 therapy level.      Able to wean O2 this shift to keep sats above 90%?: No, further care required to meet this goal. Please explain on 3 liter's oxymask.   Does patient use Home O2? No          Current oxygenation status:   SpO2: 92 %     O2 Device: Oxymask, Oxygen Delivery: 3 LPM    3.  Tolerates ambulation and mobility near baseline.     Ambulation: Yes, satisfactory for discharge.   Times patient ambulated with staff this shift: 0    Please review the Heart Failure Care Map for additional HF goal outcomes.  Patient A&Ox4, denies pain at this time, rhythm=a-fib, 02 sat's 97% on 3 liters oxymask.    Mandi Rosenthal RN  4/29/2024

## 2024-04-29 NOTE — PLAN OF CARE
Problem: Adult Inpatient Plan of Care  Goal: Plan of Care Review  Description: The Plan of Care Review/Shift note should be completed every shift.  The Outcome Evaluation is a brief statement about your assessment that the patient is improving, declining, or no change.  This information will be displayed automatically on your shift  note.  Outcome: Progressing  Flowsheets (Taken 4/28/2024 2215)  Plan of Care Reviewed With: patient   Goal Outcome Evaluation:      Plan of Care Reviewed With: patient        Pt alert and oriented times 4. She was little sleepy at the beginning but was fully awake after 1630. Vitals stable. BP on soft side but MAP greater than 65. Pt denied any pain through out the shift. She ambulated to bathroom twice. She was up in chair and tolerated for almost 3 hours. Pt has IV lasix running at 5 mg/hr. Emphasized on fluid restriction. Pt on K and mg protocol. Recheck labs in am per protocol. Tele a-fib with controlled rate. Pt's abdomen folds and groin area very red and macerated. Cleaned and powder and pillow case applied. All cares explained to pt.

## 2024-04-29 NOTE — PROGRESS NOTES
HEART CARE NOTE          Assessment/Recommendations   1. HFpEF/Valvular heart disease c/b severe ADHF  Assessment / Plan  Presented hypervolemic on physical  with > 65 lb of fluid retention in the setting of medication and diet non-compliance  Nearing euvolemia - transition to oral diuretic regimen and continue to monitor UOP and renal function closely   High risk for adverse cardiac events 2/2 advanced age, frailty, non-compliance, elevated NTproBNP  GDMT as detailed below; mainstay of treatment for HFpEF includes diuretics and adequate BP control (class I) and SGLT2-I (class 2a); additional medical therapy (ARNI, MRA, ARB) demonstrated less robust evidence for indication but may be considered per guideline recommendations (2b); no indication for BBlockers      Current Pharmacotherapy AHA Guideline-Directed Medical Therapy   Losartan 25 mg daily - on hold 2/2 hypotension ARNI/ARB   Spironolactone 25 mg  MRA   SGLT2 inhibitor not started SGLT2-I    Furosemide gtt Loop diuretic        2. Atrial fibrillation  Assessment / Plan  Rate controlled - currently on metoprolol and apixaban     3. Valvular heart disease  Assessment / Plan  LORRAINE 0.8 - will have patient follow-up with valve clinic in the outpatient setting  Severe TR currently enrolled in CLASP II TR study - s/p 1 PASCALACE device    Plan of care discussed on April 29, 2024 with patient at bedside, and primary team overseeing patient's care      History of Present Illness/Subjective    Ms. Misty Quezada is a 68 year old female with a PMHx significant for (per Epic notation) CHFpEF, tricuspid regurgitation, HTN, A-fib, lymphedema admitted on 4/23/2024 with complaints of shortness of breath and increased leg swelling due to noncompliance with Bumex.  Patient states she stopped taking it a month ago and has gained 70 pounds since.      Today, Mrs. Quezada denies acute cardiac events or complaints; Management plan as detailed above     ECG: Personally reviewed.  Afib with RVR.     Repeat echo personally reviewed 4/25/24:  There is moderate concentric left ventricular hypertrophy.  The visual ejection fraction is 55-60%.  Flattened septum is consistent with RV pressure/volume overload.  The right ventricular systolic function is normal.  Low flow, low gradient moderate to severe AS with mean gradient of 25 mmHg.  The calculated aortic valve area is 0.8cm2.  A single Janette-Ace [id number] repair device is securely attached to the  tricuspid leaflets. There is mild to moderate (1-2+) tricuspid regurgitation.  The right ventricular systolic pressure is approximated at 62 mmHg.  Compared to the prior study dated 10/18/2023, there are changes as noted. The  aortic valve stenosis appears to be more severe and pulmonary artery pressures  are higher.     ECHO (personnaly Reviewed on 4/24/24):   1. Left ventricular chamber size, wall thickness and systolic function are  normal. The visually estimated left ventricular ejection fraction is 55-60%.  2. Right ventricle is not well visualized. Chamber size may be mildly  enlarged. Systolic function may be mildly reduced.  3. Severe right atrial enlargement. Moderate left atrial enlargement.  4. A single Janette-Ace [id number] repair device is securely attached to the  tricuspid valve leaflets. There is mild-to-moderate residual tricuspid  regurgitation. No evidence of significant stenosis with peak inflow velocity  1.2 m/s and mean inflow gradient 3 mmHg at a heart rate of approximately 90  beats per minute.  5. Calcified trileaflet aoritc valve with paradoxical low flow, low gradient  (stroke volume index 22.2 mL/m2) aortic stenosis felt to be mild-to-moderate  in severity. Peak forward velocity measures 2.5 m/s, mean gradient 14 mmHg,  calculated aortic valve area 1.1 cm2, and dimensionless index 0.27. No  significant aortic regurgitation.  6. Mild pulmonary hypertension is present with an estimated pulmonary artery  systolic  pressure of 43 mmHg.  7. Compared to the prior study dated 6/1/2023, the degree of tricuspid  regurgitation is less.    Telemetry: personally reviewed April 29, 2024; notable for atrial fibrillation     Lab results: personally reviewed April 29, 2024; notable for evolving contraction alkalosis    Medical history and pertinent documents reviewed in Care Everywhere please where applicable see details above        Physical Examination Review of Systems   /66 (BP Location: Right arm, Patient Position: Supine, Cuff Size: Adult Large)   Pulse 98   Temp 98.5  F (36.9  C) (Oral)   Resp 19   Wt 124.5 kg (274 lb 6.4 oz)   SpO2 90%   BMI 42.98 kg/m    Body mass index is 42.98 kg/m .  Wt Readings from Last 3 Encounters:   04/29/24 124.5 kg (274 lb 6.4 oz)   04/23/24 (!) 152.9 kg (337 lb)   11/14/23 121.6 kg (268 lb)     General Appearance:   no distress, normal body habitus   ENT/Mouth: membranes moist, no oral lesions or bleeding gums.      EYES:  no scleral icterus, normal conjunctivae   Neck: no carotid bruits or thyromegaly   Chest/Lungs:   lungs are clear to auscultation, no rales or wheezing, equal chest wall expansion    Cardiovascular:   Irregular. Normal first and second heart sounds with no murmurs, rubs, or gallops; the carotid, radial and posterior tibial pulses are intact, + JVD and LE edema bilaterally    Abdomen:  no organomegaly, masses, bruits, or tenderness; bowel sounds are present   Extremities: no cyanosis or clubbing   Skin: no xanthelasma, warm.    Neurologic: NAD     Psychiatric: alert and oriented x3, calm     A complete 10 systems ROS was reviewed  And is negative except what is listed in the HPI.          Medical History  Surgical History Family History Social History   Past Medical History:   Diagnosis Date    (HFpEF) heart failure with preserved ejection fraction (H) 10/17/2022    Aortic valve stenosis 11/10/2022    Benign essential hypertension     Chronic atrial fibrillation (H)      Lymphedema 9/24/2022    Nonrheumatic tricuspid valve regurgitation 2/6/2023    Past Surgical History:   Procedure Laterality Date    APPENDECTOMY      CHOLECYSTECTOMY      CV CORONARY ANGIOGRAM N/A 3/15/2023    Procedure: Coronary Angiogram;  Surgeon: Howard Plummer MD;  Location: [facility] CATH LAB CV    CV LEFT HEART CATH N/A 3/15/2023    Procedure: Left Heart Catheterization;  Surgeon: Howard Plummer MD;  Location: [facility] CATH LAB CV    CV RIGHT HEART CATH MEASUREMENTS RECORDED N/A 3/15/2023    Procedure: Right Heart Catheterization;  Surgeon: Howard Plummer MD;  Location: [facility] CATH LAB CV    CV TRANSCATHETER TRICUSPID VALVE REPAIR N/A 5/3/2023    Procedure: Transcatheter Tricuspid Valve Repair;  Surgeon: Howard Plummer MD;  Location: [facility] LAB CV    GASTRIC BYPASS      OR TRANSCATHETER TRICUSPID VALVE REPLACEMENT, FEMORAL PERCUTANEOUS APPROACH (STANDBY) N/A 5/3/2023    Procedure: OR TRANSCATHETER TRICUSPID VALVE REPLACEMENT, FEMORAL PERCUTANEOUS APPROACH (STANDBY);  Surgeon: Kana Calderón MD;  Location: [facility] CATH LAB CV    no family history of premature coronary artery disease Social History     Socioeconomic History    Marital status:      Spouse name: Not on file    Number of children: Not on file    Years of education: Not on file    Highest education level: Not on file   Occupational History    Not on file   Tobacco Use    Smoking status: Never    Smokeless tobacco: Never   Substance and Sexual Activity    Alcohol use: Not Currently     Comment: Rarely Uses alcohol    Drug use: Never    Sexual activity: Not on file   Other Topics Concern    Not on file   Social History Narrative    Not on file     Social Determinants of Health     Financial Resource Strain: Not on file   Food Insecurity: Not on file   Transportation Needs: Not on file   Physical Activity: Not on file   Stress: Not on file   Social Connections: Not on file   Interpersonal Safety: Not on  file   Housing Stability: Not on file           Lab Results    Chemistry/lipid CBC Cardiac Enzymes/BNP/TSH/INR   Lab Results   Component Value Date    CHOL 163 09/09/2016    HDL 42 (L) 09/09/2016    TRIG 81 09/09/2016    BUN 25.6 (H) 04/29/2024     04/29/2024    CO2 39 (H) 04/29/2024    Lab Results   Component Value Date    WBC 5.6 04/24/2024    HGB 13.0 04/24/2024    HCT 42.2 04/24/2024     (H) 04/24/2024     04/24/2024    Lab Results   Component Value Date    TROPONINI 1.32 (HH) 08/21/2021     (H) 09/02/2021    TSH 3.67 04/24/2024    INR 1.21 (H) 05/04/2023     Lab Results   Component Value Date    TROPONINI 1.32 (HH) 08/21/2021          Weight:    Wt Readings from Last 3 Encounters:   04/29/24 124.5 kg (274 lb 6.4 oz)   04/23/24 (!) 152.9 kg (337 lb)   11/14/23 121.6 kg (268 lb)       Allergies  No Known Allergies      Surgical History  Past Surgical History:   Procedure Laterality Date    APPENDECTOMY      CHOLECYSTECTOMY      CV CORONARY ANGIOGRAM N/A 3/15/2023    Procedure: Coronary Angiogram;  Surgeon: Howard Plummer MD;  Location: [facility]    CV LEFT HEART CATH N/A 3/15/2023    Procedure: Left Heart Catheterization;  Surgeon: Howard Plummer MD;  Location: [facility] CATH [facility]    CV RIGHT HEART CATH MEASUREMENTS RECORDED N/A 3/15/2023    Procedure: Right Heart Catheterization;  Surgeon: Howard Plummer MD;  Location: [facility] CV    CV TRANSCATHETER TRICUSPID VALVE REPAIR N/A 5/3/2023    Procedure: Transcatheter Tricuspid Valve Repair;  Surgeon: Howard Plummer MD;  Location: [facility] CATH LAB CV    GASTRIC BYPASS      OR TRANSCATHETER TRICUSPID VALVE REPLACEMENT, FEMORAL PERCUTANEOUS APPROACH (STANDBY) N/A 5/3/2023    Procedure: OR TRANSCATHETER TRICUSPID VALVE REPLACEMENT, FEMORAL PERCUTANEOUS APPROACH (STANDBY);  Surgeon: Kana Calderón MD;  Location: [facility] CV       Social History  Tobacco:   History   Smoking Status     Never   Smokeless Tobacco    Never    Alcohol:   Social History    Substance and Sexual Activity      Alcohol use: Not Currently        Comment: Rarely Uses alcohol   Illicit Drugs:   History   Drug Use Unknown       Family History  Family History   Problem Relation Age of Onset    No Known Problems Mother     No Known Problems Father           Tara Siegel MD on 4/29/2024      cc: Lizbet Baer

## 2024-04-30 ENCOUNTER — APPOINTMENT (OUTPATIENT)
Dept: OCCUPATIONAL THERAPY | Facility: HOSPITAL | Age: 69
DRG: 291 | End: 2024-04-30
Payer: MEDICARE

## 2024-04-30 ENCOUNTER — APPOINTMENT (OUTPATIENT)
Dept: OCCUPATIONAL THERAPY | Facility: HOSPITAL | Age: 69
DRG: 291 | End: 2024-04-30
Attending: INTERNAL MEDICINE
Payer: MEDICARE

## 2024-04-30 LAB
ANION GAP SERPL CALCULATED.3IONS-SCNC: 8 MMOL/L (ref 7–15)
BUN SERPL-MCNC: 29.3 MG/DL (ref 8–23)
CALCIUM SERPL-MCNC: 9.6 MG/DL (ref 8.8–10.2)
CHLORIDE SERPL-SCNC: 93 MMOL/L (ref 98–107)
CREAT SERPL-MCNC: 0.86 MG/DL (ref 0.51–0.95)
DEPRECATED HCO3 PLAS-SCNC: 40 MMOL/L (ref 22–29)
EGFRCR SERPLBLD CKD-EPI 2021: 73 ML/MIN/1.73M2
GLUCOSE SERPL-MCNC: 107 MG/DL (ref 70–99)
MAGNESIUM SERPL-MCNC: 1.9 MG/DL (ref 1.7–2.3)
POTASSIUM SERPL-SCNC: 3.8 MMOL/L (ref 3.4–5.3)
SODIUM SERPL-SCNC: 141 MMOL/L (ref 135–145)

## 2024-04-30 PROCEDURE — 250N000013 HC RX MED GY IP 250 OP 250 PS 637: Performed by: EMERGENCY MEDICINE

## 2024-04-30 PROCEDURE — 83735 ASSAY OF MAGNESIUM: CPT | Performed by: STUDENT IN AN ORGANIZED HEALTH CARE EDUCATION/TRAINING PROGRAM

## 2024-04-30 PROCEDURE — 97535 SELF CARE MNGMENT TRAINING: CPT | Mod: GO

## 2024-04-30 PROCEDURE — 36415 COLL VENOUS BLD VENIPUNCTURE: CPT | Performed by: INTERNAL MEDICINE

## 2024-04-30 PROCEDURE — 97110 THERAPEUTIC EXERCISES: CPT | Mod: GO

## 2024-04-30 PROCEDURE — 250N000013 HC RX MED GY IP 250 OP 250 PS 637: Performed by: STUDENT IN AN ORGANIZED HEALTH CARE EDUCATION/TRAINING PROGRAM

## 2024-04-30 PROCEDURE — P9047 ALBUMIN (HUMAN), 25%, 50ML: HCPCS | Performed by: INTERNAL MEDICINE

## 2024-04-30 PROCEDURE — 250N000011 HC RX IP 250 OP 636: Performed by: INTERNAL MEDICINE

## 2024-04-30 PROCEDURE — 99233 SBSQ HOSP IP/OBS HIGH 50: CPT | Performed by: INTERNAL MEDICINE

## 2024-04-30 PROCEDURE — 120N000004 HC R&B MS OVERFLOW

## 2024-04-30 PROCEDURE — 80048 BASIC METABOLIC PNL TOTAL CA: CPT | Performed by: INTERNAL MEDICINE

## 2024-04-30 RX ORDER — POTASSIUM CHLORIDE 1500 MG/1
20 TABLET, EXTENDED RELEASE ORAL ONCE
Qty: 1 TABLET | Refills: 0 | Status: COMPLETED | OUTPATIENT
Start: 2024-04-30 | End: 2024-04-30

## 2024-04-30 RX ORDER — ALBUMIN (HUMAN) 12.5 G/50ML
50 SOLUTION INTRAVENOUS ONCE
Status: COMPLETED | OUTPATIENT
Start: 2024-04-30 | End: 2024-04-30

## 2024-04-30 RX ORDER — BUMETANIDE 0.25 MG/ML
3 INJECTION INTRAMUSCULAR; INTRAVENOUS EVERY 12 HOURS
Status: DISCONTINUED | OUTPATIENT
Start: 2024-04-30 | End: 2024-05-01

## 2024-04-30 RX ADMIN — ACETAMINOPHEN 1000 MG: 500 TABLET ORAL at 15:34

## 2024-04-30 RX ADMIN — ALBUMIN HUMAN 50 G: 0.25 SOLUTION INTRAVENOUS at 06:55

## 2024-04-30 RX ADMIN — ACETAMINOPHEN 1000 MG: 500 TABLET ORAL at 20:36

## 2024-04-30 RX ADMIN — POTASSIUM CHLORIDE 10 MEQ: 750 TABLET, EXTENDED RELEASE ORAL at 08:29

## 2024-04-30 RX ADMIN — Medication 5 MG: at 20:18

## 2024-04-30 RX ADMIN — BUMETANIDE 3 MG: 0.25 INJECTION INTRAMUSCULAR; INTRAVENOUS at 06:54

## 2024-04-30 RX ADMIN — BUMETANIDE 3 MG: 0.25 INJECTION INTRAMUSCULAR; INTRAVENOUS at 18:05

## 2024-04-30 RX ADMIN — APIXABAN 5 MG: 5 TABLET, FILM COATED ORAL at 08:29

## 2024-04-30 RX ADMIN — SPIRONOLACTONE 25 MG: 25 TABLET, FILM COATED ORAL at 08:29

## 2024-04-30 RX ADMIN — POTASSIUM CHLORIDE 20 MEQ: 1500 TABLET, EXTENDED RELEASE ORAL at 08:30

## 2024-04-30 RX ADMIN — MICONAZOLE NITRATE: 20 POWDER TOPICAL at 22:00

## 2024-04-30 RX ADMIN — APIXABAN 5 MG: 5 TABLET, FILM COATED ORAL at 20:17

## 2024-04-30 RX ADMIN — MICONAZOLE NITRATE: 20 POWDER TOPICAL at 08:30

## 2024-04-30 RX ADMIN — METOPROLOL SUCCINATE 50 MG: 50 TABLET, EXTENDED RELEASE ORAL at 08:29

## 2024-04-30 ASSESSMENT — ACTIVITIES OF DAILY LIVING (ADL)
ADLS_ACUITY_SCORE: 33
ADLS_ACUITY_SCORE: 32
ADLS_ACUITY_SCORE: 33
ADLS_ACUITY_SCORE: 32
ADLS_ACUITY_SCORE: 31

## 2024-04-30 NOTE — PROGRESS NOTES
CLINICAL NUTRITION SERVICES - ASSESSMENT NOTE     Nutrition Prescription    RECOMMENDATIONS FOR MDs/PROVIDERS TO ORDER:    Malnutrition Status:    Patient does not meet two of the criteria necessary for diagnosing malnutrition    Recommendations already ordered by Registered Dietitian (RD):    Future/Additional Recommendations:  Monitor po intake, weight, I/Os.      REASON FOR ASSESSMENT  Misty Quezada is a/an 68 year old female assessed by the dietitian for LOS    HPI: Pt with PMH CHFpEF, tricuspid regurgitation, HTN, A-fib, lymphedema admitted on 4/23/2024 with complaints of shortness of breath and increased leg swelling due to noncompliance with low salt diet, fluid restriction, and Bumex. Patient states she stopped taking it a month ago and has gained 70 pounds since. She went to cardiology clinic who recommend to come to ER. Admitted for acute on chronic CHF     NUTRITION HISTORY  Met with pt at bedside this afternoon. Pt reports okay po intakes and appetite. Pt notes some difficulty r/t low sodium diet restrictions and limited menu options. Pt unsure of usual body weight. Pt denies nausea vomiting or abd pain. Pt reports adequate knowledge and education on low sodium diet.   NKFA    CURRENT NUTRITION ORDERS  Diet: No Caffeine, 2 g Sodium, and 1800 mL Fluid Restriction  Intake/Tolerance:   Pt ordering 2-3 meals/day, consuming 100% of meals 4/25-4/29 per nsg documentation   Pt consuming estimated 3654-5697 kcal and 45-65 g protein/day.     LABS  Reviewed  BUN 29.3(H)     MEDICATIONS  Reviewed   Scheduled eliquis, bumex, ferrous sulfate, toprol xl, miralax, Kcl, aldactone     ANTHROPOMETRICS  Height: 0 cm (Data Unavailable)  Most Recent Weight: 125.1 kg (275 lb 12.7 oz)    IBW: 61.4 kg  BMI: Obesity Grade III BMI >40  Weight History: Wt changes this admit r/t fluid status, diuresis. -24 L this admit   Per MD pt weight up at admit r/t fluid accumulation, non-compliance.   Wt Readings from Last 10 Encounters:    04/30/24 125.1 kg (275 lb 12.7 oz)   04/23/24 (!) 152.9 kg (337 lb)   11/14/23 121.6 kg (268 lb)   11/06/23 126.1 kg (278 lb)   10/31/23 134.3 kg (296 lb)   10/18/23 132 kg (291 lb)   10/12/23 129.3 kg (285 lb 2 oz)   09/01/23 122.5 kg (270 lb)   08/08/23 117.5 kg (259 lb)   06/07/23 114.3 kg (252 lb)      Dosing Weight: 77.3 kg adjusted wt    ASSESSED NUTRITION NEEDS  Estimated Energy Needs: 5434-5912 kcals/day (25 - 30 kcals/kg)  Justification: Maintenance  Estimated Protein Needs: 62-77 grams protein/day (0.8 - 1 grams of pro/kg)  Justification: Maintenance  Estimated Fluid Needs: 1800 mL/day  Justification: On a fluid restriction    PHYSICAL FINDINGS/GI CONCERNS  See malnutrition section below.  Per Flowsheets:   GI: obese abd   BM: last noted x1 4/28   Edema: Moderate BLE  I/Os: -24 L this admit     MALNUTRITION:  % Weight Loss:  Weight loss does not meet criteria for malnutrition, r/t fluid status with diuresis  % Intake:  No decreased intake noted  Subcutaneous Fat Loss:  None observed  Muscle Loss:  None observed  Fluid Retention:  Moderate BLE    Malnutrition Diagnosis: Patient does not meet two of the above criteria necessary for diagnosing malnutrition    NUTRITION DIAGNOSIS  Unintended weight gain related to CHF, fluid retention as evidenced by >50 lb weight gain prior to admission.     INTERVENTIONS  Implementation  None at this time     Goals  Pt to meet >75% nutritional needs   Electrolytes WNL      Monitoring/Evaluation  Progress toward goals will be monitored and evaluated per protocol.

## 2024-04-30 NOTE — PROGRESS NOTES
[facility]    Medicine Progress Note - Hospitalist Service    Date of Admission:  4/23/2024    Assessment & Plan   Misty Quezada is a 68 year old female with PMH CHFpEF, tricuspid regurgitation, HTN, A-fib, lymphedema admitted on 4/23/2024 with complaints of shortness of breath and increased leg swelling due to noncompliance with low salt diet, fluid restriction, and Bumex.  Patient states she stopped taking it a month ago and has gained 70 pounds since. She went to cardiology clinic who recommend to come to ER. Admitted for acute on chronic CHF     Acute hypoxic respiratory failure  HFpEF acute on chronic  -- ProBNP:2,391  -- c/w PTA spironolactone  -- Cardiology consult appreciated  -- TTE:LVEF: 55-60%, flattened septum is consistent with RV pressure/volume overload.  -- Strict I&O's, Daily weights  -- Cont fluid restriction  -- Oxygen supplement via NC. Wean as able.  -- S/p Albumin on 04/24 and 04/25  -- Diuresed aggressively with lasix infusion, changed to PO bumex 4/29, then to IV Bumex today.   -- Lymphedema OT consulted for lymphedema wrap     Hypokalemia  -- Replace per protocol     Hypomagnesemia  -- Replace per protocol     Elevated troponin  -- No chest pain, no e/o ischemia on EKG  -- Likely 2/2 demand ischemia     A-fib  -- Rate controlled  -- PTA apixaban, metoprolol     Valvular heart disease  Moderate to severe AS  Severe TR-Janette-Ace repair device in place           Diet: Combination Diet 2 gm NA Diet; No Caffeine Diet (and additional linked orders)  Fluid restriction 1800 ML FLUID (and additional linked orders)  Fluid restriction 1800 ML FLUID    DVT Prophylaxis: DOAC  Hi Catheter: PRESENT, indication: Non-ICU: q2 hour intake/output with documentation  Lines: None     Cardiac Monitoring: ACTIVE order. Indication: Acute decompensated heart failure (48 hours)  Code Status: Full Code      Clinically Significant Risk Factors                  # Hypertension: Noted on  "problem list  # Acute heart failure with preserved ejection fraction: heart failure noted on problem list, last echo with EF >50%, and receiving IV diuretics       # Severe Obesity: Estimated body mass index is 43.2 kg/m  as calculated from the following:    Height as of an earlier encounter on 4/23/24: 1.702 m (5' 7\").    Weight as of this encounter: 125.1 kg (275 lb 12.7 oz).        # Financial/Environmental Concerns: none         Disposition Plan     Medically Ready for Discharge: Anticipated in 2-4 Days             HERB Payan  Hospitalist Service  [facility]  Securely message with [employer] (more info)  Text page via [employer] ([place]) Paging/Directory   ______________________________________________________________________    Interval History   Patient seen and examined. No new issues overnight. Denies any shortness of breath or chest pain at rest.       Physical Exam   Vital Signs: Temp: 98.1  F (36.7  C) Temp src: Oral BP: 97/61 Pulse: 87   Resp: 18 SpO2: 90 % O2 Device: None (Room air) Oxygen Delivery: 3 LPM  Weight: 275 lbs 12.73 oz      General: Not in obvious distress.  HEENT: NC, AT   Chest: Clear to auscultation bilaterally  Heart: S1S2 normal, irregular. Soft systolic murmur in the aortic area  Abdomen: Soft. NT, ND. Bowel sounds- active.  Extremities: 2-3+ leg swelling bilaterally  Neuro: Awake, grossly non-focal      Medical Decision Making             Data     I have personally reviewed the following data over the past 24 hrs:    N/A  \   N/A   / N/A     141 93 (L) 29.3 (H) /  107 (H)   3.8 40 (H) 0.86 \       Imaging results reviewed over the past 24 hrs:   No results found for this or any previous visit (from the past 24 hour(s)).  "

## 2024-04-30 NOTE — PROGRESS NOTES
HEART CARE NOTE          Assessment/Recommendations   1. HFpEF/Valvular heart disease c/b severe ADHF  Assessment / Plan  Presented hypervolemic on physical  with > 65 lb of fluid retention in the setting of medication and diet non-compliance  Resume IV diuretic regimen; continue to monitor UOP and renal function closely   High risk for adverse cardiac events 2/2 advanced age, frailty, non-compliance, elevated NTproBNP  GDMT as detailed below; mainstay of treatment for HFpEF includes diuretics and adequate BP control (class I) and SGLT2-I (class 2a); additional medical therapy (ARNI, MRA, ARB) demonstrated less robust evidence for indication but may be considered per guideline recommendations (2b); no indication for BBlockers      Current Pharmacotherapy AHA Guideline-Directed Medical Therapy   Losartan 25 mg daily - on hold 2/2 hypotension ARNI/ARB   Spironolactone 25 mg  MRA   SGLT2 inhibitor not started SGLT2-I    Bumex IV Loop diuretic        2. Atrial fibrillation  Assessment / Plan  Rate controlled - currently on metoprolol and apixaban     3. Valvular heart disease  Assessment / Plan  LORRAINE 0.8 - will have patient follow-up with valve clinic in the outpatient setting  Severe TR currently enrolled in CLASP II TR study - s/p 1 PASCALACE device    Plan of care discussed on April 30, 2024 with patient at bedside, and primary team overseeing patient's care      History of Present Illness/Subjective    Ms. Misty Quezada is a 68 year old female with a PMHx significant for (per Epic notation) CHFpEF, tricuspid regurgitation, HTN, A-fib, lymphedema admitted on 4/23/2024 with complaints of shortness of breath and increased leg swelling due to noncompliance with Bumex.  Patient states she stopped taking it a month ago and has gained 70 pounds since.      Today, Mrs. Quezada denies acute cardiac events or complaints; Management plan as detailed above     ECG: Personally reviewed. Afib with RVR.     Repeat echo  personally reviewed 4/25/24:  There is moderate concentric left ventricular hypertrophy.  The visual ejection fraction is 55-60%.  Flattened septum is consistent with RV pressure/volume overload.  The right ventricular systolic function is normal.  Low flow, low gradient moderate to severe AS with mean gradient of 25 mmHg.  The calculated aortic valve area is 0.8cm2.  A single Janette-Ace [id number] repair device is securely attached to the  tricuspid leaflets. There is mild to moderate (1-2+) tricuspid regurgitation.  The right ventricular systolic pressure is approximated at 62 mmHg.  Compared to the prior study dated 10/18/2023, there are changes as noted. The  aortic valve stenosis appears to be more severe and pulmonary artery pressures  are higher.     ECHO (personnaly Reviewed on 4/24/24):   1. Left ventricular chamber size, wall thickness and systolic function are  normal. The visually estimated left ventricular ejection fraction is 55-60%.  2. Right ventricle is not well visualized. Chamber size may be mildly  enlarged. Systolic function may be mildly reduced.  3. Severe right atrial enlargement. Moderate left atrial enlargement.  4. A single Janette-Ace fvsa-tf-[id number] repair device is securely attached to the  tricuspid valve leaflets. There is mild-to-moderate residual tricuspid  regurgitation. No evidence of significant stenosis with peak inflow velocity  1.2 m/s and mean inflow gradient 3 mmHg at a heart rate of approximately 90  beats per minute.  5. Calcified trileaflet aoritc valve with paradoxical low flow, low gradient  (stroke volume index 22.2 mL/m2) aortic stenosis felt to be mild-to-moderate  in severity. Peak forward velocity measures 2.5 m/s, mean gradient 14 mmHg,  calculated aortic valve area 1.1 cm2, and dimensionless index 0.27. No  significant aortic regurgitation.  6. Mild pulmonary hypertension is present with an estimated pulmonary artery  systolic pressure of 43 mmHg.  7. Compared to  the prior study dated 6/1/2023, the degree of tricuspid  regurgitation is less.    Telemetry: personally reviewed April 30, 2024; notable for atrial fibrillation     Medical history and pertinent documents reviewed in Care Everywhere please where applicable see details above        Physical Examination Review of Systems   /56 (BP Location: Right arm)   Pulse 93   Temp 97.3  F (36.3  C) (Oral)   Resp 18   Wt 124.5 kg (274 lb 6.4 oz)   SpO2 93%   BMI 42.98 kg/m    Body mass index is 42.98 kg/m .  Wt Readings from Last 3 Encounters:   04/29/24 124.5 kg (274 lb 6.4 oz)   04/23/24 (!) 152.9 kg (337 lb)   11/14/23 121.6 kg (268 lb)     General Appearance:   no distress, normal body habitus   ENT/Mouth: membranes moist, no oral lesions or bleeding gums.      EYES:  no scleral icterus, normal conjunctivae   Neck: no carotid bruits or thyromegaly   Chest/Lungs:   lungs are clear to auscultation, no rales or wheezing, equal chest wall expansion    Cardiovascular:   Irregular. Normal first and second heart sounds with no murmurs, rubs, or gallops; the carotid, radial and posterior tibial pulses are intact, + JVD and LE edema bilaterally    Abdomen:  no organomegaly, masses, bruits, or tenderness; bowel sounds are present   Extremities: no cyanosis or clubbing   Skin: no xanthelasma, warm.    Neurologic: NAD     Psychiatric: alert and oriented x3, calm     A complete 10 systems ROS was reviewed  And is negative except what is listed in the HPI.          Medical History  Surgical History Family History Social History   Past Medical History:   Diagnosis Date    (HFpEF) heart failure with preserved ejection fraction (H) 10/17/2022    Aortic valve stenosis 11/10/2022    Benign essential hypertension     Chronic atrial fibrillation (H)     Lymphedema 9/24/2022    Nonrheumatic tricuspid valve regurgitation 2/6/2023    Past Surgical History:   Procedure Laterality Date    APPENDECTOMY      CHOLECYSTECTOMY      CV CORONARY  ANGIOGRAM N/A 3/15/2023    Procedure: Coronary Angiogram;  Surgeon: Howard Plummer MD;  Location: [facility] CATH [facility] CV    CV LEFT HEART CATH N/A 3/15/2023    Procedure: Left Heart Catheterization;  Surgeon: Howard Plummer MD;  Location: [facility]    CV RIGHT HEART CATH MEASUREMENTS RECORDED N/A 3/15/2023    Procedure: Right Heart Catheterization;  Surgeon: Howard Plummer MD;  Location: [facility]    CV TRANSCATHETER TRICUSPID VALVE REPAIR N/A 5/3/2023    Procedure: Transcatheter Tricuspid Valve Repair;  Surgeon: Howard Plummer MD;  Location: [facility]    GASTRIC BYPASS      OR TRANSCATHETER TRICUSPID VALVE REPLACEMENT, FEMORAL PERCUTANEOUS APPROACH (STANDBY) N/A 5/3/2023    Procedure: OR TRANSCATHETER TRICUSPID VALVE REPLACEMENT, FEMORAL PERCUTANEOUS APPROACH (STANDBY);  Surgeon: Kana Calderón MD;  Location: [facility] CATH LAB CV    no family history of premature coronary artery disease Social History     Socioeconomic History    Marital status:      Spouse name: Not on file    Number of children: Not on file    Years of education: Not on file    Highest education level: Not on file   Occupational History    Not on file   Tobacco Use    Smoking status: Never    Smokeless tobacco: Never   Substance and Sexual Activity    Alcohol use: Not Currently     Comment: Rarely Uses alcohol    Drug use: Never    Sexual activity: Not on file   Other Topics Concern    Not on file   Social History Narrative    Not on file     Social Determinants of Health     Financial Resource Strain: Not on file   Food Insecurity: Not on file   Transportation Needs: Not on file   Physical Activity: Not on file   Stress: Not on file   Social Connections: Not on file   Interpersonal Safety: Not on file   Housing Stability: Not on file           Lab Results    Chemistry/lipid CBC Cardiac Enzymes/BNP/TSH/INR   Lab Results   Component Value Date    CHOL 163 09/09/2016    HDL 42 (L)  09/09/2016    TRIG 81 09/09/2016    BUN 29.3 (H) 04/30/2024     04/30/2024    CO2 40 (H) 04/30/2024    Lab Results   Component Value Date    WBC 5.6 04/24/2024    HGB 13.0 04/24/2024    HCT 42.2 04/24/2024     (H) 04/24/2024     04/24/2024    Lab Results   Component Value Date    TROPONINI 1.32 (HH) 08/21/2021     (H) 09/02/2021    TSH 3.67 04/24/2024    INR 1.21 (H) 05/04/2023     Lab Results   Component Value Date    TROPONINI 1.32 (HH) 08/21/2021          Weight:    Wt Readings from Last 3 Encounters:   04/29/24 124.5 kg (274 lb 6.4 oz)   04/23/24 (!) 152.9 kg (337 lb)   11/14/23 121.6 kg (268 lb)       Allergies  No Known Allergies      Surgical History  Past Surgical History:   Procedure Laterality Date    APPENDECTOMY      CHOLECYSTECTOMY      CV CORONARY ANGIOGRAM N/A 3/15/2023    Procedure: Coronary Angiogram;  Surgeon: Howard Plummer MD;  Location: [facility] LAB CV    CV LEFT HEART CATH N/A 3/15/2023    Procedure: Left Heart Catheterization;  Surgeon: Howard Plummer MD;  Location: [facility] CATH LAB CV    CV RIGHT HEART CATH MEASUREMENTS RECORDED N/A 3/15/2023    Procedure: Right Heart Catheterization;  Surgeon: Howard Plummer MD;  Location: [facility] CV    CV TRANSCATHETER TRICUSPID VALVE REPAIR N/A 5/3/2023    Procedure: Transcatheter Tricuspid Valve Repair;  Surgeon: Howard Plummer MD;  Location: [facility]ATH [facility] CV    GASTRIC BYPASS      OR TRANSCATHETER TRICUSPID VALVE REPLACEMENT, FEMORAL PERCUTANEOUS APPROACH (STANDBY) N/A 5/3/2023    Procedure: OR TRANSCATHETER TRICUSPID VALVE REPLACEMENT, FEMORAL PERCUTANEOUS APPROACH (STANDBY);  Surgeon: Kana Calderón MD;  Location: [facility] CATH LAB CV       Social History  Tobacco:   History   Smoking Status    Never   Smokeless Tobacco    Never    Alcohol:   Social History    Substance and Sexual Activity      Alcohol use: Not Currently        Comment: Rarely Uses alcohol   Illicit Drugs:    History   Drug Use Unknown       Family History  Family History   Problem Relation Age of Onset    No Known Problems Mother     No Known Problems Father           Tara Siegel MD on 4/30/2024      cc: Lizbet Baer

## 2024-04-30 NOTE — PLAN OF CARE
"  Problem: Adult Inpatient Plan of Care  Goal: Plan of Care Review  Description: The Plan of Care Review/Shift note should be completed every shift.  The Outcome Evaluation is a brief statement about your assessment that the patient is improving, declining, or no change.  This information will be displayed automatically on your shift  note.  Outcome: Progressing  Goal: Patient-Specific Goal (Individualized)  Description: You can add care plan individualizations to a care plan. Examples of Individualization might be:  \"Parent requests to be called daily at 9am for status\", \"I have a hard time hearing out of my right ear\", or \"Do not touch me to wake me up as it startles  me\".  Outcome: Progressing  Goal: Absence of Hospital-Acquired Illness or Injury  Outcome: Progressing  Intervention: Identify and Manage Fall Risk  Recent Flowsheet Documentation  Taken 4/29/2024 2100 by Lisa Bailey RN  Safety Promotion/Fall Prevention: activity supervised  Taken 4/29/2024 1600 by Lisa Bailey RN  Safety Promotion/Fall Prevention: activity supervised  Intervention: Prevent Skin Injury  Recent Flowsheet Documentation  Taken 4/29/2024 2100 by Lisa Bailey RN  Body Position: position changed independently  Taken 4/29/2024 1600 by Lisa Bailey RN  Body Position: position changed independently  Goal: Optimal Comfort and Wellbeing  Outcome: Progressing  Goal: Readiness for Transition of Care  Outcome: Progressing     Problem: Skin Injury Risk Increased  Goal: Skin Health and Integrity  Outcome: Progressing  Intervention: Plan: Nurse Driven Intervention: Moisture Management  Recent Flowsheet Documentation  Taken 4/29/2024 2100 by Lisa Bailey RN  Moisture Interventions: Encourage regular toileting  Taken 4/29/2024 1600 by Lisa Bailey RN  Moisture Interventions: Encourage regular toileting  Intervention: Plan: Nurse Driven Intervention: Friction and Shear  Recent Flowsheet Documentation  Taken 4/29/2024 2100 by Lisa Bailey" RN  Friction/Shear Interventions: HOB 30 degrees or less  Taken 4/29/2024 1600 by Lisa Bailey RN  Friction/Shear Interventions: HOB 30 degrees or less  Intervention: Optimize Skin Protection  Recent Flowsheet Documentation  Taken 4/29/2024 2100 by Lisa Bailey RN  Activity Management: activity adjusted per tolerance  Taken 4/29/2024 1830 by Lisa Bailey RN  Activity Management: up in chair  Taken 4/29/2024 1600 by Lisa Bailey RN  Activity Management: activity adjusted per tolerance     Problem: Pain Acute  Goal: Optimal Pain Control and Function  Outcome: Progressing  Intervention: Prevent or Manage Pain  Recent Flowsheet Documentation  Taken 4/29/2024 2100 by Lisa Bailey RN  Medication Review/Management: medications reviewed  Taken 4/29/2024 1600 by Lisa Bailey RN  Medication Review/Management: medications reviewed  Intervention: Optimize Psychosocial Wellbeing  Recent Flowsheet Documentation  Taken 4/29/2024 2100 by Lisa Bailey RN  Supportive Measures: active listening utilized  Taken 4/29/2024 1600 by Lisa Bailey RN  Supportive Measures: active listening utilized     Problem: Heart Failure  Goal: Optimal Coping  Outcome: Progressing  Intervention: Support Psychosocial Response  Recent Flowsheet Documentation  Taken 4/29/2024 2100 by Lisa Bailey RN  Supportive Measures: active listening utilized  Taken 4/29/2024 1600 by Lisa Bailey RN  Supportive Measures: active listening utilized  Goal: Optimal Cardiac Output  Outcome: Progressing  Goal: Stable Heart Rate and Rhythm  Outcome: Progressing  Goal: Optimal Functional Ability  Outcome: Progressing  Intervention: Optimize Functional Ability  Recent Flowsheet Documentation  Taken 4/29/2024 2100 by Lisa Bailey RN  Activity Management: activity adjusted per tolerance  Taken 4/29/2024 1830 by Lisa Bailey RN  Activity Management: up in chair  Taken 4/29/2024 1600 by Lisa Bailey RN  Activity Management: activity adjusted per tolerance  Goal: Fluid  and Electrolyte Balance  Outcome: Progressing  Goal: Improved Oral Intake  Outcome: Progressing  Goal: Effective Oxygenation and Ventilation  Outcome: Progressing  Intervention: Promote Airway Secretion Clearance  Recent Flowsheet Documentation  Taken 4/29/2024 2100 by Lisa Bailey RN  Activity Management: activity adjusted per tolerance  Taken 4/29/2024 1830 by Lisa Bailey RN  Activity Management: up in chair  Taken 4/29/2024 1600 by Lisa Bailey RN  Activity Management: activity adjusted per tolerance  Goal: Effective Breathing Pattern During Sleep  Outcome: Progressing  Intervention: Monitor and Manage Obstructive Sleep Apnea  Recent Flowsheet Documentation  Taken 4/29/2024 2100 by Lisa Bailey RN  Medication Review/Management: medications reviewed  Taken 4/29/2024 1600 by iLsa Bailey RN  Medication Review/Management: medications reviewed     Problem: Gas Exchange Impaired  Goal: Optimal Gas Exchange  Outcome: Progressing   Goal Outcome Evaluation:  Patient sats 80-85 on room air. Oxygen applied back on. Sob with activity. Up in chair after supper. Patient legs are tender. Patient has rash abd folds, under breast and in groin-powder applied.

## 2024-04-30 NOTE — PROGRESS NOTES
Care Management Follow Up     Length of Stay (days): 5     Expected Discharge Date: 04/29/2024     Concerns to be Addressed:     Discharge planning.  Lymph wraps.  IV bumex  Patient plan of care discussed at interdisciplinary rounds: Yes     Anticipated Discharge Disposition:  TCU      Anticipated Discharge Services:  per TCU  Anticipated Discharge DME:  Pt has home oxygen 2L      Patient/family educated on Medicare website which has current facility and service quality ratings:  NA at this time   Education Provided on the Discharge Plan:  per treatment team   Patient/Family in Agreement with the Plan:  NA     Referrals Placed by CM/SW:  None required at this time   Private pay costs discussed: Not applicable     Additional Information:  CM reviewed chart.  OT recommending TCU.      CM met with patient to discuss recommendation.  Patient feels TCU would be best.  Patient asked for CM to look up where she has been in the past. She was most recently at [facility] in October of 2022.   Patient would like to go to [facility] if they have an opening.   CM placed referral to [facility] TCU.     Patient has list of TCUs to review for additional choices.     1025: [facility] accepted patient to their TCU.  CM notified provider of this.    1044: CM asked provider when he anticipates patient will be medically ready for discharge. CM will notify TCU of this information once known.    1046: Per provider, patient needs to see lymphedema nurse for the lymphedema wrap and Cardiologist started patient on IV bumex. Anticipate patient will be here for a couple more days.   1048: CM notified Makenzie in admissions at [facility] of patient's current status.      CM will keep TCU updated.    1050: CM updated patient on accepting TCU.  Patient happy about this.        Social History:  Pt lives in a house with her two adult sons, Samuel and Slade. Her support system also includes her neighbor. Pt is independent  with most I/ADL's, uses a cane and grab bars at home.  Son to transport at discharge.

## 2024-04-30 NOTE — PLAN OF CARE
Problem: Adult Inpatient Plan of Care  Goal: Plan of Care Review  Description: The Plan of Care Review/Shift note should be completed every shift.  The Outcome Evaluation is a brief statement about your assessment that the patient is improving, declining, or no change.  This information will be displayed automatically on your shift  note.  Outcome: Progressing     Problem: Skin Injury Risk Increased  Goal: Skin Health and Integrity  Outcome: Progressing  Intervention: Plan: Nurse Driven Intervention: Moisture Management  Recent Flowsheet Documentation  Taken 4/30/2024 1239 by Jody Frazier RN  Moisture Interventions: Encourage regular toileting  Taken 4/30/2024 0836 by Jody Frazier RN  Moisture Interventions: Encourage regular toileting  Intervention: Plan: Nurse Driven Intervention: Friction and Shear  Recent Flowsheet Documentation  Taken 4/30/2024 1239 by Jody Frazier RN  Friction/Shear Interventions: HOB 30 degrees or less  Taken 4/30/2024 0836 by Jody Frazier RN  Friction/Shear Interventions: HOB 30 degrees or less  Intervention: Optimize Skin Protection  Recent Flowsheet Documentation  Taken 4/30/2024 1239 by Jody Frazier RN  Pressure Reduction Techniques: frequent weight shift encouraged  Pressure Reduction Devices: alternating pressure pump (LYNNE)  Activity Management: activity adjusted per tolerance  Taken 4/30/2024 0836 by Jody Frazier RN  Pressure Reduction Techniques: frequent weight shift encouraged  Pressure Reduction Devices: alternating pressure pump (LYNNE)  Activity Management: activity adjusted per tolerance     Problem: Pain Acute  Goal: Optimal Pain Control and Function  Outcome: Progressing  Intervention: Prevent or Manage Pain  Recent Flowsheet Documentation  Taken 4/30/2024 1239 by Jody Frazier RN  Sensory Stimulation Regulation: care clustered  Medication Review/Management: medications reviewed  Taken 4/30/2024 0836 by Jody Frazier  CHRISTOPHER TINAJERO  Sensory Stimulation Regulation: care clustered  Medication Review/Management: medications reviewed  Intervention: Optimize Psychosocial Wellbeing  Recent Flowsheet Documentation  Taken 4/30/2024 1239 by Jody Frazier RN  Supportive Measures: active listening utilized  Taken 4/30/2024 0836 by Jody Frazier RN  Supportive Measures: active listening utilized     Problem: Heart Failure  Goal: Optimal Coping  Outcome: Progressing  Intervention: Support Psychosocial Response  Recent Flowsheet Documentation  Taken 4/30/2024 1239 by Jody Frazier RN  Supportive Measures: active listening utilized  Taken 4/30/2024 0836 by Jody Frazier RN  Supportive Measures: active listening utilized     Problem: Gas Exchange Impaired  Goal: Optimal Gas Exchange  Outcome: Progressing     Problem: Adult Inpatient Plan of Care  Goal: Absence of Hospital-Acquired Illness or Injury  Intervention: Identify and Manage Fall Risk  Recent Flowsheet Documentation  Taken 4/30/2024 1239 by Jody Frazier RN  Safety Promotion/Fall Prevention: activity supervised  Taken 4/30/2024 0836 by Jody Frazier RN  Safety Promotion/Fall Prevention: activity supervised  Intervention: Prevent Skin Injury  Recent Flowsheet Documentation  Taken 4/30/2024 1239 by Jody Frazier RN  Device Skin Pressure Protection: adhesive use limited  Taken 4/30/2024 0836 by Jody Frazier RN  Device Skin Pressure Protection: adhesive use limited  Intervention: Prevent and Manage VTE (Venous Thromboembolism) Risk  Recent Flowsheet Documentation  Taken 4/30/2024 1239 by Jody Frazier RN  VTE Prevention/Management: SCDs (sequential compression devices) off  Taken 4/30/2024 0836 by Jody Frazier RN  VTE Prevention/Management: SCDs (sequential compression devices) off  Intervention: Prevent Infection  Recent Flowsheet Documentation  Taken 4/30/2024 1239 by Jody Frazier RN  Infection Prevention:  environmental surveillance performed  Taken 4/30/2024 0836 by Jody Frazier RN  Infection Prevention: environmental surveillance performed     Problem: Heart Failure  Goal: Optimal Cardiac Output  Intervention: Optimize Cardiac Output  Recent Flowsheet Documentation  Taken 4/30/2024 1239 by Jody Frazier RN  Environmental Support: calm environment promoted  Taken 4/30/2024 0836 by Jody Frazier RN  Environmental Support: calm environment promoted  Goal: Optimal Functional Ability  Intervention: Optimize Functional Ability  Recent Flowsheet Documentation  Taken 4/30/2024 1239 by Jody Frazier RN  Activity Management: activity adjusted per tolerance  Taken 4/30/2024 0836 by Jody Frazier RN  Activity Management: activity adjusted per tolerance  Goal: Effective Oxygenation and Ventilation  Intervention: Promote Airway Secretion Clearance  Recent Flowsheet Documentation  Taken 4/30/2024 1239 by Jody Frazier RN  Cough And Deep Breathing: done with encouragement  Activity Management: activity adjusted per tolerance  Taken 4/30/2024 0836 by Jody Frazier RN  Cough And Deep Breathing: done with encouragement  Activity Management: activity adjusted per tolerance  Goal: Effective Breathing Pattern During Sleep  Intervention: Monitor and Manage Obstructive Sleep Apnea  Recent Flowsheet Documentation  Taken 4/30/2024 1239 by Jody Frazier RN  Medication Review/Management: medications reviewed  Taken 4/30/2024 0836 by Jody Frazier RN  Medication Review/Management: medications reviewed   Goal Outcome Evaluation:       Patient is alert and able to let her needs be known. Denies pain when asked. VSS and on 3L oxygen. AFIB on the monitor. Hi catheter in place, good urine output. Lymphedema wraps applied today per orders. Had a shower today, nystatin powder applied per orders. Fluid restriction in place. Continue plan of care.

## 2024-04-30 NOTE — PLAN OF CARE
Problem: Heart Failure  Goal: Fluid and Electrolyte Balance  Outcome: Progressing  Intervention: Monitor and Manage Fluid and Electrolyte Balance  Recent Flowsheet Documentation  Taken 4/30/2024 0931 by Jazzmine Patterson RN  Fluid/Electrolyte Management: fluids restricted  Taken 4/29/2024 8585 by Jazzmine Patterson RN  Fluid/Electrolyte Management: fluids restricted   Goal Outcome Evaluation:  Her potassium this morning was 3.8.  She'll get 1 dose of 20meq at 06.  Her magnesium was 1.9 which will just be a recheck tomorrow.  Problem: Heart Failure  Goal: Effective Oxygenation and Ventilation  Outcome: Progressing  Her lungs are clear but diminished.  She has dyspnea on exertion.  She sats 92-93% on 2.5lt oxygen.

## 2024-05-01 ENCOUNTER — APPOINTMENT (OUTPATIENT)
Dept: OCCUPATIONAL THERAPY | Facility: HOSPITAL | Age: 69
DRG: 291 | End: 2024-05-01
Payer: MEDICARE

## 2024-05-01 LAB
ANION GAP SERPL CALCULATED.3IONS-SCNC: 12 MMOL/L (ref 7–15)
BUN SERPL-MCNC: 36.3 MG/DL (ref 8–23)
CALCIUM SERPL-MCNC: 9.6 MG/DL (ref 8.8–10.2)
CHLORIDE SERPL-SCNC: 96 MMOL/L (ref 98–107)
CREAT SERPL-MCNC: 0.82 MG/DL (ref 0.51–0.95)
DEPRECATED HCO3 PLAS-SCNC: 35 MMOL/L (ref 22–29)
EGFRCR SERPLBLD CKD-EPI 2021: 77 ML/MIN/1.73M2
GLUCOSE SERPL-MCNC: 94 MG/DL (ref 70–99)
MAGNESIUM SERPL-MCNC: 2 MG/DL (ref 1.7–2.3)
POTASSIUM SERPL-SCNC: 3.8 MMOL/L (ref 3.4–5.3)
POTASSIUM SERPL-SCNC: 3.8 MMOL/L (ref 3.4–5.3)
SODIUM SERPL-SCNC: 143 MMOL/L (ref 135–145)

## 2024-05-01 PROCEDURE — 84132 ASSAY OF SERUM POTASSIUM: CPT | Performed by: EMERGENCY MEDICINE

## 2024-05-01 PROCEDURE — 83735 ASSAY OF MAGNESIUM: CPT | Performed by: EMERGENCY MEDICINE

## 2024-05-01 PROCEDURE — 97535 SELF CARE MNGMENT TRAINING: CPT | Mod: GO

## 2024-05-01 PROCEDURE — 120N000004 HC R&B MS OVERFLOW

## 2024-05-01 PROCEDURE — 250N000013 HC RX MED GY IP 250 OP 250 PS 637: Performed by: STUDENT IN AN ORGANIZED HEALTH CARE EDUCATION/TRAINING PROGRAM

## 2024-05-01 PROCEDURE — 99233 SBSQ HOSP IP/OBS HIGH 50: CPT | Performed by: INTERNAL MEDICINE

## 2024-05-01 PROCEDURE — 80048 BASIC METABOLIC PNL TOTAL CA: CPT | Performed by: INTERNAL MEDICINE

## 2024-05-01 PROCEDURE — 250N000013 HC RX MED GY IP 250 OP 250 PS 637: Performed by: INTERNAL MEDICINE

## 2024-05-01 PROCEDURE — 97110 THERAPEUTIC EXERCISES: CPT | Mod: GO

## 2024-05-01 PROCEDURE — 36415 COLL VENOUS BLD VENIPUNCTURE: CPT | Performed by: EMERGENCY MEDICINE

## 2024-05-01 PROCEDURE — 250N000011 HC RX IP 250 OP 636: Mod: JZ | Performed by: INTERNAL MEDICINE

## 2024-05-01 PROCEDURE — P9047 ALBUMIN (HUMAN), 25%, 50ML: HCPCS | Performed by: INTERNAL MEDICINE

## 2024-05-01 RX ORDER — BUMETANIDE 0.25 MG/ML
3 INJECTION INTRAMUSCULAR; INTRAVENOUS EVERY 8 HOURS
Status: DISCONTINUED | OUTPATIENT
Start: 2024-05-01 | End: 2024-05-02

## 2024-05-01 RX ORDER — ACETAZOLAMIDE 500 MG/5ML
500 INJECTION, POWDER, LYOPHILIZED, FOR SOLUTION INTRAVENOUS ONCE
Qty: 5 ML | Refills: 0 | Status: COMPLETED | OUTPATIENT
Start: 2024-05-01 | End: 2024-05-01

## 2024-05-01 RX ORDER — ALBUMIN (HUMAN) 12.5 G/50ML
50 SOLUTION INTRAVENOUS ONCE
Status: COMPLETED | OUTPATIENT
Start: 2024-05-01 | End: 2024-05-01

## 2024-05-01 RX ORDER — BUMETANIDE 0.25 MG/ML
3 INJECTION INTRAMUSCULAR; INTRAVENOUS EVERY 8 HOURS
Status: DISCONTINUED | OUTPATIENT
Start: 2024-05-01 | End: 2024-05-01

## 2024-05-01 RX ORDER — POTASSIUM CHLORIDE 1500 MG/1
20 TABLET, EXTENDED RELEASE ORAL ONCE
Qty: 1 TABLET | Refills: 0 | Status: COMPLETED | OUTPATIENT
Start: 2024-05-01 | End: 2024-05-01

## 2024-05-01 RX ADMIN — ACETAMINOPHEN 1000 MG: 500 TABLET ORAL at 22:17

## 2024-05-01 RX ADMIN — POLYETHYLENE GLYCOL 3350 17 G: 17 POWDER, FOR SOLUTION ORAL at 09:22

## 2024-05-01 RX ADMIN — MICONAZOLE NITRATE: 20 POWDER TOPICAL at 10:44

## 2024-05-01 RX ADMIN — APIXABAN 5 MG: 5 TABLET, FILM COATED ORAL at 21:13

## 2024-05-01 RX ADMIN — FERROUS SULFATE TAB 325 MG (65 MG ELEMENTAL FE) 325 MG: 325 (65 FE) TAB at 09:05

## 2024-05-01 RX ADMIN — ALBUMIN HUMAN 50 G: 0.25 SOLUTION INTRAVENOUS at 06:28

## 2024-05-01 RX ADMIN — MICONAZOLE NITRATE: 20 POWDER TOPICAL at 21:20

## 2024-05-01 RX ADMIN — BUMETANIDE 3 MG: 0.25 INJECTION INTRAMUSCULAR; INTRAVENOUS at 06:52

## 2024-05-01 RX ADMIN — BUMETANIDE 3 MG: 0.25 INJECTION INTRAMUSCULAR; INTRAVENOUS at 17:30

## 2024-05-01 RX ADMIN — SPIRONOLACTONE 25 MG: 25 TABLET, FILM COATED ORAL at 09:03

## 2024-05-01 RX ADMIN — APIXABAN 5 MG: 5 TABLET, FILM COATED ORAL at 09:02

## 2024-05-01 RX ADMIN — POTASSIUM CHLORIDE 20 MEQ: 1500 TABLET, EXTENDED RELEASE ORAL at 09:05

## 2024-05-01 RX ADMIN — POTASSIUM CHLORIDE 10 MEQ: 750 TABLET, EXTENDED RELEASE ORAL at 09:05

## 2024-05-01 RX ADMIN — METOPROLOL SUCCINATE 50 MG: 50 TABLET, EXTENDED RELEASE ORAL at 09:06

## 2024-05-01 RX ADMIN — ACETAZOLAMIDE 500 MG: 500 INJECTION, POWDER, LYOPHILIZED, FOR SOLUTION INTRAVENOUS at 12:44

## 2024-05-01 RX ADMIN — Medication 5 MG: at 21:13

## 2024-05-01 ASSESSMENT — ACTIVITIES OF DAILY LIVING (ADL)
ADLS_ACUITY_SCORE: 33

## 2024-05-01 NOTE — PROGRESS NOTES
HEART CARE NOTE          Assessment/Recommendations     1. HFpEF/Valvular heart disease c/b severe ADHF  Assessment / Plan  Presented hypervolemic on physical  with > 65 lb of fluid retention in the setting of medication and diet non-compliance  Diuresing well on current regimen; continue to monitor UOP and renal function closely - no changes at this time  High risk for adverse cardiac events 2/2 advanced age, frailty, non-compliance, elevated NTproBNP  GDMT as detailed below; mainstay of treatment for HFpEF includes diuretics and adequate BP control (class I) and SGLT2-I (class 2a); additional medical therapy (ARNI, MRA, ARB) demonstrated less robust evidence for indication but may be considered per guideline recommendations (2b); no indication for BBlockers      Current Pharmacotherapy AHA Guideline-Directed Medical Therapy   Losartan 25 mg daily - on hold 2/2 hypotension ARNI/ARB   Spironolactone 25 mg  MRA   SGLT2 inhibitor not started SGLT2-I    Bumex IV Loop diuretic        2. Atrial fibrillation  Assessment / Plan  Rate controlled - currently on metoprolol and apixaban     3. Valvular heart disease  Assessment / Plan  LORRAINE 0.8 - will have patient follow-up with valve clinic in the outpatient setting  Severe TR currently enrolled in CLASP II TR study - s/p 1 PASCALACE device    Plan of care discussed on May 1, 2024 with patient at bedside, and primary team overseeing patient's care      History of Present Illness/Subjective    Ms. Misty Quezada is a 68 year old female with a PMHx significant for (per Epic notation) CHFpEF, tricuspid regurgitation, HTN, A-fib, lymphedema admitted on 4/23/2024 with complaints of shortness of breath and increased leg swelling due to noncompliance with Bumex.  Patient states she stopped taking it a month ago and has gained 70 pounds since.      Today, Mrs. Quezada denies acute cardiac events or complaints; Management plan as detailed above     ECG: Personally reviewed. Afib  with RVR.     Repeat echo personally reviewed 4/25/24:  There is moderate concentric left ventricular hypertrophy.  The visual ejection fraction is 55-60%.  Flattened septum is consistent with RV pressure/volume overload.  The right ventricular systolic function is normal.  Low flow, low gradient moderate to severe AS with mean gradient of 25 mmHg.  The calculated aortic valve area is 0.8cm2.  A single Janette-Ace [id number] repair device is securely attached to the  tricuspid leaflets. There is mild to moderate (1-2+) tricuspid regurgitation.  The right ventricular systolic pressure is approximated at 62 mmHg.  Compared to the prior study dated 10/18/2023, there are changes as noted. The  aortic valve stenosis appears to be more severe and pulmonary artery pressures  are higher.     ECHO (personnaly Reviewed on 4/24/24):   1. Left ventricular chamber size, wall thickness and systolic function are  normal. The visually estimated left ventricular ejection fraction is 55-60%.  2. Right ventricle is not well visualized. Chamber size may be mildly  enlarged. Systolic function may be mildly reduced.  3. Severe right atrial enlargement. Moderate left atrial enlargement.  4. A single Janette-Ace [id number] repair device is securely attached to the  tricuspid valve leaflets. There is mild-to-moderate residual tricuspid  regurgitation. No evidence of significant stenosis with peak inflow velocity  1.2 m/s and mean inflow gradient 3 mmHg at a heart rate of approximately 90  beats per minute.  5. Calcified trileaflet aoritc valve with paradoxical low flow, low gradient  (stroke volume index 22.2 mL/m2) aortic stenosis felt to be mild-to-moderate  in severity. Peak forward velocity measures 2.5 m/s, mean gradient 14 mmHg,  calculated aortic valve area 1.1 cm2, and dimensionless index 0.27. No  significant aortic regurgitation.  6. Mild pulmonary hypertension is present with an estimated pulmonary artery  systolic pressure of  43 mmHg.  7. Compared to the prior study dated 6/1/2023, the degree of tricuspid  regurgitation is less.    Telemetry: personally reviewed May 1, 2024; notable for atrial fibrillation     Lab results: personally reviewed May 1, 2024; notable for evolving contraction alkalosis - will give acetazolamide    Medical history and pertinent documents reviewed in Care Everywhere please where applicable see details above        Physical Examination Review of Systems   /66 (BP Location: Right arm)   Pulse 84   Temp 97.9  F (36.6  C) (Oral)   Resp 18   Wt 124.8 kg (275 lb 1.6 oz)   SpO2 92%   BMI 43.09 kg/m    Body mass index is 43.09 kg/m .  Wt Readings from Last 3 Encounters:   05/01/24 124.8 kg (275 lb 1.6 oz)   04/23/24 (!) 152.9 kg (337 lb)   11/14/23 121.6 kg (268 lb)     General Appearance:   no distress, normal body habitus   ENT/Mouth: membranes moist, no oral lesions or bleeding gums.      EYES:  no scleral icterus, normal conjunctivae   Neck: no carotid bruits or thyromegaly   Chest/Lungs:   lungs are clear to auscultation, no rales or wheezing, equal chest wall expansion    Cardiovascular:   Irregular. Normal first and second heart sounds with no murmurs, rubs, or gallops; the carotid, radial and posterior tibial pulses are intact, + JVD and LE edema bilaterally    Abdomen:  no organomegaly, masses, bruits, or tenderness; bowel sounds are present   Extremities: no cyanosis or clubbing   Skin: no xanthelasma, warm.    Neurologic: NAD     Psychiatric: alert and oriented x3, calm     A complete 10 systems ROS was reviewed  And is negative except what is listed in the HPI.          Medical History  Surgical History Family History Social History   Past Medical History:   Diagnosis Date    (HFpEF) heart failure with preserved ejection fraction (H) 10/17/2022    Aortic valve stenosis 11/10/2022    Benign essential hypertension     Chronic atrial fibrillation (H)     Lymphedema 9/24/2022    Nonrheumatic  tricuspid valve regurgitation 2/6/2023    Past Surgical History:   Procedure Laterality Date    APPENDECTOMY      CHOLECYSTECTOMY      CV CORONARY ANGIOGRAM N/A 3/15/2023    Procedure: Coronary Angiogram;  Surgeon: Howard Plummer MD;  Location: [facility] CATH [facility] CV    CV LEFT HEART CATH N/A 3/15/2023    Procedure: Left Heart Catheterization;  Surgeon: Howard Plummer MD;  Location: [facility]ATH [facility]    CV RIGHT HEART CATH MEASUREMENTS RECORDED N/A 3/15/2023    Procedure: Right Heart Catheterization;  Surgeon: Howard Plummer MD;  Location: [facility] CATH LAB CV    CV TRANSCATHETER TRICUSPID VALVE REPAIR N/A 5/3/2023    Procedure: Transcatheter Tricuspid Valve Repair;  Surgeon: Howard Plummer MD;  Location: [facility] CATH LAB CV    GASTRIC BYPASS      OR TRANSCATHETER TRICUSPID VALVE REPLACEMENT, FEMORAL PERCUTANEOUS APPROACH (STANDBY) N/A 5/3/2023    Procedure: OR TRANSCATHETER TRICUSPID VALVE REPLACEMENT, FEMORAL PERCUTANEOUS APPROACH (STANDBY);  Surgeon: Kana Calderón MD;  Location: [facility] CATH LAB CV    no family history of premature coronary artery disease Social History     Socioeconomic History    Marital status:      Spouse name: Not on file    Number of children: Not on file    Years of education: Not on file    Highest education level: Not on file   Occupational History    Not on file   Tobacco Use    Smoking status: Never    Smokeless tobacco: Never   Substance and Sexual Activity    Alcohol use: Not Currently     Comment: Rarely Uses alcohol    Drug use: Never    Sexual activity: Not on file   Other Topics Concern    Not on file   Social History Narrative    Not on file     Social Determinants of Health     Financial Resource Strain: Not on file   Food Insecurity: Not on file   Transportation Needs: Not on file   Physical Activity: Not on file   Stress: Not on file   Social Connections: Not on file   Interpersonal Safety: Not on file   Housing Stability: Not on file            Lab Results    Chemistry/lipid CBC Cardiac Enzymes/BNP/TSH/INR   Lab Results   Component Value Date    CHOL 163 09/09/2016    HDL 42 (L) 09/09/2016    TRIG 81 09/09/2016    BUN 29.3 (H) 04/30/2024     04/30/2024    CO2 40 (H) 04/30/2024    Lab Results   Component Value Date    WBC 5.6 04/24/2024    HGB 13.0 04/24/2024    HCT 42.2 04/24/2024     (H) 04/24/2024     04/24/2024    Lab Results   Component Value Date    TROPONINI 1.32 (HH) 08/21/2021     (H) 09/02/2021    TSH 3.67 04/24/2024    INR 1.21 (H) 05/04/2023     Lab Results   Component Value Date    TROPONINI 1.32 (HH) 08/21/2021          Weight:    Wt Readings from Last 3 Encounters:   05/01/24 124.8 kg (275 lb 1.6 oz)   04/23/24 (!) 152.9 kg (337 lb)   11/14/23 121.6 kg (268 lb)       Allergies  No Known Allergies      Surgical History  Past Surgical History:   Procedure Laterality Date    APPENDECTOMY      CHOLECYSTECTOMY      CV CORONARY ANGIOGRAM N/A 3/15/2023    Procedure: Coronary Angiogram;  Surgeon: Howard Plummer MD;  Location: [facility]    CV LEFT HEART CATH N/A 3/15/2023    Procedure: Left Heart Catheterization;  Surgeon: Howard Plummer MD;  Location: [facility]    CV RIGHT HEART CATH MEASUREMENTS RECORDED N/A 3/15/2023    Procedure: Right Heart Catheterization;  Surgeon: Howard Plummer MD;  Location: [facility] LAB CV    CV TRANSCATHETER TRICUSPID VALVE REPAIR N/A 5/3/2023    Procedure: Transcatheter Tricuspid Valve Repair;  Surgeon: Howadr Plummer MD;  Location: [facility] CATH LAB CV    GASTRIC BYPASS      OR TRANSCATHETER TRICUSPID VALVE REPLACEMENT, FEMORAL PERCUTANEOUS APPROACH (STANDBY) N/A 5/3/2023    Procedure: OR TRANSCATHETER TRICUSPID VALVE REPLACEMENT, FEMORAL PERCUTANEOUS APPROACH (STANDBY);  Surgeon: Kana Calderón MD;  Location: [facility] CV       Social History  Tobacco:   History   Smoking Status    Never   Smokeless Tobacco    Never     Alcohol:   Social History    Substance and Sexual Activity      Alcohol use: Not Currently        Comment: Rarely Uses alcohol   Illicit Drugs:   History   Drug Use Unknown       Family History  Family History   Problem Relation Age of Onset    No Known Problems Mother     No Known Problems Father           Tara Siegel MD on 5/1/2024      cc: Lizbet Baer

## 2024-05-01 NOTE — PLAN OF CARE
Goal Outcome Evaluation:      Plan of Care Reviewed With: patient    Overall Patient Progress: no changeOverall Patient Progress: no change           Problem: Adult Inpatient Plan of Care  Goal: Plan of Care Review  Description: The Plan of Care Review/Shift note should be completed every shift.  The Outcome Evaluation is a brief statement about your assessment that the patient is improving, declining, or no change.  This information will be displayed automatically on your shift  note.  Outcome: Progressing  Flowsheets (Taken 5/1/2024 0241)  Plan of Care Reviewed With: patient  Overall Patient Progress: no change     Problem: Adult Inpatient Plan of Care  Goal: Optimal Comfort and Wellbeing  Intervention: Monitor Pain and Promote Comfort  Flowsheets (Taken 5/1/2024 0241)  Pain Management Interventions:   medication (see MAR)   emotional support   rest     Problem: Adult Inpatient Plan of Care  Goal: Readiness for Transition of Care  Intervention: Mutually Develop Transition Plan  Flowsheets (Taken 5/1/2024 0241)  Anticipated Changes Related to Illness: inability to care for self       Pt is alert and oriented x4 and follows commands. Affect is flat, pt is otherwise calm and cooperative. VSS on 2-3 L NC/oximask overnight. LE wraps remained on for lymphedema. Pt complains of lower extremity tenderness and arthritic knee pain for which PRN tylenol was given. Strict Is & Os and fluid restriction maintained. Pt has chronic atrial fibrillation. Significant redness under both breasts and skin folds of perineum noted; powder applied per MAR. Albumin started and IVP bumex given. Shift  ml. Plan is to discharge to TCU after diuresis.      See flowsheets for full assessments.

## 2024-05-01 NOTE — PLAN OF CARE
Goal Outcome Evaluation:  A/Ox4. Flat affect. VSS. Denies pain. LS diminished. Sats in 90s on 1L while awake, 2L for sleep. Tele discontinued. Good UOP. Hi in place for strict I/O. 1800 FR. K and Mag protocol, AM recheck. Up in halls assist x1 with walker/gait. Up in chair for meals. Chair alarm on, call light in reach. Makes needs known.       Heart Failure Care Map  GOALS TO BE MET BEFORE DISCHARGE:    1. Decrease congestion and/or edema with diuretic therapy to achieve near optimal volume status.     Dyspnea improved: No, further care required to meet this goal. Please explain SOB with activity   Edema improved: No, further care required to meet this goal. Please explain Lymphedema wraps in place for +4 edema.         Last 24 hour I/O:   Intake/Output Summary (Last 24 hours) at 5/1/2024 1800  Last data filed at 5/1/2024 1600  Gross per 24 hour   Intake 1160 ml   Output 3350 ml   Net -2190 ml           Net I/O and Weights since admission:   04/01 2300 - 05/01 2259  In: 7474.2 [P.O.:6570; I.V.:904.2]  Out: 11514 [Urine:47489]  Net: -96693.8     Vitals:    04/23/24 1531 04/23/24 2305 04/24/24 0500 04/25/24 0637   Weight: (!) 152.9 kg (337 lb) 144.8 kg (319 lb 3.2 oz) 141.3 kg (311 lb 9.6 oz) 137.1 kg (302 lb 3.2 oz)    04/26/24 0646 04/27/24 0307 04/28/24 0453 04/29/24 0415   Weight: 132.2 kg (291 lb 6.4 oz) 127.3 kg (280 lb 10.3 oz) 124.4 kg (274 lb 4.8 oz) 124.5 kg (274 lb 6.4 oz)    04/30/24 0710 05/01/24 0354   Weight: 125.1 kg (275 lb 12.7 oz) 124.8 kg (275 lb 1.6 oz)       2.  O2 sats > 90% on room air, or at prior home O2 therapy level.      Able to wean O2 this shift to keep sats above 90%?: No, further care required to meet this goal. Please explain Pt still requires 1-2L NC   Does patient use Home O2? No          Current oxygenation status:   SpO2: 93 %     O2 Device: Nasal cannula, Oxygen Delivery: 1 LPM    3.  Tolerates ambulation and mobility near baseline.     Ambulation: No, further care required  to meet this goal. Please explain Pt still weak and SOB. Walked x2 with therapy and up to BR    Times patient ambulated with staff this shift: 3    Please review the Heart Failure Care Map for additional HF goal outcomes.    Linsey Cia RN  5/1/2024

## 2024-05-01 NOTE — PROGRESS NOTES
[facility]    Medicine Progress Note - Hospitalist Service    Date of Admission:  4/23/2024    Assessment & Plan   Misty Quezada is a 68 year old female with PMH CHFpEF, tricuspid regurgitation, HTN, A-fib, lymphedema admitted on 4/23/2024 with complaints of shortness of breath and increased leg swelling due to noncompliance with low salt diet, fluid restriction, and Bumex.  Patient states she stopped taking it a month ago and has gained 70 pounds since. She went to cardiology clinic who recommend to come to ER. Admitted for acute on chronic CHF     Acute hypoxic respiratory failure  HFpEF acute on chronic  -- ProBNP:2,391  -- c/w PTA spironolactone  -- Cardiology consult appreciated  -- TTE:LVEF: 55-60%, flattened septum is consistent with RV pressure/volume overload.  -- Strict I&O's, Daily weights  -- Cont fluid restriction  -- Oxygen supplement via NC. Wean as able.  -- S/p Albumin on 04/24 and 04/25  -- Diuresed aggressively with lasix infusion, changed to PO bumex 4/29, then to IV Bumex 4/30.   -- Lymphedema OT consulted for lymphedema wrap     Hypokalemia  -- Replace per protocol     Hypomagnesemia  -- Replace per protocol     Elevated troponin  -- No chest pain, no e/o ischemia on EKG  -- Likely 2/2 demand ischemia     A-fib  -- Rate controlled  -- PTA apixaban, metoprolol     Valvular heart disease  Moderate to severe AS  Severe TR-Janette-Ace repair device in place           Diet: Combination Diet 2 gm NA Diet; No Caffeine Diet (and additional linked orders)  Fluid restriction 1800 ML FLUID (and additional linked orders)  Fluid restriction 1800 ML FLUID    DVT Prophylaxis: DOAC  Hi Catheter: PRESENT, indication: Non-ICU: q2 hour intake/output with documentation  Lines: None     Cardiac Monitoring: ACTIVE order. Indication: Acute decompensated heart failure (48 hours)  Code Status: Full Code      Clinically Significant Risk Factors                  # Hypertension: Noted on  "problem list  # Acute heart failure with preserved ejection fraction: heart failure noted on problem list, last echo with EF >50%, and receiving IV diuretics       # Severe Obesity: Estimated body mass index is 43.09 kg/m  as calculated from the following:    Height as of an earlier encounter on 4/23/24: 1.702 m (5' 7\").    Weight as of this encounter: 124.8 kg (275 lb 1.6 oz).        # Financial/Environmental Concerns: none         Disposition Plan     Medically Ready for Discharge: Anticipated in 2-4 Days             HERB Payan  Hospitalist Service  [facility]  Securely message with [employer] (more info)  Text page via [employer] Paging/Directory   ______________________________________________________________________    Interval History   Patient seen and examined this morning. Sitting in a recliner. Lymphedema wraps in place. Patient offered no new issues. No acute issues reported overnight.       Physical Exam   Vital Signs: Temp: 98.1  F (36.7  C) Temp src: Oral BP: 96/55 Pulse: 82   Resp: 18 SpO2: 93 % O2 Device: Nasal cannula Oxygen Delivery: 1 LPM  Weight: 275 lbs 1.6 oz      General: Not in obvious distress.  HEENT: NC, AT   Chest: Clear to auscultation bilaterally  Heart: S1S2 normal, irregular. Soft systolic murmur in the aortic area  Abdomen: Soft. NT, ND. Bowel sounds- active.  Extremities: 2-3+ leg swelling bilaterally  Neuro: Awake, grossly non-focal      Medical Decision Making             Data     I have personally reviewed the following data over the past 24 hrs:    N/A  \   N/A   / N/A     143 96 (L) 36.3 (H) /  94   3.8; 3.8 35 (H) 0.82 \       Imaging results reviewed over the past 24 hrs:   No results found for this or any previous visit (from the past 24 hour(s)).  "

## 2024-05-02 ENCOUNTER — ALLIED HEALTH/NURSE VISIT (OUTPATIENT)
Dept: CARDIOLOGY | Facility: CLINIC | Age: 69
End: 2024-05-02
Payer: MEDICARE

## 2024-05-02 ENCOUNTER — APPOINTMENT (OUTPATIENT)
Dept: OCCUPATIONAL THERAPY | Facility: HOSPITAL | Age: 69
DRG: 291 | End: 2024-05-02
Payer: MEDICARE

## 2024-05-02 DIAGNOSIS — Z00.6 EXAMINATION OF PARTICIPANT OR CONTROL IN CLINICAL RESEARCH: Primary | ICD-10-CM

## 2024-05-02 LAB
ANION GAP SERPL CALCULATED.3IONS-SCNC: 11 MMOL/L (ref 7–15)
BUN SERPL-MCNC: 38.3 MG/DL (ref 8–23)
CALCIUM SERPL-MCNC: 10.2 MG/DL (ref 8.8–10.2)
CHLORIDE SERPL-SCNC: 95 MMOL/L (ref 98–107)
CREAT SERPL-MCNC: 0.95 MG/DL (ref 0.51–0.95)
DEPRECATED HCO3 PLAS-SCNC: 36 MMOL/L (ref 22–29)
EGFRCR SERPLBLD CKD-EPI 2021: 65 ML/MIN/1.73M2
GLUCOSE SERPL-MCNC: 102 MG/DL (ref 70–99)
MAGNESIUM SERPL-MCNC: 2.2 MG/DL (ref 1.7–2.3)
POTASSIUM SERPL-SCNC: 3.7 MMOL/L (ref 3.4–5.3)
SODIUM SERPL-SCNC: 142 MMOL/L (ref 135–145)

## 2024-05-02 PROCEDURE — P9047 ALBUMIN (HUMAN), 25%, 50ML: HCPCS | Performed by: INTERNAL MEDICINE

## 2024-05-02 PROCEDURE — 36415 COLL VENOUS BLD VENIPUNCTURE: CPT | Performed by: INTERNAL MEDICINE

## 2024-05-02 PROCEDURE — 250N000011 HC RX IP 250 OP 636: Performed by: INTERNAL MEDICINE

## 2024-05-02 PROCEDURE — 120N000004 HC R&B MS OVERFLOW

## 2024-05-02 PROCEDURE — 99233 SBSQ HOSP IP/OBS HIGH 50: CPT | Performed by: INTERNAL MEDICINE

## 2024-05-02 PROCEDURE — 250N000013 HC RX MED GY IP 250 OP 250 PS 637: Performed by: INTERNAL MEDICINE

## 2024-05-02 PROCEDURE — 97535 SELF CARE MNGMENT TRAINING: CPT | Mod: GO

## 2024-05-02 PROCEDURE — 83735 ASSAY OF MAGNESIUM: CPT | Performed by: INTERNAL MEDICINE

## 2024-05-02 PROCEDURE — 97110 THERAPEUTIC EXERCISES: CPT | Mod: GO

## 2024-05-02 PROCEDURE — 250N000013 HC RX MED GY IP 250 OP 250 PS 637: Performed by: STUDENT IN AN ORGANIZED HEALTH CARE EDUCATION/TRAINING PROGRAM

## 2024-05-02 PROCEDURE — 80048 BASIC METABOLIC PNL TOTAL CA: CPT | Performed by: INTERNAL MEDICINE

## 2024-05-02 PROCEDURE — 99207 PR NO CHARGE-RESEARCH SERVICE: CPT

## 2024-05-02 RX ORDER — ACETAZOLAMIDE 500 MG/5ML
500 INJECTION, POWDER, LYOPHILIZED, FOR SOLUTION INTRAVENOUS ONCE
Status: COMPLETED | OUTPATIENT
Start: 2024-05-02 | End: 2024-05-02

## 2024-05-02 RX ORDER — ALBUMIN (HUMAN) 12.5 G/50ML
50 SOLUTION INTRAVENOUS ONCE
Status: COMPLETED | OUTPATIENT
Start: 2024-05-02 | End: 2024-05-02

## 2024-05-02 RX ORDER — POTASSIUM CHLORIDE 1500 MG/1
20 TABLET, EXTENDED RELEASE ORAL ONCE
Status: COMPLETED | OUTPATIENT
Start: 2024-05-02 | End: 2024-05-02

## 2024-05-02 RX ADMIN — ACETAZOLAMIDE 500 MG: 500 INJECTION, POWDER, LYOPHILIZED, FOR SOLUTION INTRAVENOUS at 08:37

## 2024-05-02 RX ADMIN — POTASSIUM CHLORIDE 10 MEQ: 750 TABLET, EXTENDED RELEASE ORAL at 08:37

## 2024-05-02 RX ADMIN — MICONAZOLE NITRATE: 20 POWDER TOPICAL at 08:35

## 2024-05-02 RX ADMIN — POTASSIUM CHLORIDE 20 MEQ: 1500 TABLET, EXTENDED RELEASE ORAL at 08:36

## 2024-05-02 RX ADMIN — BUMETANIDE 3 MG: 2 TABLET ORAL at 17:19

## 2024-05-02 RX ADMIN — SPIRONOLACTONE 25 MG: 25 TABLET, FILM COATED ORAL at 08:36

## 2024-05-02 RX ADMIN — APIXABAN 5 MG: 5 TABLET, FILM COATED ORAL at 21:22

## 2024-05-02 RX ADMIN — Medication 5 MG: at 21:22

## 2024-05-02 RX ADMIN — ALBUMIN HUMAN 50 G: 0.25 SOLUTION INTRAVENOUS at 06:56

## 2024-05-02 RX ADMIN — BUMETANIDE 3 MG: 0.25 INJECTION INTRAMUSCULAR; INTRAVENOUS at 00:44

## 2024-05-02 RX ADMIN — APIXABAN 5 MG: 5 TABLET, FILM COATED ORAL at 08:36

## 2024-05-02 RX ADMIN — ACETAMINOPHEN 1000 MG: 500 TABLET ORAL at 15:10

## 2024-05-02 RX ADMIN — METOPROLOL SUCCINATE 50 MG: 50 TABLET, EXTENDED RELEASE ORAL at 08:35

## 2024-05-02 RX ADMIN — POLYETHYLENE GLYCOL 3350 17 G: 17 POWDER, FOR SOLUTION ORAL at 08:37

## 2024-05-02 RX ADMIN — BUMETANIDE 3 MG: 2 TABLET ORAL at 08:36

## 2024-05-02 RX ADMIN — MICONAZOLE NITRATE: 20 POWDER TOPICAL at 21:22

## 2024-05-02 ASSESSMENT — ACTIVITIES OF DAILY LIVING (ADL)
ADLS_ACUITY_SCORE: 32
ADLS_ACUITY_SCORE: 32
ADLS_ACUITY_SCORE: 33
ADLS_ACUITY_SCORE: 32
ADLS_ACUITY_SCORE: 33
ADLS_ACUITY_SCORE: 32
ADLS_ACUITY_SCORE: 33
ADLS_ACUITY_SCORE: 33
ADLS_ACUITY_SCORE: 32
ADLS_ACUITY_SCORE: 33
ADLS_ACUITY_SCORE: 32
ADLS_ACUITY_SCORE: 33

## 2024-05-02 NOTE — PROGRESS NOTES
[facility]    Medicine Progress Note - Hospitalist Service    Date of Admission:  4/23/2024    Assessment & Plan   Misty Quezada is a 68 year old female with PMH CHFpEF, tricuspid regurgitation, HTN, A-fib, lymphedema admitted on 4/23/2024 with complaints of shortness of breath and increased leg swelling due to noncompliance with low salt diet, fluid restriction, and Bumex.  Patient states she stopped taking it a month ago and has gained 70 pounds since. She went to cardiology clinic who recommend to come to ER. Admitted for acute on chronic CHF     Acute hypoxic respiratory failure  HFpEF acute on chronic  -- ProBNP:2,391  -- c/w PTA spironolactone  -- Cardiology consult appreciated  -- TTE:LVEF: 55-60%, flattened septum is consistent with RV pressure/volume overload.  -- Strict I&O's, Daily weights  -- Cont fluid restriction  -- Oxygen supplement via NC. Wean as able.  -- S/p Albumin on 04/24 and 04/25  -- Diuresed aggressively with lasix infusion, changed to PO bumex 4/29, then to IV Bumex 4/30. Switched to PO bumex today. Reviewed with cardiology. Would like to watch her today.   -- Lymphedema OT consulted for lymphedema wrap     Hypokalemia  -- Replace per protocol     Hypomagnesemia  -- Replace per protocol     Elevated troponin  -- No chest pain, no e/o ischemia on EKG  -- Likely 2/2 demand ischemia     A-fib  -- Rate controlled  -- PTA apixaban, metoprolol     Valvular heart disease  Moderate to severe AS  Severe TR-Janette-Ace repair device in place           Diet: Combination Diet 2 gm NA Diet; No Caffeine Diet (and additional linked orders)  Fluid restriction 1800 ML FLUID (and additional linked orders)  Fluid restriction 1800 ML FLUID    DVT Prophylaxis: DOAC  Hi Catheter: PRESENT, indication: Non-ICU: q2 hour intake/output with documentation  Lines: None     Cardiac Monitoring: None  Code Status: Full Code      Clinically Significant Risk Factors           # Hypercalcemia:  "Highest Ca = 10.2 mg/dL in last 2 days, will monitor as appropriate        # Hypertension: Noted on problem list  # Acute heart failure with preserved ejection fraction: heart failure noted on problem list, last echo with EF >50%, and receiving IV diuretics       # Severe Obesity: Estimated body mass index is 42.01 kg/m  as calculated from the following:    Height as of an earlier encounter on 4/23/24: 1.702 m (5' 7\").    Weight as of this encounter: 121.7 kg (268 lb 3.2 oz).        # Financial/Environmental Concerns: none         Disposition Plan     Medically Ready for Discharge: TCU tomorrow.             HERB Payan  Hospitalist Service  [facility]  Securely message with [employer] (more info)  Text page via [employer] Paging/Directory   ______________________________________________________________________    Interval History   Patient seen and examined this morning. No new issues reported overnight.       Physical Exam   Vital Signs: Temp: 97.9  F (36.6  C) Temp src: Oral BP: 111/68 Pulse: 78   Resp: 18 SpO2: 92 % O2 Device: Nasal cannula Oxygen Delivery: 1 LPM  Weight: 268 lbs 3.2 oz      General: Not in obvious distress.  HEENT: NC, AT   Chest: Clear to auscultation bilaterally  Heart: S1S2 normal, irregular. Soft systolic murmur in the aortic area  Abdomen: Soft. NT, ND. Bowel sounds- active.  Extremities: 2-3+ leg swelling bilaterally  Neuro: Awake, grossly non-focal      Medical Decision Making             Data     I have personally reviewed the following data over the past 24 hrs:    N/A  \   N/A   / N/A     142 95 (L) 38.3 (H) /  102 (H)   3.7 36 (H) 0.95 \       Imaging results reviewed over the past 24 hrs:   No results found for this or any previous visit (from the past 24 hour(s)).  "

## 2024-05-02 NOTE — PROGRESS NOTES
..Heart Failure Care Map  GOALS TO BE MET BEFORE DISCHARGE:    1. Decrease congestion and/or edema with diuretic therapy to achieve near optimal volume status.     Dyspnea improved: Yes, satisfactory for discharge.   Edema improved: No, further care required to meet this goal. Please explain Need further diuresis and Lymphedema wrap  in place intermittently as this gives her lot of discomfort        Last 24 hour I/O:   Intake/Output Summary (Last 24 hours) at 5/2/2024 1646  Last data filed at 5/2/2024 1429  Gross per 24 hour   Intake 600 ml   Output 4050 ml   Net -3930 ml           Net I/O and Weights since admission:   04/02 2300 - 05/02 2259  In: 7594.2 [P.O.:6690; I.V.:904.2]  Out: 84978 [Urine:10940]  Net: -26054.8     Vitals:    04/23/24 1531 04/23/24 2305 04/24/24 0500 04/25/24 0637   Weight: (!) 152.9 kg (337 lb) 144.8 kg (319 lb 3.2 oz) 141.3 kg (311 lb 9.6 oz) 137.1 kg (302 lb 3.2 oz)    04/26/24 0646 04/27/24 0307 04/28/24 0453 04/29/24 0415   Weight: 132.2 kg (291 lb 6.4 oz) 127.3 kg (280 lb 10.3 oz) 124.4 kg (274 lb 4.8 oz) 124.5 kg (274 lb 6.4 oz)    04/30/24 0710 05/01/24 0354 05/02/24 0429   Weight: 125.1 kg (275 lb 12.7 oz) 124.8 kg (275 lb 1.6 oz) 121.7 kg (268 lb 3.2 oz)       2.  O2 sats > 90% on room air, or at prior home O2 therapy level.      Able to wean O2 this shift to keep sats above 90%?: No, further care required to meet this goal. Please explain Still needing O2 treatment   Does patient use Home O2? No          Current oxygenation status:   SpO2: 93 %     O2 Device: Nasal cannula, Oxygen Delivery: 1 LPM    3.  Tolerates ambulation and mobility near baseline.     Ambulation: Yes, satisfactory for discharge.   Times patient ambulated with staff this shift: 1 Was up walking in th hallway and up in the recliner and at the side of bed     Please review the Heart Failure Care Map for additional HF goal outcomes.    Cindy Arnold RN  5/2/2024

## 2024-05-02 NOTE — PROGRESS NOTES
Care Management Follow Up    Length of Stay (days): 9    Expected Discharge Date: 05/03/2024     Concerns to be Addressed:     discharge planning  Patient plan of care discussed at interdisciplinary rounds: Yes    Anticipated Discharge Disposition:  [facility]     Anticipated Discharge Services:  TCU  Anticipated Discharge DME:  n/a    Patient/family educated on Medicare website which has current facility and service quality ratings:  yes  Education Provided on the Discharge Plan: yes   Patient/Family in Agreement with the Plan:  yes    Referrals Placed by CM/SW:    Private pay costs discussed: Not applicable    Additional Information:  Per physician, pt will likely be here one more day. [facility] is able to take her today or tomorrow.   ZURI intern contacted son Samuel to update him on pt's discharge plan. Samuel stated his understanding and was agreeable. Samuel is unable to transport pt at discharge, stated that [facility] may be able to. [facility] intern called and left voice mail to [facility].   [facility] contacted [facility] intern and stated that he is able to transport the pt if she is ready to discharge before 11 am. Physician states that pt will likely be medically ready to discharge by that time.   SW intern visited pt in room to inquire if she would be agreeable to a 10 am discharge time. Pt was agreeable.   SW intern called [facility] to update him that pt will likely be medically ready by 10 am. [facility] was agreeable and plans to transport her at that time. SW intern called [facility] to update them, admissions stated that they are able to admit her around that time, however they would need discharge orders, the PAS number, and scripts by 8 am on 5/3. Physician notified of TCU's request. PAS completed.     KARMEN [facility]   5/2/2024

## 2024-05-02 NOTE — PROGRESS NOTES
Spoke to pt in tele unit during ongoing hospitalization beginning 4/23. Previously, writer had spoken to pt on phone 4/23 regarding 1Y visit for the CLASP II TR trial and need to schedule TTE and study visit for assessments. PT at the time was at the clinic office to be seen and requested call back at a later time, but was subsequently sent to the ER for acute on chronic diastolic congestive heart failure (see note from 4/26) and was able to follow up regarding scheduling.    Pt and writer discussed trial involvement within the context of hospitalization and subsequent follow ups. Pt and writer agreed to follow up at a later date due to ongoing hospitalization and planned transfer to TCU and difficulty determining logistics at this time. Writer expressed understanding and will follow pt through hospitalization for adverse event reporting.

## 2024-05-02 NOTE — PROGRESS NOTES
HEART CARE NOTE          Assessment/Recommendations   1. HFpEF/Valvular heart disease c/b severe ADHF  Assessment / Plan  Presented hypervolemic on physical  with > 65 lb of fluid retention in the setting of medication and diet non-compliance  Transition to oral diuretic regimen; continue to monitor UOP and renal function closely   High risk for adverse cardiac events 2/2 advanced age, frailty, non-compliance, elevated NTproBNP  GDMT as detailed below; mainstay of treatment for HFpEF includes diuretics and adequate BP control (class I) and SGLT2-I (class 2a); additional medical therapy (ARNI, MRA, ARB) demonstrated less robust evidence for indication but may be considered per guideline recommendations (2b); no indication for BBlockers      Current Pharmacotherapy AHA Guideline-Directed Medical Therapy   Losartan 25 mg daily - on hold 2/2 hypotension ARNI/ARB   Spironolactone 25 mg  MRA   SGLT2 inhibitor not started SGLT2-I    Bumex 3 mg BID Loop diuretic        2. Atrial fibrillation  Assessment / Plan  Rate controlled - currently on metoprolol and apixaban     3. Valvular heart disease  Assessment / Plan  LORRAINE 0.8 - will have patient follow-up with valve clinic in the outpatient setting  Severe TR currently enrolled in CLASP II TR study - s/p 1 PASCALACE device    Plan of care discussed on May 2, 2024 with patient at bedside, and primary team overseeing patient's care;      History of Present Illness/Subjective    Ms. Misty Quezada is a 68 year old female with a PMHx significant for (per Epic notation) CHFpEF, tricuspid regurgitation, HTN, A-fib, lymphedema admitted on 4/23/2024 with complaints of shortness of breath and increased leg swelling due to noncompliance with Bumex.  Patient states she stopped taking it a month ago and has gained 70 pounds since.      Today, Mrs. Quezada denies acute cardiac events or complaints; Management plan as detailed above     ECG: Personally reviewed. Afib with RVR.     Repeat  echo personally reviewed 4/25/24:  There is moderate concentric left ventricular hypertrophy.  The visual ejection fraction is 55-60%.  Flattened septum is consistent with RV pressure/volume overload.  The right ventricular systolic function is normal.  Low flow, low gradient moderate to severe AS with mean gradient of 25 mmHg.  The calculated aortic valve area is 0.8cm2.  A single Janette-Ace [id number] repair device is securely attached to the  tricuspid leaflets. There is mild to moderate (1-2+) tricuspid regurgitation.  The right ventricular systolic pressure is approximated at 62 mmHg.  Compared to the prior study dated 10/18/2023, there are changes as noted. The  aortic valve stenosis appears to be more severe and pulmonary artery pressures  are higher.     ECHO (personnaly Reviewed on 4/24/24):   1. Left ventricular chamber size, wall thickness and systolic function are  normal. The visually estimated left ventricular ejection fraction is 55-60%.  2. Right ventricle is not well visualized. Chamber size may be mildly  enlarged. Systolic function may be mildly reduced.  3. Severe right atrial enlargement. Moderate left atrial enlargement.  4. A single Janette-Ace owlo-an-flou repair device is securely attached to the  tricuspid valve leaflets. There is mild-to-moderate residual tricuspid  regurgitation. No evidence of significant stenosis with peak inflow velocity  1.2 m/s and mean inflow gradient 3 mmHg at a heart rate of approximately 90  beats per minute.  5. Calcified trileaflet aoritc valve with paradoxical low flow, low gradient  (stroke volume index 22.2 mL/m2) aortic stenosis felt to be mild-to-moderate  in severity. Peak forward velocity measures 2.5 m/s, mean gradient 14 mmHg,  calculated aortic valve area 1.1 cm2, and dimensionless index 0.27. No  significant aortic regurgitation.  6. Mild pulmonary hypertension is present with an estimated pulmonary artery  systolic pressure of 43 mmHg.  7. Compared  to the prior study dated 6/1/2023, the degree of tricuspid  regurgitation is less.    Telemetry: personally reviewed May 2, 2024; notable for atrial fibrillation     Lab results: personally reviewed May 2, 2024; notable for contraction alkalosis    Medical history and pertinent documents reviewed in Care Everywhere please where applicable see details above        Physical Examination Review of Systems   /70 (BP Location: Right arm)   Pulse 81   Temp 97.5  F (36.4  C) (Oral)   Resp 18   Wt 121.7 kg (268 lb 3.2 oz)   SpO2 92%   BMI 42.01 kg/m    Body mass index is 42.01 kg/m .  Wt Readings from Last 3 Encounters:   05/02/24 121.7 kg (268 lb 3.2 oz)   04/23/24 (!) 152.9 kg (337 lb)   11/14/23 121.6 kg (268 lb)     General Appearance:   no distress, normal body habitus   ENT/Mouth: membranes moist, no oral lesions or bleeding gums.      EYES:  no scleral icterus, normal conjunctivae   Neck: no carotid bruits or thyromegaly   Chest/Lungs:   lungs are clear to auscultation, no rales or wheezing, equal chest wall expansion    Cardiovascular:   Irregular. Normal first and second heart sounds with +JOHN; no rubs, or gallops; the carotid, radial and posterior tibial pulses are intact, + JVD and mild LE edema bilaterally    Abdomen:  no organomegaly, masses, bruits, or tenderness; bowel sounds are present   Extremities: no cyanosis or clubbing   Skin: no xanthelasma, warm.    Neurologic: NAD     Psychiatric: alert and oriented x3, calm     A complete 10 systems ROS was reviewed  And is negative except what is listed in the HPI.          Medical History  Surgical History Family History Social History   Past Medical History:   Diagnosis Date    (HFpEF) heart failure with preserved ejection fraction (H) 10/17/2022    Aortic valve stenosis 11/10/2022    Benign essential hypertension     Chronic atrial fibrillation (H)     Lymphedema 9/24/2022    Nonrheumatic tricuspid valve regurgitation 2/6/2023    Past Surgical  History:   Procedure Laterality Date    APPENDECTOMY      CHOLECYSTECTOMY      CV CORONARY ANGIOGRAM N/A 3/15/2023    Procedure: Coronary Angiogram;  Surgeon: Howard Plummer MD;  Location: [facility] CATH [facility] CV    CV LEFT HEART CATH N/A 3/15/2023    Procedure: Left Heart Catheterization;  Surgeon: Howard Plummer MD;  Location: [facility]    CV RIGHT HEART CATH MEASUREMENTS RECORDED N/A 3/15/2023    Procedure: Right Heart Catheterization;  Surgeon: Howard Plummer MD;  Location: [facility] CV    CV TRANSCATHETER TRICUSPID VALVE REPAIR N/A 5/3/2023    Procedure: Transcatheter Tricuspid Valve Repair;  Surgeon: Howard Plummer MD;  Location: [facility] CATH LAB CV    GASTRIC BYPASS      OR TRANSCATHETER TRICUSPID VALVE REPLACEMENT, FEMORAL PERCUTANEOUS APPROACH (STANDBY) N/A 5/3/2023    Procedure: OR TRANSCATHETER TRICUSPID VALVE REPLACEMENT, FEMORAL PERCUTANEOUS APPROACH (STANDBY);  Surgeon: Kana Calderón MD;  Location: [facility] CATH LAB CV    no family history of premature coronary artery disease Social History     Socioeconomic History    Marital status:      Spouse name: Not on file    Number of children: Not on file    Years of education: Not on file    Highest education level: Not on file   Occupational History    Not on file   Tobacco Use    Smoking status: Never    Smokeless tobacco: Never   Substance and Sexual Activity    Alcohol use: Not Currently     Comment: Rarely Uses alcohol    Drug use: Never    Sexual activity: Not on file   Other Topics Concern    Not on file   Social History Narrative    Not on file     Social Determinants of Health     Financial Resource Strain: Not on file   Food Insecurity: Not on file   Transportation Needs: Not on file   Physical Activity: Not on file   Stress: Not on file   Social Connections: Not on file   Interpersonal Safety: Not on file   Housing Stability: Not on file           Lab Results    Chemistry/lipid CBC Cardiac  Enzymes/BNP/TSH/INR   Lab Results   Component Value Date    CHOL 163 09/09/2016    HDL 42 (L) 09/09/2016    TRIG 81 09/09/2016    BUN 38.3 (H) 05/02/2024     05/02/2024    CO2 36 (H) 05/02/2024    Lab Results   Component Value Date    WBC 5.6 04/24/2024    HGB 13.0 04/24/2024    HCT 42.2 04/24/2024     (H) 04/24/2024     04/24/2024    Lab Results   Component Value Date    TROPONINI 1.32 (HH) 08/21/2021     (H) 09/02/2021    TSH 3.67 04/24/2024    INR 1.21 (H) 05/04/2023     Lab Results   Component Value Date    TROPONINI 1.32 (HH) 08/21/2021          Weight:    Wt Readings from Last 3 Encounters:   05/02/24 121.7 kg (268 lb 3.2 oz)   04/23/24 (!) 152.9 kg (337 lb)   11/14/23 121.6 kg (268 lb)       Allergies  No Known Allergies      Surgical History  Past Surgical History:   Procedure Laterality Date    APPENDECTOMY      CHOLECYSTECTOMY      CV CORONARY ANGIOGRAM N/A 3/15/2023    Procedure: Coronary Angiogram;  Surgeon: Howard Plummer MD;  Location: [facility] CV    CV LEFT HEART CATH N/A 3/15/2023    Procedure: Left Heart Catheterization;  Surgeon: Howard Plummer MD;  Location: [facility] CATH LAB CV    CV RIGHT HEART CATH MEASUREMENTS RECORDED N/A 3/15/2023    Procedure: Right Heart Catheterization;  Surgeon: Howard Plummer MD;  Location: [facility] CATH LAB CV    CV TRANSCATHETER TRICUSPID VALVE REPAIR N/A 5/3/2023    Procedure: Transcatheter Tricuspid Valve Repair;  Surgeon: Howard Plummer MD;  Location: [facility] CATH [facility] CV    GASTRIC BYPASS      OR TRANSCATHETER TRICUSPID VALVE REPLACEMENT, FEMORAL PERCUTANEOUS APPROACH (STANDBY) N/A 5/3/2023    Procedure: OR TRANSCATHETER TRICUSPID VALVE REPLACEMENT, FEMORAL PERCUTANEOUS APPROACH (STANDBY);  Surgeon: Kana Calderón MD;  Location: [facility] CATH LAB CV       Social History  Tobacco:   History   Smoking Status    Never   Smokeless Tobacco    Never    Alcohol:   Social History    Substance and Sexual  Activity      Alcohol use: Not Currently        Comment: Rarely Uses alcohol   Illicit Drugs:   History   Drug Use Unknown       Family History  Family History   Problem Relation Age of Onset    No Known Problems Mother     No Known Problems Father           Tara Siegel MD on 5/2/2024      cc: Lizbet Baer

## 2024-05-02 NOTE — PROGRESS NOTES
Patient was complaining of leg discomfort secondary to the Lymphedema wrap and   Place patient back to bed and legs were supported with pillows and she claimed that discomfort was resolved  and will monitor   At 1510 complained of 6/10 pain on both lower legs and requested to remove the Lymphedema wrap   and a 1000 mg of Tylenol given and pain 0/10 on reassessment on 1610 and will continue to monitor pain level .

## 2024-05-02 NOTE — PLAN OF CARE
Heart Failure Care Map  GOALS TO BE MET BEFORE DISCHARGE:    1. Decrease congestion and/or edema with diuretic therapy to achieve near optimal volume status.     Dyspnea improved: No, further care required to meet this goal. Please explain SOB with activity.   Edema improved: No, further care required to meet this goal. Please explain Lymphedema wraps in place. +1-+2 generalized edema.        Last 24 hour I/O:   Intake/Output Summary (Last 24 hours) at 5/2/2024 0643  Last data filed at 5/2/2024 0446  Gross per 24 hour   Intake 420 ml   Output 3875 ml   Net -3455 ml           Net I/O and Weights since admission:   04/02 0700 - 05/02 0659  In: 7474.2 [P.O.:6570; I.V.:904.2]  Out: 87608 [Urine:35039]  Net: -42055.8     Vitals:    04/23/24 1531 04/23/24 2305 04/24/24 0500 04/25/24 0637   Weight: (!) 152.9 kg (337 lb) 144.8 kg (319 lb 3.2 oz) 141.3 kg (311 lb 9.6 oz) 137.1 kg (302 lb 3.2 oz)    04/26/24 0646 04/27/24 0307 04/28/24 0453 04/29/24 0415   Weight: 132.2 kg (291 lb 6.4 oz) 127.3 kg (280 lb 10.3 oz) 124.4 kg (274 lb 4.8 oz) 124.5 kg (274 lb 6.4 oz)    04/30/24 0710 05/01/24 0354 05/02/24 0429   Weight: 125.1 kg (275 lb 12.7 oz) 124.8 kg (275 lb 1.6 oz) 121.7 kg (268 lb 3.2 oz)       2.  O2 sats > 90% on room air, or at prior home O2 therapy level.      Able to wean O2 this shift to keep sats above 90%?: No, further care required to meet this goal. Please explain 1L O2 via nasal cannula.   Does patient use Home O2? No          Current oxygenation status:   SpO2: 92 %     O2 Device: Nasal cannula, Oxygen Delivery: 1 LPM    3.  Tolerates ambulation and mobility near baseline.     Ambulation: Yes, satisfactory for discharge.   Times patient ambulated with staff this shift: 2    Please review the Heart Failure Care Map for additional HF goal outcomes.    Meenakshi Velarde RN  5/2/2024     Assumed care 1900 to 0730. A&O x 4. Assist x 1 with a walker and gait belt. C/O back pain, PRN Tylenol given (see MAR). Bed bath  given, miconazole powder applied, and gown changed. Up to toilet. Hi catheter in place, bag below bladder. Call light within reach, able to make needs known. Bed alarm on for safety.'

## 2024-05-03 ENCOUNTER — APPOINTMENT (OUTPATIENT)
Dept: OCCUPATIONAL THERAPY | Facility: HOSPITAL | Age: 69
DRG: 291 | End: 2024-05-03
Payer: MEDICARE

## 2024-05-03 LAB
ANION GAP SERPL CALCULATED.3IONS-SCNC: 13 MMOL/L (ref 7–15)
BUN SERPL-MCNC: 47.7 MG/DL (ref 8–23)
CALCIUM SERPL-MCNC: 9.8 MG/DL (ref 8.8–10.2)
CHLORIDE SERPL-SCNC: 97 MMOL/L (ref 98–107)
CREAT SERPL-MCNC: 0.97 MG/DL (ref 0.51–0.95)
DEPRECATED HCO3 PLAS-SCNC: 33 MMOL/L (ref 22–29)
EGFRCR SERPLBLD CKD-EPI 2021: 63 ML/MIN/1.73M2
GLUCOSE SERPL-MCNC: 95 MG/DL (ref 70–99)
HOLD SPECIMEN: NORMAL
MAGNESIUM SERPL-MCNC: 2.5 MG/DL (ref 1.7–2.3)
POTASSIUM SERPL-SCNC: 3.5 MMOL/L (ref 3.4–5.3)
SODIUM SERPL-SCNC: 143 MMOL/L (ref 135–145)

## 2024-05-03 PROCEDURE — 36415 COLL VENOUS BLD VENIPUNCTURE: CPT | Performed by: INTERNAL MEDICINE

## 2024-05-03 PROCEDURE — 250N000013 HC RX MED GY IP 250 OP 250 PS 637: Performed by: STUDENT IN AN ORGANIZED HEALTH CARE EDUCATION/TRAINING PROGRAM

## 2024-05-03 PROCEDURE — 250N000013 HC RX MED GY IP 250 OP 250 PS 637: Performed by: INTERNAL MEDICINE

## 2024-05-03 PROCEDURE — 82374 ASSAY BLOOD CARBON DIOXIDE: CPT | Performed by: INTERNAL MEDICINE

## 2024-05-03 PROCEDURE — 250N000011 HC RX IP 250 OP 636: Mod: JZ | Performed by: INTERNAL MEDICINE

## 2024-05-03 PROCEDURE — 97535 SELF CARE MNGMENT TRAINING: CPT | Mod: GO

## 2024-05-03 PROCEDURE — 99232 SBSQ HOSP IP/OBS MODERATE 35: CPT | Performed by: INTERNAL MEDICINE

## 2024-05-03 PROCEDURE — 120N000001 HC R&B MED SURG/OB

## 2024-05-03 PROCEDURE — 99233 SBSQ HOSP IP/OBS HIGH 50: CPT | Performed by: INTERNAL MEDICINE

## 2024-05-03 PROCEDURE — 83735 ASSAY OF MAGNESIUM: CPT | Performed by: INTERNAL MEDICINE

## 2024-05-03 RX ORDER — ACETAZOLAMIDE 500 MG/5ML
500 INJECTION, POWDER, LYOPHILIZED, FOR SOLUTION INTRAVENOUS ONCE
Qty: 5 ML | Refills: 0 | Status: COMPLETED | OUTPATIENT
Start: 2024-05-03 | End: 2024-05-03

## 2024-05-03 RX ORDER — POTASSIUM CHLORIDE 1500 MG/1
20 TABLET, EXTENDED RELEASE ORAL ONCE
Qty: 1 TABLET | Refills: 0 | Status: COMPLETED | OUTPATIENT
Start: 2024-05-03 | End: 2024-05-03

## 2024-05-03 RX ORDER — BUMETANIDE 1 MG/1
3 TABLET ORAL
DISCHARGE
Start: 2024-05-03 | End: 2024-06-04

## 2024-05-03 RX ADMIN — BUMETANIDE 3 MG: 2 TABLET ORAL at 08:59

## 2024-05-03 RX ADMIN — MICONAZOLE NITRATE: 20 POWDER TOPICAL at 09:00

## 2024-05-03 RX ADMIN — APIXABAN 5 MG: 5 TABLET, FILM COATED ORAL at 08:58

## 2024-05-03 RX ADMIN — Medication 5 MG: at 20:13

## 2024-05-03 RX ADMIN — METOPROLOL SUCCINATE 50 MG: 50 TABLET, EXTENDED RELEASE ORAL at 08:59

## 2024-05-03 RX ADMIN — ACETAZOLAMIDE 500 MG: 500 INJECTION, POWDER, LYOPHILIZED, FOR SOLUTION INTRAVENOUS at 07:42

## 2024-05-03 RX ADMIN — MICONAZOLE NITRATE: 20 POWDER TOPICAL at 20:14

## 2024-05-03 RX ADMIN — POTASSIUM CHLORIDE 10 MEQ: 750 TABLET, EXTENDED RELEASE ORAL at 08:59

## 2024-05-03 RX ADMIN — SPIRONOLACTONE 25 MG: 25 TABLET, FILM COATED ORAL at 08:59

## 2024-05-03 RX ADMIN — APIXABAN 5 MG: 5 TABLET, FILM COATED ORAL at 20:13

## 2024-05-03 RX ADMIN — BUMETANIDE 3 MG: 2 TABLET ORAL at 18:44

## 2024-05-03 RX ADMIN — ACETAMINOPHEN 1000 MG: 500 TABLET ORAL at 22:17

## 2024-05-03 RX ADMIN — FERROUS SULFATE TAB 325 MG (65 MG ELEMENTAL FE) 325 MG: 325 (65 FE) TAB at 08:58

## 2024-05-03 RX ADMIN — POTASSIUM CHLORIDE 20 MEQ: 1500 TABLET, EXTENDED RELEASE ORAL at 08:59

## 2024-05-03 ASSESSMENT — ACTIVITIES OF DAILY LIVING (ADL)
ADLS_ACUITY_SCORE: 25
ADLS_ACUITY_SCORE: 25
ADLS_ACUITY_SCORE: 32
ADLS_ACUITY_SCORE: 25
ADLS_ACUITY_SCORE: 32
ADLS_ACUITY_SCORE: 32
ADLS_ACUITY_SCORE: 26

## 2024-05-03 NOTE — PROVIDER NOTIFICATION
"330 Rub, R. Patient has a Hi in place for strict I&O. Plan is for discharge at 10 am to TCU. Should the Hi be removed for a void trial prior to discharge? Thank you. Meenakshi MAY [phone]    Dr. Yasmani Rinaldi notified.     Provider responded, \"yes\" remove the Hi.   "

## 2024-05-03 NOTE — PLAN OF CARE
Assumed care 1900 to 0730. A&O x 4. Assist x 1 with a walker and gait belt. Denies pain. Lymph wraps removed for a shower. Gown changed, miconazole powder applied under breasts and pannus. 1L O2 via nasal cannula. Hi catheter removed. Call light within reach, able to make needs known. Bed alarm on for safety.    Problem: Gas Exchange Impaired  Goal: Optimal Gas Exchange  Intervention: Optimize Oxygenation and Ventilation  Recent Flowsheet Documentation  Taken 5/3/2024 0430 by Meenakshi Velarde RN  Head of Bed (HOB) Positioning: HOB at 20-30 degrees  Taken 5/3/2024 0132 by Meenakshi Velarde RN  Head of Bed (HOB) Positioning: HOB at 20-30 degrees  Taken 5/3/2024 0030 by Meenakshi Velarde RN  Head of Bed (HOB) Positioning: HOB at 20-30 degrees     Problem: Heart Failure  Goal: Fluid and Electrolyte Balance  Intervention: Monitor and Manage Fluid and Electrolyte Balance  Recent Flowsheet Documentation  Taken 5/3/2024 0430 by Meenakshi Velarde RN  Fluid/Electrolyte Management: fluids restricted  Taken 5/3/2024 0030 by Meenakshi Velarde RN  Fluid/Electrolyte Management: fluids restricted  Taken 5/2/2024 2030 by Meenakshi Velarde RN  Fluid/Electrolyte Management: fluids restricted

## 2024-05-03 NOTE — PLAN OF CARE
Lymphedema  Discharge Summary    Reason for therapy discharge:    Discharged to transitional care facility.    Progress towards therapy goal(s). See goals on Care Plan in [facility] electronic health record for goal details.  Goals partially met.  Barriers to achieving goals:   discharge from facility.    Therapy recommendation(s):    Continued therapy is recommended.  Rationale/Recommendations:  continued to lymph wraps at TCU.

## 2024-05-03 NOTE — PROGRESS NOTES
[facility]    Medicine Progress Note - Hospitalist Service    Date of Admission:  4/23/2024    Assessment & Plan   Misty Quezada is a 68 year old female with PMH CHFpEF, tricuspid regurgitation, HTN, A-fib, lymphedema admitted on 4/23/2024 with complaints of shortness of breath and increased leg swelling due to noncompliance with low salt diet, fluid restriction, and Bumex.  Patient states she stopped taking it a month ago and has gained 70 pounds since. She went to cardiology clinic who recommend to come to ER. Admitted for acute on chronic CHF     Acute hypoxic respiratory failure  HFpEF acute on chronic  -- ProBNP:2,391  -- c/w PTA spironolactone  -- Cardiology consult appreciated  -- TTE:LVEF: 55-60%, flattened septum is consistent with RV pressure/volume overload.  -- Strict I&O's, Daily weights  -- Cont fluid restriction  -- Oxygen supplement via NC. Wean as able.  -- S/p Albumin on 04/24 and 04/25  -- Diuresed aggressively with lasix infusion, changed to PO bumex 4/29, then to IV Bumex 4/30. Switched to PO bumex 5/2.   -- Lymphedema OT consulted for lymphedema wrap     Hypokalemia  -- Replace per protocol     Hypomagnesemia  -- Replace per protocol     Elevated troponin  -- No chest pain, no e/o ischemia on EKG  -- Likely 2/2 demand ischemia     A-fib  -- Rate controlled  -- PTA apixaban, metoprolol     Valvular heart disease  Moderate to severe AS  Severe TR-Janette-Ace repair device in place           Diet: Combination Diet 2 gm NA Diet; No Caffeine Diet (and additional linked orders)  Fluid restriction 1800 ML FLUID (and additional linked orders)  Fluid restriction 1800 ML FLUID  Diet    DVT Prophylaxis: DOAC  Hi Catheter: Not present  Lines: None     Cardiac Monitoring: None  Code Status: Full Code      Clinically Significant Risk Factors           # Hypercalcemia: Highest Ca = 10.2 mg/dL in last 2 days, will monitor as appropriate        # Hypertension: Noted on problem  "list  # Acute heart failure with preserved ejection fraction: heart failure noted on problem list, last echo with EF >50%, and receiving IV diuretics       # Severe Obesity: Estimated body mass index is 41.35 kg/m  as calculated from the following:    Height as of an earlier encounter on 4/23/24: 1.702 m (5' 7\").    Weight as of this encounter: 119.7 kg (264 lb).        # Financial/Environmental Concerns: none         Disposition Plan     Medically Ready for Discharge: TCU tomorrow.             HERB Payan  Hospitalist Service  [facility]  Securely message with [employer] - ASO (more info)  Text page via [facility] Paging/Directory   ______________________________________________________________________    Interval History   Patient seen and examined this morning. Feels ready to go to TCU. Patient reports doing fairly well. No acute issues reported. It appears the RN from TCU called in sick, and thus they could not accept the patient today.       Physical Exam   Vital Signs: Temp: 97.9  F (36.6  C) Temp src: Oral BP: 123/76 Pulse: 98   Resp: 18 SpO2: 90 % O2 Device: None (Room air) Oxygen Delivery: 1 LPM  Weight: 264 lbs 0 oz      General: Not in obvious distress.  HEENT: NC, AT   Chest: Clear to auscultation bilaterally  Heart: S1S2 normal, irregular. Soft systolic murmur in the aortic area  Abdomen: Soft. NT, ND. Bowel sounds- active.  Extremities: 2-3+ leg swelling bilaterally  Neuro: Awake, grossly non-focal      Medical Decision Making             Data     I have personally reviewed the following data over the past 24 hrs:    N/A  \   N/A   / N/A     143 97 (L) 47.7 (H) /  95   3.5 33 (H) 0.97 (H) \       Imaging results reviewed over the past 24 hrs:   No results found for this or any previous visit (from the past 24 hour(s)).  "

## 2024-05-03 NOTE — PROGRESS NOTES
HEART CARE NOTE          Assessment/Recommendations   1. HFpEF/Valvular heart disease c/b severe ADHF  Assessment / Plan  Presented hypervolemic on physical  with > 65 lb of fluid retention in the setting of medication and diet non-compliance  Tolerating oral diuretic regimen - no changes at this time; continue to monitor UOP and renal function closely   High risk for adverse cardiac events 2/2 advanced age, frailty, non-compliance, elevated NTproBNP  GDMT as detailed below; mainstay of treatment for HFpEF includes diuretics and adequate BP control (class I) and SGLT2-I (class 2a); additional medical therapy (ARNI, MRA, ARB) demonstrated less robust evidence for indication but may be considered per guideline recommendations (2b); no indication for BBlockers      Current Pharmacotherapy AHA Guideline-Directed Medical Therapy   Losartan 25 mg daily - on hold 2/2 hypotension ARNI/ARB   Spironolactone 25 mg  MRA   SGLT2 inhibitor not started SGLT2-I    Bumex 3 mg BID Loop diuretic        2. Atrial fibrillation  Assessment / Plan  Rate controlled - currently on metoprolol and apixaban     3. Valvular heart disease  Assessment / Plan  LORRAINE 0.8 - will have patient follow-up with valve clinic in the outpatient setting  Severe TR currently enrolled in CLASP II TR study - s/p 1 PASCALACE device    Plan of care discussed on May 3, 2024 with patient at bedside, and primary team overseeing patient's care    Cardiology team will sign-off for now. Please do not hesitate to consult us again if new questions or concerns arise. Follow-up appointment will be arranged by CORE/HF clinic.       History of Present Illness/Subjective    Ms. Misty Quezada is a 68 year old female with a PMHx significant for (per Epic notation) CHFpEF, tricuspid regurgitation, HTN, A-fib, lymphedema admitted on 4/23/2024 with complaints of shortness of breath and increased leg swelling due to noncompliance with Bumex.  Patient states she stopped taking it  a month ago and has gained 70 pounds since.      Today, Mrs. Quezada denies acute cardiac events or complaints; Management plan as detailed above     ECG: Personally reviewed. Afib with RVR.     Repeat echo personally reviewed 4/25/24:  There is moderate concentric left ventricular hypertrophy.  The visual ejection fraction is 55-60%.  Flattened septum is consistent with RV pressure/volume overload.  The right ventricular systolic function is normal.  Low flow, low gradient moderate to severe AS with mean gradient of 25 mmHg.  The calculated aortic valve area is 0.8cm2.  A single Janette-Ace [id number] repair device is securely attached to the  tricuspid leaflets. There is mild to moderate (1-2+) tricuspid regurgitation.  The right ventricular systolic pressure is approximated at 62 mmHg.  Compared to the prior study dated 10/18/2023, there are changes as noted. The  aortic valve stenosis appears to be more severe and pulmonary artery pressures  are higher.     ECHO (personnaly Reviewed on 4/24/24):   1. Left ventricular chamber size, wall thickness and systolic function are  normal. The visually estimated left ventricular ejection fraction is 55-60%.  2. Right ventricle is not well visualized. Chamber size may be mildly  enlarged. Systolic function may be mildly reduced.  3. Severe right atrial enlargement. Moderate left atrial enlargement.  4. A single Janette-Ace [id number] repair device is securely attached to the  tricuspid valve leaflets. There is mild-to-moderate residual tricuspid  regurgitation. No evidence of significant stenosis with peak inflow velocity  1.2 m/s and mean inflow gradient 3 mmHg at a heart rate of approximately 90  beats per minute.  5. Calcified trileaflet aoritc valve with paradoxical low flow, low gradient  (stroke volume index 22.2 mL/m2) aortic stenosis felt to be mild-to-moderate  in severity. Peak forward velocity measures 2.5 m/s, mean gradient 14 mmHg,  calculated aortic valve  area 1.1 cm2, and dimensionless index 0.27. No  significant aortic regurgitation.  6. Mild pulmonary hypertension is present with an estimated pulmonary artery  systolic pressure of 43 mmHg.  7. Compared to the prior study dated 6/1/2023, the degree of tricuspid  regurgitation is less.    Telemetry: personally reviewed May 3, 2024; notable for atrial fibrillation     Medical history and pertinent documents reviewed in Care Everywhere please where applicable see details above        Physical Examination Review of Systems   /62 (BP Location: Right arm)   Pulse 80   Temp 98.2  F (36.8  C) (Oral)   Resp 18   Wt 121.7 kg (268 lb 3.2 oz)   SpO2 97%   BMI 42.01 kg/m    Body mass index is 42.01 kg/m .  Wt Readings from Last 3 Encounters:   05/02/24 121.7 kg (268 lb 3.2 oz)   04/23/24 (!) 152.9 kg (337 lb)   11/14/23 121.6 kg (268 lb)     General Appearance:   no distress, normal body habitus   ENT/Mouth: membranes moist, no oral lesions or bleeding gums.      EYES:  no scleral icterus, normal conjunctivae   Neck: no carotid bruits or thyromegaly   Chest/Lungs:   lungs are clear to auscultation, no rales or wheezing, equal chest wall expansion    Cardiovascular:   Irregular. Normal first and second heart sounds with no murmurs, rubs, or gallops; the carotid, radial and posterior tibial pulses are intact, no JVD and mild LE edema bilaterally    Abdomen:  no organomegaly, masses, bruits, or tenderness; bowel sounds are present   Extremities: no cyanosis or clubbing   Skin: no xanthelasma, warm.    Neurologic: NAD     Psychiatric: alert and oriented x3, calm     A complete 10 systems ROS was reviewed  And is negative except what is listed in the HPI.          Medical History  Surgical History Family History Social History   Past Medical History:   Diagnosis Date    (HFpEF) heart failure with preserved ejection fraction (H) 10/17/2022    Aortic valve stenosis 11/10/2022    Benign essential hypertension     Chronic  atrial fibrillation (H)     Lymphedema 9/24/2022    Nonrheumatic tricuspid valve regurgitation 2/6/2023    Past Surgical History:   Procedure Laterality Date    APPENDECTOMY      CHOLECYSTECTOMY      CV CORONARY ANGIOGRAM N/A 3/15/2023    Procedure: Coronary Angiogram;  Surgeon: Howard Plummer MD;  Location: [facility] CATH LAB CV    CV LEFT HEART CATH N/A 3/15/2023    Procedure: Left Heart Catheterization;  Surgeon: Howard Plummer MD;  Location: [facility] CATH LAB CV    CV RIGHT HEART CATH MEASUREMENTS RECORDED N/A 3/15/2023    Procedure: Right Heart Catheterization;  Surgeon: Howard Plummer MD;  Location: [facility] CATH LAB CV    CV TRANSCATHETER TRICUSPID VALVE REPAIR N/A 5/3/2023    Procedure: Transcatheter Tricuspid Valve Repair;  Surgeon: Howard Plummer MD;  Location: [facility] CATH LAB CV    GASTRIC BYPASS      OR TRANSCATHETER TRICUSPID VALVE REPLACEMENT, FEMORAL PERCUTANEOUS APPROACH (STANDBY) N/A 5/3/2023    Procedure: OR TRANSCATHETER TRICUSPID VALVE REPLACEMENT, FEMORAL PERCUTANEOUS APPROACH (STANDBY);  Surgeon: Kana Calderón MD;  Location: [facility] CATH LAB CV    no family history of premature coronary artery disease Social History     Socioeconomic History    Marital status:      Spouse name: Not on file    Number of children: Not on file    Years of education: Not on file    Highest education level: Not on file   Occupational History    Not on file   Tobacco Use    Smoking status: Never    Smokeless tobacco: Never   Substance and Sexual Activity    Alcohol use: Not Currently     Comment: Rarely Uses alcohol    Drug use: Never    Sexual activity: Not on file   Other Topics Concern    Not on file   Social History Narrative    Not on file     Social Determinants of Health     Financial Resource Strain: Not on file   Food Insecurity: Not on file   Transportation Needs: Not on file   Physical Activity: Not on file   Stress: Not on file   Social Connections: Not on file    Interpersonal Safety: Not on file   Housing Stability: Not on file           Lab Results    Chemistry/lipid CBC Cardiac Enzymes/BNP/TSH/INR   Lab Results   Component Value Date    CHOL 163 09/09/2016    HDL 42 (L) 09/09/2016    TRIG 81 09/09/2016    BUN 38.3 (H) 05/02/2024     05/02/2024    CO2 36 (H) 05/02/2024    Lab Results   Component Value Date    WBC 5.6 04/24/2024    HGB 13.0 04/24/2024    HCT 42.2 04/24/2024     (H) 04/24/2024     04/24/2024    Lab Results   Component Value Date    TROPONINI 1.32 (HH) 08/21/2021     (H) 09/02/2021    TSH 3.67 04/24/2024    INR 1.21 (H) 05/04/2023     Lab Results   Component Value Date    TROPONINI 1.32 (HH) 08/21/2021          Weight:    Wt Readings from Last 3 Encounters:   05/02/24 121.7 kg (268 lb 3.2 oz)   04/23/24 (!) 152.9 kg (337 lb)   11/14/23 121.6 kg (268 lb)       Allergies  No Known Allergies      Surgical History  Past Surgical History:   Procedure Laterality Date    APPENDECTOMY      CHOLECYSTECTOMY      CV CORONARY ANGIOGRAM N/A 3/15/2023    Procedure: Coronary Angiogram;  Surgeon: Howard Plummer MD;  Location: [facility] CV    CV LEFT HEART CATH N/A 3/15/2023    Procedure: Left Heart Catheterization;  Surgeon: Howard Plummer MD;  Location: [facility] CV    CV RIGHT HEART CATH MEASUREMENTS RECORDED N/A 3/15/2023    Procedure: Right Heart Catheterization;  Surgeon: Howard Plummer MD;  Location: [facility] LAB CV    CV TRANSCATHETER TRICUSPID VALVE REPAIR N/A 5/3/2023    Procedure: Transcatheter Tricuspid Valve Repair;  Surgeon: Howard Plummer MD;  Location: [facility] LAB CV    GASTRIC BYPASS      OR TRANSCATHETER TRICUSPID VALVE REPLACEMENT, FEMORAL PERCUTANEOUS APPROACH (STANDBY) N/A 5/3/2023    Procedure: OR TRANSCATHETER TRICUSPID VALVE REPLACEMENT, FEMORAL PERCUTANEOUS APPROACH (STANDBY);  Surgeon: Kana Calderón MD;  Location: [facility] CV       Social History  Tobacco:    History   Smoking Status    Never   Smokeless Tobacco    Never    Alcohol:   Social History    Substance and Sexual Activity      Alcohol use: Not Currently        Comment: Rarely Uses alcohol   Illicit Drugs:   History   Drug Use Unknown       Family History  Family History   Problem Relation Age of Onset    No Known Problems Mother     No Known Problems Father           Tara Siegel MD on 5/3/2024      cc: Lizbet Baer

## 2024-05-03 NOTE — PLAN OF CARE
Occupational Therapy Discharge Summary    Reason for therapy discharge:    Discharged to transitional care facility.    Progress towards therapy goal(s). See goals on Care Plan in [facility] electronic health record for goal details.  Goals partially met.  Barriers to achieving goals:   discharge from facility.    Therapy recommendation(s):    Continued therapy is recommended.  Rationale/Recommendations:  to improve ADL independence.

## 2024-05-03 NOTE — PROGRESS NOTES
Care Management Follow Up    Length of Stay (days): 10    Expected Discharge Date: 05/04/2024     Concerns to be Addressed: discharge planning    Patient plan of care discussed at interdisciplinary rounds: Yes    Anticipated Discharge Disposition: Transitional Care     Anticipated Discharge Services: None  Anticipated Discharge DME: Oxygen    Patient/family educated on Medicare website which has current facility and service quality ratings: yes  Education Provided on the Discharge Plan: Yes  Patient/Family in Agreement with the Plan: yes    Referrals Placed by CM/SW: Post Acute Facilities  Private pay costs discussed: transportation costs    Additional Information:  7:37 AM  ZURI received a phone call from Makenzie at [facility] who requested to have Pt's discharge transport timing pushed back to 1 PM or later. ZURI called and left Pt's son Gm a voicemail reporting the change in discharge timing. ZURI left CM callback contact [phone].     ZURI received an update that Pt is requesting for hospital transportation to be organized.     [facility] wheelchair transport pick-up window: [phone]. Care Team and Pt updated. Pt requested that CM not call her sons as she will text them with updates regarding discharge.     9:42 AM  ZURI updated Makenzie with discharge timing and received confirmation that she received discharge orders.    PAS was already completed and placed in Pt's hard chart.     11:15 AM  ZURI received a phone call from Makenzie who reported that her admitting RN called-in and they are short a nurse today already so Pt will not be able to admit to the facility today. Makenzie reported she can accept the Pt tomorrow morning at any time since she has the orders. ZURI updated Care Team.    Tomorrow's updated [facility] wheelchair transport pick-up window: [phone].    ZURI called and left a voicemail with Makenzie reporting the discharge plan for tomorrow. Pt moving to Short Stay unit. ZURI updated Short Stay CM who plans to  inform Pt with the changes in discharge planning.       LISA Horan

## 2024-05-04 ENCOUNTER — LAB REQUISITION (OUTPATIENT)
Dept: LAB | Facility: CLINIC | Age: 69
End: 2024-05-04
Payer: MEDICARE

## 2024-05-04 VITALS
SYSTOLIC BLOOD PRESSURE: 102 MMHG | RESPIRATION RATE: 20 BRPM | BODY MASS INDEX: 41.35 KG/M2 | HEART RATE: 85 BPM | TEMPERATURE: 97.5 F | OXYGEN SATURATION: 91 % | WEIGHT: 264 LBS | DIASTOLIC BLOOD PRESSURE: 65 MMHG

## 2024-05-04 DIAGNOSIS — I50.33 ACUTE ON CHRONIC DIASTOLIC (CONGESTIVE) HEART FAILURE (H): ICD-10-CM

## 2024-05-04 LAB
ANION GAP SERPL CALCULATED.3IONS-SCNC: 14 MMOL/L (ref 7–15)
BUN SERPL-MCNC: 53.1 MG/DL (ref 8–23)
CALCIUM SERPL-MCNC: 10 MG/DL (ref 8.8–10.2)
CHLORIDE SERPL-SCNC: 97 MMOL/L (ref 98–107)
CREAT SERPL-MCNC: 0.99 MG/DL (ref 0.51–0.95)
DEPRECATED HCO3 PLAS-SCNC: 31 MMOL/L (ref 22–29)
EGFRCR SERPLBLD CKD-EPI 2021: 61 ML/MIN/1.73M2
GLUCOSE SERPL-MCNC: 157 MG/DL (ref 70–99)
MAGNESIUM SERPL-MCNC: 2.6 MG/DL (ref 1.7–2.3)
POTASSIUM SERPL-SCNC: 3.6 MMOL/L (ref 3.4–5.3)
SODIUM SERPL-SCNC: 142 MMOL/L (ref 135–145)

## 2024-05-04 PROCEDURE — 80048 BASIC METABOLIC PNL TOTAL CA: CPT | Performed by: INTERNAL MEDICINE

## 2024-05-04 PROCEDURE — 250N000013 HC RX MED GY IP 250 OP 250 PS 637: Performed by: STUDENT IN AN ORGANIZED HEALTH CARE EDUCATION/TRAINING PROGRAM

## 2024-05-04 PROCEDURE — 36415 COLL VENOUS BLD VENIPUNCTURE: CPT | Performed by: INTERNAL MEDICINE

## 2024-05-04 PROCEDURE — 99232 SBSQ HOSP IP/OBS MODERATE 35: CPT | Performed by: INTERNAL MEDICINE

## 2024-05-04 PROCEDURE — 83735 ASSAY OF MAGNESIUM: CPT | Performed by: INTERNAL MEDICINE

## 2024-05-04 RX ADMIN — POLYETHYLENE GLYCOL 3350 17 G: 17 POWDER, FOR SOLUTION ORAL at 10:32

## 2024-05-04 RX ADMIN — METOPROLOL SUCCINATE 50 MG: 50 TABLET, EXTENDED RELEASE ORAL at 10:35

## 2024-05-04 RX ADMIN — POTASSIUM CHLORIDE 10 MEQ: 750 TABLET, EXTENDED RELEASE ORAL at 10:30

## 2024-05-04 RX ADMIN — MICONAZOLE NITRATE: 20 POWDER TOPICAL at 10:31

## 2024-05-04 RX ADMIN — APIXABAN 5 MG: 5 TABLET, FILM COATED ORAL at 10:30

## 2024-05-04 ASSESSMENT — ACTIVITIES OF DAILY LIVING (ADL)
ADLS_ACUITY_SCORE: 26
ADLS_ACUITY_SCORE: 25
ADLS_ACUITY_SCORE: 25
ADLS_ACUITY_SCORE: 26
ADLS_ACUITY_SCORE: 25
ADLS_ACUITY_SCORE: 26
ADLS_ACUITY_SCORE: 26
ADLS_ACUITY_SCORE: 25
ADLS_ACUITY_SCORE: 26
ADLS_ACUITY_SCORE: 26

## 2024-05-04 NOTE — PLAN OF CARE
"  Problem: Adult Inpatient Plan of Care  Goal: Plan of Care Review  Description: The Plan of Care Review/Shift note should be completed every shift.  The Outcome Evaluation is a brief statement about your assessment that the patient is improving, declining, or no change.  This information will be displayed automatically on your shift  note.  Outcome: Progressing     Problem: Adult Inpatient Plan of Care  Goal: Patient-Specific Goal (Individualized)  Description: You can add care plan individualizations to a care plan. Examples of Individualization might be:  \"Parent requests to be called daily at 9am for status\", \"I have a hard time hearing out of my right ear\", or \"Do not touch me to wake me up as it startles  me\".  Outcome: Progressing     Problem: Pain Acute  Goal: Optimal Pain Control and Function  Outcome: Progressing  Intervention: Prevent or Manage Pain  Recent Flowsheet Documentation  Taken 5/4/2024 0048 by Mandi Rosenthal RN  Medication Review/Management: medications reviewed  Intervention: Optimize Psychosocial Wellbeing  Recent Flowsheet Documentation  Taken 5/4/2024 0048 by Mandi Rosenthal RN  Supportive Measures: active listening utilized     Problem: Pain Acute  Goal: Optimal Pain Control and Function  Intervention: Prevent or Manage Pain  Recent Flowsheet Documentation  Taken 5/4/2024 0048 by Mandi Rosenthal RN  Medication Review/Management: medications reviewed     Problem: Pain Acute  Goal: Optimal Pain Control and Function  Intervention: Optimize Psychosocial Wellbeing  Recent Flowsheet Documentation  Taken 5/4/2024 0048 by Mandi Rosenthal RN  Supportive Measures: active listening utilized     Problem: Heart Failure  Goal: Optimal Coping  Outcome: Progressing  Intervention: Support Psychosocial Response  Recent Flowsheet Documentation  Taken 5/4/2024 0048 by Mandi Rosenthal RN  Supportive Measures: active listening utilized     Problem: Heart Failure  Goal: Optimal Coping  Outcome: " Progressing  Intervention: Support Psychosocial Response  Recent Flowsheet Documentation  Taken 5/4/2024 0048 by Mandi Rosenthal RN  Supportive Measures: active listening utilized     Problem: Heart Failure  Goal: Optimal Coping  Intervention: Support Psychosocial Response  Recent Flowsheet Documentation  Taken 5/4/2024 0048 by Mandi Rosenthal RN  Supportive Measures: active listening utilized     Problem: Gas Exchange Impaired  Goal: Optimal Gas Exchange  Outcome: Progressing  Intervention: Optimize Oxygenation and Ventilation  Recent Flowsheet Documentation  Taken 5/4/2024 0048 by Mandi Rosenthal RN  Airway/Ventilation Management: pulmonary hygiene promoted     Problem: Gas Exchange Impaired  Goal: Optimal Gas Exchange  Intervention: Optimize Oxygenation and Ventilation  Recent Flowsheet Documentation  Taken 5/4/2024 0048 by Mandi Rosenthal RN  Airway/Ventilation Management: pulmonary hygiene promoted     Problem: Comorbidity Management  Goal: Blood Pressure in Desired Range  Outcome: Progressing  Intervention: Maintain Blood Pressure Management  Recent Flowsheet Documentation  Taken 5/4/2024 0048 by Mandi Rosenthal RN  Medication Review/Management: medications reviewed     Problem: Comorbidity Management  Goal: Blood Pressure in Desired Range  Intervention: Maintain Blood Pressure Management  Recent Flowsheet Documentation  Taken 5/4/2024 0048 by Mandi Rosenthal RN  Medication Review/Management: medications reviewed   Goal Outcome Evaluation:       Patient is A&O X4,needs much encouragement to follow fluid restriction's and diet. Patient up to bathroom with assistx1 and walker. Patient denied pain all shift, antifugal powder applied to abdominal fold's and under breast. Plan is Discharge to TCU.

## 2024-05-04 NOTE — PLAN OF CARE
Goal Outcome Evaluation:       Pt. Preparing to discharge this morning. Refusing to take her spirnolactone, and her bumex. Worries that she will have to urinate in the transport rig. Note written to receiving nurse that she will need to take these medications when she gets there. [id number] with her belongings into wheelchair in stable condition without difficulty.

## 2024-05-04 NOTE — DISCHARGE SUMMARY
[facility] MEDICINE  DISCHARGE SUMMARY     Primary Care Physician: Lizbet Baer  Admission Date: 4/23/2024   Discharge Provider: HERB Payan Discharge Date: 5/4/2024   Diet: as below   Code Status: Full Code   Activity: DCACTIVITY: Activity as tolerated        Condition at Discharge: Stable     REASON FOR PRESENTATION(See Admission Note for Details)   Shortness of breath    PRINCIPAL & ACTIVE DISCHARGE DIAGNOSES     Active Problems:    Noncompliance with medication regimen    Acute on chronic diastolic congestive heart failure (H)      PENDING LABS     Unresulted Labs Ordered in the Past 30 Days of this Admission       No orders found from 3/24/2024 to 4/24/2024.              PROCEDURES ( this hospitalization only)          RECOMMENDATIONS TO OUTPATIENT PROVIDER FOR F/U VISIT     Follow-up Appointments     Follow Up and recommended labs and tests      Follow up with snf physician.  The following labs/tests are   recommended: BMP weekly on Mondays.  Follow up with CHF clinic in next 5-7 days.                DISPOSITION     Skilled Nursing Facility    SUMMARY OF HOSPITAL COURSE:    Misty Quezada is a 68 year old female with PMH CHFpEF, tricuspid regurgitation, HTN, A-fib, lymphedema admitted on 4/23/2024 with complaints of shortness of breath and increased leg swelling due to noncompliance with low salt diet, fluid restriction, and Bumex.  Patient states she stopped taking it a month ago and has gained 70 pounds since. She went to cardiology clinic who recommended to come to ER. Admitted for acute on chronic CHF     Acute hypoxic respiratory failure  HFpEF acute on chronic  -- ProBNP:2,391  -- c/w PTA spironolactone  -- Cardiology consult appreciated  -- TTE:LVEF: 55-60%, flattened septum is consistent with RV pressure/volume overload.  -- Diuresed aggressively with lasix infusion, changed to PO bumex 4/29, then to IV Bumex 4/30. Switched to PO bumex 5/2.    -- Cont fluid restriction  -- Oxygen supplement via NC. Wean as able.  -- S/p Albumin on 04/24 and 04/25  -- Lymphedema OT consulted for lymphedema wrap     Hypokalemia  -- Replaced per protocol     Hypomagnesemia  -- Replaced per protocol     Elevated troponin  -- No chest pain, no e/o ischemia on EKG  -- Likely 2/2 demand ischemia     A-fib  -- Rate controlled  -- PTA apixaban, metoprolol     Valvular heart disease  Moderate to severe AS  Severe TR-Janette-Ace repair device in place     Discharge Medications with Med changes:     Discharge Medication List as of 5/4/2024 10:36 AM        START taking these medications    Details   bumetanide (BUMEX) 1 MG tablet Take 3 tablets (3 mg) by mouth 2 times daily, Transitional           CONTINUE these medications which have NOT CHANGED    Details   acetaminophen (TYLENOL) 500 MG tablet Take 2-3 tablets by mouth every 6 hours as needed for mild pain, Historical      apixaban (ELIQUIS) 5 mg Tab tablet Take 5 mg by mouth 2 times daily, Historical      ascorbic acid 1000 MG TABS tablet Take 1 tablet by mouth every morning, Historical      CALCIUM/D3/MAG OX//TYRONE/ZN (CALTRATE + D3 PLUS MINERALS ORAL) Take 1 tablet by mouth every morning, Historical      cholecalciferol, vitamin D3, 2,000 unit Tab Take 2,000 Units by mouth every morning, Historical      cyanocobalamin 1000 MCG tablet Take 1,000 mcg by mouth every morning, Historical      ferrous sulfate (FEROSUL) 325 (65 Fe) MG tablet Take 1 tablet (325 mg) by mouth every other day, Transitional      MELATONIN PO Take 1 tablet by mouth nightly as needed, Historical      metoprolol succinate ER (TOPROL XL) 25 MG 24 hr tablet Take 2 tablets (50 mg) by mouth daily, Disp-180 tablet, R-3, E-Prescribe      MULTIVIT WITH CALCIUM,IRON,MIN (WOMEN'S DAILY MULTIVITAMIN ORAL) Take 1 tablet by mouth every morning, Historical      potassium chloride ER (K-TAB/KLOR-CON) 10 MEQ CR tablet TAKE 1 TABLET(10 MEQ) BY MOUTH DAILY, Disp-30 tablet,  "R-2, E-Prescribe      spironolactone (ALDACTONE) 25 MG tablet Take 1 tablet (25 mg) by mouth daily, Disp-90 tablet, R-1, E-Prescribe                   Rationale for medication changes:      Please see above        Consults   Cardiology       Immunizations given this encounter     Most Recent Immunizations   Administered Date(s) Administered    COVID-19 Bivalent 12+ (Pfizer) 10/31/2022    COVID-19 Bivalent 18+ (Moderna) 04/20/2023    COVID-19 MONOVALENT 12+ (Pfizer) 12/02/2021    COVID-19 Monovalent 12+ (Pfizer 2022) 07/08/2022    Influenza Vaccine 65+ (Fluzone HD) 09/29/2022    Pneumo Conj 13-V (2010&after) 09/11/2019    Pneumococcal 23 valent 03/08/2021    TDAP (Adacel,Boostrix) 09/21/2011    Zoster recombinant adjuvanted (SHINGRIX) 10/28/2020           Anticoagulation Information      Recent INR results: No results for input(s): \"INR\" in the last 168 hours.  Warfarin doses (if applicable) or name of other anticoagulant: NA      SIGNIFICANT IMAGING FINDINGS     Results for orders placed or performed during the hospital encounter of 04/23/24   XR Chest 2 Views    Impression    IMPRESSION: A left basilar opacity is again seen and represents pleural fluid and mediastinal fat. There is a small left pleural effusion. Cannot assess for underlying airspace disease. No pneumothorax. The cardiomediastinal silhouette is enlarged. An   electronic device overlies the mediastinum.   Echocardiogram Complete   Result Value Ref Range    LVEF  55-60%        SIGNIFICANT LABORATORY FINDINGS     Most Recent 3 CBC's:  Recent Labs   Lab Test 04/24/24  0445 04/23/24  1551 10/18/23  1318   WBC 5.6 6.1 6.2   HGB 13.0 13.2 13.0   * 100 102*    292 231     Most Recent 3 BMP's:  Recent Labs   Lab Test 05/04/24  0801 05/03/24  0521 05/02/24  0454    143 142   POTASSIUM 3.6 3.5 3.7   CHLORIDE 97* 97* 95*   CO2 31* 33* 36*   BUN 53.1* 47.7* 38.3*   CR 0.99* 0.97* 0.95   ANIONGAP 14 13 11   TURNER 10.0 9.8 10.2   * 95 102* "     Most Recent 2 LFT's:  Recent Labs   Lab Test 04/24/24  0445 10/18/23  1318   AST 28 29   ALT 11 19   ALKPHOS 101 118*   BILITOTAL 0.7 0.7     Most Recent 3 INR's:  Recent Labs   Lab Test 05/04/23  0832 05/01/23  1329 01/25/23  1440   INR 1.21* 1.07 1.13         Discharge Orders        General info for SNF    Length of Stay Estimate: Short Term Care: Estimated # of Days <30  Condition at Discharge: Improving  Level of care:skilled   Rehabilitation Potential: Good  Admission H&P remains valid and up-to-date: Yes  Recent Chemotherapy: N/A  Use Nursing Home Standing Orders: Yes     Mantoux instructions    Give two-step Mantoux (PPD) Per Facility Policy Yes     Follow Up and recommended labs and tests    Follow up with [facility] physician.  The following labs/tests are recommended: BMP weekly on Mondays.  Follow up with CHF clinic in next 5-7 days.     Reason for your hospital stay    CHF     Activity - Up ad adiel     Full Code     Physical Therapy Adult Consult    Evaluate and treat as clinically indicated.    Reason:  Weakness     Occupational Therapy Adult Consult    Evaluate and treat as clinically indicated.    Reason:  Weakness     Oxygen (SNF/TCU) Discharge     Diet    Follow this diet upon discharge: Orders Placed This Encounter      Fluid restriction 1800 ML FLUID      Fluid restriction 1800 ML FLUID      Combination Diet 2 gm NA Diet; No Caffeine Diet       Examination   /65 (BP Location: Right arm)   Pulse 85   Temp 97.5  F (36.4  C) (Oral)   Resp 20   Wt 119.7 kg (264 lb)   SpO2 91%   BMI 41.35 kg/m        Please see EMR for more detailed significant labs, imaging, consultant notes etc.    I, HERB Payan, personally saw the patient today and spent greater than 30 minutes discharging this patient.    HERB Payan  [facility]    CC:Lizbet Baer

## 2024-05-04 NOTE — PLAN OF CARE
Problem: Skin Injury Risk Increased  Goal: Skin Health and Integrity  Outcome: Progressing     Problem: Comorbidity Management  Goal: Blood Pressure in Desired Range  Outcome: Progressing   Goal Outcome Evaluation:  Misty Johnson. Room air. Denies pain. No telemetry. No IV access. Sitting up in chair this shift. Plan is for patient to hopefully discharge to TCU tomorrow. No significant concerns this shift.

## 2024-05-04 NOTE — PROGRESS NOTES
Care Management Discharge Note    Discharge Date: 05/04/2024       Discharge Disposition: Transitional Care    Discharge Services: None    Discharge DME: Oxygen    Discharge Transportation: agency    Private pay costs discussed: transportation costs    Does the patient's insurance plan have a 3 day qualifying hospital stay waiver?  No    PAS Confirmation Code: [id number]  Patient/family educated on Medicare website which has current facility and service quality ratings: yes    Education Provided on the Discharge Plan: Yes  Persons Notified of Discharge Plans: MD, RN, CM ,pt  Patient/Family in Agreement with the Plan: yes    Handoff Referral Completed: Yes    Additional Information:  Pt discharging to [facility] via  [employer] w/c transport at [phone].     SW met with pt and confirmed discharge plan. CM explained that cost of transportation will be billed to insurance but is not guaranteed to be covered. CM explained that patient will be responsible for any costs not covered by insurance. Pt is agreeable to potential cost of transport.       LUZMARIA WillinghamW

## 2024-05-06 ENCOUNTER — PATIENT OUTREACH (OUTPATIENT)
Dept: CARE COORDINATION | Facility: CLINIC | Age: 69
End: 2024-05-06
Payer: MEDICARE

## 2024-05-06 PROCEDURE — P9604 ONE-WAY ALLOW PRORATED TRIP: HCPCS | Performed by: INTERNAL MEDICINE

## 2024-05-06 PROCEDURE — 36415 COLL VENOUS BLD VENIPUNCTURE: CPT | Performed by: INTERNAL MEDICINE

## 2024-05-06 PROCEDURE — 80048 BASIC METABOLIC PNL TOTAL CA: CPT | Performed by: INTERNAL MEDICINE

## 2024-05-06 NOTE — PROGRESS NOTES
Connected Care Resource Center: Connected [facility] Resource [place]    Background: Transitional Care Management program identified per system criteria and reviewed by Connected Care Resource Center team for possible outreach.    Assessment: Upon chart review, CCRC Team member will not proceed with patient outreach related to this episode of Transitional Care Management program due to reason below:    Non-[facility] TCU: CCRC team member noted patient discharged to TCU/ARU/LTACH. Patient is not established with a [facility] currently supported by Primary Care-Care Coordination therefore handoff to Primary Care-Care Coordination is not appropriate at this time.    Plan: Transitional Care Management episode addressed appropriately per reason noted above.      ИРИНА Ruff  [facility], [facility]    *Connected Care Resource Team does NOT follow patient ongoing. Referrals are identified based on internal discharge reports and the outreach is to ensure patient has an understanding of their discharge instructions.

## 2024-05-07 LAB
ANION GAP SERPL CALCULATED.3IONS-SCNC: 16 MMOL/L (ref 7–15)
BUN SERPL-MCNC: 59.9 MG/DL (ref 8–23)
CALCIUM SERPL-MCNC: 9.7 MG/DL (ref 8.8–10.2)
CHLORIDE SERPL-SCNC: 96 MMOL/L (ref 98–107)
CREAT SERPL-MCNC: 0.99 MG/DL (ref 0.51–0.95)
DEPRECATED HCO3 PLAS-SCNC: 27 MMOL/L (ref 22–29)
EGFRCR SERPLBLD CKD-EPI 2021: 61 ML/MIN/1.73M2
GLUCOSE SERPL-MCNC: 89 MG/DL (ref 70–99)
POTASSIUM SERPL-SCNC: 3.1 MMOL/L (ref 3.4–5.3)
SODIUM SERPL-SCNC: 139 MMOL/L (ref 135–145)

## 2024-05-10 ENCOUNTER — TELEPHONE (OUTPATIENT)
Dept: CARDIOLOGY | Facility: CLINIC | Age: 69
End: 2024-05-10
Payer: MEDICARE

## 2024-05-10 ENCOUNTER — LAB REQUISITION (OUTPATIENT)
Dept: LAB | Facility: CLINIC | Age: 69
End: 2024-05-10
Payer: MEDICARE

## 2024-05-10 DIAGNOSIS — Z00.6 EXAMINATION OF PARTICIPANT OR CONTROL IN CLINICAL RESEARCH: Primary | ICD-10-CM

## 2024-05-10 DIAGNOSIS — I50.33 ACUTE ON CHRONIC DIASTOLIC (CONGESTIVE) HEART FAILURE (H): ICD-10-CM

## 2024-05-10 NOTE — TELEPHONE ENCOUNTER
Spoke to pt on the phone regarding upcoming 1Y visit for the CLASP II TR trial. Pt is amenable to being seen 5/31 at 1000 at [facility] for the study required TTE with a visit with writer to follow in the [facility] for the rest of study visit assessments.    Pt may require Lyft depending on how pt is feeling - pt is aware of options and communication methods to get ahold of writer to schedule Lyft if needed.    Pt has no further questions at this time and is aware to contact coordinator if questions arise or scheduling conflicts occur. Pt was given multiple phone numbers to contact coordinator as needed.

## 2024-05-13 ENCOUNTER — TELEPHONE (OUTPATIENT)
Dept: CARDIOLOGY | Facility: CLINIC | Age: 69
End: 2024-05-13
Payer: MEDICARE

## 2024-05-13 DIAGNOSIS — Z00.6 EXAMINATION OF PARTICIPANT OR CONTROL IN CLINICAL RESEARCH: ICD-10-CM

## 2024-05-13 DIAGNOSIS — Z98.890 S/P TRICUSPID VALVE REPAIR: ICD-10-CM

## 2024-05-13 DIAGNOSIS — I36.1 TRICUSPID VALVE REGURGITATION, NONRHEUMATIC: ICD-10-CM

## 2024-05-13 DIAGNOSIS — I36.1 TRICUSPID VALVE REGURGITATION, NONRHEUMATIC: Primary | ICD-10-CM

## 2024-05-13 DIAGNOSIS — Z98.890 S/P TRICUSPID VALVE REPAIR: Primary | ICD-10-CM

## 2024-05-13 DIAGNOSIS — I50.82 BIVENTRICULAR HEART FAILURE (H): ICD-10-CM

## 2024-05-13 DIAGNOSIS — I50.32 CHRONIC HEART FAILURE WITH PRESERVED EJECTION FRACTION (H): ICD-10-CM

## 2024-05-13 LAB
ANION GAP SERPL CALCULATED.3IONS-SCNC: 15 MMOL/L (ref 7–15)
BUN SERPL-MCNC: 37.7 MG/DL (ref 8–23)
CALCIUM SERPL-MCNC: 9.7 MG/DL (ref 8.8–10.2)
CHLORIDE SERPL-SCNC: 98 MMOL/L (ref 98–107)
CREAT SERPL-MCNC: 0.82 MG/DL (ref 0.51–0.95)
DEPRECATED HCO3 PLAS-SCNC: 27 MMOL/L (ref 22–29)
EGFRCR SERPLBLD CKD-EPI 2021: 77 ML/MIN/1.73M2
GLUCOSE SERPL-MCNC: 131 MG/DL (ref 70–99)
POTASSIUM SERPL-SCNC: 3.7 MMOL/L (ref 3.4–5.3)
SODIUM SERPL-SCNC: 140 MMOL/L (ref 135–145)

## 2024-05-13 PROCEDURE — 99207 PR NO CHARGE-RESEARCH SERVICE: CPT | Performed by: INTERNAL MEDICINE

## 2024-05-13 PROCEDURE — 80048 BASIC METABOLIC PNL TOTAL CA: CPT | Performed by: INTERNAL MEDICINE

## 2024-05-13 PROCEDURE — P9604 ONE-WAY ALLOW PRORATED TRIP: HCPCS | Performed by: INTERNAL MEDICINE

## 2024-05-13 PROCEDURE — 36415 COLL VENOUS BLD VENIPUNCTURE: CPT | Performed by: INTERNAL MEDICINE

## 2024-05-13 NOTE — TELEPHONE ENCOUNTER
CLASP II TR   Phone call received from Misty Quezada.  She called to let us know that she needed to reschedule her upcoming appointment.    Message forwarded to her .    Anh Solis RN, BSN  Clinical Trials Nurse

## 2024-05-17 ENCOUNTER — LAB REQUISITION (OUTPATIENT)
Dept: LAB | Facility: CLINIC | Age: 69
End: 2024-05-17
Payer: MEDICARE

## 2024-05-17 DIAGNOSIS — I50.33 ACUTE ON CHRONIC DIASTOLIC (CONGESTIVE) HEART FAILURE (H): ICD-10-CM

## 2024-05-23 ENCOUNTER — OFFICE VISIT (OUTPATIENT)
Dept: CARDIOLOGY | Facility: CLINIC | Age: 69
End: 2024-05-23
Attending: INTERNAL MEDICINE
Payer: MEDICARE

## 2024-05-23 VITALS
DIASTOLIC BLOOD PRESSURE: 60 MMHG | BODY MASS INDEX: 40.88 KG/M2 | HEART RATE: 93 BPM | WEIGHT: 261 LBS | SYSTOLIC BLOOD PRESSURE: 120 MMHG | RESPIRATION RATE: 16 BRPM

## 2024-05-23 DIAGNOSIS — I36.1 NONRHEUMATIC TRICUSPID VALVE REGURGITATION: ICD-10-CM

## 2024-05-23 DIAGNOSIS — I35.0 NONRHEUMATIC AORTIC VALVE STENOSIS: ICD-10-CM

## 2024-05-23 DIAGNOSIS — E66.01 MORBID OBESITY (H): ICD-10-CM

## 2024-05-23 DIAGNOSIS — I48.20 CHRONIC ATRIAL FIBRILLATION (H): ICD-10-CM

## 2024-05-23 DIAGNOSIS — I50.32 CHRONIC HEART FAILURE WITH PRESERVED EJECTION FRACTION (H): Primary | ICD-10-CM

## 2024-05-23 PROBLEM — I50.33 ACUTE ON CHRONIC HEART FAILURE WITH PRESERVED EJECTION FRACTION (H): Status: RESOLVED | Noted: 2022-10-17 | Resolved: 2024-05-23

## 2024-05-23 PROBLEM — I50.33 ACUTE ON CHRONIC DIASTOLIC CONGESTIVE HEART FAILURE (H): Status: RESOLVED | Noted: 2024-04-23 | Resolved: 2024-05-23

## 2024-05-23 PROCEDURE — 99214 OFFICE O/P EST MOD 30 MIN: CPT | Performed by: NURSE PRACTITIONER

## 2024-05-23 RX ORDER — NYSTATIN 100000 U/G
CREAM TOPICAL
COMMUNITY
Start: 2024-05-15

## 2024-05-23 NOTE — PATIENT INSTRUCTIONS
Misty Quezada,    It was a pleasure to see you today at [facility].     My recommendations after this visit include:  - Please follow up with Dr Siegel in 3 months   - Echocardiogram scheduled June 4  - Continue current medications    Autumn Wan, CNP

## 2024-05-23 NOTE — LETTER
5/23/2024    LAVELL WALLACE MD  [facility] [address]  [facility] [address]    RE: Misty Quezada       Dear Colleague,     I had the pleasure of seeing Misty Quezada in the [facility].        Assessment/Recommendations   Assessment:    1.  Heart failure with preserved ejection fraction, NYHA class II: Compensated.  She states her symptoms have been stable since discharge.  Her weight has been stable.  She is trying to follow a low-sodium diet.  She does have chronic lymphedema and wears Velcro wraps.  2.  Permanent atrial fibrillation: Rate controlled.  She continues Eliquis for anticoagulation and Toprol for rate control  3.  Valvular heart disease: Status post Janette device May 2023.  Most recent echocardiogram showed moderate to severe aortic stenosis.  4.  Obesity: BMI 40.88    Plan:  1.  Continue current medications  2.  Echocardiogram scheduled June 4  3.  Continue monitoring weights and low-salt diet    Misty Quezada will follow up with Dr. Siegel in 3 months.     History of Present Illness/Subjective    Ms. Misty Quezada is a 68 year old female seen at [facility] heart failure clinic today for continued follow-up.  She follows up for heart failure with preserved ejection fraction.  She was hospitalized the end of April into May due to acute heart failure.  She had been noncompliant with her medications and had stopped her Bumex.  Her weight had increased 70 pounds.  She was started on a Lasix drip.  Her admit weight was 337 pounds and discharge weight was 264 pounds.  She had an echocardiogram which showed ejection fraction of 55 to 60% with moderate to severe aortic stenosis.  She has a past medical history significant for permanent atrial fibrillation, obesity, status post Janette device in May 2023, aortic stenosis, lymphedema.    Today, she has fatigue, mild dyspnea on exertion and lymphedema.  She denies lightheadedness, shortness  of breath, orthopnea, PND, palpitations, chest pain, and abdominal fullness/bloating.      She is monitoring home weights which are stable around 258 pounds.  She is following a low sodium diet.        ECHOCARDIOGRAM: 4/24/2024-reviewed  Interpretation Summary     There is moderate concentric left ventricular hypertrophy.  The visual ejection fraction is 55-60%.  Flattened septum is consistent with RV pressure/volume overload.  The right ventricular systolic function is normal.  Low flow, low gradient moderate to severe AS with mean gradient of 25 mmHg.  The calculated aortic valve area is 0.8cm2.  A single Janette-Ace [id number] repair device is securely attached to the  tricuspid leaflets. There is mild to moderate (1-2+) tricuspid regurgitation.  The right ventricular systolic pressure is approximated at 62 mmHg.  Compared to the prior study dated 10/18/2023, there are changes as noted. The  aortic valve stenosis appears to be more severe and pulmonary artery pressures  are higher.     Physical Examination Review of Systems   /60 (BP Location: Right arm, Patient Position: Sitting, Cuff Size: Adult Large)   Pulse 93   Resp 16   Wt 118.4 kg (261 lb)   BMI 40.88 kg/m    Body mass index is 40.88 kg/m .  Wt Readings from Last 3 Encounters:   05/23/24 118.4 kg (261 lb)   05/03/24 119.7 kg (264 lb)   04/23/24 (!) 152.9 kg (337 lb)       General Appearance:   no acute distress   ENT/Mouth: No abnormalities   EYES:  no scleral icterus, normal conjunctivae   Neck: no thyromegaly   Chest/Lungs:   lungs are clear to auscultation, no rales or wheezing, equal chest wall expansion    Cardiovascular:   Irregular irregular. Normal first and second heart sounds with grade 2/6 systolic  murmur, no rubs, or gallops, lymphedema, wearing Velcro wraps   Abdomen:  Obese, bowel sounds are present   Extremities: no cyanosis or clubbing   Skin: warm   Neurologic: no tremors     Psychiatric: alert and oriented x3                                               Medical History  Surgical History Family History Social History   Past Medical History:   Diagnosis Date    (HFpEF) heart failure with preserved ejection fraction (H) 10/17/2022    Aortic valve disorder     Aortic valve stenosis 11/10/2022    Benign essential hypertension     Chronic atrial fibrillation (H)     Congestive heart failure (H)     Lymphedema 09/24/2022    Nonrheumatic tricuspid valve regurgitation 02/06/2023    Obese     Tricuspid valve disorders, non-rheumatic     Past Surgical History:   Procedure Laterality Date    APPENDECTOMY      CHOLECYSTECTOMY      CV CORONARY ANGIOGRAM N/A 3/15/2023    Procedure: Coronary Angiogram;  Surgeon: Howard Plummer MD;  Location: [facility] CATH LAB CV    CV LEFT HEART CATH N/A 3/15/2023    Procedure: Left Heart Catheterization;  Surgeon: Howard Plummer MD;  Location: [facility] CATH LAB CV    CV RIGHT HEART CATH MEASUREMENTS RECORDED N/A 3/15/2023    Procedure: Right Heart Catheterization;  Surgeon: Howard Plummer MD;  Location: [facility] CATH LAB CV    CV TRANSCATHETER TRICUSPID VALVE REPAIR N/A 5/3/2023    Procedure: Transcatheter Tricuspid Valve Repair;  Surgeon: Howard Plummer MD;  Location: [facility] CATH LAB CV    GASTRIC BYPASS      OR TRANSCATHETER TRICUSPID VALVE REPLACEMENT, FEMORAL PERCUTANEOUS APPROACH (STANDBY) N/A 5/3/2023    Procedure: OR TRANSCATHETER TRICUSPID VALVE REPLACEMENT, FEMORAL PERCUTANEOUS APPROACH (STANDBY);  Surgeon: Kana Calderón MD;  Location: [facility] CATH LAB CV    Family History   Problem Relation Age of Onset    No Known Problems Mother     No Known Problems Father     Social History     Socioeconomic History    Marital status:      Spouse name: Not on file    Number of children: Not on file    Years of education: Not on file    Highest education level: Not on file   Occupational History    Not on file   Tobacco Use    Smoking status: Never    Smokeless tobacco: Never    Substance and Sexual Activity    Alcohol use: Not Currently     Comment: Rarely Uses alcohol    Drug use: Never    Sexual activity: Not on file   Other Topics Concern    Not on file   Social History Narrative    Not on file     Social Determinants of Health     Financial Resource Strain: Not on file   Food Insecurity: Not on file   Transportation Needs: Not on file   Physical Activity: Not on file   Stress: Not on file   Social Connections: Not on file   Interpersonal Safety: Not on file   Housing Stability: Not on file          Medications  Allergies   Current Outpatient Medications   Medication Sig Dispense Refill    acetaminophen (TYLENOL) 500 MG tablet Take 2-3 tablets by mouth every 6 hours as needed for mild pain      apixaban ANTICOAGULANT (ELIQUIS) 5 MG tablet Take 1 tablet (5 mg) by mouth 2 times daily 180 tablet 1    ascorbic acid 1000 MG TABS tablet Take 1 tablet by mouth every morning      bumetanide (BUMEX) 1 MG tablet Take 3 tablets (3 mg) by mouth 2 times daily      CALCIUM/D3/MAG OX//TYRONE/ZN (CALTRATE + D3 PLUS MINERALS ORAL) Take 1 tablet by mouth every morning      cholecalciferol, vitamin D3, 2,000 unit Tab Take 2,000 Units by mouth every morning      cyanocobalamin 1000 MCG tablet Take 1,000 mcg by mouth every morning      ferrous sulfate (FEROSUL) 325 (65 Fe) MG tablet Take 1 tablet (325 mg) by mouth every other day      MELATONIN PO Take 1 tablet by mouth nightly as needed      metoprolol succinate ER (TOPROL XL) 25 MG 24 hr tablet Take 2 tablets (50 mg) by mouth daily 180 tablet 3    MULTIVIT WITH CALCIUM,IRON,MIN (WOMEN'S DAILY MULTIVITAMIN ORAL) Take 1 tablet by mouth every morning      nystatin (MYCOSTATIN) 406192 UNIT/GM external cream Apply topically two times a day. Under breast Indications: Skin Infection due to Candida Yeast      potassium chloride ER (K-TAB/KLOR-CON) 10 MEQ CR tablet TAKE 1 TABLET(10 MEQ) BY MOUTH DAILY 30 tablet 2    spironolactone (ALDACTONE) 25 MG tablet Take  1 tablet (25 mg) by mouth daily 90 tablet 1    No Known Allergies      Lab Results    Chemistry/lipid CBC Cardiac Enzymes/BNP/TSH/INR   Lab Results   Component Value Date    CHOL 163 09/09/2016    HDL 42 (L) 09/09/2016    TRIG 81 09/09/2016    BUN 37.7 (H) 05/13/2024     05/13/2024    CO2 27 05/13/2024    Lab Results   Component Value Date    WBC 5.6 04/24/2024    HGB 13.0 04/24/2024    HCT 42.2 04/24/2024     (H) 04/24/2024     04/24/2024    Lab Results   Component Value Date    TROPONINI 1.32 (HH) 08/21/2021     (H) 09/02/2021    TSH 3.67 04/24/2024    INR 1.21 (H) 05/04/2023             This note has been dictated using voice recognition software. Any grammatical, typographical, or context distortions are unintentional and inherent to the software    30 minutes spent on the date of encounter doing chart review, review of outside records, review of test results, interpretation with above tests, patient visit, and documentation.                        Thank you for allowing me to participate in the care of your patient.      Sincerely,     ERICK Watt [facility]  cc:   Tara Siegel MD  [address] [place] [address]

## 2024-05-23 NOTE — PROGRESS NOTES
Assessment/Recommendations   Assessment:    1.  Heart failure with preserved ejection fraction, NYHA class II: Compensated.  She states her symptoms have been stable since discharge.  Her weight has been stable.  She is trying to follow a low-sodium diet.  She does have chronic lymphedema and wears Velcro wraps.  2.  Permanent atrial fibrillation: Rate controlled.  She continues Eliquis for anticoagulation and Toprol for rate control  3.  Valvular heart disease: Status post Janette device May 2023.  Most recent echocardiogram showed moderate to severe aortic stenosis.  4.  Obesity: BMI 40.88    Plan:  1.  Continue current medications  2.  Echocardiogram scheduled June 4  3.  Continue monitoring weights and low-salt diet    Misty Quezada will follow up with Dr. Siegel in 3 months.     History of Present Illness/Subjective    Ms. Misty Quezada is a 68 year old female seen at [facility] heart failure clinic today for continued follow-up.  She follows up for heart failure with preserved ejection fraction.  She was hospitalized the end of April into May due to acute heart failure.  She had been noncompliant with her medications and had stopped her Bumex.  Her weight had increased 70 pounds.  She was started on a Lasix drip.  Her admit weight was 337 pounds and discharge weight was 264 pounds.  She had an echocardiogram which showed ejection fraction of 55 to 60% with moderate to severe aortic stenosis.  She has a past medical history significant for permanent atrial fibrillation, obesity, status post Janette device in May 2023, aortic stenosis, lymphedema.    Today, she has fatigue, mild dyspnea on exertion and lymphedema.  She denies lightheadedness, shortness of breath, orthopnea, PND, palpitations, chest pain, and abdominal fullness/bloating.      She is monitoring home weights which are stable around 258 pounds.  She is following a low sodium diet.        ECHOCARDIOGRAM:  4/24/2024-reviewed  Interpretation Summary     There is moderate concentric left ventricular hypertrophy.  The visual ejection fraction is 55-60%.  Flattened septum is consistent with RV pressure/volume overload.  The right ventricular systolic function is normal.  Low flow, low gradient moderate to severe AS with mean gradient of 25 mmHg.  The calculated aortic valve area is 0.8cm2.  A single Janette-Ace [id number] repair device is securely attached to the  tricuspid leaflets. There is mild to moderate (1-2+) tricuspid regurgitation.  The right ventricular systolic pressure is approximated at 62 mmHg.  Compared to the prior study dated 10/18/2023, there are changes as noted. The  aortic valve stenosis appears to be more severe and pulmonary artery pressures  are higher.     Physical Examination Review of Systems   /60 (BP Location: Right arm, Patient Position: Sitting, Cuff Size: Adult Large)   Pulse 93   Resp 16   Wt 118.4 kg (261 lb)   BMI 40.88 kg/m    Body mass index is 40.88 kg/m .  Wt Readings from Last 3 Encounters:   05/23/24 118.4 kg (261 lb)   05/03/24 119.7 kg (264 lb)   04/23/24 (!) 152.9 kg (337 lb)       General Appearance:   no acute distress   ENT/Mouth: No abnormalities   EYES:  no scleral icterus, normal conjunctivae   Neck: no thyromegaly   Chest/Lungs:   lungs are clear to auscultation, no rales or wheezing, equal chest wall expansion    Cardiovascular:   Irregular irregular. Normal first and second heart sounds with grade 2/6 systolic  murmur, no rubs, or gallops, lymphedema, wearing Velcro wraps   Abdomen:  Obese, bowel sounds are present   Extremities: no cyanosis or clubbing   Skin: warm   Neurologic: no tremors     Psychiatric: alert and oriented x3                                              Medical History  Surgical History Family History Social History   Past Medical History:   Diagnosis Date    (HFpEF) heart failure with preserved ejection fraction (H) 10/17/2022    Aortic  valve disorder     Aortic valve stenosis 11/10/2022    Benign essential hypertension     Chronic atrial fibrillation (H)     Congestive heart failure (H)     Lymphedema 09/24/2022    Nonrheumatic tricuspid valve regurgitation 02/06/2023    Obese     Tricuspid valve disorders, non-rheumatic     Past Surgical History:   Procedure Laterality Date    APPENDECTOMY      CHOLECYSTECTOMY      CV CORONARY ANGIOGRAM N/A 3/15/2023    Procedure: Coronary Angiogram;  Surgeon: Howard Plummer MD;  Location: [facility] CATH LAB CV    CV LEFT HEART CATH N/A 3/15/2023    Procedure: Left Heart Catheterization;  Surgeon: Howard Plummer MD;  Location: [facility] CATH LAB CV    CV RIGHT HEART CATH MEASUREMENTS RECORDED N/A 3/15/2023    Procedure: Right Heart Catheterization;  Surgeon: Howard Plummer MD;  Location: [facility] CATH LAB CV    CV TRANSCATHETER TRICUSPID VALVE REPAIR N/A 5/3/2023    Procedure: Transcatheter Tricuspid Valve Repair;  Surgeon: Howard Plummer MD;  Location: [facility] CATH LAB CV    GASTRIC BYPASS      OR TRANSCATHETER TRICUSPID VALVE REPLACEMENT, FEMORAL PERCUTANEOUS APPROACH (STANDBY) N/A 5/3/2023    Procedure: OR TRANSCATHETER TRICUSPID VALVE REPLACEMENT, FEMORAL PERCUTANEOUS APPROACH (STANDBY);  Surgeon: Kana Calderón MD;  Location: [facility] CATH LAB CV    Family History   Problem Relation Age of Onset    No Known Problems Mother     No Known Problems Father     Social History     Socioeconomic History    Marital status:      Spouse name: Not on file    Number of children: Not on file    Years of education: Not on file    Highest education level: Not on file   Occupational History    Not on file   Tobacco Use    Smoking status: Never    Smokeless tobacco: Never   Substance and Sexual Activity    Alcohol use: Not Currently     Comment: Rarely Uses alcohol    Drug use: Never    Sexual activity: Not on file   Other Topics Concern    Not on file   Social History Narrative    Not on  file     Social Determinants of Health     Financial Resource Strain: Not on file   Food Insecurity: Not on file   Transportation Needs: Not on file   Physical Activity: Not on file   Stress: Not on file   Social Connections: Not on file   Interpersonal Safety: Not on file   Housing Stability: Not on file          Medications  Allergies   Current Outpatient Medications   Medication Sig Dispense Refill    acetaminophen (TYLENOL) 500 MG tablet Take 2-3 tablets by mouth every 6 hours as needed for mild pain      apixaban ANTICOAGULANT (ELIQUIS) 5 MG tablet Take 1 tablet (5 mg) by mouth 2 times daily 180 tablet 1    ascorbic acid 1000 MG TABS tablet Take 1 tablet by mouth every morning      bumetanide (BUMEX) 1 MG tablet Take 3 tablets (3 mg) by mouth 2 times daily      CALCIUM/D3/MAG OX//TYRONE/ZN (CALTRATE + D3 PLUS MINERALS ORAL) Take 1 tablet by mouth every morning      cholecalciferol, vitamin D3, 2,000 unit Tab Take 2,000 Units by mouth every morning      cyanocobalamin 1000 MCG tablet Take 1,000 mcg by mouth every morning      ferrous sulfate (FEROSUL) 325 (65 Fe) MG tablet Take 1 tablet (325 mg) by mouth every other day      MELATONIN PO Take 1 tablet by mouth nightly as needed      metoprolol succinate ER (TOPROL XL) 25 MG 24 hr tablet Take 2 tablets (50 mg) by mouth daily 180 tablet 3    MULTIVIT WITH CALCIUM,IRON,MIN (WOMEN'S DAILY MULTIVITAMIN ORAL) Take 1 tablet by mouth every morning      nystatin (MYCOSTATIN) 256945 UNIT/GM external cream Apply topically two times a day. Under breast Indications: Skin Infection due to Candida Yeast      potassium chloride ER (K-TAB/KLOR-CON) 10 MEQ CR tablet TAKE 1 TABLET(10 MEQ) BY MOUTH DAILY 30 tablet 2    spironolactone (ALDACTONE) 25 MG tablet Take 1 tablet (25 mg) by mouth daily 90 tablet 1    No Known Allergies      Lab Results    Chemistry/lipid CBC Cardiac Enzymes/BNP/TSH/INR   Lab Results   Component Value Date    CHOL 163 09/09/2016    HDL 42 (L) 09/09/2016     TRIG 81 09/09/2016    BUN 37.7 (H) 05/13/2024     05/13/2024    CO2 27 05/13/2024    Lab Results   Component Value Date    WBC 5.6 04/24/2024    HGB 13.0 04/24/2024    HCT 42.2 04/24/2024     (H) 04/24/2024     04/24/2024    Lab Results   Component Value Date    TROPONINI 1.32 (HH) 08/21/2021     (H) 09/02/2021    TSH 3.67 04/24/2024    INR 1.21 (H) 05/04/2023             This note has been dictated using voice recognition software. Any grammatical, typographical, or context distortions are unintentional and inherent to the software    30 minutes spent on the date of encounter doing chart review, review of outside records, review of test results, interpretation with above tests, patient visit, and documentation.

## 2024-05-28 ENCOUNTER — LAB REQUISITION (OUTPATIENT)
Dept: LAB | Facility: CLINIC | Age: 69
End: 2024-05-28
Payer: MEDICARE

## 2024-05-28 DIAGNOSIS — I50.33 ACUTE ON CHRONIC DIASTOLIC (CONGESTIVE) HEART FAILURE (H): ICD-10-CM

## 2024-06-03 ENCOUNTER — TELEPHONE (OUTPATIENT)
Dept: CARDIOLOGY | Facility: CLINIC | Age: 69
End: 2024-06-03
Payer: MEDICARE

## 2024-06-03 DIAGNOSIS — Z00.6 EXAMINATION OF PARTICIPANT OR CONTROL IN CLINICAL RESEARCH: Primary | ICD-10-CM

## 2024-06-03 NOTE — TELEPHONE ENCOUNTER
LVM for patient as reminder for TTE at 1000 6/4 for CLASP II TR 1Y follow up visit. Left callback number if patient has any questions.

## 2024-06-04 ENCOUNTER — OFFICE VISIT (OUTPATIENT)
Dept: CARDIOLOGY | Facility: CLINIC | Age: 69
End: 2024-06-04
Payer: MEDICARE

## 2024-06-04 ENCOUNTER — ALLIED HEALTH/NURSE VISIT (OUTPATIENT)
Dept: CARDIOLOGY | Facility: CLINIC | Age: 69
End: 2024-06-04

## 2024-06-04 ENCOUNTER — HOSPITAL ENCOUNTER (OUTPATIENT)
Dept: CARDIOLOGY | Facility: HOSPITAL | Age: 69
Discharge: HOME | End: 2024-06-04
Attending: INTERNAL MEDICINE
Payer: MEDICARE

## 2024-06-04 ENCOUNTER — TELEPHONE (OUTPATIENT)
Dept: CARDIOLOGY | Facility: CLINIC | Age: 69
End: 2024-06-04

## 2024-06-04 VITALS
TEMPERATURE: 97.6 F | RESPIRATION RATE: 16 BRPM | WEIGHT: 262.8 LBS | BODY MASS INDEX: 41.25 KG/M2 | SYSTOLIC BLOOD PRESSURE: 116 MMHG | HEIGHT: 67 IN | HEART RATE: 91 BPM | DIASTOLIC BLOOD PRESSURE: 61 MMHG | OXYGEN SATURATION: 94 %

## 2024-06-04 DIAGNOSIS — Z00.6 EXAMINATION OF PARTICIPANT OR CONTROL IN CLINICAL RESEARCH: ICD-10-CM

## 2024-06-04 DIAGNOSIS — I07.1 TRICUSPID VALVE INSUFFICIENCY, UNSPECIFIED ETIOLOGY: ICD-10-CM

## 2024-06-04 DIAGNOSIS — Z00.6 EXAMINATION OF PARTICIPANT OR CONTROL IN CLINICAL RESEARCH: Primary | ICD-10-CM

## 2024-06-04 DIAGNOSIS — Z98.890 S/P TRICUSPID VALVE REPAIR: ICD-10-CM

## 2024-06-04 DIAGNOSIS — I50.33 ACUTE ON CHRONIC DIASTOLIC CONGESTIVE HEART FAILURE (H): ICD-10-CM

## 2024-06-04 DIAGNOSIS — I50.82 BIVENTRICULAR HEART FAILURE (H): ICD-10-CM

## 2024-06-04 DIAGNOSIS — I36.1 TRICUSPID VALVE REGURGITATION, NONRHEUMATIC: ICD-10-CM

## 2024-06-04 DIAGNOSIS — I50.32 CHRONIC HEART FAILURE WITH PRESERVED EJECTION FRACTION (H): ICD-10-CM

## 2024-06-04 LAB
ALBUMIN SERPL BCG-MCNC: 4.1 G/DL (ref 3.5–5.2)
ALP SERPL-CCNC: 126 U/L (ref 40–150)
ALT SERPL W P-5'-P-CCNC: 24 U/L (ref 0–50)
ANION GAP SERPL CALCULATED.3IONS-SCNC: 12 MMOL/L (ref 7–15)
AST SERPL W P-5'-P-CCNC: 30 U/L (ref 0–45)
ATRIAL RATE - MUSE: 52 BPM
BILIRUB SERPL-MCNC: 0.6 MG/DL
BUN SERPL-MCNC: 22.9 MG/DL (ref 8–23)
CALCIUM SERPL-MCNC: 9.3 MG/DL (ref 8.8–10.2)
CHLORIDE SERPL-SCNC: 102 MMOL/L (ref 98–107)
CREAT SERPL-MCNC: 0.83 MG/DL (ref 0.51–0.95)
DEPRECATED HCO3 PLAS-SCNC: 25 MMOL/L (ref 22–29)
DIASTOLIC BLOOD PRESSURE - MUSE: NORMAL MMHG
EGFRCR SERPLBLD CKD-EPI 2021: 76 ML/MIN/1.73M2
ERYTHROCYTE [DISTWIDTH] IN BLOOD BY AUTOMATED COUNT: 13.8 % (ref 10–15)
GLUCOSE SERPL-MCNC: 94 MG/DL (ref 70–99)
HCT VFR BLD AUTO: 43.2 % (ref 35–47)
HGB BLD-MCNC: 13.8 G/DL (ref 11.7–15.7)
INTERPRETATION ECG - MUSE: NORMAL
LVEF ECHO: NORMAL
MCH RBC QN AUTO: 31.1 PG (ref 26.5–33)
MCHC RBC AUTO-ENTMCNC: 31.9 G/DL (ref 31.5–36.5)
MCV RBC AUTO: 97 FL (ref 78–100)
NT-PROBNP SERPL-MCNC: 1711 PG/ML (ref 0–900)
P AXIS - MUSE: NORMAL DEGREES
PLATELET # BLD AUTO: 221 10E3/UL (ref 150–450)
POTASSIUM SERPL-SCNC: 4.3 MMOL/L (ref 3.4–5.3)
PR INTERVAL - MUSE: NORMAL MS
PROT SERPL-MCNC: 6.9 G/DL (ref 6.4–8.3)
QRS DURATION - MUSE: 94 MS
QT - MUSE: 368 MS
QTC - MUSE: 457 MS
R AXIS - MUSE: 111 DEGREES
RBC # BLD AUTO: 4.44 10E6/UL (ref 3.8–5.2)
SODIUM SERPL-SCNC: 139 MMOL/L (ref 135–145)
SYSTOLIC BLOOD PRESSURE - MUSE: NORMAL MMHG
T AXIS - MUSE: -44 DEGREES
URATE SERPL-MCNC: 7.1 MG/DL (ref 2.4–5.7)
VENTRICULAR RATE- MUSE: 93 BPM
WBC # BLD AUTO: 6.1 10E3/UL (ref 4–11)

## 2024-06-04 PROCEDURE — 36415 COLL VENOUS BLD VENIPUNCTURE: CPT

## 2024-06-04 PROCEDURE — 85027 COMPLETE CBC AUTOMATED: CPT

## 2024-06-04 PROCEDURE — 99207 PR NO CHARGE-RESEARCH SERVICE: CPT

## 2024-06-04 PROCEDURE — 93306 TTE W/DOPPLER COMPLETE: CPT | Mod: 26 | Performed by: INTERNAL MEDICINE

## 2024-06-04 PROCEDURE — 84550 ASSAY OF BLOOD/URIC ACID: CPT

## 2024-06-04 PROCEDURE — 93306 TTE W/DOPPLER COMPLETE: CPT

## 2024-06-04 PROCEDURE — 82977 ASSAY OF GGT: CPT

## 2024-06-04 PROCEDURE — 80053 COMPREHEN METABOLIC PANEL: CPT

## 2024-06-04 PROCEDURE — 93000 ELECTROCARDIOGRAM COMPLETE: CPT | Performed by: STUDENT IN AN ORGANIZED HEALTH CARE EDUCATION/TRAINING PROGRAM

## 2024-06-04 PROCEDURE — 83880 ASSAY OF NATRIURETIC PEPTIDE: CPT

## 2024-06-04 RX ORDER — BUMETANIDE 1 MG/1
3 TABLET ORAL 2 TIMES DAILY
Qty: 180 TABLET | Refills: 11 | Status: SHIPPED | OUTPATIENT
Start: 2024-06-04

## 2024-06-04 NOTE — PROGRESS NOTES
CLASP II TR Study Consent Visit    Purpose: Freeman PILAR TrAnScatheter Valve RePair System Pivotal Clinical Trial (CLASP II TR): A prospective, multicenter, randomized, controlled pivotal trial to evaluate the safety and effectiveness of transcatheter tricuspid valve repair with the Freeman PILAR Transcatheter Valve Repair System and optimal medical therapy (OMT) compared to OMT alone in patients with tricuspid regurgitation.     Misty Quezada a 68 year old female , was seen in clinic today to discuss participation in the CLASP II TR study.     The consent discussion began on [id number].  The consent form was reviewed with the patient.     The consent discussion included:  Study description and purpose   Conflict of interest  Device description   Randomization  (2:1 investigational device:OMT)  Study visits  Study medications  Risks of participation  Benefits (if any)  Alternatives  Voluntary participation  Confidentiality   Compensation/costs of participation  Injury and legal rights    The subject was provided time to review the consent form and consider participation. her questions were answered to her satisfaction. The patient has voluntarily agreed to participate in the above noted study.     The consent form version [id number] and [id number] and HIPPA form was signed 06/04/24.     The subject was provided with a copy of the consent form and HIPPA.    Plan: Pt to continue to CLASP II TR 1Y visit.     Lydia Ramos

## 2024-06-04 NOTE — PROGRESS NOTES
CLASP II TR Follow-Up Visit   [] 30 day,  [] 6 month,  [x] Year 1- [] Year 2  [] Yeas 3  [] Year 4  [] Year 5)     CLASP II TR Freeman PILAR TrAnScatheter Valve RePair System Pivotal Clinical Trial (CLASP II TR): A prospective, multicenter, randomized, controlled pivotal trial to evaluate the safety and effectiveness of transcatheter tricuspid valve repair with the Freeman PILAR Transcatheter Valve Repair System and optimal medical therapy (OMT) compared to OMT alone in patients with tricuspid regurgitation.     Met with Misty Quezada for NYHA assessment for the CLASP II TR study  Misty reports that she has less fatigue since TR valve intervention.  Mobility limited by bilateral knee arthritis.  Edema: bilateral lower extremity edema to above the knee. Denies abdominal bloating. No scacral edema noted.   Per notes of Autumn Wan RN, NP-patient has lymphedema.     NYHA Classification:  [] I [x] II  []  III []  IV    Anh Solis RN, BSN  Clinical Trials Nurse

## 2024-06-04 NOTE — TELEPHONE ENCOUNTER
[facility] Call Center    Phone Message    May a detailed message be left on voicemail: yes     Reason for Call: Other: Pt would like a call back to discuss her bumetanide as she was taking 6 mg a day and at home was taking 4 mg and she is wondering if she should be taking the 4 mg or the 6 mg ,please reach out to pt to discuss       Action Taken: Other: Cardio    Travel Screening: Not Applicable     Date of Service:

## 2024-06-04 NOTE — PROGRESS NOTES
CLASP II TR Lab And EKG  1Y Review    Purpose: Freeman PILAR TrAnScatheter Valve RePair System Pivotal Clinical Trial (CLASP II TR): A prospective, multicenter, randomized, controlled pivotal trial to evaluate the safety and effectiveness of transcatheter tricuspid valve repair with the Freeman PILAR Transcatheter Valve Repair System and optimal medical therapy (OMT) compared to OMT alone in patients with tricuspid regurgitation.     Dr. Plummer -- Please specify if the lab work and EKG is clinically significant or not. If clinical significant, please indicate what next actions should be!     EKG 6/4/24 1211 -- []  CS    [x]  NCS   Recent Results (from the past 24 hour(s))   ECG 12-Lead with [facility] - [facility]    Collection Time: 06/04/24 12:11 PM   Result Value Ref Range    Systolic Blood Pressure  mmHg    Diastolic Blood Pressure  mmHg    Ventricular Rate 93 BPM    Atrial Rate 52 BPM    MT Interval  ms    QRS Duration 94 ms     ms    QTc 457 ms    P Axis  degrees    R AXIS 111 degrees    T Axis -44 degrees    Interpretation ECG       ** Poor data quality, interpretation may be adversely affected  Atrial fibrillation  Right axis deviation  Cannot rule out Anterior infarct (cited on or before 23-APR-2024)  Abnormal ECG  When compared with ECG of 23-APR-2024 15:25,  Questionable change in initial forces of Septal leads       Component      Latest Ref Rng 6/4/2024  12:38 PM CS/NCS   Urea Nitrogen      8.0 - 23.0 mg/dL 22.9     Creatinine      0.51 - 0.95 mg/dL 0.83     GFR Estimate      >60 mL/min/1.73m2 76     Alkaline Phosphatase      40 - 150 U/L 126     AST      0 - 45 U/L 30     ALT      0 - 50 U/L 24     Albumin      3.5 - 5.2 g/dL 4.1     Bilirubin Total      <=1.2 mg/dL 0.6     WBC      4.0 - 11.0 10e3/uL 6.1     RBC Count      3.80 - 5.20 10e6/uL 4.44     Hemoglobin      11.7 - 15.7 g/dL 13.8     Hematocrit      35.0 - 47.0 % 43.2     Platelet Count      150 - 450 10e3/uL 221     Uric Acid      2.4  - 5.7 mg/dL 7.1 (H)  [x]  CS    []  NCS    GGT      5 - 36 U/L 39 (H)  []  CS    [x]  NCS    N-Terminal Pro Bnp      0 - 900 pg/mL 1,711 (H)  [x]  CS    []  NCS       Legend:  (H) High    Per PI [id number] regarding CS labs: Continue diuresis per HF team.     Lydia Ramos

## 2024-06-04 NOTE — PROGRESS NOTES
"CLASP II TR Follow-Up Visit Year 1     Purpose: Freeman IPLAR TrAnScatheter Valve RePair System Pivotal Clinical Trial (CLASP II TR): A prospective, multicenter, randomized, controlled pivotal trial to evaluate the safety and effectiveness of transcatheter tricuspid valve repair with the Freeman PILAR Transcatheter Valve Repair System and optimal medical therapy (OMT) compared to OMT alone in patients with tricuspid regurgitation.     Misty Quezada was seen in clinic today for the CLASP II TR 1Y  Visit.     Demography  Age 68 year old    Gender female    Ethnicity White   Race White      Vitals:    06/04/24 1122   BP: 116/61   BP Location: Left arm   Cuff Size: Adult Large   Pulse: 91   Resp: 16   Temp: 97.6  F (36.4  C)   TempSrc: Oral   SpO2: 94%   Weight: 119.2 kg (262 lb 12.8 oz)   Height: 1.702 m (5' 7\")          Current Outpatient Medications:     acetaminophen (TYLENOL) 500 MG tablet, Take 2-3 tablets by mouth every 6 hours as needed for mild pain, Disp: , Rfl:     apixaban ANTICOAGULANT (ELIQUIS) 5 MG tablet, Take 1 tablet (5 mg) by mouth 2 times daily, Disp: 180 tablet, Rfl: 1    ascorbic acid 1000 MG TABS tablet, Take 1 tablet by mouth every morning, Disp: , Rfl:     bumetanide (BUMEX) 2 MG tablet, Take 2 tablets (2 mg) by mouth 2 times daily, Disp: , Rfl:    CALCIUM/D3/MAG OX//TYRONE/ZN (CALTRATE + D3 PLUS MINERALS ORAL), Take 1 tablet by mouth every morning, Disp: , Rfl:     cholecalciferol, vitamin D3, 2,000 unit Tab, Take 2,000 Units by mouth every morning, Disp: , Rfl:     cyanocobalamin 1000 MCG tablet, Take 1,000 mcg by mouth every morning, Disp: , Rfl:     ferrous sulfate (FEROSUL) 325 (65 Fe) MG tablet, Take 1 tablet (325 mg) by mouth every other day, Disp: , Rfl:     MELATONIN PO, Take 1 tablet by mouth nightly as needed, Disp: , Rfl:     metoprolol succinate ER (TOPROL XL) 25 MG 24 hr tablet, Take 2 tablets (50 mg) by mouth daily, Disp: 180 tablet, Rfl: 3    MULTIVIT WITH CALCIUM,IRON,MIN " (WOMEN'S DAILY MULTIVITAMIN ORAL), Take 1 tablet by mouth every morning, Disp: , Rfl:     nystatin (MYCOSTATIN) 974486 UNIT/GM external cream, Apply topically two times a day. Under breast Indications: Skin Infection due to Candida Yeast, Disp: , Rfl:     potassium chloride ER (K-TAB/KLOR-CON) 10 MEQ CR tablet, TAKE 1 TABLET(10 MEQ) BY MOUTH DAILY, Disp: 30 tablet, Rfl: 2    spironolactone (ALDACTONE) 25 MG tablet, Take 1 tablet (25 mg) by mouth daily, Disp: 90 tablet, Rfl: 1     NYHA: done 6/4/24 - see Anh Solis's note.  EKG -- through 1 year done 6/4/24  TTE: done 6/4/24  6 Minute walk test -through 2 years  done 6/4/24  Surveys (KCCQ, SF-36, EQ-5D-5L) -- through 2 years done 6/4/24  Caregiver Survey -- 6 month visit ONLY  N/A  Labs Completed -- through 1 year done 6/4/24     Edema Grading Questionaire  Lower Limb Edema Grading: [] Absent   [] Grade 1+ (2 mm or less: slight pitting, no visible distortion, disappears rapidly)  []Grade 2+ (2-4 mm indent: somewhat deeper pit, no readably detectable distortion, disappears in 10-25 seconds)  [] Grade 3+ (4-6 mm: pit is noticeably deep. May last more than a minute. Dependent extremity looks swollen and macedo)  [x]Grade 4+ (6-8 mm: pt is very deep. Lasts for 2-5 minutes. Dependent extremity is grossly distorted)   Ankle Circumference Measurements: Right Ankle Circumference: 33.0 cm  Left Ankle Circumference: 33.0 cm      Over the last week, how often did you have swelling in your ankles?  [] Every day   [] 6 days    [] 5 days   []4 days    [x] 3 days    []2 days   []1 day   [] Not at all    2. What level of swelling did you experience? [] Extreme swelling    [] Quite a bit of swelling   [x] Mild swelling    [] Very little swelling   [] None    3. How much were you bothered by swelling in your ankles?  [] Extremely    [] Quite a bit    [] Moderately   [] A little    [x] Not at all    4. During what times of day did you have swelling in your ankles? [] Morning only     [] afternoon only    [] evening only  [x] Morning & evening    [] morning & afternoon  [] afternoon & evening   [] morning, afternoon & evening  [] Not at all    5. Did swelling in your ankles limit your normal activity? [] Extremely   [] Quite a bit  [] Moderately  [] A little  [x] Not at all    6. Did you have any difficulty answering these questions?  [] Yes  [x] No         MODIFIED JAVIER SCALE   Only done if patient has had a stroke  Score Description   0 No symptoms at all   1 No significant disability despite symptoms; able to carry out all usual duties and activities   2 Slight disability; unable to carry out all previous activities, but able to look after own affairs without assistance   3 Moderate disability; requiring some help, but able to walk without assistance   4 Moderately severe disability; unable to walk without assistance and unable to attend to own bodily needs without assistance   5 Severe disability; bedridden, incontinent and requiring constant nursing care and attention   6 Dead    Total score (0 - 6):  N/A - patient has not had a stroke since last visit 10/18/23    Was there was an increase in the patient s total score of ? 1 since previous study required MRS test?   [] Yes  [] No [] N/A (Baseline visit)      If yes, [] Increase is due to a neurological event.    [] Increase is due to non-neurological event, specify:        Plan: Pt to proceed to 24 month visit April 2025    Lydia Ramos

## 2024-06-04 NOTE — TELEPHONE ENCOUNTER
Pt needed a new RX and refill for her Bumex. Confirmed the prescribed dose is what she is taking. 3 mg BID.     Edy Robles RN [facility]

## 2024-06-04 NOTE — LETTER
6/4/2024    LAVELL WLALACE MD  [facility] [address]  [facility] [id number]    RE: Misty NARAYAN Damien       Dear Colleague,     I had the pleasure of seeing Misty Quezada in the [facility].  CLASP II TR Follow-Up Visit   [] 30 day,  [x] 6 month,  [] Year 1- [] Year 2  [] Yeas 3  [] Year 4  [] Year 5)     CLASP II TR Freeman PILAR TrAnScatheter Valve RePair System Pivotal Clinical Trial (CLASP II TR): A prospective, multicenter, randomized, controlled pivotal trial to evaluate the safety and effectiveness of transcatheter tricuspid valve repair with the Freeman PILAR Transcatheter Valve Repair System and optimal medical therapy (OMT) compared to OMT alone in patients with tricuspid regurgitation.     Met with Misty SYLVAIN Quezada for NYHA assessment for the CLASP II TR study  Misty reports that she has less fatigue since TR valve intervention.  Mobility limited by bilateral knee arthritis.  Edema: bilateral lower extremity edema to above the knee. Denies abdominal bloating. No scacral edema noted.   Per notes of Autumn Wan RN, NP-patient has lymphedema.     NYHA Classification:  [] I [x] II  []  III []  IV    Anh Solis RN, BSN  Clinical Trials Nurse      CLASP II TR Study Consent Visit    Purpose: Freeman PILAR TrAnScatheter Valve RePair System Pivotal Clinical Trial (CLASP II TR): A prospective, multicenter, randomized, controlled pivotal trial to evaluate the safety and effectiveness of transcatheter tricuspid valve repair with the Freeman PILAR Transcatheter Valve Repair System and optimal medical therapy (OMT) compared to OMT alone in patients with tricuspid regurgitation.     Misty Quezada a 68 year old female , was seen in clinic today to discuss participation in the CLASP II TR study.     The consent discussion began on [id number].  The consent form was reviewed with the patient.     The consent discussion included:  Study description and  "purpose   Conflict of interest  Device description   Randomization  (2:1 investigational device:OMT)  Study visits  Study medications  Risks of participation  Benefits (if any)  Alternatives  Voluntary participation  Confidentiality   Compensation/costs of participation  Injury and legal rights    The subject was provided time to review the consent form and consider participation. her questions were answered to her satisfaction. The patient has voluntarily agreed to participate in the above noted study.     The consent form version [id number] and [id number] and HIPPA form was signed 06/04/24.     The subject was provided with a copy of the consent form and HIPPA.    Plan: Pt to continue to CLASP II TR 1Y visit.     Lydiaspike Ramos      CLASP II TR Follow-Up Visit Year 1     Purpose: Freeman PILAR TrAnScatheter Valve RePair System Pivotal Clinical Trial (CLASP II TR): A prospective, multicenter, randomized, controlled pivotal trial to evaluate the safety and effectiveness of transcatheter tricuspid valve repair with the Freeman PILAR Transcatheter Valve Repair System and optimal medical therapy (OMT) compared to OMT alone in patients with tricuspid regurgitation.     Misty Quezada was seen in clinic today for the CLASP II TR 1Y  Visit.     Demography  Age 68 year old    Gender female    Ethnicity White   Race White      Vitals:    06/04/24 1122   BP: 116/61   BP Location: Left arm   Cuff Size: Adult Large   Pulse: 91   Resp: 16   Temp: 97.6  F (36.4  C)   TempSrc: Oral   SpO2: 94%   Weight: 119.2 kg (262 lb 12.8 oz)   Height: 1.702 m (5' 7\")          Current Outpatient Medications:     acetaminophen (TYLENOL) 500 MG tablet, Take 2-3 tablets by mouth every 6 hours as needed for mild pain, Disp: , Rfl:     apixaban ANTICOAGULANT (ELIQUIS) 5 MG tablet, Take 1 tablet (5 mg) by mouth 2 times daily, Disp: 180 tablet, Rfl: 1    ascorbic acid 1000 MG TABS tablet, Take 1 tablet by mouth every morning, Disp: , Rfl:     bumetanide " (BUMEX) 2 MG tablet, Take 2 tablets (2 mg) by mouth 2 times daily, Disp: , Rfl:    CALCIUM/D3/MAG OX//TYRONE/ZN (CALTRATE + D3 PLUS MINERALS ORAL), Take 1 tablet by mouth every morning, Disp: , Rfl:     cholecalciferol, vitamin D3, 2,000 unit Tab, Take 2,000 Units by mouth every morning, Disp: , Rfl:     cyanocobalamin 1000 MCG tablet, Take 1,000 mcg by mouth every morning, Disp: , Rfl:     ferrous sulfate (FEROSUL) 325 (65 Fe) MG tablet, Take 1 tablet (325 mg) by mouth every other day, Disp: , Rfl:     MELATONIN PO, Take 1 tablet by mouth nightly as needed, Disp: , Rfl:     metoprolol succinate ER (TOPROL XL) 25 MG 24 hr tablet, Take 2 tablets (50 mg) by mouth daily, Disp: 180 tablet, Rfl: 3    MULTIVIT WITH CALCIUM,IRON,MIN (WOMEN'S DAILY MULTIVITAMIN ORAL), Take 1 tablet by mouth every morning, Disp: , Rfl:     nystatin (MYCOSTATIN) 741070 UNIT/GM external cream, Apply topically two times a day. Under breast Indications: Skin Infection due to Candida Yeast, Disp: , Rfl:     potassium chloride ER (K-TAB/KLOR-CON) 10 MEQ CR tablet, TAKE 1 TABLET(10 MEQ) BY MOUTH DAILY, Disp: 30 tablet, Rfl: 2    spironolactone (ALDACTONE) 25 MG tablet, Take 1 tablet (25 mg) by mouth daily, Disp: 90 tablet, Rfl: 1     NYHA: done 6/4/24 - see Anh Solis's note.  EKG -- through 1 year done 6/4/24  TTE: done 6/4/24  6 Minute walk test -through 2 years  done 6/4/24  Surveys (KCCQ, SF-36, EQ-5D-5L) -- through 2 years done 6/4/24  Caregiver Survey -- 6 month visit ONLY  N/A  Labs Completed -- through 1 year done 6/4/24     Edema Grading Questionaire  Lower Limb Edema Grading: [] Absent   [] Grade 1+ (2 mm or less: slight pitting, no visible distortion, disappears rapidly)  []Grade 2+ (2-4 mm indent: somewhat deeper pit, no readably detectable distortion, disappears in 10-25 seconds)  [] Grade 3+ (4-6 mm: pit is noticeably deep. May last more than a minute. Dependent extremity looks swollen and macedo)  [x]Grade 4+ (6-8 mm: pt is very  deep. Lasts for 2-5 minutes. Dependent extremity is grossly distorted)   Ankle Circumference Measurements: Right Ankle Circumference: 33.0 cm  Left Ankle Circumference: 33.0 cm      Over the last week, how often did you have swelling in your ankles?  [] Every day   [] 6 days    [] 5 days   []4 days    [x] 3 days    []2 days   []1 day   [] Not at all    2. What level of swelling did you experience? [] Extreme swelling    [] Quite a bit of swelling   [x] Mild swelling    [] Very little swelling   [] None    3. How much were you bothered by swelling in your ankles?  [] Extremely    [] Quite a bit    [] Moderately   [] A little    [x] Not at all    4. During what times of day did you have swelling in your ankles? [] Morning only    [] afternoon only    [] evening only  [x] Morning & evening    [] morning & afternoon  [] afternoon & evening   [] morning, afternoon & evening  [] Not at all    5. Did swelling in your ankles limit your normal activity? [] Extremely   [] Quite a bit  [] Moderately  [] A little  [x] Not at all    6. Did you have any difficulty answering these questions?  [] Yes  [x] No         MODIFIED JAVIER SCALE   Only done if patient has had a stroke  Score Description   0 No symptoms at all   1 No significant disability despite symptoms; able to carry out all usual duties and activities   2 Slight disability; unable to carry out all previous activities, but able to look after own affairs without assistance   3 Moderate disability; requiring some help, but able to walk without assistance   4 Moderately severe disability; unable to walk without assistance and unable to attend to own bodily needs without assistance   5 Severe disability; bedridden, incontinent and requiring constant nursing care and attention   6 Dead    Total score (0 - 6):  N/A - patient has not had a stroke since last visit 10/18/23    Was there was an increase in the patient s total score of ? 1 since previous study required MRS  test?   [] Yes  [] No [] N/A (Baseline visit)      If yes, [] Increase is due to a neurological event.    [] Increase is due to non-neurological event, specify:        Plan: Pt to proceed to 24 month visit April 2025    Lydia Ramos           Thank you for allowing me to participate in the care of your patient.      Sincerely,     Lydia Ramos      [facility]  cc:   Referred Self,

## 2024-06-05 ENCOUNTER — TELEPHONE (OUTPATIENT)
Dept: CARDIOLOGY | Facility: CLINIC | Age: 69
End: 2024-06-05

## 2024-06-05 LAB — GGT SERPL-CCNC: 39 U/L (ref 5–36)

## 2024-06-05 NOTE — TELEPHONE ENCOUNTER
Patient is about 1 year out from her TR clasp procedure. Echo done for monitoring. Provider noted that her clasp is functioning well and volume status overall has improved, but would like patient to be seen to monitor/evaluate for progression of aortic stenosis.     Called patient at 220 PM and discussed this with her (please see echo report notes). Msg sent to  to arrange.     Charlette Montana RN on 6/5/2024 at 2:22 PM

## 2024-07-25 RX ORDER — NYSTATIN 100000 U/G
CREAM TOPICAL
Refills: 2 | Status: CANCELLED | OUTPATIENT
Start: 2024-07-25

## 2024-08-01 NOTE — TELEPHONE ENCOUNTER
Called pt, no answer. LM that [facility] received another request for Nystatin, but that we don't manage this. Advised pt to call PCP's office which appears to be Dr. Baer w/ [facility] directly for refill. Provided HF nurse line to call back w/ any questions or concerns.     MEMO Bertrand RN

## 2024-08-07 ENCOUNTER — DOCUMENTATION ONLY (OUTPATIENT)
Dept: CARDIOLOGY | Facility: CLINIC | Age: 69
End: 2024-08-07
Payer: MEDICARE

## 2024-08-07 DIAGNOSIS — I35.0 AORTIC VALVE STENOSIS: Primary | ICD-10-CM

## 2024-08-07 NOTE — PROGRESS NOTES
"Valve Clinic Prep Worksheet    Patient Name: Misty Quezada  : 1955  AGE: 69 year old     Patient Address: [address] [place] [address] BMI: 41.16   Height (CM):    Ht Readings from Last 1 Encounters:   24 1.702 m (5' 7\")                                                      Weight (kg):   Wt Readings from Last 1 Encounters:   24 119.2 kg (262 lb 12.8 oz)                                                          Contact info/Phone number:  [phone]  Initial STS: 4.92%                                           Referred by: Dr. Plummer   Date: 24 (recommended after 1 year echo s/p TR clasp trial procedure)  Insurance:Payor: MEDICARE / Plan: MEDICARE / Product Type: Medicare /    Patient Care Team: Patient Care Team:  Lizbet Baer MD as PCP - General  Tara Siegel MD as MD (Cardiovascular Disease)  Mohan Schultz MD as Assigned Pulmonology Provider  Yong Matamoros DO as Assigned Sleep Provider  Medardo Warner APRN CNP as Nurse Practitioner (Cardiovascular Disease)  Tara Siegel MD as Assigned Heart and Vascular Provider    Past Medical History   Problem List:  2024: Noncompliance with medication regimen  2024: Acute on chronic diastolic congestive heart failure (H)  2023-10: Essential hypertension  2023: Hypotension, unspecified hypotension type  2023: S/P tricuspid valve repair  2023: Mitral regurgitation  2023: Nonrheumatic tricuspid valve regurgitation  2022: Aortic valve stenosis  2022-10: Chronic heart failure with preserved ejection fraction (H)  2022-10: Hypomagnesemia  2022: Lymphedema  2022: Acute pulmonary edema (H)  2022: Bilateral lower extremity edema  2022: Morbid obesity (H)  2021: Elevated troponin  2021: Chronic atrial fibrillation (H)  2021: Left leg cellulitis     Transthoracic Echocardiogram    Date (24): M mmHg Peak V: 2.9 m/sec LORRAINE: 0.94 cm  DI: 0.34 SVI: 25 ml/m  EF: " 55-60%  Ao max P mmHg        Comment on valves: mitral valve has mild annular calcification, mild mitral regurgitation and no stenosis. Tricuspid valve has moderate to severe regurgitation, delicia device securely attached to the tricuspid leaflets. Aortic valve has moderate calcification and moderate to severe stenosis.    Heart Cath Summary CTA (TAVR) MURTAZA   Cor angio done 3/15/23 pre-TR clasp:     Findings:  LM:no obstruction  LAD:mildly irregular  Lcx:mildly irregular  RCA:dominant, no obstruction     LVEDP:17     RHC:  RA:17   RV:55/4  PA:56/29 (39)  PCWP:17 V's to 20     SvO2:64  AO sat: 88     CO/CI:  Kapil:5.23/2.41     AV pullback gradient: 14    not done N/A   Labs   Creat:   Creatinine   Date Value Ref Range Status   2024 0.83 0.51 - 0.95 mg/dL Final       GFR:   GFR Estimate   Date Value Ref Range Status   2024 76 >60 mL/min/1.73m2 Final   2024 77 >60 mL/min/1.73m2 Final   2024 61 >60 mL/min/1.73m2 Final   2018 >60 >60 mL/min/1.73m2 Final     GFR Estimate If Black   Date Value Ref Range Status   2018 >60 >60 mL/min/1.73m2 Final       Potassium:   Potassium   Date Value Ref Range Status   2024 4.3 3.4 - 5.3 mmol/L Final   2021 3.4 (L) 3.5 - 5.0 mmol/L Final       Sodium:   Sodium   Date Value Ref Range Status   2024 139 135 - 145 mmol/L Final     Comment:     Reference intervals for this test were updated on 2023 to more accurately reflect our healthy population. There may be differences in the flagging of prior results with similar values performed with this method. Interpretation of those prior results can be made in the context of the updated reference intervals.        WBC:   WBC Count   Date Value Ref Range Status   2024 6.1 4.0 - 11.0 10e3/uL Final       Hgb:   Hemoglobin   Date Value Ref Range Status   2024 13.8 11.7 - 15.7 g/dL Final   2024 13.0 11.7 - 15.7 g/dL Final       HCT:   Hematocrit   Date Value Ref Range  Status   06/04/2024 43.2 35.0 - 47.0 % Final       Plts:   Platelet Count   Date Value Ref Range Status   06/04/2024 221 150 - 450 10e3/uL Final       Albumin:   Lab Results   Component Value Date    ALBUMIN 4.1 06/04/2024       INR:   INR   Date Value Ref Range Status   05/04/2023 1.21 (H) 0.85 - 1.15 Final        Current Medications: Allergies:    Current Outpatient Medications   Medication Sig Dispense Refill    acetaminophen (TYLENOL) 500 MG tablet Take 2-3 tablets by mouth every 6 hours as needed for mild pain      apixaban ANTICOAGULANT (ELIQUIS) 5 MG tablet Take 1 tablet (5 mg) by mouth 2 times daily 180 tablet 1    ascorbic acid 1000 MG TABS tablet Take 1 tablet by mouth every morning      bumetanide (BUMEX) 1 MG tablet Take 3 tablets (3 mg) by mouth 2 times daily 180 tablet 11    CALCIUM/D3/MAG OX//TYRONE/ZN (CALTRATE + D3 PLUS MINERALS ORAL) Take 1 tablet by mouth every morning      cholecalciferol, vitamin D3, 2,000 unit Tab Take 2,000 Units by mouth every morning      cyanocobalamin 1000 MCG tablet Take 1,000 mcg by mouth every morning      ferrous sulfate (FEROSUL) 325 (65 Fe) MG tablet Take 1 tablet (325 mg) by mouth every other day      MELATONIN PO Take 1 tablet by mouth nightly as needed      metoprolol succinate ER (TOPROL XL) 25 MG 24 hr tablet Take 2 tablets (50 mg) by mouth daily 180 tablet 3    MULTIVIT WITH CALCIUM,IRON,MIN (WOMEN'S DAILY MULTIVITAMIN ORAL) Take 1 tablet by mouth every morning      nystatin (MYCOSTATIN) 482605 UNIT/GM external cream Apply topically two times a day. Under breast Indications: Skin Infection due to Candida Yeast      potassium chloride ER (K-TAB/KLOR-CON) 10 MEQ CR tablet TAKE 1 TABLET(10 MEQ) BY MOUTH DAILY 30 tablet 2    spironolactone (ALDACTONE) 25 MG tablet Take 1 tablet (25 mg) by mouth daily 90 tablet 1     No current facility-administered medications for this visit.      No Known Allergies

## 2024-08-21 ENCOUNTER — DOCUMENTATION ONLY (OUTPATIENT)
Dept: CARDIOLOGY | Facility: CLINIC | Age: 69
End: 2024-08-21

## 2024-08-21 ENCOUNTER — OFFICE VISIT (OUTPATIENT)
Dept: CARDIOLOGY | Facility: CLINIC | Age: 69
End: 2024-08-21
Payer: MEDICARE

## 2024-08-21 VITALS
HEART RATE: 72 BPM | RESPIRATION RATE: 16 BRPM | BODY MASS INDEX: 40.02 KG/M2 | HEIGHT: 67 IN | DIASTOLIC BLOOD PRESSURE: 60 MMHG | SYSTOLIC BLOOD PRESSURE: 94 MMHG | WEIGHT: 255 LBS

## 2024-08-21 DIAGNOSIS — I50.32 CHRONIC HEART FAILURE WITH PRESERVED EJECTION FRACTION (H): Primary | ICD-10-CM

## 2024-08-21 PROCEDURE — G2211 COMPLEX E/M VISIT ADD ON: HCPCS | Performed by: INTERNAL MEDICINE

## 2024-08-21 PROCEDURE — 99214 OFFICE O/P EST MOD 30 MIN: CPT | Performed by: INTERNAL MEDICINE

## 2024-08-21 NOTE — LETTER
8/21/2024    LAVELL WALLACE MD  [facility] [address]  [facility] [address]    RE: Misty NARAYAN Damien       Dear Colleague,     I had the pleasure of seeing Misty Quezada in the [facility].    HEART CARE NOTE          Assessment/Recommendations   1. HFpEF/Valvular heart disease   Assessment / Plan  Near euvolemia; denies HF symptoms of orthopnea, PND, edema - no changes to regimen at this time  High risk for adverse cardiac events 2/2 advanced age, frailty, non-compliance, elevated NTproBNP  GDMT as detailed below; mainstay of treatment for HFpEF includes diuretics and adequate BP control (class I) and SGLT2-I (class 2a); additional medical therapy (ARNI, MRA, ARB) demonstrated less robust evidence for indication but may be considered per guideline recommendations (2b); no indication for BBlockers      Current Pharmacotherapy AHA Guideline-Directed Medical Therapy   Losartan 25 mg daily - on hold 2/2 hypotension ARNI/ARB   Spironolactone 25 mg  MRA   SGLT2 inhibitor not started SGLT2-I    Bumex 5 mg BID Loop diuretic        2. Atrial fibrillation  Assessment / Plan  Rate controlled - currently on metoprolol and apixaban     3. Valvular heart disease  Assessment / Plan  LORRAINE 0.8 - will see valve clinic 8/26/24  Severe TR currently enrolled in CLASP II TR study - s/p 1 PASCALACE device    30 minutes spent reviewing prior records (including documentation, laboratory studies, cardiac testing/imaging), history and physical exam, planning, and subsequent documentation.    The longitudinal plan of care for HFpEF was addressed during this visit. Due to the added complexity in care, I will continue to support Ms. Misty Quezada in the subsequent management of this condition(s) and with the ongoing continuity of care of this condition(s).      History of Present Illness/Subjective    Ms. Misty Quezada is a 68 year old female with a PMHx significant for (per Epic  notation) CHFpEF, tricuspid regurgitation, HTN, A-fib, lymphedema admitted on 4/23/2024 with complaints of shortness of breath and increased leg swelling due to noncompliance with Bumex.  Patient states she stopped taking it a month ago and has gained 70 pounds since.      Today, Mrs. Quezada denies acute cardiac events or complaints; Management plan as detailed above     ECG: Personally reviewed. Afib with RVR.     Repeat echo personally reviewed 4/25/24:  There is moderate concentric left ventricular hypertrophy.  The visual ejection fraction is 55-60%.  Flattened septum is consistent with RV pressure/volume overload.  The right ventricular systolic function is normal.  Low flow, low gradient moderate to severe AS with mean gradient of 25 mmHg.  The calculated aortic valve area is 0.8cm2.  A single Janette-Ace zdrv-is-hoec repair device is securely attached to the  tricuspid leaflets. There is mild to moderate (1-2+) tricuspid regurgitation.  The right ventricular systolic pressure is approximated at 62 mmHg.  Compared to the prior study dated 10/18/2023, there are changes as noted. The  aortic valve stenosis appears to be more severe and pulmonary artery pressures  are higher.     ECHO (personnaly Reviewed on 4/24/24):   1. Left ventricular chamber size, wall thickness and systolic function are  normal. The visually estimated left ventricular ejection fraction is 55-60%.  2. Right ventricle is not well visualized. Chamber size may be mildly  enlarged. Systolic function may be mildly reduced.  3. Severe right atrial enlargement. Moderate left atrial enlargement.  4. A single Janette-Ace msfy-sc-tdub repair device is securely attached to the  tricuspid valve leaflets. There is mild-to-moderate residual tricuspid  regurgitation. No evidence of significant stenosis with peak inflow velocity  1.2 m/s and mean inflow gradient 3 mmHg at a heart rate of approximately 90  beats per minute.  5. Calcified trileaflet aoritc  "valve with paradoxical low flow, low gradient  (stroke volume index 22.2 mL/m2) aortic stenosis felt to be mild-to-moderate  in severity. Peak forward velocity measures 2.5 m/s, mean gradient 14 mmHg,  calculated aortic valve area 1.1 cm2, and dimensionless index 0.27. No  significant aortic regurgitation.  6. Mild pulmonary hypertension is present with an estimated pulmonary artery  systolic pressure of 43 mmHg.  7. Compared to the prior study dated 6/1/2023, the degree of tricuspid  regurgitation is less.       Lab results: personally reviewed August 21, 2024; notable for renal function wnl    Medical history and pertinent documents reviewed in Care Everywhere please where applicable see details above        Physical Examination Review of Systems   BP 94/60 (BP Location: Right arm, Patient Position: Sitting, Cuff Size: Adult Large)   Pulse 72   Resp 16   Ht 1.702 m (5' 7\")   Wt 115.7 kg (255 lb)   BMI 39.94 kg/m    Body mass index is 39.94 kg/m .  Wt Readings from Last 3 Encounters:   08/21/24 115.7 kg (255 lb)   06/04/24 119.2 kg (262 lb 12.8 oz)   05/23/24 118.4 kg (261 lb)     General Appearance:   no distress, normal body habitus   ENT/Mouth: membranes moist, no oral lesions or bleeding gums.      EYES:  no scleral icterus, normal conjunctivae   Neck: no carotid bruits or thyromegaly   Chest/Lungs:   lungs are clear to auscultation, no rales or wheezing, equal chest wall expansion    Cardiovascular:   Regular. Normal first and second heart sounds with no murmurs, rubs, or gallops; the carotid, radial and posterior tibial pulses are intact, no JVD and trace LE edema bilaterally    Abdomen:  no organomegaly, masses, bruits, or tenderness; bowel sounds are present   Extremities: no cyanosis or clubbing   Skin: no xanthelasma, warm.    Neurologic: NAD     Psychiatric: alert and oriented x3, calm     A complete 10 systems ROS was reviewed  And is negative except what is listed in the HPI.          Medical " History  Surgical History Family History Social History   Past Medical History:   Diagnosis Date     (HFpEF) heart failure with preserved ejection fraction (H) 10/17/2022     Aortic valve disorder      Aortic valve stenosis 11/10/2022     Benign essential hypertension      Chronic atrial fibrillation (H)      Congestive heart failure (H)      Lymphedema 09/24/2022     Nonrheumatic tricuspid valve regurgitation 02/06/2023     Obese      Tricuspid valve disorders, non-rheumatic     Past Surgical History:   Procedure Laterality Date     APPENDECTOMY       CHOLECYSTECTOMY       CV CORONARY ANGIOGRAM N/A 3/15/2023    Procedure: Coronary Angiogram;  Surgeon: Howard Plummer MD;  Location: [facility] CATH LAB CV     CV LEFT HEART CATH N/A 3/15/2023    Procedure: Left Heart Catheterization;  Surgeon: Howard Plummer MD;  Location: [facility] CATH LAB CV     CV RIGHT HEART CATH MEASUREMENTS RECORDED N/A 3/15/2023    Procedure: Right Heart Catheterization;  Surgeon: Howard Plummer MD;  Location: [facility] CATH LAB CV     CV TRANSCATHETER TRICUSPID VALVE REPAIR N/A 5/3/2023    Procedure: Transcatheter Tricuspid Valve Repair;  Surgeon: Howard Plummer MD;  Location: [facility] CATH LAB CV     GASTRIC BYPASS       OR TRANSCATHETER TRICUSPID VALVE REPLACEMENT, FEMORAL PERCUTANEOUS APPROACH (STANDBY) N/A 5/3/2023    Procedure: OR TRANSCATHETER TRICUSPID VALVE REPLACEMENT, FEMORAL PERCUTANEOUS APPROACH (STANDBY);  Surgeon: Kana Calderón MD;  Location: [facility] CATH LAB CV    no family history of premature coronary artery disease Social History     Socioeconomic History     Marital status:      Spouse name: Not on file     Number of children: Not on file     Years of education: Not on file     Highest education level: Not on file   Occupational History     Not on file   Tobacco Use     Smoking status: Never     Smokeless tobacco: Never   Substance and Sexual Activity     Alcohol use: Not Currently      Comment: Rarely Uses alcohol     Drug use: Never     Sexual activity: Not on file   Other Topics Concern     Not on file   Social History Narrative     Not on file     Social Determinants of Health     Financial Resource Strain: Not on file   Food Insecurity: Not on file   Transportation Needs: Not on file   Physical Activity: Not on file   Stress: Not on file   Social Connections: Not on file   Interpersonal Safety: Not on file   Housing Stability: Not on file           Lab Results    Chemistry/lipid CBC Cardiac Enzymes/BNP/TSH/INR   Lab Results   Component Value Date    CHOL 163 09/09/2016    HDL 42 (L) 09/09/2016    TRIG 81 09/09/2016    BUN 22.9 06/04/2024     06/04/2024    CO2 25 06/04/2024    Lab Results   Component Value Date    WBC 6.1 06/04/2024    HGB 13.8 06/04/2024    HCT 43.2 06/04/2024    MCV 97 06/04/2024     06/04/2024    Lab Results   Component Value Date    TROPONINI 1.32 (HH) 08/21/2021     (H) 09/02/2021    TSH 3.67 04/24/2024    INR 1.21 (H) 05/04/2023     Lab Results   Component Value Date    TROPONINI 1.32 (HH) 08/21/2021          Weight:    Wt Readings from Last 3 Encounters:   08/21/24 115.7 kg (255 lb)   06/04/24 119.2 kg (262 lb 12.8 oz)   05/23/24 118.4 kg (261 lb)       Allergies  No Known Allergies      Surgical History  Past Surgical History:   Procedure Laterality Date     APPENDECTOMY       CHOLECYSTECTOMY       CV CORONARY ANGIOGRAM N/A 3/15/2023    Procedure: Coronary Angiogram;  Surgeon: Howard Plummer MD;  Location: [facility] CATH LAB CV     CV LEFT HEART CATH N/A 3/15/2023    Procedure: Left Heart Catheterization;  Surgeon: Howard Plummer MD;  Location: [facility] CATH LAB CV     CV RIGHT HEART CATH MEASUREMENTS RECORDED N/A 3/15/2023    Procedure: Right Heart Catheterization;  Surgeon: Howard Plummer MD;  Location: [facility] CATH LAB CV     CV TRANSCATHETER TRICUSPID VALVE REPAIR N/A 5/3/2023    Procedure: Transcatheter Tricuspid Valve Repair;   Surgeon: Howard Plummer MD;  Location: [facility] LAB CV     GASTRIC BYPASS       OR TRANSCATHETER TRICUSPID VALVE REPLACEMENT, FEMORAL PERCUTANEOUS APPROACH (STANDBY) N/A 5/3/2023    Procedure: OR TRANSCATHETER TRICUSPID VALVE REPLACEMENT, FEMORAL PERCUTANEOUS APPROACH (STANDBY);  Surgeon: Kana Calderón MD;  Location: [facility] LAB CV       Social History  Tobacco:   History   Smoking Status     Never   Smokeless Tobacco     Never    Alcohol:   Social History    Substance and Sexual Activity      Alcohol use: Not Currently        Comment: Rarely Uses alcohol   Illicit Drugs:   History   Drug Use Unknown       Family History  Family History   Problem Relation Age of Onset     No Known Problems Mother      No Known Problems Father           Tara Siegel MD on 8/21/2024      cc: Lizbet Baer      Thank you for allowing me to participate in the care of your patient.      Sincerely,     Tara Siegel MD     [facility]  cc:   ERICK Watt CNP  No address on file

## 2024-08-21 NOTE — PROGRESS NOTES
HEART CARE NOTE          Assessment/Recommendations   1. HFpEF/Valvular heart disease   Assessment / Plan  Near euvolemia; denies HF symptoms of orthopnea, PND, edema - no changes to regimen at this time  High risk for adverse cardiac events 2/2 advanced age, frailty, non-compliance, elevated NTproBNP  GDMT as detailed below; mainstay of treatment for HFpEF includes diuretics and adequate BP control (class I) and SGLT2-I (class 2a); additional medical therapy (ARNI, MRA, ARB) demonstrated less robust evidence for indication but may be considered per guideline recommendations (2b); no indication for BBlockers      Current Pharmacotherapy AHA Guideline-Directed Medical Therapy   Losartan 25 mg daily - on hold 2/2 hypotension ARNI/ARB   Spironolactone 25 mg  MRA   SGLT2 inhibitor not started SGLT2-I    Bumex 5 mg BID Loop diuretic        2. Atrial fibrillation  Assessment / Plan  Rate controlled - currently on metoprolol and apixaban     3. Valvular heart disease  Assessment / Plan  LORRAINE 0.8 - will see valve clinic 8/26/24  Severe TR currently enrolled in CLASP II TR study - s/p 1 PASCALACE device    30 minutes spent reviewing prior records (including documentation, laboratory studies, cardiac testing/imaging), history and physical exam, planning, and subsequent documentation.    The longitudinal plan of care for HFpEF was addressed during this visit. Due to the added complexity in care, I will continue to support Ms. Misty Quezada in the subsequent management of this condition(s) and with the ongoing continuity of care of this condition(s).      History of Present Illness/Subjective    Ms. Misty Quezada is a 68 year old female with a PMHx significant for (per Epic notation) CHFpEF, tricuspid regurgitation, HTN, A-fib, lymphedema admitted on 4/23/2024 with complaints of shortness of breath and increased leg swelling due to noncompliance with Bumex.  Patient states she stopped taking it a month ago and has gained  70 pounds since.      Today, Mrs. Quezada denies acute cardiac events or complaints; Management plan as detailed above     ECG: Personally reviewed. Afib with RVR.     Repeat echo personally reviewed 4/25/24:  There is moderate concentric left ventricular hypertrophy.  The visual ejection fraction is 55-60%.  Flattened septum is consistent with RV pressure/volume overload.  The right ventricular systolic function is normal.  Low flow, low gradient moderate to severe AS with mean gradient of 25 mmHg.  The calculated aortic valve area is 0.8cm2.  A single Janette-Ace llus-qm-zomt repair device is securely attached to the  tricuspid leaflets. There is mild to moderate (1-2+) tricuspid regurgitation.  The right ventricular systolic pressure is approximated at 62 mmHg.  Compared to the prior study dated 10/18/2023, there are changes as noted. The  aortic valve stenosis appears to be more severe and pulmonary artery pressures  are higher.     ECHO (personnaly Reviewed on 4/24/24):   1. Left ventricular chamber size, wall thickness and systolic function are  normal. The visually estimated left ventricular ejection fraction is 55-60%.  2. Right ventricle is not well visualized. Chamber size may be mildly  enlarged. Systolic function may be mildly reduced.  3. Severe right atrial enlargement. Moderate left atrial enlargement.  4. A single Janette-Ace [id number]-fh-upzc repair device is securely attached to the  tricuspid valve leaflets. There is mild-to-moderate residual tricuspid  regurgitation. No evidence of significant stenosis with peak inflow velocity  1.2 m/s and mean inflow gradient 3 mmHg at a heart rate of approximately 90  beats per minute.  5. Calcified trileaflet aoritc valve with paradoxical low flow, low gradient  (stroke volume index 22.2 mL/m2) aortic stenosis felt to be mild-to-moderate  in severity. Peak forward velocity measures 2.5 m/s, mean gradient 14 mmHg,  calculated aortic valve area 1.1 cm2, and  "dimensionless index 0.27. No  significant aortic regurgitation.  6. Mild pulmonary hypertension is present with an estimated pulmonary artery  systolic pressure of 43 mmHg.  7. Compared to the prior study dated 6/1/2023, the degree of tricuspid  regurgitation is less.       Lab results: personally reviewed August 21, 2024; notable for renal function wnl    Medical history and pertinent documents reviewed in Care Everywhere please where applicable see details above        Physical Examination Review of Systems   BP 94/60 (BP Location: Right arm, Patient Position: Sitting, Cuff Size: Adult Large)   Pulse 72   Resp 16   Ht 1.702 m (5' 7\")   Wt 115.7 kg (255 lb)   BMI 39.94 kg/m    Body mass index is 39.94 kg/m .  Wt Readings from Last 3 Encounters:   08/21/24 115.7 kg (255 lb)   06/04/24 119.2 kg (262 lb 12.8 oz)   05/23/24 118.4 kg (261 lb)     General Appearance:   no distress, normal body habitus   ENT/Mouth: membranes moist, no oral lesions or bleeding gums.      EYES:  no scleral icterus, normal conjunctivae   Neck: no carotid bruits or thyromegaly   Chest/Lungs:   lungs are clear to auscultation, no rales or wheezing, equal chest wall expansion    Cardiovascular:   Regular. Normal first and second heart sounds with no murmurs, rubs, or gallops; the carotid, radial and posterior tibial pulses are intact, no JVD and trace LE edema bilaterally    Abdomen:  no organomegaly, masses, bruits, or tenderness; bowel sounds are present   Extremities: no cyanosis or clubbing   Skin: no xanthelasma, warm.    Neurologic: NAD     Psychiatric: alert and oriented x3, calm     A complete 10 systems ROS was reviewed  And is negative except what is listed in the HPI.          Medical History  Surgical History Family History Social History   Past Medical History:   Diagnosis Date    (HFpEF) heart failure with preserved ejection fraction (H) 10/17/2022    Aortic valve disorder     Aortic valve stenosis 11/10/2022    Benign " essential hypertension     Chronic atrial fibrillation (H)     Congestive heart failure (H)     Lymphedema 09/24/2022    Nonrheumatic tricuspid valve regurgitation 02/06/2023    Obese     Tricuspid valve disorders, non-rheumatic     Past Surgical History:   Procedure Laterality Date    APPENDECTOMY      CHOLECYSTECTOMY      CV CORONARY ANGIOGRAM N/A 3/15/2023    Procedure: Coronary Angiogram;  Surgeon: Howard Plummer MD;  Location: [facility] CATH LAB CV    CV LEFT HEART CATH N/A 3/15/2023    Procedure: Left Heart Catheterization;  Surgeon: Howard Plummer MD;  Location: [facility] CATH LAB CV    CV RIGHT HEART CATH MEASUREMENTS RECORDED N/A 3/15/2023    Procedure: Right Heart Catheterization;  Surgeon: Howard Plummer MD;  Location: [facility] CATH LAB CV    CV TRANSCATHETER TRICUSPID VALVE REPAIR N/A 5/3/2023    Procedure: Transcatheter Tricuspid Valve Repair;  Surgeon: Howard Plummer MD;  Location: [facility] CATH LAB CV    GASTRIC BYPASS      OR TRANSCATHETER TRICUSPID VALVE REPLACEMENT, FEMORAL PERCUTANEOUS APPROACH (STANDBY) N/A 5/3/2023    Procedure: OR TRANSCATHETER TRICUSPID VALVE REPLACEMENT, FEMORAL PERCUTANEOUS APPROACH (STANDBY);  Surgeon: Kana Calderón MD;  Location: [facility] CATH LAB CV    no family history of premature coronary artery disease Social History     Socioeconomic History    Marital status:      Spouse name: Not on file    Number of children: Not on file    Years of education: Not on file    Highest education level: Not on file   Occupational History    Not on file   Tobacco Use    Smoking status: Never    Smokeless tobacco: Never   Substance and Sexual Activity    Alcohol use: Not Currently     Comment: Rarely Uses alcohol    Drug use: Never    Sexual activity: Not on file   Other Topics Concern    Not on file   Social History Narrative    Not on file     Social Determinants of Health     Financial Resource Strain: Not on file   Food Insecurity: Not on file    Transportation Needs: Not on file   Physical Activity: Not on file   Stress: Not on file   Social Connections: Not on file   Interpersonal Safety: Not on file   Housing Stability: Not on file           Lab Results    Chemistry/lipid CBC Cardiac Enzymes/BNP/TSH/INR   Lab Results   Component Value Date    CHOL 163 09/09/2016    HDL 42 (L) 09/09/2016    TRIG 81 09/09/2016    BUN 22.9 06/04/2024     06/04/2024    CO2 25 06/04/2024    Lab Results   Component Value Date    WBC 6.1 06/04/2024    HGB 13.8 06/04/2024    HCT 43.2 06/04/2024    MCV 97 06/04/2024     06/04/2024    Lab Results   Component Value Date    TROPONINI 1.32 (HH) 08/21/2021     (H) 09/02/2021    TSH 3.67 04/24/2024    INR 1.21 (H) 05/04/2023     Lab Results   Component Value Date    TROPONINI 1.32 (HH) 08/21/2021          Weight:    Wt Readings from Last 3 Encounters:   08/21/24 115.7 kg (255 lb)   06/04/24 119.2 kg (262 lb 12.8 oz)   05/23/24 118.4 kg (261 lb)       Allergies  No Known Allergies      Surgical History  Past Surgical History:   Procedure Laterality Date    APPENDECTOMY      CHOLECYSTECTOMY      CV CORONARY ANGIOGRAM N/A 3/15/2023    Procedure: Coronary Angiogram;  Surgeon: Howard Plummer MD;  Location: [facility] CATH LAB CV    CV LEFT HEART CATH N/A 3/15/2023    Procedure: Left Heart Catheterization;  Surgeon: Howard Plummer MD;  Location: [facility] CATH LAB CV    CV RIGHT HEART CATH MEASUREMENTS RECORDED N/A 3/15/2023    Procedure: Right Heart Catheterization;  Surgeon: Howard Plummer MD;  Location: [facility] CATH LAB CV    CV TRANSCATHETER TRICUSPID VALVE REPAIR N/A 5/3/2023    Procedure: Transcatheter Tricuspid Valve Repair;  Surgeon: Howard Plummer MD;  Location: [facility] CATH LAB CV    GASTRIC BYPASS      OR TRANSCATHETER TRICUSPID VALVE REPLACEMENT, FEMORAL PERCUTANEOUS APPROACH (STANDBY) N/A 5/3/2023    Procedure: OR TRANSCATHETER TRICUSPID VALVE REPLACEMENT, FEMORAL PERCUTANEOUS APPROACH  (STANDBY);  Surgeon: Kana Calderón MD;  Location: [facility] LAB CV       Social History  Tobacco:   History   Smoking Status    Never   Smokeless Tobacco    Never    Alcohol:   Social History    Substance and Sexual Activity      Alcohol use: Not Currently        Comment: Rarely Uses alcohol   Illicit Drugs:   History   Drug Use Unknown       Family History  Family History   Problem Relation Age of Onset    No Known Problems Mother     No Known Problems Father           Tara Siegel MD on 8/21/2024      cc: Lizbet Baer

## 2024-08-26 ENCOUNTER — APPOINTMENT (OUTPATIENT)
Dept: CARDIOLOGY | Facility: CLINIC | Age: 69
End: 2024-08-26
Payer: MEDICARE

## 2024-08-26 ENCOUNTER — ALLIED HEALTH/NURSE VISIT (OUTPATIENT)
Dept: CARDIOLOGY | Facility: CLINIC | Age: 69
End: 2024-08-26
Payer: MEDICARE

## 2024-08-26 ENCOUNTER — OFFICE VISIT (OUTPATIENT)
Dept: CARDIOLOGY | Facility: CLINIC | Age: 69
End: 2024-08-26
Payer: MEDICARE

## 2024-08-26 VITALS
SYSTOLIC BLOOD PRESSURE: 126 MMHG | RESPIRATION RATE: 16 BRPM | HEART RATE: 100 BPM | DIASTOLIC BLOOD PRESSURE: 78 MMHG | BODY MASS INDEX: 41.19 KG/M2 | WEIGHT: 263 LBS

## 2024-08-26 DIAGNOSIS — I35.0 AORTIC VALVE STENOSIS: Primary | ICD-10-CM

## 2024-08-26 DIAGNOSIS — I35.0 NONRHEUMATIC AORTIC VALVE STENOSIS: ICD-10-CM

## 2024-08-26 LAB
ATRIAL RATE - MUSE: 108 BPM
DIASTOLIC BLOOD PRESSURE - MUSE: NORMAL MMHG
INTERPRETATION ECG - MUSE: NORMAL
P AXIS - MUSE: NORMAL DEGREES
PR INTERVAL - MUSE: NORMAL MS
QRS DURATION - MUSE: 88 MS
QT - MUSE: 306 MS
QTC - MUSE: 394 MS
R AXIS - MUSE: 75 DEGREES
SYSTOLIC BLOOD PRESSURE - MUSE: NORMAL MMHG
T AXIS - MUSE: -30 DEGREES
VENTRICULAR RATE- MUSE: 100 BPM

## 2024-08-26 PROCEDURE — 99205 OFFICE O/P NEW HI 60 MIN: CPT | Performed by: INTERNAL MEDICINE

## 2024-08-26 PROCEDURE — G2211 COMPLEX E/M VISIT ADD ON: HCPCS | Performed by: INTERNAL MEDICINE

## 2024-08-26 PROCEDURE — 93000 ELECTROCARDIOGRAM COMPLETE: CPT | Performed by: INTERNAL MEDICINE

## 2024-08-26 PROCEDURE — 99207 PR NO CHARGE LOS: CPT

## 2024-08-26 NOTE — PROGRESS NOTES
Valve Clinic - 8/26/24  Referring provider: Dr. Plummer, recommendation to follow up and be seen 1 year post TR clasp and for assessment/evaluation of patients progression in aortic stenosis.     See valve clinic consult note from Dr. Cancino.      Summary of visit: RN visit deferred due to discussion with provider and plan moving forward.     Symptoms: Patient denied any symptoms related to there aortic stenosis; orthopnea, chest discomfort, fatigue, SOB, LUJAN, etc.       Plan: Patient should repeat an echo in six months. We will contact her with results and to check in and see how she is doing. Based on results will either bring patient back in to discuss aortic valve replacement or interval for repeat echo beyond that.     Sunil Montana RN BSN  [facility]  [facility]  Phone: [phone]  Fax: [phone]

## 2024-08-26 NOTE — PROGRESS NOTES
5 Meter Walk Test:  1st Trial: 10.44  2nd Trial: 11.45  3rd Trial: 11.89    Score: 11.26    Pt. Used a cane in each hand for support during the 5 meter walk. Pt. Has arthritis in both knees and is bone on bone.

## 2024-08-26 NOTE — PROGRESS NOTES
[facility]       [facility]   [address], [place] [address]   [email or web address]   OFFICE: [phone]     IMPRESSION:  Low-flow, low gradient, moderate to severe, aortic valve stenosis (PV 2.9 m/s, MG 21 mmHg , calculated valve area 0.9-1.0 cm .  DI 0.34.  SVI 25 mL/m .)  Of note, review of aortic valve gradients and valve area from echocardiograms over the past two years with stable findings.   Functional Capacity:  NYHA class I-II, CCS class 0   Severe tricuspid regurgitation, post tricuspid TIARA with PILAR-ACE (CLASP TR) -now with residual moderate TR  Heart failure with preserved ejection fraction  Persistent atrial fibrillation, rate control and therapeutic anticoagulation  Hypertension  Medication noncompliance      SUMMARY OF RECOMMENDATIONS: We had a long discussion with Ms. Quezada regarding natural progression and therapeutic options of calcific aortic valve disease.  Severity of her aortic valve disease is unclear, likely moderate approaching severe.  We discussed further workup including TAVR CT valve calcium score and invasive valve study with R/L heart catheterization in addition to coronary angiogram.  Given patient is minimally symptomatic she would like to defer work up at this time, with plan for a repeat echocardiogram in 6 months.  We agreed if she develops new onset symptoms to contact us sooner.      Dear Lizbet Baer     I had the pleasure of seeing Misty Quezada today at the [facility] for evaluation for Transcatheter Aortic Valve Replacement for progressive aortic valve stenosis.  Briefly, patient recently had routine echocardiogram as follow-up for CLASP trial post tricuspid TIARA and was noted to have progression of her aortic valve disease for which she was referred to our clinic.  Today, Ms. Quezada reports overall she has been doing well. The patient  denies symptoms of chest pain, shortness of breath, palpitations, dizziness, lightheadedness, presyncope, syncope, orthopnea, PND, early satiety/abdominal bloating, stroke like symptoms, or claudication.  Her chronic lower extremity edema is unchanged.    ROS:  A 14 point review of symptoms was reviewed.  No prior hx of rheumatic fever or chest irradiation. There were no additional findings pertinent to this encounter.     Past Medical History: as above     Social History: Denies tobacco use, rare alcohol use    Family History: No early family history of cardiovascular disease or sudden cardiac death.    Allergies: reviewed   No Known Allergies    Pertinent Medications:    Current Outpatient Medications   Medication Sig Dispense Refill    acetaminophen (TYLENOL) 500 MG tablet Take 2-3 tablets by mouth every 6 hours as needed for mild pain      apixaban ANTICOAGULANT (ELIQUIS) 5 MG tablet Take 1 tablet (5 mg) by mouth 2 times daily 180 tablet 1    ascorbic acid 1000 MG TABS tablet Take 1 tablet by mouth every morning      bumetanide (BUMEX) 1 MG tablet Take 3 tablets (3 mg) by mouth 2 times daily 180 tablet 11    CALCIUM/D3/MAG OX//TYRONE/ZN (CALTRATE + D3 PLUS MINERALS ORAL) Take 1 tablet by mouth every morning      cholecalciferol, vitamin D3, 2,000 unit Tab Take 2,000 Units by mouth every morning      cyanocobalamin 1000 MCG tablet Take 1,000 mcg by mouth every morning      ferrous sulfate (FEROSUL) 325 (65 Fe) MG tablet Take 1 tablet (325 mg) by mouth every other day      MELATONIN PO Take 1 tablet by mouth nightly as needed      metoprolol succinate ER (TOPROL XL) 25 MG 24 hr tablet Take 2 tablets (50 mg) by mouth daily 180 tablet 3    MULTIVIT WITH CALCIUM,IRON,MIN (WOMEN'S DAILY MULTIVITAMIN ORAL) Take 1 tablet by mouth every morning      nystatin (MYCOSTATIN) 479828 UNIT/GM external cream Apply topically two times a day. Under breast Indications: Skin Infection due to Candida Yeast      potassium chloride ER  (K-TAB/KLOR-CON) 10 MEQ CR tablet TAKE 1 TABLET(10 MEQ) BY MOUTH DAILY 30 tablet 2    spironolactone (ALDACTONE) 25 MG tablet Take 1 tablet (25 mg) by mouth daily 90 tablet 1     No current facility-administered medications for this visit.       OBJECTIVE:  Physical Examination   /78 (BP Location: Right arm, Patient Position: Sitting, Cuff Size: Adult Large)   Pulse 100   Resp 16   Wt 119.3 kg (263 lb)   BMI 41.19 kg/m     Constitutional:  NAD  Cognitive: Alert, oriented x 3, Appropriate speech  Chest:  Clear bilaterally   Cardiac: regular rhythm, normal S1/S2, mid to late peaking systolic murmur. JVP mildly elevated.  Abdoment:  Soft, non-tender  Pulses: 2 + distal pulses  Skin and Integument: +1-2 peripheral edema, extremities warm  Neuro:  Grossly normal for motor, sensory.      Studies:     Laboratory performed June 2024 and reviewed by me personally showed: Serum creatinine of 0.83, hemoglobin of 13.8, platelet count of 221.    Electrocardiogram performed today and reviewed by me personally showed: Atrial fibrillation with heart rate in the low 100s, low voltage QRS, poor R wave progression    TTE performed June 2024 and reviewed by me personally showed: LV size and function with estimated EF of 55 to 60%, normal RV size and function, moderate to moderate-severe (2-3+) TR with PILAR ACE device in place, moderate to severe low-flow, low gradient aortic valve stenosis (PV 2.9 m/s, MG 21 mmHg , calculated valve area 0.9-1.0 cm .  DI 0.34.  SVI 25 mL/m ).  Of note, review of aortic valve gradients and valve area from echocardiograms over the past 2 years with stable findings.    Cath performed March 2023 and reviewed by me personally showed: Mild nonobstructive coronary artery disease    TAVR CT pending    The longitudinal plan of care for aortic valve disease as documented were addressed during this visit. Due to the added complexity in care, I will continue to support Misty in the subsequent management  and with ongoing continuity of care.    I sincerely appreciate the opportunity to participate in this patient's care.  Please do not hesitate to contact me if any questions or concerns arise.     Sheldon Cancino MD  Interventional Cardiology

## 2024-08-26 NOTE — LETTER
8/26/2024    LIZBET WALLACE MD  [facility] [address]  [facility] [id number]    RE: Misty Quezada       Dear Colleague,     I had the pleasure of seeing Misty A Damien in the [facility].  [facility]   [address], [place] [address]   [email or web address]   OFFICE: [phone]     IMPRESSION:  Low-flow, low gradient, moderate to severe, aortic valve stenosis (PV 2.9 m/s, MG 21 mmHg , calculated valve area 0.9-1.0 cm .  DI 0.34.  SVI 25 mL/m .)  Of note, review of aortic valve gradients and valve area from echocardiograms over the past two years with stable findings.   Functional Capacity:  NYHA class I-II, CCS class 0   Severe tricuspid regurgitation, post tricuspid TIARA with PILAR-ACE (CLASP TR) -now with residual moderate TR  Heart failure with preserved ejection fraction  Persistent atrial fibrillation, rate control and therapeutic anticoagulation  Hypertension  Medication noncompliance      SUMMARY OF RECOMMENDATIONS: We had a long discussion with Ms. Quezada regarding natural progression and therapeutic options of calcific aortic valve disease.  Severity of her aortic valve disease is unclear, likely moderate approaching severe.  We discussed further workup including TAVR CT valve calcium score and invasive valve study with R/L heart catheterization in addition to coronary angiogram.  Given patient is minimally symptomatic she would like to defer work up at this time, with plan for a repeat echocardiogram in 6 months.  We agreed if she develops new onset symptoms to contact us sooner.      Dear Lizbet Wallace     I had the pleasure of seeing Misty Damien today at the [facility] for evaluation for Transcatheter Aortic Valve Replacement for progressive aortic valve stenosis.  Briefly, patient recently had  routine echocardiogram as follow-up for CLASP trial post tricuspid TIARA and was noted to have progression of her aortic valve disease for which she was referred to our clinic.  Today, Ms. Quezada reports overall she has been doing well. The patient denies symptoms of chest pain, shortness of breath, palpitations, dizziness, lightheadedness, presyncope, syncope, orthopnea, PND, early satiety/abdominal bloating, stroke like symptoms, or claudication.  Her chronic lower extremity edema is unchanged.    ROS:  A 14 point review of symptoms was reviewed.  No prior hx of rheumatic fever or chest irradiation. There were no additional findings pertinent to this encounter.     Past Medical History: as above     Social History: Denies tobacco use, rare alcohol use    Family History: No early family history of cardiovascular disease or sudden cardiac death.    Allergies: reviewed   No Known Allergies    Pertinent Medications:    Current Outpatient Medications   Medication Sig Dispense Refill     acetaminophen (TYLENOL) 500 MG tablet Take 2-3 tablets by mouth every 6 hours as needed for mild pain       apixaban ANTICOAGULANT (ELIQUIS) 5 MG tablet Take 1 tablet (5 mg) by mouth 2 times daily 180 tablet 1     ascorbic acid 1000 MG TABS tablet Take 1 tablet by mouth every morning       bumetanide (BUMEX) 1 MG tablet Take 3 tablets (3 mg) by mouth 2 times daily 180 tablet 11     CALCIUM/D3/MAG OX//TYRONE/ZN (CALTRATE + D3 PLUS MINERALS ORAL) Take 1 tablet by mouth every morning       cholecalciferol, vitamin D3, 2,000 unit Tab Take 2,000 Units by mouth every morning       cyanocobalamin 1000 MCG tablet Take 1,000 mcg by mouth every morning       ferrous sulfate (FEROSUL) 325 (65 Fe) MG tablet Take 1 tablet (325 mg) by mouth every other day       MELATONIN PO Take 1 tablet by mouth nightly as needed       metoprolol succinate ER (TOPROL XL) 25 MG 24 hr tablet Take 2 tablets (50 mg) by mouth daily 180 tablet 3     MULTIVIT WITH  CALCIUM,IRON,MIN (WOMEN'S DAILY MULTIVITAMIN ORAL) Take 1 tablet by mouth every morning       nystatin (MYCOSTATIN) 650817 UNIT/GM external cream Apply topically two times a day. Under breast Indications: Skin Infection due to Candida Yeast       potassium chloride ER (K-TAB/KLOR-CON) 10 MEQ CR tablet TAKE 1 TABLET(10 MEQ) BY MOUTH DAILY 30 tablet 2     spironolactone (ALDACTONE) 25 MG tablet Take 1 tablet (25 mg) by mouth daily 90 tablet 1     No current facility-administered medications for this visit.       OBJECTIVE:  Physical Examination   /78 (BP Location: Right arm, Patient Position: Sitting, Cuff Size: Adult Large)   Pulse 100   Resp 16   Wt 119.3 kg (263 lb)   BMI 41.19 kg/m     Constitutional:  NAD  Cognitive: Alert, oriented x 3, Appropriate speech  Chest:  Clear bilaterally   Cardiac: regular rhythm, normal S1/S2, mid to late peaking systolic murmur. JVP mildly elevated.  Abdoment:  Soft, non-tender  Pulses: 2 + distal pulses  Skin and Integument: +1-2 peripheral edema, extremities warm  Neuro:  Grossly normal for motor, sensory.      Studies:     Laboratory performed June 2024 and reviewed by me personally showed: Serum creatinine of 0.83, hemoglobin of 13.8, platelet count of 221.    Electrocardiogram performed today and reviewed by me personally showed: Atrial fibrillation with heart rate in the low 100s, low voltage QRS, poor R wave progression    TTE performed June 2024 and reviewed by me personally showed: LV size and function with estimated EF of 55 to 60%, normal RV size and function, moderate to moderate-severe (2-3+) TR with PILAR ACE device in place, moderate to severe low-flow, low gradient aortic valve stenosis (PV 2.9 m/s, MG 21 mmHg , calculated valve area 0.9-1.0 cm .  DI 0.34.  SVI 25 mL/m ).  Of note, review of aortic valve gradients and valve area from echocardiograms over the past 2 years with stable findings.    Cath performed March 2023 and reviewed by me personally  showed: Mild nonobstructive coronary artery disease    TAVR CT pending    The longitudinal plan of care for aortic valve disease as documented were addressed during this visit. Due to the added complexity in care, I will continue to support Misty in the subsequent management and with ongoing continuity of care.    I sincerely appreciate the opportunity to participate in this patient's care.  Please do not hesitate to contact me if any questions or concerns arise.     Sheldon Cancino MD  Interventional Cardiology               Thank you for allowing me to participate in the care of your patient.      Sincerely,     Sheldon Cancino MD     [facility]  cc:   No referring provider defined for this encounter.

## 2024-09-06 DIAGNOSIS — I50.32 CHRONIC HEART FAILURE WITH PRESERVED EJECTION FRACTION (H): ICD-10-CM

## 2024-09-10 RX ORDER — POTASSIUM CHLORIDE 750 MG/1
10 TABLET, EXTENDED RELEASE ORAL DAILY
Qty: 90 TABLET | Refills: 0 | Status: SHIPPED | OUTPATIENT
Start: 2024-09-10

## 2024-09-10 NOTE — TELEPHONE ENCOUNTER
Last seen 4/17/23 by CORTES. Multiple follow-ups noted with other cardiologist specialties. Msg sent to scheduling. Refills granted with request to sched follow-up for further refills. ALONSO

## 2024-10-28 ENCOUNTER — TELEPHONE (OUTPATIENT)
Dept: CARDIOLOGY | Facility: CLINIC | Age: 69
End: 2024-10-28
Payer: MEDICARE

## 2024-10-28 NOTE — TELEPHONE ENCOUNTER
Patient seen in valve clinic in August. Patient s/p TR clasp, team is currently monitoring aortic valve for progression of stenosis. Recommendation to have repeat imaging in early December. Order is in. Message sent to scheduling team to reach out to patient to set up.     Charlette Montana RN on 10/28/2024 at 7:25 AM

## 2024-10-30 NOTE — TELEPHONE ENCOUNTER
Patient scheduled for echo 12/2/24. Msg sent to team for reminder to contact pt with results.     Charlette Montana RN on 10/30/2024 at 10:01 AM

## 2024-11-21 ENCOUNTER — OFFICE VISIT (OUTPATIENT)
Dept: CARDIOLOGY | Facility: CLINIC | Age: 69
End: 2024-11-21
Payer: MEDICARE

## 2024-11-21 VITALS
HEART RATE: 78 BPM | HEIGHT: 67 IN | BODY MASS INDEX: 42.69 KG/M2 | RESPIRATION RATE: 16 BRPM | WEIGHT: 272 LBS | SYSTOLIC BLOOD PRESSURE: 122 MMHG | DIASTOLIC BLOOD PRESSURE: 78 MMHG

## 2024-11-21 DIAGNOSIS — I48.20 CHRONIC ATRIAL FIBRILLATION (H): ICD-10-CM

## 2024-11-21 DIAGNOSIS — I50.32 CHRONIC HEART FAILURE WITH PRESERVED EJECTION FRACTION (H): ICD-10-CM

## 2024-11-21 DIAGNOSIS — I50.30 HEART FAILURE WITH PRESERVED EJECTION FRACTION, UNSPECIFIED HF CHRONICITY (H): ICD-10-CM

## 2024-11-21 DIAGNOSIS — I50.33 ACUTE ON CHRONIC DIASTOLIC CONGESTIVE HEART FAILURE (H): Primary | ICD-10-CM

## 2024-11-21 PROCEDURE — G2211 COMPLEX E/M VISIT ADD ON: HCPCS | Performed by: INTERNAL MEDICINE

## 2024-11-21 PROCEDURE — 99214 OFFICE O/P EST MOD 30 MIN: CPT | Performed by: INTERNAL MEDICINE

## 2024-11-21 RX ORDER — BUMETANIDE 2 MG/1
4 TABLET ORAL 2 TIMES DAILY
Qty: 360 TABLET | Refills: 3 | Status: SHIPPED | OUTPATIENT
Start: 2024-11-21 | End: 2024-11-21

## 2024-11-21 RX ORDER — METOPROLOL SUCCINATE 25 MG/1
50 TABLET, EXTENDED RELEASE ORAL DAILY
Qty: 180 TABLET | Refills: 1 | Status: SHIPPED | OUTPATIENT
Start: 2024-11-21

## 2024-11-21 RX ORDER — METOPROLOL SUCCINATE 25 MG/1
50 TABLET, EXTENDED RELEASE ORAL DAILY
Qty: 180 TABLET | Refills: 3 | Status: SHIPPED | OUTPATIENT
Start: 2024-11-21 | End: 2024-11-21

## 2024-11-21 RX ORDER — BUMETANIDE 2 MG/1
4 TABLET ORAL 2 TIMES DAILY
Qty: 360 TABLET | Refills: 1 | Status: SHIPPED | OUTPATIENT
Start: 2024-11-21

## 2024-11-21 NOTE — PATIENT INSTRUCTIONS
Thank you for allowing the [facility] clinic to be a part of your care. Please pay close attention to the following medications as they have been changed during this visit.    1.) Please increase your Bumex to 4 mg (four 1 mg tablets) TWO TIMES DAILY

## 2024-11-21 NOTE — PROGRESS NOTES
HEART CARE NOTE          Assessment/Recommendations   1. HFpEF/Valvular heart disease   Assessment / Plan  Concern for hypervolemia given symptoms of volume overload and weight gain - increase bumex to 4 mg BID   High risk for adverse cardiac events 2/2 advanced age, frailty, non-compliance, elevated NTproBNP  GDMT as detailed below; mainstay of treatment for HFpEF includes diuretics and adequate BP control (class I) and SGLT2-I (class 2a); additional medical therapy (ARNI, MRA, ARB) demonstrated less robust evidence for indication but may be considered per guideline recommendations (2b); no indication for BBlockers      Current Pharmacotherapy AHA Guideline-Directed Medical Therapy   Losartan 25 mg daily - on hold 2/2 hypotension ARNI/ARB   Spironolactone 25 mg  MRA   SGLT2 inhibitor not started SGLT2-I    Bumex 4 mg BID Loop diuretic        2. Atrial fibrillation  Assessment / Plan  Rate controlled - currently on metoprolol and apixaban     3. Valvular heart disease  Assessment / Plan  LORRAINE 0.8 - follows with valve clinic; please see detailed notes re: plan of care in chart  Severe TR currently enrolled in CLASP II TR study - s/p 1 PASCALACE device    35 minutes spent reviewing prior records (including documentation, laboratory studies, cardiac testing/imaging), history and physical exam, planning, and subsequent documentation.    The longitudinal plan of care for HFpEF was addressed during this visit. Due to the added complexity in care, I will continue to support  Ms. Misty Quezadain the subsequent management of this condition(s) and with the ongoing continuity of care of this condition(s).      History of Present Illness/Subjective    Ms. Misty Quezada is a 68 year old female with a PMHx significant for (per Epic notation) CHFpEF, tricuspid regurgitation, HTN, A-fib, lymphedema admitted on 4/23/2024 with complaints of shortness of breath and increased leg swelling due to noncompliance with Bumex.  Patient  states she stopped taking it a month ago and has gained 70 pounds since.      Today, Mrs. Quezada denies acute cardiac events or complaints; Management plan as detailed above     We discussed HF diet and lifestyle modifications including the 2 g Na and 2L fluid restrictions. She does not currently adhere, but will do so moving forward. Patient was provided with the HF educational binder as well as a book to log his daily weights as well as HF education by our CORE RN    ECG: Personally reviewed. Afib with RVR.     Repeat echo personally reviewed 4/25/24:  There is moderate concentric left ventricular hypertrophy.  The visual ejection fraction is 55-60%.  Flattened septum is consistent with RV pressure/volume overload.  The right ventricular systolic function is normal.  Low flow, low gradient moderate to severe AS with mean gradient of 25 mmHg.  The calculated aortic valve area is 0.8cm2.  A single Janette-Ace [id number] repair device is securely attached to the  tricuspid leaflets. There is mild to moderate (1-2+) tricuspid regurgitation.  The right ventricular systolic pressure is approximated at 62 mmHg.  Compared to the prior study dated 10/18/2023, there are changes as noted. The  aortic valve stenosis appears to be more severe and pulmonary artery pressures  are higher.     ECHO (personnaly Reviewed on 4/24/24):   1. Left ventricular chamber size, wall thickness and systolic function are  normal. The visually estimated left ventricular ejection fraction is 55-60%.  2. Right ventricle is not well visualized. Chamber size may be mildly  enlarged. Systolic function may be mildly reduced.  3. Severe right atrial enlargement. Moderate left atrial enlargement.  4. A single Janette-Ace [id number]-[id number] repair device is securely attached to the  tricuspid valve leaflets. There is mild-to-moderate residual tricuspid  regurgitation. No evidence of significant stenosis with peak inflow velocity  1.2 m/s and mean inflow  "gradient 3 mmHg at a heart rate of approximately 90  beats per minute.  5. Calcified trileaflet aoritc valve with paradoxical low flow, low gradient  (stroke volume index 22.2 mL/m2) aortic stenosis felt to be mild-to-moderate  in severity. Peak forward velocity measures 2.5 m/s, mean gradient 14 mmHg,  calculated aortic valve area 1.1 cm2, and dimensionless index 0.27. No  significant aortic regurgitation.  6. Mild pulmonary hypertension is present with an estimated pulmonary artery  systolic pressure of 43 mmHg.  7. Compared to the prior study dated 6/1/2023, the degree of tricuspid  regurgitation is less.     Lab results: personally reviewed November 21, 2024; notable for renal function wnl and persistently elevated NTproBNP    Medical history and pertinent documents reviewed in Care Everywhere please where applicable see details above        Physical Examination Review of Systems   /78 (BP Location: Right arm, Patient Position: Sitting, Cuff Size: Adult Regular)   Pulse 78   Resp 16   Ht 1.702 m (5' 7\")   Wt 123.4 kg (272 lb)   BMI 42.60 kg/m    Body mass index is 42.6 kg/m .  Wt Readings from Last 3 Encounters:   11/21/24 123.4 kg (272 lb)   08/26/24 119.3 kg (263 lb)   08/21/24 115.7 kg (255 lb)     General Appearance:   no distress, normal body habitus   ENT/Mouth: membranes moist, no oral lesions or bleeding gums.      EYES:  no scleral icterus, normal conjunctivae   Neck: no carotid bruits or thyromegaly   Chest/Lungs:   lungs are clear to auscultation, no rales or wheezing, equal chest wall expansion    Cardiovascular:   Irregular. Normal first and second heart sounds with no murmurs, rubs, or gallops; the carotid, radial and posterior tibial pulses are intact, + JVD and mild LE edema bilaterally    Abdomen:  no organomegaly, masses, bruits, or tenderness; bowel sounds are present   Extremities: no cyanosis or clubbing   Skin: no xanthelasma, warm.    Neurologic: NAD     Psychiatric: alert and " oriented x3, calm     A complete 10 systems ROS was reviewed  And is negative except what is listed in the HPI.          Medical History  Surgical History Family History Social History   Past Medical History:   Diagnosis Date    (HFpEF) heart failure with preserved ejection fraction (H) 10/17/2022    Aortic valve disorder     Aortic valve stenosis 11/10/2022    Benign essential hypertension     Chronic atrial fibrillation (H)     Congestive heart failure (H)     Lymphedema 09/24/2022    Nonrheumatic tricuspid valve regurgitation 02/06/2023    Obese     Tricuspid valve disorders, non-rheumatic     Past Surgical History:   Procedure Laterality Date    APPENDECTOMY      CHOLECYSTECTOMY      CV CORONARY ANGIOGRAM N/A 3/15/2023    Procedure: Coronary Angiogram;  Surgeon: Howard Plummer MD;  Location: [facility] CATH LAB CV    CV LEFT HEART CATH N/A 3/15/2023    Procedure: Left Heart Catheterization;  Surgeon: Howard Plummer MD;  Location: [facility] CATH LAB CV    CV RIGHT HEART CATH MEASUREMENTS RECORDED N/A 3/15/2023    Procedure: Right Heart Catheterization;  Surgeon: Howard Plummer MD;  Location: [facility] CATH LAB CV    CV TRANSCATHETER TRICUSPID VALVE REPAIR N/A 5/3/2023    Procedure: Transcatheter Tricuspid Valve Repair;  Surgeon: Howard Plummer MD;  Location: [facility] CATH LAB CV    GASTRIC BYPASS      OR TRANSCATHETER TRICUSPID VALVE REPLACEMENT, FEMORAL PERCUTANEOUS APPROACH (STANDBY) N/A 5/3/2023    Procedure: OR TRANSCATHETER TRICUSPID VALVE REPLACEMENT, FEMORAL PERCUTANEOUS APPROACH (STANDBY);  Surgeon: Kana Calderón MD;  Location: [facility] CATH LAB CV    no family history of premature coronary artery disease Social History     Socioeconomic History    Marital status:      Spouse name: Not on file    Number of children: Not on file    Years of education: Not on file    Highest education level: Not on file   Occupational History    Not on file   Tobacco Use    Smoking status:  Never    Smokeless tobacco: Never   Substance and Sexual Activity    Alcohol use: Not Currently     Comment: Rarely Uses alcohol    Drug use: Never    Sexual activity: Not on file   Other Topics Concern    Not on file   Social History Narrative    Not on file     Social Drivers of Health     Financial Resource Strain: Not on file   Food Insecurity: Not on file   Transportation Needs: Not on file   Physical Activity: Not on file   Stress: Not on file   Social Connections: Not on file   Interpersonal Safety: Not on file   Housing Stability: Not on file           Lab Results    Chemistry/lipid CBC Cardiac Enzymes/BNP/TSH/INR   Lab Results   Component Value Date    CHOL 163 09/09/2016    HDL 42 (L) 09/09/2016    TRIG 81 09/09/2016    BUN 22.9 06/04/2024     06/04/2024    CO2 25 06/04/2024    Lab Results   Component Value Date    WBC 6.1 06/04/2024    HGB 13.8 06/04/2024    HCT 43.2 06/04/2024    MCV 97 06/04/2024     06/04/2024    Lab Results   Component Value Date    TROPONINI 1.32 (HH) 08/21/2021     (H) 09/02/2021    TSH 3.67 04/24/2024    INR 1.21 (H) 05/04/2023     Lab Results   Component Value Date    TROPONINI 1.32 (HH) 08/21/2021          Weight:    Wt Readings from Last 3 Encounters:   11/21/24 123.4 kg (272 lb)   08/26/24 119.3 kg (263 lb)   08/21/24 115.7 kg (255 lb)       Allergies  No Known Allergies      Surgical History  Past Surgical History:   Procedure Laterality Date    APPENDECTOMY      CHOLECYSTECTOMY      CV CORONARY ANGIOGRAM N/A 3/15/2023    Procedure: Coronary Angiogram;  Surgeon: Howard Plummer MD;  Location: [facility] CATH LAB CV    CV LEFT HEART CATH N/A 3/15/2023    Procedure: Left Heart Catheterization;  Surgeon: Howard Plummer MD;  Location: [facility] CATH LAB CV    CV RIGHT HEART CATH MEASUREMENTS RECORDED N/A 3/15/2023    Procedure: Right Heart Catheterization;  Surgeon: Howard Plummer MD;  Location: [facility] CATH LAB CV    CV TRANSCATHETER TRICUSPID VALVE  REPAIR N/A 5/3/2023    Procedure: Transcatheter Tricuspid Valve Repair;  Surgeon: Howard Plummer MD;  Location: [facility] CATH LAB CV    GASTRIC BYPASS      OR TRANSCATHETER TRICUSPID VALVE REPLACEMENT, FEMORAL PERCUTANEOUS APPROACH (STANDBY) N/A 5/3/2023    Procedure: OR TRANSCATHETER TRICUSPID VALVE REPLACEMENT, FEMORAL PERCUTANEOUS APPROACH (STANDBY);  Surgeon: Kana Calderón MD;  Location: [facility] CATH LAB CV       Social History  Tobacco:   History   Smoking Status    Never   Smokeless Tobacco    Never    Alcohol:   Social History    Substance and Sexual Activity      Alcohol use: Not Currently        Comment: Rarely Uses alcohol   Illicit Drugs:   History   Drug Use Unknown       Family History  Family History   Problem Relation Age of Onset    No Known Problems Mother     No Known Problems Father           Tara Siegel MD on 11/21/2024      cc: Lizbet Baer

## 2024-11-21 NOTE — PROGRESS NOTES
[facility]: Heart Failure Care Coordination   Heart Failure Education    Situation/Background:      RN CC provided heart failure education to pt during clinic visit.    Assessment:      Living situation: home, independent    Barriers to Heart Failure follow-up: Lack of motivation    Medication management: independent    CHF assessment:  No assessment indicated.     Goals Addressed    None               Intervention/Plan:      CM/HF education topics reviewed:  Low sodium: 2000 mg or less daily, meal choices and label reading   Fluid Restriction: 60 oz per day   Daily weight monitoring and logging   Medication review and importance of compliance   Home blood pressure monitoring and logging   Overview of C.O.R.E. clinic   Overview of heart failure appointments and testing   Importance of exercise       Education materials provided:  Low sodium food and drink handout  Low sodium food product examples  Already prepared low salt meal delivery services handout  HF stoplight tool  Guide to HF booklet  Cardiac medication handout  C.O.R.E. information brochure  Palliative information handout     HF resources reviewed:  [facility]  Heart Failure support group      Patient to follow up as scheduled.     Patient expressed understanding of above education/instructions and denied further questions at this time.    Myra WEBB RN BSN, CHFN, PCCN-K

## 2024-11-21 NOTE — LETTER
11/21/2024    LAVELL WALLACE MD  [facility] [address]  [facility] [address]    RE: Misty SYLVAIN Quezada       Dear Colleague,     I had the pleasure of seeing Misty NARAYAN Damien in the [facility].    HEART CARE NOTE          Assessment/Recommendations   1. HFpEF/Valvular heart disease   Assessment / Plan  Concern for hypervolemia given symptoms of volume overload and weight gain - increase bumex to 4 mg BID   High risk for adverse cardiac events 2/2 advanced age, frailty, non-compliance, elevated NTproBNP  GDMT as detailed below; mainstay of treatment for HFpEF includes diuretics and adequate BP control (class I) and SGLT2-I (class 2a); additional medical therapy (ARNI, MRA, ARB) demonstrated less robust evidence for indication but may be considered per guideline recommendations (2b); no indication for BBlockers      Current Pharmacotherapy AHA Guideline-Directed Medical Therapy   Losartan 25 mg daily - on hold 2/2 hypotension ARNI/ARB   Spironolactone 25 mg  MRA   SGLT2 inhibitor not started SGLT2-I    Bumex 4 mg BID Loop diuretic        2. Atrial fibrillation  Assessment / Plan  Rate controlled - currently on metoprolol and apixaban     3. Valvular heart disease  Assessment / Plan  LORRAINE 0.8 - follows with valve clinic; please see detailed notes re: plan of care in chart  Severe TR currently enrolled in CLASP II TR study - s/p 1 PASCALACE device    35 minutes spent reviewing prior records (including documentation, laboratory studies, cardiac testing/imaging), history and physical exam, planning, and subsequent documentation.    The longitudinal plan of care for HFpEF was addressed during this visit. Due to the added complexity in care, I will continue to support  Ms. Misty Quezadain the subsequent management of this condition(s) and with the ongoing continuity of care of this condition(s).      History of Present Illness/Subjective    Ms. Misty Quezada is a 68  year old female with a PMHx significant for (per Epic notation) CHFpEF, tricuspid regurgitation, HTN, A-fib, lymphedema admitted on 4/23/2024 with complaints of shortness of breath and increased leg swelling due to noncompliance with Bumex.  Patient states she stopped taking it a month ago and has gained 70 pounds since.      Today, Mrs. Quezada denies acute cardiac events or complaints; Management plan as detailed above     We discussed HF diet and lifestyle modifications including the 2 g Na and 2L fluid restrictions. She does not currently adhere, but will do so moving forward. Patient was provided with the HF educational binder as well as a book to log his daily weights as well as HF education by our CORE RN    ECG: Personally reviewed. Afib with RVR.     Repeat echo personally reviewed 4/25/24:  There is moderate concentric left ventricular hypertrophy.  The visual ejection fraction is 55-60%.  Flattened septum is consistent with RV pressure/volume overload.  The right ventricular systolic function is normal.  Low flow, low gradient moderate to severe AS with mean gradient of 25 mmHg.  The calculated aortic valve area is 0.8cm2.  A single Janette-Ace [id number]-jcby repair device is securely attached to the  tricuspid leaflets. There is mild to moderate (1-2+) tricuspid regurgitation.  The right ventricular systolic pressure is approximated at 62 mmHg.  Compared to the prior study dated 10/18/2023, there are changes as noted. The  aortic valve stenosis appears to be more severe and pulmonary artery pressures  are higher.     ECHO (personnaly Reviewed on 4/24/24):   1. Left ventricular chamber size, wall thickness and systolic function are  normal. The visually estimated left ventricular ejection fraction is 55-60%.  2. Right ventricle is not well visualized. Chamber size may be mildly  enlarged. Systolic function may be mildly reduced.  3. Severe right atrial enlargement. Moderate left atrial enlargement.  4. A  "single Janette-Ace [id number]-azpj repair device is securely attached to the  tricuspid valve leaflets. There is mild-to-moderate residual tricuspid  regurgitation. No evidence of significant stenosis with peak inflow velocity  1.2 m/s and mean inflow gradient 3 mmHg at a heart rate of approximately 90  beats per minute.  5. Calcified trileaflet aoritc valve with paradoxical low flow, low gradient  (stroke volume index 22.2 mL/m2) aortic stenosis felt to be mild-to-moderate  in severity. Peak forward velocity measures 2.5 m/s, mean gradient 14 mmHg,  calculated aortic valve area 1.1 cm2, and dimensionless index 0.27. No  significant aortic regurgitation.  6. Mild pulmonary hypertension is present with an estimated pulmonary artery  systolic pressure of 43 mmHg.  7. Compared to the prior study dated 6/1/2023, the degree of tricuspid  regurgitation is less.     Lab results: personally reviewed November 21, 2024; notable for renal function wnl and persistently elevated NTproBNP    Medical history and pertinent documents reviewed in Care Everywhere please where applicable see details above        Physical Examination Review of Systems   /78 (BP Location: Right arm, Patient Position: Sitting, Cuff Size: Adult Regular)   Pulse 78   Resp 16   Ht 1.702 m (5' 7\")   Wt 123.4 kg (272 lb)   BMI 42.60 kg/m    Body mass index is 42.6 kg/m .  Wt Readings from Last 3 Encounters:   11/21/24 123.4 kg (272 lb)   08/26/24 119.3 kg (263 lb)   08/21/24 115.7 kg (255 lb)     General Appearance:   no distress, normal body habitus   ENT/Mouth: membranes moist, no oral lesions or bleeding gums.      EYES:  no scleral icterus, normal conjunctivae   Neck: no carotid bruits or thyromegaly   Chest/Lungs:   lungs are clear to auscultation, no rales or wheezing, equal chest wall expansion    Cardiovascular:   Irregular. Normal first and second heart sounds with no murmurs, rubs, or gallops; the carotid, radial and posterior tibial pulses " are intact, + JVD and mild LE edema bilaterally    Abdomen:  no organomegaly, masses, bruits, or tenderness; bowel sounds are present   Extremities: no cyanosis or clubbing   Skin: no xanthelasma, warm.    Neurologic: NAD     Psychiatric: alert and oriented x3, calm     A complete 10 systems ROS was reviewed  And is negative except what is listed in the HPI.          Medical History  Surgical History Family History Social History   Past Medical History:   Diagnosis Date     (HFpEF) heart failure with preserved ejection fraction (H) 10/17/2022     Aortic valve disorder      Aortic valve stenosis 11/10/2022     Benign essential hypertension      Chronic atrial fibrillation (H)      Congestive heart failure (H)      Lymphedema 09/24/2022     Nonrheumatic tricuspid valve regurgitation 02/06/2023     Obese      Tricuspid valve disorders, non-rheumatic     Past Surgical History:   Procedure Laterality Date     APPENDECTOMY       CHOLECYSTECTOMY       CV CORONARY ANGIOGRAM N/A 3/15/2023    Procedure: Coronary Angiogram;  Surgeon: Howard Plummer MD;  Location: [facility] CATH [facility] CV     CV LEFT HEART CATH N/A 3/15/2023    Procedure: Left Heart Catheterization;  Surgeon: Howard Plummer MD;  Location: [facility] CATH LAB      CV RIGHT HEART CATH MEASUREMENTS RECORDED N/A 3/15/2023    Procedure: Right Heart Catheterization;  Surgeon: Howard Plummer MD;  Location: [facility] CATH LAB CV     CV TRANSCATHETER TRICUSPID VALVE REPAIR N/A 5/3/2023    Procedure: Transcatheter Tricuspid Valve Repair;  Surgeon: Howard Plummer MD;  Location: [facility] CATH LAB CV     GASTRIC BYPASS       OR TRANSCATHETER TRICUSPID VALVE REPLACEMENT, FEMORAL PERCUTANEOUS APPROACH (STANDBY) N/A 5/3/2023    Procedure: OR TRANSCATHETER TRICUSPID VALVE REPLACEMENT, FEMORAL PERCUTANEOUS APPROACH (STANDBY);  Surgeon: Kana Calderón MD;  Location: [facility] CATH [facility] CV    no family history of premature coronary artery disease Social  History     Socioeconomic History     Marital status:      Spouse name: Not on file     Number of children: Not on file     Years of education: Not on file     Highest education level: Not on file   Occupational History     Not on file   Tobacco Use     Smoking status: Never     Smokeless tobacco: Never   Substance and Sexual Activity     Alcohol use: Not Currently     Comment: Rarely Uses alcohol     Drug use: Never     Sexual activity: Not on file   Other Topics Concern     Not on file   Social History Narrative     Not on file     Social Drivers of Health     Financial Resource Strain: Not on file   Food Insecurity: Not on file   Transportation Needs: Not on file   Physical Activity: Not on file   Stress: Not on file   Social Connections: Not on file   Interpersonal Safety: Not on file   Housing Stability: Not on file           Lab Results    Chemistry/lipid CBC Cardiac Enzymes/BNP/TSH/INR   Lab Results   Component Value Date    CHOL 163 09/09/2016    HDL 42 (L) 09/09/2016    TRIG 81 09/09/2016    BUN 22.9 06/04/2024     06/04/2024    CO2 25 06/04/2024    Lab Results   Component Value Date    WBC 6.1 06/04/2024    HGB 13.8 06/04/2024    HCT 43.2 06/04/2024    MCV 97 06/04/2024     06/04/2024    Lab Results   Component Value Date    TROPONINI 1.32 (HH) 08/21/2021     (H) 09/02/2021    TSH 3.67 04/24/2024    INR 1.21 (H) 05/04/2023     Lab Results   Component Value Date    TROPONINI 1.32 (HH) 08/21/2021          Weight:    Wt Readings from Last 3 Encounters:   11/21/24 123.4 kg (272 lb)   08/26/24 119.3 kg (263 lb)   08/21/24 115.7 kg (255 lb)       Allergies  No Known Allergies      Surgical History  Past Surgical History:   Procedure Laterality Date     APPENDECTOMY       CHOLECYSTECTOMY       CV CORONARY ANGIOGRAM N/A 3/15/2023    Procedure: Coronary Angiogram;  Surgeon: Howard Plummer MD;  Location: [facility] CATH LAB CV     CV LEFT HEART CATH N/A 3/15/2023    Procedure: Left Heart  Catheterization;  Surgeon: Howard Plummer MD;  Location: [facility] LAB CV     CV RIGHT HEART CATH MEASUREMENTS RECORDED N/A 3/15/2023    Procedure: Right Heart Catheterization;  Surgeon: Howard Plummer MD;  Location: [facility] CATH LAB CV     CV TRANSCATHETER TRICUSPID VALVE REPAIR N/A 5/3/2023    Procedure: Transcatheter Tricuspid Valve Repair;  Surgeon: Howard Plummer MD;  Location: [facility] CATH LAB CV     GASTRIC BYPASS       OR TRANSCATHETER TRICUSPID VALVE REPLACEMENT, FEMORAL PERCUTANEOUS APPROACH (STANDBY) N/A 5/3/2023    Procedure: OR TRANSCATHETER TRICUSPID VALVE REPLACEMENT, FEMORAL PERCUTANEOUS APPROACH (STANDBY);  Surgeon: Kana Calderón MD;  Location: [facility] CATH LAB CV       Social History  Tobacco:   History   Smoking Status     Never   Smokeless Tobacco     Never    Alcohol:   Social History    Substance and Sexual Activity      Alcohol use: Not Currently        Comment: Rarely Uses alcohol   Illicit Drugs:   History   Drug Use Unknown       Family History  Family History   Problem Relation Age of Onset     No Known Problems Mother      No Known Problems Father           Tara Siegel MD on 11/21/2024      cc: Lizbet Baer      [facility]: Heart Failure Care Coordination   Heart Failure Education    Situation/Background:      RN CC provided heart failure education to pt during clinic visit.    Assessment:      Living situation: home, independent    Barriers to Heart Failure follow-up: Lack of motivation    Medication management: independent    CHF assessment:  No assessment indicated.     Goals Addressed    None               Intervention/Plan:      CM/HF education topics reviewed:  Low sodium: 2000 mg or less daily, meal choices and label reading   Fluid Restriction: 60 oz per day   Daily weight monitoring and logging   Medication review and importance of compliance   Home blood pressure monitoring and logging   Overview of C.O.R.E. clinic   Overview  of heart failure appointments and testing   Importance of exercise       Education materials provided:  Low sodium food and drink handout  Low sodium food product examples  Already prepared low salt meal delivery services handout  HF stoplight tool  Guide to HF booklet  Cardiac medication handout  C.O.R.E. information brochure  Palliative information handout     HF resources reviewed:  [facility]  Heart Failure support group      Patient to follow up as scheduled.     Patient expressed understanding of above education/instructions and denied further questions at this time.    Myra WEBB RN BSN, CHFN, PCCN-K      Thank you for allowing me to participate in the care of your patient.      Sincerely,     Tara Siegel MD     [facility]  cc:   Tara Siegel MD  [address]  [place] [address]

## 2024-12-02 ENCOUNTER — HOSPITAL ENCOUNTER (OUTPATIENT)
Dept: CARDIOLOGY | Facility: HOSPITAL | Age: 69
Discharge: HOME | End: 2024-12-02
Attending: INTERNAL MEDICINE | Admitting: INTERNAL MEDICINE
Payer: MEDICARE

## 2024-12-02 DIAGNOSIS — I35.0 AORTIC VALVE STENOSIS: ICD-10-CM

## 2024-12-02 LAB — LVEF ECHO: NORMAL

## 2024-12-02 PROCEDURE — 93306 TTE W/DOPPLER COMPLETE: CPT | Mod: 26 | Performed by: INTERNAL MEDICINE

## 2024-12-02 PROCEDURE — 93306 TTE W/DOPPLER COMPLETE: CPT

## 2025-01-21 ENCOUNTER — DOCUMENTATION ONLY (OUTPATIENT)
Dept: CARDIOLOGY | Facility: CLINIC | Age: 70
End: 2025-01-21
Payer: MEDICARE

## 2025-01-21 NOTE — PROGRESS NOTES
"Valve Clinic Prep Worksheet    Patient Name: Misty Quezada  : 1955  AGE: 69 year old     Patient Address: [address] [place] [address] BMI: 42   Height (CM):    Ht Readings from Last 1 Encounters:   24 1.702 m (5' 7\")                                                      Weight (kg):   Wt Readings from Last 1 Encounters:   24 123.4 kg (272 lb)                                                          Contact info/Phone number:  [phone]  Initial STS: 4.92%                                           Referred by: valve team  Date: scheduled follow up  Insurance:Payor: MEDICARE / Plan: MEDICARE / Product Type: Medicare /    Patient Care Team: Patient Care Team:  Lizbet Baer MD as PCP - General  Tara Siegel MD as MD (Cardiovascular Disease)  Yong Matamoros DO as Assigned Sleep Provider  Medardo Warner APRN CNP as Nurse Practitioner (Cardiovascular Disease)  Tara Siegel MD as Assigned Heart and Vascular Provider    Past Medical History   Problem List:  2024: Noncompliance with medication regimen  2024: Acute on chronic diastolic congestive heart failure (H)  2023-10: Essential hypertension  2023: Hypotension, unspecified hypotension type  2023: S/P tricuspid valve repair  2023: Mitral regurgitation  2023: Nonrheumatic tricuspid valve regurgitation  2022: Aortic valve stenosis  2022-10: Chronic heart failure with preserved ejection fraction (H)  2022-10: Hypomagnesemia  2022: Lymphedema  2022: Acute pulmonary edema (H)  2022: Bilateral lower extremity edema  2022: Morbid obesity (H)  2021: Elevated troponin  2021: Chronic atrial fibrillation (H)  2021: Left leg cellulitis     Transthoracic Echocardiogram    Date (24): M.7 mmHg Peak V: 2.9 m/sec LORRAINE: 0.76 cm  DI: 0.26 SVI: 22 ml/m  EF: 55-60%  Ao max P mmHg        Comment on valves: Mitral valve has trace regurgitation and no stenosis. Residual moderate TR " after pascal edge to edge repair. Aortic valve has moderate to severe stenosis with trace regurgitation.    Heart Cath Summary CTA (TAVR) MURTAZA   Cardiac Catheterization last completed during pascal TR clasp procedure in 2023   not ordered  Not ordered    Labs   Creat:   Creatinine   Date Value Ref Range Status   06/04/2024 0.83 0.51 - 0.95 mg/dL Final       GFR:   GFR Estimate   Date Value Ref Range Status   06/04/2024 76 >60 mL/min/1.73m2 Final   05/13/2024 77 >60 mL/min/1.73m2 Final   05/06/2024 61 >60 mL/min/1.73m2 Final   03/22/2018 >60 >60 mL/min/1.73m2 Final     GFR Estimate If Black   Date Value Ref Range Status   03/22/2018 >60 >60 mL/min/1.73m2 Final       Potassium:   Potassium   Date Value Ref Range Status   06/04/2024 4.3 3.4 - 5.3 mmol/L Final   09/21/2021 3.4 (L) 3.5 - 5.0 mmol/L Final       Sodium:   Sodium   Date Value Ref Range Status   06/04/2024 139 135 - 145 mmol/L Final     Comment:     Reference intervals for this test were updated on 09/26/2023 to more accurately reflect our healthy population. There may be differences in the flagging of prior results with similar values performed with this method. Interpretation of those prior results can be made in the context of the updated reference intervals.        WBC:   WBC Count   Date Value Ref Range Status   06/04/2024 6.1 4.0 - 11.0 10e3/uL Final       Hgb:   Hemoglobin   Date Value Ref Range Status   06/04/2024 13.8 11.7 - 15.7 g/dL Final   04/24/2024 13.0 11.7 - 15.7 g/dL Final       HCT:   Hematocrit   Date Value Ref Range Status   06/04/2024 43.2 35.0 - 47.0 % Final       Plts:   Platelet Count   Date Value Ref Range Status   06/04/2024 221 150 - 450 10e3/uL Final       Albumin:   Lab Results   Component Value Date    ALBUMIN 4.1 06/04/2024       INR:   INR   Date Value Ref Range Status   05/04/2023 1.21 (H) 0.85 - 1.15 Final        Current Medications: Allergies:    Current Outpatient Medications   Medication Sig Dispense Refill     acetaminophen (TYLENOL) 500 MG tablet Take 2-3 tablets by mouth every 6 hours as needed for mild pain      apixaban ANTICOAGULANT (ELIQUIS) 5 MG tablet Take 1 tablet (5 mg) by mouth 2 times daily 180 tablet 1    ascorbic acid 1000 MG TABS tablet Take 1 tablet by mouth every morning      bumetanide (BUMEX) 2 MG tablet Take 2 tablets (4 mg) by mouth 2 times daily. 360 tablet 1    CALCIUM/D3/MAG OX//TYRONE/ZN (CALTRATE + D3 PLUS MINERALS ORAL) Take 1 tablet by mouth every morning      cholecalciferol, vitamin D3, 2,000 unit Tab Take 2,000 Units by mouth every morning      cyanocobalamin 1000 MCG tablet Take 1,000 mcg by mouth every morning      ferrous sulfate (FEROSUL) 325 (65 Fe) MG tablet Take 1 tablet (325 mg) by mouth every other day      MELATONIN PO Take 1 tablet by mouth nightly as needed      metoprolol succinate ER (TOPROL XL) 25 MG 24 hr tablet Take 2 tablets (50 mg) by mouth daily. 180 tablet 1    MULTIVIT WITH CALCIUM,IRON,MIN (WOMEN'S DAILY MULTIVITAMIN ORAL) Take 1 tablet by mouth every morning      nystatin (MYCOSTATIN) 676154 UNIT/GM external cream Apply topically two times a day. Under breast Indications: Skin Infection due to Candida Yeast      potassium chloride ER (K-TAB/KLOR-CON) 10 MEQ CR tablet TAKE 1 TABLET(10 MEQ) BY MOUTH DAILY 90 tablet 0    spironolactone (ALDACTONE) 25 MG tablet Take 1 tablet (25 mg) by mouth daily 90 tablet 1     No current facility-administered medications for this visit.      No Known Allergies

## 2025-01-22 ENCOUNTER — TELEPHONE (OUTPATIENT)
Dept: CARDIOLOGY | Facility: CLINIC | Age: 70
End: 2025-01-22

## 2025-01-22 NOTE — TELEPHONE ENCOUNTER
Writer called patient to ask if she wants to reschedule her cancelled visit from 01/22.  She does not want to reschedule at this time but will call directly when she is ready to schedule.

## 2025-02-06 ENCOUNTER — OFFICE VISIT (OUTPATIENT)
Dept: CARDIOLOGY | Facility: CLINIC | Age: 70
End: 2025-02-06
Payer: MEDICARE

## 2025-02-06 VITALS
SYSTOLIC BLOOD PRESSURE: 112 MMHG | WEIGHT: 273 LBS | RESPIRATION RATE: 20 BRPM | DIASTOLIC BLOOD PRESSURE: 76 MMHG | BODY MASS INDEX: 42.76 KG/M2 | HEART RATE: 60 BPM

## 2025-02-06 DIAGNOSIS — Z98.890 S/P TRICUSPID VALVE REPAIR: ICD-10-CM

## 2025-02-06 DIAGNOSIS — E66.01 MORBID OBESITY (H): ICD-10-CM

## 2025-02-06 DIAGNOSIS — I95.9 HYPOTENSION, UNSPECIFIED HYPOTENSION TYPE: ICD-10-CM

## 2025-02-06 DIAGNOSIS — I50.32 CHRONIC HEART FAILURE WITH PRESERVED EJECTION FRACTION (H): ICD-10-CM

## 2025-02-06 DIAGNOSIS — I35.0 NONRHEUMATIC AORTIC VALVE STENOSIS: ICD-10-CM

## 2025-02-06 DIAGNOSIS — I50.32 CHRONIC HEART FAILURE WITH PRESERVED EJECTION FRACTION (HFPEF) (H): Primary | ICD-10-CM

## 2025-02-06 DIAGNOSIS — I10 ESSENTIAL HYPERTENSION: ICD-10-CM

## 2025-02-06 DIAGNOSIS — I89.0 LYMPHEDEMA: ICD-10-CM

## 2025-02-06 DIAGNOSIS — I48.20 CHRONIC ATRIAL FIBRILLATION (H): ICD-10-CM

## 2025-02-06 DIAGNOSIS — I36.1 NONRHEUMATIC TRICUSPID VALVE REGURGITATION: ICD-10-CM

## 2025-02-06 PROBLEM — E83.42 HYPOMAGNESEMIA: Status: RESOLVED | Noted: 2022-10-02 | Resolved: 2025-02-06

## 2025-02-06 PROBLEM — R79.89 ELEVATED TROPONIN: Status: RESOLVED | Noted: 2021-08-21 | Resolved: 2025-02-06

## 2025-02-06 LAB
ANION GAP SERPL CALCULATED.3IONS-SCNC: 10 MMOL/L (ref 7–15)
BUN SERPL-MCNC: 24 MG/DL (ref 8–23)
CALCIUM SERPL-MCNC: 9.8 MG/DL (ref 8.8–10.4)
CHLORIDE SERPL-SCNC: 101 MMOL/L (ref 98–107)
CREAT SERPL-MCNC: 0.78 MG/DL (ref 0.51–0.95)
EGFRCR SERPLBLD CKD-EPI 2021: 81 ML/MIN/1.73M2
GLUCOSE SERPL-MCNC: 120 MG/DL (ref 70–99)
HCO3 SERPL-SCNC: 29 MMOL/L (ref 22–29)
NT-PROBNP SERPL-MCNC: 961 PG/ML (ref 0–900)
POTASSIUM SERPL-SCNC: 3.9 MMOL/L (ref 3.4–5.3)
SODIUM SERPL-SCNC: 140 MMOL/L (ref 135–145)

## 2025-02-06 NOTE — PATIENT INSTRUCTIONS
Misty Quezada,    It was a pleasure to see you today at the [facility].     My recommendations after this visit include:    - No medications changes made today    - Low sodium diet < 2000 mg/day, daily weight monitoring, and maintain adequate fluid intake at 50 to 60 ounces per day    -Please call if you experience persistent weight gain 2 to 3 pounds 2 days in a row or 5 pounds in a week with shortness of breath, abdominal bloating and leg swelling    - Follow up with Dr. Plummer as scheduled in February in valve clinic    - Follow up with Dr. Siegel in 2 months     - Please call Heart Failure Nurse Line at [phone], if you have any questions or concerns

## 2025-02-06 NOTE — LETTER
2/6/2025    LAVELL WALLACE MD  [facility] [address]  [facility] [address]    RE: Misty Quezada       Dear Colleague,     I had the pleasure of seeing Misty Espitiajonas in the [facility].          Assessment/Recommendations   Assessment:      # Chronic heart failure with preserved ejection fraction, NYHA class II:  Current home weight is 270 lbs. reports stable and unchanged since she saw Dr. Siegel.  Patient is mild hypervolemic on exam.    She reports taking Bumex 4 mg once a day instead of twice a day.  Every once in a while she skips when she has to go out.     Known chronic lymphedema.    BMP stable on 11/7/2023.    # History of hypotension: Blood pressure today is 112/76.    # Valvular heart disease: Severe functional tricuspid regurgitation with status post TIARA with 1 PASCALACE device resulting in slight reduction in tricuspid regurgitation: Stable per echo in December 2024.    Moderate to severe aortic stenosis: Patient scheduled to be seen in valve clinic in few days.  Denies symptoms except reports some dizziness or feeling unsteady on her feet.    # Obesity with BMI of 42.76:    #  Mild to moderate aortic stenosis: Stable per echo    # Chronic atrial fibrillation: Stable.    Plan/Recommendation:  -BMP and NT proBNP pending  -We will adjust diuretic therapy once lab results back  - Continue on low salt diet <2000 mg per day, daily weight monitoring, and maintain adequate fluid intake with 50-60 ounces per day    Follow-up with Dr. Siegel in 2-3 months.     The longitudinal plan of care for chronic heart failure with preserved ejection fraction was addressed during this visit.?Due to the added   complexity in care, I will continue to support Misty Quezada in the subsequent management of this   condition(s) and with the ongoing continuity of care of this condition(s)       History of Present Illness/Subjective    MsSkylar Quezada is a 69 year  old female with a past medical history of permanent atrial fibrillation,  severe tricuspid regurgitation with status post TIARA with 1 PASCALACE device, moderate to severe aortic valve stenosis, lymphedema, morbid obesity, obstructive sleep apnea, and chronic heart failure with preserved ejection fraction who is seen at [facility] for continued heart failure follow-up    Patient was last seen by me November 2023.  She has been following up with my colleagues in heart failure clinic.  She saw Dr. Siegel in November 2024.  She was thought to be volume overloaded and her Bumex dose was increased to 4 mg twice a day.    Today, Misty reports stable from heart failure and weight status since she saw Dr. Siegel in November.  She does report some dizziness or having issue with balance and unsteadiness specially in new environment.    She denies fatigue, lightheadedness, shortness of breath, dyspnea on exertion, orthopnea, PND, palpitations, chest pain, and abdominal fullness/bloating.      She has chronic lower extremity edema.  She has compression stocking at home.      She reports following low-sodium diet.  She reports adequate fluid intake.    Echo from December 2024-reviewed:  Interpretation Summary   The left ventricle is normal in size.  The visual ejection fraction is 55-60%.  Regional wall motion cannot be evaluated due to poor endocardial border  definition  Moderate to severe valvular aortic stenosis. LORRAINE 0.82 cm^2, Peak Markus 3.0 m/s,  Mean gradient 23 mmHg, DI 0.26  A single Janette-Ace cojq-wx-iput repair device was placed 5/3/2023, device not  well seen. There is moderate tricuspid regurgitation. Mean TV gradient  2.3mmHg.  RVSP 36 mmHg  Compared to the prior study dated 6/4/2024 overall findings are similar     Echocardiogram on 10/18/2023 - reviewed:  Interpretation Summary   1. Left ventricular chamber size, wall thickness and systolic function are  normal. The visually  estimated left ventricular ejection fraction is 55-60%.  2. Right ventricle is not well visualized. Chamber size may be mildly  enlarged. Systolic function may be mildly reduced.  3. Severe right atrial enlargement. Moderate left atrial enlargement.  4. A single Janette-Ace rleu-rn-ifhb repair device is securely attached to the  tricuspid valve leaflets. There is mild-to-moderate residual tricuspid  regurgitation. No evidence of significant stenosis with peak inflow velocity  1.2 m/s and mean inflow gradient 3 mmHg at a heart rate of approximately 90  beats per minute.  5. Calcified trileaflet aoritc valve with paradoxical low flow, low gradient  (stroke volume index 22.2 mL/m2) aortic stenosis felt to be mild-to-moderate  in severity. Peak forward velocity measures 2.5 m/s, mean gradient 14 mmHg,  calculated aortic valve area 1.1 cm2, and dimensionless index 0.27. No  significant aortic regurgitation.  6. Mild pulmonary hypertension is present with an estimated pulmonary artery  systolic pressure of 43 mmHg.  7. Compared to the prior study dated 6/1/2023, the degree of tricuspid  regurgitation is less.     Physical Examination Review of Systems   /76 (BP Location: Right arm, Patient Position: Sitting, Cuff Size: Adult Large)   Pulse 60   Resp 20   Wt 123.8 kg (273 lb)   BMI 42.76 kg/m    Body mass index is 42.76 kg/m .  Wt Readings from Last 3 Encounters:   02/06/25 123.8 kg (273 lb)   11/21/24 123.4 kg (272 lb)   08/26/24 119.3 kg (263 lb)     General Appearance:   no distress, normal body habitus   ENT/Mouth: membranes moist, no oral lesions or bleeding gums.      EYES:  no scleral icterus, normal conjunctivae   Neck: no carotid bruits or thyromegaly   Chest/Lungs:   lungs are clear to auscultation, no rales or wheezing, equal chest wall expansion    Cardiovascular:   Irregular irregular. Normal first and second heart sounds with 2/6 systolic murmurs, no rubs or gallops;  JVP is difficult to assess due  to the patient's obesity and body habitus   , mild-moderate edema bilaterally    Abdomen:  Large but soft, no organomegaly, masses, bruits, or tenderness; bowel sounds are present   Extremities   no cyanosis or clubbing    CMS intact.   Skin: no xanthelasma, warm.    Neurologic:  no tremors   Psychiatric: alert and oriented x3, calm                                                        Negative unless noted in HPI     Medical History  Surgical History Family History Social History   Past Medical History:   Diagnosis Date     (HFpEF) heart failure with preserved ejection fraction (H) 10/17/2022     Aortic valve disorder      Aortic valve stenosis 11/10/2022     Benign essential hypertension      Chronic atrial fibrillation (H)      Congestive heart failure (H)      Lymphedema 09/24/2022     Nonrheumatic tricuspid valve regurgitation 02/06/2023     Obese      Tricuspid valve disorders, non-rheumatic     Past Surgical History:   Procedure Laterality Date     APPENDECTOMY       CHOLECYSTECTOMY       CV CORONARY ANGIOGRAM N/A 3/15/2023    Procedure: Coronary Angiogram;  Surgeon: Howard Plummer MD;  Location: [facility]     CV LEFT HEART CATH N/A 3/15/2023    Procedure: Left Heart Catheterization;  Surgeon: Howard Plummer MD;  Location: [facility]     CV RIGHT HEART CATH MEASUREMENTS RECORDED N/A 3/15/2023    Procedure: Right Heart Catheterization;  Surgeon: Howard Plummer MD;  Location: [facility]     CV TRANSCATHETER TRICUSPID VALVE REPAIR N/A 5/3/2023    Procedure: Transcatheter Tricuspid Valve Repair;  Surgeon: Howard Plummer MD;  Location: [facility] CV     GASTRIC BYPASS       OR TRANSCATHETER TRICUSPID VALVE REPLACEMENT, FEMORAL PERCUTANEOUS APPROACH (STANDBY) N/A 5/3/2023    Procedure: OR TRANSCATHETER TRICUSPID VALVE REPLACEMENT, FEMORAL PERCUTANEOUS APPROACH (STANDBY);  Surgeon: Kana Calderón MD;  Location: [facility] CV    Family History    Problem Relation Age of Onset     No Known Problems Mother      No Known Problems Father     Social History     Socioeconomic History     Marital status:      Spouse name: Not on file     Number of children: Not on file     Years of education: Not on file     Highest education level: Not on file   Occupational History     Not on file   Tobacco Use     Smoking status: Never     Smokeless tobacco: Never   Substance and Sexual Activity     Alcohol use: Not Currently     Comment: Rarely Uses alcohol     Drug use: Never     Sexual activity: Not on file   Other Topics Concern     Not on file   Social History Narrative     Not on file     Social Drivers of Health     Financial Resource Strain: Not on file   Food Insecurity: Not on file   Transportation Needs: Not on file   Physical Activity: Not on file   Stress: Not on file   Social Connections: Not on file   Interpersonal Safety: Not on file   Housing Stability: Not on file          Medications  Allergies   Current Outpatient Medications   Medication Sig Dispense Refill     acetaminophen (TYLENOL) 500 MG tablet Take 2-3 tablets by mouth every 6 hours as needed for mild pain       apixaban ANTICOAGULANT (ELIQUIS) 5 MG tablet Take 1 tablet (5 mg) by mouth 2 times daily 180 tablet 1     ascorbic acid 1000 MG TABS tablet Take 1 tablet by mouth every morning       bumetanide (BUMEX) 2 MG tablet Take 2 tablets (4 mg) by mouth 2 times daily. 360 tablet 1     CALCIUM/D3/MAG OX//TYRONE/ZN (CALTRATE + D3 PLUS MINERALS ORAL) Take 1 tablet by mouth every morning       cholecalciferol, vitamin D3, 2,000 unit Tab Take 2,000 Units by mouth every morning       cyanocobalamin 1000 MCG tablet Take 1,000 mcg by mouth every morning       ferrous sulfate (FEROSUL) 325 (65 Fe) MG tablet Take 1 tablet (325 mg) by mouth every other day       MELATONIN PO Take 1 tablet by mouth nightly as needed       metoprolol succinate ER (TOPROL XL) 25 MG 24 hr tablet Take 2 tablets (50 mg) by  mouth daily. 180 tablet 1     MULTIVIT WITH CALCIUM,IRON,MIN (WOMEN'S DAILY MULTIVITAMIN ORAL) Take 1 tablet by mouth every morning       nystatin (MYCOSTATIN) 735530 UNIT/GM external cream Apply topically two times a day. Under breast Indications: Skin Infection due to Candida Yeast       potassium chloride ER (K-TAB/KLOR-CON) 10 MEQ CR tablet TAKE 1 TABLET(10 MEQ) BY MOUTH DAILY 90 tablet 0     spironolactone (ALDACTONE) 25 MG tablet Take 1 tablet (25 mg) by mouth daily 90 tablet 1    No Known Allergies      Lab Results    Chemistry/lipid CBC Cardiac Enzymes/BNP/TSH/INR   Lab Results   Component Value Date    CHOL 163 09/09/2016    HDL 42 (L) 09/09/2016    TRIG 81 09/09/2016    BUN 22.9 06/04/2024     06/04/2024    CO2 25 06/04/2024    Lab Results   Component Value Date    WBC 6.1 06/04/2024    HGB 13.8 06/04/2024    HCT 43.2 06/04/2024    MCV 97 06/04/2024     06/04/2024    Lab Results   Component Value Date    TROPONINI 1.32 (HH) 08/21/2021     (H) 09/02/2021    TSH 3.67 04/24/2024    INR 1.21 (H) 05/04/2023        30  minutes spent on the date of encounter doing chart review, review of test results, patient visit, and documentation.        This note has been dictated using voice recognition software. Any grammatical, typographical, or context distortions are unintentional and inherent to the software          Thank you for allowing me to participate in the care of your patient.      Sincerely,     ERICK [facility]  cc:   Tara Siegel MD  [address]  [place] [address]

## 2025-02-06 NOTE — PROGRESS NOTES
Assessment/Recommendations   Assessment:      # Chronic heart failure with preserved ejection fraction, NYHA class II:  Current home weight is 270 lbs. reports stable and unchanged since she saw Dr. Siegel.  Patient is mild hypervolemic on exam.    She reports taking Bumex 4 mg once a day instead of twice a day.  Every once in a while she skips when she has to go out.     Known chronic lymphedema.    BMP stable on 11/7/2023.    # History of hypotension: Blood pressure today is 112/76.    # Valvular heart disease: Severe functional tricuspid regurgitation with status post TIARA with 1 PASCALACE device resulting in slight reduction in tricuspid regurgitation: Stable per echo in December 2024.    Moderate to severe aortic stenosis: Patient scheduled to be seen in valve clinic in few days.  Denies symptoms except reports some dizziness or feeling unsteady on her feet.    # Obesity with BMI of 42.76:    #  Mild to moderate aortic stenosis: Stable per echo    # Chronic atrial fibrillation: Stable.    Plan/Recommendation:  -BMP and NT proBNP pending  -We will adjust diuretic therapy once lab results back  - Continue on low salt diet <2000 mg per day, daily weight monitoring, and maintain adequate fluid intake with 50-60 ounces per day    Follow-up with Dr. Siegel in 2-3 months.     The longitudinal plan of care for chronic heart failure with preserved ejection fraction was addressed during this visit.?Due to the added   complexity in care, I will continue to support Misty Quezada in the subsequent management of this   condition(s) and with the ongoing continuity of care of this condition(s)       History of Present Illness/Subjective    Ms. Misty Quezada is a 69 year old female with a past medical history of permanent atrial fibrillation,  severe tricuspid regurgitation with status post TAIRA with 1 PASCALACE device, moderate to severe aortic valve stenosis, lymphedema, morbid obesity, obstructive sleep  apnea, and chronic heart failure with preserved ejection fraction who is seen at [facility] for continued heart failure follow-up    Patient was last seen by me November 2023.  She has been following up with my colleagues in heart failure clinic.  She saw Dr. Siegel in November 2024.  She was thought to be volume overloaded and her Bumex dose was increased to 4 mg twice a day.    Today, Misty reports stable from heart failure and weight status since she saw Dr. Siegel in November.  She does report some dizziness or having issue with balance and unsteadiness specially in new environment.    She denies fatigue, lightheadedness, shortness of breath, dyspnea on exertion, orthopnea, PND, palpitations, chest pain, and abdominal fullness/bloating.      She has chronic lower extremity edema.  She has compression stocking at home.      She reports following low-sodium diet.  She reports adequate fluid intake.    Echo from December 2024-reviewed:  Interpretation Summary   The left ventricle is normal in size.  The visual ejection fraction is 55-60%.  Regional wall motion cannot be evaluated due to poor endocardial border  definition  Moderate to severe valvular aortic stenosis. LORRAINE 0.82 cm^2, Peak Markus 3.0 m/s,  Mean gradient 23 mmHg, DI 0.26  A single Janette-Ace alva-wu-eqja repair device was placed 5/3/2023, device not  well seen. There is moderate tricuspid regurgitation. Mean TV gradient  2.3mmHg.  RVSP 36 mmHg  Compared to the prior study dated 6/4/2024 overall findings are similar     Echocardiogram on 10/18/2023 - reviewed:  Interpretation Summary   1. Left ventricular chamber size, wall thickness and systolic function are  normal. The visually estimated left ventricular ejection fraction is 55-60%.  2. Right ventricle is not well visualized. Chamber size may be mildly  enlarged. Systolic function may be mildly reduced.  3. Severe right atrial enlargement. Moderate left atrial  enlargement.  4. A single Janette-Ace fvkw-te-nllo repair device is securely attached to the  tricuspid valve leaflets. There is mild-to-moderate residual tricuspid  regurgitation. No evidence of significant stenosis with peak inflow velocity  1.2 m/s and mean inflow gradient 3 mmHg at a heart rate of approximately 90  beats per minute.  5. Calcified trileaflet aoritc valve with paradoxical low flow, low gradient  (stroke volume index 22.2 mL/m2) aortic stenosis felt to be mild-to-moderate  in severity. Peak forward velocity measures 2.5 m/s, mean gradient 14 mmHg,  calculated aortic valve area 1.1 cm2, and dimensionless index 0.27. No  significant aortic regurgitation.  6. Mild pulmonary hypertension is present with an estimated pulmonary artery  systolic pressure of 43 mmHg.  7. Compared to the prior study dated 6/1/2023, the degree of tricuspid  regurgitation is less.     Physical Examination Review of Systems   /76 (BP Location: Right arm, Patient Position: Sitting, Cuff Size: Adult Large)   Pulse 60   Resp 20   Wt 123.8 kg (273 lb)   BMI 42.76 kg/m    Body mass index is 42.76 kg/m .  Wt Readings from Last 3 Encounters:   02/06/25 123.8 kg (273 lb)   11/21/24 123.4 kg (272 lb)   08/26/24 119.3 kg (263 lb)     General Appearance:   no distress, normal body habitus   ENT/Mouth: membranes moist, no oral lesions or bleeding gums.      EYES:  no scleral icterus, normal conjunctivae   Neck: no carotid bruits or thyromegaly   Chest/Lungs:   lungs are clear to auscultation, no rales or wheezing, equal chest wall expansion    Cardiovascular:   Irregular irregular. Normal first and second heart sounds with 2/6 systolic murmurs, no rubs or gallops;  JVP is difficult to assess due to the patient's obesity and body habitus   , mild-moderate edema bilaterally    Abdomen:  Large but soft, no organomegaly, masses, bruits, or tenderness; bowel sounds are present   Extremities   no cyanosis or clubbing    CMS intact.    Skin: no xanthelasma, warm.    Neurologic:  no tremors   Psychiatric: alert and oriented x3, calm                                                        Negative unless noted in HPI     Medical History  Surgical History Family History Social History   Past Medical History:   Diagnosis Date    (HFpEF) heart failure with preserved ejection fraction (H) 10/17/2022    Aortic valve disorder     Aortic valve stenosis 11/10/2022    Benign essential hypertension     Chronic atrial fibrillation (H)     Congestive heart failure (H)     Lymphedema 09/24/2022    Nonrheumatic tricuspid valve regurgitation 02/06/2023    Obese     Tricuspid valve disorders, non-rheumatic     Past Surgical History:   Procedure Laterality Date    APPENDECTOMY      CHOLECYSTECTOMY      CV CORONARY ANGIOGRAM N/A 3/15/2023    Procedure: Coronary Angiogram;  Surgeon: Howard Plummer MD;  Location: [facility] C[facility] CV    CV LEFT HEART CATH N/A 3/15/2023    Procedure: Left Heart Catheterization;  Surgeon: Howard Plummer MD;  Location: [facility] CATH [facility] CV    CV RIGHT HEART CATH MEASUREMENTS RECORDED N/A 3/15/2023    Procedure: Right Heart Catheterization;  Surgeon: Howard Plummer MD;  Location: [facility] CATH [facility] CV    CV TRANSCATHETER TRICUSPID VALVE REPAIR N/A 5/3/2023    Procedure: Transcatheter Tricuspid Valve Repair;  Surgeon: Howard Plummer MD;  Location: [facility] CATH LAB CV    GASTRIC BYPASS      OR TRANSCATHETER TRICUSPID VALVE REPLACEMENT, FEMORAL PERCUTANEOUS APPROACH (STANDBY) N/A 5/3/2023    Procedure: OR TRANSCATHETER TRICUSPID VALVE REPLACEMENT, FEMORAL PERCUTANEOUS APPROACH (STANDBY);  Surgeon: Kana Calderón MD;  Location: [facility] CATH LAB CV    Family History   Problem Relation Age of Onset    No Known Problems Mother     No Known Problems Father     Social History     Socioeconomic History    Marital status:      Spouse name: Not on file    Number of children: Not on file    Years of education: Not  on file    Highest education level: Not on file   Occupational History    Not on file   Tobacco Use    Smoking status: Never    Smokeless tobacco: Never   Substance and Sexual Activity    Alcohol use: Not Currently     Comment: Rarely Uses alcohol    Drug use: Never    Sexual activity: Not on file   Other Topics Concern    Not on file   Social History Narrative    Not on file     Social Drivers of Health     Financial Resource Strain: Not on file   Food Insecurity: Not on file   Transportation Needs: Not on file   Physical Activity: Not on file   Stress: Not on file   Social Connections: Not on file   Interpersonal Safety: Not on file   Housing Stability: Not on file          Medications  Allergies   Current Outpatient Medications   Medication Sig Dispense Refill    acetaminophen (TYLENOL) 500 MG tablet Take 2-3 tablets by mouth every 6 hours as needed for mild pain      apixaban ANTICOAGULANT (ELIQUIS) 5 MG tablet Take 1 tablet (5 mg) by mouth 2 times daily 180 tablet 1    ascorbic acid 1000 MG TABS tablet Take 1 tablet by mouth every morning      bumetanide (BUMEX) 2 MG tablet Take 2 tablets (4 mg) by mouth 2 times daily. 360 tablet 1    CALCIUM/D3/MAG OX//TYRONE/ZN (CALTRATE + D3 PLUS MINERALS ORAL) Take 1 tablet by mouth every morning      cholecalciferol, vitamin D3, 2,000 unit Tab Take 2,000 Units by mouth every morning      cyanocobalamin 1000 MCG tablet Take 1,000 mcg by mouth every morning      ferrous sulfate (FEROSUL) 325 (65 Fe) MG tablet Take 1 tablet (325 mg) by mouth every other day      MELATONIN PO Take 1 tablet by mouth nightly as needed      metoprolol succinate ER (TOPROL XL) 25 MG 24 hr tablet Take 2 tablets (50 mg) by mouth daily. 180 tablet 1    MULTIVIT WITH CALCIUM,IRON,MIN (WOMEN'S DAILY MULTIVITAMIN ORAL) Take 1 tablet by mouth every morning      nystatin (MYCOSTATIN) 618647 UNIT/GM external cream Apply topically two times a day. Under breast Indications: Skin Infection due to Candida  Yeast      potassium chloride ER (K-TAB/KLOR-CON) 10 MEQ CR tablet TAKE 1 TABLET(10 MEQ) BY MOUTH DAILY 90 tablet 0    spironolactone (ALDACTONE) 25 MG tablet Take 1 tablet (25 mg) by mouth daily 90 tablet 1    No Known Allergies      Lab Results    Chemistry/lipid CBC Cardiac Enzymes/BNP/TSH/INR   Lab Results   Component Value Date    CHOL 163 09/09/2016    HDL 42 (L) 09/09/2016    TRIG 81 09/09/2016    BUN 22.9 06/04/2024     06/04/2024    CO2 25 06/04/2024    Lab Results   Component Value Date    WBC 6.1 06/04/2024    HGB 13.8 06/04/2024    HCT 43.2 06/04/2024    MCV 97 06/04/2024     06/04/2024    Lab Results   Component Value Date    TROPONINI 1.32 (HH) 08/21/2021     (H) 09/02/2021    TSH 3.67 04/24/2024    INR 1.21 (H) 05/04/2023        30  minutes spent on the date of encounter doing chart review, review of test results, patient visit, and documentation.        This note has been dictated using voice recognition software. Any grammatical, typographical, or context distortions are unintentional and inherent to the software

## 2025-02-18 LAB
ABO + RH BLD: NORMAL
BLD GP AB SCN SERPL QL: NEGATIVE
SPECIMEN EXP DATE BLD: NORMAL

## 2025-02-19 ENCOUNTER — APPOINTMENT (OUTPATIENT)
Dept: CARDIOLOGY | Facility: CLINIC | Age: 70
End: 2025-02-19
Payer: MEDICARE

## 2025-02-19 ENCOUNTER — ALLIED HEALTH/NURSE VISIT (OUTPATIENT)
Dept: CARDIOLOGY | Facility: CLINIC | Age: 70
End: 2025-02-19
Payer: MEDICARE

## 2025-02-19 ENCOUNTER — OFFICE VISIT (OUTPATIENT)
Dept: CARDIOLOGY | Facility: CLINIC | Age: 70
End: 2025-02-19
Payer: MEDICARE

## 2025-02-19 VITALS
SYSTOLIC BLOOD PRESSURE: 112 MMHG | HEART RATE: 96 BPM | BODY MASS INDEX: 43.7 KG/M2 | WEIGHT: 279 LBS | RESPIRATION RATE: 16 BRPM | DIASTOLIC BLOOD PRESSURE: 74 MMHG

## 2025-02-19 DIAGNOSIS — I35.0 AORTIC VALVE STENOSIS: ICD-10-CM

## 2025-02-19 DIAGNOSIS — I35.0 NONRHEUMATIC AORTIC VALVE STENOSIS: Primary | ICD-10-CM

## 2025-02-19 DIAGNOSIS — I51.89 OTHER ILL-DEFINED HEART DISEASES: Primary | ICD-10-CM

## 2025-02-19 LAB
ATRIAL RATE - MUSE: 101 BPM
DIASTOLIC BLOOD PRESSURE - MUSE: NORMAL MMHG
ERYTHROCYTE [DISTWIDTH] IN BLOOD BY AUTOMATED COUNT: 12.7 % (ref 10–15)
HCT VFR BLD AUTO: 44.4 % (ref 35–47)
HGB BLD-MCNC: 14.4 G/DL (ref 11.7–15.7)
INTERPRETATION ECG - MUSE: NORMAL
MCH RBC QN AUTO: 31.6 PG (ref 26.5–33)
MCHC RBC AUTO-ENTMCNC: 32.4 G/DL (ref 31.5–36.5)
MCV RBC AUTO: 98 FL (ref 78–100)
P AXIS - MUSE: NORMAL DEGREES
PLATELET # BLD AUTO: 276 10E3/UL (ref 150–450)
PR INTERVAL - MUSE: NORMAL MS
QRS DURATION - MUSE: 92 MS
QT - MUSE: 362 MS
QTC - MUSE: 457 MS
R AXIS - MUSE: 69 DEGREES
RBC # BLD AUTO: 4.55 10E6/UL (ref 3.8–5.2)
SYSTOLIC BLOOD PRESSURE - MUSE: NORMAL MMHG
T AXIS - MUSE: 39 DEGREES
VENTRICULAR RATE- MUSE: 96 BPM
WBC # BLD AUTO: 6.5 10E3/UL (ref 4–11)

## 2025-02-19 PROCEDURE — 85027 COMPLETE CBC AUTOMATED: CPT

## 2025-02-19 PROCEDURE — 86901 BLOOD TYPING SEROLOGIC RH(D): CPT

## 2025-02-19 PROCEDURE — G2211 COMPLEX E/M VISIT ADD ON: HCPCS | Performed by: INTERNAL MEDICINE

## 2025-02-19 PROCEDURE — 99207 PR NO CHARGE LOS: CPT

## 2025-02-19 PROCEDURE — 36415 COLL VENOUS BLD VENIPUNCTURE: CPT

## 2025-02-19 PROCEDURE — 86850 RBC ANTIBODY SCREEN: CPT

## 2025-02-19 PROCEDURE — 93000 ELECTROCARDIOGRAM COMPLETE: CPT | Performed by: INTERNAL MEDICINE

## 2025-02-19 PROCEDURE — 99215 OFFICE O/P EST HI 40 MIN: CPT | Performed by: INTERNAL MEDICINE

## 2025-02-19 PROCEDURE — 99417 PROLNG OP E/M EACH 15 MIN: CPT | Performed by: INTERNAL MEDICINE

## 2025-02-19 PROCEDURE — 86900 BLOOD TYPING SEROLOGIC ABO: CPT

## 2025-02-19 NOTE — PROGRESS NOTES
5 Meter Walk Test:  1st Trial: 10.39   2nd Trial: 12.25  3rd Trial: 12.15    Score: 11.59    Pt. used two canes for support during the 5 meter walk.

## 2025-02-19 NOTE — PROGRESS NOTES
HEART CARE VALVE CLINIC ENCOUNTER CONSULTATON NOTE      [facility]  [phone]      Assessment/Recommendations   Assessment/Plan:Misty Quezada is a 69F w/ aortic stenosis, severe TR s/p PILAR T-TIARA, HFPEF, AF, untreated MIKEL, high BMI who is here for evaluation of management options for her valvular disease.    -  I personally reviewed cardiac imaging including: TTE, which showed P/M 35/23 LORRAINE 0.8 DI 0.26 SVI 22 consistent w/ early severe aortic stenosis;  -  angio from '23, which showed non-obstructive CAD  -  after a long discussion of natural history, risk/benefit, and management options w/ the patient, we all agree to proceed w/ the remainder of w/up including CTA TAVR protocol,  angio +/- PCI/RHC/valve study,  CTS consult, Dental eval    The longitudinal plan of care for the diagnosis(es)/condition(s) as documented were addressed during this visit. Due to the added complexity in care, I will continue to support Misty in the subsequent management and with ongoing continuity of care.      A total of >60 mins was spent reviewing prior records, including documentation, lab studies, cardiac testing/imaging, interview with patient, physical exam, and documentation     History of Present Illness/Subjective    HPI: Misty Quezada is a 69 year old female w/ aortic stenosis, severe TR s/p PILAR T-TIARA, HFPEF, AF, untreated MIKEL, high BMI who is here for evaluation of management options for her valvular disease.     She is generally doing well, denies CP/SOB at rest, orthopnea/PND. + edema, is fairly sedentary - moved very slowly on account of weight and knee pain, so difficult to assess functional capacity. No N/V/D/F/c/wt.changes.    Lives w/ 2 son, no tobacco/EtOH, retired.     Recent Echocardiogram Results:  The visual ejection fraction is 55-60%.  Regional wall motion cannot be evaluated due to poor endocardial border  definition  Moderate to severe valvular aortic stenosis. LORRAINE 0.82 cm^2,  Peak Markus 3.0 m/s,  Mean gradient 23 mmHg, DI 0.26  A single Janette-Ace [id number]-[id number] repair device was placed 5/3/2023, device not  well seen. There is moderate tricuspid regurgitation. Mean TV gradient  2.3mmHg.  RVSP 36 mmHg  Compared to the prior study dated 6/4/2024 overall findings are similar    Recent Coronary Angiogram Results'23:  Misty Quezada is a 67 year old old female with severe TR, at least mod aortic stenosis, HFPEF, AF, untreated MIKEL, high BMI who is here for angio/RHC for evaluation prior to possible valve intervention (CLASP TR vs. OHS).     - non-obstructive CAD  - elevated R-sided, normal L-sided filling pressures, moderate pulmonary HTN w/ pulmonary arterial component (not due to L-sided HF) with transpulmonary gradient of 39-17=23; preserved CO/CI by Kapil  - continue screening for CLASP trial candidacy, but in parallel recommend establishing follow up w/ Pulmonology, establish treatment for MIKEL, consider other pHTN treatment options  - continue aggressive risk factor modification       Physical Examination  Review of Systems   Vitals: /74 (BP Location: Left arm, Patient Position: Sitting, Cuff Size: Adult Large)   Pulse 96   Resp 16   Wt 126.6 kg (279 lb)   BMI 43.70 kg/m    BMI= Body mass index is 43.7 kg/m .  Wt Readings from Last 3 Encounters:   02/19/25 126.6 kg (279 lb)   02/06/25 123.8 kg (273 lb)   11/21/24 123.4 kg (272 lb)       General Appearance:   no distress, normal body habitus   ENT/Mouth: membranes moist, no oral lesions or bleeding gums.      EYES:  no scleral icterus, normal conjunctivae   Neck: no carotid bruits or thyromegaly   Chest/Lungs:   lungs are clear to auscultation, no rales or wheezing, no sternal scar, equal chest wall expansion    Cardiovascular:   Regular. Normal first and second heart sounds with 2/6 systolic murmur, no rubs, or gallops; the carotid, radial and posterior tibial pulses are intact, Jugular venous pressure 7, no edema bilaterally     Abdomen:  no organomegaly, masses, bruits, or tenderness; bowel sounds are present   Extremities: no cyanosis or clubbing   Skin: no xanthelasma, warm.    Neurologic: normal  bilateral, no tremors     Psychiatric: alert and oriented x3, calm        Please refer above for cardiac ROS details.        Medical History  Surgical History Family History Social History   Past Medical History:   Diagnosis Date    (HFpEF) heart failure with preserved ejection fraction (H) 10/17/2022    Aortic valve disorder     Aortic valve stenosis 11/10/2022    Benign essential hypertension     Chronic atrial fibrillation (H)     Congestive heart failure (H)     Elevated troponin 08/21/2021    Hypomagnesemia 10/02/2022    Lymphedema 09/24/2022    Nonrheumatic tricuspid valve regurgitation 02/06/2023    Obese     Tricuspid valve disorders, non-rheumatic      Past Surgical History:   Procedure Laterality Date    APPENDECTOMY      CHOLECYSTECTOMY      CV CORONARY ANGIOGRAM N/A 3/15/2023    Procedure: Coronary Angiogram;  Surgeon: Howard Plummer MD;  Location: [facility] CV    CV LEFT HEART CATH N/A 3/15/2023    Procedure: Left Heart Catheterization;  Surgeon: Howard Plummer MD;  Location: [facility] CATH LAB     CV RIGHT HEART CATH MEASUREMENTS RECORDED N/A 3/15/2023    Procedure: Right Heart Catheterization;  Surgeon: Howard Plummer MD;  Location: [facility] CATH [facility] CV    CV TRANSCATHETER TRICUSPID VALVE REPAIR N/A 5/3/2023    Procedure: Transcatheter Tricuspid Valve Repair;  Surgeon: Howard Plummer MD;  Location: [facility] CATH LAB CV    GASTRIC BYPASS      OR TRANSCATHETER TRICUSPID VALVE REPLACEMENT, FEMORAL PERCUTANEOUS APPROACH (STANDBY) N/A 5/3/2023    Procedure: OR TRANSCATHETER TRICUSPID VALVE REPLACEMENT, FEMORAL PERCUTANEOUS APPROACH (STANDBY);  Surgeon: Kana Calderón MD;  Location: [facility] CATH [facility] CV     Family History   Problem Relation Age of Onset    No Known Problems Mother     No Known  Problems Father         Social History     Socioeconomic History    Marital status:      Spouse name: Not on file    Number of children: Not on file    Years of education: Not on file    Highest education level: Not on file   Occupational History    Not on file   Tobacco Use    Smoking status: Never    Smokeless tobacco: Never   Substance and Sexual Activity    Alcohol use: Not Currently     Comment: Rarely Uses alcohol    Drug use: Never    Sexual activity: Not on file   Other Topics Concern    Not on file   Social History Narrative    Not on file     Social Drivers of Health     Financial Resource Strain: Not on file   Food Insecurity: Not on file   Transportation Needs: Not on file   Physical Activity: Not on file   Stress: Not on file   Social Connections: Not on file   Interpersonal Safety: Not on file   Housing Stability: Not on file           Medications  Allergies   Current Outpatient Medications   Medication Sig Dispense Refill    acetaminophen (TYLENOL) 500 MG tablet Take 2-3 tablets by mouth every 6 hours as needed for mild pain      apixaban ANTICOAGULANT (ELIQUIS) 5 MG tablet Take 1 tablet (5 mg) by mouth 2 times daily 180 tablet 1    ascorbic acid 1000 MG TABS tablet Take 1 tablet by mouth every morning      bumetanide (BUMEX) 2 MG tablet Take 2 tablets (4 mg) by mouth 2 times daily. (Patient taking differently: Take 4 mg by mouth daily.) 360 tablet 1    CALCIUM/D3/MAG OX//TYRONE/ZN (CALTRATE + D3 PLUS MINERALS ORAL) Take 1 tablet by mouth every morning      cholecalciferol, vitamin D3, 2,000 unit Tab Take 2,000 Units by mouth every morning      cyanocobalamin 1000 MCG tablet Take 1,000 mcg by mouth every morning      ferrous sulfate (FEROSUL) 325 (65 Fe) MG tablet Take 1 tablet (325 mg) by mouth every other day      MELATONIN PO Take 1 tablet by mouth nightly as needed      metoprolol succinate ER (TOPROL XL) 25 MG 24 hr tablet Take 2 tablets (50 mg) by mouth daily. 180 tablet 1    MULTIVIT  "WITH CALCIUM,IRON,MIN (WOMEN'S DAILY MULTIVITAMIN ORAL) Take 1 tablet by mouth every morning      nystatin (MYCOSTATIN) 851669 UNIT/GM external cream Apply topically two times a day. Under breast Indications: Skin Infection due to Candida Yeast      potassium chloride ER (K-TAB/KLOR-CON) 10 MEQ CR tablet Take 1 tablet (10 mEq) by mouth daily. 90 tablet 3    spironolactone (ALDACTONE) 25 MG tablet Take 1 tablet (25 mg) by mouth daily. 90 tablet 3     No Known Allergies       Lab Results    Chemistry/lipid CBC Cardiac Enzymes/BNP/TSH/INR   No results for input(s): \"CHOL\", \"HDL\", \"LDL\", \"TRIG\", \"CHOLHDLRATIO\" in the last 65558 hours.  No results for input(s): \"LDL\" in the last 61597 hours.  Recent Labs   Lab Test 02/06/25  1634      POTASSIUM 3.9   CHLORIDE 101   CO2 29   *   BUN 24.0*   CR 0.78   GFRESTIMATED 81   TURNER 9.8     Recent Labs   Lab Test 02/06/25  1634 06/04/24  1238 05/13/24  1301   CR 0.78 0.83 0.82     Recent Labs   Lab Test 04/23/24  1551 09/02/21  0457   A1C 5.8* 6.2*          Recent Labs   Lab Test 06/04/24  1238   WBC 6.1   HGB 13.8   HCT 43.2   MCV 97        Recent Labs   Lab Test 06/04/24  1238 04/24/24  0445 04/23/24  1551   HGB 13.8 13.0 13.2    Recent Labs   Lab Test 08/21/21  1944 08/21/21  1426 08/21/21  0820   TROPONINI 1.32* 1.87* 1.75*     Recent Labs   Lab Test 02/06/25  1634 06/04/24  1238 04/23/24  1551 10/18/23  1318 09/24/22  0850 09/02/21  0457 08/21/21  0820   BNP  --   --   --   --   --  199* 281*   NTBNPI  --   --  2,391*  --   --   --   --    NTBNP 961* 1,711*  --  1,910*   < >  --   --     < > = values in this interval not displayed.     Recent Labs   Lab Test 04/24/24  0445   TSH 3.67     Recent Labs   Lab Test 05/04/23  0832 05/01/23  1329 01/25/23  1440   INR 1.21* 1.07 1.13        Howard Plummer MD                                      "

## 2025-02-19 NOTE — H&P (VIEW-ONLY)
HEART CARE VALVE CLINIC ENCOUNTER CONSULTATON NOTE      [facility]  [phone]      Assessment/Recommendations   Assessment/Plan:Misty Quezada is a 69F w/ aortic stenosis, severe TR s/p PILAR T-TIARA, HFPEF, AF, untreated MIKEL, high BMI who is here for evaluation of management options for her valvular disease.    -  I personally reviewed cardiac imaging including: TTE, which showed P/M 35/23 LORRAINE 0.8 DI 0.26 SVI 22 consistent w/ early severe aortic stenosis;  -  angio from '23, which showed non-obstructive CAD  -  after a long discussion of natural history, risk/benefit, and management options w/ the patient, we all agree to proceed w/ the remainder of w/up including CTA TAVR protocol,  angio +/- PCI/RHC/valve study,  CTS consult, Dental eval    The longitudinal plan of care for the diagnosis(es)/condition(s) as documented were addressed during this visit. Due to the added complexity in care, I will continue to support Misty in the subsequent management and with ongoing continuity of care.      A total of >60 mins was spent reviewing prior records, including documentation, lab studies, cardiac testing/imaging, interview with patient, physical exam, and documentation     History of Present Illness/Subjective    HPI: Misty Quezada is a 69 year old female w/ aortic stenosis, severe TR s/p PILAR T-TIARA, HFPEF, AF, untreated MIKEL, high BMI who is here for evaluation of management options for her valvular disease.     She is generally doing well, denies CP/SOB at rest, orthopnea/PND. + edema, is fairly sedentary - moved very slowly on account of weight and knee pain, so difficult to assess functional capacity. No N/V/D/F/c/wt.changes.    Lives w/ 2 son, no tobacco/EtOH, retired.     Recent Echocardiogram Results:  The visual ejection fraction is 55-60%.  Regional wall motion cannot be evaluated due to poor endocardial border  definition  Moderate to severe valvular aortic stenosis. LORRAINE 0.82 cm^2,  Peak Markus 3.0 m/s,  Mean gradient 23 mmHg, DI 0.26  A single Janette-Ace zzim-dk-capp repair device was placed 5/3/2023, device not  well seen. There is moderate tricuspid regurgitation. Mean TV gradient  2.3mmHg.  RVSP 36 mmHg  Compared to the prior study dated 6/4/2024 overall findings are similar    Recent Coronary Angiogram Results'23:  Misty Quezada is a 67 year old old female with severe TR, at least mod aortic stenosis, HFPEF, AF, untreated MIKEL, high BMI who is here for angio/RHC for evaluation prior to possible valve intervention (CLASP TR vs. OHS).     - non-obstructive CAD  - elevated R-sided, normal L-sided filling pressures, moderate pulmonary HTN w/ pulmonary arterial component (not due to L-sided HF) with transpulmonary gradient of 39-17=23; preserved CO/CI by Kapil  - continue screening for CLASP trial candidacy, but in parallel recommend establishing follow up w/ Pulmonology, establish treatment for MIEKL, consider other pHTN treatment options  - continue aggressive risk factor modification       Physical Examination  Review of Systems   Vitals: /74 (BP Location: Left arm, Patient Position: Sitting, Cuff Size: Adult Large)   Pulse 96   Resp 16   Wt 126.6 kg (279 lb)   BMI 43.70 kg/m    BMI= Body mass index is 43.7 kg/m .  Wt Readings from Last 3 Encounters:   02/19/25 126.6 kg (279 lb)   02/06/25 123.8 kg (273 lb)   11/21/24 123.4 kg (272 lb)       General Appearance:   no distress, normal body habitus   ENT/Mouth: membranes moist, no oral lesions or bleeding gums.      EYES:  no scleral icterus, normal conjunctivae   Neck: no carotid bruits or thyromegaly   Chest/Lungs:   lungs are clear to auscultation, no rales or wheezing, no sternal scar, equal chest wall expansion    Cardiovascular:   Regular. Normal first and second heart sounds with 2/6 systolic murmur, no rubs, or gallops; the carotid, radial and posterior tibial pulses are intact, Jugular venous pressure 7, no edema bilaterally     Abdomen:  no organomegaly, masses, bruits, or tenderness; bowel sounds are present   Extremities: no cyanosis or clubbing   Skin: no xanthelasma, warm.    Neurologic: normal  bilateral, no tremors     Psychiatric: alert and oriented x3, calm        Please refer above for cardiac ROS details.        Medical History  Surgical History Family History Social History   Past Medical History:   Diagnosis Date    (HFpEF) heart failure with preserved ejection fraction (H) 10/17/2022    Aortic valve disorder     Aortic valve stenosis 11/10/2022    Benign essential hypertension     Chronic atrial fibrillation (H)     Congestive heart failure (H)     Elevated troponin 08/21/2021    Hypomagnesemia 10/02/2022    Lymphedema 09/24/2022    Nonrheumatic tricuspid valve regurgitation 02/06/2023    Obese     Tricuspid valve disorders, non-rheumatic      Past Surgical History:   Procedure Laterality Date    APPENDECTOMY      CHOLECYSTECTOMY      CV CORONARY ANGIOGRAM N/A 3/15/2023    Procedure: Coronary Angiogram;  Surgeon: Howard Plummer MD;  Location: [facility] CV    CV LEFT HEART CATH N/A 3/15/2023    Procedure: Left Heart Catheterization;  Surgeon: Howard Plummer MD;  Location: [facility] CATH LAB     CV RIGHT HEART CATH MEASUREMENTS RECORDED N/A 3/15/2023    Procedure: Right Heart Catheterization;  Surgeon: Howard Plummer MD;  Location: [facility] CATH [facility] CV    CV TRANSCATHETER TRICUSPID VALVE REPAIR N/A 5/3/2023    Procedure: Transcatheter Tricuspid Valve Repair;  Surgeon: Howard Plummer MD;  Location: [facility] CATH LAB CV    GASTRIC BYPASS      OR TRANSCATHETER TRICUSPID VALVE REPLACEMENT, FEMORAL PERCUTANEOUS APPROACH (STANDBY) N/A 5/3/2023    Procedure: OR TRANSCATHETER TRICUSPID VALVE REPLACEMENT, FEMORAL PERCUTANEOUS APPROACH (STANDBY);  Surgeon: Kana Calderón MD;  Location: [facility] CATH [facility] CV     Family History   Problem Relation Age of Onset    No Known Problems Mother     No Known  Problems Father         Social History     Socioeconomic History    Marital status:      Spouse name: Not on file    Number of children: Not on file    Years of education: Not on file    Highest education level: Not on file   Occupational History    Not on file   Tobacco Use    Smoking status: Never    Smokeless tobacco: Never   Substance and Sexual Activity    Alcohol use: Not Currently     Comment: Rarely Uses alcohol    Drug use: Never    Sexual activity: Not on file   Other Topics Concern    Not on file   Social History Narrative    Not on file     Social Drivers of Health     Financial Resource Strain: Not on file   Food Insecurity: Not on file   Transportation Needs: Not on file   Physical Activity: Not on file   Stress: Not on file   Social Connections: Not on file   Interpersonal Safety: Not on file   Housing Stability: Not on file           Medications  Allergies   Current Outpatient Medications   Medication Sig Dispense Refill    acetaminophen (TYLENOL) 500 MG tablet Take 2-3 tablets by mouth every 6 hours as needed for mild pain      apixaban ANTICOAGULANT (ELIQUIS) 5 MG tablet Take 1 tablet (5 mg) by mouth 2 times daily 180 tablet 1    ascorbic acid 1000 MG TABS tablet Take 1 tablet by mouth every morning      bumetanide (BUMEX) 2 MG tablet Take 2 tablets (4 mg) by mouth 2 times daily. (Patient taking differently: Take 4 mg by mouth daily.) 360 tablet 1    CALCIUM/D3/MAG OX//TYRONE/ZN (CALTRATE + D3 PLUS MINERALS ORAL) Take 1 tablet by mouth every morning      cholecalciferol, vitamin D3, 2,000 unit Tab Take 2,000 Units by mouth every morning      cyanocobalamin 1000 MCG tablet Take 1,000 mcg by mouth every morning      ferrous sulfate (FEROSUL) 325 (65 Fe) MG tablet Take 1 tablet (325 mg) by mouth every other day      MELATONIN PO Take 1 tablet by mouth nightly as needed      metoprolol succinate ER (TOPROL XL) 25 MG 24 hr tablet Take 2 tablets (50 mg) by mouth daily. 180 tablet 1    MULTIVIT  "WITH CALCIUM,IRON,MIN (WOMEN'S DAILY MULTIVITAMIN ORAL) Take 1 tablet by mouth every morning      nystatin (MYCOSTATIN) 525168 UNIT/GM external cream Apply topically two times a day. Under breast Indications: Skin Infection due to Candida Yeast      potassium chloride ER (K-TAB/KLOR-CON) 10 MEQ CR tablet Take 1 tablet (10 mEq) by mouth daily. 90 tablet 3    spironolactone (ALDACTONE) 25 MG tablet Take 1 tablet (25 mg) by mouth daily. 90 tablet 3     No Known Allergies       Lab Results    Chemistry/lipid CBC Cardiac Enzymes/BNP/TSH/INR   No results for input(s): \"CHOL\", \"HDL\", \"LDL\", \"TRIG\", \"CHOLHDLRATIO\" in the last 66893 hours.  No results for input(s): \"LDL\" in the last 91022 hours.  Recent Labs   Lab Test 02/06/25  1634      POTASSIUM 3.9   CHLORIDE 101   CO2 29   *   BUN 24.0*   CR 0.78   GFRESTIMATED 81   TURNER 9.8     Recent Labs   Lab Test 02/06/25  1634 06/04/24  1238 05/13/24  1301   CR 0.78 0.83 0.82     Recent Labs   Lab Test 04/23/24  1551 09/02/21  0457   A1C 5.8* 6.2*          Recent Labs   Lab Test 06/04/24  1238   WBC 6.1   HGB 13.8   HCT 43.2   MCV 97        Recent Labs   Lab Test 06/04/24  1238 04/24/24  0445 04/23/24  1551   HGB 13.8 13.0 13.2    Recent Labs   Lab Test 08/21/21  1944 08/21/21  1426 08/21/21  0820   TROPONINI 1.32* 1.87* 1.75*     Recent Labs   Lab Test 02/06/25  1634 06/04/24  1238 04/23/24  1551 10/18/23  1318 09/24/22  0850 09/02/21  0457 08/21/21  0820   BNP  --   --   --   --   --  199* 281*   NTBNPI  --   --  2,391*  --   --   --   --    NTBNP 961* 1,711*  --  1,910*   < >  --   --     < > = values in this interval not displayed.     Recent Labs   Lab Test 04/24/24  0445   TSH 3.67     Recent Labs   Lab Test 05/04/23  0832 05/01/23  1329 01/25/23  1440   INR 1.21* 1.07 1.13        Howard Plummer MD                                      "

## 2025-02-19 NOTE — LETTER
2/19/2025    LAVELL WALLACE MD  [facility] [address]  [facility] [address]    RE: Misty Quezada       Dear Colleague,     I had the pleasure of seeing Misty Quezada in the [facility].    HEART CARE VALVE CLINIC ENCOUNTER CONSULTATON NOTE      [facility]  [phone]      Assessment/Recommendations   Assessment/Plan:Misty Quezada is a 69F w/ aortic stenosis, severe TR s/p PILAR T-TIARA, HFPEF, AF, untreated MIKEL, high BMI who is here for evaluation of management options for her valvular disease.    -  I personally reviewed cardiac imaging including: TTE, which showed P/M 35/23 LORRAINE 0.8 DI 0.26 SVI 22 consistent w/ early severe aortic stenosis;  -  angio from '23, which showed non-obstructive CAD  -  after a long discussion of natural history, risk/benefit, and management options w/ the patient, we all agree to proceed w/ the remainder of w/up including CTA TAVR protocol,  angio +/- PCI/RHC/valve study,  CTS consult, Dental eval    The longitudinal plan of care for the diagnosis(es)/condition(s) as documented were addressed during this visit. Due to the added complexity in care, I will continue to support Misty in the subsequent management and with ongoing continuity of care.      A total of >60 mins was spent reviewing prior records, including documentation, lab studies, cardiac testing/imaging, interview with patient, physical exam, and documentation     History of Present Illness/Subjective    HPI: Misty Quezada is a 69 year old female w/ aortic stenosis, severe TR s/p PILAR T-TIARA, HFPEF, AF, untreated MIKEL, high BMI who is here for evaluation of management options for her valvular disease.     She is generally doing well, denies CP/SOB at rest, orthopnea/PND. + edema, is fairly sedentary - moved very slowly on account of weight and knee pain, so difficult to assess functional capacity. No N/V/D/F/c/wt.changes.    Lives w/ 2 son,  no tobacco/EtOH, retired.     Recent Echocardiogram Results:  The visual ejection fraction is 55-60%.  Regional wall motion cannot be evaluated due to poor endocardial border  definition  Moderate to severe valvular aortic stenosis. LORRAINE 0.82 cm^2, Peak Markus 3.0 m/s,  Mean gradient 23 mmHg, DI 0.26  A single Janette-Ace [id number] repair device was placed 5/3/2023, device not  well seen. There is moderate tricuspid regurgitation. Mean TV gradient  2.3mmHg.  RVSP 36 mmHg  Compared to the prior study dated 6/4/2024 overall findings are similar    Recent Coronary Angiogram Results'23:  Misty Quezada is a 67 year old old female with severe TR, at least mod aortic stenosis, HFPEF, AF, untreated MIKEL, high BMI who is here for angio/RHC for evaluation prior to possible valve intervention (CLASP TR vs. OHS).     - non-obstructive CAD  - elevated R-sided, normal L-sided filling pressures, moderate pulmonary HTN w/ pulmonary arterial component (not due to L-sided HF) with transpulmonary gradient of 39-17=23; preserved CO/CI by Kapil  - continue screening for CLASP trial candidacy, but in parallel recommend establishing follow up w/ Pulmonology, establish treatment for MIKEL, consider other pHTN treatment options  - continue aggressive risk factor modification       Physical Examination  Review of Systems   Vitals: /74 (BP Location: Left arm, Patient Position: Sitting, Cuff Size: Adult Large)   Pulse 96   Resp 16   Wt 126.6 kg (279 lb)   BMI 43.70 kg/m    BMI= Body mass index is 43.7 kg/m .  Wt Readings from Last 3 Encounters:   02/19/25 126.6 kg (279 lb)   02/06/25 123.8 kg (273 lb)   11/21/24 123.4 kg (272 lb)       General Appearance:   no distress, normal body habitus   ENT/Mouth: membranes moist, no oral lesions or bleeding gums.      EYES:  no scleral icterus, normal conjunctivae   Neck: no carotid bruits or thyromegaly   Chest/Lungs:   lungs are clear to auscultation, no rales or wheezing, no sternal scar, equal  chest wall expansion    Cardiovascular:   Regular. Normal first and second heart sounds with 2/6 systolic murmur, no rubs, or gallops; the carotid, radial and posterior tibial pulses are intact, Jugular venous pressure 7, no edema bilaterally    Abdomen:  no organomegaly, masses, bruits, or tenderness; bowel sounds are present   Extremities: no cyanosis or clubbing   Skin: no xanthelasma, warm.    Neurologic: normal  bilateral, no tremors     Psychiatric: alert and oriented x3, calm        Please refer above for cardiac ROS details.        Medical History  Surgical History Family History Social History   Past Medical History:   Diagnosis Date     (HFpEF) heart failure with preserved ejection fraction (H) 10/17/2022     Aortic valve disorder      Aortic valve stenosis 11/10/2022     Benign essential hypertension      Chronic atrial fibrillation (H)      Congestive heart failure (H)      Elevated troponin 08/21/2021     Hypomagnesemia 10/02/2022     Lymphedema 09/24/2022     Nonrheumatic tricuspid valve regurgitation 02/06/2023     Obese      Tricuspid valve disorders, non-rheumatic      Past Surgical History:   Procedure Laterality Date     APPENDECTOMY       CHOLECYSTECTOMY       CV CORONARY ANGIOGRAM N/A 3/15/2023    Procedure: Coronary Angiogram;  Surgeon: Howard Plummer MD;  Location: [facility]     CV LEFT HEART CATH N/A 3/15/2023    Procedure: Left Heart Catheterization;  Surgeon: Howard Plummer MD;  Location: [facility]     CV RIGHT HEART CATH MEASUREMENTS RECORDED N/A 3/15/2023    Procedure: Right Heart Catheterization;  Surgeon: Howard Plummer MD;  Location: [facility]     CV TRANSCATHETER TRICUSPID VALVE REPAIR N/A 5/3/2023    Procedure: Transcatheter Tricuspid Valve Repair;  Surgeon: Howard Plummer MD;  Location: [facility] CATH [facility] CV     GASTRIC BYPASS       OR TRANSCATHETER TRICUSPID VALVE REPLACEMENT, FEMORAL PERCUTANEOUS APPROACH (STANDBY) N/A 5/3/2023     Procedure: OR TRANSCATHETER TRICUSPID VALVE REPLACEMENT, FEMORAL PERCUTANEOUS APPROACH (STANDBY);  Surgeon: Kana Calderón MD;  Location: [facility] CV     Family History   Problem Relation Age of Onset     No Known Problems Mother      No Known Problems Father         Social History     Socioeconomic History     Marital status:      Spouse name: Not on file     Number of children: Not on file     Years of education: Not on file     Highest education level: Not on file   Occupational History     Not on file   Tobacco Use     Smoking status: Never     Smokeless tobacco: Never   Substance and Sexual Activity     Alcohol use: Not Currently     Comment: Rarely Uses alcohol     Drug use: Never     Sexual activity: Not on file   Other Topics Concern     Not on file   Social History Narrative     Not on file     Social Drivers of Health     Financial Resource Strain: Not on file   Food Insecurity: Not on file   Transportation Needs: Not on file   Physical Activity: Not on file   Stress: Not on file   Social Connections: Not on file   Interpersonal Safety: Not on file   Housing Stability: Not on file           Medications  Allergies   Current Outpatient Medications   Medication Sig Dispense Refill     acetaminophen (TYLENOL) 500 MG tablet Take 2-3 tablets by mouth every 6 hours as needed for mild pain       apixaban ANTICOAGULANT (ELIQUIS) 5 MG tablet Take 1 tablet (5 mg) by mouth 2 times daily 180 tablet 1     ascorbic acid 1000 MG TABS tablet Take 1 tablet by mouth every morning       bumetanide (BUMEX) 2 MG tablet Take 2 tablets (4 mg) by mouth 2 times daily. (Patient taking differently: Take 4 mg by mouth daily.) 360 tablet 1     CALCIUM/D3/MAG OX//TYRONE/ZN (CALTRATE + D3 PLUS MINERALS ORAL) Take 1 tablet by mouth every morning       cholecalciferol, vitamin D3, 2,000 unit Tab Take 2,000 Units by mouth every morning       cyanocobalamin 1000 MCG tablet Take 1,000 mcg by mouth every morning  "      ferrous sulfate (FEROSUL) 325 (65 Fe) MG tablet Take 1 tablet (325 mg) by mouth every other day       MELATONIN PO Take 1 tablet by mouth nightly as needed       metoprolol succinate ER (TOPROL XL) 25 MG 24 hr tablet Take 2 tablets (50 mg) by mouth daily. 180 tablet 1     MULTIVIT WITH CALCIUM,IRON,MIN (WOMEN'S DAILY MULTIVITAMIN ORAL) Take 1 tablet by mouth every morning       nystatin (MYCOSTATIN) 160981 UNIT/GM external cream Apply topically two times a day. Under breast Indications: Skin Infection due to Candida Yeast       potassium chloride ER (K-TAB/KLOR-CON) 10 MEQ CR tablet Take 1 tablet (10 mEq) by mouth daily. 90 tablet 3     spironolactone (ALDACTONE) 25 MG tablet Take 1 tablet (25 mg) by mouth daily. 90 tablet 3     No Known Allergies       Lab Results    Chemistry/lipid CBC Cardiac Enzymes/BNP/TSH/INR   No results for input(s): \"CHOL\", \"HDL\", \"LDL\", \"TRIG\", \"CHOLHDLRATIO\" in the last 77677 hours.  No results for input(s): \"LDL\" in the last 44829 hours.  Recent Labs   Lab Test 02/06/25  1634      POTASSIUM 3.9   CHLORIDE 101   CO2 29   *   BUN 24.0*   CR 0.78   GFRESTIMATED 81   TURNER 9.8     Recent Labs   Lab Test 02/06/25  1634 06/04/24  1238 05/13/24  1301   CR 0.78 0.83 0.82     Recent Labs   Lab Test 04/23/24  1551 09/02/21  0457   A1C 5.8* 6.2*          Recent Labs   Lab Test 06/04/24  1238   WBC 6.1   HGB 13.8   HCT 43.2   MCV 97        Recent Labs   Lab Test 06/04/24  1238 04/24/24  0445 04/23/24  1551   HGB 13.8 13.0 13.2    Recent Labs   Lab Test 08/21/21  1944 08/21/21  1426 08/21/21  0820   TROPONINI 1.32* 1.87* 1.75*     Recent Labs   Lab Test 02/06/25  1634 06/04/24  1238 04/23/24  1551 10/18/23  1318 09/24/22  0850 09/02/21  0457 08/21/21  0820   BNP  --   --   --   --   --  199* 281*   NTBNPI  --   --  2,391*  --   --   --   --    NTBNP 961* 1,711*  --  1,910*   < >  --   --     < > = values in this interval not displayed.     Recent Labs   Lab Test 04/24/24  0445 "   TSH 3.67     Recent Labs   Lab Test 05/04/23  0832 05/01/23  1329 01/25/23  1440   INR 1.21* 1.07 1.13        Howard Plummer MD                                        Thank you for allowing me to participate in the care of your patient.      Sincerely,     Howard Plummer MD     [facility]  cc:   Adia Rocha MD  [address]  [place] [id number]

## 2025-02-19 NOTE — PROGRESS NOTES
Valve Clinic TAVR - 2/19/25  (See consult note from Dr. Plummer)    Referring provider: Dr. Plummer (follows with valve team for tricuspid intervention, have been monitoring echos for progression of aortic stenosis since tricuspid intervention)     Labs completed at visit today: Yes; including T&S ext form     Labs reviewed prior to clinic:      Latest Reference Range & Units 02/06/25 16:34   Sodium 135 - 145 mmol/L 140   Potassium 3.4 - 5.3 mmol/L 3.9   Chloride 98 - 107 mmol/L 101   Carbon Dioxide (CO2) 22 - 29 mmol/L 29   Urea Nitrogen 8.0 - 23.0 mg/dL 24.0 (H)   Creatinine 0.51 - 0.95 mg/dL 0.78   GFR Estimate >60 mL/min/1.73m2 81   Calcium 8.8 - 10.4 mg/dL 9.8   Anion Gap 7 - 15 mmol/L 10   Glucose 70 - 99 mg/dL 120 (H)   N-Terminal Pro Bnp 0 - 900 pg/mL 961 (H)   (H): Data is abnormally high    Preliminary STS Score: 4.92%      KCQ12 (date completed 2/19/25), scanned into chart      PMH: HF, HTN, tricuspid regurgitation s/p transcatheter repair with delicia device during TR clasp II trial, mitral regurgitation, bilateral LE edema, morbid obesity, chronic afib, severe aortic stenosis.     Symptoms: LUJAN    Social: Patient presents to clinic independently today, uses cane a baseline.       TTE date 12/2/24  EF 55-60 %  AV mean gradient: 22.7 mmHg  AV Peak Markus: 2.9 m/sec  AV area: 0.76 cm2  AV DIM IND VTI: 0.26  LV SVi: 22 ml/m2    Comments on valves: Mitral valve has trace regurgitation and no stenosis. Residual moderate TR after delicia edge to edge repair. Aortic valve has moderate to severe stenosis with trace regurgitation.     Plan: Pt would like to move forward with workup for TAVR. Patient will need to be scheduled for full work up including CTA TAVR, coronary angiogram and CT surgery consultation. Orders placed and message sent to scheduling/ to arrange. Patient understands to anticipate a phone call to arrange these appts.     Pt has full dentures and does not require dental  clearance.     Reviewed pre-procedure work up and procedural related education information with patient. All questions answered, no further questions at this time. Patient has my direct contact information as well as the valve clinic line and was encouraged to call with questions or concerns.       Sunil Montana RN BSN- Valve Clinic Coordinator   [facility]  [facility]  Phone: [phone]  Fax: [phone]

## 2025-02-27 ENCOUNTER — TELEPHONE (OUTPATIENT)
Dept: CARDIOLOGY | Facility: CLINIC | Age: 70
End: 2025-02-27
Payer: MEDICARE

## 2025-02-27 NOTE — TELEPHONE ENCOUNTER
Letter drafted and mailed to patient home today for upcoming angiogram on 3/6/25.     Charlette Montana RN on 2/27/2025 at 10:50 AM

## 2025-03-03 ENCOUNTER — PREP FOR PROCEDURE (OUTPATIENT)
Dept: CARDIOLOGY | Facility: CLINIC | Age: 70
End: 2025-03-03
Payer: MEDICARE

## 2025-03-03 DIAGNOSIS — I35.0 NONRHEUMATIC AORTIC VALVE STENOSIS: Primary | ICD-10-CM

## 2025-03-03 RX ORDER — SODIUM CHLORIDE 9 MG/ML
INJECTION, SOLUTION INTRAVENOUS CONTINUOUS
Status: CANCELLED | OUTPATIENT
Start: 2025-03-06

## 2025-03-03 RX ORDER — LIDOCAINE 40 MG/G
CREAM TOPICAL
Status: CANCELLED | OUTPATIENT
Start: 2025-03-03

## 2025-03-03 RX ORDER — ASPIRIN 81 MG/1
243 TABLET, CHEWABLE ORAL ONCE
Status: CANCELLED | OUTPATIENT
Start: 2025-03-06

## 2025-03-03 RX ORDER — ASPIRIN 325 MG
325 TABLET ORAL ONCE
Status: CANCELLED | OUTPATIENT
Start: 2025-03-06 | End: 2025-03-03

## 2025-03-03 RX ORDER — FENTANYL CITRATE 50 UG/ML
25 INJECTION, SOLUTION INTRAMUSCULAR; INTRAVENOUS
Status: CANCELLED | OUTPATIENT
Start: 2025-03-06

## 2025-03-03 NOTE — TELEPHONE ENCOUNTER
I called Misty and left a voicemail to return call regarding angio teaching.     Clarisse Rai RN on 3/3/2025 at 2:52 PM

## 2025-03-03 NOTE — TELEPHONE ENCOUNTER
Pre-Procedure Angiogram Education     Misty Quezada  [address] [address]  [phone] (home)     Procedure cardiologist:  Dr. Cancino  PCP:  Lizbet Baer  H&P completed by:  Dr. Plummer  Admit date 3/6  Arrival time:  0930  Anticoagulation: Eliquis- Held starting 3/4.  CPAP: No  Previous PCI: yes 2023- showed nonobstructive CAD  Bypass Grafts: No  Renal Issues: No  Diabetic?: Yes - prediabetic  Device?: No  Type:  N/A    Angiogram Teaching    Reason for Visit:  Telephone call to discuss pre-procedure education in preparation for: Coronary angiogram with possible percutaneous coronary intervention    Procedure Prep:  Primary Cardiologist note dated: Dr. Rocha 4/17/2023 , More recently followed by Heart failure clinic w/ Dr. Siegel 11/21/2024  EKG results obtained, dated: to be obtained date of procedure   T&S- Yes- 2/19/2025  BMP, CBC- to be drawn day of procedure     Patient Education  Patient states understanding of procedure and risks and agrees to proceed.    Pre-procedure instructions  Patient instructed to be NPO per anesthesia guidelines; no food after 130am, clear liquids until 730am, nothing by mouth after 730am  Patient instructed to shower the evening before or the morning of the procedure.  Leave all valuables at home (jewlery, rings, watches, large amounts of money).  Patient understands there are two visitors allowed during patients stay.   Patient instructed to arrange for transportation home following procedure from a responsible family member of friend. No driving for at least 24 hours post-procedure.  Patient instructed to have a responsible adult with them for 24 hours post-procedure.  Post-procedure follow up process.  Conscious sedation discussed.    Pre-procedure medication instructions  Patient instructed on antiplatelet medication.  Continue medications as scheduled up until the date of procedure unless indicated below.   Patient instructed to take Aspirin am  of procedure: No    Aspirin to be given in [facility] prior to procedure     Other medication: instructed to only take Metoprolol a.m. of the procedure.  Instructed patient to hold all other prescription medications, OTC meds, supplements and vitamins the day of procedure.     Pt instructed to hold anticoagulation Eliquis beginning on 3/4.      *PATIENTS RECORDS AVAILABLE IN [facility] UNLESS OTHERWISE INDICATED*      Patient Active Problem List   Diagnosis    Chronic atrial fibrillation (H)    Lymphedema    Morbid obesity (H)    Chronic heart failure with preserved ejection fraction (H)    Aortic valve stenosis    Nonrheumatic tricuspid valve regurgitation    S/P tricuspid valve repair    Mitral regurgitation    Hypotension, unspecified hypotension type    Essential hypertension    Noncompliance with medication regimen       Current Outpatient Medications   Medication Sig Dispense Refill    acetaminophen (TYLENOL) 500 MG tablet Take 2-3 tablets by mouth every 6 hours as needed for mild pain      apixaban ANTICOAGULANT (ELIQUIS) 5 MG tablet Take 1 tablet (5 mg) by mouth 2 times daily 180 tablet 1    ascorbic acid 1000 MG TABS tablet Take 1 tablet by mouth every morning      bumetanide (BUMEX) 2 MG tablet Take 2 tablets (4 mg) by mouth 2 times daily. (Patient taking differently: Take 4 mg by mouth daily.) 360 tablet 1    CALCIUM/D3/MAG OX//TYRONE/ZN (CALTRATE + D3 PLUS MINERALS ORAL) Take 1 tablet by mouth every morning      cholecalciferol, vitamin D3, 2,000 unit Tab Take 2,000 Units by mouth every morning      cyanocobalamin 1000 MCG tablet Take 1,000 mcg by mouth every morning      ferrous sulfate (FEROSUL) 325 (65 Fe) MG tablet Take 1 tablet (325 mg) by mouth every other day      MELATONIN PO Take 1 tablet by mouth nightly as needed      metoprolol succinate ER (TOPROL XL) 25 MG 24 hr tablet Take 2 tablets (50 mg) by mouth daily. 180 tablet 1    MULTIVIT WITH CALCIUM,IRON,MIN (WOMEN'S DAILY MULTIVITAMIN ORAL) Take 1 tablet  by mouth every morning      nystatin (MYCOSTATIN) 195614 UNIT/GM external cream Apply topically two times a day. Under breast Indications: Skin Infection due to Candida Yeast      potassium chloride ER (K-TAB/KLOR-CON) 10 MEQ CR tablet Take 1 tablet (10 mEq) by mouth daily. 90 tablet 3    spironolactone (ALDACTONE) 25 MG tablet Take 1 tablet (25 mg) by mouth daily. 90 tablet 3       No Known Allergies        Orders placed. Patient has my contact information if they have any additional questions or concerns.       Clarisse Rai RN BSN  Valve Clinic Coordinator  [facility]   [phone]

## 2025-03-06 ENCOUNTER — HOSPITAL ENCOUNTER (OUTPATIENT)
Facility: HOSPITAL | Age: 70
Discharge: HOME | End: 2025-03-06
Attending: INTERNAL MEDICINE | Admitting: INTERNAL MEDICINE
Payer: MEDICARE

## 2025-03-06 VITALS
TEMPERATURE: 97.7 F | HEART RATE: 81 BPM | DIASTOLIC BLOOD PRESSURE: 71 MMHG | RESPIRATION RATE: 21 BRPM | HEIGHT: 67 IN | WEIGHT: 279 LBS | SYSTOLIC BLOOD PRESSURE: 121 MMHG | OXYGEN SATURATION: 94 % | BODY MASS INDEX: 43.79 KG/M2

## 2025-03-06 DIAGNOSIS — I35.0 NONRHEUMATIC AORTIC VALVE STENOSIS: ICD-10-CM

## 2025-03-06 DIAGNOSIS — Z98.890 STATUS POST CORONARY ANGIOGRAM: Primary | ICD-10-CM

## 2025-03-06 DIAGNOSIS — I51.89 OTHER ILL-DEFINED HEART DISEASES: ICD-10-CM

## 2025-03-06 DIAGNOSIS — I50.33 ACUTE ON CHRONIC DIASTOLIC CONGESTIVE HEART FAILURE (H): ICD-10-CM

## 2025-03-06 LAB
ANION GAP SERPL CALCULATED.3IONS-SCNC: 10 MMOL/L (ref 7–15)
ATRIAL RATE - MUSE: NORMAL BPM
BASE EXCESS BLDA CALC-SCNC: 2.7 MMOL/L (ref -3–3)
BASE EXCESS BLDV CALC-SCNC: 5.2 MMOL/L (ref -3–3)
BUN SERPL-MCNC: 25.6 MG/DL (ref 8–23)
CALCIUM SERPL-MCNC: 9.8 MG/DL (ref 8.8–10.4)
CHLORIDE SERPL-SCNC: 103 MMOL/L (ref 98–107)
CREAT SERPL-MCNC: 0.88 MG/DL (ref 0.51–0.95)
DIASTOLIC BLOOD PRESSURE - MUSE: NORMAL MMHG
EGFRCR SERPLBLD CKD-EPI 2021: 70 ML/MIN/1.73M2
ERYTHROCYTE [DISTWIDTH] IN BLOOD BY AUTOMATED COUNT: 12.9 % (ref 10–15)
GLUCOSE SERPL-MCNC: 107 MG/DL (ref 70–99)
HCO3 BLDA-SCNC: 27 MMOL/L (ref 21–28)
HCO3 BLDV-SCNC: 28 MMOL/L (ref 21–28)
HCO3 SERPL-SCNC: 31 MMOL/L (ref 22–29)
HCT VFR BLD AUTO: 42.2 % (ref 35–47)
HGB BLD-MCNC: 14.2 G/DL (ref 11.7–15.7)
INTERPRETATION ECG - MUSE: NORMAL
MCH RBC QN AUTO: 32.6 PG (ref 26.5–33)
MCHC RBC AUTO-ENTMCNC: 33.6 G/DL (ref 31.5–36.5)
MCV RBC AUTO: 97 FL (ref 78–100)
OXYHGB MFR BLDA: 86 % (ref 92–100)
OXYHGB MFR BLDV: 61 % (ref 70–75)
P AXIS - MUSE: NORMAL DEGREES
PCO2 BLDA: 47 MM HG (ref 35–45)
PCO2 BLDV: 53 MM HG (ref 40–50)
PH BLDA: 7.39 [PH] (ref 7.35–7.45)
PH BLDV: 7.39 [PH] (ref 7.32–7.43)
PLATELET # BLD AUTO: 275 10E3/UL (ref 150–450)
PO2 BLDA: 54 MM HG (ref 80–105)
PO2 BLDV: 34 MM HG (ref 25–47)
POTASSIUM SERPL-SCNC: 4.1 MMOL/L (ref 3.4–5.3)
PR INTERVAL - MUSE: NORMAL MS
QRS DURATION - MUSE: 94 MS
QT - MUSE: 372 MS
QTC - MUSE: 465 MS
R AXIS - MUSE: 110 DEGREES
RBC # BLD AUTO: 4.36 10E6/UL (ref 3.8–5.2)
SAO2 % BLDA: 87 % (ref 95–96)
SAO2 % BLDV: 62 % (ref 70–75)
SODIUM SERPL-SCNC: 144 MMOL/L (ref 135–145)
SYSTOLIC BLOOD PRESSURE - MUSE: NORMAL MMHG
T AXIS - MUSE: -11 DEGREES
VENTRICULAR RATE- MUSE: 94 BPM
WBC # BLD AUTO: 7.5 10E3/UL (ref 4–11)

## 2025-03-06 PROCEDURE — 99152 MOD SED SAME PHYS/QHP 5/>YRS: CPT | Performed by: INTERNAL MEDICINE

## 2025-03-06 PROCEDURE — C1769 GUIDE WIRE: HCPCS | Performed by: INTERNAL MEDICINE

## 2025-03-06 PROCEDURE — 255N000002 HC RX 255 OP 636: Performed by: INTERNAL MEDICINE

## 2025-03-06 PROCEDURE — C1894 INTRO/SHEATH, NON-LASER: HCPCS | Performed by: INTERNAL MEDICINE

## 2025-03-06 PROCEDURE — 85027 COMPLETE CBC AUTOMATED: CPT | Performed by: INTERNAL MEDICINE

## 2025-03-06 PROCEDURE — 272N000001 HC OR GENERAL SUPPLY STERILE: Performed by: INTERNAL MEDICINE

## 2025-03-06 PROCEDURE — 36415 COLL VENOUS BLD VENIPUNCTURE: CPT | Performed by: INTERNAL MEDICINE

## 2025-03-06 PROCEDURE — 250N000011 HC RX IP 250 OP 636: Performed by: INTERNAL MEDICINE

## 2025-03-06 PROCEDURE — 258N000003 HC RX IP 258 OP 636: Performed by: INTERNAL MEDICINE

## 2025-03-06 PROCEDURE — 999N000054 HC STATISTIC EKG NON-CHARGEABLE

## 2025-03-06 PROCEDURE — 82805 BLOOD GASES W/O2 SATURATION: CPT

## 2025-03-06 PROCEDURE — 250N000009 HC RX 250: Performed by: INTERNAL MEDICINE

## 2025-03-06 PROCEDURE — 250N000013 HC RX MED GY IP 250 OP 250 PS 637: Performed by: INTERNAL MEDICINE

## 2025-03-06 PROCEDURE — C1751 CATH, INF, PER/CENT/MIDLINE: HCPCS | Performed by: INTERNAL MEDICINE

## 2025-03-06 PROCEDURE — 93010 ELECTROCARDIOGRAM REPORT: CPT | Mod: HOP | Performed by: INTERNAL MEDICINE

## 2025-03-06 PROCEDURE — 82310 ASSAY OF CALCIUM: CPT | Performed by: INTERNAL MEDICINE

## 2025-03-06 PROCEDURE — 93005 ELECTROCARDIOGRAM TRACING: CPT

## 2025-03-06 PROCEDURE — 999N000054 ECG 12-LEAD WITH MUSE (LHE): Performed by: INTERNAL MEDICINE

## 2025-03-06 PROCEDURE — C1887 CATHETER, GUIDING: HCPCS | Performed by: INTERNAL MEDICINE

## 2025-03-06 PROCEDURE — 93460 R&L HRT ART/VENTRICLE ANGIO: CPT | Mod: 26 | Performed by: INTERNAL MEDICINE

## 2025-03-06 PROCEDURE — 93460 R&L HRT ART/VENTRICLE ANGIO: CPT | Performed by: INTERNAL MEDICINE

## 2025-03-06 PROCEDURE — 80048 BASIC METABOLIC PNL TOTAL CA: CPT | Performed by: INTERNAL MEDICINE

## 2025-03-06 PROCEDURE — 99153 MOD SED SAME PHYS/QHP EA: CPT | Performed by: INTERNAL MEDICINE

## 2025-03-06 RX ORDER — SODIUM CHLORIDE 9 MG/ML
INJECTION, SOLUTION INTRAVENOUS CONTINUOUS
Status: DISCONTINUED | OUTPATIENT
Start: 2025-03-06 | End: 2025-03-06 | Stop reason: HOSPADM

## 2025-03-06 RX ORDER — OXYCODONE HYDROCHLORIDE 5 MG/1
10 TABLET ORAL EVERY 4 HOURS PRN
Status: DISCONTINUED | OUTPATIENT
Start: 2025-03-06 | End: 2025-03-06 | Stop reason: HOSPADM

## 2025-03-06 RX ORDER — BUMETANIDE 2 MG/1
4 TABLET ORAL DAILY
Qty: 60 TABLET | Refills: 11 | Status: SHIPPED | OUTPATIENT
Start: 2025-03-06

## 2025-03-06 RX ORDER — NALOXONE HYDROCHLORIDE 0.4 MG/ML
0.4 INJECTION, SOLUTION INTRAMUSCULAR; INTRAVENOUS; SUBCUTANEOUS
Status: DISCONTINUED | OUTPATIENT
Start: 2025-03-06 | End: 2025-03-06 | Stop reason: HOSPADM

## 2025-03-06 RX ORDER — ACETAMINOPHEN 325 MG/1
650 TABLET ORAL EVERY 4 HOURS PRN
Status: DISCONTINUED | OUTPATIENT
Start: 2025-03-06 | End: 2025-03-06 | Stop reason: HOSPADM

## 2025-03-06 RX ORDER — ASPIRIN 81 MG/1
243 TABLET, CHEWABLE ORAL ONCE
Status: COMPLETED | OUTPATIENT
Start: 2025-03-06 | End: 2025-03-06

## 2025-03-06 RX ORDER — ASPIRIN 325 MG
325 TABLET ORAL ONCE
Status: COMPLETED | OUTPATIENT
Start: 2025-03-06 | End: 2025-03-06

## 2025-03-06 RX ORDER — LIDOCAINE 40 MG/G
CREAM TOPICAL
Status: DISCONTINUED | OUTPATIENT
Start: 2025-03-06 | End: 2025-03-06 | Stop reason: HOSPADM

## 2025-03-06 RX ORDER — FENTANYL CITRATE 50 UG/ML
INJECTION, SOLUTION INTRAMUSCULAR; INTRAVENOUS
Status: DISCONTINUED | OUTPATIENT
Start: 2025-03-06 | End: 2025-03-06 | Stop reason: HOSPADM

## 2025-03-06 RX ORDER — FENTANYL CITRATE 50 UG/ML
25 INJECTION, SOLUTION INTRAMUSCULAR; INTRAVENOUS
Status: DISCONTINUED | OUTPATIENT
Start: 2025-03-06 | End: 2025-03-06 | Stop reason: HOSPADM

## 2025-03-06 RX ORDER — FLUMAZENIL 0.1 MG/ML
0.2 INJECTION, SOLUTION INTRAVENOUS
Status: DISCONTINUED | OUTPATIENT
Start: 2025-03-06 | End: 2025-03-06 | Stop reason: HOSPADM

## 2025-03-06 RX ORDER — NALOXONE HYDROCHLORIDE 0.4 MG/ML
0.2 INJECTION, SOLUTION INTRAMUSCULAR; INTRAVENOUS; SUBCUTANEOUS
Status: DISCONTINUED | OUTPATIENT
Start: 2025-03-06 | End: 2025-03-06 | Stop reason: HOSPADM

## 2025-03-06 RX ORDER — OXYCODONE HYDROCHLORIDE 5 MG/1
5 TABLET ORAL EVERY 4 HOURS PRN
Status: DISCONTINUED | OUTPATIENT
Start: 2025-03-06 | End: 2025-03-06 | Stop reason: HOSPADM

## 2025-03-06 RX ORDER — ATROPINE SULFATE 0.1 MG/ML
0.5 INJECTION INTRAVENOUS
Status: DISCONTINUED | OUTPATIENT
Start: 2025-03-06 | End: 2025-03-06 | Stop reason: HOSPADM

## 2025-03-06 RX ORDER — HEPARIN SODIUM 1000 [USP'U]/ML
INJECTION, SOLUTION INTRAVENOUS; SUBCUTANEOUS
Status: DISCONTINUED | OUTPATIENT
Start: 2025-03-06 | End: 2025-03-06 | Stop reason: HOSPADM

## 2025-03-06 RX ORDER — DIAZEPAM 5 MG/1
5 TABLET ORAL
Status: DISCONTINUED | OUTPATIENT
Start: 2025-03-06 | End: 2025-03-06 | Stop reason: HOSPADM

## 2025-03-06 RX ADMIN — ASPIRIN 325 MG ORAL TABLET 325 MG: 325 PILL ORAL at 12:54

## 2025-03-06 RX ADMIN — SODIUM CHLORIDE 150 ML/HR: 0.9 INJECTION, SOLUTION INTRAVENOUS at 12:50

## 2025-03-06 ASSESSMENT — ACTIVITIES OF DAILY LIVING (ADL)
ADLS_ACUITY_SCORE: 59

## 2025-03-06 ASSESSMENT — EJECTION FRACTION: EF_VALUE: .21

## 2025-03-06 NOTE — PROGRESS NOTES
[facility] pharmacy called to verify dosing of Bumetanide. Kina LANDRY clarified to take Bumex 4 mg (2 tablets in the morning, and 2 mg (1 tablet) in the evening.

## 2025-03-06 NOTE — INTERVAL H&P NOTE
"I have reviewed the surgical (or preoperative) H&P that is linked to this encounter, and examined the patient. There are no significant changes    Clinical Conditions Present on Arrival:  Clinically Significant Risk Factors Present on Admission                 # Drug Induced Coagulation Defect: home medication list includes an anticoagulant medication       # Severe Obesity: Estimated body mass index is 43.7 kg/m  as calculated from the following:    Height as of this encounter: 1.702 m (5' 7\").    Weight as of this encounter: 126.6 kg (279 lb).       "

## 2025-03-06 NOTE — PRE-PROCEDURE
GENERAL PRE-PROCEDURE:   Procedure:  Coronary angiogram with possible percutaneous coronary intervention  Date/Time:  3/6/2025 1:26 PM    Written consent obtained?: Yes    Risks and benefits: Risks, benefits and alternatives were discussed    Consent given by:  Patient  Patient states understanding of procedure being performed: Yes    Patient's understanding of procedure matches consent: Yes    Procedure consent matches procedure scheduled: Yes    Expected level of sedation:  Moderate  Appropriately NPO:  Yes  ASA Class:  4  Mallampati  :  Grade 2- soft palate, base of uvula, tonsillar pillars, and portion of posterior pharyngeal wall visible  Lungs:  Lungs clear with good breath sounds bilaterally  Heart:  Systolic murmur and RRR  History & Physical reviewed:  History and physical reviewed and updates made (see comment)  H&P Comments:  Clinically Significant Risk Factors Present on Admission    Cardiovascular : Severe aortic stenosis, s/p TIARA for TR, HFpEF, atrial fibrillation    Fluid & Electrolyte Disorders : Not present on admission    Gastroenterology : Not present on admission    Hematology/Oncology : Not present on admission    Nephrology : Not present on admission    Neurology : Not present on admission    Pulmonology : Not present on admission    Systemic : obesity     [unfilled]    Statement of review:  I have reviewed the lab findings, diagnostic data, medications, and the plan for sedation

## 2025-03-06 NOTE — DISCHARGE INSTRUCTIONS
- TAKE BUMEX 4 MG IN THE MORNING AND 2 MG IN THE EVENING  - CHECK LABS IN 1 WEEK TO ASSESS YOUR KIDNEY FUNCTION  - REPEAT ECHOCARDIOGRAM IN 3 MONTHS TO ASSESS YOUR AORTIC STENOSIS  - FOLLOW-UP WITH DR. PANDEY AND DR. DIEGO AS SCHEDULED    Interventional Cardiology  Coronary Angiogram/Angioplasty/Stent/Atherectomy Discharge  Instructions -   Radial (wrist) Approach     The instructions below are to help you understand how to take care of yourself. There is also information about when to call the doctor or emergency services.    **Do not stop your aspirin or platelet inhibitor unless directed by your Cardiologist.  These medications help to prevent platelets in your blood from sticking together and forming a clot.   Examples of these medications are: Ticagrelor (Brilinta), Clopidogrel (Plavix), Prasugrel (Effient)    For 24 hours after procedure:  Do not subject hand/arm to any forceful movements for 24 hours, such as supporting weight when rising from a chair or bed.  Do not drive a car for 24 hours.  The dressing on the puncture site may be removed after 24 hours and left open to air. If minor oozing, you may apply a Band-Aid and remove after 12 hours.   You may shower on the day after your procedure. Do not take a tub bath or wash dishes (no soaking wrist) with the puncture site in water for 3 days after the procedure.    For 48 hours following the procedure:  Do not operate a lawnmower, motorcycle, chainsaw or all-terrain vehicle.  Do not lift anything heavier than 5-10 pounds with affected arm for 5 days.  Avoid excessive bending (flexion/extension) wrist movement.  Do not engage in vigorous exercise (i.e. tennis, golf) using the affected arm for 5 days after discharge.  You may return to work in 72 hours if no complications and no heavy lifting.    If bleeding should occur following discharge:  Sit down and apply firm pressure with your thumb against the puncture site and fingers against back of wrist for  10 minutes.  If the bleeding stops, continue to rest, keeping your wrist still for 2 hours. Notify your doctor as soon as possible.  If bleeding does not stop after 10 minutes or if there is a large amount of bleeding or spurting, call 911 immediately. Do not drive yourself to the hospital.           Contact the Heart Clinic at [phone] if you develop:  Fever over 100.4, that lasts more than one day  Redness, heat, or pus at the puncture site  Change in color or temperature of the hand or arm    Expect mild tingling of hand and tenderness at the puncture site for up to 3 days. You may take Tylenol or a pain medicine recommended by your doctor.                       Our Cardiac Rehab staff may visit briefly with you while your in the hospital.   If they miss you, someone will contact you after you are home.  You are encouraged to enroll in an Outpatient Cardiac Rehab Program       Your Procedural Physician was: Dr Cancino  the phone number is: [phone]    [facility] Clinic:  [phone]  If you are calling after hours, please listen to the entire voicemail, a live  will answer at the end of the message

## 2025-03-06 NOTE — Clinical Note
LCA Cine(s)  injected and visualized utilizing power injector system. Patient will transfer to toilet with DME as needed with minimal assistance with in 3-5 sessions.

## 2025-03-06 NOTE — Clinical Note
RCA Cine(s)  injected and visualized utilizing power injector system. Note Text (......Xxx Chief Complaint.): This diagnosis correlates with the Other (Free Text): LN2 x 1--no additional cost Detail Level: Detailed

## 2025-03-07 RX ORDER — IODIXANOL 320 MG/ML
INJECTION, SOLUTION INTRAVASCULAR
OUTPATIENT
Start: 2025-03-06

## 2025-03-07 NOTE — PROGRESS NOTES
Wrist stable.  Denies pain.  Understands new dosage of bumex.  Will follow up with [facility] outpatient lab for BMP next week.  Hours of lab given to patient.  Follow up scheduled.

## 2025-03-11 ENCOUNTER — TELEPHONE (OUTPATIENT)
Dept: CARDIOLOGY | Facility: CLINIC | Age: 70
End: 2025-03-11
Payer: MEDICARE

## 2025-03-11 NOTE — TELEPHONE ENCOUNTER
Valve Clinic RN Phone Call:  Call made on 3/11/2025 at 1:15 PM by Charlette Montana RN    Reason for call: go over cath results and follow up    Summary of conversation: Called patient to notify  her we would cancel her 3/13 CTA TAVR scan and 3/18 surgery visit with Dr. Calderón. Her findings from her heart cath were more consistent with late moderate stenosis or her aortic valve. Dr. Cancino recommends keeping a close eye with a repeat echo and clinic visit in about 3 months. Patient had no questions at this time.     Additional comments/Actions: Msg sent to .     Instructions given to patient: We will cancel your march pre-TAVR appts. Please keep your 4/9 appt with Dr. Siegel. Danitza will call you in May to schedule an echocardiogram and follow up with us in clinic. Please call us sooner if you have questions or concerns.     Charlette Montana RN on 3/11/2025 at 1:35 PM

## 2025-03-13 ENCOUNTER — TELEPHONE (OUTPATIENT)
Dept: CARDIOLOGY | Facility: CLINIC | Age: 70
End: 2025-03-13
Payer: MEDICARE

## 2025-03-13 ENCOUNTER — LAB (OUTPATIENT)
Dept: LAB | Facility: HOSPITAL | Age: 70
End: 2025-03-13
Payer: MEDICARE

## 2025-03-13 DIAGNOSIS — Z98.890 STATUS POST CORONARY ANGIOGRAM: ICD-10-CM

## 2025-03-13 LAB
ANION GAP SERPL CALCULATED.3IONS-SCNC: 12 MMOL/L (ref 7–15)
BUN SERPL-MCNC: 25.3 MG/DL (ref 8–23)
CALCIUM SERPL-MCNC: 9.9 MG/DL (ref 8.8–10.4)
CHLORIDE SERPL-SCNC: 99 MMOL/L (ref 98–107)
CREAT SERPL-MCNC: 0.9 MG/DL (ref 0.51–0.95)
EGFRCR SERPLBLD CKD-EPI 2021: 68 ML/MIN/1.73M2
GLUCOSE SERPL-MCNC: 118 MG/DL (ref 70–99)
HCO3 SERPL-SCNC: 29 MMOL/L (ref 22–29)
POTASSIUM SERPL-SCNC: 4 MMOL/L (ref 3.4–5.3)
SODIUM SERPL-SCNC: 140 MMOL/L (ref 135–145)

## 2025-03-13 PROCEDURE — 36415 COLL VENOUS BLD VENIPUNCTURE: CPT

## 2025-03-13 PROCEDURE — 80048 BASIC METABOLIC PNL TOTAL CA: CPT

## 2025-03-13 NOTE — TELEPHONE ENCOUNTER
Patient called and left VM on the valve clinic nurse line wondering how long her bandage should stay on after her angiogram she had last week. Discharge instructions state remove after 24 hours. OK to put bandaid on after if oozing. Called and left detailed VM for patient explaining this.     Charlette Montana RN on 3/13/2025 at 2:25 PM

## 2025-04-07 ENCOUNTER — DOCUMENTATION ONLY (OUTPATIENT)
Dept: CARDIOLOGY | Facility: CLINIC | Age: 70
End: 2025-04-07

## 2025-04-07 DIAGNOSIS — I07.1 TRICUSPID VALVE INSUFFICIENCY, UNSPECIFIED ETIOLOGY: ICD-10-CM

## 2025-04-07 DIAGNOSIS — Z00.6 EXAMINATION OF PARTICIPANT OR CONTROL IN CLINICAL RESEARCH: ICD-10-CM

## 2025-04-07 DIAGNOSIS — I50.32 CHRONIC HEART FAILURE WITH PRESERVED EJECTION FRACTION (HFPEF) (H): ICD-10-CM

## 2025-04-07 DIAGNOSIS — Z00.6 EXAMINATION OF PARTICIPANT OR CONTROL IN CLINICAL RESEARCH: Primary | ICD-10-CM

## 2025-04-07 DIAGNOSIS — I07.1 TRICUSPID VALVE INSUFFICIENCY, UNSPECIFIED ETIOLOGY: Primary | ICD-10-CM

## 2025-04-07 DIAGNOSIS — Z98.890 S/P TRICUSPID VALVE REPAIR: ICD-10-CM

## 2025-04-07 PROCEDURE — 99207 PR NO CHARGE-RESEARCH SERVICE: CPT | Performed by: INTERNAL MEDICINE

## 2025-04-07 NOTE — PROGRESS NOTES
CLASP II TR Adverse Event Note    Purpose: Freeman PILAR TrAnScatheter Valve RePair System Pivotal Clinical Trial (CLASP II TR): A prospective, multicenter, randomized, controlled pivotal trial to evaluate the safety and effectiveness of transcatheter tricuspid valve repair with the Freeman PILAR Transcatheter Valve Repair System and optimal medical therapy (OMT) compared to OMT alone in patients with tricuspid regurgitation.     AE term (Medical Diagnosis): osteoarthritis, bilateral kneesAdverse Event Number: 004  Event Onset Date: 2019; sought evaluation & treatment 19 JUN 2024  Date of site awareness: 7 APR 2025  Date sponsor notified: 7 APR 2025    AE detailed description: seen by provider 19JUN2024 with progressive pain in both knees over last 5 yrs. Now required to ambulate with 2 canes. Received bilateral knee injections with steroid.    Dr. Plummer: Please provide your assessment of relationship     Event Relationship:   Related to study device [x] Not related []  Unlikely  [] Possible [] Probable [] Causal     Related to study procedure [x] Not related []  Unlikely  [] Possible [] Probable [] Causal   Related to COVID-19 [] Yes [x] No [] Unknown     Serious Adverse Event Category:    Death  No   Serious deterioration in the health of a subject that led to life threatening illness or injury  No   Serious deterioration in the health of a subject that led to permanent impairment of a body structure or body function  No   Serious deterioration in the health of a subject that resulted in hospitalization or prolongation of hospitalization  No   Serious deterioration in the health of a subject that resulted in medical or surgical intervention to prevent life threatening illness or injury or permanent impairment to a body structure or a body function  No   Serious deterioration in the health of a subject that resulted in chronic disease  No   Fetal distress, fetal death, or a congenital abnormality or birth  defect including physical or mental impairment  No     Is adverse event considered serious? No    Was there bleeding, anemia requiring transfusion or transfusion only associated with this event? No    Action Taken:     medication, other: x-rays of knees, physical therapy    Event Outcome: Ongoing,   Date Recovered:     Anh Solis RN

## 2025-04-09 ENCOUNTER — OFFICE VISIT (OUTPATIENT)
Dept: CARDIOLOGY | Facility: CLINIC | Age: 70
End: 2025-04-09
Payer: MEDICARE

## 2025-04-09 VITALS
SYSTOLIC BLOOD PRESSURE: 114 MMHG | RESPIRATION RATE: 16 BRPM | HEART RATE: 68 BPM | DIASTOLIC BLOOD PRESSURE: 64 MMHG | WEIGHT: 282 LBS | BODY MASS INDEX: 44.17 KG/M2

## 2025-04-09 DIAGNOSIS — I50.32 CHRONIC HEART FAILURE WITH PRESERVED EJECTION FRACTION (HFPEF) (H): Primary | ICD-10-CM

## 2025-04-09 LAB — NT-PROBNP SERPL-MCNC: 1257 PG/ML (ref 0–900)

## 2025-04-09 PROCEDURE — 36415 COLL VENOUS BLD VENIPUNCTURE: CPT | Performed by: INTERNAL MEDICINE

## 2025-04-09 PROCEDURE — 3078F DIAST BP <80 MM HG: CPT | Performed by: INTERNAL MEDICINE

## 2025-04-09 PROCEDURE — 3074F SYST BP LT 130 MM HG: CPT | Performed by: INTERNAL MEDICINE

## 2025-04-09 PROCEDURE — 99214 OFFICE O/P EST MOD 30 MIN: CPT | Performed by: INTERNAL MEDICINE

## 2025-04-09 PROCEDURE — 83880 ASSAY OF NATRIURETIC PEPTIDE: CPT | Performed by: INTERNAL MEDICINE

## 2025-04-09 PROCEDURE — G2211 COMPLEX E/M VISIT ADD ON: HCPCS | Performed by: INTERNAL MEDICINE

## 2025-04-09 NOTE — PROGRESS NOTES
HEART CARE NOTE          Assessment/Recommendations     1. HFpEF/Valvular heart disease   Assessment / Plan  Weight trending up - but, granted it's been over several months; patient denies HF symptoms; will get NTproBNP   High risk for adverse cardiac events 2/2 advanced age, frailty, non-compliance, elevated NTproBNP  GDMT as detailed below; mainstay of treatment for HFpEF includes diuretics and adequate BP control (class I) and SGLT2-I (class 2a); additional medical therapy (ARNI, MRA, ARB) demonstrated less robust evidence for indication but may be considered per guideline recommendations (2b); no indication for BBlockers      Current Pharmacotherapy AHA Guideline-Directed Medical Therapy   Losartan 25 mg daily - on hold 2/2 hypotension ARNI/ARB   Spironolactone 25 mg  MRA   SGLT2 inhibitor not started SGLT2-I    Bumex 4 mg BID Loop diuretic        2. Atrial fibrillation  Assessment / Plan  Rate controlled - currently on metoprolol and apixaban     3. Valvular heart disease  Assessment / Plan  LORRAINE 0.8 - follows with valve clinic; TAVR eval underway; please see detailed notes re: plan of care in chart  Severe TR currently enrolled in CLASP II TR study - s/p 1 PASCALACE device      35 minutes spent reviewing prior records (including documentation, laboratory studies, cardiac testing/imaging), history and physical exam, planning, and subsequent documentation.    The longitudinal plan of care for HFpEF was addressed during this visit. Due to the added complexity in care, I will continue to support Ms. Misty Quezada in the subsequent management of this condition(s) and with the ongoing continuity of care of this condition(s).      History of Present Illness/Subjective    Ms. Misty Quezada is a 68 year old female with a PMHx significant for (per Epic notation) CHFpEF, tricuspid regurgitation, HTN, A-fib, lymphedema admitted on 4/23/2024 with complaints of shortness of breath and increased leg swelling due to  noncompliance with Bumex.  Patient states she stopped taking it a month ago and has gained 70 pounds since.      Today, Mrs. Quezada denies acute cardiac events or complaints; Management plan as detailed above     ECG: Personally reviewed. Afib with RVR.     Repeat echo personally reviewed 4/25/24:  There is moderate concentric left ventricular hypertrophy.  The visual ejection fraction is 55-60%.  Flattened septum is consistent with RV pressure/volume overload.  The right ventricular systolic function is normal.  Low flow, low gradient moderate to severe AS with mean gradient of 25 mmHg.  The calculated aortic valve area is 0.8cm2.  A single Janette-Ace [id number] repair device is securely attached to the  tricuspid leaflets. There is mild to moderate (1-2+) tricuspid regurgitation.  The right ventricular systolic pressure is approximated at 62 mmHg.  Compared to the prior study dated 10/18/2023, there are changes as noted. The  aortic valve stenosis appears to be more severe and pulmonary artery pressures  are higher.     ECHO (personnaly Reviewed on 4/24/24):   1. Left ventricular chamber size, wall thickness and systolic function are  normal. The visually estimated left ventricular ejection fraction is 55-60%.  2. Right ventricle is not well visualized. Chamber size may be mildly  enlarged. Systolic function may be mildly reduced.  3. Severe right atrial enlargement. Moderate left atrial enlargement.  4. A single Janette-Ace [id number] repair device is securely attached to the  tricuspid valve leaflets. There is mild-to-moderate residual tricuspid  regurgitation. No evidence of significant stenosis with peak inflow velocity  1.2 m/s and mean inflow gradient 3 mmHg at a heart rate of approximately 90  beats per minute.  5. Calcified trileaflet aoritc valve with paradoxical low flow, low gradient  (stroke volume index 22.2 mL/m2) aortic stenosis felt to be mild-to-moderate  in severity. Peak forward velocity  measures 2.5 m/s, mean gradient 14 mmHg,  calculated aortic valve area 1.1 cm2, and dimensionless index 0.27. No  significant aortic regurgitation.  6. Mild pulmonary hypertension is present with an estimated pulmonary artery  systolic pressure of 43 mmHg.  7. Compared to the prior study dated 6/1/2023, the degree of tricuspid  regurgitation is less.    Lab results: personally reviewed April 9, 2025; notable for renal function wnl    Medical history and pertinent documents reviewed in Care Everywhere please where applicable see details above        Physical Examination Review of Systems   /64 (BP Location: Right arm, Patient Position: Sitting, Cuff Size: Adult Regular)   Pulse 68   Resp 16   Wt 127.9 kg (282 lb)   BMI 44.17 kg/m    Body mass index is 44.17 kg/m .  Wt Readings from Last 3 Encounters:   04/09/25 127.9 kg (282 lb)   03/06/25 126.6 kg (279 lb)   02/19/25 126.6 kg (279 lb)     General Appearance:   no distress, normal body habitus   ENT/Mouth: membranes moist, no oral lesions or bleeding gums.      EYES:  no scleral icterus, normal conjunctivae   Neck: no carotid bruits or thyromegaly   Chest/Lungs:   lungs are clear to auscultation, no rales or wheezing, equal chest wall expansion    Cardiovascular:   Irregular. Normal first and second heart sounds with +JOHN; no rubs, or gallops; the carotid, radial and posterior tibial pulses are intact, no JVD and trace LE edema bilaterally    Abdomen:  no organomegaly, masses, bruits, or tenderness; bowel sounds are present   Extremities: no cyanosis or clubbing   Skin: no xanthelasma, warm.    Neurologic: NAD     Psychiatric: alert and oriented x3, calm     A complete 10 systems ROS was reviewed  And is negative except what is listed in the HPI.          Medical History  Surgical History Family History Social History   Past Medical History:   Diagnosis Date    (HFpEF) heart failure with preserved ejection fraction (H) 10/17/2022    Aortic valve disorder      Aortic valve stenosis 11/10/2022    Benign essential hypertension     Chronic atrial fibrillation (H)     Congestive heart failure (H)     Elevated troponin 08/21/2021    Hypomagnesemia 10/02/2022    Lymphedema 09/24/2022    Nonrheumatic tricuspid valve regurgitation 02/06/2023    Obese     Tricuspid valve disorders, non-rheumatic     Past Surgical History:   Procedure Laterality Date    APPENDECTOMY      CHOLECYSTECTOMY      CV CORONARY ANGIOGRAM N/A 3/15/2023    Procedure: Coronary Angiogram;  Surgeon: Howard Plummer MD;  Location: [facility]    CV CORONARY ANGIOGRAM N/A 3/6/2025    Procedure: Coronary Angiogram;  Surgeon: Sheldon Cancino MD;  Location: [facility]    CV LEFT HEART CATH N/A 3/15/2023    Procedure: Left Heart Catheterization;  Surgeon: Howard Plummer MD;  Location: [facility]    CV LEFT HEART CATH N/A 3/6/2025    Procedure: Left Heart Catheterization;  Surgeon: Sheldon Cancino MD;  Location: [facility]    CV RIGHT HEART CATH MEASUREMENTS RECORDED N/A 3/15/2023    Procedure: Right Heart Catheterization;  Surgeon: Howard Plummer MD;  Location: [facility]    CV RIGHT HEART CATH MEASUREMENTS RECORDED N/A 3/6/2025    Procedure: Right Heart Catheterization with invasive aortic valve gradient study;  Surgeon: Sheldon Cancino MD;  Location: [facility]    CV TRANSCATHETER TRICUSPID VALVE REPAIR N/A 5/3/2023    Procedure: Transcatheter Tricuspid Valve Repair;  Surgeon: Howard Plummer MD;  Location: [facility]    GASTRIC BYPASS      OR TRANSCATHETER TRICUSPID VALVE REPLACEMENT, FEMORAL PERCUTANEOUS APPROACH (STANDBY) N/A 5/3/2023    Procedure: OR TRANSCATHETER TRICUSPID VALVE REPLACEMENT, FEMORAL PERCUTANEOUS APPROACH (STANDBY);  Surgeon: Kana Calderón MD;  Location: [facility] CATH [facility]    no family history of premature coronary artery disease Social History     Socioeconomic History    Marital status:       Spouse name: Not on file    Number of children: Not on file    Years of education: Not on file    Highest education level: Not on file   Occupational History    Not on file   Tobacco Use    Smoking status: Never    Smokeless tobacco: Never   Substance and Sexual Activity    Alcohol use: Not Currently     Comment: Rarely Uses alcohol    Drug use: Never    Sexual activity: Not on file   Other Topics Concern    Not on file   Social History Narrative    Not on file     Social Drivers of Health     Financial Resource Strain: Not on file   Food Insecurity: Not on file   Transportation Needs: Not on file   Physical Activity: Not on file   Stress: Not on file   Social Connections: Not on file   Interpersonal Safety: Low Risk  (3/6/2025)    Interpersonal Safety     Do you feel physically and emotionally safe where you currently live?: Yes     Within the past 12 months, have you been hit, slapped, kicked or otherwise physically hurt by someone?: No     Within the past 12 months, have you been humiliated or emotionally abused in other ways by your partner or ex-partner?: No   Housing Stability: Not on file           Lab Results    Chemistry/lipid CBC Cardiac Enzymes/BNP/TSH/INR   Lab Results   Component Value Date    CHOL 163 09/09/2016    HDL 42 (L) 09/09/2016    TRIG 81 09/09/2016    BUN 25.3 (H) 03/13/2025     03/13/2025    CO2 29 03/13/2025    Lab Results   Component Value Date    WBC 7.5 03/06/2025    HGB 14.2 03/06/2025    HCT 42.2 03/06/2025    MCV 97 03/06/2025     03/06/2025    Lab Results   Component Value Date    TROPONINI 1.32 (HH) 08/21/2021     (H) 09/02/2021    TSH 3.67 04/24/2024    INR 1.21 (H) 05/04/2023     Lab Results   Component Value Date    TROPONINI 1.32 (HH) 08/21/2021          Weight:    Wt Readings from Last 3 Encounters:   03/06/25 126.6 kg (279 lb)   02/19/25 126.6 kg (279 lb)   02/06/25 123.8 kg (273 lb)       Allergies  No Known Allergies      Surgical  History  Past Surgical History:   Procedure Laterality Date    APPENDECTOMY      CHOLECYSTECTOMY      CV CORONARY ANGIOGRAM N/A 3/15/2023    Procedure: Coronary Angiogram;  Surgeon: Howard Plummer MD;  Location: [facility]    CV CORONARY ANGIOGRAM N/A 3/6/2025    Procedure: Coronary Angiogram;  Surgeon: Sheldon Cancino MD;  Location: [facility]    CV LEFT HEART CATH N/A 3/15/2023    Procedure: Left Heart Catheterization;  Surgeon: Howard Plummer MD;  Location: [facility]    CV LEFT HEART CATH N/A 3/6/2025    Procedure: Left Heart Catheterization;  Surgeon: Sheldon Cancino MD;  Location: [facility]    CV RIGHT HEART CATH MEASUREMENTS RECORDED N/A 3/15/2023    Procedure: Right Heart Catheterization;  Surgeon: Howard Plummer MD;  Location: [facility]    CV RIGHT HEART CATH MEASUREMENTS RECORDED N/A 3/6/2025    Procedure: Right Heart Catheterization with invasive aortic valve gradient study;  Surgeon: Sheldon Cancino MD;  Location: [facility]    CV TRANSCATHETER TRICUSPID VALVE REPAIR N/A 5/3/2023    Procedure: Transcatheter Tricuspid Valve Repair;  Surgeon: Howard Plummer MD;  Location: [facility]    GASTRIC BYPASS      OR TRANSCATHETER TRICUSPID VALVE REPLACEMENT, FEMORAL PERCUTANEOUS APPROACH (STANDBY) N/A 5/3/2023    Procedure: OR TRANSCATHETER TRICUSPID VALVE REPLACEMENT, FEMORAL PERCUTANEOUS APPROACH (STANDBY);  Surgeon: Kana Calderón MD;  Location: [facility] CATH LAB CV       Social History  Tobacco:   History   Smoking Status    Never   Smokeless Tobacco    Never    Alcohol:   Social History    Substance and Sexual Activity      Alcohol use: Not Currently        Comment: Rarely Uses alcohol   Illicit Drugs:   History   Drug Use Unknown       Family History  Family History   Problem Relation Age of Onset    No Known Problems Mother     No Known Problems Father           Tara Siegel MD on  4/9/2025      cc: Lizbet Baer

## 2025-04-09 NOTE — LETTER
4/9/2025    LAVELL WALLACE MD  [facility] [address]  [facility] [id number]    RE: Misty Quezada       Dear Colleague,     I had the pleasure of seeing Misty Quezada in the [facility].    HEART CARE NOTE          Assessment/Recommendations     1. HFpEF/Valvular heart disease   Assessment / Plan  Weight trending up - but, granted it's been over several months; patient denies HF symptoms; will get NTproBNP   High risk for adverse cardiac events 2/2 advanced age, frailty, non-compliance, elevated NTproBNP  GDMT as detailed below; mainstay of treatment for HFpEF includes diuretics and adequate BP control (class I) and SGLT2-I (class 2a); additional medical therapy (ARNI, MRA, ARB) demonstrated less robust evidence for indication but may be considered per guideline recommendations (2b); no indication for BBlockers      Current Pharmacotherapy AHA Guideline-Directed Medical Therapy   Losartan 25 mg daily - on hold 2/2 hypotension ARNI/ARB   Spironolactone 25 mg  MRA   SGLT2 inhibitor not started SGLT2-I    Bumex 4 mg BID Loop diuretic        2. Atrial fibrillation  Assessment / Plan  Rate controlled - currently on metoprolol and apixaban     3. Valvular heart disease  Assessment / Plan  LORRAINE 0.8 - follows with valve clinic; TAVR eval underway; please see detailed notes re: plan of care in chart  Severe TR currently enrolled in CLASP II TR study - s/p 1 PASCALACE device      35 minutes spent reviewing prior records (including documentation, laboratory studies, cardiac testing/imaging), history and physical exam, planning, and subsequent documentation.    The longitudinal plan of care for HFpEF was addressed during this visit. Due to the added complexity in care, I will continue to support MsSkylar Quezada in the subsequent management of this condition(s) and with the ongoing continuity of care of this condition(s).      History of Present Illness/Subjective     Ms. Misty Quezada is a 68 year old female with a PMHx significant for (per Epic notation) CHFpEF, tricuspid regurgitation, HTN, A-fib, lymphedema admitted on 4/23/2024 with complaints of shortness of breath and increased leg swelling due to noncompliance with Bumex.  Patient states she stopped taking it a month ago and has gained 70 pounds since.      Today, Mrs. Quezada denies acute cardiac events or complaints; Management plan as detailed above     ECG: Personally reviewed. Afib with RVR.     Repeat echo personally reviewed 4/25/24:  There is moderate concentric left ventricular hypertrophy.  The visual ejection fraction is 55-60%.  Flattened septum is consistent with RV pressure/volume overload.  The right ventricular systolic function is normal.  Low flow, low gradient moderate to severe AS with mean gradient of 25 mmHg.  The calculated aortic valve area is 0.8cm2.  A single Janette-Ace ekgk-fu-glyl repair device is securely attached to the  tricuspid leaflets. There is mild to moderate (1-2+) tricuspid regurgitation.  The right ventricular systolic pressure is approximated at 62 mmHg.  Compared to the prior study dated 10/18/2023, there are changes as noted. The  aortic valve stenosis appears to be more severe and pulmonary artery pressures  are higher.     ECHO (personnaly Reviewed on 4/24/24):   1. Left ventricular chamber size, wall thickness and systolic function are  normal. The visually estimated left ventricular ejection fraction is 55-60%.  2. Right ventricle is not well visualized. Chamber size may be mildly  enlarged. Systolic function may be mildly reduced.  3. Severe right atrial enlargement. Moderate left atrial enlargement.  4. A single Janette-Ace ejty-ao-nsin repair device is securely attached to the  tricuspid valve leaflets. There is mild-to-moderate residual tricuspid  regurgitation. No evidence of significant stenosis with peak inflow velocity  1.2 m/s and mean inflow gradient 3 mmHg at a  heart rate of approximately 90  beats per minute.  5. Calcified trileaflet aoritc valve with paradoxical low flow, low gradient  (stroke volume index 22.2 mL/m2) aortic stenosis felt to be mild-to-moderate  in severity. Peak forward velocity measures 2.5 m/s, mean gradient 14 mmHg,  calculated aortic valve area 1.1 cm2, and dimensionless index 0.27. No  significant aortic regurgitation.  6. Mild pulmonary hypertension is present with an estimated pulmonary artery  systolic pressure of 43 mmHg.  7. Compared to the prior study dated 6/1/2023, the degree of tricuspid  regurgitation is less.    Lab results: personally reviewed April 9, 2025; notable for renal function wnl    Medical history and pertinent documents reviewed in Care Everywhere please where applicable see details above        Physical Examination Review of Systems   /64 (BP Location: Right arm, Patient Position: Sitting, Cuff Size: Adult Regular)   Pulse 68   Resp 16   Wt 127.9 kg (282 lb)   BMI 44.17 kg/m    Body mass index is 44.17 kg/m .  Wt Readings from Last 3 Encounters:   04/09/25 127.9 kg (282 lb)   03/06/25 126.6 kg (279 lb)   02/19/25 126.6 kg (279 lb)     General Appearance:   no distress, normal body habitus   ENT/Mouth: membranes moist, no oral lesions or bleeding gums.      EYES:  no scleral icterus, normal conjunctivae   Neck: no carotid bruits or thyromegaly   Chest/Lungs:   lungs are clear to auscultation, no rales or wheezing, equal chest wall expansion    Cardiovascular:   Irregular. Normal first and second heart sounds with +JOHN; no rubs, or gallops; the carotid, radial and posterior tibial pulses are intact, no JVD and trace LE edema bilaterally    Abdomen:  no organomegaly, masses, bruits, or tenderness; bowel sounds are present   Extremities: no cyanosis or clubbing   Skin: no xanthelasma, warm.    Neurologic: NAD     Psychiatric: alert and oriented x3, calm     A complete 10 systems ROS was reviewed  And is negative except  what is listed in the HPI.          Medical History  Surgical History Family History Social History   Past Medical History:   Diagnosis Date     (HFpEF) heart failure with preserved ejection fraction (H) 10/17/2022     Aortic valve disorder      Aortic valve stenosis 11/10/2022     Benign essential hypertension      Chronic atrial fibrillation (H)      Congestive heart failure (H)      Elevated troponin 08/21/2021     Hypomagnesemia 10/02/2022     Lymphedema 09/24/2022     Nonrheumatic tricuspid valve regurgitation 02/06/2023     Obese      Tricuspid valve disorders, non-rheumatic     Past Surgical History:   Procedure Laterality Date     APPENDECTOMY       CHOLECYSTECTOMY       CV CORONARY ANGIOGRAM N/A 3/15/2023    Procedure: Coronary Angiogram;  Surgeon: Howard Plummer MD;  Location: [facility]     CV CORONARY ANGIOGRAM N/A 3/6/2025    Procedure: Coronary Angiogram;  Surgeon: Sheldon Cancino MD;  Location: [facility]     CV LEFT HEART CATH N/A 3/15/2023    Procedure: Left Heart Catheterization;  Surgeon: Howard Plummer MD;  Location: [facility]     CV LEFT HEART CATH N/A 3/6/2025    Procedure: Left Heart Catheterization;  Surgeon: Sheldon Cancino MD;  Location: [facility]     CV RIGHT HEART CATH MEASUREMENTS RECORDED N/A 3/15/2023    Procedure: Right Heart Catheterization;  Surgeon: Howard Plummer MD;  Location: [facility]     CV RIGHT HEART CATH MEASUREMENTS RECORDED N/A 3/6/2025    Procedure: Right Heart Catheterization with invasive aortic valve gradient study;  Surgeon: Sheldon Cancino MD;  Location: [facility]     CV TRANSCATHETER TRICUSPID VALVE REPAIR N/A 5/3/2023    Procedure: Transcatheter Tricuspid Valve Repair;  Surgeon: Howard Plummer MD;  Location: [facility]     GASTRIC BYPASS       OR TRANSCATHETER TRICUSPID VALVE REPLACEMENT, FEMORAL PERCUTANEOUS APPROACH (STANDBY) N/A 5/3/2023    Procedure: OR  TRANSCATHETER TRICUSPID VALVE REPLACEMENT, FEMORAL PERCUTANEOUS APPROACH (STANDBY);  Surgeon: Kana Calderón MD;  Location: [facility] CV    no family history of premature coronary artery disease Social History     Socioeconomic History     Marital status:      Spouse name: Not on file     Number of children: Not on file     Years of education: Not on file     Highest education level: Not on file   Occupational History     Not on file   Tobacco Use     Smoking status: Never     Smokeless tobacco: Never   Substance and Sexual Activity     Alcohol use: Not Currently     Comment: Rarely Uses alcohol     Drug use: Never     Sexual activity: Not on file   Other Topics Concern     Not on file   Social History Narrative     Not on file     Social Drivers of Health     Financial Resource Strain: Not on file   Food Insecurity: Not on file   Transportation Needs: Not on file   Physical Activity: Not on file   Stress: Not on file   Social Connections: Not on file   Interpersonal Safety: Low Risk  (3/6/2025)    Interpersonal Safety      Do you feel physically and emotionally safe where you currently live?: Yes      Within the past 12 months, have you been hit, slapped, kicked or otherwise physically hurt by someone?: No      Within the past 12 months, have you been humiliated or emotionally abused in other ways by your partner or ex-partner?: No   Housing Stability: Not on file           Lab Results    Chemistry/lipid CBC Cardiac Enzymes/BNP/TSH/INR   Lab Results   Component Value Date    CHOL 163 09/09/2016    HDL 42 (L) 09/09/2016    TRIG 81 09/09/2016    BUN 25.3 (H) 03/13/2025     03/13/2025    CO2 29 03/13/2025    Lab Results   Component Value Date    WBC 7.5 03/06/2025    HGB 14.2 03/06/2025    HCT 42.2 03/06/2025    MCV 97 03/06/2025     03/06/2025    Lab Results   Component Value Date    TROPONINI 1.32 (HH) 08/21/2021     (H) 09/02/2021    TSH 3.67 04/24/2024    INR 1.21 (H)  05/04/2023     Lab Results   Component Value Date    TROPONINI 1.32 (HH) 08/21/2021          Weight:    Wt Readings from Last 3 Encounters:   03/06/25 126.6 kg (279 lb)   02/19/25 126.6 kg (279 lb)   02/06/25 123.8 kg (273 lb)       Allergies  No Known Allergies      Surgical History  Past Surgical History:   Procedure Laterality Date     APPENDECTOMY       CHOLECYSTECTOMY       CV CORONARY ANGIOGRAM N/A 3/15/2023    Procedure: Coronary Angiogram;  Surgeon: Howard Plummer MD;  Location: [facility]     CV CORONARY ANGIOGRAM N/A 3/6/2025    Procedure: Coronary Angiogram;  Surgeon: Sheldon Cancino MD;  Location: [facility]     CV LEFT HEART CATH N/A 3/15/2023    Procedure: Left Heart Catheterization;  Surgeon: Howard Plummer MD;  Location: [facility]     CV LEFT HEART CATH N/A 3/6/2025    Procedure: Left Heart Catheterization;  Surgeon: Sheldon Cancino MD;  Location: [facility]     CV RIGHT HEART CATH MEASUREMENTS RECORDED N/A 3/15/2023    Procedure: Right Heart Catheterization;  Surgeon: Howard Plummer MD;  Location: [facility]     CV RIGHT HEART CATH MEASUREMENTS RECORDED N/A 3/6/2025    Procedure: Right Heart Catheterization with invasive aortic valve gradient study;  Surgeon: Sheldon Cancino MD;  Location: [facility]     CV TRANSCATHETER TRICUSPID VALVE REPAIR N/A 5/3/2023    Procedure: Transcatheter Tricuspid Valve Repair;  Surgeon: Howard Plummer MD;  Location: [facility]     GASTRIC BYPASS       OR TRANSCATHETER TRICUSPID VALVE REPLACEMENT, FEMORAL PERCUTANEOUS APPROACH (STANDBY) N/A 5/3/2023    Procedure: OR TRANSCATHETER TRICUSPID VALVE REPLACEMENT, FEMORAL PERCUTANEOUS APPROACH (STANDBY);  Surgeon: Kana Calderón MD;  Location: [facility]       Social History  Tobacco:   History   Smoking Status     Never   Smokeless Tobacco     Never    Alcohol:   Social History    Substance and Sexual Activity       Alcohol use: Not Currently        Comment: Rarely Uses alcohol   Illicit Drugs:   History   Drug Use Unknown       Family History  Family History   Problem Relation Age of Onset     No Known Problems Mother      No Known Problems Father           Tara Siegel MD on 4/9/2025      cc: Lizbet Baer      Thank you for allowing me to participate in the care of your patient.      Sincerely,     Tara Siegel MD     [facility]  cc:   ERICK Mar CNP  [address]  [place] [phone]

## 2025-06-16 ENCOUNTER — TELEPHONE (OUTPATIENT)
Dept: CARDIOLOGY | Facility: CLINIC | Age: 70
End: 2025-06-16
Payer: MEDICARE

## 2025-06-16 NOTE — TELEPHONE ENCOUNTER
Bartolo attempts to schedule follow up in valve clinic.  Writer did leave contact information if patient decides to return call to schedule.

## 2025-06-17 NOTE — TELEPHONE ENCOUNTER
Patient called and scheduled echo only. Needs provider visit as well. Sent letter to clarify this and provide scheduling number.     Charlette Montana RN on 6/17/2025 at 3:45 PM

## 2025-08-06 ENCOUNTER — HOSPITAL ENCOUNTER (OUTPATIENT)
Dept: CARDIOLOGY | Facility: HOSPITAL | Age: 70
Discharge: HOME | End: 2025-08-06
Attending: INTERNAL MEDICINE
Payer: MEDICARE

## 2025-08-06 DIAGNOSIS — Z98.890 S/P TRICUSPID VALVE REPAIR: ICD-10-CM

## 2025-08-06 DIAGNOSIS — Z00.6 EXAMINATION OF PARTICIPANT OR CONTROL IN CLINICAL RESEARCH: ICD-10-CM

## 2025-08-06 DIAGNOSIS — I07.1 TRICUSPID VALVE INSUFFICIENCY, UNSPECIFIED ETIOLOGY: ICD-10-CM

## 2025-08-06 DIAGNOSIS — I50.32 CHRONIC HEART FAILURE WITH PRESERVED EJECTION FRACTION (HFPEF) (H): ICD-10-CM

## 2025-08-06 LAB — LVEF ECHO: NORMAL

## 2025-08-06 PROCEDURE — 999N000208 ECHOCARDIOGRAM COMPLETE

## 2025-08-06 PROCEDURE — 255N000002 HC RX 255 OP 636: Performed by: INTERNAL MEDICINE

## 2025-08-06 RX ADMIN — PERFLUTREN 4 ML: 6.52 INJECTION, SUSPENSION INTRAVENOUS at 14:43

## 2025-08-11 ENCOUNTER — TELEPHONE (OUTPATIENT)
Dept: CARDIOLOGY | Facility: CLINIC | Age: 70
End: 2025-08-11
Payer: MEDICARE

## 2025-08-11 DIAGNOSIS — I50.32 CHRONIC HEART FAILURE WITH PRESERVED EJECTION FRACTION (HFPEF) (H): Primary | ICD-10-CM

## 2025-08-11 DIAGNOSIS — I07.1 TRICUSPID VALVE INSUFFICIENCY, UNSPECIFIED ETIOLOGY: ICD-10-CM

## 2025-08-11 DIAGNOSIS — Z00.6 EXAMINATION OF PARTICIPANT OR CONTROL IN CLINICAL RESEARCH: ICD-10-CM

## 2025-08-11 DIAGNOSIS — Z98.890 S/P TRICUSPID VALVE REPAIR: ICD-10-CM

## 2025-08-11 PROCEDURE — 99207 PR NO CHARGE-RESEARCH SERVICE: CPT

## 2025-08-19 ENCOUNTER — TELEPHONE (OUTPATIENT)
Dept: CARDIOLOGY | Facility: CLINIC | Age: 70
End: 2025-08-19
Payer: MEDICARE

## 2025-08-19 ENCOUNTER — DOCUMENTATION ONLY (OUTPATIENT)
Dept: CARDIOLOGY | Facility: CLINIC | Age: 70
End: 2025-08-19
Payer: MEDICARE

## 2025-08-19 DIAGNOSIS — I48.20 CHRONIC ATRIAL FIBRILLATION (H): ICD-10-CM

## 2025-08-19 DIAGNOSIS — I50.30 HEART FAILURE WITH PRESERVED EJECTION FRACTION, UNSPECIFIED HF CHRONICITY (H): ICD-10-CM

## 2025-08-19 DIAGNOSIS — I07.1 TRICUSPID VALVE INSUFFICIENCY, UNSPECIFIED ETIOLOGY: Primary | ICD-10-CM

## 2025-08-19 RX ORDER — METOPROLOL SUCCINATE 25 MG/1
50 TABLET, EXTENDED RELEASE ORAL DAILY
Qty: 180 TABLET | Refills: 0 | Status: SHIPPED | OUTPATIENT
Start: 2025-08-19

## 2025-08-20 ENCOUNTER — OFFICE VISIT (OUTPATIENT)
Dept: CARDIOLOGY | Facility: CLINIC | Age: 70
End: 2025-08-20
Payer: MEDICARE

## 2025-08-20 ENCOUNTER — ALLIED HEALTH/NURSE VISIT (OUTPATIENT)
Dept: CARDIOLOGY | Facility: CLINIC | Age: 70
End: 2025-08-20
Payer: MEDICARE

## 2025-08-20 VITALS
DIASTOLIC BLOOD PRESSURE: 71 MMHG | BODY MASS INDEX: 43.85 KG/M2 | RESPIRATION RATE: 14 BRPM | SYSTOLIC BLOOD PRESSURE: 119 MMHG | HEART RATE: 98 BPM | WEIGHT: 280 LBS | OXYGEN SATURATION: 96 %

## 2025-08-20 VITALS
OXYGEN SATURATION: 96 % | DIASTOLIC BLOOD PRESSURE: 71 MMHG | WEIGHT: 280 LBS | HEART RATE: 98 BPM | SYSTOLIC BLOOD PRESSURE: 119 MMHG | BODY MASS INDEX: 43.85 KG/M2

## 2025-08-20 DIAGNOSIS — I50.32 CHRONIC HEART FAILURE WITH PRESERVED EJECTION FRACTION (HFPEF) (H): Primary | ICD-10-CM

## 2025-08-20 DIAGNOSIS — I50.30 HEART FAILURE WITH PRESERVED EJECTION FRACTION, UNSPECIFIED HF CHRONICITY (H): ICD-10-CM

## 2025-08-20 DIAGNOSIS — Z00.6 EXAMINATION OF PARTICIPANT OR CONTROL IN CLINICAL RESEARCH: ICD-10-CM

## 2025-08-20 DIAGNOSIS — I48.20 CHRONIC ATRIAL FIBRILLATION (H): ICD-10-CM

## 2025-08-20 DIAGNOSIS — I48.20 CHRONIC ATRIAL FIBRILLATION (H): Primary | ICD-10-CM

## 2025-08-20 DIAGNOSIS — I07.1 TRICUSPID VALVE INSUFFICIENCY, UNSPECIFIED ETIOLOGY: ICD-10-CM

## 2025-08-20 DIAGNOSIS — I35.0 NONRHEUMATIC AORTIC VALVE STENOSIS: ICD-10-CM

## 2025-08-20 DIAGNOSIS — I35.0 NONRHEUMATIC AORTIC VALVE STENOSIS: Primary | ICD-10-CM

## 2025-08-20 LAB — NT-PROBNP SERPL-MCNC: 1409 PG/ML (ref 0–353)

## 2025-08-20 PROCEDURE — 99207 PR NO CHARGE LOS: CPT

## (undated) DEVICE — ELECTRODE ADULT PACING MULTI P-211-M1

## (undated) DEVICE — TRANSDUCER W/MONITORING TRAY 42632-05

## (undated) DEVICE — SYR ANGIOGRAPHY MULTIUSE KIT ACIST 014612

## (undated) DEVICE — SYSTEM PANNUS RETENTION 4 PAD 2 STRAP CZ-PRS-04

## (undated) DEVICE — SHTH INTRO 0.021IN ID 6FR DIA

## (undated) DEVICE — ELECTRODE DEFIB CADENCE 22550R

## (undated) DEVICE — CATH ANGIO LANGSTRON 6FRX110CM 145DEG 4H 5540

## (undated) DEVICE — SLEEVE TR BAND RADIAL COMPRESSION DEVICE 24CM TRB24-REG

## (undated) DEVICE — SHEATH GUIDE SLENDER 7FRX10CM SS 80-1070

## (undated) DEVICE — DILATOR VASCULAR 16FRX20CM G01212

## (undated) DEVICE — SHEATH GLIDE RADIAL 5FR 10CM .021

## (undated) DEVICE — DILATOR VASCULAR 12FRX20CM G00995

## (undated) DEVICE — MANIFOLD KIT ANGIO AUTOMATED 014613

## (undated) DEVICE — CATH PULM ART 7FR X 110CM, SWA

## (undated) DEVICE — GUIDEWIRE STRAIGHT .035 M001491601

## (undated) DEVICE — INTRO SHEATH 7FRX10CM PINNACLE RSS702

## (undated) DEVICE — Device

## (undated) DEVICE — KIT HAND CONTROL ACIST 014644 AR-P54

## (undated) DEVICE — WIRE GUIDE 0.035"X260CM AMPTLAZ XSTIFF CVD THSCF-35-260-3-A

## (undated) DEVICE — 0.035IN X 150CM INQWIRE DIAGNOSTIC GUIDEWIRE, FIXED-CORE HEPARIN COATED, STRAIGHT TIP

## (undated) DEVICE — CATH THERMODILUTION 7.0FR 151F7

## (undated) DEVICE — GUIDEWIRE NITINOL .018 80CM N180802

## (undated) DEVICE — CUSTOM PACK CORONARY SAN5BCRHEA

## (undated) DEVICE — INTRO SHEATH 4FRX10CM PINNACLE RSS402

## (undated) DEVICE — CATH ANGIO JUDKINS JL3.5 6FRX100CM INFINITI 534618T

## (undated) DEVICE — CATH DIAGNOSTIC RADIAL 5FR TIG 4.0

## (undated) DEVICE — GUIDEWIRE STRT .025 SCN M001491001

## (undated) DEVICE — INTRO MICRO MINI STICK 4FR X 10CM STIFF H965457511

## (undated) DEVICE — CLOSURE DEVICE 6FR VASC PROGLIDE MEDICATED SUTURE 12673-03

## (undated) DEVICE — EXCHANGE WIRE .035 260 STAR/JFC/035/260/ M001491681

## (undated) DEVICE — CATH ANGIO JUDKINS R4 6FRX100CM INFINITI 534621T

## (undated) DEVICE — GUIDEWIRE FORTE FLOPPY J TOP 34949-05J

## (undated) DEVICE — GW .035IN X 260CM WHOLEY FLOPPY TIP WWFS35260

## (undated) DEVICE — DILATOR VASCULAR 20FRX20CM G01471

## (undated) DEVICE — CATH ANGIO WEDGE PRESSURE 5FRX110CM DL AI-07124

## (undated) DEVICE — INTRO SHEATH 8FRX10CM PINNACLE RSS802

## (undated) RX ORDER — FENTANYL CITRATE 50 UG/ML
INJECTION, SOLUTION INTRAMUSCULAR; INTRAVENOUS
Status: DISPENSED
Start: 2023-05-03

## (undated) RX ORDER — DIAZEPAM 5 MG
TABLET ORAL
Status: DISPENSED
Start: 2023-03-15

## (undated) RX ORDER — FENTANYL CITRATE 50 UG/ML
INJECTION, SOLUTION INTRAMUSCULAR; INTRAVENOUS
Status: DISPENSED
Start: 2023-03-15

## (undated) RX ORDER — FENTANYL CITRATE 50 UG/ML
INJECTION, SOLUTION INTRAMUSCULAR; INTRAVENOUS
Status: DISPENSED
Start: 2025-03-06

## (undated) RX ORDER — HEPARIN SODIUM 1000 [USP'U]/ML
INJECTION, SOLUTION INTRAVENOUS; SUBCUTANEOUS
Status: DISPENSED
Start: 2023-05-03

## (undated) RX ORDER — PROPOFOL 10 MG/ML
INJECTION, EMULSION INTRAVENOUS
Status: DISPENSED
Start: 2023-05-03

## (undated) RX ORDER — FENTANYL CITRATE-0.9 % NACL/PF 10 MCG/ML
PLASTIC BAG, INJECTION (ML) INTRAVENOUS
Status: DISPENSED
Start: 2023-05-03

## (undated) RX ORDER — FUROSEMIDE 10 MG/ML
INJECTION INTRAMUSCULAR; INTRAVENOUS
Status: DISPENSED
Start: 2023-05-03

## (undated) RX ORDER — DEXAMETHASONE SODIUM PHOSPHATE 10 MG/ML
INJECTION, SOLUTION INTRAMUSCULAR; INTRAVENOUS
Status: DISPENSED
Start: 2023-05-03

## (undated) RX ORDER — ASPIRIN 325 MG
TABLET ORAL
Status: DISPENSED
Start: 2023-05-03

## (undated) RX ORDER — NITROGLYCERIN 5 MG/ML
VIAL (ML) INTRAVENOUS
Status: DISPENSED
Start: 2025-03-06

## (undated) RX ORDER — CEFAZOLIN SODIUM/WATER 2 G/20 ML
SYRINGE (ML) INTRAVENOUS
Status: DISPENSED
Start: 2023-05-03

## (undated) RX ORDER — ASPIRIN 325 MG
TABLET ORAL
Status: DISPENSED
Start: 2025-03-06

## (undated) RX ORDER — ASPIRIN 325 MG
TABLET ORAL
Status: DISPENSED
Start: 2023-03-15

## (undated) RX ORDER — OXYCODONE HYDROCHLORIDE 5 MG/1
TABLET ORAL
Status: DISPENSED
Start: 2023-05-03

## (undated) RX ORDER — EPHEDRINE SULFATE 50 MG/ML
INJECTION, SOLUTION INTRAMUSCULAR; INTRAVENOUS; SUBCUTANEOUS
Status: DISPENSED
Start: 2023-05-03